# Patient Record
Sex: FEMALE | Race: BLACK OR AFRICAN AMERICAN | NOT HISPANIC OR LATINO | Employment: UNEMPLOYED | URBAN - METROPOLITAN AREA
[De-identification: names, ages, dates, MRNs, and addresses within clinical notes are randomized per-mention and may not be internally consistent; named-entity substitution may affect disease eponyms.]

---

## 2017-01-23 ENCOUNTER — HOSPITAL ENCOUNTER (INPATIENT)
Facility: OTHER | Age: 27
LOS: 5 days | Discharge: HOME | DRG: 812 | End: 2017-01-30
Attending: EMERGENCY MEDICINE | Admitting: HOSPITALIST
Payer: MEDICAID

## 2017-01-23 DIAGNOSIS — D57.00 HB-SS DISEASE WITH CRISIS: ICD-10-CM

## 2017-01-23 DIAGNOSIS — D57.00 SICKLE CELL ANEMIA WITH CRISIS: Primary | ICD-10-CM

## 2017-01-23 DIAGNOSIS — I50.9 HEART FAILURE: ICD-10-CM

## 2017-01-23 DIAGNOSIS — R07.9 CHEST PAIN, UNSPECIFIED TYPE: ICD-10-CM

## 2017-01-23 LAB
ALBUMIN SERPL BCP-MCNC: 3.9 G/DL
ALP SERPL-CCNC: 128 U/L
ALT SERPL W/O P-5'-P-CCNC: 38 U/L
ANION GAP SERPL CALC-SCNC: 9 MMOL/L
AST SERPL-CCNC: 55 U/L
B-HCG UR QL: NEGATIVE
BASOPHILS # BLD AUTO: 0.04 K/UL
BASOPHILS NFR BLD: 0.3 %
BILIRUB SERPL-MCNC: 3.5 MG/DL
BILIRUB UR QL STRIP: NEGATIVE
BUN SERPL-MCNC: 9 MG/DL
CALCIUM SERPL-MCNC: 9.1 MG/DL
CHLORIDE SERPL-SCNC: 108 MMOL/L
CLARITY UR: CLEAR
CO2 SERPL-SCNC: 23 MMOL/L
COLOR UR: YELLOW
CREAT SERPL-MCNC: 0.7 MG/DL
CTP QC/QA: YES
DIFFERENTIAL METHOD: ABNORMAL
EOSINOPHIL # BLD AUTO: 0 K/UL
EOSINOPHIL NFR BLD: 0.1 %
ERYTHROCYTE [DISTWIDTH] IN BLOOD BY AUTOMATED COUNT: 17.7 %
EST. GFR  (AFRICAN AMERICAN): >60 ML/MIN/1.73 M^2
EST. GFR  (NON AFRICAN AMERICAN): >60 ML/MIN/1.73 M^2
GLUCOSE SERPL-MCNC: 100 MG/DL
GLUCOSE UR QL STRIP: NEGATIVE
HCT VFR BLD AUTO: 22.2 %
HGB BLD-MCNC: 7.4 G/DL
HGB UR QL STRIP: NEGATIVE
KETONES UR QL STRIP: NEGATIVE
LEUKOCYTE ESTERASE UR QL STRIP: NEGATIVE
LYMPHOCYTES # BLD AUTO: 1.3 K/UL
LYMPHOCYTES NFR BLD: 8.9 %
MCH RBC QN AUTO: 29.5 PG
MCHC RBC AUTO-ENTMCNC: 33.3 %
MCV RBC AUTO: 88 FL
MONOCYTES # BLD AUTO: 1.9 K/UL
MONOCYTES NFR BLD: 12.8 %
NEUTROPHILS # BLD AUTO: 11.4 K/UL
NEUTROPHILS NFR BLD: 77.1 %
NITRITE UR QL STRIP: NEGATIVE
PH UR STRIP: 6 [PH] (ref 5–8)
PLATELET # BLD AUTO: 449 K/UL
PMV BLD AUTO: 8.3 FL
POTASSIUM SERPL-SCNC: 4 MMOL/L
PROT SERPL-MCNC: 7.4 G/DL
PROT UR QL STRIP: NEGATIVE
RBC # BLD AUTO: 2.51 M/UL
RETICS/RBC NFR AUTO: 11.1 %
SODIUM SERPL-SCNC: 140 MMOL/L
SP GR UR STRIP: 1.01 (ref 1–1.03)
TROPONIN I SERPL DL<=0.01 NG/ML-MCNC: <0.006 NG/ML
URN SPEC COLLECT METH UR: NORMAL
UROBILINOGEN UR STRIP-ACNC: 1 EU/DL
WBC # BLD AUTO: 14.8 K/UL

## 2017-01-23 PROCEDURE — 81003 URINALYSIS AUTO W/O SCOPE: CPT

## 2017-01-23 PROCEDURE — 85025 COMPLETE CBC W/AUTO DIFF WBC: CPT

## 2017-01-23 PROCEDURE — 63600175 PHARM REV CODE 636 W HCPCS: Performed by: HOSPITALIST

## 2017-01-23 PROCEDURE — 85045 AUTOMATED RETICULOCYTE COUNT: CPT

## 2017-01-23 PROCEDURE — 63600175 PHARM REV CODE 636 W HCPCS: Performed by: EMERGENCY MEDICINE

## 2017-01-23 PROCEDURE — G0378 HOSPITAL OBSERVATION PER HR: HCPCS

## 2017-01-23 PROCEDURE — 25000003 PHARM REV CODE 250: Performed by: HOSPITALIST

## 2017-01-23 PROCEDURE — 20000000 HC ICU ROOM

## 2017-01-23 PROCEDURE — 96374 THER/PROPH/DIAG INJ IV PUSH: CPT

## 2017-01-23 PROCEDURE — 93005 ELECTROCARDIOGRAM TRACING: CPT

## 2017-01-23 PROCEDURE — 93010 ELECTROCARDIOGRAM REPORT: CPT | Mod: ,,, | Performed by: INTERNAL MEDICINE

## 2017-01-23 PROCEDURE — 81025 URINE PREGNANCY TEST: CPT | Performed by: EMERGENCY MEDICINE

## 2017-01-23 PROCEDURE — 25000003 PHARM REV CODE 250: Performed by: EMERGENCY MEDICINE

## 2017-01-23 PROCEDURE — 96376 TX/PRO/DX INJ SAME DRUG ADON: CPT

## 2017-01-23 PROCEDURE — 99285 EMERGENCY DEPT VISIT HI MDM: CPT

## 2017-01-23 PROCEDURE — 25500020 PHARM REV CODE 255: Performed by: EMERGENCY MEDICINE

## 2017-01-23 PROCEDURE — 84484 ASSAY OF TROPONIN QUANT: CPT

## 2017-01-23 PROCEDURE — 80053 COMPREHEN METABOLIC PANEL: CPT

## 2017-01-23 PROCEDURE — 96375 TX/PRO/DX INJ NEW DRUG ADDON: CPT

## 2017-01-23 PROCEDURE — 96361 HYDRATE IV INFUSION ADD-ON: CPT

## 2017-01-23 RX ORDER — MORPHINE SULFATE 60 MG/1
60 TABLET, FILM COATED, EXTENDED RELEASE ORAL EVERY 12 HOURS PRN
Status: ON HOLD | COMMUNITY
End: 2017-10-11 | Stop reason: HOSPADM

## 2017-01-23 RX ORDER — HYDROMORPHONE HYDROCHLORIDE 1 MG/ML
1 INJECTION, SOLUTION INTRAMUSCULAR; INTRAVENOUS; SUBCUTANEOUS
Status: COMPLETED | OUTPATIENT
Start: 2017-01-23 | End: 2017-01-23

## 2017-01-23 RX ORDER — DIPHENHYDRAMINE HYDROCHLORIDE 50 MG/ML
25 INJECTION INTRAMUSCULAR; INTRAVENOUS
Status: COMPLETED | OUTPATIENT
Start: 2017-01-23 | End: 2017-01-23

## 2017-01-23 RX ORDER — ONDANSETRON 4 MG/1
4 TABLET, ORALLY DISINTEGRATING ORAL EVERY 8 HOURS PRN
COMMUNITY
End: 2022-03-02

## 2017-01-23 RX ORDER — DIPHENHYDRAMINE HCL 25 MG
25 CAPSULE ORAL
Status: COMPLETED | OUTPATIENT
Start: 2017-01-23 | End: 2017-01-23

## 2017-01-23 RX ORDER — HYDROMORPHONE HYDROCHLORIDE 2 MG/ML
2 INJECTION, SOLUTION INTRAMUSCULAR; INTRAVENOUS; SUBCUTANEOUS
Status: COMPLETED | OUTPATIENT
Start: 2017-01-23 | End: 2017-01-23

## 2017-01-23 RX ORDER — OXYCODONE AND ACETAMINOPHEN 10; 325 MG/1; MG/1
1 TABLET ORAL EVERY 8 HOURS PRN
Status: ON HOLD | COMMUNITY
End: 2017-01-30 | Stop reason: HOSPADM

## 2017-01-23 RX ORDER — FAMOTIDINE 20 MG/1
20 TABLET, FILM COATED ORAL EVERY MORNING
COMMUNITY
End: 2022-03-02

## 2017-01-23 RX ORDER — OXYCODONE HYDROCHLORIDE 10 MG/1
10 TABLET ORAL EVERY 12 HOURS PRN
Status: ON HOLD | COMMUNITY
End: 2017-01-30

## 2017-01-23 RX ORDER — TRAMADOL HYDROCHLORIDE 50 MG/1
50 TABLET ORAL EVERY 6 HOURS PRN
Status: ON HOLD | COMMUNITY
End: 2017-09-06 | Stop reason: HOSPADM

## 2017-01-23 RX ORDER — HYDROMORPHONE HYDROCHLORIDE 2 MG/ML
2 INJECTION, SOLUTION INTRAMUSCULAR; INTRAVENOUS; SUBCUTANEOUS
Status: DISCONTINUED | OUTPATIENT
Start: 2017-01-23 | End: 2017-01-25

## 2017-01-23 RX ORDER — OXYCODONE HYDROCHLORIDE 5 MG/1
10 TABLET ORAL EVERY 4 HOURS PRN
Status: DISCONTINUED | OUTPATIENT
Start: 2017-01-23 | End: 2017-01-30 | Stop reason: HOSPADM

## 2017-01-23 RX ORDER — DIPHENHYDRAMINE HYDROCHLORIDE 50 MG/ML
25 INJECTION INTRAMUSCULAR; INTRAVENOUS EVERY 6 HOURS PRN
Status: DISCONTINUED | OUTPATIENT
Start: 2017-01-23 | End: 2017-01-26

## 2017-01-23 RX ORDER — ACETAMINOPHEN 325 MG/1
650 TABLET ORAL EVERY 4 HOURS PRN
Status: DISCONTINUED | OUTPATIENT
Start: 2017-01-23 | End: 2017-01-30 | Stop reason: HOSPADM

## 2017-01-23 RX ORDER — HYDROXYUREA 500 MG/1
1000 CAPSULE ORAL 2 TIMES DAILY
Status: DISCONTINUED | OUTPATIENT
Start: 2017-01-23 | End: 2017-01-27

## 2017-01-23 RX ORDER — POLYETHYLENE GLYCOL 3350 17 G/17G
17 POWDER, FOR SOLUTION ORAL 2 TIMES DAILY PRN
Status: DISCONTINUED | OUTPATIENT
Start: 2017-01-23 | End: 2017-01-30 | Stop reason: HOSPADM

## 2017-01-23 RX ORDER — ENOXAPARIN SODIUM 100 MG/ML
40 INJECTION SUBCUTANEOUS EVERY 24 HOURS
Status: DISCONTINUED | OUTPATIENT
Start: 2017-01-24 | End: 2017-01-30 | Stop reason: HOSPADM

## 2017-01-23 RX ORDER — AMOXICILLIN 250 MG
1 CAPSULE ORAL 2 TIMES DAILY
Status: DISCONTINUED | OUTPATIENT
Start: 2017-01-23 | End: 2017-01-30 | Stop reason: HOSPADM

## 2017-01-23 RX ORDER — FOLIC ACID 1 MG/1
1 TABLET ORAL 2 TIMES DAILY
Status: DISCONTINUED | OUTPATIENT
Start: 2017-01-23 | End: 2017-01-30 | Stop reason: HOSPADM

## 2017-01-23 RX ORDER — FAMOTIDINE 20 MG/1
20 TABLET, FILM COATED ORAL EVERY MORNING
Status: DISCONTINUED | OUTPATIENT
Start: 2017-01-24 | End: 2017-01-30 | Stop reason: HOSPADM

## 2017-01-23 RX ORDER — DEFERASIROX 500 MG/1
1000 TABLET, FOR SUSPENSION ORAL EVERY MORNING
Status: DISCONTINUED | OUTPATIENT
Start: 2017-01-24 | End: 2017-01-30 | Stop reason: HOSPADM

## 2017-01-23 RX ORDER — SODIUM CHLORIDE 9 MG/ML
INJECTION, SOLUTION INTRAVENOUS CONTINUOUS
Status: DISCONTINUED | OUTPATIENT
Start: 2017-01-23 | End: 2017-01-30 | Stop reason: HOSPADM

## 2017-01-23 RX ORDER — ONDANSETRON 2 MG/ML
4 INJECTION INTRAMUSCULAR; INTRAVENOUS EVERY 12 HOURS PRN
Status: DISCONTINUED | OUTPATIENT
Start: 2017-01-23 | End: 2017-01-30 | Stop reason: HOSPADM

## 2017-01-23 RX ADMIN — DIPHENHYDRAMINE HYDROCHLORIDE 25 MG: 25 CAPSULE ORAL at 03:01

## 2017-01-23 RX ADMIN — HYDROMORPHONE HYDROCHLORIDE 2 MG: 2 INJECTION, SOLUTION INTRAMUSCULAR; INTRAVENOUS; SUBCUTANEOUS at 08:01

## 2017-01-23 RX ADMIN — DIPHENHYDRAMINE HYDROCHLORIDE 25 MG: 50 INJECTION, SOLUTION INTRAMUSCULAR; INTRAVENOUS at 04:01

## 2017-01-23 RX ADMIN — DIPHENHYDRAMINE HYDROCHLORIDE 25 MG: 50 INJECTION, SOLUTION INTRAMUSCULAR; INTRAVENOUS at 10:01

## 2017-01-23 RX ADMIN — STANDARDIZED SENNA CONCENTRATE AND DOCUSATE SODIUM 1 TABLET: 8.6; 5 TABLET, FILM COATED ORAL at 10:01

## 2017-01-23 RX ADMIN — SODIUM CHLORIDE: 0.9 INJECTION, SOLUTION INTRAVENOUS at 10:01

## 2017-01-23 RX ADMIN — DIPHENHYDRAMINE HYDROCHLORIDE 25 MG: 25 CAPSULE ORAL at 02:01

## 2017-01-23 RX ADMIN — HYDROMORPHONE HYDROCHLORIDE 1 MG: 1 INJECTION, SOLUTION INTRAMUSCULAR; INTRAVENOUS; SUBCUTANEOUS at 02:01

## 2017-01-23 RX ADMIN — HYDROXYUREA 1000 MG: 500 CAPSULE ORAL at 10:01

## 2017-01-23 RX ADMIN — OXYCODONE HYDROCHLORIDE 10 MG: 5 TABLET ORAL at 10:01

## 2017-01-23 RX ADMIN — FOLIC ACID 1 MG: 1 TABLET ORAL at 10:01

## 2017-01-23 RX ADMIN — IOHEXOL 75 ML: 350 INJECTION, SOLUTION INTRAVENOUS at 05:01

## 2017-01-23 RX ADMIN — SODIUM CHLORIDE 1000 ML: 0.9 INJECTION, SOLUTION INTRAVENOUS at 02:01

## 2017-01-23 RX ADMIN — HYDROMORPHONE HYDROCHLORIDE 2 MG: 2 INJECTION, SOLUTION INTRAMUSCULAR; INTRAVENOUS; SUBCUTANEOUS at 11:01

## 2017-01-23 RX ADMIN — HYDROMORPHONE HYDROCHLORIDE 1 MG: 1 INJECTION, SOLUTION INTRAMUSCULAR; INTRAVENOUS; SUBCUTANEOUS at 04:01

## 2017-01-23 RX ADMIN — HYDROMORPHONE HYDROCHLORIDE 2 MG: 2 INJECTION, SOLUTION INTRAMUSCULAR; INTRAVENOUS; SUBCUTANEOUS at 03:01

## 2017-01-23 RX ADMIN — SODIUM CHLORIDE 1000 ML: 0.9 INJECTION, SOLUTION INTRAVENOUS at 04:01

## 2017-01-23 NOTE — ED NOTES
Pt with hx of sickle cell reporting to ED with c/o generalized back pain, CP, and L shoulder pain since yesterday. Pt reporting pain 9/10/. Pt is able to move all extremities well, sensation and movement intact. Pt AAOx4 and appropriate at this time. Respirations even and unlabored. No acute distress noted. Awaiting further orders. Pt updated on POC. Bed is locked and in lowest position with side rails up x2. Call bell within reach and pt oriented to use of call bell. Pt on continuous cardiac monitoring, continuous pulse ox, and continuous BP cuff. Will continue to monitor.

## 2017-01-23 NOTE — IP AVS SNAPSHOT
[facility] Location ([facility])  [address] [place], [place] [id number]  Phone: [phone]           Patient Discharge Instructions     Our goal is to set you up for success. This packet includes information on your condition, medications, and your home care. It will help you to care for yourself so you don't get sicker and need to go back to the hospital.     Please ask your nurse if you have any questions.        There are many details to remember when preparing to leave the hospital. Here is what you will need to do:    1. Take your medicine. If you are prescribed medications, review your Medication List in the following pages. You may have new medications to  at the pharmacy and others that you'll need to stop taking. Review the instructions for how and when to take your medications. Talk with your doctor or nurses if you are unsure of what to do.     2. Go to your follow-up appointments. Specific follow-up information is listed in the following pages. Your may be contacted by a transition nurse or clinical provider about future appointments. Be sure we have all of the phone numbers to reach you, if needed. Please contact your provider's office if you are unable to make an appointment.     3. Watch for warning signs. Your doctor or nurse will give you detailed warning signs to watch for and when to call for assistance. These instructions may also include educational information about your condition. If you experience any of warning signs to your health, call your doctor.               [facility]  Unless otherwise directed by your provider, please contact [facility], our nurse care line that is available for 24/7 assistance.     [phone] (toll-free)    Registered nurses in the [facility] provide clinical advisement, health education, appointment booking, and other advisory services.                    ** Verify the list of medication(s) below is accurate and up to  date. Carry this with you in case of emergency. If your medications have changed, please notify your healthcare provider.             Medication List      CONTINUE taking these medications        Additional Info                      deferasirox 500 MG disintegrating tablet   Commonly known as:  EXJADE   Refills:  0   Dose:  1000 mg    Last time this was given:  1,000 mg on 1/29/2017 12:09 PM   Instructions:  Take 1,000 mg by mouth every morning. before breakfast     Begin Date    AM    Noon    PM    Bedtime       famotidine 20 MG tablet   Commonly known as:  PEPCID   Refills:  0   Dose:  20 mg    Last time this was given:  20 mg on 1/30/2017  6:14 AM   Instructions:  Take 20 mg by mouth every morning.     Begin Date    AM    Noon    PM    Bedtime       folic acid 1 MG tablet   Commonly known as:  FOLVITE   Refills:  0   Dose:  1 mg    Last time this was given:  1 mg on 1/30/2017  9:25 AM   Instructions:  Take 1 mg by mouth 2 (two) times daily.     Begin Date    AM    Noon    PM    Bedtime       hydroxyurea 500 mg Cap   Commonly known as:  HYDREA   Refills:  0   Dose:  1000 mg    Last time this was given:  1,000 mg on 1/27/2017  8:38 AM   Instructions:  Take 1,000 mg by mouth 2 (two) times daily.     Begin Date    AM    Noon    PM    Bedtime       morphine 60 MG 12 hr tablet   Commonly known as:  MS CONTIN   Refills:  0   Dose:  60 mg    Last time this was given:  60 mg on 1/30/2017  9:25 AM   Instructions:  Take 60 mg by mouth every 12 (twelve) hours as needed for Pain.     Begin Date    AM    Noon    PM    Bedtime       ondansetron 4 MG Tbdl   Commonly known as:  ZOFRAN-ODT   Refills:  0   Dose:  4 mg    Instructions:  Take 4 mg by mouth every 8 (eight) hours as needed (for nausea).     Begin Date    AM    Noon    PM    Bedtime       oxycodone 10 mg Tab immediate release tablet   Commonly known as:  ROXICODONE   Quantity:  10 tablet   Refills:  0   Dose:  10 mg    Last time this was given:  10 mg on 1/27/2017  4:52  PM   Instructions:  Take 1 tablet (10 mg total) by mouth every 12 (twelve) hours as needed for Pain.     Begin Date    AM    Noon    PM    Bedtime       tramadol 50 mg tablet   Commonly known as:  ULTRAM   Refills:  0   Dose:  50 mg    Instructions:  Take 50 mg by mouth every 6 (six) hours as needed for Pain.     Begin Date    AM    Noon    PM    Bedtime         STOP taking these medications     hydrocodone-acetaminophen 10-325mg  mg Tab   Commonly known as:  NORCO       oxycodone-acetaminophen  mg per tablet   Commonly known as:  PERCOCET            Where to Get Your Medications      You can get these medications from any pharmacy     Bring a paper prescription for each of these medications     oxycodone 10 mg Tab immediate release tablet                  Please bring to all follow up appointments:    1. A copy of your discharge instructions.  2. All medicines you are currently taking in their original bottles.  3. Identification and insurance card.    Please arrive 15 minutes ahead of scheduled appointment time.    Please call 24 hours in advance if you must reschedule your appointment and/or time.        Follow-up Information     Follow up On 5/8/2017.    Why:   @ 2PM 2ND FLOOR WITH DR DUQUE ( [address] [place] [address] ) [facility] # [id number]  NEW PCP APPT         Please follow up.    Contact information:    You have an appointment scheduled with Dr. Howard for Tues. Jan. 31, for 9:40 AM        Discharge Instructions     Future Orders    Activity as tolerated     Diet general     Questions:    Total calories:      Fat restriction, if any:      Protein restriction, if any:      Na restriction, if any:      Fluid restriction:      Additional restrictions:          Discharge Instructions       Your feedback is important to us.  If you should receive a survey in the next few days, please share your experience with us.      Discharge References/Attachments     SICKLE CELL PAIN CRISIS (ENGLISH)    SICKLE CELL  "ANEMIA AND SICKLE CELL CRISIS, DISCHARGE INSTRUCTIONS (ENGLISH)        Primary Diagnosis     Your primary diagnosis was:  Sickle Cell Anemia With Pain      Admission Information     Date & Time Provider Department CSN    1/23/2017 12:40 PM Adam Turk MD [facility] [id number]      Care Providers     Provider Role Specialty Primary office phone    Adam Turk MD Attending Provider Hospitalist [phone]    Brian Erazo MD Consulting Physician  Hematology and Oncology [phone]      Your Vitals Were     BP Pulse Temp Resp Height Weight    111/61 (BP Location: Right arm, Patient Position: Lying, BP Method: Automatic) 79 97.6 °F (36.4 °C) (Oral) 18 4' 11" (1.499 m) 49.5 kg (109 lb 1.6 oz)    Last Period SpO2 BMI          (LMP Unknown) 98% 22.04 kg/m2        Recent Lab Values     No lab values to display.      Pending Labs     Order Current Status    Parvovirus B19 antibody, IgG and IgM In process    Prepare RBC 2 Units In process    Blood culture Preliminary result    Prepare RBC 1 Unit Preliminary result    Prepare RBC 1 Unit Preliminary result      Allergies as of 1/30/2017        Reactions    Iodine And Iodide Containing Products Hives, Swelling    Zithromax [Azithromycin] Anaphylaxis    Demerol [Meperidine] Other (See Comments)    Regarding reaction to demerol pt states "I talk out of my head and become aggressive"      Advance Directives     An advance directive is a document which, in the event you are no longer able to make decisions for yourself, tells your healthcare team what kind of treatment you do or do not want to receive, or who you would like to make those decisions for you.  If you do not currently have an advance directive, [facility] encourages you to create one.  For more information call:  [phone]), [phone] ([phone]),  or log on to [email or web address].        Language Assistance Services     ATTENTION: Language assistance services are " available, free of charge. Please call [phone].      ATENCIÓN: Si noeim morton, tiene a gilman disposición servicios gratuitos de asistencia lingüística. Lladriel al [phone].     CHÚ Ý: N?u b?n nói Ti?ng Vi?t, có các d?ch v? h? tr? ngôn ng? mi?n phí dành cho b?n. G?i s? [phone].        Blood Transfusion Reaction Signs and Symptoms     The blood you have received has been matched for you as carefully as possible. Most patients who receive a blood transfusion do not experience problems. However, there can be a delayed reaction that happens a few weeks after your blood transfusion. Contact your physician immediately if you experience any NEW SYMPTOMS listed below:     Fever greater than 100.4 degrees    Chills   Yellow color to your skin or eyes(Jaundice)   Back pain, chest pain, or pain at the infusion site   Weakness (more than usual)   Discomfort or uneasiness more than usual (Malaise)   Nausea or vomiting   Shortness of breath, wheezing, or coughing   Higher or lower blood pressure than normal   Skin rash, itching, skin redness, or localized swelling (example: hands or feet)   Urinating less than normal   Urine appears reddish or orange and is darker than normal      Remember that some these signs may already exist for you--such as having chronic back pain or high blood pressure. You only need to look for and report to your doctor any new occurrences since your blood transfusion that are of concern.        [facility] Sign-Up     Activating your [facility] account is as easy as 1-2-3!     1) Visit [email or web address], select Sign Up Now, enter this activation code and your date of birth, then select Next.  [email or web address]  Expires: 3/16/2017 11:30 AM      2) Create a username and password to use when you visit [facility] in the future and select a security question in case you lose your password and select Next.    3) Enter your e-mail address and click Sign Up!    Additional Information  If you  have questions, please e-mail [email or web address] or call [phone] to talk to our [facility] staff. Remember, [facility] is NOT to be used for urgent needs. For medical emergencies, dial 911.          [facility] complies with applicable Federal civil rights laws and does not discriminate on the basis of race, color, national origin, age, disability, or sex.

## 2017-01-23 NOTE — ED PROVIDER NOTES
"Encounter Date: 1/23/2017    SCRIBE #1 NOTE: I, John Edwards, am scribing for, and in the presence of,  Dr. Carey. I have scribed the entire note.       History     Chief Complaint   Patient presents with    Sickle Cell Pain Crisis     Pt reports L sided back, chest, and shoulder pain that feels like typical presentation of sickle cell crisis.      Review of patient's allergies indicates:   Allergen Reactions    Zithromax [azithromycin] Anaphylaxis    Demerol [meperidine] Other (See Comments)     Regarding reaction to demerol pt states "I talk out of my head and become aggressive"     HPI Comments: Time seen by provider: 12:43 PM    This is a 26 y.o. female with sickle cell anemia who presents with complaint of back pain, left shoulder pain and chest pain that she attributes to her chronic sickle cell pain crisis. The pain started yesterday and has not been relieved by taking her home medications.  She reports that the pain is typical both in character, intensity, and location to her usual pain crisis.  She last took her Oxycodone 4.5 hours ago. She rates her pain a 9/10 in severity and describes it as "sharp and stabbing." She notes some accompanying cough but denies fever, chills, SOB, rashes nausea, vomiting, diarrhea, constipation, abdominal pain, urinary symptoms, LOC, palpitations, numbness and tingling. She is compliant with her home medications. Her PSHx includes cholecystectomy and left hip replacement.    The history is provided by the patient.     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    Avascular necrosis of bone of hip     Avascular necrosis of humeral head     Blood transfusion     Sickle cell disease      Past Medical History Pertinent Negatives   Diagnosis Date Noted    Anticoagulant long-term use 12/5/2013    Arthritis 12/5/2013    Asthma 12/5/2013    Cancer 12/5/2013    CHF (congestive heart failure) 12/5/2013    COPD " (chronic obstructive pulmonary disease) 12/5/2013    Coronary artery disease 12/5/2013    Diabetes mellitus 12/5/2013    Hypertension 12/5/2013    Seizures 12/5/2013    Stroke 12/5/2013    Thyroid disease 12/5/2013    Transfusion reaction 12/5/2013     Past Surgical History   Procedure Laterality Date    Joint replacement        left hip    Cholecystectomy       Family History   Problem Relation Age of Onset    Sickle cell trait Mother     Sickle cell trait Father     Sickle cell trait Brother     Sickle cell trait Daughter      Social History   Substance Use Topics    Smoking status: Never Smoker    Smokeless tobacco: None    Alcohol use 0.0 oz/week      Comment: occassionally     Review of Systems   Constitutional: Negative for chills, diaphoresis and fever.   HENT: Negative for congestion and sore throat.    Eyes: Negative for pain and visual disturbance.   Respiratory: Positive for cough. Negative for shortness of breath.    Cardiovascular: Positive for chest pain. Negative for palpitations.   Gastrointestinal: Negative for abdominal pain, diarrhea, nausea and vomiting.   Genitourinary: Negative for dysuria and vaginal discharge.   Musculoskeletal: Positive for back pain. Negative for joint swelling, myalgias, neck pain and neck stiffness.        Left shoulder pain   Skin: Negative for color change and rash.   Neurological: Negative for weakness, numbness and headaches.   Psychiatric/Behavioral: Negative for behavioral problems and confusion.       Physical Exam   Initial Vitals   BP Pulse Resp Temp SpO2   01/23/17 1239 01/23/17 1239 01/23/17 1239 01/23/17 1239 01/23/17 1239   110/72 122 17 98.1 °F (36.7 °C) 97 %     Physical Exam    Nursing note and vitals reviewed.  Constitutional: She appears well-developed and well-nourished. She is not diaphoretic. No distress.   HENT:   Head: Normocephalic and atraumatic.   Mouth/Throat: Oropharynx is clear and moist.   Eyes: Conjunctivae and EOM are  normal. Pupils are equal, round, and reactive to light. Right eye exhibits no discharge. Left eye exhibits no discharge.   Neck: Normal range of motion. Neck supple.   Cardiovascular: Regular rhythm and normal heart sounds. Tachycardia present.  Exam reveals no gallop and no friction rub.    No murmur heard.  Pulmonary/Chest: Breath sounds normal. No respiratory distress. She has no wheezes. She has no rhonchi. She has no rales.   Abdominal: Soft. Bowel sounds are normal. She exhibits no distension. There is no tenderness. There is no rebound and no guarding.   Musculoskeletal: She exhibits no edema.   Left shoulder with TTP but no overlying erythema or induration. Bilateral lumbar paraspinal tenderness.    Neurological: She is alert and oriented to person, place, and time. She has normal strength. No sensory deficit.   Skin: Skin is warm and dry. No rash and no abscess noted. No erythema. No pallor.   Psychiatric: She has a normal mood and affect. Her behavior is normal. Judgment and thought content normal.         ED Course   Procedures  Labs Reviewed   CBC W/ AUTO DIFFERENTIAL - Abnormal; Notable for the following:        Result Value    WBC 14.80 (*)     RBC 2.51 (*)     Hemoglobin 7.4 (*)     Hematocrit 22.2 (*)     RDW 17.7 (*)     Platelets 449 (*)     MPV 8.3 (*)     Gran # 11.4 (*)     Mono # 1.9 (*)     Gran% 77.1 (*)     Lymph% 8.9 (*)     All other components within normal limits   COMPREHENSIVE METABOLIC PANEL - Abnormal; Notable for the following:     Total Bilirubin 3.5 (*)     AST 55 (*)     All other components within normal limits   RETICULOCYTES - Abnormal; Notable for the following:     Retic 11.1 (*)     All other components within normal limits   URINALYSIS   TROPONIN I   POCT URINE PREGNANCY     EKG Readings: (Independently Interpreted)   Initial Reading: No STEMI.   Sinus tachycardia at a rate of 105. Prominent appearance of T wave in V2 which is present on previous EKG from 9/28/15.         X-Rays:   Independently Interpreted Readings:   Chest X-Ray: No infiltrate, effusion, pneumothorax or acute process. Port present which terminates in the right atrium.       Imaging Results         CTA Chest Non-Coronary (PE Study) (Final result) Result time:  01/23/17 18:15:49    Final result by Rikki Brizuela MD (01/23/17 18:15:49)    Impression:       No evidence of pulmonary embolism.    Mosaic pattern of attenuation in the pulmonary parenchyma with airspace opacities in the lower lobes.  The findings may be seen with pulmonary artery hypertension or reactive airway disease.  Suggest clinical correlation.    Findings of autosplenectomy and sclerosis throughout the osseous structures, consistent with sickle cell disease.              Electronically signed by: RIKKI BRIZUELA MD  Date:     01/23/17  Time:    18:15     Narrative:    Exam: [id number]  01/23/17  17:01:35 [id number] ([facility]) : CTA CHEST NON CORONARY    Technique:     CT images of the chest obtained during pulmonary arterial phase after injection of 75 cc Omnipaque IV contrast. Coronal and Sagittal Reformats were obtained.       Comparison:    1/23/17    Findings:      CT pulmonary embolism examination:  There are no filling defects within the pulmonary tree to suggest a pulmonary embolism.  The pulmonary tree is nondilated.    CT chest:    The thyroid gland in the base of the neck are within normal limits.    The trachea and the central airways are within normal limits.  There is no evidence of bronchiectasis.  No endobronchial lesion is identified.    The heart is normal in appearance.  No pericardial effusions identified.  No coronary artery calcifications are present.    The thoracic aorta is normal in caliber.  There is no evidence of an aneurysm or dissections.  The visualized musculature is unremarkable.    There is nonspecific prominent soft tissue in the anterior mediastinum, which represent residuum of the thymus.  There is a 1.2 x 1.3 cm right hilar  lymph node.  No additional enlarged lymph nodes are present in the chest.  Prominent lymph nodes are present in the axillary regions left greater than right.    There is no evidence of a pneumothorax.  There are no pleural effusions.  No pleural effusion is present.    There are patchy airspace opacities in the bilateral lower lobes.  There is mosaic attenuation of the pulmonary parenchyma.  No discrete nodules are identified.    There is H-shaped appearance of the vertebral bodies and diffuse sclerosis of the osseous structures, in keeping with history of sickle cell disease.  There is prominent breast tissue, which may be seen with lactation.    There is autosplenectomy.  The reminder of the visualized upper abdominal structures are unremarkable.            X-Ray Chest PA And Lateral (Final result) Result time:  01/23/17 13:54:05    Final result by Roberto Harrison MD (01/23/17 13:54:05)    Impression:     No acute process identified.        Electronically signed by: ROBERTO HARRISON MD  Date:     01/23/17  Time:    13:54     Narrative:    Chest pain.    Comparison: 9/28/15     Findings:2 view examination.Left chest single lumen port via subclavian approach noted with tip projected over SVC.No pneumothorax. The cardiomediastinal contour is within normal limits. No findings to suggest pleural effusions. The lungs are clear.There is fish mouth deformity of the vertebral bodies in keeping with sickle cell disease. Cholecystectomy clips noted.             Medical Decision Making:   Clinical Tests:   Lab Tests: Ordered and Reviewed  Radiological Study: Ordered and Reviewed  Medical Tests: Ordered and Reviewed  ED Management:  4:38 PM - on reassessment after the patient has had a total of 3mg Dilaudid she is still writhing around in bed complaining of continued 9/10 chest pain. Her HR on the monitor is 126. I will test for pulmonary embolism and then plan to admit.     Emergent evaluation of 26 she'll female with history of  sickle cell anemia who presents with complaint of chest pain, reported sickle cell crisis.  Vital signs reveal significant tachycardia, afebrile.  On exam there is no obvious source for bacterial infection.  Although she complains of left shoulder pain, there is no evidence of a septic joint or cellulitis.  She was treated with IV fluids and analgesia, with mild improvement.  EKG and screening troponin are negative, I doubt acute cardiac ischemia.  Labs show anemia with elevated reticulocyte count, consistent with acute sickle cell crisis, but no aplastic anemia.  Chest x-ray did not show any infiltrates to support diagnosis of acute chest syndrome.  On reassessment patient remained significantly tachycardic, so I did check a CTA.  There was no evidence of pulmonary embolus.  She was admitted in stable condition for further care and treatment.  She may need possible transfusion after hydration, repeat CBC will be checked.  Other:   I have discussed this case with another health care provider.       <> Summary of the Discussion: 6:33 PM - I discussed the case with Dr. Mckeon (hospitalist) who will admit the patient.     Additional MDM:   EKG: I have independently interpreted EKG(s) - see notes.   X-Rays: I have independently interpreted X-Ray(s) - see notes.          Scribe Attestation:   Scribe #1: I performed the above scribed service and the documentation accurately describes the services I performed. I attest to the accuracy of the note.    Attending Attestation:           Physician Attestation for Scribe:  Physician Attestation Statement for Scribe #1: I, Dr. Carey, reviewed documentation, as scribed by John Edwards in my presence, and it is both accurate and complete.                 ED Course     Clinical Impression:     1. Sickle cell anemia with crisis    2. Chest pain, unspecified type               Marge Carey MD  01/23/17 2035

## 2017-01-24 LAB
ABO + RH BLD: NORMAL
ANION GAP SERPL CALC-SCNC: 9 MMOL/L
ANISOCYTOSIS BLD QL SMEAR: ABNORMAL
BASOPHILS # BLD AUTO: ABNORMAL K/UL
BASOPHILS NFR BLD: 0 %
BLD GP AB SCN CELLS X3 SERPL QL: NORMAL
BUN SERPL-MCNC: 7 MG/DL
CALCIUM SERPL-MCNC: 8.7 MG/DL
CHLORIDE SERPL-SCNC: 108 MMOL/L
CO2 SERPL-SCNC: 20 MMOL/L
CREAT SERPL-MCNC: 0.6 MG/DL
DIASTOLIC DYSFUNCTION: NO
DIFFERENTIAL METHOD: ABNORMAL
EOSINOPHIL # BLD AUTO: ABNORMAL K/UL
EOSINOPHIL NFR BLD: 0 %
ERYTHROCYTE [DISTWIDTH] IN BLOOD BY AUTOMATED COUNT: 16.7 %
EST. GFR  (AFRICAN AMERICAN): >60 ML/MIN/1.73 M^2
EST. GFR  (NON AFRICAN AMERICAN): >60 ML/MIN/1.73 M^2
ESTIMATED PA SYSTOLIC PRESSURE: 29.01
GLOBAL PERICARDIAL EFFUSION: NORMAL
GLUCOSE SERPL-MCNC: 111 MG/DL
HCT VFR BLD AUTO: 18.5 %
HCT VFR BLD AUTO: 18.6 %
HGB BLD-MCNC: 6.2 G/DL
HGB BLD-MCNC: 6.3 G/DL
HYPOCHROMIA BLD QL SMEAR: ABNORMAL
LYMPHOCYTES # BLD AUTO: ABNORMAL K/UL
LYMPHOCYTES NFR BLD: 5 %
MAGNESIUM SERPL-MCNC: 1.8 MG/DL
MCH RBC QN AUTO: 29.7 PG
MCHC RBC AUTO-ENTMCNC: 33.5 %
MCV RBC AUTO: 89 FL
MITRAL VALVE REGURGITATION: NORMAL
MONOCYTES # BLD AUTO: ABNORMAL K/UL
MONOCYTES NFR BLD: 12 %
NEUTROPHILS NFR BLD: 80 %
NEUTS BAND NFR BLD MANUAL: 3 %
NRBC BLD-RTO: 3 /100 WBC
OVALOCYTES BLD QL SMEAR: ABNORMAL
PHOSPHATE SERPL-MCNC: 3.9 MG/DL
PLATELET # BLD AUTO: 410 K/UL
PLATELET BLD QL SMEAR: ABNORMAL
PMV BLD AUTO: 8.8 FL
POIKILOCYTOSIS BLD QL SMEAR: ABNORMAL
POLYCHROMASIA BLD QL SMEAR: ABNORMAL
POTASSIUM SERPL-SCNC: 4.2 MMOL/L
RBC # BLD AUTO: 2.09 M/UL
RETIRED EF AND QEF - SEE NOTES: 60 (ref 55–65)
SICKLE CELLS BLD QL SMEAR: ABNORMAL
SODIUM SERPL-SCNC: 137 MMOL/L
STOMATOCYTES BLD QL SMEAR: PRESENT
TARGETS BLD QL SMEAR: ABNORMAL
TRICUSPID VALVE REGURGITATION: NORMAL
WBC # BLD AUTO: 15.21 K/UL

## 2017-01-24 PROCEDURE — 25000003 PHARM REV CODE 250: Performed by: HOSPITALIST

## 2017-01-24 PROCEDURE — 93010 ELECTROCARDIOGRAM REPORT: CPT | Mod: ,,, | Performed by: INTERNAL MEDICINE

## 2017-01-24 PROCEDURE — 86920 COMPATIBILITY TEST SPIN: CPT

## 2017-01-24 PROCEDURE — 86900 BLOOD TYPING SEROLOGIC ABO: CPT

## 2017-01-24 PROCEDURE — 85027 COMPLETE CBC AUTOMATED: CPT

## 2017-01-24 PROCEDURE — 36415 COLL VENOUS BLD VENIPUNCTURE: CPT

## 2017-01-24 PROCEDURE — 85018 HEMOGLOBIN: CPT

## 2017-01-24 PROCEDURE — 99222 1ST HOSP IP/OBS MODERATE 55: CPT | Mod: ,,, | Performed by: INTERNAL MEDICINE

## 2017-01-24 PROCEDURE — 93306 TTE W/DOPPLER COMPLETE: CPT

## 2017-01-24 PROCEDURE — 93005 ELECTROCARDIOGRAM TRACING: CPT

## 2017-01-24 PROCEDURE — 27201040 HC RC 50 FILTER: Mod: 91

## 2017-01-24 PROCEDURE — 84100 ASSAY OF PHOSPHORUS: CPT

## 2017-01-24 PROCEDURE — 85007 BL SMEAR W/DIFF WBC COUNT: CPT | Mod: NCS

## 2017-01-24 PROCEDURE — 85014 HEMATOCRIT: CPT

## 2017-01-24 PROCEDURE — 99223 1ST HOSP IP/OBS HIGH 75: CPT | Mod: ,,, | Performed by: HOSPITALIST

## 2017-01-24 PROCEDURE — 63600175 PHARM REV CODE 636 W HCPCS: Performed by: HOSPITALIST

## 2017-01-24 PROCEDURE — 80048 BASIC METABOLIC PNL TOTAL CA: CPT

## 2017-01-24 PROCEDURE — 86850 RBC ANTIBODY SCREEN: CPT

## 2017-01-24 PROCEDURE — 83735 ASSAY OF MAGNESIUM: CPT

## 2017-01-24 PROCEDURE — 63600175 PHARM REV CODE 636 W HCPCS: Performed by: PHYSICIAN ASSISTANT

## 2017-01-24 PROCEDURE — G0378 HOSPITAL OBSERVATION PER HR: HCPCS

## 2017-01-24 PROCEDURE — 20000000 HC ICU ROOM

## 2017-01-24 RX ORDER — MORPHINE SULFATE 15 MG/1
30 TABLET, FILM COATED, EXTENDED RELEASE ORAL EVERY 12 HOURS
Status: DISCONTINUED | OUTPATIENT
Start: 2017-01-24 | End: 2017-01-24

## 2017-01-24 RX ORDER — HYDROCODONE BITARTRATE AND ACETAMINOPHEN 500; 5 MG/1; MG/1
TABLET ORAL
Status: DISCONTINUED | OUTPATIENT
Start: 2017-01-24 | End: 2017-01-26

## 2017-01-24 RX ORDER — DIPHENHYDRAMINE HYDROCHLORIDE 50 MG/ML
25 INJECTION INTRAMUSCULAR; INTRAVENOUS ONCE
Status: COMPLETED | OUTPATIENT
Start: 2017-01-24 | End: 2017-01-24

## 2017-01-24 RX ORDER — DIPHENHYDRAMINE HYDROCHLORIDE 50 MG/ML
50 INJECTION INTRAMUSCULAR; INTRAVENOUS ONCE
Status: COMPLETED | OUTPATIENT
Start: 2017-01-24 | End: 2017-01-25

## 2017-01-24 RX ORDER — MORPHINE SULFATE 15 MG/1
60 TABLET, FILM COATED, EXTENDED RELEASE ORAL EVERY 12 HOURS
Status: DISCONTINUED | OUTPATIENT
Start: 2017-01-24 | End: 2017-01-30 | Stop reason: HOSPADM

## 2017-01-24 RX ADMIN — STANDARDIZED SENNA CONCENTRATE AND DOCUSATE SODIUM 1 TABLET: 8.6; 5 TABLET, FILM COATED ORAL at 08:01

## 2017-01-24 RX ADMIN — HYDROMORPHONE HYDROCHLORIDE 2 MG: 2 INJECTION, SOLUTION INTRAMUSCULAR; INTRAVENOUS; SUBCUTANEOUS at 05:01

## 2017-01-24 RX ADMIN — OXYCODONE HYDROCHLORIDE 10 MG: 5 TABLET ORAL at 02:01

## 2017-01-24 RX ADMIN — DIPHENHYDRAMINE HYDROCHLORIDE 25 MG: 50 INJECTION, SOLUTION INTRAMUSCULAR; INTRAVENOUS at 06:01

## 2017-01-24 RX ADMIN — FOLIC ACID 1 MG: 1 TABLET ORAL at 08:01

## 2017-01-24 RX ADMIN — FOLIC ACID 1 MG: 1 TABLET ORAL at 09:01

## 2017-01-24 RX ADMIN — HYDROMORPHONE HYDROCHLORIDE 2 MG: 2 INJECTION, SOLUTION INTRAMUSCULAR; INTRAVENOUS; SUBCUTANEOUS at 11:01

## 2017-01-24 RX ADMIN — HYDROMORPHONE HYDROCHLORIDE 2 MG: 2 INJECTION, SOLUTION INTRAMUSCULAR; INTRAVENOUS; SUBCUTANEOUS at 08:01

## 2017-01-24 RX ADMIN — ENOXAPARIN SODIUM 40 MG: 100 INJECTION SUBCUTANEOUS at 11:01

## 2017-01-24 RX ADMIN — HYDROXYUREA 1000 MG: 500 CAPSULE ORAL at 08:01

## 2017-01-24 RX ADMIN — MORPHINE SULFATE 60 MG: 15 TABLET, EXTENDED RELEASE ORAL at 03:01

## 2017-01-24 RX ADMIN — HYDROMORPHONE HYDROCHLORIDE 2 MG: 2 INJECTION, SOLUTION INTRAMUSCULAR; INTRAVENOUS; SUBCUTANEOUS at 04:01

## 2017-01-24 RX ADMIN — DIPHENHYDRAMINE HYDROCHLORIDE 25 MG: 50 INJECTION, SOLUTION INTRAMUSCULAR; INTRAVENOUS at 11:01

## 2017-01-24 RX ADMIN — FAMOTIDINE 20 MG: 20 TABLET, FILM COATED ORAL at 06:01

## 2017-01-24 RX ADMIN — STANDARDIZED SENNA CONCENTRATE AND DOCUSATE SODIUM 1 TABLET: 8.6; 5 TABLET, FILM COATED ORAL at 09:01

## 2017-01-24 RX ADMIN — SODIUM CHLORIDE: 0.9 INJECTION, SOLUTION INTRAVENOUS at 07:01

## 2017-01-24 RX ADMIN — HYDROMORPHONE HYDROCHLORIDE 2 MG: 2 INJECTION, SOLUTION INTRAMUSCULAR; INTRAVENOUS; SUBCUTANEOUS at 02:01

## 2017-01-24 RX ADMIN — OXYCODONE HYDROCHLORIDE 10 MG: 5 TABLET ORAL at 09:01

## 2017-01-24 RX ADMIN — OXYCODONE HYDROCHLORIDE 10 MG: 5 TABLET ORAL at 06:01

## 2017-01-24 RX ADMIN — ONDANSETRON 4 MG: 2 INJECTION INTRAMUSCULAR; INTRAVENOUS at 08:01

## 2017-01-24 RX ADMIN — SODIUM CHLORIDE: 0.9 INJECTION, SOLUTION INTRAVENOUS at 01:01

## 2017-01-24 RX ADMIN — HYDROMORPHONE HYDROCHLORIDE 2 MG: 2 INJECTION, SOLUTION INTRAMUSCULAR; INTRAVENOUS; SUBCUTANEOUS at 07:01

## 2017-01-24 RX ADMIN — OXYCODONE HYDROCHLORIDE 10 MG: 5 TABLET ORAL at 10:01

## 2017-01-24 RX ADMIN — HYDROXYUREA 1000 MG: 500 CAPSULE ORAL at 09:01

## 2017-01-24 RX ADMIN — HYDROMORPHONE HYDROCHLORIDE 2 MG: 2 INJECTION, SOLUTION INTRAMUSCULAR; INTRAVENOUS; SUBCUTANEOUS at 01:01

## 2017-01-24 NOTE — SUBJECTIVE & OBJECTIVE
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      Current Outpatient Prescriptions on File Prior to Encounter   Medication Sig    deferasirox (EXJADE) 500 MG disintegrating tablet Take 1,000 mg by mouth every morning. before breakfast    folic acid (FOLVITE) 1 MG tablet Take 1 mg by mouth 2 (two) times daily.    hydrocodone-acetaminophen 10-325mg (NORCO)  mg Tab Take 1 tablet by mouth every 12 (twelve) hours as needed for Pain.     hydroxyurea (HYDREA) 500 mg Cap Take 1,000 mg by mouth 2 (two) times daily.      Family History     Problem Relation (Age of Onset)    Sickle cell trait Mother, Father, Brother, Daughter        Social History Main Topics    Smoking status: Never Smoker    Smokeless tobacco: Not on file    Alcohol use 0.0 oz/week      Comment: occassionally    Drug use: No    Sexual activity: Yes     Partners: Male     Birth control/ protection: Injection     Review of Systems   Constitutional: Negative for appetite change, chills and fever.   HENT: Negative for congestion, nosebleeds, sore throat and trouble swallowing.    Eyes: Negative for pain, discharge and redness.   Respiratory: Positive for cough. Negative for shortness of breath and stridor.    Cardiovascular: Positive for chest pain. Negative for palpitations and leg swelling.   Gastrointestinal: Negative for abdominal pain, " blood in stool, diarrhea, nausea and vomiting.   Endocrine: Negative for polydipsia, polyphagia and polyuria.   Genitourinary: Negative for difficulty urinating, dysuria, flank pain, frequency, urgency and vaginal pain.   Musculoskeletal: Positive for back pain and myalgias. Negative for neck pain and neck stiffness.   Skin: Negative for color change and pallor.   Neurological: Negative for dizziness, seizures, light-headedness and numbness.   Psychiatric/Behavioral: Negative for agitation.     Objective:     Vital Signs (Most Recent):  Temp: 98.4 °F (36.9 °C) (01/24/17 0400)  Pulse: (!) 136 (01/24/17 0700)  Resp: 20 (01/24/17 0400)  BP: (!) 125/57 (01/24/17 0400)  SpO2: 97 % (01/24/17 0400) Vital Signs (24h Range):  Temp:  [98.1 °F (36.7 °C)-100.7 °F (38.2 °C)] 98.4 °F (36.9 °C)  Pulse:  [104-136] 136  Resp:  [17-32] 20  SpO2:  [95 %-100 %] 97 %  BP: (104-125)/(51-72) 125/57     Weight: 49.5 kg (109 lb 1.6 oz)  Body mass index is 22.04 kg/(m^2).    Physical Exam   Constitutional: She is oriented to person, place, and time. She appears well-developed and well-nourished. She appears distressed (secondary to pain).   HENT:   Head: Normocephalic and atraumatic.   Mouth/Throat: Oropharynx is clear and moist. No oropharyngeal exudate.   Eyes: Conjunctivae are normal. Right eye exhibits no discharge. Left eye exhibits no discharge. No scleral icterus.   Neck: Normal range of motion. Neck supple. No JVD present. No tracheal deviation present.   Cardiovascular:   tachycardic   Abdominal: Soft. Bowel sounds are normal. She exhibits no distension. There is no tenderness.   Musculoskeletal: Normal range of motion. She exhibits no edema, tenderness or deformity.   Neurological: She is alert and oriented to person, place, and time. No cranial nerve deficit.   Skin: Skin is warm and dry.   Psychiatric: She has a normal mood and affect.   Nursing note and vitals reviewed.       Significant Labs:   CBC:   Recent Labs  Lab  01/23/17  1331 01/24/17  0332 01/24/17  0559   WBC 14.80* 15.21*  --    HGB 7.4* 6.2* 6.3*   HCT 22.2* 18.5* 18.6*   * 410*  --      CMP:   Recent Labs  Lab 01/23/17  1331 01/24/17  0332    137   K 4.0 4.2    108   CO2 23 20*    111*   BUN 9 7   CREATININE 0.7 0.6   CALCIUM 9.1 8.7   PROT 7.4  --    ALBUMIN 3.9  --    BILITOT 3.5*  --    ALKPHOS 128  --    AST 55*  --    ALT 38  --    ANIONGAP 9 9   EGFRNONAA >60 >60     All pertinent labs within the past 24 hours have been reviewed.    Significant Imaging: I have reviewed all pertinent imaging results/findings within the past 24 hours.   CT chest 1/23/2017  No evidence of pulmonary embolism.    Mosaic pattern of attenuation in the pulmonary parenchyma with airspace opacities in the lower lobes.  The findings may be seen with pulmonary artery hypertension or reactive airway disease.  Suggest clinical correlation.    Findings of autosplenectomy and sclerosis throughout the osseous structures, consistent with sickle cell disease.

## 2017-01-24 NOTE — ASSESSMENT & PLAN NOTE
"- retic 11.1; H&H 6.3/18.6  - transfuse 1 unit PRBC's; patient states she feels like she needs " blood"    "

## 2017-01-24 NOTE — PLAN OF CARE
ATTN: TEAM   DC PLANNING     NO NEEDS VOICED PER PT     Hortencia Grigsby RN  Case management 1/24/20174:29 PM  # [phone] (FAX) [phone]     01/24/17 1630   Discharge Assessment   Assessment Type Discharge Planning Assessment   Confirmed/corrected address and phone number on facesheet? Yes   Assessment information obtained from? Patient   Prior to hospitilization cognitive status: Alert/Oriented   Prior to hospitalization functional status: Independent   Current cognitive status: Alert/Oriented   Current Functional Status: Independent   Lives With child(asha), dependent   Able to Return to Prior Arrangements yes   Is patient able to care for self after discharge? Yes   Patient currently being followed by outpatient case management? No   Patient currently receives home health services? No   Does the patient currently use HME? No   Patient currently receives private duty nursing? N/A   Patient currently receives any other outside agency services? No   Equipment Currently Used at Home none   Do you have any problems affording any of your prescribed medications? TBD   Is the patient taking medications as prescribed? no   Do you have any financial concerns preventing you from receiving the healthcare you need? No   Does the patient have transportation to healthcare appointments? No   On Dialysis? No   Does the patient receive services at the [facility]? No   Are there any open cases? No   Discharge Plan A Home with family   Discharge Plan B Home with family   Patient/Family In Agreement With Plan yes

## 2017-01-24 NOTE — ASSESSMENT & PLAN NOTE
- IVF's  - pain management, currently on Oxycodone IR 10 q4, hydromorphone 2 mg Q3  - Hematology consulted  - no PE on CT chest

## 2017-01-24 NOTE — H&P
"[facility] Hospital Medicine  History & Physical    Patient Name: Nishi Dumont  MRN: [id number]  Admission Date: 1/23/2017  Attending Physician: Adam Turk MD   Primary Care Provider: Vannessa Alcala MD         Patient information was obtained from patient, past medical records and ER records.     Subjective:     Principal Problem:Sickle cell anemia with crisis    Chief Complaint: chest pain    HPI: 26 y.o. female with sickle cell anemia who presents to ED with c/o back pain and chest pain that she attributes to her chronic sickle cell pain crisis. Reports she was in Hospital in [facility] 2-3 weeks ago for same symptoms. Current pain started yesterday and has not been relieved by taking her home medications.  She reports that the pain is typical both in character, intensity, and location to her usual pain crisis, 9/10 in severity and describes it as "sharp and stabbing." Reports cough but denies fever, chills, SOB, rashes nausea, vomiting, diarrhea, constipation, abdominal pain, urinary symptoms, LOC, palpitations, numbness and tingling. She is compliant with her home medications.     Her PSHx includes cholecystectomy and left hip replacement.  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      Current Outpatient Prescriptions on File " Prior to Encounter   Medication Sig    deferasirox (EXJADE) 500 MG disintegrating tablet Take 1,000 mg by mouth every morning. before breakfast    folic acid (FOLVITE) 1 MG tablet Take 1 mg by mouth 2 (two) times daily.    hydrocodone-acetaminophen 10-325mg (NORCO)  mg Tab Take 1 tablet by mouth every 12 (twelve) hours as needed for Pain.     hydroxyurea (HYDREA) 500 mg Cap Take 1,000 mg by mouth 2 (two) times daily.      Family History     Problem Relation (Age of Onset)    Sickle cell trait Mother, Father, Brother, Daughter        Social History Main Topics    Smoking status: Never Smoker    Smokeless tobacco: Not on file    Alcohol use 0.0 oz/week      Comment: occassionally    Drug use: No    Sexual activity: Yes     Partners: Male     Birth control/ protection: Injection     Review of Systems   Constitutional: Negative for appetite change, chills and fever.   HENT: Negative for congestion, nosebleeds, sore throat and trouble swallowing.    Eyes: Negative for pain, discharge and redness.   Respiratory: Positive for cough. Negative for shortness of breath and stridor.    Cardiovascular: Positive for chest pain. Negative for palpitations and leg swelling.   Gastrointestinal: Negative for abdominal pain, blood in stool, diarrhea, nausea and vomiting.   Endocrine: Negative for polydipsia, polyphagia and polyuria.   Genitourinary: Negative for difficulty urinating, dysuria, flank pain, frequency, urgency and vaginal pain.   Musculoskeletal: Positive for back pain and myalgias. Negative for neck pain and neck stiffness.   Skin: Negative for color change and pallor.   Neurological: Negative for dizziness, seizures, light-headedness and numbness.   Psychiatric/Behavioral: Negative for agitation.     Objective:     Vital Signs (Most Recent):  Temp: 98.4 °F (36.9 °C) (01/24/17 0400)  Pulse: (!) 136 (01/24/17 0700)  Resp: 20 (01/24/17 0400)  BP: (!) 125/57 (01/24/17 0400)  SpO2: 97 % (01/24/17 0400) Vital  Signs (24h Range):  Temp:  [98.1 °F (36.7 °C)-100.7 °F (38.2 °C)] 98.4 °F (36.9 °C)  Pulse:  [104-136] 136  Resp:  [17-32] 20  SpO2:  [95 %-100 %] 97 %  BP: (104-125)/(51-72) 125/57     Weight: 49.5 kg (109 lb 1.6 oz)  Body mass index is 22.04 kg/(m^2).    Physical Exam   Constitutional: She is oriented to person, place, and time. She appears well-developed and well-nourished. She appears distressed (secondary to pain).   HENT:   Head: Normocephalic and atraumatic.   Mouth/Throat: Oropharynx is clear and moist. No oropharyngeal exudate.   Eyes: Conjunctivae are normal. Right eye exhibits no discharge. Left eye exhibits no discharge. No scleral icterus.   Neck: Normal range of motion. Neck supple. No JVD present. No tracheal deviation present.   Cardiovascular:   tachycardic   Abdominal: Soft. Bowel sounds are normal. She exhibits no distension. There is no tenderness.   Musculoskeletal: Normal range of motion. She exhibits no edema, tenderness or deformity.   Neurological: She is alert and oriented to person, place, and time. No cranial nerve deficit.   Skin: Skin is warm and dry.   Psychiatric: She has a normal mood and affect.   Nursing note and vitals reviewed.       Significant Labs:   CBC:   Recent Labs  Lab 01/23/17  1331 01/24/17  0332 01/24/17  0559   WBC 14.80* 15.21*  --    HGB 7.4* 6.2* 6.3*   HCT 22.2* 18.5* 18.6*   * 410*  --      CMP:   Recent Labs  Lab 01/23/17  1331 01/24/17  0332    137   K 4.0 4.2    108   CO2 23 20*    111*   BUN 9 7   CREATININE 0.7 0.6   CALCIUM 9.1 8.7   PROT 7.4  --    ALBUMIN 3.9  --    BILITOT 3.5*  --    ALKPHOS 128  --    AST 55*  --    ALT 38  --    ANIONGAP 9 9   EGFRNONAA >60 >60     All pertinent labs within the past 24 hours have been reviewed.    Significant Imaging: I have reviewed all pertinent imaging results/findings within the past 24 hours.   CT chest 1/23/2017  No evidence of pulmonary embolism.    Mosaic pattern of attenuation in the  "pulmonary parenchyma with airspace opacities in the lower lobes.  The findings may be seen with pulmonary artery hypertension or reactive airway disease.  Suggest clinical correlation.    Findings of autosplenectomy and sclerosis throughout the osseous structures, consistent with sickle cell disease.    Assessment/Plan:     * Sickle cell anemia with crisis  - IVF's  - pain management, currently on Oxycodone IR 10 q4, hydromorphone 2 mg Q3  - Hematology consulted  - no PE on CT chest      Sickle cell anemia  - retic 11.1; H&H 6.3/18.6  - transfuse 1 unit PRBC's; patient states she feels like she needs " blood"      Fever  - resolved w/o intervention, monitor      VTE Risk Mitigation         Ordered     enoxaparin injection 40 mg  Daily     Route:  Subcutaneous        01/23/17 2127     Medium Risk of VTE  Once      01/23/17 2127        Brook Williamson PA-C  Department of Hospital Medicine   [facility]  "

## 2017-01-24 NOTE — CONSULTS
Consult Note  Hematology/Oncology      Consult Requested By: Adam Turk MD    Reason for Consult: Sickle cell anemia    SUBJECTIVE:     History of Present Illness:  Patient is a 26 y.o. female presents with back and chest pain typical of her normal sickle cell pain crisis.  She's had no fever and no shortness of breath.  O2 saturations have been 100% on room air.   She describes her crises as occurring approximately twice per month and typically lasting 2-4 days.  That has been her pattern for the last 6 months.    Her hematologic care is at [facility] and her last visit with her hematologist was in November.  She has been on hydroxyurea 1 g per day for some time..    Admission labs showed a hemoglobin of 7.4, reticulocyte count of 11.  Hemoglobin declined to 6.2 today.     CT angiogram showed no evidence of pulmonary embolism.  There were some pulmonary parenchymal changes felt to be consistent with pulmonary artery hypertension or small airways disease.  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    Avascular necrosis of bone of hip     Avascular necrosis of humeral head     Blood transfusion     Sickle cell disease      Past Surgical History   Procedure Laterality Date    Joint replacement        left hip    Cholecystectomy       Family History   Problem Relation Age of Onset    Sickle cell trait Mother     Sickle cell trait Father     Sickle cell trait  "Brother     Sickle cell trait Daughter      Social History   Substance Use Topics    Smoking status: Never Smoker    Smokeless tobacco: None    Alcohol use 0.0 oz/week      Comment: occassionally       Review of patient's allergies indicates:   Allergen Reactions    Iodine and iodide containing products Hives and Swelling    Zithromax [azithromycin] Anaphylaxis    Demerol [meperidine] Other (See Comments)     Regarding reaction to demerol pt states "I talk out of my head and become aggressive"        Review of Systems:  Constitutional: no fever or chills  Respiratory: no cough or shortness of breath  Musculoskeletal: Back and chest pain    OBJECTIVE:     Vital Signs (Most Recent)  Temp: 98.4 °F (36.9 °C) (01/24/17 1230)  Pulse: (!) 117 (01/24/17 1230)  Resp: 20 (01/24/17 1230)  BP: 116/66 (01/24/17 1230)  SpO2: 98 % (01/24/17 1230)    Pain Assessment: 8    Vital Signs Range (Last 24H):  Temp:  [98.4 °F (36.9 °C)-100.7 °F (38.2 °C)]   Pulse:  [114-136]   Resp:  [20-27]   BP: ()/(51-71)   SpO2:  [95 %-100 %]     Physical Exam:  General: well developed, well nourished, moderate distress  HENT: Head:normocephalic, atraumatic. Ears:not examined. Nose: no discharge, no epistaxis. Throat: lips, mucosa, and tongue normal; teeth and gums normal and no throat erythema.  Lungs:  clear to auscultation bilaterally, normal respiratory effort and Respiratory effort suboptimal  Cardiovascular: Heart: regular rate and rhythm. Chest Wall: not examined. Extremities: no cyanosis or edema, or clubbing. Pulses: not examined.  Neurologic: Mental status: Alert, oriented, thought content appropriate    Laboratory:  CBC with Differential:    Recent Labs  Lab 01/24/17  0332 01/24/17  0559   WBC 15.21*  --    LYMPH 5.0*  CANCELED  --    BASOPHIL 0.0  --    RBC 2.09*  --    HCT 18.5* 18.6*   HGB 6.2* 6.3*   MCV 89  --    MCH 29.7  --    RDW 16.7*  --    *  --    MPV 8.8*  --      CMP:    Recent Labs  Lab 01/24/17  0332 "   *   CALCIUM 8.7      K 4.2   CO2 20*      BUN 7   CREATININE 0.6     BMP:   Recent Labs  Lab 01/24/17  0332   *   CALCIUM 8.7      K 4.2   CO2 20*      BUN 7   CREATININE 0.6     LFTs: No results for input(s): ALT, AST, ALKPHOS, BILITOT, PROT, ALBUMIN in the last 24 hours.    Diagnostic Results:  Labs: Reviewed  CT: Reviewed    ASSESSMENT/PLAN:     Sickle cell pain crisis-usual sites, no evidence for acute chest syndrome.    Plan: Continue supportive care with IV fluids and pain medication.  Hemoglobin has declined but transfusion not necessarily indicated for that value, especially given likely pre-existing iron overload.  Would consider increasing her Dilaudid dose as her pain control has not been good at 2 mg.  I told her she should discuss possible increase in her Hydrea dose with her hematologist at [facility] as 1000 mg is not a high dose and her pain crises if not been well controlled.

## 2017-01-25 LAB
ALBUMIN SERPL BCP-MCNC: 3.2 G/DL
ALP SERPL-CCNC: 109 U/L
ALT SERPL W/O P-5'-P-CCNC: 23 U/L
ANION GAP SERPL CALC-SCNC: 8 MMOL/L
ANISOCYTOSIS BLD QL SMEAR: ABNORMAL
AST SERPL-CCNC: 22 U/L
BASOPHILS # BLD AUTO: ABNORMAL K/UL
BASOPHILS NFR BLD: 0 %
BILIRUB SERPL-MCNC: 4.9 MG/DL
BILIRUB UR QL STRIP: ABNORMAL
BILIRUB UR QL STRIP: ABNORMAL
BLD PROD TYP BPU: NORMAL
BLD PROD TYP BPU: NORMAL
BLOOD UNIT EXPIRATION DATE: NORMAL
BLOOD UNIT EXPIRATION DATE: NORMAL
BLOOD UNIT TYPE CODE: 5100
BLOOD UNIT TYPE CODE: 5100
BLOOD UNIT TYPE: NORMAL
BLOOD UNIT TYPE: NORMAL
BUN SERPL-MCNC: 8 MG/DL
CALCIUM SERPL-MCNC: 8.6 MG/DL
CHLORIDE SERPL-SCNC: 110 MMOL/L
CLARITY UR: CLEAR
CLARITY UR: CLEAR
CO2 SERPL-SCNC: 20 MMOL/L
CODING SYSTEM: NORMAL
CODING SYSTEM: NORMAL
COLOR UR: YELLOW
COLOR UR: YELLOW
CREAT SERPL-MCNC: 0.6 MG/DL
DIFFERENTIAL METHOD: ABNORMAL
DISPENSE STATUS: NORMAL
DISPENSE STATUS: NORMAL
EOSINOPHIL # BLD AUTO: ABNORMAL K/UL
EOSINOPHIL NFR BLD: 0 %
ERYTHROCYTE [DISTWIDTH] IN BLOOD BY AUTOMATED COUNT: 15.9 %
EST. GFR  (AFRICAN AMERICAN): >60 ML/MIN/1.73 M^2
EST. GFR  (NON AFRICAN AMERICAN): >60 ML/MIN/1.73 M^2
FLUAV AG SPEC QL IA: NEGATIVE
FLUBV AG SPEC QL IA: NEGATIVE
GLUCOSE SERPL-MCNC: 80 MG/DL
GLUCOSE UR QL STRIP: NEGATIVE
GLUCOSE UR QL STRIP: NEGATIVE
HCT VFR BLD AUTO: 12.1 %
HCT VFR BLD AUTO: 16.2 %
HCT VFR BLD AUTO: 22.6 %
HGB BLD-MCNC: 4.1 G/DL
HGB BLD-MCNC: 5.6 G/DL
HGB BLD-MCNC: 7.9 G/DL
HGB UR QL STRIP: NEGATIVE
HGB UR QL STRIP: NEGATIVE
HYPOCHROMIA BLD QL SMEAR: ABNORMAL
KETONES UR QL STRIP: NEGATIVE
KETONES UR QL STRIP: NEGATIVE
LACTATE SERPL-SCNC: 0.6 MMOL/L
LACTATE SERPL-SCNC: 0.6 MMOL/L
LACTATE SERPL-SCNC: 0.7 MMOL/L
LDH SERPL L TO P-CCNC: 626 U/L
LEUKOCYTE ESTERASE UR QL STRIP: NEGATIVE
LEUKOCYTE ESTERASE UR QL STRIP: NEGATIVE
LYMPHOCYTES # BLD AUTO: ABNORMAL K/UL
LYMPHOCYTES NFR BLD: 6 %
MAGNESIUM SERPL-MCNC: 1.8 MG/DL
MCH RBC QN AUTO: 29.5 PG
MCHC RBC AUTO-ENTMCNC: 33.9 %
MCV RBC AUTO: 87 FL
MONOCYTES # BLD AUTO: ABNORMAL K/UL
MONOCYTES NFR BLD: 11 %
NEUTROPHILS NFR BLD: 81 %
NEUTS BAND NFR BLD MANUAL: 2 %
NITRITE UR QL STRIP: NEGATIVE
NITRITE UR QL STRIP: NEGATIVE
NUM UNITS TRANS PACKED RBC: NORMAL
NUM UNITS TRANS PACKED RBC: NORMAL
OVALOCYTES BLD QL SMEAR: ABNORMAL
PH UR STRIP: 7 [PH] (ref 5–8)
PH UR STRIP: 7 [PH] (ref 5–8)
PHOSPHATE SERPL-MCNC: 2.7 MG/DL
PLATELET # BLD AUTO: 426 K/UL
PLATELET BLD QL SMEAR: ABNORMAL
PMV BLD AUTO: 8.5 FL
POIKILOCYTOSIS BLD QL SMEAR: ABNORMAL
POLYCHROMASIA BLD QL SMEAR: ABNORMAL
POTASSIUM SERPL-SCNC: 3.7 MMOL/L
PROT SERPL-MCNC: 6.4 G/DL
PROT UR QL STRIP: NEGATIVE
PROT UR QL STRIP: NEGATIVE
RBC # BLD AUTO: 1.39 M/UL
RETICS/RBC NFR AUTO: 10.6 %
RETICS/RBC NFR AUTO: 10.9 %
SICKLE CELLS BLD QL SMEAR: ABNORMAL
SODIUM SERPL-SCNC: 138 MMOL/L
SP GR UR STRIP: 1.01 (ref 1–1.03)
SP GR UR STRIP: 1.01 (ref 1–1.03)
SPECIMEN SOURCE: NORMAL
STOMATOCYTES BLD QL SMEAR: PRESENT
TARGETS BLD QL SMEAR: ABNORMAL
URN SPEC COLLECT METH UR: ABNORMAL
URN SPEC COLLECT METH UR: ABNORMAL
UROBILINOGEN UR STRIP-ACNC: 1 EU/DL
UROBILINOGEN UR STRIP-ACNC: 1 EU/DL
WBC # BLD AUTO: 19.85 K/UL

## 2017-01-25 PROCEDURE — 84100 ASSAY OF PHOSPHORUS: CPT

## 2017-01-25 PROCEDURE — P9011 BLOOD SPLIT UNIT: HCPCS

## 2017-01-25 PROCEDURE — 80053 COMPREHEN METABOLIC PANEL: CPT

## 2017-01-25 PROCEDURE — 25000003 PHARM REV CODE 250: Performed by: HOSPITALIST

## 2017-01-25 PROCEDURE — 83615 LACTATE (LD) (LDH) ENZYME: CPT

## 2017-01-25 PROCEDURE — 63600175 PHARM REV CODE 636 W HCPCS: Performed by: HOSPITALIST

## 2017-01-25 PROCEDURE — 85007 BL SMEAR W/DIFF WBC COUNT: CPT

## 2017-01-25 PROCEDURE — 36430 TRANSFUSION BLD/BLD COMPNT: CPT

## 2017-01-25 PROCEDURE — 85014 HEMATOCRIT: CPT | Mod: 91

## 2017-01-25 PROCEDURE — 83735 ASSAY OF MAGNESIUM: CPT

## 2017-01-25 PROCEDURE — 83605 ASSAY OF LACTIC ACID: CPT | Mod: 91

## 2017-01-25 PROCEDURE — 85018 HEMOGLOBIN: CPT | Mod: 91

## 2017-01-25 PROCEDURE — 63600175 PHARM REV CODE 636 W HCPCS: Performed by: PHYSICIAN ASSISTANT

## 2017-01-25 PROCEDURE — 99233 SBSQ HOSP IP/OBS HIGH 50: CPT | Mod: ,,, | Performed by: HOSPITALIST

## 2017-01-25 PROCEDURE — 99233 SBSQ HOSP IP/OBS HIGH 50: CPT | Mod: ,,, | Performed by: INTERNAL MEDICINE

## 2017-01-25 PROCEDURE — P9038 RBC IRRADIATED: HCPCS | Mod: BL

## 2017-01-25 PROCEDURE — 85045 AUTOMATED RETICULOCYTE COUNT: CPT | Mod: 91

## 2017-01-25 PROCEDURE — 87040 BLOOD CULTURE FOR BACTERIA: CPT

## 2017-01-25 PROCEDURE — 86985 SPLIT BLOOD OR PRODUCTS: CPT

## 2017-01-25 PROCEDURE — 81003 URINALYSIS AUTO W/O SCOPE: CPT

## 2017-01-25 PROCEDURE — 87086 URINE CULTURE/COLONY COUNT: CPT

## 2017-01-25 PROCEDURE — 36415 COLL VENOUS BLD VENIPUNCTURE: CPT

## 2017-01-25 PROCEDURE — 20000000 HC ICU ROOM

## 2017-01-25 PROCEDURE — P9011 BLOOD SPLIT UNIT: HCPCS | Mod: BL

## 2017-01-25 PROCEDURE — P9038 RBC IRRADIATED: HCPCS

## 2017-01-25 PROCEDURE — 87400 INFLUENZA A/B EACH AG IA: CPT | Mod: 59

## 2017-01-25 PROCEDURE — 85027 COMPLETE CBC AUTOMATED: CPT

## 2017-01-25 RX ORDER — HYDROCODONE BITARTRATE AND ACETAMINOPHEN 500; 5 MG/1; MG/1
TABLET ORAL
Status: DISCONTINUED | OUTPATIENT
Start: 2017-01-25 | End: 2017-01-26

## 2017-01-25 RX ORDER — HYDROMORPHONE HYDROCHLORIDE 1 MG/ML
3 INJECTION, SOLUTION INTRAMUSCULAR; INTRAVENOUS; SUBCUTANEOUS
Status: DISCONTINUED | OUTPATIENT
Start: 2017-01-25 | End: 2017-01-26

## 2017-01-25 RX ADMIN — HYDROMORPHONE HYDROCHLORIDE 2 MG: 2 INJECTION, SOLUTION INTRAMUSCULAR; INTRAVENOUS; SUBCUTANEOUS at 05:01

## 2017-01-25 RX ADMIN — OXYCODONE HYDROCHLORIDE 10 MG: 5 TABLET ORAL at 06:01

## 2017-01-25 RX ADMIN — HYDROMORPHONE HYDROCHLORIDE 2 MG: 2 INJECTION, SOLUTION INTRAMUSCULAR; INTRAVENOUS; SUBCUTANEOUS at 01:01

## 2017-01-25 RX ADMIN — OXYCODONE HYDROCHLORIDE 10 MG: 5 TABLET ORAL at 09:01

## 2017-01-25 RX ADMIN — OXYCODONE HYDROCHLORIDE 10 MG: 5 TABLET ORAL at 07:01

## 2017-01-25 RX ADMIN — ACETAMINOPHEN 650 MG: 325 TABLET ORAL at 06:01

## 2017-01-25 RX ADMIN — HYDROXYUREA 1000 MG: 500 CAPSULE ORAL at 08:01

## 2017-01-25 RX ADMIN — HYDROMORPHONE HYDROCHLORIDE 3 MG: 1 INJECTION, SOLUTION INTRAMUSCULAR; INTRAVENOUS; SUBCUTANEOUS at 06:01

## 2017-01-25 RX ADMIN — FAMOTIDINE 20 MG: 20 TABLET, FILM COATED ORAL at 07:01

## 2017-01-25 RX ADMIN — PIPERACILLIN SODIUM AND TAZOBACTAM SODIUM 4.5 G: 4; .5 INJECTION, POWDER, LYOPHILIZED, FOR SOLUTION INTRAVENOUS at 08:01

## 2017-01-25 RX ADMIN — DIPHENHYDRAMINE HYDROCHLORIDE 25 MG: 50 INJECTION, SOLUTION INTRAMUSCULAR; INTRAVENOUS at 07:01

## 2017-01-25 RX ADMIN — PIPERACILLIN SODIUM AND TAZOBACTAM SODIUM 4.5 G: 4; .5 INJECTION, POWDER, LYOPHILIZED, FOR SOLUTION INTRAVENOUS at 02:01

## 2017-01-25 RX ADMIN — STANDARDIZED SENNA CONCENTRATE AND DOCUSATE SODIUM 1 TABLET: 8.6; 5 TABLET, FILM COATED ORAL at 08:01

## 2017-01-25 RX ADMIN — HYDROMORPHONE HYDROCHLORIDE 3 MG: 1 INJECTION, SOLUTION INTRAMUSCULAR; INTRAVENOUS; SUBCUTANEOUS at 02:01

## 2017-01-25 RX ADMIN — OXYCODONE HYDROCHLORIDE 10 MG: 5 TABLET ORAL at 10:01

## 2017-01-25 RX ADMIN — MORPHINE SULFATE 60 MG: 15 TABLET, EXTENDED RELEASE ORAL at 08:01

## 2017-01-25 RX ADMIN — HYDROMORPHONE HYDROCHLORIDE 2 MG: 2 INJECTION, SOLUTION INTRAMUSCULAR; INTRAVENOUS; SUBCUTANEOUS at 10:01

## 2017-01-25 RX ADMIN — DIPHENHYDRAMINE HYDROCHLORIDE 25 MG: 50 INJECTION, SOLUTION INTRAMUSCULAR; INTRAVENOUS at 03:01

## 2017-01-25 RX ADMIN — OXYCODONE HYDROCHLORIDE 10 MG: 5 TABLET ORAL at 02:01

## 2017-01-25 RX ADMIN — DIPHENHYDRAMINE HYDROCHLORIDE 25 MG: 50 INJECTION, SOLUTION INTRAMUSCULAR; INTRAVENOUS at 05:01

## 2017-01-25 RX ADMIN — DIPHENHYDRAMINE HYDROCHLORIDE 50 MG: 50 INJECTION, SOLUTION INTRAMUSCULAR; INTRAVENOUS at 10:01

## 2017-01-25 RX ADMIN — DIPHENHYDRAMINE HYDROCHLORIDE 25 MG: 50 INJECTION, SOLUTION INTRAMUSCULAR; INTRAVENOUS at 12:01

## 2017-01-25 RX ADMIN — ENOXAPARIN SODIUM 40 MG: 100 INJECTION SUBCUTANEOUS at 01:01

## 2017-01-25 RX ADMIN — HYDROMORPHONE HYDROCHLORIDE 3 MG: 1 INJECTION, SOLUTION INTRAMUSCULAR; INTRAVENOUS; SUBCUTANEOUS at 09:01

## 2017-01-25 RX ADMIN — HYDROMORPHONE HYDROCHLORIDE 2 MG: 2 INJECTION, SOLUTION INTRAMUSCULAR; INTRAVENOUS; SUBCUTANEOUS at 08:01

## 2017-01-25 RX ADMIN — SODIUM CHLORIDE: 0.9 INJECTION, SOLUTION INTRAVENOUS at 01:01

## 2017-01-25 RX ADMIN — FOLIC ACID 1 MG: 1 TABLET ORAL at 08:01

## 2017-01-25 RX ADMIN — ACETAMINOPHEN 650 MG: 325 TABLET ORAL at 05:01

## 2017-01-25 RX ADMIN — SODIUM CHLORIDE: 0.9 INJECTION, SOLUTION INTRAVENOUS at 10:01

## 2017-01-25 NOTE — SUBJECTIVE & OBJECTIVE
Interval History: febrile, c/o chest, back pain    Review of Systems   Constitutional: Positive for chills and fever. Negative for appetite change.   HENT: Negative for congestion, nosebleeds, sore throat and trouble swallowing.    Eyes: Negative for pain, discharge and redness.   Respiratory: Positive for cough. Negative for shortness of breath and stridor.    Cardiovascular: Positive for chest pain and palpitations. Negative for leg swelling.   Gastrointestinal: Negative for abdominal pain, blood in stool, diarrhea, nausea and vomiting.   Endocrine: Negative for polydipsia, polyphagia and polyuria.   Genitourinary: Negative for difficulty urinating, dysuria, flank pain, frequency, urgency and vaginal pain.   Musculoskeletal: Positive for back pain and myalgias. Negative for neck pain and neck stiffness.   Skin: Negative for color change and pallor.   Neurological: Negative for dizziness, seizures, light-headedness and numbness.   Psychiatric/Behavioral: Negative for agitation.     Objective:     Vital Signs (Most Recent):  Temp: 98.3 °F (36.8 °C) (01/25/17 1030)  Pulse: (!) 118 (01/25/17 1045)  Resp: (!) 23 (01/25/17 1045)  BP: 109/69 (01/25/17 1045)  SpO2: 100 % (01/25/17 1045) Vital Signs (24h Range):  Temp:  [98.3 °F (36.8 °C)-102.4 °F (39.1 °C)] 98.3 °F (36.8 °C)  Pulse:  [112-136] 118  Resp:  [19-26] 23  SpO2:  [91 %-100 %] 100 %  BP: ()/(55-76) 109/69     Weight: 49.5 kg (109 lb 1.6 oz)  Body mass index is 22.04 kg/(m^2).    Intake/Output Summary (Last 24 hours) at 01/25/17 1356  Last data filed at 01/25/17 0910   Gross per 24 hour   Intake           5297.5 ml   Output                0 ml   Net           5297.5 ml      Physical Exam   Constitutional: She is oriented to person, place, and time. She appears well-developed and well-nourished. She appears distressed (secondary to pain).   HENT:   Head: Normocephalic and atraumatic.   Mouth/Throat: Oropharynx is clear and moist. No oropharyngeal exudate.    Eyes: Right eye exhibits no discharge. Left eye exhibits no discharge. Scleral icterus is present.   Neck: Normal range of motion. Neck supple. No JVD present. No tracheal deviation present.   Cardiovascular:   tachycardic   Pulmonary/Chest: Effort normal and breath sounds normal. No respiratory distress.   Abdominal: Soft. Bowel sounds are normal. She exhibits no distension. There is no tenderness.   Musculoskeletal: Normal range of motion. She exhibits no edema, tenderness or deformity.   Neurological: She is alert and oriented to person, place, and time. No cranial nerve deficit.   Skin: Skin is warm and dry.   Psychiatric: She has a normal mood and affect.   Nursing note and vitals reviewed.      Significant Labs:   CBC:   Recent Labs  Lab 01/24/17 0332 01/24/17  0559 01/25/17  0530 01/25/17  0920   WBC 15.21*  --  19.85*  --    HGB 6.2* 6.3* 4.1* 5.6*   HCT 18.5* 18.6* 12.1* 16.2*   *  --  426*  --      CMP:   Recent Labs  Lab 01/24/17 0332 01/25/17  0530    138   K 4.2 3.7    110   CO2 20* 20*   * 80   BUN 7 8   CREATININE 0.6 0.6   CALCIUM 8.7 8.6*   PROT  --  6.4   ALBUMIN  --  3.2*   BILITOT  --  4.9*   ALKPHOS  --  109   AST  --  22   ALT  --  23   ANIONGAP 9 8   EGFRNONAA >60 >60     All pertinent labs within the past 24 hours have been reviewed.    Significant Imaging: I have reviewed all pertinent imaging results/findings within the past 24 hours.   CXR As before, there is a left subclavian line with tip in superior vena cava.  Cardiac size is not enlarged.  There is a diminished inspiration.  Accentuation of increased markings in the lower lung fields likely reflects atelectasis versus developing infiltrate.  No large volume of pleural fluid is present.  There are changes in osseous structures which are the sequelae of sickle cell disease.

## 2017-01-25 NOTE — PROGRESS NOTES
Patient transferred vis wheelchair to ICU bed 10; ambulates to bed; AAOx3, DELEON & follows commands; C/O generalized pain 8/10 noted; VSS; Sinus tach on monitor; no s/s of resp distress noted; temp-99.1; see full assessment; will continue to monitor

## 2017-01-25 NOTE — PLAN OF CARE
Problem: Patient Care Overview  Goal: Plan of Care Review  Outcome: Ongoing (interventions implemented as appropriate)      Resting in bed at this time.  Tachycardic on Telemetry up to 150 bpm and monitored temperature closely  100.3-97.9 F/PO on 2L/NC-RA.  Pain has been an issue this shift, suffers with 9-10/0-10 remains in SCC.  Poor appetite and good UOP this shift, with BRP.  Repositions self independently in bed and ambulating around room independently.       PRBCs not rcvd this shift.   MD Castro Hemoc consulted and following     Free from injury or skin breakdown; Fall precautions maintained and call light in reach.  POC updated questions answered and comments acknowledged.  Purposeful hourly rounding completed this shift.

## 2017-01-25 NOTE — PROGRESS NOTES
"[facility] Medicine  Progress Note    Patient Name: Nishi Dumont  MRN: [id number]  Patient Class: OP- Observation   Admission Date: 1/23/2017  Length of Stay: 0 days  Attending Physician: Adam Turk MD  Primary Care Provider: Vannessa Alcala MD        Subjective:     Principal Problem:Sickle cell anemia with crisis    HPI:  26 y.o. female with sickle cell anemia who presents to ED with c/o back pain and chest pain that she attributes to her chronic sickle cell pain crisis. Reports she was in Hospital in [facility] 2-3 weeks ago for same symptoms. Current pain started yesterday and has not been relieved by taking her home medications.  She reports that the pain is typical both in character, intensity, and location to her usual pain crisis, 9/10 in severity and describes it as "sharp and stabbing." Reports cough but denies fever, chills, SOB, rashes nausea, vomiting, diarrhea, constipation, abdominal pain, urinary symptoms, LOC, palpitations, numbness and tingling. She is compliant with her home medications.     Her PSHx includes cholecystectomy and left hip replacement.  FHx mother and father with sickle cell trait      Hospital Course:  Admitted with sickle cell crisis placed on IVF's, pain medication. Hospital course notable for fever, H&H drop to 4.1 and transferred to ICU.     Interval History: febrile, c/o chest, back pain    Review of Systems   Constitutional: Positive for chills and fever. Negative for appetite change.   HENT: Negative for congestion, nosebleeds, sore throat and trouble swallowing.    Eyes: Negative for pain, discharge and redness.   Respiratory: Positive for cough. Negative for shortness of breath and stridor.    Cardiovascular: Positive for chest pain and palpitations. Negative for leg swelling.   Gastrointestinal: Negative for abdominal pain, blood in stool, diarrhea, nausea and vomiting.   Endocrine: Negative for polydipsia, polyphagia and polyuria. "   Genitourinary: Negative for difficulty urinating, dysuria, flank pain, frequency, urgency and vaginal pain.   Musculoskeletal: Positive for back pain and myalgias. Negative for neck pain and neck stiffness.   Skin: Negative for color change and pallor.   Neurological: Negative for dizziness, seizures, light-headedness and numbness.   Psychiatric/Behavioral: Negative for agitation.     Objective:     Vital Signs (Most Recent):  Temp: 98.3 °F (36.8 °C) (01/25/17 1030)  Pulse: (!) 118 (01/25/17 1045)  Resp: (!) 23 (01/25/17 1045)  BP: 109/69 (01/25/17 1045)  SpO2: 100 % (01/25/17 1045) Vital Signs (24h Range):  Temp:  [98.3 °F (36.8 °C)-102.4 °F (39.1 °C)] 98.3 °F (36.8 °C)  Pulse:  [112-136] 118  Resp:  [19-26] 23  SpO2:  [91 %-100 %] 100 %  BP: ()/(55-76) 109/69     Weight: 49.5 kg (109 lb 1.6 oz)  Body mass index is 22.04 kg/(m^2).    Intake/Output Summary (Last 24 hours) at 01/25/17 1356  Last data filed at 01/25/17 0910   Gross per 24 hour   Intake           5297.5 ml   Output                0 ml   Net           5297.5 ml      Physical Exam   Constitutional: She is oriented to person, place, and time. She appears well-developed and well-nourished. She appears distressed (secondary to pain).   HENT:   Head: Normocephalic and atraumatic.   Mouth/Throat: Oropharynx is clear and moist. No oropharyngeal exudate.   Eyes: Right eye exhibits no discharge. Left eye exhibits no discharge. Scleral icterus is present.   Neck: Normal range of motion. Neck supple. No JVD present. No tracheal deviation present.   Cardiovascular:   tachycardic   Pulmonary/Chest: Effort normal and breath sounds normal. No respiratory distress.   Abdominal: Soft. Bowel sounds are normal. She exhibits no distension. There is no tenderness.   Musculoskeletal: Normal range of motion. She exhibits no edema, tenderness or deformity.   Neurological: She is alert and oriented to person, place, and time. No cranial nerve deficit.   Skin: Skin is  warm and dry.   Psychiatric: She has a normal mood and affect.   Nursing note and vitals reviewed.      Significant Labs:   CBC:   Recent Labs  Lab 01/24/17  0332 01/24/17  0559 01/25/17  0530 01/25/17  0920   WBC 15.21*  --  19.85*  --    HGB 6.2* 6.3* 4.1* 5.6*   HCT 18.5* 18.6* 12.1* 16.2*   *  --  426*  --      CMP:   Recent Labs  Lab 01/24/17  0332 01/25/17  0530    138   K 4.2 3.7    110   CO2 20* 20*   * 80   BUN 7 8   CREATININE 0.6 0.6   CALCIUM 8.7 8.6*   PROT  --  6.4   ALBUMIN  --  3.2*   BILITOT  --  4.9*   ALKPHOS  --  109   AST  --  22   ALT  --  23   ANIONGAP 9 8   EGFRNONAA >60 >60     All pertinent labs within the past 24 hours have been reviewed.    Significant Imaging: I have reviewed all pertinent imaging results/findings within the past 24 hours.   CXR As before, there is a left subclavian line with tip in superior vena cava.  Cardiac size is not enlarged.  There is a diminished inspiration.  Accentuation of increased markings in the lower lung fields likely reflects atelectasis versus developing infiltrate.  No large volume of pleural fluid is present.  There are changes in osseous structures which are the sequelae of sickle cell disease.    Assessment/Plan:      * Sickle cell anemia with crisis  - IVF's  - transfuse PRBC's  - monitor LDH, CBC;  today  - per Heme if no improvement within the next 24- 24 hours would consider blood exchange.  - pain management, currently on Oxycodone IR 10 q4, hydromorphone 2 mg Q3  - no PE on CT chest  - transfer to ICU for closer hemodynamic monitoring      Sickle cell anemia  - retic 10.6,  H&H  Dropped today 4.1/12.1  - transfuse PRBC's      Fever  - panculture  - start Zosyn empirically      VTE Risk Mitigation         Ordered     enoxaparin injection 40 mg  Daily     Route:  Subcutaneous        01/23/17 2127     Medium Risk of VTE  Once      01/23/17 2127          Brook Williamson PA-C  Department of [facility]    [facility]

## 2017-01-25 NOTE — NURSING
During bedside reporting lab called nurse to report critical low H&H results. DIXON Romano RN paged MD twice to report these results. Also called house supervisor to report not reaching the MD. After vital signs were taken I also paged MD and called house supervisor and charge nurse. SUSANA Doe then came to bedside to assess patient. It was determined that she should be moved up to ICU status. House supervisor and charge nurse notified at this time. Report was called to GILLIAN Phoenix in ICU. AM meds were given, along with PRN pain meds. Patient was able to ambulate to  and complete hygiene routine without SOB or difficulty. C/O increased pain in chest. Temp max overnight 102.4F. GILLIAN Mirza, charge nurse transferred patient to ICU and returned telemetry box to monitor.

## 2017-01-25 NOTE — ASSESSMENT & PLAN NOTE
- IVF's  - transfuse PRBC's  - monitor LDH, CBC;  today  - per Heme if no improvement within the next 24- 24 hours would consider blood exchange.  - pain management, currently on Oxycodone IR 10 q4, hydromorphone 2 mg Q3  - no PE on CT chest  - transfer to ICU for closer hemodynamic monitoring

## 2017-01-25 NOTE — PROGRESS NOTES
ATTENDING NOTE, HEMATOLOGY CONSULT TEAM       Patient seen and examined, chart reviewed, Dr. Erazo's note reviewed.  Events of last 24 hours noted.  Apparently she spiked a fever and she was brought to ICU; Ht was 12.1 % at 5:30 am, with a repeat one around 9;20 a.m. Being 16.2 %.  She is currently receiving blood transfusions.  On examination she is uncomfortable, complaining of her typical sickle cell crisis pain involving her back and her chest wall.  Lungs are clear.  Abdomen is soft, nontender.    RECOMMEND  Follow CBC and LDH daily; please check LDH today.  Agree with blood transfusions; if no improvement within the next 24- 24 hours would consider blood exchange.  Agree with supportive care, analgesics, Xjade, and folic acid.    Would consider empirically covering with antibiotics since she spiked to 102.4 and she is effectively splenectomized.    We will follow with you.  Above recommendations were discussed with Dr. Turk.

## 2017-01-25 NOTE — NURSING
Pt free of fall, trauma. Pain constant through the night, occasionally drifting off to sleep between care and medications. Ambulates self to bathroom. Developed elevated temp of reported to Dr. Bronson. H/H received from lab during change of shift, reported to jazmyne Liu, attempted to page Dr. Turk twice, awaiting call back. Pt was typed and screened yesterday, with consent for transfusion in chart if needed.

## 2017-01-26 PROBLEM — E83.39 HYPOPHOSPHATEMIA: Status: ACTIVE | Noted: 2017-01-26

## 2017-01-26 PROBLEM — E87.6 HYPOKALEMIA: Status: ACTIVE | Noted: 2017-01-26

## 2017-01-26 LAB
ALBUMIN SERPL BCP-MCNC: 3.2 G/DL
ALP SERPL-CCNC: 170 U/L
ALT SERPL W/O P-5'-P-CCNC: 34 U/L
ANION GAP SERPL CALC-SCNC: 7 MMOL/L
AST SERPL-CCNC: 46 U/L
BACTERIA UR CULT: NORMAL
BASOPHILS # BLD AUTO: 0.04 K/UL
BASOPHILS NFR BLD: 0.3 %
BILIRUB SERPL-MCNC: 7.4 MG/DL
BUN SERPL-MCNC: 6 MG/DL
CALCIUM SERPL-MCNC: 8.6 MG/DL
CHLORIDE SERPL-SCNC: 107 MMOL/L
CO2 SERPL-SCNC: 23 MMOL/L
CREAT SERPL-MCNC: 0.7 MG/DL
DIFFERENTIAL METHOD: ABNORMAL
EOSINOPHIL # BLD AUTO: 0 K/UL
EOSINOPHIL NFR BLD: 0.1 %
ERYTHROCYTE [DISTWIDTH] IN BLOOD BY AUTOMATED COUNT: 15.3 %
EST. GFR  (AFRICAN AMERICAN): >60 ML/MIN/1.73 M^2
EST. GFR  (NON AFRICAN AMERICAN): >60 ML/MIN/1.73 M^2
GLUCOSE SERPL-MCNC: 100 MG/DL
HCT VFR BLD AUTO: 22.2 %
HGB BLD-MCNC: 7.8 G/DL
LDH SERPL L TO P-CCNC: 502 U/L
LYMPHOCYTES # BLD AUTO: 1.3 K/UL
LYMPHOCYTES NFR BLD: 7.9 %
MAGNESIUM SERPL-MCNC: 1.7 MG/DL
MCH RBC QN AUTO: 29.5 PG
MCHC RBC AUTO-ENTMCNC: 35.1 %
MCV RBC AUTO: 84 FL
MONOCYTES # BLD AUTO: 1.5 K/UL
MONOCYTES NFR BLD: 9.4 %
NEUTROPHILS # BLD AUTO: 12.9 K/UL
NEUTROPHILS NFR BLD: 81.9 %
PHOSPHATE SERPL-MCNC: 1.9 MG/DL
PLATELET # BLD AUTO: 373 K/UL
PMV BLD AUTO: 8.7 FL
POTASSIUM SERPL-SCNC: 3.3 MMOL/L
PROT SERPL-MCNC: 6.9 G/DL
RBC # BLD AUTO: 2.64 M/UL
RETICS/RBC NFR AUTO: 6.1 %
SODIUM SERPL-SCNC: 137 MMOL/L
WBC # BLD AUTO: 15.77 K/UL

## 2017-01-26 PROCEDURE — 99900035 HC TECH TIME PER 15 MIN (STAT)

## 2017-01-26 PROCEDURE — 63600175 PHARM REV CODE 636 W HCPCS: Performed by: HOSPITALIST

## 2017-01-26 PROCEDURE — 99231 SBSQ HOSP IP/OBS SF/LOW 25: CPT | Mod: ,,, | Performed by: INTERNAL MEDICINE

## 2017-01-26 PROCEDURE — 84100 ASSAY OF PHOSPHORUS: CPT

## 2017-01-26 PROCEDURE — 25000003 PHARM REV CODE 250: Performed by: HOSPITALIST

## 2017-01-26 PROCEDURE — 25000003 PHARM REV CODE 250: Performed by: PHYSICIAN ASSISTANT

## 2017-01-26 PROCEDURE — 94770 HC EXHALED C02 TEST: CPT

## 2017-01-26 PROCEDURE — 85045 AUTOMATED RETICULOCYTE COUNT: CPT

## 2017-01-26 PROCEDURE — 63600175 PHARM REV CODE 636 W HCPCS: Performed by: PHYSICIAN ASSISTANT

## 2017-01-26 PROCEDURE — 80053 COMPREHEN METABOLIC PANEL: CPT

## 2017-01-26 PROCEDURE — 63600175 PHARM REV CODE 636 W HCPCS: Performed by: INTERNAL MEDICINE

## 2017-01-26 PROCEDURE — 20000000 HC ICU ROOM

## 2017-01-26 PROCEDURE — 27000221 HC OXYGEN, UP TO 24 HOURS

## 2017-01-26 PROCEDURE — 85025 COMPLETE CBC W/AUTO DIFF WBC: CPT

## 2017-01-26 PROCEDURE — 99232 SBSQ HOSP IP/OBS MODERATE 35: CPT | Mod: ,,, | Performed by: HOSPITALIST

## 2017-01-26 PROCEDURE — 83615 LACTATE (LD) (LDH) ENZYME: CPT

## 2017-01-26 PROCEDURE — 83735 ASSAY OF MAGNESIUM: CPT

## 2017-01-26 RX ORDER — HYDROMORPHONE HCL IN 0.9% NACL 6 MG/30 ML
PATIENT CONTROLLED ANALGESIA SYRINGE INTRAVENOUS CONTINUOUS
Status: DISCONTINUED | OUTPATIENT
Start: 2017-01-26 | End: 2017-01-30 | Stop reason: HOSPADM

## 2017-01-26 RX ORDER — NALOXONE HCL 0.4 MG/ML
0.02 VIAL (ML) INJECTION
Status: DISCONTINUED | OUTPATIENT
Start: 2017-01-26 | End: 2017-01-30 | Stop reason: HOSPADM

## 2017-01-26 RX ORDER — HYDROMORPHONE HYDROCHLORIDE 1 MG/ML
4 INJECTION, SOLUTION INTRAMUSCULAR; INTRAVENOUS; SUBCUTANEOUS
Status: DISCONTINUED | OUTPATIENT
Start: 2017-01-26 | End: 2017-01-26

## 2017-01-26 RX ORDER — PROMETHAZINE HYDROCHLORIDE 25 MG/ML
25 INJECTION, SOLUTION INTRAMUSCULAR; INTRAVENOUS ONCE
Status: COMPLETED | OUTPATIENT
Start: 2017-01-26 | End: 2017-01-26

## 2017-01-26 RX ORDER — SODIUM,POTASSIUM PHOSPHATES 280-250MG
1 POWDER IN PACKET (EA) ORAL
Status: COMPLETED | OUTPATIENT
Start: 2017-01-26 | End: 2017-01-27

## 2017-01-26 RX ORDER — HYDROMORPHONE HYDROCHLORIDE 1 MG/ML
0.2 INJECTION, SOLUTION INTRAMUSCULAR; INTRAVENOUS; SUBCUTANEOUS EVERY 10 MIN PRN
Status: DISCONTINUED | OUTPATIENT
Start: 2017-01-26 | End: 2017-01-30 | Stop reason: HOSPADM

## 2017-01-26 RX ORDER — POTASSIUM CHLORIDE 7.45 MG/ML
10 INJECTION INTRAVENOUS
Status: COMPLETED | OUTPATIENT
Start: 2017-01-26 | End: 2017-01-26

## 2017-01-26 RX ORDER — HYDROMORPHONE HYDROCHLORIDE 2 MG/ML
4 INJECTION, SOLUTION INTRAMUSCULAR; INTRAVENOUS; SUBCUTANEOUS
Status: DISCONTINUED | OUTPATIENT
Start: 2017-01-26 | End: 2017-01-26

## 2017-01-26 RX ORDER — DIPHENHYDRAMINE HYDROCHLORIDE 50 MG/ML
25 INJECTION INTRAMUSCULAR; INTRAVENOUS EVERY 4 HOURS PRN
Status: DISCONTINUED | OUTPATIENT
Start: 2017-01-26 | End: 2017-01-30 | Stop reason: HOSPADM

## 2017-01-26 RX ADMIN — PROMETHAZINE HYDROCHLORIDE 25 MG: 25 INJECTION INTRAMUSCULAR; INTRAVENOUS at 02:01

## 2017-01-26 RX ADMIN — POTASSIUM CHLORIDE 10 MEQ: 10 INJECTION, SOLUTION INTRAVENOUS at 09:01

## 2017-01-26 RX ADMIN — ONDANSETRON 4 MG: 2 INJECTION INTRAMUSCULAR; INTRAVENOUS at 12:01

## 2017-01-26 RX ADMIN — ENOXAPARIN SODIUM 40 MG: 100 INJECTION SUBCUTANEOUS at 12:01

## 2017-01-26 RX ADMIN — MORPHINE SULFATE 60 MG: 15 TABLET, EXTENDED RELEASE ORAL at 08:01

## 2017-01-26 RX ADMIN — FAMOTIDINE 20 MG: 20 TABLET, FILM COATED ORAL at 07:01

## 2017-01-26 RX ADMIN — SODIUM CHLORIDE 150 ML/HR: 0.9 INJECTION, SOLUTION INTRAVENOUS at 08:01

## 2017-01-26 RX ADMIN — DIPHENHYDRAMINE HYDROCHLORIDE 25 MG: 50 INJECTION, SOLUTION INTRAMUSCULAR; INTRAVENOUS at 06:01

## 2017-01-26 RX ADMIN — MORPHINE SULFATE 60 MG: 15 TABLET, EXTENDED RELEASE ORAL at 07:01

## 2017-01-26 RX ADMIN — HYDROXYUREA 1000 MG: 500 CAPSULE ORAL at 07:01

## 2017-01-26 RX ADMIN — STANDARDIZED SENNA CONCENTRATE AND DOCUSATE SODIUM 1 TABLET: 8.6; 5 TABLET, FILM COATED ORAL at 08:01

## 2017-01-26 RX ADMIN — POTASSIUM & SODIUM PHOSPHATES POWDER PACK 280-160-250 MG 1 PACKET: 280-160-250 PACK at 12:01

## 2017-01-26 RX ADMIN — DIPHENHYDRAMINE HYDROCHLORIDE 25 MG: 50 INJECTION, SOLUTION INTRAMUSCULAR; INTRAVENOUS at 12:01

## 2017-01-26 RX ADMIN — DEFERASIROX 1000 MG: 500 TABLET, FOR SUSPENSION ORAL at 05:01

## 2017-01-26 RX ADMIN — PIPERACILLIN SODIUM AND TAZOBACTAM SODIUM 4.5 G: 4; .5 INJECTION, POWDER, LYOPHILIZED, FOR SOLUTION INTRAVENOUS at 03:01

## 2017-01-26 RX ADMIN — OXYCODONE HYDROCHLORIDE 10 MG: 5 TABLET ORAL at 11:01

## 2017-01-26 RX ADMIN — Medication: at 10:01

## 2017-01-26 RX ADMIN — PIPERACILLIN SODIUM AND TAZOBACTAM SODIUM 4.5 G: 4; .5 INJECTION, POWDER, LYOPHILIZED, FOR SOLUTION INTRAVENOUS at 12:01

## 2017-01-26 RX ADMIN — Medication: at 04:01

## 2017-01-26 RX ADMIN — HYDROMORPHONE HYDROCHLORIDE 4 MG: 2 INJECTION, SOLUTION INTRAMUSCULAR; INTRAVENOUS; SUBCUTANEOUS at 09:01

## 2017-01-26 RX ADMIN — HYDROMORPHONE HYDROCHLORIDE 4 MG: 2 INJECTION, SOLUTION INTRAMUSCULAR; INTRAVENOUS; SUBCUTANEOUS at 02:01

## 2017-01-26 RX ADMIN — DIPHENHYDRAMINE HYDROCHLORIDE 25 MG: 50 INJECTION, SOLUTION INTRAMUSCULAR; INTRAVENOUS at 09:01

## 2017-01-26 RX ADMIN — OXYCODONE HYDROCHLORIDE 10 MG: 5 TABLET ORAL at 12:01

## 2017-01-26 RX ADMIN — PIPERACILLIN SODIUM AND TAZOBACTAM SODIUM 4.5 G: 4; .5 INJECTION, POWDER, LYOPHILIZED, FOR SOLUTION INTRAVENOUS at 08:01

## 2017-01-26 RX ADMIN — ACETAMINOPHEN 650 MG: 325 TABLET ORAL at 02:01

## 2017-01-26 RX ADMIN — HYDROMORPHONE HYDROCHLORIDE 4 MG: 2 INJECTION, SOLUTION INTRAMUSCULAR; INTRAVENOUS; SUBCUTANEOUS at 06:01

## 2017-01-26 RX ADMIN — HYDROMORPHONE HYDROCHLORIDE 3 MG: 1 INJECTION, SOLUTION INTRAMUSCULAR; INTRAVENOUS; SUBCUTANEOUS at 12:01

## 2017-01-26 RX ADMIN — POTASSIUM & SODIUM PHOSPHATES POWDER PACK 280-160-250 MG 1 PACKET: 280-160-250 PACK at 09:01

## 2017-01-26 RX ADMIN — DIPHENHYDRAMINE HYDROCHLORIDE 25 MG: 50 INJECTION, SOLUTION INTRAMUSCULAR; INTRAVENOUS at 05:01

## 2017-01-26 RX ADMIN — FOLIC ACID 1 MG: 1 TABLET ORAL at 08:01

## 2017-01-26 RX ADMIN — FOLIC ACID 1 MG: 1 TABLET ORAL at 07:01

## 2017-01-26 RX ADMIN — DIPHENHYDRAMINE HYDROCHLORIDE 25 MG: 50 INJECTION, SOLUTION INTRAMUSCULAR; INTRAVENOUS at 08:01

## 2017-01-26 RX ADMIN — OXYCODONE HYDROCHLORIDE 10 MG: 5 TABLET ORAL at 04:01

## 2017-01-26 RX ADMIN — OXYCODONE HYDROCHLORIDE 10 MG: 5 TABLET ORAL at 08:01

## 2017-01-26 RX ADMIN — ACETAMINOPHEN 650 MG: 325 TABLET ORAL at 04:01

## 2017-01-26 RX ADMIN — POTASSIUM & SODIUM PHOSPHATES POWDER PACK 280-160-250 MG 1 PACKET: 280-160-250 PACK at 05:01

## 2017-01-26 RX ADMIN — POTASSIUM CHLORIDE 10 MEQ: 10 INJECTION, SOLUTION INTRAVENOUS at 08:01

## 2017-01-26 RX ADMIN — STANDARDIZED SENNA CONCENTRATE AND DOCUSATE SODIUM 1 TABLET: 8.6; 5 TABLET, FILM COATED ORAL at 07:01

## 2017-01-26 RX ADMIN — SODIUM CHLORIDE 150 ML/HR: 0.9 INJECTION, SOLUTION INTRAVENOUS at 05:01

## 2017-01-26 RX ADMIN — SODIUM CHLORIDE: 0.9 INJECTION, SOLUTION INTRAVENOUS at 12:01

## 2017-01-26 RX ADMIN — HYDROMORPHONE HYDROCHLORIDE 4 MG: 2 INJECTION, SOLUTION INTRAMUSCULAR; INTRAVENOUS; SUBCUTANEOUS at 12:01

## 2017-01-26 RX ADMIN — HYDROXYUREA 1000 MG: 500 CAPSULE ORAL at 08:01

## 2017-01-26 RX ADMIN — OXYCODONE HYDROCHLORIDE 10 MG: 5 TABLET ORAL at 07:01

## 2017-01-26 NOTE — PLAN OF CARE
Pt was very restless throughout the night, groaned and cried majority of the night, pt pain was poorly controlled even while receiving pain meds q3h, MD notified, new orders noted. Lowest pain rating was a 9/10. Pt remains in sinus tach, tmax 102.3, prn tylenol administered x1, ambulates without assistance, will continue to monitor.

## 2017-01-26 NOTE — SUBJECTIVE & OBJECTIVE
Interval History: febrile, c/o pain     Review of Systems   Constitutional: Positive for chills and fever. Negative for appetite change.   HENT: Negative for congestion, nosebleeds, sore throat and trouble swallowing.    Eyes: Negative for pain, discharge and redness.   Respiratory: Positive for cough. Negative for shortness of breath and stridor.    Cardiovascular: Positive for chest pain and palpitations. Negative for leg swelling.   Gastrointestinal: Negative for abdominal pain, blood in stool, diarrhea, nausea and vomiting.   Endocrine: Negative for polydipsia, polyphagia and polyuria.   Genitourinary: Negative for difficulty urinating, dysuria, flank pain, frequency, urgency and vaginal pain.   Musculoskeletal: Positive for back pain and myalgias. Negative for neck pain and neck stiffness.   Skin: Negative for color change and pallor.   Neurological: Negative for dizziness, seizures, light-headedness and numbness.   Psychiatric/Behavioral: Negative for agitation.     Objective:     Vital Signs (Most Recent):  Temp: 99.1 °F (37.3 °C) (01/26/17 0700)  Pulse: 100 (01/26/17 1015)  Resp: (!) 22 (01/26/17 1015)  BP: (!) 111/56 (01/26/17 1015)  SpO2: 96 % (01/26/17 1015) Vital Signs (24h Range):  Temp:  [97.6 °F (36.4 °C)-103.1 °F (39.5 °C)] 99.1 °F (37.3 °C)  Pulse:  [] 100  Resp:  [21-46] 22  SpO2:  [78 %-100 %] 96 %  BP: ()/(52-74) 111/56     Weight: 49.5 kg (109 lb 1.6 oz)  Body mass index is 22.04 kg/(m^2).    Intake/Output Summary (Last 24 hours) at 01/26/17 1045  Last data filed at 01/26/17 0931   Gross per 24 hour   Intake             5145 ml   Output                0 ml   Net             5145 ml      Physical Exam   Constitutional: She is oriented to person, place, and time. She appears well-developed and well-nourished. She appears distressed (secondary to pain).   HENT:   Head: Normocephalic and atraumatic.   Mouth/Throat: Oropharynx is clear and moist. No oropharyngeal exudate.   Eyes: Right  eye exhibits no discharge. Left eye exhibits no discharge. Scleral icterus is present.   Neck: Normal range of motion. Neck supple. No JVD present. No tracheal deviation present.   Cardiovascular:   tachycardic   Pulmonary/Chest: Effort normal and breath sounds normal. No respiratory distress.   Abdominal: Soft. Bowel sounds are normal. She exhibits no distension. There is no tenderness.   Musculoskeletal: Normal range of motion. She exhibits no edema, tenderness or deformity.   Neurological: She is alert and oriented to person, place, and time. No cranial nerve deficit.   Skin: Skin is warm and dry.   Psychiatric: She has a normal mood and affect.   Nursing note and vitals reviewed.      Significant Labs:   Blood Culture:   Recent Labs  Lab 01/25/17  0921 01/25/17  1308   LABBLOO No Growth to date No Growth to date     BMP:   Recent Labs  Lab 01/26/17  0530         K 3.3*      CO2 23   BUN 6   CREATININE 0.7   CALCIUM 8.6*   MG 1.7     CBC:   Recent Labs  Lab 01/25/17  0530 01/25/17  0920 01/25/17  1825 01/26/17  0530   WBC 19.85*  --   --  15.77*   HGB 4.1* 5.6* 7.9* 7.8*   HCT 12.1* 16.2* 22.6* 22.2*   *  --   --  373*     Urine Culture: No results for input(s): LABURIN in the last 48 hours.  Urine Studies:   Recent Labs  Lab 01/25/17  1453   COLORU Yellow  Yellow   APPEARANCEUA Clear  Clear   PHUR 7.0  7.0   SPECGRAV 1.010  1.010   PROTEINUA Negative  Negative   GLUCUA Negative  Negative   KETONESU Negative  Negative   BILIRUBINUA 1+*  1+*   OCCULTUA Negative  Negative   NITRITE Negative  Negative   UROBILINOGEN 1.0  1.0   LEUKOCYTESUR Negative  Negative     All pertinent labs within the past 24 hours have been reviewed.    Significant Imaging: I have reviewed all pertinent imaging results/findings within the past 24 hours.   CXR   As before, there is a left subclavian line with tip in superior vena cava.  Cardiac size is not enlarged.  There is a diminished inspiration.   Accentuation of increased markings in the lower lung fields likely reflects atelectasis versus developing infiltrate.  No large volume of pleural fluid is present.  There are changes in osseous structures which are the sequelae of sickle cell disease.

## 2017-01-26 NOTE — ASSESSMENT & PLAN NOTE
- IVF's  - s/p 2 units PRBC  - Xjade (mother is supposed to bring) and folic acid  -  >502  - pain management, patient with severe pain last night, eICU increased hydromorphone (Dilaudid) to 4 mg every 3 hours iv    - may need exchange transfusion  - no PE on CT chest

## 2017-01-26 NOTE — PROGRESS NOTES
"[facility] Hospital Medicine  Progress Note    Patient Name: Nishi Dumont  MRN: [id number]  Patient Class: IP- Inpatient   Admission Date: 1/23/2017  Length of Stay: 1 days  Attending Physician: Adam Turk MD  Primary Care Provider: Vannessa Alcala MD        Subjective:     Principal Problem:Sickle cell anemia with crisis    HPI:  26 y.o. female with sickle cell anemia who presents to ED with c/o back pain and chest pain that she attributes to her chronic sickle cell pain crisis. Reports she was in Hospital in [facility] 2-3 weeks ago for same symptoms. Current pain started yesterday and has not been relieved by taking her home medications.  She reports that the pain is typical both in character, intensity, and location to her usual pain crisis, 9/10 in severity and describes it as "sharp and stabbing." Reports cough but denies fever, chills, SOB, rashes nausea, vomiting, diarrhea, constipation, abdominal pain, urinary symptoms, LOC, palpitations, numbness and tingling. She is compliant with her home medications.     Her PSHx includes cholecystectomy and left hip replacement.  FHx mother and father with sickle cell trait      Hospital Course:  Admitted with sickle cell crisis placed on IVF's, pain medication. Hospital course notable for fever, H&H drop to 4.1 and transferred to ICU s/p 2 units PRBC's.     Interval History: febrile, c/o pain     Review of Systems   Constitutional: Positive for chills and fever. Negative for appetite change.   HENT: Negative for congestion, nosebleeds, sore throat and trouble swallowing.    Eyes: Negative for pain, discharge and redness.   Respiratory: Positive for cough. Negative for shortness of breath and stridor.    Cardiovascular: Positive for chest pain and palpitations. Negative for leg swelling.   Gastrointestinal: Negative for abdominal pain, blood in stool, diarrhea, nausea and vomiting.   Endocrine: Negative for polydipsia, polyphagia and polyuria. "   Genitourinary: Negative for difficulty urinating, dysuria, flank pain, frequency, urgency and vaginal pain.   Musculoskeletal: Positive for back pain and myalgias. Negative for neck pain and neck stiffness.   Skin: Negative for color change and pallor.   Neurological: Negative for dizziness, seizures, light-headedness and numbness.   Psychiatric/Behavioral: Negative for agitation.     Objective:     Vital Signs (Most Recent):  Temp: 99.1 °F (37.3 °C) (01/26/17 0700)  Pulse: 100 (01/26/17 1015)  Resp: (!) 22 (01/26/17 1015)  BP: (!) 111/56 (01/26/17 1015)  SpO2: 96 % (01/26/17 1015) Vital Signs (24h Range):  Temp:  [97.6 °F (36.4 °C)-103.1 °F (39.5 °C)] 99.1 °F (37.3 °C)  Pulse:  [] 100  Resp:  [21-46] 22  SpO2:  [78 %-100 %] 96 %  BP: ()/(52-74) 111/56     Weight: 49.5 kg (109 lb 1.6 oz)  Body mass index is 22.04 kg/(m^2).    Intake/Output Summary (Last 24 hours) at 01/26/17 1045  Last data filed at 01/26/17 0931   Gross per 24 hour   Intake             5145 ml   Output                0 ml   Net             5145 ml      Physical Exam   Constitutional: She is oriented to person, place, and time. She appears well-developed and well-nourished.   HENT:   Head: Normocephalic and atraumatic.   Mouth/Throat: Oropharynx is clear and moist. No oropharyngeal exudate.   Eyes: Right eye exhibits no discharge. Left eye exhibits no discharge. Scleral icterus is present.   Neck: Normal range of motion. Neck supple. No JVD present. No tracheal deviation present.   Cardiovascular:   tachycardic   Pulmonary/Chest: Effort normal and breath sounds normal. No respiratory distress.   Abdominal: Soft. Bowel sounds are normal. She exhibits no distension. There is no tenderness.   Musculoskeletal: Normal range of motion. She exhibits no edema, tenderness or deformity.   Neurological: She is alert and oriented to person, place, and time. No cranial nerve deficit.   Skin: Skin is warm and dry.   Psychiatric: She has a normal  mood and affect.   Nursing note and vitals reviewed.      Significant Labs:   Blood Culture:   Recent Labs  Lab 01/25/17  0921 01/25/17  1308   LABBLOO No Growth to date No Growth to date     BMP:   Recent Labs  Lab 01/26/17  0530         K 3.3*      CO2 23   BUN 6   CREATININE 0.7   CALCIUM 8.6*   MG 1.7     CBC:   Recent Labs  Lab 01/25/17  0530 01/25/17  0920 01/25/17  1825 01/26/17  0530   WBC 19.85*  --   --  15.77*   HGB 4.1* 5.6* 7.9* 7.8*   HCT 12.1* 16.2* 22.6* 22.2*   *  --   --  373*     Urine Culture: No results for input(s): LABURIN in the last 48 hours.  Urine Studies:   Recent Labs  Lab 01/25/17  1453   COLORU Yellow  Yellow   APPEARANCEUA Clear  Clear   PHUR 7.0  7.0   SPECGRAV 1.010  1.010   PROTEINUA Negative  Negative   GLUCUA Negative  Negative   KETONESU Negative  Negative   BILIRUBINUA 1+*  1+*   OCCULTUA Negative  Negative   NITRITE Negative  Negative   UROBILINOGEN 1.0  1.0   LEUKOCYTESUR Negative  Negative     All pertinent labs within the past 24 hours have been reviewed.    Significant Imaging: I have reviewed all pertinent imaging results/findings within the past 24 hours.   CXR   As before, there is a left subclavian line with tip in superior vena cava.  Cardiac size is not enlarged.  There is a diminished inspiration.  Accentuation of increased markings in the lower lung fields likely reflects atelectasis versus developing infiltrate.  No large volume of pleural fluid is present.  There are changes in osseous structures which are the sequelae of sickle cell disease.             Assessment/Plan:      * Sickle cell anemia with crisis  - IVF's  - s/p 2 units PRBC  - Xjade (mother is supposed to bring) and folic acid  -  >502  - pain management, patient with severe pain last night, eICU increased hydromorphone (Dilaudid) to 4 mg every 3 hours iv    - may need exchange transfusion  - no PE on CT chest         Sickle cell anemia  - s/p 2 units  PRBC's            Fever  - panculture; BC, NGTD, UC pending  - on Zosyn empirically      Hypokalemia  - replaced      Hypophosphatemia  - replaced      VTE Risk Mitigation         Ordered     enoxaparin injection 40 mg  Daily     Route:  Subcutaneous        01/23/17 2127     Medium Risk of VTE  Once      01/23/17 2127          Brook Williamson PA-C  Department of Hospital Medicine   [facility]

## 2017-01-26 NOTE — PROGRESS NOTES
Spoke with Dr. Turk at length about patient's continued poor pain control; verbal order for PCA placed with MD on phone; orders read back & verified with MD

## 2017-01-26 NOTE — PROGRESS NOTES
Events noted. Fever spike - cults  negative thus far,CXR does not reveal any significant changes - some increase in lower lung field markings.    She continues to have significant pain in spite of Dilaudid 4 mg Q 3 h.    Hemoglobin 7.8 - stable, retic 6.1, , WBC 15.77    O2 sat 96%    Comfortable on room air - ambulating to the bathroom.  Lungs - slight decrease BS at bases, otherwise clear.    Imp: Sickle cell pain crisis          Fever - etiology unclear    Rec: See no clear indication for exchange at this time.           Consider changing to PCA given ongoing poor pain control.

## 2017-01-26 NOTE — EICU
Patient continues to report severe pain despite aggressive opioid regimen with hydromorphone (Dilaudid) 3 mg iv push every 3 hours, in addition to long-acting po morphine 60 mg twice daily and immediate release po oxycodone 10 mg every 4 hours prn.    · Will increase hydromorphone (Dilaudid) to 4 mg every 3 hours iv and give one time dose of phenergan iv.  · Given refractory crisis, exchange transfusion seems like the next step in her management.

## 2017-01-27 LAB
ALBUMIN SERPL BCP-MCNC: 2.9 G/DL
ALP SERPL-CCNC: 230 U/L
ALT SERPL W/O P-5'-P-CCNC: 64 U/L
ANION GAP SERPL CALC-SCNC: 7 MMOL/L
AST SERPL-CCNC: 82 U/L
BASOPHILS # BLD AUTO: 0.03 K/UL
BASOPHILS NFR BLD: 0.2 %
BILIRUB SERPL-MCNC: 5.8 MG/DL
BUN SERPL-MCNC: 3 MG/DL
CALCIUM SERPL-MCNC: 8.4 MG/DL
CHLORIDE SERPL-SCNC: 108 MMOL/L
CO2 SERPL-SCNC: 24 MMOL/L
CREAT SERPL-MCNC: 0.6 MG/DL
DIFFERENTIAL METHOD: ABNORMAL
EOSINOPHIL # BLD AUTO: 0.1 K/UL
EOSINOPHIL NFR BLD: 0.5 %
ERYTHROCYTE [DISTWIDTH] IN BLOOD BY AUTOMATED COUNT: 15.9 %
EST. GFR  (AFRICAN AMERICAN): >60 ML/MIN/1.73 M^2
EST. GFR  (NON AFRICAN AMERICAN): >60 ML/MIN/1.73 M^2
GLUCOSE SERPL-MCNC: 101 MG/DL
HCT VFR BLD AUTO: 19.6 %
HGB BLD-MCNC: 6.8 G/DL
LYMPHOCYTES # BLD AUTO: 1.7 K/UL
LYMPHOCYTES NFR BLD: 13.3 %
MAGNESIUM SERPL-MCNC: 1.6 MG/DL
MCH RBC QN AUTO: 29.4 PG
MCHC RBC AUTO-ENTMCNC: 34.7 %
MCV RBC AUTO: 85 FL
MONOCYTES # BLD AUTO: 1 K/UL
MONOCYTES NFR BLD: 8.1 %
NEUTROPHILS # BLD AUTO: 9.9 K/UL
NEUTROPHILS NFR BLD: 77.7 %
PHOSPHATE SERPL-MCNC: 2.3 MG/DL
PLATELET # BLD AUTO: 367 K/UL
PMV BLD AUTO: 8.7 FL
POTASSIUM SERPL-SCNC: 3.6 MMOL/L
PROT SERPL-MCNC: 6.5 G/DL
RBC # BLD AUTO: 2.31 M/UL
RETICS/RBC NFR AUTO: 3.7 %
SODIUM SERPL-SCNC: 139 MMOL/L
WBC # BLD AUTO: 12.73 K/UL

## 2017-01-27 PROCEDURE — 25000003 PHARM REV CODE 250: Performed by: PHYSICIAN ASSISTANT

## 2017-01-27 PROCEDURE — 99231 SBSQ HOSP IP/OBS SF/LOW 25: CPT | Mod: ,,, | Performed by: INTERNAL MEDICINE

## 2017-01-27 PROCEDURE — 86747 PARVOVIRUS ANTIBODY: CPT | Mod: 91

## 2017-01-27 PROCEDURE — 99900035 HC TECH TIME PER 15 MIN (STAT)

## 2017-01-27 PROCEDURE — 63600175 PHARM REV CODE 636 W HCPCS: Performed by: PHYSICIAN ASSISTANT

## 2017-01-27 PROCEDURE — 63600175 PHARM REV CODE 636 W HCPCS: Performed by: INTERNAL MEDICINE

## 2017-01-27 PROCEDURE — 25000003 PHARM REV CODE 250: Performed by: HOSPITALIST

## 2017-01-27 PROCEDURE — 63600175 PHARM REV CODE 636 W HCPCS: Performed by: HOSPITALIST

## 2017-01-27 PROCEDURE — 84100 ASSAY OF PHOSPHORUS: CPT

## 2017-01-27 PROCEDURE — 83735 ASSAY OF MAGNESIUM: CPT

## 2017-01-27 PROCEDURE — 25000003 PHARM REV CODE 250: Performed by: INTERNAL MEDICINE

## 2017-01-27 PROCEDURE — 11000001 HC ACUTE MED/SURG PRIVATE ROOM

## 2017-01-27 PROCEDURE — 85025 COMPLETE CBC W/AUTO DIFF WBC: CPT

## 2017-01-27 PROCEDURE — 80053 COMPREHEN METABOLIC PANEL: CPT

## 2017-01-27 PROCEDURE — 99232 SBSQ HOSP IP/OBS MODERATE 35: CPT | Mod: ,,, | Performed by: HOSPITALIST

## 2017-01-27 PROCEDURE — 94770 HC EXHALED C02 TEST: CPT

## 2017-01-27 PROCEDURE — 85045 AUTOMATED RETICULOCYTE COUNT: CPT

## 2017-01-27 RX ORDER — PROMETHAZINE HYDROCHLORIDE 25 MG/1
25 TABLET ORAL EVERY 6 HOURS PRN
Status: DISCONTINUED | OUTPATIENT
Start: 2017-01-27 | End: 2017-01-27

## 2017-01-27 RX ORDER — PROMETHAZINE HYDROCHLORIDE 25 MG/1
25 TABLET ORAL EVERY 6 HOURS PRN
Status: DISCONTINUED | OUTPATIENT
Start: 2017-01-27 | End: 2017-01-30 | Stop reason: HOSPADM

## 2017-01-27 RX ADMIN — DEFERASIROX 1000 MG: 500 TABLET, FOR SUSPENSION ORAL at 12:01

## 2017-01-27 RX ADMIN — PIPERACILLIN SODIUM AND TAZOBACTAM SODIUM 4.5 G: 4; .5 INJECTION, POWDER, LYOPHILIZED, FOR SOLUTION INTRAVENOUS at 12:01

## 2017-01-27 RX ADMIN — PIPERACILLIN SODIUM AND TAZOBACTAM SODIUM 4.5 G: 4; .5 INJECTION, POWDER, LYOPHILIZED, FOR SOLUTION INTRAVENOUS at 04:01

## 2017-01-27 RX ADMIN — DIPHENHYDRAMINE HYDROCHLORIDE 25 MG: 50 INJECTION, SOLUTION INTRAMUSCULAR; INTRAVENOUS at 08:01

## 2017-01-27 RX ADMIN — STANDARDIZED SENNA CONCENTRATE AND DOCUSATE SODIUM 1 TABLET: 8.6; 5 TABLET, FILM COATED ORAL at 08:01

## 2017-01-27 RX ADMIN — DIPHENHYDRAMINE HYDROCHLORIDE 25 MG: 50 INJECTION, SOLUTION INTRAMUSCULAR; INTRAVENOUS at 05:01

## 2017-01-27 RX ADMIN — HYDROXYUREA 1000 MG: 500 CAPSULE ORAL at 08:01

## 2017-01-27 RX ADMIN — OXYCODONE HYDROCHLORIDE 10 MG: 5 TABLET ORAL at 04:01

## 2017-01-27 RX ADMIN — Medication: at 07:01

## 2017-01-27 RX ADMIN — POTASSIUM & SODIUM PHOSPHATES POWDER PACK 280-160-250 MG 1 PACKET: 280-160-250 PACK at 05:01

## 2017-01-27 RX ADMIN — OXYCODONE HYDROCHLORIDE 10 MG: 5 TABLET ORAL at 08:01

## 2017-01-27 RX ADMIN — OXYCODONE HYDROCHLORIDE 10 MG: 5 TABLET ORAL at 03:01

## 2017-01-27 RX ADMIN — POTASSIUM & SODIUM PHOSPHATES POWDER PACK 280-160-250 MG 1 PACKET: 280-160-250 PACK at 12:01

## 2017-01-27 RX ADMIN — DIPHENHYDRAMINE HYDROCHLORIDE 25 MG: 50 INJECTION, SOLUTION INTRAMUSCULAR; INTRAVENOUS at 12:01

## 2017-01-27 RX ADMIN — POTASSIUM & SODIUM PHOSPHATES POWDER PACK 280-160-250 MG 1 PACKET: 280-160-250 PACK at 06:01

## 2017-01-27 RX ADMIN — FOLIC ACID 1 MG: 1 TABLET ORAL at 08:01

## 2017-01-27 RX ADMIN — DIPHENHYDRAMINE HYDROCHLORIDE 25 MG: 50 INJECTION, SOLUTION INTRAMUSCULAR; INTRAVENOUS at 04:01

## 2017-01-27 RX ADMIN — PIPERACILLIN SODIUM AND TAZOBACTAM SODIUM 4.5 G: 4; .5 INJECTION, POWDER, LYOPHILIZED, FOR SOLUTION INTRAVENOUS at 08:01

## 2017-01-27 RX ADMIN — SODIUM CHLORIDE 150 ML/HR: 0.9 INJECTION, SOLUTION INTRAVENOUS at 03:01

## 2017-01-27 RX ADMIN — MORPHINE SULFATE 60 MG: 15 TABLET, EXTENDED RELEASE ORAL at 08:01

## 2017-01-27 RX ADMIN — PROMETHAZINE HYDROCHLORIDE 25 MG: 25 TABLET ORAL at 02:01

## 2017-01-27 RX ADMIN — SODIUM CHLORIDE 150 ML/HR: 0.9 INJECTION, SOLUTION INTRAVENOUS at 08:01

## 2017-01-27 RX ADMIN — Medication: at 10:01

## 2017-01-27 RX ADMIN — OXYCODONE HYDROCHLORIDE 10 MG: 5 TABLET ORAL at 12:01

## 2017-01-27 RX ADMIN — ENOXAPARIN SODIUM 40 MG: 100 INJECTION SUBCUTANEOUS at 12:01

## 2017-01-27 RX ADMIN — Medication: at 02:01

## 2017-01-27 RX ADMIN — FAMOTIDINE 20 MG: 20 TABLET, FILM COATED ORAL at 06:01

## 2017-01-27 RX ADMIN — DIPHENHYDRAMINE HYDROCHLORIDE 25 MG: 50 INJECTION, SOLUTION INTRAMUSCULAR; INTRAVENOUS at 01:01

## 2017-01-27 NOTE — PROGRESS NOTES
"[facility] Hospital Medicine  Progress Note    Patient Name: Nishi Dumont  MRN: [id number]  Patient Class: IP- Inpatient   Admission Date: 1/23/2017  Length of Stay: 2 days  Attending Physician: Adam Turk MD  Primary Care Provider: Vannessa Alcala MD        Subjective:     Principal Problem:Sickle cell anemia with crisis    HPI:  26 y.o. female with sickle cell anemia who presents to ED with c/o back pain and chest pain that she attributes to her chronic sickle cell pain crisis. Reports she was in Hospital in [facility] 2-3 weeks ago for same symptoms. Current pain started yesterday and has not been relieved by taking her home medications.  She reports that the pain is typical both in character, intensity, and location to her usual pain crisis, 9/10 in severity and describes it as "sharp and stabbing." Reports cough but denies fever, chills, SOB, rashes nausea, vomiting, diarrhea, constipation, abdominal pain, urinary symptoms, LOC, palpitations, numbness and tingling. She is compliant with her home medications.     Her PSHx includes cholecystectomy and left hip replacement.  FHx mother and father with sickle cell trait      Hospital Course:  Admitted with sickle cell crisis placed on IVF's, pain medication. Hospital course notable for fever, H&H drop to 4.1 and transferred to ICU s/p 2 units PRBC's. cultures negative to date,CXR does not reveal any significant changes. No indication at this time for exchange transfusion    Interval History: placed on PCA, afebrile overngiht    Review of Systems   Constitutional: Positive for chills. Negative for appetite change.   HENT: Negative for congestion, nosebleeds, sore throat and trouble swallowing.    Eyes: Negative for pain, discharge and redness.   Respiratory: Negative for shortness of breath and stridor.    Cardiovascular: Positive for chest pain. Negative for leg swelling.   Gastrointestinal: Negative for abdominal pain, blood in stool, " diarrhea, nausea and vomiting.   Endocrine: Negative for polydipsia, polyphagia and polyuria.   Genitourinary: Negative for difficulty urinating, dysuria, flank pain, frequency, urgency and vaginal pain.   Musculoskeletal: Positive for back pain and myalgias. Negative for neck pain and neck stiffness.   Skin: Negative for color change and pallor.   Neurological: Negative for dizziness, seizures, light-headedness and numbness.   Psychiatric/Behavioral: Negative for agitation.     Objective:     Vital Signs (Most Recent):  Temp: 99 °F (37.2 °C) (01/27/17 0300)  Pulse: 98 (01/27/17 0715)  Resp: (!) 25 (01/27/17 0715)  BP: 118/66 (01/27/17 0500)  SpO2: 99 % (01/27/17 0500) Vital Signs (24h Range):  Temp:  [98.1 °F (36.7 °C)-100 °F (37.8 °C)] 99 °F (37.2 °C)  Pulse:  [] 98  Resp:  [22-38] 25  SpO2:  [76 %-100 %] 99 %  BP: (101-128)/(55-82) 118/66     Weight: 49.5 kg (109 lb 1.6 oz)  Body mass index is 22.04 kg/(m^2).    Intake/Output Summary (Last 24 hours) at 01/27/17 0833  Last data filed at 01/27/17 0710   Gross per 24 hour   Intake          3459.62 ml   Output                0 ml   Net          3459.62 ml      Physical Exam   Constitutional: She is oriented to person, place, and time. She appears well-developed and well-nourished.   HENT:   Head: Normocephalic and atraumatic.   Mouth/Throat: Oropharynx is clear and moist. No oropharyngeal exudate.   Eyes: Right eye exhibits no discharge. Left eye exhibits no discharge. Scleral icterus is present.   Neck: Normal range of motion. Neck supple. No JVD present. No tracheal deviation present.   Cardiovascular:   tachycardic   Pulmonary/Chest: Effort normal and breath sounds normal. No respiratory distress.   Abdominal: Soft. Bowel sounds are normal. She exhibits no distension. There is no tenderness.   Musculoskeletal: Normal range of motion. She exhibits no edema, tenderness or deformity.   Neurological: She is alert and oriented to person, place, and time. No  cranial nerve deficit.   Skin: Skin is warm and dry.   Psychiatric: She has a normal mood and affect.   Nursing note and vitals reviewed.      Significant Labs:   Blood Culture:   Recent Labs  Lab 01/25/17  0921 01/25/17  1308   LABBLOO No Growth to date  No Growth to date No Growth to date  No Growth to date     CBC:   Recent Labs  Lab 01/25/17  1825 01/26/17  0530 01/27/17  0452   WBC  --  15.77* 12.73*   HGB 7.9* 7.8* 6.8*   HCT 22.6* 22.2* 19.6*   PLT  --  373* 367*     CMP:   Recent Labs  Lab 01/26/17  0530 01/27/17  0452    139   K 3.3* 3.6    108   CO2 23 24    101   BUN 6 3*   CREATININE 0.7 0.6   CALCIUM 8.6* 8.4*   PROT 6.9 6.5   ALBUMIN 3.2* 2.9*   BILITOT 7.4* 5.8*   ALKPHOS 170* 230*   AST 46* 82*   ALT 34 64*   ANIONGAP 7* 7*   EGFRNONAA >60 >60     Urine Culture:   Recent Labs  Lab 01/25/17  1453   LABURIN No significant growth     All pertinent labs within the past 24 hours have been reviewed.         Assessment/Plan:      * Sickle cell anemia with crisis  - IVF's  - s/p 2 units PRBC  - Xjade (mother is supposed to bring) and folic acid  -  >502  - pain management, PCA placed  - Hemoglobin 6.8 -retic 3.7 WBC 12.73 trending down  - no need exchange transfusion at this time per Heme  - no PE on CT chest         Sickle cell anemia  - s/p 2 units PRBC's   - Hgb 6.8           Fever  - panculture; BC, NGTD, UC NGTD, afebrile last night  - on Zosyn empirically       Hypokalemia  - replaced      Hypophosphatemia  - replaced      VTE Risk Mitigation         Ordered     enoxaparin injection 40 mg  Daily     Route:  Subcutaneous        01/23/17 2127     Medium Risk of VTE  Once      01/23/17 2127          Brook Williamson PA-C  Department of [facility] Medicine   [facility]

## 2017-01-27 NOTE — NURSING
eICU notified again of patient's increased pain. No new orders noted prior. Awaiting additional orders at present time. Patient currently crying and restless in bed. heating pads placed to affected area. Safety maintained. Will cont to monitor.  India Russ

## 2017-01-27 NOTE — EICU
Second call from nurse with concerns of patient's pain level.  On both occassions, upon video assessment, patient was resting with eyes closed in no acute distress.   /74, Dr Padgett was notified of concerns. No new orders at this time

## 2017-01-27 NOTE — PLAN OF CARE
Problem: Patient Care Overview  Goal: Plan of Care Review  Outcome: Ongoing (interventions implemented as appropriate)  AAOx3, crying and moaning inconsolably throughout most of day shift. Intermittent nausea, no vomiting, no bowel movements.  Sinus tachy, asymptomatic other than continuous pain. O2 sats >90% on 2L NC.  OOB toileting spontaneously without incident.  Provided comfort measures including family support, dietary requests, music requests, healing touch, heating packs, prn medications all to no relief.  PCA pump started late in day per MD order. Pt and family educated on treatment course, plan of care and medications.  All questions answered, verbalized understanding.  Pt remained safe and comfort measures provided all day.

## 2017-01-27 NOTE — PROGRESS NOTES
Afebrile, cults negative.  Pian control better on PCA but still C/O significant pain. No SOB.  Oxygen sats 99%    Hgb 6.8 with retic of 3.7 (falling)      Imp: SS crisis, retic lower than appropriate     Rec: Stop Hydrea - not needed in the acute setting           Will check for parvoviral infection.  Will need blood if her Hgb continues to decline.

## 2017-01-27 NOTE — SUBJECTIVE & OBJECTIVE
Interval History: placed on PCA, afebrile overngiht    Review of Systems   Constitutional: Positive for chills. Negative for appetite change.   HENT: Negative for congestion, nosebleeds, sore throat and trouble swallowing.    Eyes: Negative for pain, discharge and redness.   Respiratory: Negative for shortness of breath and stridor.    Cardiovascular: Positive for chest pain. Negative for leg swelling.   Gastrointestinal: Negative for abdominal pain, blood in stool, diarrhea, nausea and vomiting.   Endocrine: Negative for polydipsia, polyphagia and polyuria.   Genitourinary: Negative for difficulty urinating, dysuria, flank pain, frequency, urgency and vaginal pain.   Musculoskeletal: Positive for back pain and myalgias. Negative for neck pain and neck stiffness.   Skin: Negative for color change and pallor.   Neurological: Negative for dizziness, seizures, light-headedness and numbness.   Psychiatric/Behavioral: Negative for agitation.     Objective:     Vital Signs (Most Recent):  Temp: 99 °F (37.2 °C) (01/27/17 0300)  Pulse: 98 (01/27/17 0715)  Resp: (!) 25 (01/27/17 0715)  BP: 118/66 (01/27/17 0500)  SpO2: 99 % (01/27/17 0500) Vital Signs (24h Range):  Temp:  [98.1 °F (36.7 °C)-100 °F (37.8 °C)] 99 °F (37.2 °C)  Pulse:  [] 98  Resp:  [22-38] 25  SpO2:  [76 %-100 %] 99 %  BP: (101-128)/(55-82) 118/66     Weight: 49.5 kg (109 lb 1.6 oz)  Body mass index is 22.04 kg/(m^2).    Intake/Output Summary (Last 24 hours) at 01/27/17 0833  Last data filed at 01/27/17 0710   Gross per 24 hour   Intake          3459.62 ml   Output                0 ml   Net          3459.62 ml      Physical Exam   Constitutional: She is oriented to person, place, and time. She appears well-developed and well-nourished.   HENT:   Head: Normocephalic and atraumatic.   Mouth/Throat: Oropharynx is clear and moist. No oropharyngeal exudate.   Eyes: Right eye exhibits no discharge. Left eye exhibits no discharge. Scleral icterus is present.    Neck: Normal range of motion. Neck supple. No JVD present. No tracheal deviation present.   Cardiovascular:   tachycardic   Pulmonary/Chest: Effort normal and breath sounds normal. No respiratory distress.   Abdominal: Soft. Bowel sounds are normal. She exhibits no distension. There is no tenderness.   Musculoskeletal: Normal range of motion. She exhibits no edema, tenderness or deformity.   Neurological: She is alert and oriented to person, place, and time. No cranial nerve deficit.   Skin: Skin is warm and dry.   Psychiatric: She has a normal mood and affect.   Nursing note and vitals reviewed.      Significant Labs:   Blood Culture:   Recent Labs  Lab 01/25/17  0921 01/25/17  1308   LABBLOO No Growth to date  No Growth to date No Growth to date  No Growth to date     CBC:   Recent Labs  Lab 01/25/17  1825 01/26/17  0530 01/27/17  0452   WBC  --  15.77* 12.73*   HGB 7.9* 7.8* 6.8*   HCT 22.6* 22.2* 19.6*   PLT  --  373* 367*     CMP:   Recent Labs  Lab 01/26/17  0530 01/27/17  0452    139   K 3.3* 3.6    108   CO2 23 24    101   BUN 6 3*   CREATININE 0.7 0.6   CALCIUM 8.6* 8.4*   PROT 6.9 6.5   ALBUMIN 3.2* 2.9*   BILITOT 7.4* 5.8*   ALKPHOS 170* 230*   AST 46* 82*   ALT 34 64*   ANIONGAP 7* 7*   EGFRNONAA >60 >60     Urine Culture:   Recent Labs  Lab 01/25/17  1453   LABURIN No significant growth     All pertinent labs within the past 24 hours have been reviewed.

## 2017-01-27 NOTE — ASSESSMENT & PLAN NOTE
- IVF's  - s/p 2 units PRBC  - Xjade (mother is supposed to bring) and folic acid  -  >502  - pain management, PCA placed  - Hemoglobin 6.8 -retic 3.7 WBC 12.73 trending down  - no need exchange transfusion at this time per Heme  - no PE on CT chest

## 2017-01-27 NOTE — PLAN OF CARE
Problem: Patient Care Overview  Goal: Plan of Care Review  Outcome: Ongoing (interventions implemented as appropriate)  No changes made this shift. ETCO2 not in use at this time. Will continue to monitor.

## 2017-01-28 LAB
ALBUMIN SERPL BCP-MCNC: 2.8 G/DL
ALP SERPL-CCNC: 223 U/L
ALT SERPL W/O P-5'-P-CCNC: 47 U/L
ANION GAP SERPL CALC-SCNC: 10 MMOL/L
ANISOCYTOSIS BLD QL SMEAR: SLIGHT
AST SERPL-CCNC: 33 U/L
BASOPHILS # BLD AUTO: 0.06 K/UL
BASOPHILS NFR BLD: 0.6 %
BILIRUB SERPL-MCNC: 4.1 MG/DL
BUN SERPL-MCNC: 3 MG/DL
CALCIUM SERPL-MCNC: 8.5 MG/DL
CHLORIDE SERPL-SCNC: 106 MMOL/L
CO2 SERPL-SCNC: 25 MMOL/L
CREAT SERPL-MCNC: 0.6 MG/DL
DIFFERENTIAL METHOD: ABNORMAL
EOSINOPHIL # BLD AUTO: 0.3 K/UL
EOSINOPHIL NFR BLD: 3 %
ERYTHROCYTE [DISTWIDTH] IN BLOOD BY AUTOMATED COUNT: 16.2 %
EST. GFR  (AFRICAN AMERICAN): >60 ML/MIN/1.73 M^2
EST. GFR  (NON AFRICAN AMERICAN): >60 ML/MIN/1.73 M^2
GLUCOSE SERPL-MCNC: 87 MG/DL
HCT VFR BLD AUTO: 19.3 %
HGB BLD-MCNC: 6.8 G/DL
HYPOCHROMIA BLD QL SMEAR: ABNORMAL
LYMPHOCYTES # BLD AUTO: 3.1 K/UL
LYMPHOCYTES NFR BLD: 28.6 %
MAGNESIUM SERPL-MCNC: 1.8 MG/DL
MCH RBC QN AUTO: 30.4 PG
MCHC RBC AUTO-ENTMCNC: 35.2 %
MCV RBC AUTO: 86 FL
MONOCYTES # BLD AUTO: 0.8 K/UL
MONOCYTES NFR BLD: 7.2 %
NEUTROPHILS # BLD AUTO: 6.5 K/UL
NEUTROPHILS NFR BLD: 60.3 %
OVALOCYTES BLD QL SMEAR: ABNORMAL
PHOSPHATE SERPL-MCNC: 3.7 MG/DL
PLATELET # BLD AUTO: 401 K/UL
PLATELET BLD QL SMEAR: ABNORMAL
PMV BLD AUTO: 8.6 FL
POIKILOCYTOSIS BLD QL SMEAR: SLIGHT
POLYCHROMASIA BLD QL SMEAR: ABNORMAL
POTASSIUM SERPL-SCNC: 3.9 MMOL/L
PROT SERPL-MCNC: 6.6 G/DL
RBC # BLD AUTO: 2.24 M/UL
RETICS/RBC NFR AUTO: 4.9 %
SODIUM SERPL-SCNC: 141 MMOL/L
STOMATOCYTES BLD QL SMEAR: PRESENT
TARGETS BLD QL SMEAR: ABNORMAL
WBC # BLD AUTO: 10.83 K/UL

## 2017-01-28 PROCEDURE — 63600175 PHARM REV CODE 636 W HCPCS: Performed by: PHYSICIAN ASSISTANT

## 2017-01-28 PROCEDURE — 99231 SBSQ HOSP IP/OBS SF/LOW 25: CPT | Mod: ,,, | Performed by: INTERNAL MEDICINE

## 2017-01-28 PROCEDURE — 11000001 HC ACUTE MED/SURG PRIVATE ROOM

## 2017-01-28 PROCEDURE — 99900035 HC TECH TIME PER 15 MIN (STAT)

## 2017-01-28 PROCEDURE — 63600175 PHARM REV CODE 636 W HCPCS: Performed by: HOSPITALIST

## 2017-01-28 PROCEDURE — 84100 ASSAY OF PHOSPHORUS: CPT

## 2017-01-28 PROCEDURE — 25000003 PHARM REV CODE 250: Performed by: HOSPITALIST

## 2017-01-28 PROCEDURE — 94770 HC EXHALED C02 TEST: CPT

## 2017-01-28 PROCEDURE — 83735 ASSAY OF MAGNESIUM: CPT

## 2017-01-28 PROCEDURE — 99232 SBSQ HOSP IP/OBS MODERATE 35: CPT | Mod: ,,, | Performed by: HOSPITALIST

## 2017-01-28 PROCEDURE — 85025 COMPLETE CBC W/AUTO DIFF WBC: CPT

## 2017-01-28 PROCEDURE — 80053 COMPREHEN METABOLIC PANEL: CPT

## 2017-01-28 PROCEDURE — 85045 AUTOMATED RETICULOCYTE COUNT: CPT

## 2017-01-28 RX ADMIN — PIPERACILLIN SODIUM AND TAZOBACTAM SODIUM 4.5 G: 4; .5 INJECTION, POWDER, LYOPHILIZED, FOR SOLUTION INTRAVENOUS at 01:01

## 2017-01-28 RX ADMIN — FOLIC ACID 1 MG: 1 TABLET ORAL at 08:01

## 2017-01-28 RX ADMIN — ENOXAPARIN SODIUM 40 MG: 100 INJECTION SUBCUTANEOUS at 01:01

## 2017-01-28 RX ADMIN — MORPHINE SULFATE 60 MG: 15 TABLET, EXTENDED RELEASE ORAL at 08:01

## 2017-01-28 RX ADMIN — FAMOTIDINE 20 MG: 20 TABLET, FILM COATED ORAL at 05:01

## 2017-01-28 RX ADMIN — SODIUM CHLORIDE: 0.9 INJECTION, SOLUTION INTRAVENOUS at 04:01

## 2017-01-28 RX ADMIN — DIPHENHYDRAMINE HYDROCHLORIDE 25 MG: 50 INJECTION, SOLUTION INTRAMUSCULAR; INTRAVENOUS at 01:01

## 2017-01-28 RX ADMIN — STANDARDIZED SENNA CONCENTRATE AND DOCUSATE SODIUM 1 TABLET: 8.6; 5 TABLET, FILM COATED ORAL at 08:01

## 2017-01-28 RX ADMIN — DIPHENHYDRAMINE HYDROCHLORIDE 25 MG: 50 INJECTION, SOLUTION INTRAMUSCULAR; INTRAVENOUS at 05:01

## 2017-01-28 RX ADMIN — DEFERASIROX 1000 MG: 500 TABLET, FOR SUSPENSION ORAL at 01:01

## 2017-01-28 RX ADMIN — Medication: at 07:01

## 2017-01-28 RX ADMIN — DIPHENHYDRAMINE HYDROCHLORIDE 25 MG: 50 INJECTION, SOLUTION INTRAMUSCULAR; INTRAVENOUS at 09:01

## 2017-01-28 RX ADMIN — PIPERACILLIN SODIUM AND TAZOBACTAM SODIUM 4.5 G: 4; .5 INJECTION, POWDER, LYOPHILIZED, FOR SOLUTION INTRAVENOUS at 08:01

## 2017-01-28 RX ADMIN — Medication: at 09:01

## 2017-01-28 RX ADMIN — PIPERACILLIN SODIUM AND TAZOBACTAM SODIUM 4.5 G: 4; .5 INJECTION, POWDER, LYOPHILIZED, FOR SOLUTION INTRAVENOUS at 04:01

## 2017-01-28 NOTE — PLAN OF CARE
Problem: Patient Care Overview  Goal: Plan of Care Review  Outcome: Ongoing (interventions implemented as appropriate)  Patient received on RA SAT 98%, ETCO2 47, f 21. Will continue to monitor.

## 2017-01-28 NOTE — SUBJECTIVE & OBJECTIVE
"Interval History: states pain is a "little better" today;      Review of Systems   Constitutional: Negative for appetite change.   HENT: Negative for congestion, nosebleeds, sore throat and trouble swallowing.    Eyes: Negative for pain, discharge and redness.   Respiratory: Negative for shortness of breath and stridor.    Cardiovascular: Positive for chest pain. Negative for leg swelling.   Gastrointestinal: Negative for abdominal pain, blood in stool, diarrhea, nausea and vomiting.   Endocrine: Negative for polydipsia, polyphagia and polyuria.   Genitourinary: Negative for difficulty urinating, dysuria, flank pain, frequency, urgency and vaginal pain.   Musculoskeletal: Positive for back pain and myalgias. Negative for neck pain and neck stiffness.   Skin: Negative for color change and pallor.   Neurological: Negative for dizziness, seizures, light-headedness and numbness.   Psychiatric/Behavioral: Negative for agitation.     Objective:     Vital Signs (Most Recent):  Temp: 98.4 °F (36.9 °C) (01/28/17 0745)  Pulse: 89 (01/28/17 0800)  Resp: (!) 21 (01/28/17 0748)  BP: 108/66 (01/28/17 0745)  SpO2: 98 % (01/28/17 0748) Vital Signs (24h Range):  Temp:  [98.4 °F (36.9 °C)-99.8 °F (37.7 °C)] 98.4 °F (36.9 °C)  Pulse:  [] 89  Resp:  [18-92] 21  SpO2:  [95 %-99 %] 98 %  BP: ()/(57-74) 108/66     Weight: 49.5 kg (109 lb 1.6 oz)  Body mass index is 22.04 kg/(m^2).    Intake/Output Summary (Last 24 hours) at 01/28/17 1037  Last data filed at 01/28/17 0611   Gross per 24 hour   Intake          2685.15 ml   Output                0 ml   Net          2685.15 ml      Physical Exam   Constitutional: She is oriented to person, place, and time. She appears well-developed and well-nourished.   HENT:   Head: Normocephalic and atraumatic.   Mouth/Throat: Oropharynx is clear and moist. No oropharyngeal exudate.   Eyes: Right eye exhibits no discharge. Left eye exhibits no discharge.   Neck: Normal range of motion. Neck " supple. No JVD present. No tracheal deviation present.   Cardiovascular: Normal rate, regular rhythm, normal heart sounds and intact distal pulses.    Pulmonary/Chest: Effort normal and breath sounds normal. No respiratory distress.   Abdominal: Soft. Bowel sounds are normal. She exhibits no distension. There is no tenderness.   Musculoskeletal: Normal range of motion. She exhibits no edema, tenderness or deformity.   Neurological: She is alert and oriented to person, place, and time. No cranial nerve deficit.   Skin: Skin is warm and dry.   Psychiatric: She has a normal mood and affect.   Nursing note and vitals reviewed.      Significant Labs:   CBC:   Recent Labs  Lab 01/27/17 0452 01/28/17 0424   WBC 12.73* 10.83   HGB 6.8* 6.8*   HCT 19.6* 19.3*   * 401*     CMP:   Recent Labs  Lab 01/27/17 0452 01/28/17 0424    141   K 3.6 3.9    106   CO2 24 25    87   BUN 3* 3*   CREATININE 0.6 0.6   CALCIUM 8.4* 8.5*   PROT 6.5 6.6   ALBUMIN 2.9* 2.8*   BILITOT 5.8* 4.1*   ALKPHOS 230* 223*   AST 82* 33   ALT 64* 47*   ANIONGAP 7* 10   EGFRNONAA >60 >60      All pertinent labs within the past 24 hours have been reviewed.

## 2017-01-28 NOTE — ASSESSMENT & PLAN NOTE
- IVF's  - s/p 2 units PRBC  - Xjade (mother is supposed to bring) and folic acid  -  >502  - pain management, PCA placed  - Hemoglobin 6.8 -retic up 4.9; Hydroxy dc'd not needed in acute crisis; WBC trending down 10.83  - Parvovirus B19 antibody, IgG and IgM pending  - no need exchange transfusion at this time per Heme  - no PE on CT chest

## 2017-01-28 NOTE — PROGRESS NOTES
Patient lying in bed with generalized body aches and pain, but patient does state that pain is controlled with medication regimen. Patient has no complaints of SOB or difficulty breathing at this time. VSS. Patient free from falls with bed in low position, wheels locked, and call light in reach. Will continue to monitor.

## 2017-01-28 NOTE — PLAN OF CARE
Problem: Patient Care Overview  Goal: Plan of Care Review  Outcome: Ongoing (interventions implemented as appropriate)  Pt on RA. No ETCO2 in use. No distress noted.

## 2017-01-28 NOTE — NURSING TRANSFER
Nursing Transfer Note      1/27/2017     Transfer from ICU 10 to [facility] room 312    Transfer via wheelchair.    Transfer with patient belongings.    Transported by NILDA Segura RN    Medicines sent: YES    Chart send with patient: YES    Notified: Charge RN, Nurse receiving patient    Patient reassessed at: 1930    Upon arrival to floor: Pt ambulated from wheelchair, no apparent distress.
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
## 2017-01-28 NOTE — PROGRESS NOTES
"[facility] Hospital Medicine  Progress Note    Patient Name: Nishi Dumont  MRN: [id number]  Patient Class: IP- Inpatient   Admission Date: 1/23/2017  Length of Stay: 3 days  Attending Physician: Adam Turk MD  Primary Care Provider: Vannessa Alcala MD        Subjective:     Principal Problem:Sickle cell anemia with crisis    HPI:  26 y.o. female with sickle cell anemia who presents to ED with c/o back pain and chest pain that she attributes to her chronic sickle cell pain crisis. Reports she was in Hospital in [facility] 2-3 weeks ago for same symptoms. Current pain started yesterday and has not been relieved by taking her home medications.  She reports that the pain is typical both in character, intensity, and location to her usual pain crisis, 9/10 in severity and describes it as "sharp and stabbing." Reports cough but denies fever, chills, SOB, rashes nausea, vomiting, diarrhea, constipation, abdominal pain, urinary symptoms, LOC, palpitations, numbness and tingling. She is compliant with her home medications.     Her PSHx includes cholecystectomy and left hip replacement.  FHx mother and father with sickle cell trait      Hospital Course:  Admitted with sickle cell crisis placed on IVF's, pain medication. Hospital course notable for fever, H&H drop to 4.1 and transferred to ICU s/p 2 units PRBC's. cultures negative to date,CXR does not reveal any significant changes. No indication at this time for exchange transfusion. Placed on PCA for pain control    Interval History: states pain is a "little better" today;      Review of Systems   Constitutional: Negative for appetite change.   HENT: Negative for congestion, nosebleeds, sore throat and trouble swallowing.    Eyes: Negative for pain, discharge and redness.   Respiratory: Negative for shortness of breath and stridor.    Cardiovascular: Positive for chest pain. Negative for leg swelling.   Gastrointestinal: Negative for abdominal pain, " blood in stool, diarrhea, nausea and vomiting.   Endocrine: Negative for polydipsia, polyphagia and polyuria.   Genitourinary: Negative for difficulty urinating, dysuria, flank pain, frequency, urgency and vaginal pain.   Musculoskeletal: Positive for back pain and myalgias. Negative for neck pain and neck stiffness.   Skin: Negative for color change and pallor.   Neurological: Negative for dizziness, seizures, light-headedness and numbness.   Psychiatric/Behavioral: Negative for agitation.     Objective:     Vital Signs (Most Recent):  Temp: 98.4 °F (36.9 °C) (01/28/17 0745)  Pulse: 89 (01/28/17 0800)  Resp: (!) 21 (01/28/17 0748)  BP: 108/66 (01/28/17 0745)  SpO2: 98 % (01/28/17 0748) Vital Signs (24h Range):  Temp:  [98.4 °F (36.9 °C)-99.8 °F (37.7 °C)] 98.4 °F (36.9 °C)  Pulse:  [] 89  Resp:  [18-92] 21  SpO2:  [95 %-99 %] 98 %  BP: ()/(57-74) 108/66     Weight: 49.5 kg (109 lb 1.6 oz)  Body mass index is 22.04 kg/(m^2).    Intake/Output Summary (Last 24 hours) at 01/28/17 1037  Last data filed at 01/28/17 0611   Gross per 24 hour   Intake          2685.15 ml   Output                0 ml   Net          2685.15 ml      Physical Exam   Constitutional: She is oriented to person, place, and time. She appears well-developed and well-nourished.   HENT:   Head: Normocephalic and atraumatic.   Mouth/Throat: Oropharynx is clear and moist. No oropharyngeal exudate.   Eyes: Right eye exhibits no discharge. Left eye exhibits no discharge.   Neck: Normal range of motion. Neck supple. No JVD present. No tracheal deviation present.   Cardiovascular: Normal rate, regular rhythm, normal heart sounds and intact distal pulses.    Pulmonary/Chest: Effort normal and breath sounds normal. No respiratory distress.   Abdominal: Soft. Bowel sounds are normal. She exhibits no distension. There is no tenderness.   Musculoskeletal: Normal range of motion. She exhibits no edema, tenderness or deformity.   Neurological: She is  alert and oriented to person, place, and time. No cranial nerve deficit.   Skin: Skin is warm and dry.   Psychiatric: She has a normal mood and affect.   Nursing note and vitals reviewed.      Significant Labs:   CBC:   Recent Labs  Lab 01/27/17 0452 01/28/17 0424   WBC 12.73* 10.83   HGB 6.8* 6.8*   HCT 19.6* 19.3*   * 401*     CMP:   Recent Labs  Lab 01/27/17 0452 01/28/17 0424    141   K 3.6 3.9    106   CO2 24 25    87   BUN 3* 3*   CREATININE 0.6 0.6   CALCIUM 8.4* 8.5*   PROT 6.5 6.6   ALBUMIN 2.9* 2.8*   BILITOT 5.8* 4.1*   ALKPHOS 230* 223*   AST 82* 33   ALT 64* 47*   ANIONGAP 7* 10   EGFRNONAA >60 >60      All pertinent labs within the past 24 hours have been reviewed.         Assessment/Plan:      * Sickle cell anemia with crisis  - IVF's  - s/p 2 units PRBC  - Xjade (mother is supposed to bring) and folic acid  -  >502  - pain management, PCA placed  - Hemoglobin 6.8 -retic up 4.9; Hydroxy dc'd not needed in acute crisis; WBC trending down 10.83  - Parvovirus B19 antibody, IgG and IgM pending  - no need exchange transfusion at this time per Heme  - no PE on CT chest         Sickle cell anemia  - s/p 2 units PRBC's   - Hgb 6.8  - monitor, retic up from yesterday 4.9 today           Fever  - panculture; BC, NGTD, UC NGTD, afebrile last night  - on Zosyn empirically       Hypokalemia  - replaced      Hypophosphatemia  - replaced      VTE Risk Mitigation         Ordered     enoxaparin injection 40 mg  Daily     Route:  Subcutaneous        01/23/17 2127     Medium Risk of VTE  Once      01/23/17 2127          Brook Williamson PA-C  Department of Hospital Medicine   [facility]

## 2017-01-28 NOTE — PROGRESS NOTES
Afebrile, counts stable with Hgb 6.8,retic 4.9.    Cults negative.    Still with some pain on PCA but I think there is some slow improvement.  Lungs clear on exam and she is comfortable from a resp perspective.    IMP: SS pain Crisis    Rec: Continue current RX. Nothing new to add.

## 2017-01-29 LAB
ALBUMIN SERPL BCP-MCNC: 2.9 G/DL
ALP SERPL-CCNC: 205 U/L
ALT SERPL W/O P-5'-P-CCNC: 42 U/L
ANION GAP SERPL CALC-SCNC: 8 MMOL/L
ANISOCYTOSIS BLD QL SMEAR: ABNORMAL
AST SERPL-CCNC: 25 U/L
BASOPHILS # BLD AUTO: 0.04 K/UL
BASOPHILS NFR BLD: 0.4 %
BILIRUB SERPL-MCNC: 2.9 MG/DL
BUN SERPL-MCNC: 3 MG/DL
CALCIUM SERPL-MCNC: 8.6 MG/DL
CHLORIDE SERPL-SCNC: 106 MMOL/L
CO2 SERPL-SCNC: 26 MMOL/L
CREAT SERPL-MCNC: 0.6 MG/DL
DIFFERENTIAL METHOD: ABNORMAL
EOSINOPHIL # BLD AUTO: 0.4 K/UL
EOSINOPHIL NFR BLD: 3.8 %
ERYTHROCYTE [DISTWIDTH] IN BLOOD BY AUTOMATED COUNT: 16.6 %
EST. GFR  (AFRICAN AMERICAN): >60 ML/MIN/1.73 M^2
EST. GFR  (NON AFRICAN AMERICAN): >60 ML/MIN/1.73 M^2
GLUCOSE SERPL-MCNC: 95 MG/DL
HCT VFR BLD AUTO: 22.5 %
HGB BLD-MCNC: 7.5 G/DL
LYMPHOCYTES # BLD AUTO: 2.9 K/UL
LYMPHOCYTES NFR BLD: 28.1 %
MAGNESIUM SERPL-MCNC: 1.8 MG/DL
MCH RBC QN AUTO: 29.2 PG
MCHC RBC AUTO-ENTMCNC: 33.3 %
MCV RBC AUTO: 88 FL
MONOCYTES # BLD AUTO: 1 K/UL
MONOCYTES NFR BLD: 9.9 %
NEUTROPHILS # BLD AUTO: 5.8 K/UL
NEUTROPHILS NFR BLD: 57.8 %
OVALOCYTES BLD QL SMEAR: ABNORMAL
PHOSPHATE SERPL-MCNC: 4 MG/DL
PLATELET # BLD AUTO: 519 K/UL
PLATELET BLD QL SMEAR: ABNORMAL
PMV BLD AUTO: 8.7 FL
POIKILOCYTOSIS BLD QL SMEAR: SLIGHT
POLYCHROMASIA BLD QL SMEAR: ABNORMAL
POTASSIUM SERPL-SCNC: 3.7 MMOL/L
PROT SERPL-MCNC: 6.9 G/DL
RBC # BLD AUTO: 2.57 M/UL
RETICS/RBC NFR AUTO: 7.1 %
SCHISTOCYTES BLD QL SMEAR: ABNORMAL
SODIUM SERPL-SCNC: 140 MMOL/L
STOMATOCYTES BLD QL SMEAR: PRESENT
TARGETS BLD QL SMEAR: ABNORMAL
WBC # BLD AUTO: 10.14 K/UL

## 2017-01-29 PROCEDURE — 25000003 PHARM REV CODE 250: Performed by: HOSPITALIST

## 2017-01-29 PROCEDURE — 63600175 PHARM REV CODE 636 W HCPCS: Performed by: HOSPITALIST

## 2017-01-29 PROCEDURE — 63600175 PHARM REV CODE 636 W HCPCS: Performed by: PHYSICIAN ASSISTANT

## 2017-01-29 PROCEDURE — 83735 ASSAY OF MAGNESIUM: CPT

## 2017-01-29 PROCEDURE — 99900035 HC TECH TIME PER 15 MIN (STAT)

## 2017-01-29 PROCEDURE — 84100 ASSAY OF PHOSPHORUS: CPT

## 2017-01-29 PROCEDURE — 85025 COMPLETE CBC W/AUTO DIFF WBC: CPT

## 2017-01-29 PROCEDURE — 11000001 HC ACUTE MED/SURG PRIVATE ROOM

## 2017-01-29 PROCEDURE — 94770 HC EXHALED C02 TEST: CPT

## 2017-01-29 PROCEDURE — 80053 COMPREHEN METABOLIC PANEL: CPT

## 2017-01-29 PROCEDURE — 85045 AUTOMATED RETICULOCYTE COUNT: CPT

## 2017-01-29 RX ADMIN — DIPHENHYDRAMINE HYDROCHLORIDE 25 MG: 50 INJECTION, SOLUTION INTRAMUSCULAR; INTRAVENOUS at 05:01

## 2017-01-29 RX ADMIN — PIPERACILLIN SODIUM AND TAZOBACTAM SODIUM 4.5 G: 4; .5 INJECTION, POWDER, LYOPHILIZED, FOR SOLUTION INTRAVENOUS at 12:01

## 2017-01-29 RX ADMIN — PIPERACILLIN SODIUM AND TAZOBACTAM SODIUM 4.5 G: 4; .5 INJECTION, POWDER, LYOPHILIZED, FOR SOLUTION INTRAVENOUS at 04:01

## 2017-01-29 RX ADMIN — DIPHENHYDRAMINE HYDROCHLORIDE 25 MG: 50 INJECTION, SOLUTION INTRAMUSCULAR; INTRAVENOUS at 09:01

## 2017-01-29 RX ADMIN — FOLIC ACID 1 MG: 1 TABLET ORAL at 09:01

## 2017-01-29 RX ADMIN — Medication: at 08:01

## 2017-01-29 RX ADMIN — SODIUM CHLORIDE: 0.9 INJECTION, SOLUTION INTRAVENOUS at 09:01

## 2017-01-29 RX ADMIN — Medication: at 04:01

## 2017-01-29 RX ADMIN — ENOXAPARIN SODIUM 40 MG: 100 INJECTION SUBCUTANEOUS at 12:01

## 2017-01-29 RX ADMIN — STANDARDIZED SENNA CONCENTRATE AND DOCUSATE SODIUM 1 TABLET: 8.6; 5 TABLET, FILM COATED ORAL at 09:01

## 2017-01-29 RX ADMIN — DIPHENHYDRAMINE HYDROCHLORIDE 25 MG: 50 INJECTION, SOLUTION INTRAMUSCULAR; INTRAVENOUS at 01:01

## 2017-01-29 RX ADMIN — DEFERASIROX 1000 MG: 500 TABLET, FOR SUSPENSION ORAL at 12:01

## 2017-01-29 RX ADMIN — FAMOTIDINE 20 MG: 20 TABLET, FILM COATED ORAL at 06:01

## 2017-01-29 RX ADMIN — MORPHINE SULFATE 60 MG: 15 TABLET, EXTENDED RELEASE ORAL at 09:01

## 2017-01-29 RX ADMIN — PIPERACILLIN SODIUM AND TAZOBACTAM SODIUM 4.5 G: 4; .5 INJECTION, POWDER, LYOPHILIZED, FOR SOLUTION INTRAVENOUS at 09:01

## 2017-01-29 RX ADMIN — Medication: at 12:01

## 2017-01-29 RX ADMIN — ONDANSETRON 4 MG: 2 INJECTION INTRAMUSCULAR; INTRAVENOUS at 10:01

## 2017-01-29 RX ADMIN — DIPHENHYDRAMINE HYDROCHLORIDE 25 MG: 50 INJECTION, SOLUTION INTRAMUSCULAR; INTRAVENOUS at 06:01

## 2017-01-29 NOTE — PLAN OF CARE
Problem: Patient Care Overview  Goal: Plan of Care Review  Outcome: Ongoing (interventions implemented as appropriate)  Safety maintained. VSS on RA. Heart monitor remains in place. IV Abx administered as scheduled. NS infusing @ 150 ml/hr. PCA infusing. Bed in lowest position, wheels locked and call light within reach.

## 2017-01-29 NOTE — SUBJECTIVE & OBJECTIVE
Interval History: feels better, less pain    Review of Systems   Constitutional: Negative for appetite change.   HENT: Negative for congestion, nosebleeds, sore throat and trouble swallowing.    Eyes: Negative for pain, discharge and redness.   Respiratory: Negative for shortness of breath and stridor.    Cardiovascular: Negative for chest pain and leg swelling.   Gastrointestinal: Negative for abdominal pain, blood in stool, diarrhea, nausea and vomiting.   Endocrine: Negative for polydipsia, polyphagia and polyuria.   Genitourinary: Negative for difficulty urinating, dysuria, flank pain, frequency, urgency and vaginal pain.   Musculoskeletal: Positive for back pain and myalgias. Negative for neck pain and neck stiffness.   Skin: Negative for color change and pallor.   Neurological: Negative for dizziness, seizures, light-headedness and numbness.   Psychiatric/Behavioral: Negative for agitation.     Objective:     Vital Signs (Most Recent):  Temp: 98.2 °F (36.8 °C) (01/29/17 1210)  Pulse: 84 (01/29/17 1210)  Resp: 18 (01/29/17 1210)  BP: (!) 105/58 (01/29/17 1210)  SpO2: 98 % (01/29/17 1210) Vital Signs (24h Range):  Temp:  [98 °F (36.7 °C)-99.9 °F (37.7 °C)] 98.2 °F (36.8 °C)  Pulse:  [78-93] 84  Resp:  [18-20] 18  SpO2:  [97 %-99 %] 98 %  BP: (101-114)/(56-62) 105/58     Weight: 49.5 kg (109 lb 1.6 oz)  Body mass index is 22.04 kg/(m^2).    Intake/Output Summary (Last 24 hours) at 01/29/17 1359  Last data filed at 01/29/17 0659   Gross per 24 hour   Intake            644.5 ml   Output                0 ml   Net            644.5 ml      Physical Exam   Constitutional: She is oriented to person, place, and time. She appears well-developed and well-nourished.   HENT:   Head: Normocephalic and atraumatic.   Mouth/Throat: Oropharynx is clear and moist. No oropharyngeal exudate.   Eyes: Right eye exhibits no discharge. Left eye exhibits no discharge.   Neck: Normal range of motion. Neck supple. No JVD present. No  tracheal deviation present.   Cardiovascular: Normal rate, regular rhythm, normal heart sounds and intact distal pulses.    Pulmonary/Chest: Effort normal and breath sounds normal. No respiratory distress.   Abdominal: Soft. Bowel sounds are normal. She exhibits no distension. There is no tenderness.   Musculoskeletal: Normal range of motion. She exhibits no edema, tenderness or deformity.   Neurological: She is alert and oriented to person, place, and time. No cranial nerve deficit.   Skin: Skin is warm and dry.   Psychiatric: She has a normal mood and affect.   Nursing note and vitals reviewed.      Significant Labs:   BMP:   Recent Labs  Lab 01/29/17 0423   GLU 95      K 3.7      CO2 26   BUN 3*   CREATININE 0.6   CALCIUM 8.6*   MG 1.8     CBC:   Recent Labs  Lab 01/28/17 0424 01/29/17 0423   WBC 10.83 10.14   HGB 6.8* 7.5*   HCT 19.3* 22.5*   * 519*

## 2017-01-29 NOTE — PLAN OF CARE
Problem: Patient Care Overview  Goal: Plan of Care Review  Outcome: Ongoing (interventions implemented as appropriate)  Resting in a low lying bed with her PCA pump button in her hand.  VSS.  Afebrile.  C/o itching even though she requests benadryl Q4 as prescribed.  Is often locked out from trying to give herself more Dilaudid than ordered.  No falls or injuries.  Safety precautions ongoing and call light within reach.

## 2017-01-29 NOTE — PLAN OF CARE
Problem: Patient Care Overview  Goal: Plan of Care Review  Outcome: Ongoing (interventions implemented as appropriate)  Patient in no apparent distress. Sat's 99  % on room air. ETCO2 47 . Will continue to monitor.

## 2017-01-30 VITALS
HEART RATE: 102 BPM | BODY MASS INDEX: 22 KG/M2 | TEMPERATURE: 99 F | OXYGEN SATURATION: 97 % | SYSTOLIC BLOOD PRESSURE: 104 MMHG | WEIGHT: 109.13 LBS | RESPIRATION RATE: 18 BRPM | HEIGHT: 59 IN | DIASTOLIC BLOOD PRESSURE: 59 MMHG

## 2017-01-30 LAB
ALBUMIN SERPL BCP-MCNC: 3.1 G/DL
ALP SERPL-CCNC: 185 U/L
ALT SERPL W/O P-5'-P-CCNC: 42 U/L
ANION GAP SERPL CALC-SCNC: 8 MMOL/L
ANISOCYTOSIS BLD QL SMEAR: ABNORMAL
AST SERPL-CCNC: 34 U/L
BACTERIA BLD CULT: NORMAL
BACTERIA BLD CULT: NORMAL
BASOPHILS # BLD AUTO: 0.05 K/UL
BASOPHILS NFR BLD: 0.5 %
BILIRUB SERPL-MCNC: 2.5 MG/DL
BUN SERPL-MCNC: 3 MG/DL
CALCIUM SERPL-MCNC: 8.9 MG/DL
CHLORIDE SERPL-SCNC: 105 MMOL/L
CO2 SERPL-SCNC: 25 MMOL/L
CREAT SERPL-MCNC: 0.7 MG/DL
DIFFERENTIAL METHOD: ABNORMAL
EOSINOPHIL # BLD AUTO: 0.3 K/UL
EOSINOPHIL NFR BLD: 2.9 %
ERYTHROCYTE [DISTWIDTH] IN BLOOD BY AUTOMATED COUNT: 16.7 %
EST. GFR  (AFRICAN AMERICAN): >60 ML/MIN/1.73 M^2
EST. GFR  (NON AFRICAN AMERICAN): >60 ML/MIN/1.73 M^2
GLUCOSE SERPL-MCNC: 95 MG/DL
HCT VFR BLD AUTO: 23.1 %
HGB BLD-MCNC: 7.8 G/DL
HYPOCHROMIA BLD QL SMEAR: ABNORMAL
LYMPHOCYTES # BLD AUTO: 2.9 K/UL
LYMPHOCYTES NFR BLD: 30.4 %
MAGNESIUM SERPL-MCNC: 2.1 MG/DL
MCH RBC QN AUTO: 29.7 PG
MCHC RBC AUTO-ENTMCNC: 33.8 %
MCV RBC AUTO: 88 FL
MONOCYTES # BLD AUTO: 1.2 K/UL
MONOCYTES NFR BLD: 12.4 %
NEUTROPHILS # BLD AUTO: 5.1 K/UL
NEUTROPHILS NFR BLD: 53.8 %
OVALOCYTES BLD QL SMEAR: ABNORMAL
PHOSPHATE SERPL-MCNC: 4.4 MG/DL
PLATELET # BLD AUTO: 583 K/UL
PLATELET BLD QL SMEAR: ABNORMAL
PMV BLD AUTO: 8.8 FL
POIKILOCYTOSIS BLD QL SMEAR: SLIGHT
POLYCHROMASIA BLD QL SMEAR: ABNORMAL
POTASSIUM SERPL-SCNC: 3.8 MMOL/L
PROT SERPL-MCNC: 7.3 G/DL
RBC # BLD AUTO: 2.63 M/UL
RETICS/RBC NFR AUTO: 7.4 %
SCHISTOCYTES BLD QL SMEAR: ABNORMAL
SODIUM SERPL-SCNC: 138 MMOL/L
STOMATOCYTES BLD QL SMEAR: PRESENT
TARGETS BLD QL SMEAR: ABNORMAL
WBC # BLD AUTO: 9.58 K/UL

## 2017-01-30 PROCEDURE — 85025 COMPLETE CBC W/AUTO DIFF WBC: CPT

## 2017-01-30 PROCEDURE — 99239 HOSP IP/OBS DSCHRG MGMT >30: CPT | Mod: ,,, | Performed by: PHYSICIAN ASSISTANT

## 2017-01-30 PROCEDURE — 85045 AUTOMATED RETICULOCYTE COUNT: CPT

## 2017-01-30 PROCEDURE — 84100 ASSAY OF PHOSPHORUS: CPT

## 2017-01-30 PROCEDURE — 25000003 PHARM REV CODE 250: Performed by: HOSPITALIST

## 2017-01-30 PROCEDURE — 83735 ASSAY OF MAGNESIUM: CPT

## 2017-01-30 PROCEDURE — 63600175 PHARM REV CODE 636 W HCPCS: Performed by: PHYSICIAN ASSISTANT

## 2017-01-30 PROCEDURE — 80053 COMPREHEN METABOLIC PANEL: CPT

## 2017-01-30 PROCEDURE — 63600175 PHARM REV CODE 636 W HCPCS: Performed by: HOSPITALIST

## 2017-01-30 RX ORDER — OXYCODONE HYDROCHLORIDE 10 MG/1
10 TABLET ORAL EVERY 12 HOURS PRN
Qty: 10 TABLET | Refills: 0 | Status: ON HOLD | OUTPATIENT
Start: 2017-01-30 | End: 2017-10-11 | Stop reason: HOSPADM

## 2017-01-30 RX ADMIN — STANDARDIZED SENNA CONCENTRATE AND DOCUSATE SODIUM 1 TABLET: 8.6; 5 TABLET, FILM COATED ORAL at 09:01

## 2017-01-30 RX ADMIN — MORPHINE SULFATE 60 MG: 15 TABLET, EXTENDED RELEASE ORAL at 09:01

## 2017-01-30 RX ADMIN — PIPERACILLIN SODIUM AND TAZOBACTAM SODIUM 4.5 G: 4; .5 INJECTION, POWDER, LYOPHILIZED, FOR SOLUTION INTRAVENOUS at 05:01

## 2017-01-30 RX ADMIN — DEFERASIROX 1000 MG: 500 TABLET, FOR SUSPENSION ORAL at 01:01

## 2017-01-30 RX ADMIN — DIPHENHYDRAMINE HYDROCHLORIDE 25 MG: 50 INJECTION, SOLUTION INTRAMUSCULAR; INTRAVENOUS at 09:01

## 2017-01-30 RX ADMIN — FOLIC ACID 1 MG: 1 TABLET ORAL at 09:01

## 2017-01-30 RX ADMIN — DIPHENHYDRAMINE HYDROCHLORIDE 25 MG: 50 INJECTION, SOLUTION INTRAMUSCULAR; INTRAVENOUS at 01:01

## 2017-01-30 RX ADMIN — DIPHENHYDRAMINE HYDROCHLORIDE 25 MG: 50 INJECTION, SOLUTION INTRAMUSCULAR; INTRAVENOUS at 05:01

## 2017-01-30 RX ADMIN — DIPHENHYDRAMINE HYDROCHLORIDE 25 MG: 50 INJECTION, SOLUTION INTRAMUSCULAR; INTRAVENOUS at 02:01

## 2017-01-30 RX ADMIN — FAMOTIDINE 20 MG: 20 TABLET, FILM COATED ORAL at 06:01

## 2017-01-30 NOTE — PLAN OF CARE
Problem: Patient Care Overview  Goal: Plan of Care Review  Outcome: Ongoing (interventions implemented as appropriate)  Patient received on RA SAT 98%, ETCO2- 43. Will continue to monitor.

## 2017-01-30 NOTE — ASSESSMENT & PLAN NOTE
- s/p IVF's  - s/p 2 units PRBC  - Xjade (mother is supposed to bring) and folic acid  -  >502  - pain management, PCA placed  - Hemoglobin 7.8 -retic up ; Hydroxy dc'd not needed in acute crisis; WBC trending down   - Parvovirus B19 antibody, IgG and IgM pending  - no need exchange transfusion at this time per Heme  - no PE on CT chest

## 2017-01-30 NOTE — PLAN OF CARE
Discharge Plan    Pt discharging home today. States her mother will transport her home.  CM scheduled an appointment with pt's Hematologist, Dr. Howard for Tues. Jan. 31 for 0940.  Pt is in agreement with plan.  No further dc needs identified.     01/30/17 1209   Final Note   Assessment Type Final Discharge Note   Discharge Disposition Home   Discharge planning education complete? Yes   What phone number can be called within the next 1-3 days to see how you are doing after discharge? [phone]   Hospital Follow Up  Appt(s) scheduled? Yes   Discharge plans and expectations educations in teach back method with documentation complete? Yes   Offered [facility] -- Bedside Delivery? n/a   Discharge/Hospital Encounter Summary to (non-[facility]) PCP Yes   Referral to Outpatient Case Management complete? n/a   30 day supply of medicines given at discharge, if documented non-compliance / non-adherence? n/a   Any social issues identified prior to discharge? No   Did you assess the readiness or willingness of the family or caregiver to support self management of care? Yes   Right Care Referral Info   Post Acute Recommendation Home-care

## 2017-01-30 NOTE — PROGRESS NOTES
"[facility] Hospital Medicine  Progress Note    Patient Name: Nishi Dumont  MRN: [id number]  Patient Class: IP- Inpatient   Admission Date: 1/23/2017  Length of Stay: 5 days  Attending Physician: Adam Turk MD  Primary Care Provider: No primary care provider on file.        Subjective:     Principal Problem:Sickle cell anemia with crisis    HPI:  26 y.o. female with sickle cell anemia who presents to ED with c/o back pain and chest pain that she attributes to her chronic sickle cell pain crisis. Reports she was in Hospital in [facility] 2-3 weeks ago for same symptoms. Current pain started yesterday and has not been relieved by taking her home medications.  She reports that the pain is typical both in character, intensity, and location to her usual pain crisis, 9/10 in severity and describes it as "sharp and stabbing." Reports cough but denies fever, chills, SOB, rashes nausea, vomiting, diarrhea, constipation, abdominal pain, urinary symptoms, LOC, palpitations, numbness and tingling. She is compliant with her home medications.     Her PSHx includes cholecystectomy and left hip replacement.  FHx mother and father with sickle cell trait      Hospital Course:  Admitted with sickle cell crisis placed on IVF's, pain medication. Hospital course notable for fever, H&H drop to 4.1 and transferred to ICU s/p 2 units PRBC's. cultures negative to date,CXR does not reveal any significant changes. No indication at this time for exchange transfusion. Lungs clear on exam and she is comfortable from a respiratory perspective. Placed on PCA for pain control, clinically improved.     Interval History: feels better, less pain    Review of Systems   Constitutional: Negative for appetite change.   HENT: Negative for congestion, nosebleeds, sore throat and trouble swallowing.    Eyes: Negative for pain, discharge and redness.   Respiratory: Negative for shortness of breath and stridor.    Cardiovascular: " Negative for chest pain and leg swelling.   Gastrointestinal: Negative for abdominal pain, blood in stool, diarrhea, nausea and vomiting.   Endocrine: Negative for polydipsia, polyphagia and polyuria.   Genitourinary: Negative for difficulty urinating, dysuria, flank pain, frequency, urgency and vaginal pain.   Musculoskeletal: Positive for back pain and myalgias. Negative for neck pain and neck stiffness.   Skin: Negative for color change and pallor.   Neurological: Negative for dizziness, seizures, light-headedness and numbness.   Psychiatric/Behavioral: Negative for agitation.     Objective:     Vital Signs (Most Recent):  Temp: 98.2 °F (36.8 °C) (01/29/17 1210)  Pulse: 84 (01/29/17 1210)  Resp: 18 (01/29/17 1210)  BP: (!) 105/58 (01/29/17 1210)  SpO2: 98 % (01/29/17 1210) Vital Signs (24h Range):  Temp:  [98 °F (36.7 °C)-99.9 °F (37.7 °C)] 98.2 °F (36.8 °C)  Pulse:  [78-93] 84  Resp:  [18-20] 18  SpO2:  [97 %-99 %] 98 %  BP: (101-114)/(56-62) 105/58     Weight: 49.5 kg (109 lb 1.6 oz)  Body mass index is 22.04 kg/(m^2).    Intake/Output Summary (Last 24 hours) at 01/29/17 1359  Last data filed at 01/29/17 0659   Gross per 24 hour   Intake            644.5 ml   Output                0 ml   Net            644.5 ml      Physical Exam   Constitutional: She is oriented to person, place, and time. She appears well-developed and well-nourished.   HENT:   Head: Normocephalic and atraumatic.   Mouth/Throat: Oropharynx is clear and moist. No oropharyngeal exudate.   Eyes: Right eye exhibits no discharge. Left eye exhibits no discharge.   Neck: Normal range of motion. Neck supple. No JVD present. No tracheal deviation present.   Cardiovascular: Normal rate, regular rhythm, normal heart sounds and intact distal pulses.    Pulmonary/Chest: Effort normal and breath sounds normal. No respiratory distress.   Abdominal: Soft. Bowel sounds are normal. She exhibits no distension. There is no tenderness.   Musculoskeletal: Normal  range of motion. She exhibits no edema, tenderness or deformity.   Neurological: She is alert and oriented to person, place, and time. No cranial nerve deficit.   Skin: Skin is warm and dry.   Psychiatric: She has a normal mood and affect.   Nursing note and vitals reviewed.      Significant Labs:   BMP:   Recent Labs  Lab 01/29/17 0423   GLU 95      K 3.7      CO2 26   BUN 3*   CREATININE 0.6   CALCIUM 8.6*   MG 1.8     CBC:   Recent Labs  Lab 01/28/17 0424 01/29/17 0423   WBC 10.83 10.14   HGB 6.8* 7.5*   HCT 19.3* 22.5*   * 519*           Assessment/Plan:      * Sickle cell anemia with crisis  - IVF's  - s/p 2 units PRBC  - Xjade (mother is supposed to bring) and folic acid  -  >502  - pain management, PCA placed  - Hemoglobin 6.8 -retic up 4.9; Hydroxy dc'd not needed in acute crisis; WBC trending down   - Parvovirus B19 antibody, IgG and IgM pending  - no need exchange transfusion at this time per Heme  - no PE on CT chest         Sickle cell anemia  - s/p 2 units PRBC's   - Hgb 6.8             Fever  - panculture; BC, NGTD, UC NGTD, afebrile last night  - on Zosyn empirically       Hypokalemia  - replaced      Hypophosphatemia  - replaced      VTE Risk Mitigation         Ordered     enoxaparin injection 40 mg  Daily     Route:  Subcutaneous        01/23/17 2127     Medium Risk of VTE  Once      01/23/17 2127          Brook Williamson PA-C  Department of Hospital Medicine   [facility]

## 2017-01-30 NOTE — ASSESSMENT & PLAN NOTE
- IVF's  - s/p 2 units PRBC  - Xjade (mother is supposed to bring) and folic acid  -  >502  - pain management, PCA placed  - Hemoglobin 6.8 -retic up 4.9; Hydroxy dc'd not needed in acute crisis; WBC trending down   - Parvovirus B19 antibody, IgG and IgM pending  - no need exchange transfusion at this time per Heme  - no PE on CT chest

## 2017-01-30 NOTE — PROGRESS NOTES
Discharge papers, instructions, and prescription given. All questions answered and Pt verbalized understanding. Port-a-Cath de-accessed and gauze and tegaderm pressure dressing applied. Pt walked out under own power with family.

## 2017-01-30 NOTE — DISCHARGE SUMMARY
"[facility] Hospital Medicine  Discharge Summary      Patient Name: Nishi Dumont  MRN: [id number]  Admission Date: 1/23/2017  Hospital Length of Stay: 5 days  Discharge Date and Time: 1/30/2017 2:57 PM  Attending Physician: Adam Turk MD   Discharging Provider: Brook Williamson PA-C  Primary Care Provider: No primary care provider on file.      HPI:   26 y.o. female with sickle cell anemia who presents to ED with c/o back pain and chest pain that she attributes to her chronic sickle cell pain crisis. Reports she was in Hospital in [facility] 2-3 weeks ago for same symptoms. Current pain started yesterday and has not been relieved by taking her home medications.  She reports that the pain is typical both in character, intensity, and location to her usual pain crisis, 9/10 in severity and describes it as "sharp and stabbing." Reports cough but denies fever, chills, SOB, rashes nausea, vomiting, diarrhea, constipation, abdominal pain, urinary symptoms, LOC, palpitations, numbness and tingling. She is compliant with her home medications.     Her PSHx includes cholecystectomy and left hip replacement.  FHx mother and father with sickle cell trait      * No surgery found *      Indwelling Lines/Drains at time of discharge:   Lines/Drains/Airways     Central Venous Catheter Line                 Port A Cath Single Lumen 09/14/15 504 days              Hospital Course:   Admitted with sickle cell crisis placed on IVF's, pain medication. Hospital course notable for fever, H&H drop to 4.1 and transferred to ICU s/p 2 units PRBC's. cultures negative to date,CXR does not reveal any significant changes. No indication at this time for exchange transfusion. Lungs clear on exam and she is comfortable from a respiratory perspective. Placed on PCA for pain control, clinically improved. Vitals stable, discharged home.      Consults:   Consults         Status Ordering Provider     Inpatient consult to Hematology  " Once     Provider:  Brian Erazo MD    Completed KSENIA BROOKS     Inpatient consult to Respiratory Care  Once     Provider:  (Not yet assigned)    Acknowledged KSENIA BROOKS          Significant Diagnostic Studies: Labs:   BMP:     Recent Labs  Lab 01/29/17 0423 01/30/17  0431   GLU 95 95    138   K 3.7 3.8    105   CO2 26 25   BUN 3* 3*   CREATININE 0.6 0.7   CALCIUM 8.6* 8.9   MG 1.8 2.1   , CBC     Recent Labs  Lab 01/29/17 0423 01/30/17  0431   WBC 10.14 9.58   HGB 7.5* 7.8*   HCT 22.5* 23.1*   * 583*    and All labs within the past 24 hours have been reviewed    Pending Diagnostic Studies:     Procedure Component Value Units Date/Time    Parvovirus B19 antibody, IgG and IgM [[id number]] Collected:  01/27/17 1629    Order Status:  Sent Lab Status:  In process Updated:  01/27/17 1088    Specimen:  Blood from Blood         Final Active Diagnoses:    Diagnosis Date Noted POA    PRINCIPAL PROBLEM:  Sickle cell anemia with crisis [D57.00] 01/23/2017 Yes    Hypokalemia [E87.6] 01/26/2017 Yes    Hypophosphatemia [E83.39] 01/26/2017 Yes    Leukocytosis, unspecified [D72.829] 12/30/2013 Yes    Fever [R50.9] 09/04/2013 Yes    Sickle cell anemia [D57.1] 09/03/2013 Yes      Problems Resolved During this Admission:    Diagnosis Date Noted Date Resolved POA      * Sickle cell anemia with crisis  - s/p IVF's  - s/p 2 units PRBC  - Xjade (mother is supposed to bring) and folic acid  -  >502  - pain management, PCA placed  - Hemoglobin 7.8 -retic up ; Hydroxy dc'd not needed in acute crisis; WBC trending down   - Parvovirus B19 antibody, IgG and IgM pending  - no need exchange transfusion at this time per Heme  - no PE on CT chest         Sickle cell anemia  - s/p 2 units PRBC's   - Hgb 7.8             Fever  - panculture; BC, NGTD, UC NGTD, afebrile   - dc Zosyn      Hypokalemia  - replaced      Hypophosphatemia  - replaced        Discharged Condition: stable    Disposition: Home or Self  Care    Follow Up:  Follow-up Information     Follow up On 5/8/2017.    Why:   @ 2PM 2ND FLOOR WITH DR DUQUE ( [address] [facility] ) [facility] # [phone]  NEW PCP APPT         Please follow up.    Contact information:    You have an appointment scheduled with Dr. Howard for [place]. Jan. 31, for 9:40 AM        Patient Instructions:     Diet general     Activity as tolerated       Medications:  Reconciled Home Medications:   Current Discharge Medication List      CONTINUE these medications which have CHANGED    Details   oxycodone (ROXICODONE) 10 mg Tab immediate release tablet Take 1 tablet (10 mg total) by mouth every 12 (twelve) hours as needed for Pain.  Qty: 10 tablet, Refills: 0         CONTINUE these medications which have NOT CHANGED    Details   deferasirox (EXJADE) 500 MG disintegrating tablet Take 1,000 mg by mouth every morning. before breakfast      famotidine (PEPCID) 20 MG tablet Take 20 mg by mouth every morning.      folic acid (FOLVITE) 1 MG tablet Take 1 mg by mouth 2 (two) times daily.      hydroxyurea (HYDREA) 500 mg Cap Take 1,000 mg by mouth 2 (two) times daily.       morphine (MS CONTIN) 60 MG 12 hr tablet Take 60 mg by mouth every 12 (twelve) hours as needed for Pain.      ondansetron (ZOFRAN-ODT) 4 MG TbDL Take 4 mg by mouth every 8 (eight) hours as needed (for nausea).      tramadol (ULTRAM) 50 mg tablet Take 50 mg by mouth every 6 (six) hours as needed for Pain.         STOP taking these medications       oxycodone-acetaminophen (PERCOCET)  mg per tablet Comments:   Reason for Stopping:         hydrocodone-acetaminophen 10-325mg (NORCO)  mg Tab Comments:   Reason for Stopping:             Time spent on the discharge of patient: > 30 minutes    Brook Williamson PA-C  Department of Hospital Medicine  [facility]

## 2017-01-31 LAB
PARVOVIRUS B19 ABS IGG & IGM: ABNORMAL
PARVOVIRUS B19 IGG ANTIBODY: 6.7 INDEX
PARVOVIRUS B19 IGM ANTIBODY: 0.17 INDEX

## 2017-02-01 ENCOUNTER — PATIENT OUTREACH (OUTPATIENT)
Dept: ADMINISTRATIVE | Facility: CLINIC | Age: 27
End: 2017-02-01
Payer: MEDICAID

## 2017-02-01 NOTE — PROGRESS NOTES
C3 nurse attempted to contact patient. No answer. The following message was left for the patient to return the call:  Good morning  I am a nurse calling on [facility] of [facility] from [facility].  This is a Transitional Care Call for Nishi Dumont. When you have a moment please contact us at [phone] or [phone] Monday through Friday, between the hours of 8 am to 4 pm. We look forward to speaking with you. On behalf of [facility] have a nice day.    The patient has a scheduled appointment with Dr. Howard on 1/31/17 @ 1342. Unable to route out of network MD.

## 2017-04-19 ENCOUNTER — HOSPITAL ENCOUNTER (EMERGENCY)
Facility: OTHER | Age: 27
Discharge: HOME | End: 2017-04-19
Attending: EMERGENCY MEDICINE
Payer: MEDICAID

## 2017-04-19 VITALS
RESPIRATION RATE: 18 BRPM | OXYGEN SATURATION: 100 % | TEMPERATURE: 99 F | WEIGHT: 122 LBS | BODY MASS INDEX: 24.6 KG/M2 | DIASTOLIC BLOOD PRESSURE: 59 MMHG | SYSTOLIC BLOOD PRESSURE: 109 MMHG | HEIGHT: 59 IN | HEART RATE: 100 BPM

## 2017-04-19 DIAGNOSIS — D57.00 SICKLE CELL CRISIS: Primary | ICD-10-CM

## 2017-04-19 DIAGNOSIS — Z34.90 PREGNANCY, UNSPECIFIED GESTATIONAL AGE: ICD-10-CM

## 2017-04-19 LAB
ALBUMIN SERPL BCP-MCNC: 4 G/DL
ALP SERPL-CCNC: 101 U/L
ALT SERPL W/O P-5'-P-CCNC: 33 U/L
ANION GAP SERPL CALC-SCNC: 8 MMOL/L
AST SERPL-CCNC: 31 U/L
B-HCG UR QL: POSITIVE
BACTERIA #/AREA URNS HPF: ABNORMAL /HPF
BASOPHILS # BLD AUTO: 0.02 K/UL
BASOPHILS NFR BLD: 0.1 %
BILIRUB SERPL-MCNC: 1.9 MG/DL
BILIRUB UR QL STRIP: NEGATIVE
BUN SERPL-MCNC: 5 MG/DL
CALCIUM SERPL-MCNC: 8.7 MG/DL
CHLORIDE SERPL-SCNC: 112 MMOL/L
CLARITY UR: CLEAR
CO2 SERPL-SCNC: 18 MMOL/L
COLOR UR: YELLOW
CREAT SERPL-MCNC: 0.6 MG/DL
CTP QC/QA: YES
DIFFERENTIAL METHOD: ABNORMAL
EOSINOPHIL # BLD AUTO: 0 K/UL
EOSINOPHIL NFR BLD: 0.1 %
ERYTHROCYTE [DISTWIDTH] IN BLOOD BY AUTOMATED COUNT: 15 %
EST. GFR  (AFRICAN AMERICAN): >60 ML/MIN/1.73 M^2
EST. GFR  (NON AFRICAN AMERICAN): >60 ML/MIN/1.73 M^2
GLUCOSE SERPL-MCNC: 106 MG/DL
GLUCOSE UR QL STRIP: NEGATIVE
HCG INTACT+B SERPL-ACNC: 6214 MIU/ML
HCT VFR BLD AUTO: 24.2 %
HGB BLD-MCNC: 8.3 G/DL
HGB UR QL STRIP: NEGATIVE
KETONES UR QL STRIP: NEGATIVE
LEUKOCYTE ESTERASE UR QL STRIP: ABNORMAL
LYMPHOCYTES # BLD AUTO: 2 K/UL
LYMPHOCYTES NFR BLD: 12.5 %
MCH RBC QN AUTO: 29.3 PG
MCHC RBC AUTO-ENTMCNC: 34.3 %
MCV RBC AUTO: 86 FL
MICROSCOPIC COMMENT: ABNORMAL
MONOCYTES # BLD AUTO: 1.2 K/UL
MONOCYTES NFR BLD: 7.4 %
NEUTROPHILS # BLD AUTO: 13 K/UL
NEUTROPHILS NFR BLD: 79.5 %
NITRITE UR QL STRIP: NEGATIVE
PH UR STRIP: 7 [PH] (ref 5–8)
PLATELET # BLD AUTO: 479 K/UL
PMV BLD AUTO: 8 FL
POTASSIUM SERPL-SCNC: 3.8 MMOL/L
PROT SERPL-MCNC: 7.5 G/DL
PROT UR QL STRIP: NEGATIVE
RBC # BLD AUTO: 2.83 M/UL
RETICS/RBC NFR AUTO: 3.5 %
SODIUM SERPL-SCNC: 138 MMOL/L
SP GR UR STRIP: <=1.005 (ref 1–1.03)
URN SPEC COLLECT METH UR: ABNORMAL
UROBILINOGEN UR STRIP-ACNC: NEGATIVE EU/DL
WBC # BLD AUTO: 16.36 K/UL
WBC #/AREA URNS HPF: 12 /HPF (ref 0–5)

## 2017-04-19 PROCEDURE — 96375 TX/PRO/DX INJ NEW DRUG ADDON: CPT

## 2017-04-19 PROCEDURE — 87086 URINE CULTURE/COLONY COUNT: CPT

## 2017-04-19 PROCEDURE — 63600175 PHARM REV CODE 636 W HCPCS: Performed by: EMERGENCY MEDICINE

## 2017-04-19 PROCEDURE — 84702 CHORIONIC GONADOTROPIN TEST: CPT

## 2017-04-19 PROCEDURE — 80053 COMPREHEN METABOLIC PANEL: CPT

## 2017-04-19 PROCEDURE — 81000 URINALYSIS NONAUTO W/SCOPE: CPT

## 2017-04-19 PROCEDURE — 81025 URINE PREGNANCY TEST: CPT | Performed by: EMERGENCY MEDICINE

## 2017-04-19 PROCEDURE — 25000003 PHARM REV CODE 250: Performed by: EMERGENCY MEDICINE

## 2017-04-19 PROCEDURE — 85045 AUTOMATED RETICULOCYTE COUNT: CPT

## 2017-04-19 PROCEDURE — 96376 TX/PRO/DX INJ SAME DRUG ADON: CPT

## 2017-04-19 PROCEDURE — 99284 EMERGENCY DEPT VISIT MOD MDM: CPT | Mod: 25

## 2017-04-19 PROCEDURE — 85025 COMPLETE CBC W/AUTO DIFF WBC: CPT

## 2017-04-19 PROCEDURE — 96374 THER/PROPH/DIAG INJ IV PUSH: CPT

## 2017-04-19 RX ORDER — HYDROMORPHONE HYDROCHLORIDE 1 MG/ML
1 INJECTION, SOLUTION INTRAMUSCULAR; INTRAVENOUS; SUBCUTANEOUS
Status: COMPLETED | OUTPATIENT
Start: 2017-04-19 | End: 2017-04-19

## 2017-04-19 RX ORDER — NITROFURANTOIN 25; 75 MG/1; MG/1
100 CAPSULE ORAL 2 TIMES DAILY
Qty: 14 CAPSULE | Refills: 0 | Status: SHIPPED | OUTPATIENT
Start: 2017-04-19 | End: 2017-04-26

## 2017-04-19 RX ORDER — SODIUM CHLORIDE 9 MG/ML
1000 INJECTION, SOLUTION INTRAVENOUS
Status: COMPLETED | OUTPATIENT
Start: 2017-04-19 | End: 2017-04-19

## 2017-04-19 RX ORDER — DIPHENHYDRAMINE HCL 25 MG
25 CAPSULE ORAL
Status: COMPLETED | OUTPATIENT
Start: 2017-04-19 | End: 2017-04-19

## 2017-04-19 RX ORDER — HYDROMORPHONE HYDROCHLORIDE 1 MG/ML
1.5 INJECTION, SOLUTION INTRAMUSCULAR; INTRAVENOUS; SUBCUTANEOUS
Status: COMPLETED | OUTPATIENT
Start: 2017-04-19 | End: 2017-04-19

## 2017-04-19 RX ORDER — ONDANSETRON 2 MG/ML
4 INJECTION INTRAMUSCULAR; INTRAVENOUS
Status: COMPLETED | OUTPATIENT
Start: 2017-04-19 | End: 2017-04-19

## 2017-04-19 RX ADMIN — Medication 500 UNITS: at 06:04

## 2017-04-19 RX ADMIN — SODIUM CHLORIDE 1000 ML: 0.9 INJECTION, SOLUTION INTRAVENOUS at 02:04

## 2017-04-19 RX ADMIN — DIPHENHYDRAMINE HYDROCHLORIDE 25 MG: 25 CAPSULE ORAL at 04:04

## 2017-04-19 RX ADMIN — ONDANSETRON 4 MG: 2 INJECTION INTRAMUSCULAR; INTRAVENOUS at 02:04

## 2017-04-19 RX ADMIN — HYDROMORPHONE HYDROCHLORIDE 1 MG: 1 INJECTION, SOLUTION INTRAMUSCULAR; INTRAVENOUS; SUBCUTANEOUS at 02:04

## 2017-04-19 RX ADMIN — HYDROMORPHONE HYDROCHLORIDE 1.5 MG: 1 INJECTION, SOLUTION INTRAMUSCULAR; INTRAVENOUS; SUBCUTANEOUS at 04:04

## 2017-04-19 NOTE — ED NOTES
Patient reclined in recliner, eyes open AAOX4,  No apparent SOB or distress noted.  Continuous Pulse ox, BP, and heart rate in place.  Call bell within reach, comfort measures addressed.  Patient informed of plan of care.  Will continue to monitor.

## 2017-04-19 NOTE — ED AVS SNAPSHOT
[facility]  [address]  [facility] [address]               Nishi Pinzon Vital   2017 12:19 PM   ED    Description:  Female : 1990   Department:  [facility]           Your Care was Coordinated By:     Provider Role From To    Clarissa Ireland MD Attending Provider 17 1228 --      Reason for Visit     Sickle Cell Pain Crisis           Diagnoses this Visit        Comments    Sickle cell crisis    -  Primary     Pregnancy, unspecified gestational age           ED Disposition     None           To Do List           Follow-up Information     Follow up with Primary Care Physician  .       These Medications        Disp Refills Start End    nitrofurantoin, macrocrystal-monohydrate, (MACROBID) 100 MG capsule 14 capsule 0 2017    Take 1 capsule (100 mg total) by mouth 2 (two) times daily. - Oral    [facility] [address] [place] [address] AT [facility] #: [phone]         [facility]     [facility] Nurse Care Line -  Assistance  Unless otherwise directed by your provider, please contact [facility], our nurse care line that is available for  assistance.     Registered nurses in the [facility] provide: appointment scheduling, clinical advisement, health education, and other advisory services.  Call: [phone] (toll free)               Medications           Message regarding Medications     Verify the changes and/or additions to your medication regime listed below are the same as discussed with your clinician today.  If any of these changes or additions are incorrect, please notify your healthcare provider.        START taking these NEW medications        Refills    nitrofurantoin, macrocrystal-monohydrate, (MACROBID) 100 MG capsule 0    Sig: Take 1 capsule (100 mg total) by mouth 2 (two) times daily.    Class: Print    Route: Oral       These medications were administered today        Dose Freq    0.9%  NaCl infusion 1,000 mL ED 1 Time    Sig: Inject 1,000 mLs into the vein ED 1 Time.    Class: Normal    Route: Intravenous    0.9%  NaCl infusion 1,000 mL ED 1 Time    Sig: Inject 1,000 mLs into the vein ED 1 Time.    Class: Normal    Route: Intravenous    hydromorphone injection 1 mg 1 mg ED 1 Time    Sig: Inject 1 mL (1 mg total) into the vein ED 1 Time.    Class: Normal    Route: Intravenous    ondansetron injection 4 mg 4 mg ED 1 Time    Sig: Inject 4 mg into the vein ED 1 Time.    Class: Normal    Route: Intravenous    HYDROmorphone injection 1.5 mg 1.5 mg ED 1 Time    Sig: Inject 1.5 mLs (1.5 mg total) into the vein ED 1 Time.    Class: Normal    Route: Intravenous    diphenhydrAMINE capsule 25 mg 25 mg ED 1 Time    Sig: Take 1 each (25 mg total) by mouth ED 1 Time.    Class: Normal    Route: Oral           Verify that the below list of medications is an accurate representation of the medications you are currently taking.  If none reported, the list may be blank. If incorrect, please contact your healthcare provider. Carry this list with you in case of emergency.           Current Medications     deferasirox (EXJADE) 500 MG disintegrating tablet Take 1,000 mg by mouth every morning. before breakfast    famotidine (PEPCID) 20 MG tablet Take 20 mg by mouth every morning.    folic acid (FOLVITE) 1 MG tablet Take 1 mg by mouth 2 (two) times daily.    hydroxyurea (HYDREA) 500 mg Cap Take 1,000 mg by mouth 2 (two) times daily.     morphine (MS CONTIN) 60 MG 12 hr tablet Take 60 mg by mouth every 12 (twelve) hours as needed for Pain.    ondansetron (ZOFRAN-ODT) 4 MG TbDL Take 4 mg by mouth every 8 (eight) hours as needed (for nausea).    oxycodone (ROXICODONE) 10 mg Tab immediate release tablet Take 1 tablet (10 mg total) by mouth every 12 (twelve) hours as needed for Pain.    tramadol (ULTRAM) 50 mg tablet Take 50 mg by mouth every 6 (six) hours as  "needed for Pain.    nitrofurantoin, macrocrystal-monohydrate, (MACROBID) 100 MG capsule Take 1 capsule (100 mg total) by mouth 2 (two) times daily.           Clinical Reference Information           Your Vitals Were     BP Pulse Temp Resp Height Weight    102/58 96 98.9 °F (37.2 °C) (Oral) 18 4' 11" (1.499 m) 55.3 kg (122 lb)    SpO2 BMI             100% 24.64 kg/m2         Allergies as of 4/19/2017        Reactions    Iodine And Iodide Containing Products Hives, Swelling    Zithromax [Azithromycin] Anaphylaxis    Demerol [Meperidine] Other (See Comments)    Regarding reaction to demerol pt states "I talk out of my head and become aggressive"      Immunizations Administered on Date of Encounter - 4/19/2017     None      ED Micro, Lab, POCT     Start Ordered       Status Ordering Provider    04/19/17 1553 04/19/17 1552  Urine culture  Add-on      Completed     04/19/17 1459 04/19/17 1459  Urinalysis  STAT      Final result     04/19/17 1459 04/19/17 1459  Urinalysis Microscopic  Once      Final result     04/19/17 1459 04/19/17 1459  Urine culture  Once      In process     04/19/17 1420 04/19/17 1419  hCG, quantitative, pregnancy  Add-on      Completed     04/19/17 1240 04/19/17 1239  CBC auto differential  STAT      Final result     04/19/17 1240 04/19/17 1239  Reticulocytes  Once      Final result     04/19/17 1239 04/19/17 1239  Comprehensive metabolic panel  STAT      Final result     04/19/17 1239 04/19/17 1239  hCG, quantitative  Once      Final result     04/19/17 1220 04/19/17 1219  POCT urine pregnancy  Once      Final result       ED Imaging Orders     None        Discharge Instructions       We have prescribed you antibiotics. Please fill and take as directed. It is important that you completely finish the antibiotics.        Please return to the ER if you have chest pain, difficulty breathing, fevers, altered mental status, dizziness, weakness, or any other concerns.      Follow up with your primary care " physician and your OB physician.        Discharge References/Attachments     SICKLE CELL ANEMIA AND SICKLE CELL CRISIS, DISCHARGE INSTRUCTIONS (ENGLISH)    PREGNANCY, NEW DX (ENGLISH)      [facility] Sign-Up     Activating your [facility] account is as easy as 1-2-3!     1) Visit [email or web address], select Sign Up Now, enter this activation code and your date of birth, then select Next.  [email or web address]  Expires: 6/3/2017  3:12 PM      2) Create a username and password to use when you visit [facility] in the future and select a security question in case you lose your password and select Next.    3) Enter your e-mail address and click Sign Up!    Additional Information  If you have questions, please e-mail [email or web address] or call [phone] to talk to our [facility] staff. Remember, [facility] is NOT to be used for urgent needs. For medical emergencies, dial 911.          [facility] complies with applicable Federal civil rights laws and does not discriminate on the basis of race, color, national origin, age, disability, or sex.        Language Assistance Services     ATTENTION: Language assistance services are available, free of charge. Please call [phone].      ATENCIÓN: Si habla selene, tiene a gilman disposición servicios gratuitos de asistencia lingüística. Llame al [phone].     CARLOS Ý: N?u b?n nói Ti?ng Vi?t, có các d?ch v? h? tr? ngôn ng? mi?n phí dành cho b?n. G?i s? [phone].

## 2017-04-19 NOTE — ED PROVIDER NOTES
"Encounter Date: 4/19/2017    SCRIBE #1 NOTE: I, Karma Wen, am scribing for, and in the presence of,  Dr. Ibarra I have scribed the entire note.       History     Chief Complaint   Patient presents with    Sickle Cell Pain Crisis     "sickle cell crisis"     Review of patient's allergies indicates:   Allergen Reactions    Iodine and iodide containing products Hives and Swelling    Zithromax [azithromycin] Anaphylaxis    Demerol [meperidine] Other (See Comments)     Regarding reaction to demerol pt states "I talk out of my head and become aggressive"     HPI Comments: Time seen by provider: 12:37 PM    This is a 26 y.o. female who presents with complaint of sickle cell pain. She reports onset of symptoms was about 2 days ago. The patient states the pain is primarily located in the chest and back. She denies any associated shortness of breath, palpitations, nausea, vomiting, fever or chills but admits to cough. The patient states she has been having the cough for 1 week. She reports she has been using her home medications with minimal relief of symptoms. The patient states her last admission and transfusion was about 1 month ago. She states she typically has to get a transfusion every 2-3 months.     The history is provided by the patient.     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    Avascular necrosis of bone of hip     Avascular necrosis of humeral head     Blood transfusion     Sickle cell disease      Past Surgical History:   Procedure Laterality Date    CHOLECYSTECTOMY      JOINT REPLACEMENT       left hip     Family History   Problem Relation Age of Onset    Sickle cell trait Mother     Sickle cell trait Father     Sickle cell trait Brother     Sickle cell trait Daughter      Social History   Substance Use Topics    Smoking status: Never Smoker    Smokeless tobacco: None    Alcohol use 0.0 oz/week      Comment: occassionally "     Review of Systems   Constitutional: Negative for chills and fever.   HENT: Negative for congestion and sore throat.    Eyes: Negative for redness and visual disturbance.   Respiratory: Positive for cough. Negative for shortness of breath.    Cardiovascular: Positive for chest pain. Negative for palpitations.   Gastrointestinal: Negative for abdominal pain, diarrhea, nausea and vomiting.   Genitourinary: Negative for dysuria.   Musculoskeletal: Positive for back pain.   Skin: Negative for rash.   Neurological: Negative for weakness and headaches.   Psychiatric/Behavioral: Negative for confusion.       Physical Exam   Initial Vitals   BP Pulse Resp Temp SpO2   04/19/17 1217 04/19/17 1217 04/19/17 1217 04/19/17 1217 04/19/17 1217   116/67 103 18 98.9 °F (37.2 °C) 100 %     Physical Exam    Nursing note and vitals reviewed.  Constitutional: She appears well-developed and well-nourished. She is not diaphoretic. No distress.   HENT:   Head: Normocephalic and atraumatic.   Right Ear: External ear normal.   Left Ear: External ear normal.   Eyes: Conjunctivae and EOM are normal.   Neck: Normal range of motion. Neck supple.   Cardiovascular: Normal rate, regular rhythm and normal heart sounds.   Pulmonary/Chest: Breath sounds normal. No respiratory distress. She has no wheezes. She has no rhonchi. She has no rales.   Left chest port that is clean, dry and intact with no overlying erythema.   Abdominal: Soft. Bowel sounds are normal. She exhibits no distension. There is no tenderness. There is no rebound and no guarding.   Musculoskeletal: Normal range of motion. She exhibits no edema or tenderness.   Lymphadenopathy:     She has no cervical adenopathy.   Neurological: She is alert and oriented to person, place, and time. She has normal strength.   Skin: Skin is warm and dry. No rash noted.         ED Course   Procedures  Labs Reviewed   COMPREHENSIVE METABOLIC PANEL - Abnormal; Notable for the following:        Result  Value    Chloride 112 (*)     CO2 18 (*)     BUN, Bld 5 (*)     Total Bilirubin 1.9 (*)     All other components within normal limits   CBC W/ AUTO DIFFERENTIAL - Abnormal; Notable for the following:     WBC 16.36 (*)     RBC 2.83 (*)     Hemoglobin 8.3 (*)     Hematocrit 24.2 (*)     RDW 15.0 (*)     Platelets 479 (*)     MPV 8.0 (*)     Gran # 13.0 (*)     Mono # 1.2 (*)     Gran% 79.5 (*)     Lymph% 12.5 (*)     All other components within normal limits   RETICULOCYTES - Abnormal; Notable for the following:     Retic 3.5 (*)     All other components within normal limits   URINALYSIS - Abnormal; Notable for the following:     Specific Gravity, UA <=1.005 (*)     Leukocytes, UA 1+ (*)     All other components within normal limits   URINALYSIS MICROSCOPIC - Abnormal; Notable for the following:     WBC, UA 12 (*)     All other components within normal limits   POCT URINE PREGNANCY - Abnormal; Notable for the following:     POC Preg Test, Ur Positive (*)     All other components within normal limits   CULTURE, URINE   CULTURE, URINE   HCG, QUANTITATIVE, PREGNANCY   HCG, QUANTITATIVE, PREGNANCY             Medical Decision Making:   History:   Old Medical Records: I decided to obtain old medical records.  Old Records Summarized: records from previous admission(s) and other records.  Initial Assessment:   12:37PM:  Pt is a 27 y/o F who presents to ED with sickle cell crisis.  Pt appears well, nontoxic.  Will plan for labs, analgesia, will continue to follow and reassess.    Clinical Tests:   Lab Tests: Ordered and Reviewed    2:13 PM:  Pt's pregnancy test is positive.  I informed the patient of her results. She is on depo, but her last shot was in December.  She denies any vaginal bleeding, abdominal pain, or cramping.  I did inform her that pain medication is a class C for the fetus and that it would put her fetus at increased risk.  She still would like the pain medication for her sickle cell, will continue to follow.         5:47 PM: Pt doing well, she is feeling better.  Her UA does show bacteria, so will treat with abx.  Her labs are otherwise stable with a stable Hgb.  I updated pt regarding results and I counseled pt regarding establishing care with OB and following up closely with her heme-onc.  I counseled pt regarding supportive care measures.  I have discussed with the pt ED return warnings.  Pt agreeable to plan and all questions answered.  I feel that pt is stable for discharge and management as an outpatient and no further intervention is needed at this time.  Pt is comfortable returning to the ED if needed.  Will DC home in stable condition.                Scribe Attestation:   Scribe #1: I performed the above scribed service and the documentation accurately describes the services I performed. I attest to the accuracy of the note.    Attending Attestation:           Physician Attestation for Scribe:  Physician Attestation Statement for Scribe #1: I, Dr. Ireland, reviewed documentation, as scribed by Karma Wen in my presence, and it is both accurate and complete.                 ED Course     Clinical Impression:     1. Sickle cell crisis    2. Pregnancy, unspecified gestational age                Clarissa Ireland MD  04/19/17 3140

## 2017-04-19 NOTE — DISCHARGE INSTRUCTIONS
We have prescribed you antibiotics. Please fill and take as directed. It is important that you completely finish the antibiotics.        Please return to the ER if you have chest pain, difficulty breathing, fevers, altered mental status, dizziness, weakness, or any other concerns.      Follow up with your primary care physician and your OB physician.

## 2017-04-19 NOTE — ED TRIAGE NOTES
Patient c/o a sickle cell pain crisis since Monday night.  Patient stated that her lower back and bilateral lower rib cage area hurt.  No trauma.  Patient took oxycodone and ms contin with little relief.  Patient c/o nausea.  Denies vomiting, dizziness and fatigue.

## 2017-04-21 LAB — BACTERIA UR CULT: NORMAL

## 2017-08-30 ENCOUNTER — HOSPITAL ENCOUNTER (OUTPATIENT)
Facility: OTHER | Age: 27
Discharge: HOME | End: 2017-09-06
Attending: OBSTETRICS & GYNECOLOGY | Admitting: OBSTETRICS & GYNECOLOGY
Payer: MEDICAID

## 2017-08-30 DIAGNOSIS — Z3A.23 23 WEEKS GESTATION OF PREGNANCY: ICD-10-CM

## 2017-08-30 DIAGNOSIS — D57.00 SICKLE CELL CRISIS: ICD-10-CM

## 2017-08-30 DIAGNOSIS — O09.292 HISTORY OF PRE-ECLAMPSIA IN PRIOR PREGNANCY, CURRENTLY PREGNANT IN SECOND TRIMESTER: ICD-10-CM

## 2017-08-30 DIAGNOSIS — D57.00 ACUTE SICKLE CELL CRISIS: Primary | ICD-10-CM

## 2017-08-30 PROBLEM — Z87.59 HISTORY OF PRE-ECLAMPSIA: Status: ACTIVE | Noted: 2017-08-30

## 2017-08-30 PROBLEM — E87.6 HYPOKALEMIA: Status: RESOLVED | Noted: 2017-01-26 | Resolved: 2017-08-30

## 2017-08-30 LAB
ALBUMIN SERPL BCP-MCNC: 2.8 G/DL
ALP SERPL-CCNC: 107 U/L
ALT SERPL W/O P-5'-P-CCNC: 15 U/L
ANION GAP SERPL CALC-SCNC: 6 MMOL/L
ANISOCYTOSIS BLD QL SMEAR: SLIGHT
AST SERPL-CCNC: 23 U/L
BACTERIA #/AREA URNS HPF: NORMAL /HPF
BASOPHILS # BLD AUTO: ABNORMAL K/UL
BASOPHILS NFR BLD: 0 %
BILIRUB SERPL-MCNC: 2.6 MG/DL
BILIRUB UR QL STRIP: ABNORMAL
BUN SERPL-MCNC: 5 MG/DL
CALCIUM SERPL-MCNC: 8.3 MG/DL
CHLORIDE SERPL-SCNC: 114 MMOL/L
CLARITY UR: CLEAR
CO2 SERPL-SCNC: 20 MMOL/L
COLOR UR: YELLOW
CREAT SERPL-MCNC: 0.6 MG/DL
CREAT UR-MCNC: 57.5 MG/DL
DIFFERENTIAL METHOD: ABNORMAL
EOSINOPHIL # BLD AUTO: ABNORMAL K/UL
EOSINOPHIL NFR BLD: 1 %
ERYTHROCYTE [DISTWIDTH] IN BLOOD BY AUTOMATED COUNT: 15.9 %
EST. GFR  (AFRICAN AMERICAN): >60 ML/MIN/1.73 M^2
EST. GFR  (NON AFRICAN AMERICAN): >60 ML/MIN/1.73 M^2
GIANT PLATELETS BLD QL SMEAR: PRESENT
GLUCOSE SERPL-MCNC: 99 MG/DL
GLUCOSE UR QL STRIP: NEGATIVE
HCT VFR BLD AUTO: 21.3 %
HGB BLD-MCNC: 7.4 G/DL
HGB UR QL STRIP: NEGATIVE
KETONES UR QL STRIP: NEGATIVE
LEUKOCYTE ESTERASE UR QL STRIP: ABNORMAL
LYMPHOCYTES # BLD AUTO: ABNORMAL K/UL
LYMPHOCYTES NFR BLD: 18 %
MCH RBC QN AUTO: 30.5 PG
MCHC RBC AUTO-ENTMCNC: 34.7 G/DL
MCV RBC AUTO: 88 FL
MICROSCOPIC COMMENT: NORMAL
MONOCYTES # BLD AUTO: ABNORMAL K/UL
MONOCYTES NFR BLD: 5 %
NEUTROPHILS NFR BLD: 76 %
NITRITE UR QL STRIP: NEGATIVE
PH UR STRIP: 6 [PH] (ref 5–8)
PLATELET # BLD AUTO: 579 K/UL
PLATELET BLD QL SMEAR: ABNORMAL
PMV BLD AUTO: 9.1 FL
POIKILOCYTOSIS BLD QL SMEAR: SLIGHT
POLYCHROMASIA BLD QL SMEAR: ABNORMAL
POTASSIUM SERPL-SCNC: 3.6 MMOL/L
PROT SERPL-MCNC: 6.3 G/DL
PROT UR QL STRIP: NEGATIVE
PROT UR-MCNC: <7 MG/DL
PROT/CREAT RATIO, UR: NORMAL
RBC # BLD AUTO: 2.43 M/UL
RETICS/RBC NFR AUTO: 11.9 %
SODIUM SERPL-SCNC: 140 MMOL/L
SP GR UR STRIP: 1.01 (ref 1–1.03)
SQUAMOUS #/AREA URNS HPF: 3 /HPF
URN SPEC COLLECT METH UR: ABNORMAL
UROBILINOGEN UR STRIP-ACNC: >=8 EU/DL
WBC # BLD AUTO: 17.97 K/UL
WBC #/AREA URNS HPF: 0 /HPF (ref 0–5)

## 2017-08-30 PROCEDURE — 63600175 PHARM REV CODE 636 W HCPCS: Performed by: OBSTETRICS & GYNECOLOGY

## 2017-08-30 PROCEDURE — G0378 HOSPITAL OBSERVATION PER HR: HCPCS

## 2017-08-30 PROCEDURE — 83020 HEMOGLOBIN ELECTROPHORESIS: CPT | Mod: 91

## 2017-08-30 PROCEDURE — 99285 EMERGENCY DEPT VISIT HI MDM: CPT | Mod: 25

## 2017-08-30 PROCEDURE — 96374 THER/PROPH/DIAG INJ IV PUSH: CPT

## 2017-08-30 PROCEDURE — 99220 PR INITIAL OBSERVATION CARE,LEVL III: CPT | Mod: ,,, | Performed by: OBSTETRICS & GYNECOLOGY

## 2017-08-30 PROCEDURE — 25000003 PHARM REV CODE 250: Performed by: STUDENT IN AN ORGANIZED HEALTH CARE EDUCATION/TRAINING PROGRAM

## 2017-08-30 PROCEDURE — 25000003 PHARM REV CODE 250: Performed by: OBSTETRICS & GYNECOLOGY

## 2017-08-30 PROCEDURE — 99284 EMERGENCY DEPT VISIT MOD MDM: CPT | Mod: ,,, | Performed by: OBSTETRICS & GYNECOLOGY

## 2017-08-30 PROCEDURE — 83020 HEMOGLOBIN ELECTROPHORESIS: CPT

## 2017-08-30 PROCEDURE — 85045 AUTOMATED RETICULOCYTE COUNT: CPT

## 2017-08-30 PROCEDURE — 85007 BL SMEAR W/DIFF WBC COUNT: CPT

## 2017-08-30 PROCEDURE — 82570 ASSAY OF URINE CREATININE: CPT

## 2017-08-30 PROCEDURE — 87086 URINE CULTURE/COLONY COUNT: CPT

## 2017-08-30 PROCEDURE — 85027 COMPLETE CBC AUTOMATED: CPT

## 2017-08-30 PROCEDURE — 94761 N-INVAS EAR/PLS OXIMETRY MLT: CPT

## 2017-08-30 PROCEDURE — 80053 COMPREHEN METABOLIC PANEL: CPT

## 2017-08-30 PROCEDURE — 81000 URINALYSIS NONAUTO W/SCOPE: CPT

## 2017-08-30 PROCEDURE — 27000221 HC OXYGEN, UP TO 24 HOURS

## 2017-08-30 PROCEDURE — 96375 TX/PRO/DX INJ NEW DRUG ADDON: CPT

## 2017-08-30 RX ORDER — DIPHENHYDRAMINE HCL 25 MG
25 CAPSULE ORAL EVERY 4 HOURS PRN
Status: DISCONTINUED | OUTPATIENT
Start: 2017-08-30 | End: 2017-09-06 | Stop reason: HOSPADM

## 2017-08-30 RX ORDER — OXYCODONE AND ACETAMINOPHEN 10; 325 MG/1; MG/1
1 TABLET ORAL EVERY 4 HOURS PRN
Status: DISCONTINUED | OUTPATIENT
Start: 2017-08-30 | End: 2017-09-06 | Stop reason: HOSPADM

## 2017-08-30 RX ORDER — MORPHINE SULFATE 15 MG/1
30 TABLET, FILM COATED, EXTENDED RELEASE ORAL EVERY 8 HOURS
Status: DISCONTINUED | OUTPATIENT
Start: 2017-08-30 | End: 2017-08-31

## 2017-08-30 RX ORDER — ONDANSETRON 8 MG/1
8 TABLET, ORALLY DISINTEGRATING ORAL EVERY 8 HOURS PRN
Status: DISCONTINUED | OUTPATIENT
Start: 2017-08-30 | End: 2017-09-06 | Stop reason: HOSPADM

## 2017-08-30 RX ORDER — DIPHENHYDRAMINE HYDROCHLORIDE 50 MG/ML
12.5 INJECTION INTRAMUSCULAR; INTRAVENOUS ONCE
Status: COMPLETED | OUTPATIENT
Start: 2017-08-30 | End: 2017-08-30

## 2017-08-30 RX ORDER — FOLIC ACID 1 MG/1
4 TABLET ORAL DAILY
Status: DISCONTINUED | OUTPATIENT
Start: 2017-08-31 | End: 2017-09-06 | Stop reason: HOSPADM

## 2017-08-30 RX ORDER — MORPHINE SULFATE 15 MG/1
15 TABLET, FILM COATED, EXTENDED RELEASE ORAL EVERY 8 HOURS
Status: DISCONTINUED | OUTPATIENT
Start: 2017-08-30 | End: 2017-08-30

## 2017-08-30 RX ORDER — OXYCODONE AND ACETAMINOPHEN 5; 325 MG/1; MG/1
1 TABLET ORAL EVERY 4 HOURS PRN
Status: DISCONTINUED | OUTPATIENT
Start: 2017-08-30 | End: 2017-09-06 | Stop reason: HOSPADM

## 2017-08-30 RX ORDER — PRENATAL WITH FERROUS FUM AND FOLIC ACID 3080; 920; 120; 400; 22; 1.84; 3; 20; 10; 1; 12; 200; 27; 25; 2 [IU]/1; [IU]/1; MG/1; [IU]/1; MG/1; MG/1; MG/1; MG/1; MG/1; MG/1; UG/1; MG/1; MG/1; MG/1; MG/1
1 TABLET ORAL DAILY
Status: DISCONTINUED | OUTPATIENT
Start: 2017-08-31 | End: 2017-09-06 | Stop reason: HOSPADM

## 2017-08-30 RX ORDER — FOLIC ACID 1 MG/1
2 TABLET ORAL DAILY
Status: DISCONTINUED | OUTPATIENT
Start: 2017-08-31 | End: 2017-08-30

## 2017-08-30 RX ORDER — AMOXICILLIN 250 MG
1 CAPSULE ORAL NIGHTLY PRN
Status: DISCONTINUED | OUTPATIENT
Start: 2017-08-30 | End: 2017-09-06 | Stop reason: HOSPADM

## 2017-08-30 RX ORDER — NAPROXEN SODIUM 220 MG/1
81 TABLET, FILM COATED ORAL DAILY
Status: DISCONTINUED | OUTPATIENT
Start: 2017-08-31 | End: 2017-09-06 | Stop reason: HOSPADM

## 2017-08-30 RX ORDER — GABAPENTIN 100 MG/1
100 CAPSULE ORAL DAILY
Status: DISCONTINUED | OUTPATIENT
Start: 2017-08-31 | End: 2017-08-31

## 2017-08-30 RX ORDER — DIPHENHYDRAMINE HYDROCHLORIDE 50 MG/ML
25 INJECTION INTRAMUSCULAR; INTRAVENOUS EVERY 4 HOURS PRN
Status: DISCONTINUED | OUTPATIENT
Start: 2017-08-30 | End: 2017-09-02

## 2017-08-30 RX ORDER — SIMETHICONE 80 MG
1 TABLET,CHEWABLE ORAL EVERY 6 HOURS PRN
Status: DISCONTINUED | OUTPATIENT
Start: 2017-08-30 | End: 2017-09-06 | Stop reason: HOSPADM

## 2017-08-30 RX ORDER — HYDROMORPHONE HYDROCHLORIDE 1 MG/ML
1 INJECTION, SOLUTION INTRAMUSCULAR; INTRAVENOUS; SUBCUTANEOUS EVERY 6 HOURS PRN
Status: DISCONTINUED | OUTPATIENT
Start: 2017-08-30 | End: 2017-08-30

## 2017-08-30 RX ORDER — ACETAMINOPHEN 325 MG/1
650 TABLET ORAL EVERY 6 HOURS PRN
Status: DISCONTINUED | OUTPATIENT
Start: 2017-08-30 | End: 2017-09-06 | Stop reason: HOSPADM

## 2017-08-30 RX ORDER — HYDROXYZINE PAMOATE 25 MG/1
25 CAPSULE ORAL ONCE
Status: COMPLETED | OUTPATIENT
Start: 2017-08-31 | End: 2017-08-31

## 2017-08-30 RX ORDER — HYDROMORPHONE HYDROCHLORIDE 1 MG/ML
1 INJECTION, SOLUTION INTRAMUSCULAR; INTRAVENOUS; SUBCUTANEOUS
Status: COMPLETED | OUTPATIENT
Start: 2017-08-30 | End: 2017-08-30

## 2017-08-30 RX ORDER — SODIUM CHLORIDE, SODIUM LACTATE, POTASSIUM CHLORIDE, CALCIUM CHLORIDE 600; 310; 30; 20 MG/100ML; MG/100ML; MG/100ML; MG/100ML
INJECTION, SOLUTION INTRAVENOUS CONTINUOUS
Status: DISCONTINUED | OUTPATIENT
Start: 2017-08-30 | End: 2017-09-06 | Stop reason: HOSPADM

## 2017-08-30 RX ORDER — HYDROMORPHONE HYDROCHLORIDE 1 MG/ML
1 INJECTION, SOLUTION INTRAMUSCULAR; INTRAVENOUS; SUBCUTANEOUS EVERY 4 HOURS PRN
Status: DISCONTINUED | OUTPATIENT
Start: 2017-08-30 | End: 2017-08-31

## 2017-08-30 RX ORDER — DIPHENHYDRAMINE HCL 25 MG
25 CAPSULE ORAL EVERY 6 HOURS PRN
Status: DISCONTINUED | OUTPATIENT
Start: 2017-08-30 | End: 2017-08-31

## 2017-08-30 RX ADMIN — OXYCODONE HYDROCHLORIDE AND ACETAMINOPHEN 1 TABLET: 10; 325 TABLET ORAL at 09:08

## 2017-08-30 RX ADMIN — MORPHINE SULFATE 30 MG: 15 TABLET, EXTENDED RELEASE ORAL at 10:08

## 2017-08-30 RX ADMIN — SODIUM CHLORIDE, SODIUM LACTATE, POTASSIUM CHLORIDE, AND CALCIUM CHLORIDE: .6; .31; .03; .02 INJECTION, SOLUTION INTRAVENOUS at 09:08

## 2017-08-30 RX ADMIN — SODIUM CHLORIDE, SODIUM LACTATE, POTASSIUM CHLORIDE, AND CALCIUM CHLORIDE 1000 ML: .6; .31; .03; .02 INJECTION, SOLUTION INTRAVENOUS at 08:08

## 2017-08-30 RX ADMIN — DIPHENHYDRAMINE HYDROCHLORIDE 12.5 MG: 50 INJECTION, SOLUTION INTRAMUSCULAR; INTRAVENOUS at 08:08

## 2017-08-30 RX ADMIN — HYDROMORPHONE HYDROCHLORIDE 1 MG: 1 INJECTION, SOLUTION INTRAMUSCULAR; INTRAVENOUS; SUBCUTANEOUS at 08:08

## 2017-08-30 RX ADMIN — HYDROMORPHONE HYDROCHLORIDE 1 MG: 1 INJECTION, SOLUTION INTRAMUSCULAR; INTRAVENOUS; SUBCUTANEOUS at 10:08

## 2017-08-30 NOTE — ED PROVIDER NOTES
"Encounter Date: 2017       History     Chief Complaint   Patient presents with    Sickle Cell Pain Crisis     Rhoda is a 25yo  at 23.5 wga 8 week US c/w LMP with history significant for sickle cell disease who presents with back, right rib and left shoulder pain.  She has had multiple sickle cell crises' during this pregnancy, and was last in the hospital at [facility] about 2 weeks ago.   She was sent home on MS contin, demerol and percocet - which she tried to use to prevent readmission - but pain became too severe.    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    She has not overexerted herself recently, no sick contacts, she does have URI symptoms.             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    Avascular necrosis of bone of hip     Avascular necrosis of humeral head     Blood transfusion     Sickle cell disease      Past Surgical History:   Procedure Laterality Date    CHOLECYSTECTOMY      JOINT REPLACEMENT       left hip     Family History   Problem Relation Age of Onset    Sickle cell trait Mother     Sickle cell trait Father     Sickle cell trait Brother     Sickle cell trait Daughter      Social History   Substance Use Topics    Smoking status: Never Smoker    Smokeless tobacco: Not on file    Alcohol use 0.0 oz/week      Comment: occassionally     Review of Systems   Constitutional: Positive for activity change and appetite change. Negative for chills and fever.   HENT: Positive " for congestion, rhinorrhea and sinus pressure. Negative for sneezing and sore throat.    Eyes: Negative for visual disturbance.   Respiratory: Negative for shortness of breath.    Cardiovascular: Negative for chest pain and leg swelling.   Gastrointestinal: Negative for abdominal pain, constipation, diarrhea, nausea and vomiting.   Genitourinary: Negative for dysuria, urgency, vaginal bleeding, vaginal discharge and vaginal pain.   Musculoskeletal: Positive for back pain.   Neurological: Negative for headaches.       Physical Exam     Initial Vitals   BP Pulse Resp Temp SpO2   08/30/17 1813 08/30/17 1813 08/30/17 1816 08/30/17 1814 08/30/17 1816   (!) 109/58 (!) 111 18 98.1 °F (36.7 °C) 96 %      MAP       08/30/17 1813       75         Physical Exam    Vitals reviewed.  Constitutional: She appears well-developed and well-nourished. She is not diaphoretic. No distress.   HENT:   Head: Normocephalic and atraumatic.   Eyes: Scleral icterus is present.   Neck: Neck supple.   Cardiovascular: Normal rate and regular rhythm.   Murmur heard.  Pulmonary/Chest: Breath sounds normal. No respiratory distress. She exhibits tenderness (over lower right ribs).   Abdominal: Soft. There is no tenderness. There is no rebound, no guarding and no CVA tenderness.   Gravid with fundus at the umbilicus, non-tender   Musculoskeletal: She exhibits no edema.   Neurological: She is alert and oriented to person, place, and time.   Skin: Skin is warm and dry.   Psychiatric: She has a normal mood and affect. Her behavior is normal. Judgment and thought content normal.     OB LABOR EXAM:                       Comments: Fetal heart tones 140       ED Course   Procedures  Labs Reviewed   URINALYSIS - Abnormal; Notable for the following:        Result Value    Bilirubin (UA) 1+ (*)     Urobilinogen, UA >=8.0 (*)     Leukocytes, UA 1+ (*)     All other components within normal limits   URINALYSIS MICROSCOPIC   RETICULOCYTES   CBC W/ AUTO  DIFFERENTIAL   COMPREHENSIVE METABOLIC PANEL   HEMOGLOBIN S QUANTITATION BY ELECTROPHORESIS   RETICULOCYTES             Medical Decision Making:   ED Management:  CXR was normal, power port seen  UA showed 1+ leuks and bilirubin  Port was just accessed prior to patient being assigned room, so she was transferred prior bloodwork returning  Will admit for IV fluids, O2, pain control and work up for infectious sources  Other:   I have discussed this case with another health care provider.       <> Summary of the Discussion: I called Dr. Armenta, her primary MFM at [facility].  She confirms that pt is around 22wga.  She was recently hospitalized at [facility] for crisis. She was discharged on MS Contin, percocet and dilaudid.  We discussed that this patient has severe disease with h/o acute chest and bilateral hip necrosis s/p repair of left hip.   Records requested from [facility].                         ED Course     Clinical Impression:   The primary encounter diagnosis was Acute sickle cell crisis. Diagnoses of Chest pain with noncardiac features, 23 weeks gestation of pregnancy, and History of pre-eclampsia in prior pregnancy, currently pregnant in second trimester were also pertinent to this visit.    Disposition:   Disposition: Admitted  Condition: Consuelo Nicholson DO  08/30/17 2047

## 2017-08-31 LAB
ABO + RH BLD: NORMAL
ANISOCYTOSIS BLD QL SMEAR: SLIGHT
BASOPHILS # BLD AUTO: ABNORMAL K/UL
BASOPHILS NFR BLD: 0 %
BLD GP AB SCN CELLS X3 SERPL QL: NORMAL
BLD PROD TYP BPU: NORMAL
BLD PROD TYP BPU: NORMAL
BLOOD UNIT EXPIRATION DATE: NORMAL
BLOOD UNIT EXPIRATION DATE: NORMAL
BLOOD UNIT TYPE CODE: 7300
BLOOD UNIT TYPE CODE: 7300
BLOOD UNIT TYPE: NORMAL
BLOOD UNIT TYPE: NORMAL
CODING SYSTEM: NORMAL
CODING SYSTEM: NORMAL
DIFFERENTIAL METHOD: ABNORMAL
DISPENSE STATUS: NORMAL
DISPENSE STATUS: NORMAL
EOSINOPHIL # BLD AUTO: ABNORMAL K/UL
EOSINOPHIL NFR BLD: 1 %
ERYTHROCYTE [DISTWIDTH] IN BLOOD BY AUTOMATED COUNT: 15.8 %
GIANT PLATELETS BLD QL SMEAR: PRESENT
HCT VFR BLD AUTO: 20.2 %
HGB BLD-MCNC: 7 G/DL
HYPOCHROMIA BLD QL SMEAR: ABNORMAL
LYMPHOCYTES # BLD AUTO: ABNORMAL K/UL
LYMPHOCYTES NFR BLD: 15 %
MCH RBC QN AUTO: 30.4 PG
MCHC RBC AUTO-ENTMCNC: 34.7 G/DL
MCV RBC AUTO: 88 FL
METAMYELOCYTES NFR BLD MANUAL: 1 %
MONOCYTES # BLD AUTO: ABNORMAL K/UL
MONOCYTES NFR BLD: 13 %
MYELOCYTES NFR BLD MANUAL: 1 %
NEUTROPHILS NFR BLD: 68 %
NEUTS BAND NFR BLD MANUAL: 1 %
PLATELET # BLD AUTO: 519 K/UL
PLATELET BLD QL SMEAR: ABNORMAL
PMV BLD AUTO: 8.9 FL
POIKILOCYTOSIS BLD QL SMEAR: ABNORMAL
POLYCHROMASIA BLD QL SMEAR: ABNORMAL
RBC # BLD AUTO: 2.3 M/UL
SICKLE CELLS BLD QL SMEAR: ABNORMAL
STOMATOCYTES BLD QL SMEAR: PRESENT
TARGETS BLD QL SMEAR: ABNORMAL
TRANS ERYTHROCYTES VOL PATIENT: NORMAL ML
TRANS ERYTHROCYTES VOL PATIENT: NORMAL ML
WBC # BLD AUTO: 23.21 K/UL

## 2017-08-31 PROCEDURE — 25000003 PHARM REV CODE 250: Performed by: STUDENT IN AN ORGANIZED HEALTH CARE EDUCATION/TRAINING PROGRAM

## 2017-08-31 PROCEDURE — 86850 RBC ANTIBODY SCREEN: CPT

## 2017-08-31 PROCEDURE — P9021 RED BLOOD CELLS UNIT: HCPCS

## 2017-08-31 PROCEDURE — 86900 BLOOD TYPING SEROLOGIC ABO: CPT

## 2017-08-31 PROCEDURE — 25000003 PHARM REV CODE 250: Performed by: OBSTETRICS & GYNECOLOGY

## 2017-08-31 PROCEDURE — 86902 BLOOD TYPE ANTIGEN DONOR EA: CPT | Mod: 59

## 2017-08-31 PROCEDURE — 94761 N-INVAS EAR/PLS OXIMETRY MLT: CPT

## 2017-08-31 PROCEDURE — 36430 TRANSFUSION BLD/BLD COMPNT: CPT

## 2017-08-31 PROCEDURE — 36415 COLL VENOUS BLD VENIPUNCTURE: CPT

## 2017-08-31 PROCEDURE — 27000221 HC OXYGEN, UP TO 24 HOURS

## 2017-08-31 PROCEDURE — 63600175 PHARM REV CODE 636 W HCPCS: Performed by: OBSTETRICS & GYNECOLOGY

## 2017-08-31 PROCEDURE — 27201040 HC RC 50 FILTER

## 2017-08-31 PROCEDURE — G0378 HOSPITAL OBSERVATION PER HR: HCPCS

## 2017-08-31 PROCEDURE — 85007 BL SMEAR W/DIFF WBC COUNT: CPT

## 2017-08-31 PROCEDURE — 85660 RBC SICKLE CELL TEST: CPT

## 2017-08-31 PROCEDURE — 99900035 HC TECH TIME PER 15 MIN (STAT)

## 2017-08-31 PROCEDURE — 63600175 PHARM REV CODE 636 W HCPCS: Performed by: STUDENT IN AN ORGANIZED HEALTH CARE EDUCATION/TRAINING PROGRAM

## 2017-08-31 PROCEDURE — 86920 COMPATIBILITY TEST SPIN: CPT

## 2017-08-31 PROCEDURE — 99226 PR SUBSEQUENT OBSERVATION CARE,LEVEL III: CPT | Mod: ,,, | Performed by: OBSTETRICS & GYNECOLOGY

## 2017-08-31 PROCEDURE — 85027 COMPLETE CBC AUTOMATED: CPT

## 2017-08-31 RX ORDER — GABAPENTIN 100 MG/1
100 CAPSULE ORAL 3 TIMES DAILY
Status: DISCONTINUED | OUTPATIENT
Start: 2017-08-31 | End: 2017-09-06 | Stop reason: HOSPADM

## 2017-08-31 RX ORDER — HYDROMORPHONE HYDROCHLORIDE 1 MG/ML
1 INJECTION, SOLUTION INTRAMUSCULAR; INTRAVENOUS; SUBCUTANEOUS
Status: DISCONTINUED | OUTPATIENT
Start: 2017-08-31 | End: 2017-08-31

## 2017-08-31 RX ORDER — HYDROCODONE BITARTRATE AND ACETAMINOPHEN 500; 5 MG/1; MG/1
TABLET ORAL
Status: DISCONTINUED | OUTPATIENT
Start: 2017-08-31 | End: 2017-09-06 | Stop reason: HOSPADM

## 2017-08-31 RX ORDER — MORPHINE SULFATE 15 MG/1
30 TABLET, FILM COATED, EXTENDED RELEASE ORAL EVERY 12 HOURS
Status: DISCONTINUED | OUTPATIENT
Start: 2017-08-31 | End: 2017-09-06 | Stop reason: HOSPADM

## 2017-08-31 RX ORDER — HYDROMORPHONE HYDROCHLORIDE 2 MG/ML
2 INJECTION, SOLUTION INTRAMUSCULAR; INTRAVENOUS; SUBCUTANEOUS
Status: DISCONTINUED | OUTPATIENT
Start: 2017-08-31 | End: 2017-09-05

## 2017-08-31 RX ADMIN — DIPHENHYDRAMINE HYDROCHLORIDE 25 MG: 50 INJECTION, SOLUTION INTRAMUSCULAR; INTRAVENOUS at 10:08

## 2017-08-31 RX ADMIN — DIPHENHYDRAMINE HYDROCHLORIDE 25 MG: 50 INJECTION, SOLUTION INTRAMUSCULAR; INTRAVENOUS at 06:08

## 2017-08-31 RX ADMIN — GABAPENTIN 100 MG: 100 CAPSULE ORAL at 01:08

## 2017-08-31 RX ADMIN — OXYCODONE HYDROCHLORIDE AND ACETAMINOPHEN 1 TABLET: 10; 325 TABLET ORAL at 08:08

## 2017-08-31 RX ADMIN — OXYCODONE HYDROCHLORIDE AND ACETAMINOPHEN 1 TABLET: 10; 325 TABLET ORAL at 05:08

## 2017-08-31 RX ADMIN — HYDROMORPHONE HYDROCHLORIDE 2 MG: 2 INJECTION INTRAMUSCULAR; INTRAVENOUS; SUBCUTANEOUS at 04:08

## 2017-08-31 RX ADMIN — HYDROMORPHONE HYDROCHLORIDE 1 MG: 1 INJECTION, SOLUTION INTRAMUSCULAR; INTRAVENOUS; SUBCUTANEOUS at 07:08

## 2017-08-31 RX ADMIN — OXYCODONE HYDROCHLORIDE AND ACETAMINOPHEN 1 TABLET: 10; 325 TABLET ORAL at 01:08

## 2017-08-31 RX ADMIN — SODIUM CHLORIDE, SODIUM LACTATE, POTASSIUM CHLORIDE, AND CALCIUM CHLORIDE: .6; .31; .03; .02 INJECTION, SOLUTION INTRAVENOUS at 06:08

## 2017-08-31 RX ADMIN — GABAPENTIN 100 MG: 100 CAPSULE ORAL at 10:08

## 2017-08-31 RX ADMIN — MORPHINE SULFATE 30 MG: 15 TABLET, EXTENDED RELEASE ORAL at 06:08

## 2017-08-31 RX ADMIN — HYDROMORPHONE HYDROCHLORIDE 2 MG: 2 INJECTION INTRAMUSCULAR; INTRAVENOUS; SUBCUTANEOUS at 10:08

## 2017-08-31 RX ADMIN — DIPHENHYDRAMINE HYDROCHLORIDE 25 MG: 50 INJECTION, SOLUTION INTRAMUSCULAR; INTRAVENOUS at 07:08

## 2017-08-31 RX ADMIN — CEFTRIAXONE 2 G: 2 INJECTION, SOLUTION INTRAVENOUS at 09:08

## 2017-08-31 RX ADMIN — ASPIRIN 81 MG CHEWABLE TABLET 81 MG: 81 TABLET CHEWABLE at 08:08

## 2017-08-31 RX ADMIN — SODIUM CHLORIDE, SODIUM LACTATE, POTASSIUM CHLORIDE, AND CALCIUM CHLORIDE: .6; .31; .03; .02 INJECTION, SOLUTION INTRAVENOUS at 03:08

## 2017-08-31 RX ADMIN — HYDROXYZINE PAMOATE 25 MG: 25 CAPSULE ORAL at 12:08

## 2017-08-31 RX ADMIN — GABAPENTIN 100 MG: 100 CAPSULE ORAL at 08:08

## 2017-08-31 RX ADMIN — PRENATAL VIT W/ FE FUMARATE-FA TAB 27-0.8 MG 1 EACH: 27-0.8 TAB at 08:08

## 2017-08-31 RX ADMIN — OXYCODONE HYDROCHLORIDE AND ACETAMINOPHEN 1 TABLET: 10; 325 TABLET ORAL at 03:08

## 2017-08-31 RX ADMIN — FOLIC ACID 4 MG: 1 TABLET ORAL at 08:08

## 2017-08-31 RX ADMIN — HYDROMORPHONE HYDROCHLORIDE 2 MG: 2 INJECTION INTRAMUSCULAR; INTRAVENOUS; SUBCUTANEOUS at 07:08

## 2017-08-31 RX ADMIN — OXYCODONE HYDROCHLORIDE AND ACETAMINOPHEN 1 TABLET: 10; 325 TABLET ORAL at 09:08

## 2017-08-31 RX ADMIN — HYDROMORPHONE HYDROCHLORIDE 1 MG: 1 INJECTION, SOLUTION INTRAMUSCULAR; INTRAVENOUS; SUBCUTANEOUS at 01:08

## 2017-08-31 RX ADMIN — MORPHINE SULFATE 30 MG: 15 TABLET, EXTENDED RELEASE ORAL at 05:08

## 2017-08-31 RX ADMIN — HYDROMORPHONE HYDROCHLORIDE 1 MG: 1 INJECTION, SOLUTION INTRAMUSCULAR; INTRAVENOUS; SUBCUTANEOUS at 10:08

## 2017-08-31 RX ADMIN — ACETAMINOPHEN 650 MG: 325 TABLET ORAL at 12:08

## 2017-08-31 RX ADMIN — DIPHENHYDRAMINE HYDROCHLORIDE 25 MG: 50 INJECTION, SOLUTION INTRAMUSCULAR; INTRAVENOUS at 12:08

## 2017-08-31 RX ADMIN — HYDROMORPHONE HYDROCHLORIDE 1 MG: 1 INJECTION, SOLUTION INTRAMUSCULAR; INTRAVENOUS; SUBCUTANEOUS at 03:08

## 2017-08-31 NOTE — ASSESSMENT & PLAN NOTE
- H/H 7.4/21.3 ([facility] records received, Aug 19 H/H 8.1/23.6)  - RC elevated 11.9  - Leukocytosis (18), thrombocytosis (579)  - Total bili mildly increased at 2.6  - Hg electrophoresis pending  - Home medications continued   - MS Contin 30mg tid   - Percocet 10/325mg q4h prn   - Gabapentin 100mg  - Add dilaudid 1mg q4h for breathrough pain  - IVFs, supplemental O2  - UA 1+ leuks, Urine culture pending  - Folic acid 4mg, Aspirin 81mg daily  - Patient reports most recent echo 3 years ago normal

## 2017-08-31 NOTE — PROGRESS NOTES
[facility]  Obstetrics & Gynecology  Progress Note    Patient Name: Nishi Dumont  MRN: [id number]  Admission Date: 2017  Primary Care Provider: Primary Doctor No  Principal Problem: Sickle cell crisis    Subjective:     HPI:   Nishi Dumont is a 26 y.o.  female with IUP at 23w5d (c/w 8 wk US) with significant hx of sickle cell disease who is being admitted for sickle cell crisis.   Patient has been admitted multiple times throughout her pregnancy for sickle cell crisis, approximately every 2 weeks per patient. She has back pain with these episodes, between her shoulder blades, down to her lower back and in her left shoulder.  Her pain today is similar to previous episodes. Patient denies reduced fluid intake but reports having a reduced appetite. Patient feels as though she is coming down with a cold (sore throat). Her most recent sickle cell crisis admission was 2 weeks ago at [facility], at which time she received 1u pRBC, whiched helped. She was discharged on MS contin 30mg TID, percocet 10/325 q4h prn, and Dilaudid PO. Her primary MFM is Dr. Armenta at [facility].   Prior to this pregnancy, she had fewer crises (1/month). Patient only had 1 crisis in her previous pregnancy 7 years ago. 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 She has a history of multiple blood transfusions. This IUP also complicated by a history of pre-eclampsia in prior pregnancy GBS bacteruria.   Patient denies contractions, denies vaginal bleeding, denies leakage of fluid.   Fetal Movement: normal. Patient denies fevers, CP, SOB, N/V, dysuria, hematuria, HA, leg swelling, visual disturbances    Interval History: Patient continues to complain of 9/10 pain in her shoulders, back, and left leg. This is where she usually has pain with crises. Denies nausea, vomiting, fever, chills, shortness of breath.  "    Scheduled Meds:   aspirin  81 mg Oral Daily    folic acid  4 mg Oral Daily    gabapentin  100 mg Oral Daily    morphine  30 mg Oral Q8H    prenatal vitamin  1 tablet Oral Daily     Continuous Infusions:   lactated Ringers 125 mL/hr at 08/31/17 0616     PRN Meds:acetaminophen, diphenhydrAMINE, diphenhydrAMINE, diphenhydrAMINE, HYDROmorphone, ondansetron, oxycodone-acetaminophen, oxycodone-acetaminophen, promethazine (PHENERGAN) IVPB, senna-docusate 8.6-50 mg, simethicone    Review of patient's allergies indicates:   Allergen Reactions    Contrast media Hives    Iodine and iodide containing products Hives and Swelling    Mushroom Hives    Zithromax [azithromycin] Anaphylaxis    Demerol [meperidine] Other (See Comments)     Regarding reaction to demerol pt states "I talk out of my head and become aggressive"       Objective:     Vital Signs (Most Recent):  Temp: 97.3 °F (36.3 °C) (08/31/17 0406)  Pulse: 105 (08/31/17 0445)  Resp: 16 (08/31/17 0406)  BP: (!) 93/48 (08/31/17 0406)  SpO2: 100 % (08/31/17 0445) Vital Signs (24h Range):  Temp:  [96.4 °F (35.8 °C)-98.1 °F (36.7 °C)] 97.3 °F (36.3 °C)  Pulse:  [] 105  Resp:  [16-18] 16  SpO2:  [88 %-100 %] 100 %  BP: ()/(48-63) 93/48     Weight: 59.9 kg (132 lb)  Body mass index is 26.66 kg/m².  No LMP recorded. Patient has had an injection.    I&O (Last 24H):      Laboratory:  Recent Lab Results       08/31/17  0435 08/30/17 2229 08/30/17 2015 08/30/17  1831      Albumin   2.8(L)      Alkaline Phosphatase   107      ALT   15      Anion Gap   6(L)      Aniso Slight  Slight      Appearance, UA    Clear     AST   23      Bacteria, UA    Occasional     BANDS 1.0        Baso # CANCELED  Comment:  Result canceled by the ancillary  CANCELED  Comment:  Result canceled by the ancillary      Basophil% 0.0  0.0      Bilirubin (UA)    1+  Comment:  Positive urine bilirubin is not confirmed. Correlate with   serum bilirubin and clinical presentation.  (A)     " Total Bilirubin   2.6  Comment:  For infants and newborns, interpretation of results should be based  on gestational age, weight and in agreement with clinical  observations.  Premature Infant recommended reference ranges:  Up to 24 hours.............<8.0 mg/dL  Up to 48 hours............<12.0 mg/dL  3-5 days..................<15.0 mg/dL  6-29 days.................<15.0 mg/dL  (H)      BUN, Bld   5(L)      Calcium   8.3(L)      Chloride   114(H)      CO2   20(L)      Color, UA    Yellow     Creatinine   0.6      Creatinine, Random Ur  57.5  Comment:  The random urine reference ranges provided were established   for 24 hour urine collections.  No reference ranges exist for  random urine specimens.  Correlate clinically.         Differential Method Manual  Manual      eGFR if    >60      eGFR if non    >60  Comment:  Calculation used to obtain the estimated glomerular filtration  rate (eGFR) is the CKD-EPI equation. Since race is unknown   in our information system, the eGFR values for   -American and Non--American patients are given   for each creatinine result.        Eos # CANCELED  Comment:  Result canceled by the ancillary  CANCELED  Comment:  Result canceled by the ancillary      Eosinophil% 1.0  1.0      Large/Giant Platelets Present  Present      Glucose   99      Glucose, UA    Negative     Gran% 68.0  76.0(H)      Hematocrit 20.2(L)  21.3(L)      Hemoglobin 7.0(L)  7.4(L)      Hypo Occasional        Ketones, UA    Negative     Leukocytes, UA    1+(A)     Lymph # CANCELED  Comment:  Result canceled by the ancillary  CANCELED  Comment:  Result canceled by the ancillary      Lymph% 15.0(L)  18.0      MCH 30.4  30.5      MCHC 34.7  34.7      MCV 88  88      Metamyelocytes 1.0        Microscopic Comment    SEE COMMENT  Comment:  Other formed elements not mentioned in the report are not   present in the microscopic examination.        Mono # CANCELED  Comment:  Result  canceled by the ancillary  CANCELED  Comment:  Result canceled by the ancillary      Mono% 13.0  5.0      MPV 8.9(L)  9.1(L)      Myelocytes 1.0        Nitrite, UA    Negative     Occult Blood UA    Negative     pH, UA    6.0     Platelet Estimate Increased(A)  Increased(A)      Platelets 519(H)  579(H)      Poik Moderate  Slight      Poly Occasional  Occasional      Potassium   3.6      Prot/Creat Ratio, Ur  Unable to calculate       Total Protein   6.3      Protein, UA    Negative  Comment:  Recommend a 24 hour urine protein or a urine   protein/creatinine ratio if globulin induced proteinuria is  clinically suspected.       Protein, Urine Random  <7  Comment:  The random urine reference ranges provided were established   for 24 hour urine collections.  No reference ranges exist for  random urine specimens.  Correlate clinically.         RBC 2.30(L)  2.43(L)      RDW 15.8(H)  15.9(H)      Retic   11.9(H)      Sickle Cells Moderate(A)        Sodium   140      Specific [place], UA    1.010     Specimen UA    Urine, Clean Catch     Squam Epithel, UA    3     Stomatocytes Present        Target Cells Occasional        Urobilinogen, UA    >=8.0(A)     WBC, UA    0     WBC 23.21(H)  17.97(H)            Diagnostic Results:  X-Ray: Reviewed    Physical Exam:   Constitutional: She is oriented to person, place, and time. She appears well-developed and well-nourished. No distress.       Cardiovascular: Normal rate and regular rhythm.     Pulmonary/Chest: Effort normal. No respiratory distress.   NC at 3L        Abdominal: Soft. She exhibits no distension and no abdominal incision. There is no tenderness.             Musculoskeletal: Normal range of motion. She exhibits no edema or tenderness.       Neurological: She is alert and oriented to person, place, and time.    Skin: Skin is warm and dry. She is not diaphoretic.        Assessment/Plan:     History of pre-eclampsia    - normotensive  - P/c too low to calculate        *  Sickle cell crisis    - H/H 7.4/21.3 > 7.0/20.2 ([facility] records received, Aug 19 H/H 8.1/23.6)   - Received 1U pRBC at [facility] which patient reports helped symptoms   - Consider transfusion today  - RC elevated 11.9  - Leukocytosis (23), thrombocytosis (519)  - Total bili mildly increased at 2.6  - Hg electrophoresis pending  - Home medications continued   - MS Contin 30mg tid   - Percocet 10/325mg q4h prn   - Gabapentin 100mg  - Add dilaudid 1mg q3h for breathrough pain  - IVFs, supplemental O2   - UA 1+ leuks, Urine culture pending  - Folic acid 4mg, Aspirin 81mg daily  - Patient reports most recent echo 3 years ago normal              Efren Garcia MD  Obstetrics & Gynecology  [facility]

## 2017-08-31 NOTE — PLAN OF CARE
Problem: Patient Care Overview  Goal: Plan of Care Review  Outcome: Ongoing (interventions implemented as appropriate)  Patient in no apparent distress. Sat's 99  % on 3 lpm . Will continue to monitor.

## 2017-08-31 NOTE — HPI
Nishi Dumont is a 26 y.o.  female with IUP at 23w5d (c/w 8 wk US) with significant hx of sickle cell disease who is being admitted for sickle cell crisis.   Patient has been admitted multiple times throughout her pregnancy for sickle cell crisis, approximately every 2 weeks per patient. She has back pain with these episodes, between her shoulder blades, down to her lower back and in her left shoulder.  Her pain today is similar to previous episodes. Patient denies reduced fluid intake but reports having a reduced appetite. Patient feels as though she is coming down with a cold (sore throat). Her most recent sickle cell crisis admission was 2 weeks ago at [facility], at which time she received 1u pRBC, whiched helped. She was discharged on MS contin 30mg TID, percocet 10/325 q4h prn, and Dilaudid PO. Her primary MFM is Dr. Armenta at [facility].   Prior to this pregnancy, she had fewer crises (1/month). Patient only had 1 crisis in her previous pregnancy 7 years ago. 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 She has a history of multiple blood transfusions. This IUP also complicated by a history of pre-eclampsia in prior pregnancy GBS bacteruria.   Patient denies contractions, denies vaginal bleeding, denies leakage of fluid.   Fetal Movement: normal. Patient denies fevers, CP, SOB, N/V, dysuria, hematuria, HA, leg swelling, visual disturbances

## 2017-08-31 NOTE — H&P
[facility]  Obstetrics & Gynecology  History & Physical    Patient Name: Nishi Dumont  MRN: [id number]  Admission Date: 2017  Primary Care Provider: Primary Doctor No    Subjective:     Chief Complaint/Reason for Admission: Sickle cell crisis    History of Present Illness:   Nishi Dumont is a 26 y.o.  female with IUP at 23w5d (c/w 8 wk US) with significant hx of sickle cell disease who is being admitted for sickle cell crisis.   Patient has been admitted multiple times throughout her pregnancy for sickle cell crisis, approximately every 2 weeks per patient. She has back pain with these episodes, between her shoulder blades, down to her lower back and in her left shoulder.  Her pain today is similar to previous episodes. Patient denies reduced fluid intake but reports having a reduced appetite. Patient feels as though she is coming down with a cold (sore throat). Her most recent sickle cell crisis admission was 2 weeks ago at [facility], at which time she received 1u pRBC, whiched helped. She was discharged on MS contin 30mg TID, percocet 10/325 q4h prn, and Dilaudid PO. Her primary MFM is Dr. Armenta at [facility].   Prior to this pregnancy, she had fewer crises (1/month). Patient only had 1 crisis in her previous pregnancy 7 years ago. 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 She has a history of multiple blood transfusions. This IUP also complicated by a history of pre-eclampsia in prior pregnancy GBS bacteruria.   Patient denies contractions, denies vaginal bleeding, denies leakage of fluid.   Fetal Movement: normal. 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 before breakfast    famotidine (PEPCID) 20 MG tablet Take 20 mg by mouth every morning.    folic acid (FOLVITE) 1 MG tablet Take 1 mg by mouth 2 (two) times daily.    hydroxyurea (HYDREA) 500 mg Cap Take 1,000 mg by mouth 2 (two) times daily.     morphine (MS CONTIN) 60 MG 12 hr tablet Take 60 mg by mouth every 12 (twelve) hours as needed for Pain.    ondansetron (ZOFRAN-ODT) 4 MG TbDL Take 4 mg by mouth every 8 (eight) hours as needed (for nausea).    oxycodone (ROXICODONE) 10 mg Tab immediate release tablet Take 1 tablet (10 mg total) by mouth every 12 (twelve) hours as needed for Pain.    tramadol (ULTRAM) 50 mg tablet Take 50 mg by mouth every 6 (six) hours as needed for Pain.       Review of patient's allergies indicates:   Allergen Reactions    Contrast media Hives    Iodine and iodide containing products Hives and Swelling    Mushroom Hives    Zithromax [azithromycin] Anaphylaxis    Demerol [meperidine] Other (See Comments)     Regarding reaction to demerol pt states "I talk out of my head and become aggressive"        Family History     Problem Relation (Age of Onset)    Sickle cell trait Mother, Father, Brother, Daughter        Social History Main Topics    Smoking status: Never Smoker    Smokeless tobacco: Not on file    Alcohol use 0.0 oz/week      Comment: occassionally    Drug use: No    Sexual activity: Yes     Partners: Male     Birth control/ " protection: Injection     Review of Systems   Constitutional: Positive for activity change and appetite change. Negative for chills and fever.   HENT:        Sore throat   Respiratory: Negative for shortness of breath.    Cardiovascular: Negative for chest pain and leg swelling.   Gastrointestinal: Negative for abdominal pain, diarrhea and vomiting.   Genitourinary: Negative for dysuria and vaginal bleeding.   Musculoskeletal: Positive for back pain.   Neurological: Negative for headaches.      Objective:     Vital Signs (Most Recent):  Temp: 98.1 °F (36.7 °C) (08/30/17 1814)  Pulse: (!) 112 (08/30/17 1816)  Resp: 18 (08/30/17 1816)  BP: (!) 109/58 (08/30/17 1813)  SpO2: 96 % (08/30/17 1816) Vital Signs (24h Range):  Temp:  [98.1 °F (36.7 °C)] 98.1 °F (36.7 °C)  Pulse:  [111-112] 112  Resp:  [18] 18  SpO2:  [96 %] 96 %  BP: (109)/(58) 109/58        There is no height or weight on file to calculate BMI.    No LMP recorded. Patient has had an injection.    Physical Exam:   Constitutional: She is oriented to person, place, and time. She appears well-developed and well-nourished. She appears distressed.    HENT:   Head: Normocephalic and atraumatic.    Eyes: Scleral icterus is present.     Cardiovascular: Normal rate and regular rhythm.    Murmur heard.   Pulmonary/Chest: Effort normal and breath sounds normal. No respiratory distress. She has no wheezes. She has no rales.                  Musculoskeletal: Normal range of motion. She exhibits no edema.   Tender along length of thoracic and upper lumbar spine       Neurological: She is alert and oriented to person, place, and time.    Skin: Skin is warm and dry.    Psychiatric: She has a normal mood and affect.       Laboratory:  CBC:   Recent Labs  Lab 08/30/17 2015   WBC 17.97*   RBC 2.43*   HGB 7.4*   HCT 21.3*   *   MCV 88   MCH 30.5   MCHC 34.7     CMP:   Recent Labs  Lab 08/30/17 2015   GLU 99   CALCIUM 8.3*   ALBUMIN 2.8*   PROT 6.3      K 3.6   CO2  20*   *   BUN 5*   CREATININE 0.6   ALKPHOS 107   ALT 15   AST 23   BILITOT 2.6*       Recent Labs  Lab 08/30/17  1831   COLORU Yellow   SPECGRAV 1.010   PHUR 6.0   PROTEINUA Negative   BACTERIA Occasional   NITRITE Negative   LEUKOCYTESUR 1+*   UROBILINOGEN >=8.0*       Diagnostic Results:  N/A    Assessment/Plan:     History of pre-eclampsia    - normotensive  - baseline P:C pending        * Sickle cell crisis    - H/H 7.4/21.3 ([facility] records received, Aug 19 H/H 8.1/23.6)  - RC elevated 11.9  - Leukocytosis (18), thrombocytosis (579)  - Total bili mildly increased at 2.6  - Hg electrophoresis pending  - Home medications continued   - MS Contin 30mg tid   - Percocet 10/325mg q4h prn   - Gabapentin 100mg  - Add dilaudid 1mg q4h for breathrough pain  - IVFs, supplemental O2  - UA 1+ leuks, Urine culture pending  - Folic acid 4mg, Aspirin 81mg daily  - Patient reports most recent echo 3 years ago normal              Betty Schrader MD  Obstetrics & Gynecology  [facility]

## 2017-08-31 NOTE — NURSING
Blood admin paused. Port flushed with 20 mL NS, dilaudid given IVP, port flushed with 20 more mL. Blood restarted.

## 2017-08-31 NOTE — ASSESSMENT & PLAN NOTE
- H/H 7.4/21.3 > 7.0/20.2 ([facility] records received, Aug 19 H/H 8.1/23.6)   - Received 1U pRBC at [facility] which patient reports helped symptoms   - Consider transfusion today  - RC elevated 11.9  - Leukocytosis (23), thrombocytosis (519)  - Total bili mildly increased at 2.6  - Hg electrophoresis pending  - Home medications continued   - MS Contin 30mg tid   - Percocet 10/325mg q4h prn   - Gabapentin 100mg  - Add dilaudid 1mg q3h for breathrough pain  - IVFs, supplemental O2   - UA 1+ leuks, Urine culture pending  - Folic acid 4mg, Aspirin 81mg daily  - Patient reports most recent echo 3 years ago normal

## 2017-08-31 NOTE — SUBJECTIVE & OBJECTIVE
"Interval History: Patient continues to complain of 9/10 pain in her shoulders, back, and left leg. This is where she usually has pain with crises. Denies nausea, vomiting, fever, chills, shortness of breath.     Scheduled Meds:   aspirin  81 mg Oral Daily    folic acid  4 mg Oral Daily    gabapentin  100 mg Oral Daily    morphine  30 mg Oral Q8H    prenatal vitamin  1 tablet Oral Daily     Continuous Infusions:   lactated Ringers 125 mL/hr at 08/31/17 0616     PRN Meds:acetaminophen, diphenhydrAMINE, diphenhydrAMINE, diphenhydrAMINE, HYDROmorphone, ondansetron, oxycodone-acetaminophen, oxycodone-acetaminophen, promethazine (PHENERGAN) IVPB, senna-docusate 8.6-50 mg, simethicone    Review of patient's allergies indicates:   Allergen Reactions    Contrast media Hives    Iodine and iodide containing products Hives and Swelling    Mushroom Hives    Zithromax [azithromycin] Anaphylaxis    Demerol [meperidine] Other (See Comments)     Regarding reaction to demerol pt states "I talk out of my head and become aggressive"       Objective:     Vital Signs (Most Recent):  Temp: 97.3 °F (36.3 °C) (08/31/17 0406)  Pulse: 105 (08/31/17 0445)  Resp: 16 (08/31/17 0406)  BP: (!) 93/48 (08/31/17 0406)  SpO2: 100 % (08/31/17 0445) Vital Signs (24h Range):  Temp:  [96.4 °F (35.8 °C)-98.1 °F (36.7 °C)] 97.3 °F (36.3 °C)  Pulse:  [] 105  Resp:  [16-18] 16  SpO2:  [88 %-100 %] 100 %  BP: ()/(48-63) 93/48     Weight: 59.9 kg (132 lb)  Body mass index is 26.66 kg/m².  No LMP recorded. Patient has had an injection.    I&O (Last 24H):      Laboratory:  Recent Lab Results       08/31/17  0435 08/30/17 2229 08/30/17 2015 08/30/17  1831      Albumin   2.8(L)      Alkaline Phosphatase   107      ALT   15      Anion Gap   6(L)      Aniso Slight  Slight      Appearance, UA    Clear     AST   23      Bacteria, UA    Occasional     BANDS 1.0        Baso # CANCELED  Comment:  Result canceled by the ancillary  " CANCELED  Comment:  Result canceled by the ancillary      Basophil% 0.0  0.0      Bilirubin (UA)    1+  Comment:  Positive urine bilirubin is not confirmed. Correlate with   serum bilirubin and clinical presentation.  (A)     Total Bilirubin   2.6  Comment:  For infants and newborns, interpretation of results should be based  on gestational age, weight and in agreement with clinical  observations.  Premature Infant recommended reference ranges:  Up to 24 hours.............<8.0 mg/dL  Up to 48 hours............<12.0 mg/dL  3-5 days..................<15.0 mg/dL  6-29 days.................<15.0 mg/dL  (H)      BUN, Bld   5(L)      Calcium   8.3(L)      Chloride   114(H)      CO2   20(L)      Color, UA    Yellow     Creatinine   0.6      Creatinine, Random Ur  57.5  Comment:  The random urine reference ranges provided were established   for 24 hour urine collections.  No reference ranges exist for  random urine specimens.  Correlate clinically.         Differential Method Manual  Manual      eGFR if    >60      eGFR if non    >60  Comment:  Calculation used to obtain the estimated glomerular filtration  rate (eGFR) is the CKD-EPI equation. Since race is unknown   in our information system, the eGFR values for   -American and Non--American patients are given   for each creatinine result.        Eos # CANCELED  Comment:  Result canceled by the ancillary  CANCELED  Comment:  Result canceled by the ancillary      Eosinophil% 1.0  1.0      Large/Giant Platelets Present  Present      Glucose   99      Glucose, UA    Negative     Gran% 68.0  76.0(H)      Hematocrit 20.2(L)  21.3(L)      Hemoglobin 7.0(L)  7.4(L)      Hypo Occasional        Ketones, UA    Negative     Leukocytes, UA    1+(A)     Lymph # CANCELED  Comment:  Result canceled by the ancillary  CANCELED  Comment:  Result canceled by the ancillary      Lymph% 15.0(L)  18.0      MCH 30.4  30.5      MCHC 34.7  34.7      MCV  88  88      Metamyelocytes 1.0        Microscopic Comment    SEE COMMENT  Comment:  Other formed elements not mentioned in the report are not   present in the microscopic examination.        Mono # CANCELED  Comment:  Result canceled by the ancillary  CANCELED  Comment:  Result canceled by the ancillary      Mono% 13.0  5.0      MPV 8.9(L)  9.1(L)      Myelocytes 1.0        Nitrite, UA    Negative     Occult Blood UA    Negative     pH, UA    6.0     Platelet Estimate Increased(A)  Increased(A)      Platelets 519(H)  579(H)      Poik Moderate  Slight      Poly Occasional  Occasional      Potassium   3.6      Prot/Creat Ratio, Ur  Unable to calculate       Total Protein   6.3      Protein, UA    Negative  Comment:  Recommend a 24 hour urine protein or a urine   protein/creatinine ratio if globulin induced proteinuria is  clinically suspected.       Protein, Urine Random  <7  Comment:  The random urine reference ranges provided were established   for 24 hour urine collections.  No reference ranges exist for  random urine specimens.  Correlate clinically.         RBC 2.30(L)  2.43(L)      RDW 15.8(H)  15.9(H)      Retic   11.9(H)      Sickle Cells Moderate(A)        Sodium   140      Specific [place], UA    1.010     Specimen UA    Urine, Clean Catch     Squam Epithel, UA    3     Stomatocytes Present        Target Cells Occasional        Urobilinogen, UA    >=8.0(A)     WBC, UA    0     WBC 23.21(H)  17.97(H)            Diagnostic Results:  X-Ray: Reviewed    Physical Exam:   Constitutional: She is oriented to person, place, and time. She appears well-developed and well-nourished. No distress.       Cardiovascular: Normal rate and regular rhythm.     Pulmonary/Chest: Effort normal. No respiratory distress.   NC at 3L        Abdominal: Soft. She exhibits no distension and no abdominal incision. There is no tenderness.             Musculoskeletal: Normal range of motion. She exhibits no edema or tenderness.        Neurological: She is alert and oriented to person, place, and time.    Skin: Skin is warm and dry. She is not diaphoretic.

## 2017-08-31 NOTE — SUBJECTIVE & OBJECTIVE
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 before breakfast    famotidine (PEPCID) 20 MG tablet Take 20 mg by mouth every morning.    folic acid (FOLVITE) 1 MG tablet Take 1 mg by mouth 2 (two) times daily.    hydroxyurea (HYDREA) 500 mg Cap Take 1,000 mg by mouth 2 (two) times daily.     morphine (MS CONTIN) 60 MG 12 hr tablet Take 60 mg by mouth every 12 (twelve) hours as needed for Pain.    ondansetron (ZOFRAN-ODT) 4 MG TbDL Take 4 mg by mouth every 8 (eight) hours as needed (for nausea).    oxycodone (ROXICODONE) 10 mg Tab immediate release tablet Take 1 tablet (10 mg total) by mouth every 12 (twelve) hours as needed for Pain.    tramadol (ULTRAM) 50 mg tablet Take 50 mg by mouth every 6 (six) hours as needed for Pain.       Review of patient's allergies indicates:   Allergen Reactions    Contrast media Hives    Iodine and iodide containing products Hives and Swelling    Mushroom Hives    Zithromax [azithromycin] Anaphylaxis    Demerol [meperidine] Other (See Comments)     Regarding reaction to demerol pt states "I talk out of my head and become aggressive"        Family History     Problem Relation (Age of Onset)    Sickle cell trait Mother, Father, Brother, Daughter        Social History Main Topics    Smoking status: Never Smoker    Smokeless " tobacco: Not on file    Alcohol use 0.0 oz/week      Comment: occassionally    Drug use: No    Sexual activity: Yes     Partners: Male     Birth control/ protection: Injection     Review of Systems   Constitutional: Positive for activity change and appetite change. Negative for chills and fever.   HENT:        Sore throat   Respiratory: Negative for shortness of breath.    Cardiovascular: Negative for chest pain and leg swelling.   Gastrointestinal: Negative for abdominal pain, diarrhea and vomiting.   Genitourinary: Negative for dysuria and vaginal bleeding.   Musculoskeletal: Positive for back pain.   Neurological: Negative for headaches.      Objective:     Vital Signs (Most Recent):  Temp: 98.1 °F (36.7 °C) (08/30/17 1814)  Pulse: (!) 112 (08/30/17 1816)  Resp: 18 (08/30/17 1816)  BP: (!) 109/58 (08/30/17 1813)  SpO2: 96 % (08/30/17 1816) Vital Signs (24h Range):  Temp:  [98.1 °F (36.7 °C)] 98.1 °F (36.7 °C)  Pulse:  [111-112] 112  Resp:  [18] 18  SpO2:  [96 %] 96 %  BP: (109)/(58) 109/58        There is no height or weight on file to calculate BMI.    No LMP recorded. Patient has had an injection.    Physical Exam:   Constitutional: She is oriented to person, place, and time. She appears well-developed and well-nourished. She appears distressed.    HENT:   Head: Normocephalic and atraumatic.    Eyes: Scleral icterus is present.     Cardiovascular: Normal rate and regular rhythm.    Murmur heard.   Pulmonary/Chest: Effort normal and breath sounds normal. No respiratory distress. She has no wheezes. She has no rales.                  Musculoskeletal: Normal range of motion. She exhibits no edema.   Tender along length of thoracic and upper lumbar spine       Neurological: She is alert and oriented to person, place, and time.    Skin: Skin is warm and dry.    Psychiatric: She has a normal mood and affect.       Laboratory:  CBC:   Recent Labs  Lab 08/30/17 2015   WBC 17.97*   RBC 2.43*   HGB 7.4*   HCT 21.3*    *   MCV 88   MCH 30.5   MCHC 34.7     CMP:   Recent Labs  Lab 08/30/17 2015   GLU 99   CALCIUM 8.3*   ALBUMIN 2.8*   PROT 6.3      K 3.6   CO2 20*   *   BUN 5*   CREATININE 0.6   ALKPHOS 107   ALT 15   AST 23   BILITOT 2.6*       Recent Labs  Lab 08/30/17  1831   COLORU Yellow   SPECGRAV 1.010   PHUR 6.0   PROTEINUA Negative   BACTERIA Occasional   NITRITE Negative   LEUKOCYTESUR 1+*   UROBILINOGEN >=8.0*       Diagnostic Results:  N/A

## 2017-08-31 NOTE — HOSPITAL COURSE
HD#1: admitted for IVFs and pain control.   HD#2: Home pain regimen reinitiated with added dilaudid for breakthrough pain. Continues to complain of 9/10 pain in her back, shoulder, and legs. Followed by hematologist at [facility]; requesting records. 2u pRBC transfused.  HD#3: AM CBC 10/29, appropriate rise s/p 2u pRBC. Pain control somewhat improved, but still very severe.   HD#4: Pain contolled when she receives pain meds. H/H with appropriate rise this Am. Permacath having difficulties functioning however anesthesia adjusted and fixed. CBC and CMP WNL  Hd#5-7 Pain improving, but still present (7/10). Patient has been taking only IV dilaudid instead of percocet.  HD8: Pain improving, patient requiring less pain medication overnight. Dilaudid transitioned to PO and weaned in dose. Potentially ready for discharge today.

## 2017-09-01 PROBLEM — M79.605 LOWER EXTREMITY PAIN, LEFT: Status: ACTIVE | Noted: 2017-09-01

## 2017-09-01 LAB
ACANTHOCYTES BLD QL SMEAR: PRESENT
ANISOCYTOSIS BLD QL SMEAR: SLIGHT
BACTERIA UR CULT: NORMAL
BASOPHILS # BLD AUTO: ABNORMAL K/UL
BASOPHILS NFR BLD: 1 %
DIFFERENTIAL METHOD: ABNORMAL
EOSINOPHIL # BLD AUTO: ABNORMAL K/UL
EOSINOPHIL NFR BLD: 7 %
ERYTHROCYTE [DISTWIDTH] IN BLOOD BY AUTOMATED COUNT: 15.4 %
GIANT PLATELETS BLD QL SMEAR: PRESENT
HCT VFR BLD AUTO: 28.9 %
HGB BLD-MCNC: 10 G/DL
HGB FRACT BLD ELPH-IMP: NORMAL
HGB S MFR BLD ELPH: 58 %
HYPOCHROMIA BLD QL SMEAR: ABNORMAL
LYMPHOCYTES # BLD AUTO: ABNORMAL K/UL
LYMPHOCYTES NFR BLD: 31 %
MCH RBC QN AUTO: 30.4 PG
MCHC RBC AUTO-ENTMCNC: 34.6 G/DL
MCV RBC AUTO: 88 FL
MONOCYTES # BLD AUTO: ABNORMAL K/UL
MONOCYTES NFR BLD: 5 %
MYELOCYTES NFR BLD MANUAL: 3 %
NEUTROPHILS NFR BLD: 53 %
NRBC BLD-RTO: 2 /100 WBC
PLATELET # BLD AUTO: 484 K/UL
PLATELET BLD QL SMEAR: ABNORMAL
PMV BLD AUTO: 8.7 FL
POIKILOCYTOSIS BLD QL SMEAR: ABNORMAL
POLYCHROMASIA BLD QL SMEAR: ABNORMAL
RBC # BLD AUTO: 3.29 M/UL
SICKLE CELLS BLD QL SMEAR: ABNORMAL
SPHEROCYTES BLD QL SMEAR: ABNORMAL
STOMATOCYTES BLD QL SMEAR: PRESENT
TARGETS BLD QL SMEAR: ABNORMAL
WBC # BLD AUTO: 13.23 K/UL

## 2017-09-01 PROCEDURE — 99233 SBSQ HOSP IP/OBS HIGH 50: CPT | Mod: 25,,, | Performed by: OBSTETRICS & GYNECOLOGY

## 2017-09-01 PROCEDURE — 63600175 PHARM REV CODE 636 W HCPCS: Performed by: OBSTETRICS & GYNECOLOGY

## 2017-09-01 PROCEDURE — G0378 HOSPITAL OBSERVATION PER HR: HCPCS

## 2017-09-01 PROCEDURE — 25000003 PHARM REV CODE 250: Performed by: OBSTETRICS & GYNECOLOGY

## 2017-09-01 PROCEDURE — 85007 BL SMEAR W/DIFF WBC COUNT: CPT

## 2017-09-01 PROCEDURE — 85027 COMPLETE CBC AUTOMATED: CPT

## 2017-09-01 PROCEDURE — 27000221 HC OXYGEN, UP TO 24 HOURS

## 2017-09-01 PROCEDURE — 94761 N-INVAS EAR/PLS OXIMETRY MLT: CPT

## 2017-09-01 PROCEDURE — 63600175 PHARM REV CODE 636 W HCPCS: Performed by: STUDENT IN AN ORGANIZED HEALTH CARE EDUCATION/TRAINING PROGRAM

## 2017-09-01 PROCEDURE — 99900035 HC TECH TIME PER 15 MIN (STAT)

## 2017-09-01 PROCEDURE — 25000003 PHARM REV CODE 250: Performed by: STUDENT IN AN ORGANIZED HEALTH CARE EDUCATION/TRAINING PROGRAM

## 2017-09-01 RX ADMIN — SODIUM CHLORIDE, SODIUM LACTATE, POTASSIUM CHLORIDE, AND CALCIUM CHLORIDE: .6; .31; .03; .02 INJECTION, SOLUTION INTRAVENOUS at 02:09

## 2017-09-01 RX ADMIN — DIPHENHYDRAMINE HYDROCHLORIDE 25 MG: 50 INJECTION, SOLUTION INTRAMUSCULAR; INTRAVENOUS at 07:09

## 2017-09-01 RX ADMIN — GABAPENTIN 100 MG: 100 CAPSULE ORAL at 06:09

## 2017-09-01 RX ADMIN — OXYCODONE HYDROCHLORIDE AND ACETAMINOPHEN 1 TABLET: 10; 325 TABLET ORAL at 01:09

## 2017-09-01 RX ADMIN — DIPHENHYDRAMINE HYDROCHLORIDE 25 MG: 50 INJECTION, SOLUTION INTRAMUSCULAR; INTRAVENOUS at 11:09

## 2017-09-01 RX ADMIN — FOLIC ACID 4 MG: 1 TABLET ORAL at 08:09

## 2017-09-01 RX ADMIN — DIPHENHYDRAMINE HYDROCHLORIDE 25 MG: 50 INJECTION, SOLUTION INTRAMUSCULAR; INTRAVENOUS at 03:09

## 2017-09-01 RX ADMIN — MORPHINE SULFATE 30 MG: 15 TABLET, EXTENDED RELEASE ORAL at 05:09

## 2017-09-01 RX ADMIN — SODIUM CHLORIDE, SODIUM LACTATE, POTASSIUM CHLORIDE, AND CALCIUM CHLORIDE: .6; .31; .03; .02 INJECTION, SOLUTION INTRAVENOUS at 11:09

## 2017-09-01 RX ADMIN — HYDROMORPHONE HYDROCHLORIDE 2 MG: 2 INJECTION INTRAMUSCULAR; INTRAVENOUS; SUBCUTANEOUS at 07:09

## 2017-09-01 RX ADMIN — OXYCODONE HYDROCHLORIDE AND ACETAMINOPHEN 1 TABLET: 10; 325 TABLET ORAL at 10:09

## 2017-09-01 RX ADMIN — HYDROMORPHONE HYDROCHLORIDE 2 MG: 2 INJECTION INTRAMUSCULAR; INTRAVENOUS; SUBCUTANEOUS at 03:09

## 2017-09-01 RX ADMIN — OXYCODONE HYDROCHLORIDE AND ACETAMINOPHEN 1 TABLET: 10; 325 TABLET ORAL at 02:09

## 2017-09-01 RX ADMIN — OXYCODONE HYDROCHLORIDE AND ACETAMINOPHEN 1 TABLET: 10; 325 TABLET ORAL at 05:09

## 2017-09-01 RX ADMIN — DIPHENHYDRAMINE HYDROCHLORIDE 25 MG: 50 INJECTION, SOLUTION INTRAMUSCULAR; INTRAVENOUS at 02:09

## 2017-09-01 RX ADMIN — PRENATAL VIT W/ FE FUMARATE-FA TAB 27-0.8 MG 1 EACH: 27-0.8 TAB at 08:09

## 2017-09-01 RX ADMIN — HYDROMORPHONE HYDROCHLORIDE 2 MG: 2 INJECTION INTRAMUSCULAR; INTRAVENOUS; SUBCUTANEOUS at 11:09

## 2017-09-01 RX ADMIN — OXYCODONE HYDROCHLORIDE AND ACETAMINOPHEN 1 TABLET: 10; 325 TABLET ORAL at 06:09

## 2017-09-01 RX ADMIN — GABAPENTIN 100 MG: 100 CAPSULE ORAL at 02:09

## 2017-09-01 RX ADMIN — GABAPENTIN 100 MG: 100 CAPSULE ORAL at 09:09

## 2017-09-01 RX ADMIN — ASPIRIN 81 MG CHEWABLE TABLET 81 MG: 81 TABLET CHEWABLE at 08:09

## 2017-09-01 RX ADMIN — MORPHINE SULFATE 30 MG: 15 TABLET, EXTENDED RELEASE ORAL at 06:09

## 2017-09-01 RX ADMIN — HYDROMORPHONE HYDROCHLORIDE 2 MG: 2 INJECTION INTRAMUSCULAR; INTRAVENOUS; SUBCUTANEOUS at 02:09

## 2017-09-01 NOTE — SUBJECTIVE & OBJECTIVE
"Interval History:     Patient continues to complain of severe pain in her shoulders, back, and left leg. This morning felt as though left LE pain was worse and somewhat atypical from her usual sickle cell pain. She states the duration of her pain relief is somewhat improved, however when her pain spikes it is still unbearable. She states it usually takes a few days for blood transfusion to give her relief.     Denies nausea, vomiting, fever, chills, shortness of breath, dysuria.     Scheduled Meds:   aspirin  81 mg Oral Daily    folic acid  4 mg Oral Daily    gabapentin  100 mg Oral Daily    morphine  30 mg Oral Q8H    prenatal vitamin  1 tablet Oral Daily     Continuous Infusions:   lactated Ringers 125 mL/hr at 08/31/17 0616     PRN Meds:acetaminophen, diphenhydrAMINE, diphenhydrAMINE, diphenhydrAMINE, HYDROmorphone, ondansetron, oxycodone-acetaminophen, oxycodone-acetaminophen, promethazine (PHENERGAN) IVPB, senna-docusate 8.6-50 mg, simethicone    Review of patient's allergies indicates:   Allergen Reactions    Contrast media Hives    Iodine and iodide containing products Hives and Swelling    Mushroom Hives    Zithromax [azithromycin] Anaphylaxis    Demerol [meperidine] Other (See Comments)     Regarding reaction to demerol pt states "I talk out of my head and become aggressive"       Objective:     Vital Signs (Most Recent):  Temp: 97.3 °F (36.3 °C) (08/31/17 0406)  Pulse: 105 (08/31/17 0445)  Resp: 16 (08/31/17 0406)  BP: (!) 93/48 (08/31/17 0406)  SpO2: 100 % (08/31/17 0445) Vital Signs (24h Range):  Temp:  [96.4 °F (35.8 °C)-98.1 °F (36.7 °C)] 97.3 °F (36.3 °C)  Pulse:  [] 105  Resp:  [16-18] 16  SpO2:  [88 %-100 %] 100 %  BP: ()/(48-63) 93/48     Weight: 59.9 kg (132 lb)  Body mass index is 26.66 kg/m².  No LMP recorded. Patient has had an injection.    I&O (Last 24H):      Laboratory:  Recent Lab Results       09/01/17  0435      Acanthocytes Present     Aniso Slight     Baso # " CANCELED  Comment:  Result canceled by the ancillary     Basophil% 1.0     Differential Method Manual     Eos # CANCELED  Comment:  Result canceled by the ancillary     Eosinophil% 7.0     Large/Giant Platelets Present     Gran% 53.0     Hematocrit 28.9(L)     Hemoglobin 10.0  Comment:  Patient received blood.(L)     Hypo Occasional     Lymph # CANCELED  Comment:  Result canceled by the ancillary     Lymph% 31.0     MCH 30.4     MCHC 34.6     MCV 88     Mono # CANCELED  Comment:  Result canceled by the ancillary     Mono% 5.0     MPV 8.7(L)     Myelocytes 3.0     nRBC 2(A)     Platelet Estimate Increased(A)     Platelets 484(H)     Poik Moderate     Poly Occasional     RBC 3.29(L)     RDW 15.4(H)     Sickle Cells Marked(A)     Spherocytes Occasional     Stomatocytes Present     Target Cells Occasional     WBC 13.23(H)           Diagnostic Results:  X-Ray: Reviewed    Physical Exam:   Constitutional: She is oriented to person, place, and time. She appears well-developed and well-nourished. No distress.       Cardiovascular: Normal rate and regular rhythm.     Pulmonary/Chest: Effort normal. No respiratory distress.   NC at 3L        Abdominal: Soft. She exhibits no distension and no abdominal incision. There is no tenderness.             Musculoskeletal: Normal range of motion. She exhibits tenderness. She exhibits no edema.   LE symmetric without erythema or edema. LLE with +TTP anteriorly and posteriorly. Homans +. Also +TTP over upper back.       Neurological: She is alert and oriented to person, place, and time.    Skin: Skin is warm and dry. She is not diaphoretic.

## 2017-09-01 NOTE — PROGRESS NOTES
To bedside to evaluate patient as notified by nursing of uilateral LLE pain. Patient states she has new onset pain in her lower left leg. Patient tells me this not how her sickle crisis pain typically presents. She denies SOB or palpitations.  She state she has a family member that  from a blood clot in his lungs.     Temp:  [97 °F (36.1 °C)-97.5 °F (36.4 °C)] 97.5 °F (36.4 °C)  Pulse:  [] 103  Resp:  [16] 16  SpO2:  [98 %-100 %] 98 %  BP: ()/(56-74) 112/71    Uncomfortable appearing  Normal WOB  BLE appear symmetric without marked edema or erythema. LLE very tender to palpation over shin and calf. Radha's sign + on LLE.     Will order LE ultrasound to r/o DVT. Continue to monitor.    Betty Mcgill MD  OB/GYN, PGY-2

## 2017-09-01 NOTE — ASSESSMENT & PLAN NOTE
- H/H 7.4/21.3 > 7.0/20.2 > 2u pRBC 8/31 > 10/29  - RC elevated 11.9  - Leukocytosis (23), thrombocytosis (519)  - Total bili mildly increased at 2.6  - Hg electrophoresis pending  - Home medications continued   - MS Contin 30mg tid   - Percocet 10/325mg q4h prn   - Gabapentin 100mg tid  - Add dilaudid 2mg q3h for breathrough pain  - IVFs, supplemental O2   - UA 1+ leuks, Urine culture pending   - s/p Rocephin x 1  - Folic acid 4mg, Aspirin 81mg daily

## 2017-09-01 NOTE — PLAN OF CARE
Problem: Patient Care Overview  Goal: Plan of Care Review  Outcome: Ongoing (interventions implemented as appropriate)  Plan of care discussed with pt.  Scheduled and PRN meds administered for pain.  Pt's pain has been steadily rated at 8-9/10 today.  Port a cath reaccessed and redressed by house supervisor Wendy this afternoon.  LR infusing continuously at 125 cc/hr.  NST completed BID.  Pt on 2L nasal cannula with humidification.  Will continue to monitor closely.

## 2017-09-01 NOTE — PLAN OF CARE
Problem: Patient Care Overview  Goal: Plan of Care Review  Outcome: Ongoing (interventions implemented as appropriate)  Patient on RA. No distress. Will continue to monitor.

## 2017-09-01 NOTE — PROGRESS NOTES
Pt c/o worsening lower left leg pain and now feeling as if her lower left leg was swelling. Pain has been present in this area throughout the night that she attributed to her sickle cell crisis but this is now a notable change per the pt. No swelling visualized but felt tight upon palpation. Dr. Mcgill notified and coming to bedside.

## 2017-09-01 NOTE — PROGRESS NOTES
[facility]  Obstetrics  Antepartum Progress Note    Patient Name: Nishi Dumont  MRN: [id number]  Admission Date: 2017  Hospital Length of Stay: 0 days  Attending Physician: Alex Cortez MD  Primary Care Provider: Primary Doctor No    Subjective:     Principal Problem:Sickle cell crisis    HPI:   Nishi Dumont is a 26 y.o.  female with IUP at 23w5d (c/w 8 wk US) with significant hx of sickle cell disease who is being admitted for sickle cell crisis.   Patient has been admitted multiple times throughout her pregnancy for sickle cell crisis, approximately every 2 weeks per patient. She has back pain with these episodes, between her shoulder blades, down to her lower back and in her left shoulder.  Her pain today is similar to previous episodes. Patient denies reduced fluid intake but reports having a reduced appetite. Patient feels as though she is coming down with a cold (sore throat). Her most recent sickle cell crisis admission was 2 weeks ago at [facility], at which time she received 1u pRBC, whiched helped. She was discharged on MS contin 30mg TID, percocet 10/325 q4h prn, and Dilaudid PO. Her primary MFM is Dr. Armenta at [facility].   Prior to this pregnancy, she had fewer crises (1/month). Patient only had 1 crisis in her previous pregnancy 7 years ago. 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 She has a history of multiple blood transfusions. This IUP also complicated by a history of pre-eclampsia in prior pregnancy GBS bacteruria.   Patient denies contractions, denies vaginal bleeding, denies leakage of fluid.   Fetal Movement: normal. Patient denies fevers, CP, SOB, N/V, dysuria, hematuria, HA, leg swelling, visual disturbances    Hospital Course:  HD#1: admitted for IVFs and pain control.   HD#2: Home pain regimen reinitiated with added dilaudid for breakthrough pain.  "Continues to complain of 9/10 pain in her back, shoulder, and legs. Followed by hematologist at [facility]; requesting records. 2u pRBC transfused.  HD#3: AM CBC 10/29, appropriate rise s/p 2u pRBC. Pain control somewhat improved, but still very severe.     Interval History:     Patient continues to complain of severe pain in her shoulders, back, and left leg. This morning felt as though left LE pain was worse and somewhat atypical from her usual sickle cell pain. She states the duration of her pain relief is somewhat improved, however when her pain spikes it is still unbearable. She states it usually takes a few days for blood transfusion to give her relief.     Denies nausea, vomiting, fever, chills, shortness of breath, dysuria.     Scheduled Meds:   aspirin  81 mg Oral Daily    folic acid  4 mg Oral Daily    gabapentin  100 mg Oral Daily    morphine  30 mg Oral Q8H    prenatal vitamin  1 tablet Oral Daily     Continuous Infusions:   lactated Ringers 125 mL/hr at 08/31/17 0616     PRN Meds:acetaminophen, diphenhydrAMINE, diphenhydrAMINE, diphenhydrAMINE, HYDROmorphone, ondansetron, oxycodone-acetaminophen, oxycodone-acetaminophen, promethazine (PHENERGAN) IVPB, senna-docusate 8.6-50 mg, simethicone    Review of patient's allergies indicates:   Allergen Reactions    Contrast media Hives    Iodine and iodide containing products Hives and Swelling    Mushroom Hives    Zithromax [azithromycin] Anaphylaxis    Demerol [meperidine] Other (See Comments)     Regarding reaction to demerol pt states "I talk out of my head and become aggressive"       Objective:     Vital Signs (Most Recent):  Temp: 97.3 °F (36.3 °C) (08/31/17 0406)  Pulse: 105 (08/31/17 0445)  Resp: 16 (08/31/17 0406)  BP: (!) 93/48 (08/31/17 0406)  SpO2: 100 % (08/31/17 0445) Vital Signs (24h Range):  Temp:  [96.4 °F (35.8 °C)-98.1 °F (36.7 °C)] 97.3 °F (36.3 °C)  Pulse:  [] 105  Resp:  [16-18] 16  SpO2:  [88 %-100 %] 100 %  BP: ()/(48-63) " 93/48     Weight: 59.9 kg (132 lb)  Body mass index is 26.66 kg/m².  No LMP recorded. Patient has had an injection.    I&O (Last 24H):      Laboratory:  Recent Lab Results       17  0435      Acanthocytes Present     Aniso Slight     Baso # CANCELED  Comment:  Result canceled by the ancillary     Basophil% 1.0     Differential Method Manual     Eos # CANCELED  Comment:  Result canceled by the ancillary     Eosinophil% 7.0     Large/Giant Platelets Present     Gran% 53.0     Hematocrit 28.9(L)     Hemoglobin 10.0  Comment:  Patient received blood.(L)     Hypo Occasional     Lymph # CANCELED  Comment:  Result canceled by the ancillary     Lymph% 31.0     MCH 30.4     MCHC 34.6     MCV 88     Mono # CANCELED  Comment:  Result canceled by the ancillary     Mono% 5.0     MPV 8.7(L)     Myelocytes 3.0     nRBC 2(A)     Platelet Estimate Increased(A)     Platelets 484(H)     Poik Moderate     Poly Occasional     RBC 3.29(L)     RDW 15.4(H)     Sickle Cells Marked(A)     Spherocytes Occasional     Stomatocytes Present     Target Cells Occasional     WBC 13.23(H)           Diagnostic Results:  X-Ray: Reviewed    Physical Exam:   Constitutional: She is oriented to person, place, and time. She appears well-developed and well-nourished. No distress.       Cardiovascular: Normal rate and regular rhythm.     Pulmonary/Chest: Effort normal. No respiratory distress.   NC at 3L        Abdominal: Soft. She exhibits no distension and no abdominal incision. There is no tenderness.             Musculoskeletal: Normal range of motion. She exhibits tenderness. She exhibits no edema.   LE symmetric without erythema or edema. LLE with +TTP anteriorly and posteriorly. Homans +. Also +TTP over upper back.       Neurological: She is alert and oriented to person, place, and time.    Skin: Skin is warm and dry. She is not diaphoretic.        Assessment/Plan:     26 y.o. female  at 24w0d for:    Lower extremity pain, left    - likely  related to sickle cell crisis  - LE dopplers pending (per tech, US neg for DVT)        History of pre-eclampsia    - normotensive  - P/c too low to calculate        * Sickle cell crisis    - H/H 7.4/21.3 > 7.0/20.2 > 2u pRBC 8/31 > 10/29  - RC elevated 11.9  - Leukocytosis (23), thrombocytosis (519)  - Total bili mildly increased at 2.6  - Hg electrophoresis pending  - Home medications continued   - MS Contin 30mg tid   - Percocet 10/325mg q4h prn   - Gabapentin 100mg tid  - Add dilaudid 2mg q3h for breathrough pain  - IVFs, supplemental O2   - UA 1+ leuks, Urine culture pending   - s/p Rocephin x 1  - Folic acid 4mg, Aspirin 81mg daily                Betty Schrader MD  Obstetrics  [facility]

## 2017-09-01 NOTE — PROGRESS NOTES
Order received for LLE ultrasound. Called ED to ask if they were ready for the pt. ED stated that radiology would be calling ultrasound and that they would call me back when ready.

## 2017-09-01 NOTE — PLAN OF CARE
Problem: Patient Care Overview  Goal: Plan of Care Review  Outcome: Ongoing (interventions implemented as appropriate)  Patient in no distress on  2L NC . Sats  98 %. Will continue to monitor.

## 2017-09-02 LAB
ALBUMIN SERPL BCP-MCNC: 2.6 G/DL
ALP SERPL-CCNC: 112 U/L
ALT SERPL W/O P-5'-P-CCNC: 20 U/L
ANION GAP SERPL CALC-SCNC: 10 MMOL/L
AST SERPL-CCNC: 29 U/L
BASOPHILS # BLD AUTO: 0.04 K/UL
BASOPHILS NFR BLD: 0.4 %
BILIRUB SERPL-MCNC: 2.3 MG/DL
BUN SERPL-MCNC: 4 MG/DL
CALCIUM SERPL-MCNC: 8.7 MG/DL
CHLORIDE SERPL-SCNC: 107 MMOL/L
CO2 SERPL-SCNC: 23 MMOL/L
CREAT SERPL-MCNC: 0.6 MG/DL
DIFFERENTIAL METHOD: ABNORMAL
EOSINOPHIL # BLD AUTO: 0.7 K/UL
EOSINOPHIL NFR BLD: 6 %
ERYTHROCYTE [DISTWIDTH] IN BLOOD BY AUTOMATED COUNT: 15.5 %
EST. GFR  (AFRICAN AMERICAN): >60 ML/MIN/1.73 M^2
EST. GFR  (NON AFRICAN AMERICAN): >60 ML/MIN/1.73 M^2
GLUCOSE SERPL-MCNC: 98 MG/DL
HCT VFR BLD AUTO: 28.9 %
HGB BLD-MCNC: 9.9 G/DL
LYMPHOCYTES # BLD AUTO: 2.2 K/UL
LYMPHOCYTES NFR BLD: 20.2 %
MCH RBC QN AUTO: 30.4 PG
MCHC RBC AUTO-ENTMCNC: 34.3 G/DL
MCV RBC AUTO: 89 FL
MONOCYTES # BLD AUTO: 1.2 K/UL
MONOCYTES NFR BLD: 11 %
NEUTROPHILS # BLD AUTO: 6.7 K/UL
NEUTROPHILS NFR BLD: 61.4 %
PLATELET # BLD AUTO: 440 K/UL
PMV BLD AUTO: 8.8 FL
POTASSIUM SERPL-SCNC: 4.1 MMOL/L
PROT SERPL-MCNC: 5.9 G/DL
RBC # BLD AUTO: 3.26 M/UL
SODIUM SERPL-SCNC: 140 MMOL/L
WBC # BLD AUTO: 10.92 K/UL

## 2017-09-02 PROCEDURE — 25000003 PHARM REV CODE 250: Performed by: STUDENT IN AN ORGANIZED HEALTH CARE EDUCATION/TRAINING PROGRAM

## 2017-09-02 PROCEDURE — 25000003 PHARM REV CODE 250: Performed by: OBSTETRICS & GYNECOLOGY

## 2017-09-02 PROCEDURE — 63600175 PHARM REV CODE 636 W HCPCS: Performed by: OBSTETRICS & GYNECOLOGY

## 2017-09-02 PROCEDURE — 99233 SBSQ HOSP IP/OBS HIGH 50: CPT | Mod: 25,,, | Performed by: OBSTETRICS & GYNECOLOGY

## 2017-09-02 PROCEDURE — 59025 FETAL NON-STRESS TEST: CPT | Mod: 26,,, | Performed by: OBSTETRICS & GYNECOLOGY

## 2017-09-02 PROCEDURE — 63600175 PHARM REV CODE 636 W HCPCS: Performed by: STUDENT IN AN ORGANIZED HEALTH CARE EDUCATION/TRAINING PROGRAM

## 2017-09-02 PROCEDURE — 27000221 HC OXYGEN, UP TO 24 HOURS

## 2017-09-02 PROCEDURE — 87081 CULTURE SCREEN ONLY: CPT

## 2017-09-02 PROCEDURE — 80053 COMPREHEN METABOLIC PANEL: CPT

## 2017-09-02 PROCEDURE — G0378 HOSPITAL OBSERVATION PER HR: HCPCS

## 2017-09-02 PROCEDURE — 94761 N-INVAS EAR/PLS OXIMETRY MLT: CPT

## 2017-09-02 PROCEDURE — 85025 COMPLETE CBC W/AUTO DIFF WBC: CPT

## 2017-09-02 RX ORDER — DIPHENHYDRAMINE HYDROCHLORIDE 50 MG/ML
25 INJECTION INTRAMUSCULAR; INTRAVENOUS EVERY 4 HOURS PRN
Status: DISCONTINUED | OUTPATIENT
Start: 2017-09-02 | End: 2017-09-05

## 2017-09-02 RX ADMIN — HYDROMORPHONE HYDROCHLORIDE 2 MG: 2 INJECTION INTRAMUSCULAR; INTRAVENOUS; SUBCUTANEOUS at 08:09

## 2017-09-02 RX ADMIN — HYDROMORPHONE HYDROCHLORIDE 2 MG: 2 INJECTION INTRAMUSCULAR; INTRAVENOUS; SUBCUTANEOUS at 12:09

## 2017-09-02 RX ADMIN — OXYCODONE HYDROCHLORIDE AND ACETAMINOPHEN 1 TABLET: 10; 325 TABLET ORAL at 06:09

## 2017-09-02 RX ADMIN — SODIUM CHLORIDE, SODIUM LACTATE, POTASSIUM CHLORIDE, AND CALCIUM CHLORIDE: .6; .31; .03; .02 INJECTION, SOLUTION INTRAVENOUS at 04:09

## 2017-09-02 RX ADMIN — MORPHINE SULFATE 30 MG: 15 TABLET, EXTENDED RELEASE ORAL at 05:09

## 2017-09-02 RX ADMIN — SODIUM CHLORIDE, SODIUM LACTATE, POTASSIUM CHLORIDE, AND CALCIUM CHLORIDE: .6; .31; .03; .02 INJECTION, SOLUTION INTRAVENOUS at 03:09

## 2017-09-02 RX ADMIN — DIPHENHYDRAMINE HYDROCHLORIDE 25 MG: 50 INJECTION, SOLUTION INTRAMUSCULAR; INTRAVENOUS at 08:09

## 2017-09-02 RX ADMIN — OXYCODONE HYDROCHLORIDE AND ACETAMINOPHEN 1 TABLET: 10; 325 TABLET ORAL at 10:09

## 2017-09-02 RX ADMIN — OXYCODONE HYDROCHLORIDE AND ACETAMINOPHEN 1 TABLET: 10; 325 TABLET ORAL at 02:09

## 2017-09-02 RX ADMIN — GABAPENTIN 100 MG: 100 CAPSULE ORAL at 10:09

## 2017-09-02 RX ADMIN — HYDROMORPHONE HYDROCHLORIDE 2 MG: 2 INJECTION INTRAMUSCULAR; INTRAVENOUS; SUBCUTANEOUS at 03:09

## 2017-09-02 RX ADMIN — SODIUM CHLORIDE, SODIUM LACTATE, POTASSIUM CHLORIDE, AND CALCIUM CHLORIDE: .6; .31; .03; .02 INJECTION, SOLUTION INTRAVENOUS at 08:09

## 2017-09-02 RX ADMIN — HYDROMORPHONE HYDROCHLORIDE 2 MG: 2 INJECTION INTRAMUSCULAR; INTRAVENOUS; SUBCUTANEOUS at 04:09

## 2017-09-02 RX ADMIN — DIPHENHYDRAMINE HYDROCHLORIDE 25 MG: 50 INJECTION, SOLUTION INTRAMUSCULAR; INTRAVENOUS at 04:09

## 2017-09-02 RX ADMIN — DIPHENHYDRAMINE HYDROCHLORIDE 25 MG: 50 INJECTION, SOLUTION INTRAMUSCULAR; INTRAVENOUS at 12:09

## 2017-09-02 RX ADMIN — OXYCODONE HYDROCHLORIDE AND ACETAMINOPHEN 1 TABLET: 10; 325 TABLET ORAL at 01:09

## 2017-09-02 RX ADMIN — DIPHENHYDRAMINE HYDROCHLORIDE 25 MG: 50 INJECTION, SOLUTION INTRAMUSCULAR; INTRAVENOUS at 03:09

## 2017-09-02 RX ADMIN — ASPIRIN 81 MG CHEWABLE TABLET 81 MG: 81 TABLET CHEWABLE at 08:09

## 2017-09-02 RX ADMIN — GABAPENTIN 100 MG: 100 CAPSULE ORAL at 05:09

## 2017-09-02 RX ADMIN — GABAPENTIN 100 MG: 100 CAPSULE ORAL at 02:09

## 2017-09-02 RX ADMIN — PRENATAL VIT W/ FE FUMARATE-FA TAB 27-0.8 MG 1 EACH: 27-0.8 TAB at 08:09

## 2017-09-02 RX ADMIN — FOLIC ACID 4 MG: 1 TABLET ORAL at 08:09

## 2017-09-02 RX ADMIN — OXYCODONE HYDROCHLORIDE AND ACETAMINOPHEN 1 TABLET: 10; 325 TABLET ORAL at 09:09

## 2017-09-02 NOTE — PROGRESS NOTES
[facility]  Obstetrics  Antepartum Progress Note    Patient Name: Nishi Dumont  MRN: [id number]  Admission Date: 2017  Hospital Length of Stay: 0 days  Attending Physician: Alex Cortez MD  Primary Care Provider: Primary Doctor No    Subjective:     Principal Problem:Sickle cell crisis    HPI:   Nishi Dumont is a 26 y.o.  female with IUP at 23w5d (c/w 8 wk US) with significant hx of sickle cell disease who is being admitted for sickle cell crisis.   Patient has been admitted multiple times throughout her pregnancy for sickle cell crisis, approximately every 2 weeks per patient. She has back pain with these episodes, between her shoulder blades, down to her lower back and in her left shoulder.  Her pain today is similar to previous episodes. Patient denies reduced fluid intake but reports having a reduced appetite. Patient feels as though she is coming down with a cold (sore throat). Her most recent sickle cell crisis admission was 2 weeks ago at [facility], at which time she received 1u pRBC, whiched helped. She was discharged on MS contin 30mg TID, percocet 10/325 q4h prn, and Dilaudid PO. Her primary MFM is Dr. Armenta at [facility].   Prior to this pregnancy, she had fewer crises (1/month). Patient only had 1 crisis in her previous pregnancy 7 years ago. 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 She has a history of multiple blood transfusions. This IUP also complicated by a history of pre-eclampsia in prior pregnancy GBS bacteruria.   Patient denies contractions, denies vaginal bleeding, denies leakage of fluid.   Fetal Movement: normal. Patient denies fevers, CP, SOB, N/V, dysuria, hematuria, HA, leg swelling, visual disturbances    Hospital Course:  HD#1: admitted for IVFs and pain control.   HD#2: Home pain regimen reinitiated with added dilaudid for breakthrough pain.  "Continues to complain of 9/10 pain in her back, shoulder, and legs. Followed by hematologist at [facility]; requesting records. 2u pRBC transfused.  HD#3: AM CBC 10/29, appropriate rise s/p 2u pRBC. Pain control somewhat improved, but still very severe.   HD#4: Pain contolled when she receives pain meds. H/H with appropriate rise this Am.     Interval History:     Patient reports pain in her shoulders, back, and left leg this morning.  States when she receives pain meds, her pain is now becoming more controlled. However this morning there is difficulty with IV access due to blockage of permacath. Anesthesia coming to assess.   Denies nausea, vomiting, fever, chills, shortness of breath, dysuria, CP.     Scheduled Meds:   aspirin  81 mg Oral Daily    folic acid  4 mg Oral Daily    gabapentin  100 mg Oral Daily    morphine  30 mg Oral Q8H    prenatal vitamin  1 tablet Oral Daily     Continuous Infusions:   lactated Ringers 125 mL/hr at 08/31/17 0616     PRN Meds:acetaminophen, diphenhydrAMINE, diphenhydrAMINE, diphenhydrAMINE, HYDROmorphone, ondansetron, oxycodone-acetaminophen, oxycodone-acetaminophen, promethazine (PHENERGAN) IVPB, senna-docusate 8.6-50 mg, simethicone    Review of patient's allergies indicates:   Allergen Reactions    Contrast media Hives    Iodine and iodide containing products Hives and Swelling    Mushroom Hives    Zithromax [azithromycin] Anaphylaxis    Demerol [meperidine] Other (See Comments)     Regarding reaction to demerol pt states "I talk out of my head and become aggressive"       Objective:     Vital Signs (Most Recent):  Temp: 97.3 °F (36.3 °C) (08/31/17 0406)  Pulse: 105 (08/31/17 0445)  Resp: 16 (08/31/17 0406)  BP: (!) 93/48 (08/31/17 0406)  SpO2: 100 % (08/31/17 0445) Vital Signs (24h Range):  Temp:  [96.4 °F (35.8 °C)-98.1 °F (36.7 °C)] 97.3 °F (36.3 °C)  Pulse:  [] 105  Resp:  [16-18] 16  SpO2:  [88 %-100 %] 100 %  BP: ()/(48-63) 93/48     Weight: 59.9 kg (132 " lb)  Body mass index is 26.66 kg/m².  No LMP recorded. Patient has had an injection.    I&O (Last 24H):      Laboratory:  Recent Lab Results     None          Diagnostic Results:  X-Ray: Reviewed    Physical Exam:   Constitutional: She is oriented to person, place, and time. She appears well-developed and well-nourished. No distress.       Cardiovascular: Normal rate and regular rhythm.     Pulmonary/Chest: Effort normal. No respiratory distress.   NC at 3L        Abdominal: Soft. She exhibits no distension and no abdominal incision. There is no tenderness.             Musculoskeletal: Normal range of motion. She exhibits no edema or tenderness.   LE symmetric without erythema or edema. LLE with +TTP anteriorly and posteriorly. Homans +. Also +TTP over upper back.       Neurological: She is alert and oriented to person, place, and time.    Skin: Skin is warm and dry. She is not diaphoretic.        Assessment/Plan:     26 y.o. female  at 24w1d for:    Lower extremity pain, left    - likely related to sickle cell crisis  - LE dopplers pending (per tech, US neg for DVT)        History of pre-eclampsia    - normotensive  - P/c too low to calculate        * Sickle cell crisis    - H/H 7.4/21.3 > 7.0/20.2 > 2u pRBC  > 10/29   - RC elevated 11.9  - Leukocytosis (23 >13.23), thrombocytosis (519 > 484)  - Total bili mildly increased at 2.6  - Hg electrophoresis pending  - Home medications continued   - MS Contin 30mg tid   - Percocet 10/325mg q4h prn   - Gabapentin 100mg tid  - Add dilaudid 2mg q3h for breathrough pain  - IVFs, supplemental O2   - UA 1+ leuks, Urine culture pending   - s/p Rocephin x 1  - Folic acid 4mg, Aspirin 81mg daily  - anesthesia will assess for IV acccess, permacath may not be functioning              Karma Keating MD  Obstetrics  [facility]

## 2017-09-02 NOTE — ASSESSMENT & PLAN NOTE
- H/H 7.4/21.3 > 7.0/20.2 > 2u pRBC 8/31 > 10/29   - RC elevated 11.9  - Leukocytosis (23 >13.23), thrombocytosis (519 > 484)  - Total bili mildly increased at 2.6  - Hg electrophoresis pending  - Home medications continued   - MS Contin 30mg tid   - Percocet 10/325mg q4h prn   - Gabapentin 100mg tid  - Add dilaudid 2mg q3h for breathrough pain  - IVFs, supplemental O2   - UA 1+ leuks, Urine culture pending   - s/p Rocephin x 1  - Folic acid 4mg, Aspirin 81mg daily  - anesthesia will assess for IV acccess, permacath may not be functioning

## 2017-09-02 NOTE — SUBJECTIVE & OBJECTIVE
"Interval History:     Patient reports pain in her shoulders, back, and left leg this morning.  States when she receives pain meds, her pain is now becoming more controlled. However this morning there is difficulty with IV access due to blockage of permacath. Anesthesia coming to assess.   Denies nausea, vomiting, fever, chills, shortness of breath, dysuria, CP.     Scheduled Meds:   aspirin  81 mg Oral Daily    folic acid  4 mg Oral Daily    gabapentin  100 mg Oral Daily    morphine  30 mg Oral Q8H    prenatal vitamin  1 tablet Oral Daily     Continuous Infusions:   lactated Ringers 125 mL/hr at 08/31/17 0616     PRN Meds:acetaminophen, diphenhydrAMINE, diphenhydrAMINE, diphenhydrAMINE, HYDROmorphone, ondansetron, oxycodone-acetaminophen, oxycodone-acetaminophen, promethazine (PHENERGAN) IVPB, senna-docusate 8.6-50 mg, simethicone    Review of patient's allergies indicates:   Allergen Reactions    Contrast media Hives    Iodine and iodide containing products Hives and Swelling    Mushroom Hives    Zithromax [azithromycin] Anaphylaxis    Demerol [meperidine] Other (See Comments)     Regarding reaction to demerol pt states "I talk out of my head and become aggressive"       Objective:     Vital Signs (Most Recent):  Temp: 97.3 °F (36.3 °C) (08/31/17 0406)  Pulse: 105 (08/31/17 0445)  Resp: 16 (08/31/17 0406)  BP: (!) 93/48 (08/31/17 0406)  SpO2: 100 % (08/31/17 0445) Vital Signs (24h Range):  Temp:  [96.4 °F (35.8 °C)-98.1 °F (36.7 °C)] 97.3 °F (36.3 °C)  Pulse:  [] 105  Resp:  [16-18] 16  SpO2:  [88 %-100 %] 100 %  BP: ()/(48-63) 93/48     Weight: 59.9 kg (132 lb)  Body mass index is 26.66 kg/m².  No LMP recorded. Patient has had an injection.    I&O (Last 24H):      Laboratory:  Recent Lab Results     None          Diagnostic Results:  X-Ray: Reviewed    Physical Exam:   Constitutional: She is oriented to person, place, and time. She appears well-developed and well-nourished. No distress.     "   Cardiovascular: Normal rate and regular rhythm.     Pulmonary/Chest: Effort normal. No respiratory distress.   NC at 3L        Abdominal: Soft. She exhibits no distension and no abdominal incision. There is no tenderness.             Musculoskeletal: Normal range of motion. She exhibits no edema or tenderness.   LE symmetric without erythema or edema. LLE with +TTP anteriorly and posteriorly. Homans +. Also +TTP over upper back.       Neurological: She is alert and oriented to person, place, and time.    Skin: Skin is warm and dry. She is not diaphoretic.

## 2017-09-02 NOTE — PLAN OF CARE
Problem: Patient Care Overview  Goal: Plan of Care Review  Outcome: Ongoing (interventions implemented as appropriate)  Patient in no distress on  2L NC . Sats 100  %. Will continue to monitor.

## 2017-09-02 NOTE — PROGRESS NOTES
Intake output teaching done with patient, I&O for this shift documented thus far. KAMI stockings applied, teaching done, pt verbalized understanding.

## 2017-09-02 NOTE — PLAN OF CARE
Problem: Patient Care Overview  Goal: Plan of Care Review  Outcome: Ongoing (interventions implemented as appropriate)  Pt remains on 2LNC. No distress noted.

## 2017-09-02 NOTE — PLAN OF CARE
Problem: Patient Care Overview  Goal: Plan of Care Review  Outcome: Ongoing (interventions implemented as appropriate)  Pain control mildly controlled with PO and IV pain medication. Dialudid and benadryl given ATC. Pain never less than an 8/10. C/o pain to left leg, right rib and lower back. Heat packs applied intermittently. Reports +FM; denies ctx, leakage of fluids or vaginal bleeding. O2 maintained at 2L per nasal cannula. Teds/ SCds maintained. VSS. Plan of care reviewed with patient. All questions answered.

## 2017-09-03 PROBLEM — M79.605 LOWER EXTREMITY PAIN, LEFT: Status: ACTIVE | Noted: 2017-09-03

## 2017-09-03 LAB — DEPRECATED S PYO AG THROAT QL EIA: NEGATIVE

## 2017-09-03 PROCEDURE — 63600175 PHARM REV CODE 636 W HCPCS: Performed by: OBSTETRICS & GYNECOLOGY

## 2017-09-03 PROCEDURE — 25000003 PHARM REV CODE 250: Performed by: STUDENT IN AN ORGANIZED HEALTH CARE EDUCATION/TRAINING PROGRAM

## 2017-09-03 PROCEDURE — 25000003 PHARM REV CODE 250: Performed by: OBSTETRICS & GYNECOLOGY

## 2017-09-03 PROCEDURE — 99233 SBSQ HOSP IP/OBS HIGH 50: CPT | Mod: 25,,, | Performed by: OBSTETRICS & GYNECOLOGY

## 2017-09-03 PROCEDURE — G0378 HOSPITAL OBSERVATION PER HR: HCPCS

## 2017-09-03 PROCEDURE — 59025 FETAL NON-STRESS TEST: CPT | Mod: 26,,, | Performed by: OBSTETRICS & GYNECOLOGY

## 2017-09-03 PROCEDURE — 87880 STREP A ASSAY W/OPTIC: CPT

## 2017-09-03 PROCEDURE — 87081 CULTURE SCREEN ONLY: CPT

## 2017-09-03 PROCEDURE — 63600175 PHARM REV CODE 636 W HCPCS: Performed by: STUDENT IN AN ORGANIZED HEALTH CARE EDUCATION/TRAINING PROGRAM

## 2017-09-03 RX ORDER — PSEUDOEPHEDRINE HCL 30 MG
60 TABLET ORAL EVERY 6 HOURS PRN
Status: DISCONTINUED | OUTPATIENT
Start: 2017-09-03 | End: 2017-09-03

## 2017-09-03 RX ADMIN — MORPHINE SULFATE 30 MG: 15 TABLET, EXTENDED RELEASE ORAL at 06:09

## 2017-09-03 RX ADMIN — HYDROMORPHONE HYDROCHLORIDE 2 MG: 2 INJECTION INTRAMUSCULAR; INTRAVENOUS; SUBCUTANEOUS at 05:09

## 2017-09-03 RX ADMIN — HYDROMORPHONE HYDROCHLORIDE 2 MG: 2 INJECTION INTRAMUSCULAR; INTRAVENOUS; SUBCUTANEOUS at 01:09

## 2017-09-03 RX ADMIN — OXYCODONE HYDROCHLORIDE AND ACETAMINOPHEN 1 TABLET: 10; 325 TABLET ORAL at 11:09

## 2017-09-03 RX ADMIN — HYDROMORPHONE HYDROCHLORIDE 2 MG: 2 INJECTION INTRAMUSCULAR; INTRAVENOUS; SUBCUTANEOUS at 12:09

## 2017-09-03 RX ADMIN — PSEUDOEPHEDRINE HCL 60 MG: 30 TABLET, FILM COATED ORAL at 02:09

## 2017-09-03 RX ADMIN — HYDROMORPHONE HYDROCHLORIDE 2 MG: 2 INJECTION INTRAMUSCULAR; INTRAVENOUS; SUBCUTANEOUS at 09:09

## 2017-09-03 RX ADMIN — FOLIC ACID 4 MG: 1 TABLET ORAL at 09:09

## 2017-09-03 RX ADMIN — SODIUM CHLORIDE, SODIUM LACTATE, POTASSIUM CHLORIDE, AND CALCIUM CHLORIDE: .6; .31; .03; .02 INJECTION, SOLUTION INTRAVENOUS at 02:09

## 2017-09-03 RX ADMIN — GABAPENTIN 100 MG: 100 CAPSULE ORAL at 09:09

## 2017-09-03 RX ADMIN — DIPHENHYDRAMINE HYDROCHLORIDE 25 MG: 50 INJECTION, SOLUTION INTRAMUSCULAR; INTRAVENOUS at 09:09

## 2017-09-03 RX ADMIN — DIPHENHYDRAMINE HYDROCHLORIDE 25 MG: 50 INJECTION, SOLUTION INTRAMUSCULAR; INTRAVENOUS at 12:09

## 2017-09-03 RX ADMIN — DIPHENHYDRAMINE HYDROCHLORIDE 25 MG: 50 INJECTION, SOLUTION INTRAMUSCULAR; INTRAVENOUS at 05:09

## 2017-09-03 RX ADMIN — GABAPENTIN 100 MG: 100 CAPSULE ORAL at 06:09

## 2017-09-03 RX ADMIN — SODIUM CHLORIDE, SODIUM LACTATE, POTASSIUM CHLORIDE, AND CALCIUM CHLORIDE: .6; .31; .03; .02 INJECTION, SOLUTION INTRAVENOUS at 01:09

## 2017-09-03 RX ADMIN — OXYCODONE HYDROCHLORIDE AND ACETAMINOPHEN 1 TABLET: 10; 325 TABLET ORAL at 08:09

## 2017-09-03 RX ADMIN — ASPIRIN 81 MG CHEWABLE TABLET 81 MG: 81 TABLET CHEWABLE at 09:09

## 2017-09-03 RX ADMIN — GABAPENTIN 100 MG: 100 CAPSULE ORAL at 02:09

## 2017-09-03 RX ADMIN — DIPHENHYDRAMINE HYDROCHLORIDE 25 MG: 50 INJECTION, SOLUTION INTRAMUSCULAR; INTRAVENOUS at 01:09

## 2017-09-03 RX ADMIN — PRENATAL VIT W/ FE FUMARATE-FA TAB 27-0.8 MG 1 EACH: 27-0.8 TAB at 09:09

## 2017-09-03 NOTE — PLAN OF CARE
Problem: Patient Care Overview  Goal: Plan of Care Review  Outcome: Ongoing (interventions implemented as appropriate)  No significant changes this shift. Vital signs stable. Patient's pain level has not been less than an 8/10 on the pain scale. Patient denies contractions, vaginal bleeding, or LOF. POC and medications reviewed with patient. Will continue to monitor.

## 2017-09-03 NOTE — ASSESSMENT & PLAN NOTE
- H/H 7.4/21.3 > 7.0/20.2 > 2u pRBC 8/31 > 10/29 repeat stable (10/29)  - RC elevated 11.9  - Leukocytosis trending downward (23 >13.23>11), thrombocytosis (519 > 484 > 440)  - Total bili mildly increased at 2.6  - Hg electrophoresis pending  - Home medications continued   - MS Contin 30mg tid   - Percocet 10/325mg q4h prn   - Gabapentin 100mg tid  - Dilaudid 2mg q3h for breathrough pain  - Overall pain is improving  - IVFs, supplemental O2   - UA 1+ leuks, Urine culture NGTD   - s/p Rocephin x 1  - Folic acid 4mg, Aspirin 81mg daily  - Permacath now functioning  - Productive cough noted - chest - xray negative  - Sore throat - Group A strep rapid negative  - Pseudoephedrine 60mg PRN

## 2017-09-03 NOTE — SUBJECTIVE & OBJECTIVE
"Interval History:     Patient reports pain is returning in her ribs and back this morning as her next does of pain meds is due soon, however she feels that she is improving from baseline. Also reporting cough and congestion with green sputum, which is new from yesterday. Denies fever/chills/SOB/CP.   Denies CTX, VB, LOF. Reports good fetal movement.    Scheduled Meds:   aspirin  81 mg Oral Daily    folic acid  4 mg Oral Daily    gabapentin  100 mg Oral Daily    morphine  30 mg Oral Q8H    prenatal vitamin  1 tablet Oral Daily     Continuous Infusions:   lactated Ringers 125 mL/hr at 08/31/17 0616     PRN Meds:acetaminophen, diphenhydrAMINE, diphenhydrAMINE, diphenhydrAMINE, HYDROmorphone, ondansetron, oxycodone-acetaminophen, oxycodone-acetaminophen, promethazine (PHENERGAN) IVPB, senna-docusate 8.6-50 mg, simethicone    Review of patient's allergies indicates:   Allergen Reactions    Contrast media Hives    Iodine and iodide containing products Hives and Swelling    Mushroom Hives    Zithromax [azithromycin] Anaphylaxis    Demerol [meperidine] Other (See Comments)     Regarding reaction to demerol pt states "I talk out of my head and become aggressive"       Objective:     Vital Signs (Most Recent):  Temp: 97.3 °F (36.3 °C) (08/31/17 0406)  Pulse: 105 (08/31/17 0445)  Resp: 16 (08/31/17 0406)  BP: (!) 93/48 (08/31/17 0406)  SpO2: 100 % (08/31/17 0445) Vital Signs (24h Range):  Temp:  [96.4 °F (35.8 °C)-98.1 °F (36.7 °C)] 97.3 °F (36.3 °C)  Pulse:  [] 105  Resp:  [16-18] 16  SpO2:  [88 %-100 %] 100 %  BP: ()/(48-63) 93/48     Weight: 59.9 kg (132 lb)  Body mass index is 26.66 kg/m².  No LMP recorded. Patient has had an injection.    I&O (Last 24H):      Laboratory:  Recent Lab Results       09/02/17  1351      Albumin 2.6(L)     Alkaline Phosphatase 112     ALT 20     Anion Gap 10     AST 29     Baso # 0.04     Basophil% 0.4     Total Bilirubin 2.3  Comment:  For infants and newborns, " interpretation of results should be based  on gestational age, weight and in agreement with clinical  observations.  Premature Infant recommended reference ranges:  Up to 24 hours.............<8.0 mg/dL  Up to 48 hours............<12.0 mg/dL  3-5 days..................<15.0 mg/dL  6-29 days.................<15.0 mg/dL  (H)     BUN, Bld 4(L)     Calcium 8.7     Chloride 107     CO2 23     Creatinine 0.6     Differential Method Automated     eGFR if  >60     eGFR if non  >60  Comment:  Calculation used to obtain the estimated glomerular filtration  rate (eGFR) is the CKD-EPI equation. Since race is unknown   in our information system, the eGFR values for   -American and Non--American patients are given   for each creatinine result.       Eos # 0.7(H)     Eosinophil% 6.0     Glucose 98     Gran # 6.7     Gran% 61.4     Hematocrit 28.9(L)     Hemoglobin 9.9(L)     Lymph # 2.2     Lymph% 20.2     MCH 30.4     MCHC 34.3     MCV 89     Mono # 1.2(H)     Mono% 11.0     MPV 8.8(L)     Platelets 440(H)     Potassium 4.1     Total Protein 5.9(L)     RBC 3.26(L)     RDW 15.5(H)     Sodium 140     WBC 10.92           Diagnostic Results:  X-Ray: Reviewed - no evidence for acute pulmonary process    Physical Exam:   Constitutional: She is oriented to person, place, and time. She appears well-developed and well-nourished. No distress.       Cardiovascular: Normal rate and regular rhythm.     Pulmonary/Chest: Effort normal. No respiratory distress.   Posterior ribs with +TTP        Abdominal: Soft. She exhibits no distension and no abdominal incision. There is no tenderness.             Musculoskeletal: Normal range of motion. She exhibits no edema or tenderness.       Neurological: She is alert and oriented to person, place, and time.    Skin: Skin is warm and dry. She is not diaphoretic.

## 2017-09-03 NOTE — PROGRESS NOTES
[facility]  Obstetrics  Antepartum Progress Note    Patient Name: Nishi Dumont  MRN: [id number]  Admission Date: 2017  Hospital Length of Stay: 0 days  Attending Physician: Alex Cortez MD  Primary Care Provider: Primary Doctor No    Subjective:     Principal Problem:Sickle cell crisis    HPI:   Nishi Dumont is a 26 y.o.  female with IUP at 23w5d (c/w 8 wk US) with significant hx of sickle cell disease who is being admitted for sickle cell crisis.   Patient has been admitted multiple times throughout her pregnancy for sickle cell crisis, approximately every 2 weeks per patient. She has back pain with these episodes, between her shoulder blades, down to her lower back and in her left shoulder.  Her pain today is similar to previous episodes. Patient denies reduced fluid intake but reports having a reduced appetite. Patient feels as though she is coming down with a cold (sore throat). Her most recent sickle cell crisis admission was 2 weeks ago at [facility], at which time she received 1u pRBC, whiched helped. She was discharged on MS contin 30mg TID, percocet 10/325 q4h prn, and Dilaudid PO. Her primary MFM is Dr. Armenta at [facility].   Prior to this pregnancy, she had fewer crises (1/month). Patient only had 1 crisis in her previous pregnancy 7 years ago. 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 She has a history of multiple blood transfusions. This IUP also complicated by a history of pre-eclampsia in prior pregnancy GBS bacteruria.   Patient denies contractions, denies vaginal bleeding, denies leakage of fluid.   Fetal Movement: normal. Patient denies fevers, CP, SOB, N/V, dysuria, hematuria, HA, leg swelling, visual disturbances    Hospital Course:  HD#1: admitted for IVFs and pain control.   HD#2: Home pain regimen reinitiated with added dilaudid for breakthrough pain.  Continues to complain of 9/10 pain in her back, shoulder, and legs. Followed by hematologist at [facility]; requesting records. 2u pRBC transfused.  HD#3: AM CBC 10/29, appropriate rise s/p 2u pRBC. Pain control somewhat improved, but still very severe.   HD#4: Pain contolled when she receives pain meds. H/H with appropriate rise this Am. Permacath having difficulties functioning however anesthesia adjusted and fixed. CBC and CMP WNL  HD#5 Pain improving, but still present    No new subjective & objective note has been filed under this hospital service since the last note was generated.    Assessment/Plan:     26 y.o. female  at 24w2d for:    Lower extremity pain, left    - likely related to sickle cell crisis  - LE dopplers pending (per tech, US neg for DVT)        History of pre-eclampsia    - Blood pressures   - P/c too low to calculate        * Sickle cell crisis    - H/H 7.4/21.3 > 7.0/20.2 > 2u pRBC  > 10/29 repeat stable (10/29)  - RC elevated 11.9  - Leukocytosis trending downward (23 >13.23>11), thrombocytosis (519 > 484 > 440)  - Total bili mildly increased at 2.6  - Hg electrophoresis pending  - Home medications continued   - MS Contin 30mg tid   - Percocet 10/325mg q4h prn   - Gabapentin 100mg tid  - Dilaudid 2mg q3h for breathrough pain  - Overall pain is improving  - IVFs, supplemental O2   - UA 1+ leuks, Urine culture NGTD   - s/p Rocephin x 1  - Folic acid 4mg, Aspirin 81mg daily  - Permacath now functioning  - Productive cough noted - chest - xray negative  - Sore throat - Group A strep rapid pending              B Ej Roberts MD  Obstetrics  [facility]

## 2017-09-03 NOTE — PROGRESS NOTES
[facility]  Obstetrics  Antepartum Progress Note    Patient Name: Nishi Dumont  MRN: [id number]  Admission Date: 2017  Hospital Length of Stay: 0 days  Attending Physician: Alex Cortez MD  Primary Care Provider: Primary Doctor No    Subjective:     Principal Problem:Sickle cell crisis    HPI:   Nishi Dumont is a 26 y.o.  female with IUP at 23w5d (c/w 8 wk US) with significant hx of sickle cell disease who is being admitted for sickle cell crisis.   Patient has been admitted multiple times throughout her pregnancy for sickle cell crisis, approximately every 2 weeks per patient. She has back pain with these episodes, between her shoulder blades, down to her lower back and in her left shoulder.  Her pain today is similar to previous episodes. Patient denies reduced fluid intake but reports having a reduced appetite. Patient feels as though she is coming down with a cold (sore throat). Her most recent sickle cell crisis admission was 2 weeks ago at [facility], at which time she received 1u pRBC, whiched helped. She was discharged on MS contin 30mg TID, percocet 10/325 q4h prn, and Dilaudid PO. Her primary MFM is Dr. Armenta at [facility].   Prior to this pregnancy, she had fewer crises (1/month). Patient only had 1 crisis in her previous pregnancy 7 years ago. 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 She has a history of multiple blood transfusions. This IUP also complicated by a history of pre-eclampsia in prior pregnancy GBS bacteruria.   Patient denies contractions, denies vaginal bleeding, denies leakage of fluid.   Fetal Movement: normal. Patient denies fevers, CP, SOB, N/V, dysuria, hematuria, HA, leg swelling, visual disturbances    Hospital Course:  HD#1: admitted for IVFs and pain control.   HD#2: Home pain regimen reinitiated with added dilaudid for breakthrough pain.  "Continues to complain of 9/10 pain in her back, shoulder, and legs. Followed by hematologist at [facility]; requesting records. 2u pRBC transfused.  HD#3: AM CBC 10/29, appropriate rise s/p 2u pRBC. Pain control somewhat improved, but still very severe.   HD#4: Pain contolled when she receives pain meds. H/H with appropriate rise this Am. Permacath having difficulties functioning however anesthesia adjusted and fixed. CBC and CMP WNL  HD#5 Pain improving, but still present    Interval History:     Patient reports pain is still present in back, and shoulders, however she feels that she is improving from baseline and is turning a corner. Otherwise doing well.  Denies nausea, vomiting, fever, chills, shortness of breath, dysuria, CP.   Denies CTX, VB, LOF. Reports good fetal movement.    Scheduled Meds:   aspirin  81 mg Oral Daily    folic acid  4 mg Oral Daily    gabapentin  100 mg Oral Daily    morphine  30 mg Oral Q8H    prenatal vitamin  1 tablet Oral Daily     Continuous Infusions:   lactated Ringers 125 mL/hr at 08/31/17 0616     PRN Meds:acetaminophen, diphenhydrAMINE, diphenhydrAMINE, diphenhydrAMINE, HYDROmorphone, ondansetron, oxycodone-acetaminophen, oxycodone-acetaminophen, promethazine (PHENERGAN) IVPB, senna-docusate 8.6-50 mg, simethicone    Review of patient's allergies indicates:   Allergen Reactions    Contrast media Hives    Iodine and iodide containing products Hives and Swelling    Mushroom Hives    Zithromax [azithromycin] Anaphylaxis    Demerol [meperidine] Other (See Comments)     Regarding reaction to demerol pt states "I talk out of my head and become aggressive"       Objective:     Vital Signs (Most Recent):  Temp: 97.3 °F (36.3 °C) (08/31/17 0406)  Pulse: 105 (08/31/17 0445)  Resp: 16 (08/31/17 0406)  BP: (!) 93/48 (08/31/17 0406)  SpO2: 100 % (08/31/17 0445) Vital Signs (24h Range):  Temp:  [96.4 °F (35.8 °C)-98.1 °F (36.7 °C)] 97.3 °F (36.3 °C)  Pulse:  [] 105  Resp:  [16-18] " 16  SpO2:  [88 %-100 %] 100 %  BP: ()/(48-63) 93/48     Weight: 59.9 kg (132 lb)  Body mass index is 26.66 kg/m².  No LMP recorded. Patient has had an injection.    I&O (Last 24H):      Laboratory:  Recent Lab Results       09/02/17  1351      Albumin 2.6(L)     Alkaline Phosphatase 112     ALT 20     Anion Gap 10     AST 29     Baso # 0.04     Basophil% 0.4     Total Bilirubin 2.3  Comment:  For infants and newborns, interpretation of results should be based  on gestational age, weight and in agreement with clinical  observations.  Premature Infant recommended reference ranges:  Up to 24 hours.............<8.0 mg/dL  Up to 48 hours............<12.0 mg/dL  3-5 days..................<15.0 mg/dL  6-29 days.................<15.0 mg/dL  (H)     BUN, Bld 4(L)     Calcium 8.7     Chloride 107     CO2 23     Creatinine 0.6     Differential Method Automated     eGFR if  >60     eGFR if non  >60  Comment:  Calculation used to obtain the estimated glomerular filtration  rate (eGFR) is the CKD-EPI equation. Since race is unknown   in our information system, the eGFR values for   -American and Non--American patients are given   for each creatinine result.       Eos # 0.7(H)     Eosinophil% 6.0     Glucose 98     Gran # 6.7     Gran% 61.4     Hematocrit 28.9(L)     Hemoglobin 9.9(L)     Lymph # 2.2     Lymph% 20.2     MCH 30.4     MCHC 34.3     MCV 89     Mono # 1.2(H)     Mono% 11.0     MPV 8.8(L)     Platelets 440(H)     Potassium 4.1     Total Protein 5.9(L)     RBC 3.26(L)     RDW 15.5(H)     Sodium 140     WBC 10.92           Diagnostic Results:  X-Ray: Reviewed - no evidence for acute pulmonary process    Physical Exam:   Constitutional: She is oriented to person, place, and time. She appears well-developed and well-nourished. No distress.       Cardiovascular: Normal rate and regular rhythm.     Pulmonary/Chest: Effort normal. No respiratory distress.   NC at 2L         Abdominal: Soft. She exhibits no distension and no abdominal incision. There is no tenderness.             Musculoskeletal: Normal range of motion. She exhibits no edema or tenderness.       Neurological: She is alert and oriented to person, place, and time.    Skin: Skin is warm and dry. She is not diaphoretic.        Assessment/Plan:     26 y.o. female  at 24w2d for:    Lower extremity pain, left    - likely related to sickle cell crisis  - LE dopplers pending (per tech, US neg for DVT)        History of pre-eclampsia    - Blood pressures   - P/c too low to calculate        * Sickle cell crisis    - H/H 7.4/21.3 > 7.0/20.2 > 2u pRBC  > 10/29 repeat stable (10/29)  - RC elevated 11.9  - Leukocytosis trending downward (23 >13.23>11), thrombocytosis (519 > 484 > 440)  - Total bili mildly increased at 2.6  - Hg electrophoresis pending  - Home medications continued   - MS Contin 30mg tid   - Percocet 10/325mg q4h prn   - Gabapentin 100mg tid  - Dilaudid 2mg q3h for breathrough pain  - Overall pain is improving  - IVFs, supplemental O2   - UA 1+ leuks, Urine culture NGTD   - s/p Rocephin x 1  - Folic acid 4mg, Aspirin 81mg daily  - Permacath now functioning  - Productive cough noted - chest - xray negative  - Sore throat - Group A strep rapid pending              B Ej Roberts MD  Obstetrics  [facility]

## 2017-09-03 NOTE — PLAN OF CARE
Problem: Patient Care Overview  Goal: Plan of Care Review  Outcome: Ongoing (interventions implemented as appropriate)  Patient on RA at this time. States she only uses oxygen when needed. Will continue to monitor.

## 2017-09-04 PROCEDURE — 25000003 PHARM REV CODE 250: Performed by: OBSTETRICS & GYNECOLOGY

## 2017-09-04 PROCEDURE — G0378 HOSPITAL OBSERVATION PER HR: HCPCS

## 2017-09-04 PROCEDURE — 99900035 HC TECH TIME PER 15 MIN (STAT)

## 2017-09-04 PROCEDURE — 25000003 PHARM REV CODE 250: Performed by: STUDENT IN AN ORGANIZED HEALTH CARE EDUCATION/TRAINING PROGRAM

## 2017-09-04 PROCEDURE — 63600175 PHARM REV CODE 636 W HCPCS: Performed by: OBSTETRICS & GYNECOLOGY

## 2017-09-04 PROCEDURE — 94761 N-INVAS EAR/PLS OXIMETRY MLT: CPT

## 2017-09-04 PROCEDURE — 27000221 HC OXYGEN, UP TO 24 HOURS

## 2017-09-04 PROCEDURE — 99233 SBSQ HOSP IP/OBS HIGH 50: CPT | Mod: 25,,, | Performed by: OBSTETRICS & GYNECOLOGY

## 2017-09-04 PROCEDURE — 59025 FETAL NON-STRESS TEST: CPT | Mod: 26,,, | Performed by: OBSTETRICS & GYNECOLOGY

## 2017-09-04 PROCEDURE — 63600175 PHARM REV CODE 636 W HCPCS: Performed by: STUDENT IN AN ORGANIZED HEALTH CARE EDUCATION/TRAINING PROGRAM

## 2017-09-04 RX ORDER — GUAIFENESIN 600 MG/1
600 TABLET, EXTENDED RELEASE ORAL 2 TIMES DAILY
Status: DISCONTINUED | OUTPATIENT
Start: 2017-09-04 | End: 2017-09-06 | Stop reason: HOSPADM

## 2017-09-04 RX ORDER — BENZONATATE 100 MG/1
100 CAPSULE ORAL 3 TIMES DAILY PRN
Status: DISCONTINUED | OUTPATIENT
Start: 2017-09-04 | End: 2017-09-06 | Stop reason: HOSPADM

## 2017-09-04 RX ADMIN — HYDROMORPHONE HYDROCHLORIDE 2 MG: 2 INJECTION INTRAMUSCULAR; INTRAVENOUS; SUBCUTANEOUS at 05:09

## 2017-09-04 RX ADMIN — GUAIFENESIN 600 MG: 600 TABLET, EXTENDED RELEASE ORAL at 08:09

## 2017-09-04 RX ADMIN — GABAPENTIN 100 MG: 100 CAPSULE ORAL at 01:09

## 2017-09-04 RX ADMIN — MORPHINE SULFATE 30 MG: 15 TABLET, EXTENDED RELEASE ORAL at 08:09

## 2017-09-04 RX ADMIN — ASPIRIN 81 MG CHEWABLE TABLET 81 MG: 81 TABLET CHEWABLE at 08:09

## 2017-09-04 RX ADMIN — DIPHENHYDRAMINE HYDROCHLORIDE 25 MG: 50 INJECTION, SOLUTION INTRAMUSCULAR; INTRAVENOUS at 01:09

## 2017-09-04 RX ADMIN — PRENATAL VIT W/ FE FUMARATE-FA TAB 27-0.8 MG 1 EACH: 27-0.8 TAB at 08:09

## 2017-09-04 RX ADMIN — DIPHENHYDRAMINE HYDROCHLORIDE 25 MG: 50 INJECTION, SOLUTION INTRAMUSCULAR; INTRAVENOUS at 05:09

## 2017-09-04 RX ADMIN — HYDROMORPHONE HYDROCHLORIDE 2 MG: 2 INJECTION INTRAMUSCULAR; INTRAVENOUS; SUBCUTANEOUS at 01:09

## 2017-09-04 RX ADMIN — GABAPENTIN 100 MG: 100 CAPSULE ORAL at 10:09

## 2017-09-04 RX ADMIN — DIPHENHYDRAMINE HYDROCHLORIDE 25 MG: 50 INJECTION, SOLUTION INTRAMUSCULAR; INTRAVENOUS at 09:09

## 2017-09-04 RX ADMIN — SODIUM CHLORIDE, SODIUM LACTATE, POTASSIUM CHLORIDE, AND CALCIUM CHLORIDE: .6; .31; .03; .02 INJECTION, SOLUTION INTRAVENOUS at 10:09

## 2017-09-04 RX ADMIN — HYDROMORPHONE HYDROCHLORIDE 2 MG: 2 INJECTION INTRAMUSCULAR; INTRAVENOUS; SUBCUTANEOUS at 10:09

## 2017-09-04 RX ADMIN — FOLIC ACID 4 MG: 1 TABLET ORAL at 08:09

## 2017-09-04 RX ADMIN — DIPHENHYDRAMINE HYDROCHLORIDE 25 MG: 50 INJECTION, SOLUTION INTRAMUSCULAR; INTRAVENOUS at 10:09

## 2017-09-04 RX ADMIN — SODIUM CHLORIDE, SODIUM LACTATE, POTASSIUM CHLORIDE, AND CALCIUM CHLORIDE: .6; .31; .03; .02 INJECTION, SOLUTION INTRAVENOUS at 06:09

## 2017-09-04 RX ADMIN — GABAPENTIN 100 MG: 100 CAPSULE ORAL at 06:09

## 2017-09-04 RX ADMIN — HYDROMORPHONE HYDROCHLORIDE 2 MG: 2 INJECTION INTRAMUSCULAR; INTRAVENOUS; SUBCUTANEOUS at 09:09

## 2017-09-04 NOTE — PLAN OF CARE
Problem: Patient Care Overview  Goal: Plan of Care Review  Outcome: Ongoing (interventions implemented as appropriate)  No change in respiratory status, will continue to monitor.

## 2017-09-04 NOTE — PROGRESS NOTES
[facility]  Obstetrics  Antepartum Progress Note    Patient Name: Nishi Dumont  MRN: [id number]  Admission Date: 2017  Hospital Length of Stay: 0 days  Attending Physician: Alex Cortez MD  Primary Care Provider: Primary Doctor No    Subjective:     Principal Problem:Sickle cell crisis    HPI:   Nishi Dumont is a 26 y.o.  female with IUP at 23w5d (c/w 8 wk US) with significant hx of sickle cell disease who is being admitted for sickle cell crisis.   Patient has been admitted multiple times throughout her pregnancy for sickle cell crisis, approximately every 2 weeks per patient. She has back pain with these episodes, between her shoulder blades, down to her lower back and in her left shoulder.  Her pain today is similar to previous episodes. Patient denies reduced fluid intake but reports having a reduced appetite. Patient feels as though she is coming down with a cold (sore throat). Her most recent sickle cell crisis admission was 2 weeks ago at [facility], at which time she received 1u pRBC, whiched helped. She was discharged on MS contin 30mg TID, percocet 10/325 q4h prn, and Dilaudid PO. Her primary MFM is Dr. Armenta at [facility].   Prior to this pregnancy, she had fewer crises (1/month). Patient only had 1 crisis in her previous pregnancy 7 years ago. 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 She has a history of multiple blood transfusions. This IUP also complicated by a history of pre-eclampsia in prior pregnancy GBS bacteruria.   Patient denies contractions, denies vaginal bleeding, denies leakage of fluid.   Fetal Movement: normal. Patient denies fevers, CP, SOB, N/V, dysuria, hematuria, HA, leg swelling, visual disturbances    Hospital Course:  HD#1: admitted for IVFs and pain control.   HD#2: Home pain regimen reinitiated with added dilaudid for breakthrough pain.  "Continues to complain of 9/10 pain in her back, shoulder, and legs. Followed by hematologist at [facility]; requesting records. 2u pRBC transfused.  HD#3: AM CBC 10/29, appropriate rise s/p 2u pRBC. Pain control somewhat improved, but still very severe.   HD#4: Pain contolled when she receives pain meds. H/H with appropriate rise this Am. Permacath having difficulties functioning however anesthesia adjusted and fixed. CBC and CMP WNL  Hd#5-6 Pain improving, but still present    Interval History:     Patient reports pain is returning in her ribs and back this morning as her next does of pain meds is due soon, however she feels that she is improving from baseline. Also reporting cough and congestion with green sputum, which was new yesterday. Denies fever/chills/SOB/CP.   Denies CTX, VB, LOF. Reports good fetal movement.    Scheduled Meds:   aspirin  81 mg Oral Daily    folic acid  4 mg Oral Daily    gabapentin  100 mg Oral Daily    morphine  30 mg Oral Q8H    prenatal vitamin  1 tablet Oral Daily     Continuous Infusions:   lactated Ringers 125 mL/hr at 08/31/17 0616     PRN Meds:acetaminophen, diphenhydrAMINE, diphenhydrAMINE, diphenhydrAMINE, HYDROmorphone, ondansetron, oxycodone-acetaminophen, oxycodone-acetaminophen, promethazine (PHENERGAN) IVPB, senna-docusate 8.6-50 mg, simethicone    Review of patient's allergies indicates:   Allergen Reactions    Contrast media Hives    Iodine and iodide containing products Hives and Swelling    Mushroom Hives    Zithromax [azithromycin] Anaphylaxis    Demerol [meperidine] Other (See Comments)     Regarding reaction to demerol pt states "I talk out of my head and become aggressive"       Objective:     Vital Signs (Most Recent):  Temp: 97.3 °F (36.3 °C) (08/31/17 0406)  Pulse: 105 (08/31/17 0445)  Resp: 16 (08/31/17 0406)  BP: (!) 93/48 (08/31/17 0406)  SpO2: 100 % (08/31/17 0445) Vital Signs (24h Range):  Temp:  [96.4 °F (35.8 °C)-98.1 °F (36.7 °C)] 97.3 °F (36.3 " °C)  Pulse:  [] 105  Resp:  [16-18] 16  SpO2:  [88 %-100 %] 100 %  BP: ()/(48-63) 93/48     Weight: 59.9 kg (132 lb)  Body mass index is 26.66 kg/m².  No LMP recorded. Patient has had an injection.    I&O (Last 24H):      Laboratory:  Recent Lab Results       09/02/17  1351      Albumin 2.6(L)     Alkaline Phosphatase 112     ALT 20     Anion Gap 10     AST 29     Baso # 0.04     Basophil% 0.4     Total Bilirubin 2.3  Comment:  For infants and newborns, interpretation of results should be based  on gestational age, weight and in agreement with clinical  observations.  Premature Infant recommended reference ranges:  Up to 24 hours.............<8.0 mg/dL  Up to 48 hours............<12.0 mg/dL  3-5 days..................<15.0 mg/dL  6-29 days.................<15.0 mg/dL  (H)     BUN, Bld 4(L)     Calcium 8.7     Chloride 107     CO2 23     Creatinine 0.6     Differential Method Automated     eGFR if  >60     eGFR if non  >60  Comment:  Calculation used to obtain the estimated glomerular filtration  rate (eGFR) is the CKD-EPI equation. Since race is unknown   in our information system, the eGFR values for   -American and Non--American patients are given   for each creatinine result.       Eos # 0.7(H)     Eosinophil% 6.0     Glucose 98     Gran # 6.7     Gran% 61.4     Hematocrit 28.9(L)     Hemoglobin 9.9(L)     Lymph # 2.2     Lymph% 20.2     MCH 30.4     MCHC 34.3     MCV 89     Mono # 1.2(H)     Mono% 11.0     MPV 8.8(L)     Platelets 440(H)     Potassium 4.1     Total Protein 5.9(L)     RBC 3.26(L)     RDW 15.5(H)     Sodium 140     WBC 10.92           Diagnostic Results:  X-Ray: Reviewed - no evidence for acute pulmonary process    Physical Exam:   Constitutional: She is oriented to person, place, and time. She appears well-developed and well-nourished. No distress.       Cardiovascular: Normal rate and regular rhythm.     Pulmonary/Chest: Effort normal. No  respiratory distress.   Posterior ribs with +TTP        Abdominal: Soft. She exhibits no distension and no abdominal incision. There is no tenderness.             Musculoskeletal: Normal range of motion. She exhibits no edema or tenderness.       Neurological: She is alert and oriented to person, place, and time.    Skin: Skin is warm and dry. She is not diaphoretic.        Assessment/Plan:     26 y.o. female  at 24w3d for:    Lower extremity pain, left    - likely related to sickle cell crisis  - LE dopplers pending (per tech, US neg for DVT)        History of pre-eclampsia    - Blood pressures stable, normotensive  - P/c too low to calculate        * Sickle cell crisis    - H/H 7.4/21.3 > 7.0/20.2 > 2u pRBC  > 10/29 repeat stable (10/29)  - RC elevated 11.9  - Leukocytosis trending downward (23 >13.23>11), thrombocytosis (519 > 484 > 440)  - Total bili mildly increased at 2.6  - Hg electrophoresis pending  - Home medications continued   - MS Contin 30mg tid   - Percocet 10/325mg q4h prn   - Gabapentin 100mg tid  - Dilaudid 2mg q3h for breathrough pain  - Overall pain is improving  - IVFs, supplemental O2   - UA 1+ leuks, Urine culture NGTD   - s/p Rocephin x 1  - Folic acid 4mg, Aspirin 81mg daily  - Permacath now functioning  - Productive cough noted - chest - xray negative  - Sore throat - Group A strep rapid negative  - Pseudoephedrine 60mg PRN  - NST BID, FHT cat 1 130 bpm              Karma Keating MD  Obstetrics  [facility]

## 2017-09-04 NOTE — PLAN OF CARE
Problem: Patient Care Overview  Goal: Plan of Care Review  Outcome: Ongoing (interventions implemented as appropriate)  Patient on RA sat's 99% with no distress O2 on standby.

## 2017-09-05 PROBLEM — D57.00 ACUTE SICKLE CELL CRISIS: Status: ACTIVE | Noted: 2017-09-05

## 2017-09-05 LAB
BACTERIA THROAT CULT: NORMAL
HGB FRACT BLD ELPH PH6.0-IMP: NORMAL

## 2017-09-05 PROCEDURE — 99900035 HC TECH TIME PER 15 MIN (STAT)

## 2017-09-05 PROCEDURE — 11000001 HC ACUTE MED/SURG PRIVATE ROOM

## 2017-09-05 PROCEDURE — 25000003 PHARM REV CODE 250: Performed by: STUDENT IN AN ORGANIZED HEALTH CARE EDUCATION/TRAINING PROGRAM

## 2017-09-05 PROCEDURE — 99232 SBSQ HOSP IP/OBS MODERATE 35: CPT | Mod: 25,,, | Performed by: OBSTETRICS & GYNECOLOGY

## 2017-09-05 PROCEDURE — 59025 FETAL NON-STRESS TEST: CPT | Mod: 26,,, | Performed by: OBSTETRICS & GYNECOLOGY

## 2017-09-05 PROCEDURE — 25000003 PHARM REV CODE 250: Performed by: OBSTETRICS & GYNECOLOGY

## 2017-09-05 PROCEDURE — 63600175 PHARM REV CODE 636 W HCPCS: Performed by: OBSTETRICS & GYNECOLOGY

## 2017-09-05 PROCEDURE — 59025 FETAL NON-STRESS TEST: CPT

## 2017-09-05 PROCEDURE — G0378 HOSPITAL OBSERVATION PER HR: HCPCS

## 2017-09-05 PROCEDURE — 63600175 PHARM REV CODE 636 W HCPCS: Performed by: STUDENT IN AN ORGANIZED HEALTH CARE EDUCATION/TRAINING PROGRAM

## 2017-09-05 RX ORDER — CETIRIZINE HYDROCHLORIDE 5 MG/1
5 TABLET ORAL DAILY
Status: DISCONTINUED | OUTPATIENT
Start: 2017-09-05 | End: 2017-09-06 | Stop reason: HOSPADM

## 2017-09-05 RX ORDER — HYDROMORPHONE HYDROCHLORIDE 2 MG/ML
2 INJECTION, SOLUTION INTRAMUSCULAR; INTRAVENOUS; SUBCUTANEOUS
Status: DISCONTINUED | OUTPATIENT
Start: 2017-09-05 | End: 2017-09-05

## 2017-09-05 RX ORDER — HYDROMORPHONE HYDROCHLORIDE 2 MG/1
8 TABLET ORAL
Status: DISCONTINUED | OUTPATIENT
Start: 2017-09-05 | End: 2017-09-06

## 2017-09-05 RX ADMIN — OXYCODONE HYDROCHLORIDE AND ACETAMINOPHEN 1 TABLET: 10; 325 TABLET ORAL at 01:09

## 2017-09-05 RX ADMIN — HYDROMORPHONE HYDROCHLORIDE 2 MG: 2 INJECTION INTRAMUSCULAR; INTRAVENOUS; SUBCUTANEOUS at 02:09

## 2017-09-05 RX ADMIN — CETIRIZINE HYDROCHLORIDE 5 MG: 5 TABLET, FILM COATED ORAL at 09:09

## 2017-09-05 RX ADMIN — GABAPENTIN 100 MG: 100 CAPSULE ORAL at 01:09

## 2017-09-05 RX ADMIN — MORPHINE SULFATE 30 MG: 15 TABLET, EXTENDED RELEASE ORAL at 08:09

## 2017-09-05 RX ADMIN — DIPHENHYDRAMINE HYDROCHLORIDE 25 MG: 25 CAPSULE ORAL at 09:09

## 2017-09-05 RX ADMIN — ASPIRIN 81 MG CHEWABLE TABLET 81 MG: 81 TABLET CHEWABLE at 09:09

## 2017-09-05 RX ADMIN — HYDROMORPHONE HYDROCHLORIDE 8 MG: 2 TABLET ORAL at 03:09

## 2017-09-05 RX ADMIN — MORPHINE SULFATE 30 MG: 15 TABLET, EXTENDED RELEASE ORAL at 09:09

## 2017-09-05 RX ADMIN — DIPHENHYDRAMINE HYDROCHLORIDE 25 MG: 25 CAPSULE ORAL at 01:09

## 2017-09-05 RX ADMIN — SODIUM CHLORIDE, SODIUM LACTATE, POTASSIUM CHLORIDE, AND CALCIUM CHLORIDE: .6; .31; .03; .02 INJECTION, SOLUTION INTRAVENOUS at 02:09

## 2017-09-05 RX ADMIN — GUAIFENESIN 600 MG: 600 TABLET, EXTENDED RELEASE ORAL at 08:09

## 2017-09-05 RX ADMIN — DIPHENHYDRAMINE HYDROCHLORIDE 25 MG: 50 INJECTION, SOLUTION INTRAMUSCULAR; INTRAVENOUS at 02:09

## 2017-09-05 RX ADMIN — FOLIC ACID 4 MG: 1 TABLET ORAL at 09:09

## 2017-09-05 RX ADMIN — GABAPENTIN 100 MG: 100 CAPSULE ORAL at 06:09

## 2017-09-05 RX ADMIN — PRENATAL VIT W/ FE FUMARATE-FA TAB 27-0.8 MG 1 EACH: 27-0.8 TAB at 09:09

## 2017-09-05 RX ADMIN — HYDROMORPHONE HYDROCHLORIDE 2 MG: 2 INJECTION INTRAMUSCULAR; INTRAVENOUS; SUBCUTANEOUS at 06:09

## 2017-09-05 RX ADMIN — DIPHENHYDRAMINE HYDROCHLORIDE 25 MG: 50 INJECTION, SOLUTION INTRAMUSCULAR; INTRAVENOUS at 06:09

## 2017-09-05 RX ADMIN — GUAIFENESIN 600 MG: 600 TABLET, EXTENDED RELEASE ORAL at 09:09

## 2017-09-05 RX ADMIN — GABAPENTIN 100 MG: 100 CAPSULE ORAL at 08:09

## 2017-09-05 NOTE — PROGRESS NOTES
[facility]  Obstetrics  Antepartum Progress Note    Patient Name: Nishi Dumont  MRN: [id number]  Admission Date: 2017  Hospital Length of Stay: 0 days  Attending Physician: Alex Cortez MD  Primary Care Provider: Primary Doctor No    Subjective:     Principal Problem:Sickle cell crisis    HPI:   Nishi Dumont is a 26 y.o.  female with IUP at 23w5d (c/w 8 wk US) with significant hx of sickle cell disease who is being admitted for sickle cell crisis.   Patient has been admitted multiple times throughout her pregnancy for sickle cell crisis, approximately every 2 weeks per patient. She has back pain with these episodes, between her shoulder blades, down to her lower back and in her left shoulder.  Her pain today is similar to previous episodes. Patient denies reduced fluid intake but reports having a reduced appetite. Patient feels as though she is coming down with a cold (sore throat). Her most recent sickle cell crisis admission was 2 weeks ago at [facility], at which time she received 1u pRBC, whiched helped. She was discharged on MS contin 30mg TID, percocet 10/325 q4h prn, and Dilaudid PO. Her primary MFM is Dr. Armenta at [facility].   Prior to this pregnancy, she had fewer crises (1/month). Patient only had 1 crisis in her previous pregnancy 7 years ago. 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 She has a history of multiple blood transfusions. This IUP also complicated by a history of pre-eclampsia in prior pregnancy GBS bacteruria.   Patient denies contractions, denies vaginal bleeding, denies leakage of fluid.   Fetal Movement: normal. Patient denies fevers, CP, SOB, N/V, dysuria, hematuria, HA, leg swelling, visual disturbances    Hospital Course:  HD#1: admitted for IVFs and pain control.   HD#2: Home pain regimen reinitiated with added dilaudid for breakthrough pain.  "Continues to complain of 9/10 pain in her back, shoulder, and legs. Followed by hematologist at [facility]; requesting records. 2u pRBC transfused.  HD#3: AM CBC 10/29, appropriate rise s/p 2u pRBC. Pain control somewhat improved, but still very severe.   HD#4: Pain contolled when she receives pain meds. H/H with appropriate rise this Am. Permacath having difficulties functioning however anesthesia adjusted and fixed. CBC and CMP WNL  Hd#5-7 Pain improving, but still present (7/10). Patient has been taking only IV dilaudid instead of percocet.    Interval History:   Patient complains of pain that is 7/10, mostly in her right posterior rib. Also reporting cough and congestion with green sputum. Denies fever/chills/SOB/CP.   Denies CTX, VB, LOF. Reports good fetal movement.    Scheduled Meds:   aspirin  81 mg Oral Daily    folic acid  4 mg Oral Daily    gabapentin  100 mg Oral Daily    morphine  30 mg Oral Q8H    prenatal vitamin  1 tablet Oral Daily     Continuous Infusions:   lactated Ringers 125 mL/hr at 08/31/17 0616     PRN Meds:acetaminophen, diphenhydrAMINE, diphenhydrAMINE, diphenhydrAMINE, HYDROmorphone, ondansetron, oxycodone-acetaminophen, oxycodone-acetaminophen, promethazine (PHENERGAN) IVPB, senna-docusate 8.6-50 mg, simethicone    Review of patient's allergies indicates:   Allergen Reactions    Contrast media Hives    Iodine and iodide containing products Hives and Swelling    Mushroom Hives    Zithromax [azithromycin] Anaphylaxis    Demerol [meperidine] Other (See Comments)     Regarding reaction to demerol pt states "I talk out of my head and become aggressive"       Objective:     Vital Signs (Most Recent):  Temp: 97.3 °F (36.3 °C) (08/31/17 0406)  Pulse: 105 (08/31/17 0445)  Resp: 16 (08/31/17 0406)  BP: (!) 93/48 (08/31/17 0406)  SpO2: 100 % (08/31/17 0445) Vital Signs (24h Range):  Temp:  [96.4 °F (35.8 °C)-98.1 °F (36.7 °C)] 97.3 °F (36.3 °C)  Pulse:  [] 105  Resp:  [16-18] 16  SpO2:  " [88 %-100 %] 100 %  BP: ()/(48-63) 93/48     Weight: 59.9 kg (132 lb)  Body mass index is 26.66 kg/m².  No LMP recorded. Patient has had an injection.    I&O (Last 24H):      Laboratory:  Recent Lab Results     None          Diagnostic Results:  X-Ray: Reviewed - no evidence for acute pulmonary process    Physical Exam:   Constitutional: She is oriented to person, place, and time. She appears well-developed and well-nourished. No distress.       Cardiovascular: Normal rate and regular rhythm.     Pulmonary/Chest: Effort normal. No respiratory distress.   Posterior ribs with +TTP        Abdominal: Soft. She exhibits no distension and no abdominal incision. There is no tenderness.             Musculoskeletal: Normal range of motion. She exhibits no edema or tenderness.       Neurological: She is alert and oriented to person, place, and time.    Skin: Skin is warm and dry. She is not diaphoretic.        Assessment/Plan:     26 y.o. female  at 24w4d for:    Lower extremity pain, left    - likely related to sickle cell crisis  - LE dopplers pending (per tech, US neg for DVT)        History of pre-eclampsia    - Blood pressures stable, normotensive  - P/c too low to calculate        * Sickle cell crisis    - H/H 7.4/21.3 > 7.0/20.2 > 2u pRBC  > 10/29 repeat stable (10/29)  - RC elevated 11.9  - Leukocytosis trending downward (23 >13.23>11), thrombocytosis (519 > 484 > 440)  - Total bili mildly increased at 2.6  - Hg electrophoresis pending  - Home medications continued   - MS Contin 30mg tid   - Percocet 10/325mg q4h prn   - Gabapentin 100mg tid  - Dilaudid 2mg q3h for breathrough pain. Patient has been taking this without the percocet, so it has not been functioning for breakthrough pain. Orders were changed and this was clarified with nursing staff.  - Overall pain is improving  - IVFs, supplemental O2   - UA 1+ leuks, Urine culture NGTD   - s/p Rocephin x 1  - Folic acid 4mg, Aspirin 81mg daily  -  Permacath now functioning  - Productive cough noted - chest - xray negative  - Sore throat - Group A strep rapid negative  - Mucinex and zyrtec for cold symptoms.              Efren Garcia MD  Obstetrics  [facility]

## 2017-09-05 NOTE — ASSESSMENT & PLAN NOTE
- H/H 7.4/21.3 > 7.0/20.2 > 2u pRBC 8/31 > 10/29 repeat stable (10/29)  - RC elevated 11.9  - Leukocytosis trending downward (23 >13.23>11), thrombocytosis (519 > 484 > 440)  - Total bili mildly increased at 2.6  - Hg electrophoresis pending  - Home medications continued   - MS Contin 30mg tid   - Percocet 10/325mg q4h prn   - Gabapentin 100mg tid  - Dilaudid 2mg q3h for breathrough pain. Patient has been taking this without the percocet, so it has not been functioning for breakthrough pain. Orders were changed and this was clarified with nursing staff.  - Overall pain is improving  - IVFs, supplemental O2   - UA 1+ leuks, Urine culture NGTD   - s/p Rocephin x 1  - Folic acid 4mg, Aspirin 81mg daily  - Permacath now functioning  - Productive cough noted - chest - xray negative  - Sore throat - Group A strep rapid negative  - Mucinex and zyrtec for cold symptoms.

## 2017-09-05 NOTE — SUBJECTIVE & OBJECTIVE
"Interval History:   Patient complains of pain that is 7/10, mostly in her right posterior rib. Also reporting cough and congestion with green sputum. Denies fever/chills/SOB/CP.   Denies CTX, VB, LOF. Reports good fetal movement.    Scheduled Meds:   aspirin  81 mg Oral Daily    folic acid  4 mg Oral Daily    gabapentin  100 mg Oral Daily    morphine  30 mg Oral Q8H    prenatal vitamin  1 tablet Oral Daily     Continuous Infusions:   lactated Ringers 125 mL/hr at 08/31/17 0616     PRN Meds:acetaminophen, diphenhydrAMINE, diphenhydrAMINE, diphenhydrAMINE, HYDROmorphone, ondansetron, oxycodone-acetaminophen, oxycodone-acetaminophen, promethazine (PHENERGAN) IVPB, senna-docusate 8.6-50 mg, simethicone    Review of patient's allergies indicates:   Allergen Reactions    Contrast media Hives    Iodine and iodide containing products Hives and Swelling    Mushroom Hives    Zithromax [azithromycin] Anaphylaxis    Demerol [meperidine] Other (See Comments)     Regarding reaction to demerol pt states "I talk out of my head and become aggressive"       Objective:     Vital Signs (Most Recent):  Temp: 97.3 °F (36.3 °C) (08/31/17 0406)  Pulse: 105 (08/31/17 0445)  Resp: 16 (08/31/17 0406)  BP: (!) 93/48 (08/31/17 0406)  SpO2: 100 % (08/31/17 0445) Vital Signs (24h Range):  Temp:  [96.4 °F (35.8 °C)-98.1 °F (36.7 °C)] 97.3 °F (36.3 °C)  Pulse:  [] 105  Resp:  [16-18] 16  SpO2:  [88 %-100 %] 100 %  BP: ()/(48-63) 93/48     Weight: 59.9 kg (132 lb)  Body mass index is 26.66 kg/m².  No LMP recorded. Patient has had an injection.    I&O (Last 24H):      Laboratory:  Recent Lab Results     None          Diagnostic Results:  X-Ray: Reviewed - no evidence for acute pulmonary process    Physical Exam:   Constitutional: She is oriented to person, place, and time. She appears well-developed and well-nourished. No distress.       Cardiovascular: Normal rate and regular rhythm.     Pulmonary/Chest: Effort normal. No " respiratory distress.   Posterior ribs with +TTP        Abdominal: Soft. She exhibits no distension and no abdominal incision. There is no tenderness.             Musculoskeletal: Normal range of motion. She exhibits no edema or tenderness.       Neurological: She is alert and oriented to person, place, and time.    Skin: Skin is warm and dry. She is not diaphoretic.

## 2017-09-05 NOTE — PLAN OF CARE
Problem: Patient Care Overview  Goal: Plan of Care Review  Outcome: Ongoing (interventions implemented as appropriate)  No acute changes this shift. VSS. Pt afebrile. Pt c/o pain in her back and R side of her ribs. Pain controlled by scheduled and PRN pain medication. Pt denies ctxs, vaginal bleeding, or LOF. Pt ambulating to bathroom. Intake and output recorded in flowsheets. Pt encouraged to drink plenty of fluids. TEDs and SCDs in place. Plan of care reviewed with pt. Pt has no further questions at this time.

## 2017-09-05 NOTE — PLAN OF CARE
Problem: Patient Care Overview  Goal: Plan of Care Review  Vital signs stable, afebrile; O2 sats =>95% on room air; tolerating ambulation in room;  discussed changing pain/itching meds to po for preparation for discharge home. Toleration pain well c po meds

## 2017-09-05 NOTE — PLAN OF CARE
Problem: Patient Care Overview  Goal: Plan of Care Review  Outcome: Ongoing (interventions implemented as appropriate)  Patient in no distress on 3L NC  . Sats 98  %. Will continue to monitor.

## 2017-09-06 VITALS
HEART RATE: 94 BPM | TEMPERATURE: 97 F | HEIGHT: 59 IN | BODY MASS INDEX: 26.61 KG/M2 | WEIGHT: 132 LBS | OXYGEN SATURATION: 100 % | RESPIRATION RATE: 16 BRPM | DIASTOLIC BLOOD PRESSURE: 60 MMHG | SYSTOLIC BLOOD PRESSURE: 103 MMHG

## 2017-09-06 LAB — BACTERIA THROAT CULT: NORMAL

## 2017-09-06 PROCEDURE — 25000003 PHARM REV CODE 250: Performed by: OBSTETRICS & GYNECOLOGY

## 2017-09-06 PROCEDURE — 99238 HOSP IP/OBS DSCHRG MGMT 30/<: CPT | Mod: 25,,, | Performed by: OBSTETRICS & GYNECOLOGY

## 2017-09-06 PROCEDURE — 25000003 PHARM REV CODE 250: Performed by: STUDENT IN AN ORGANIZED HEALTH CARE EDUCATION/TRAINING PROGRAM

## 2017-09-06 PROCEDURE — G0378 HOSPITAL OBSERVATION PER HR: HCPCS

## 2017-09-06 PROCEDURE — 59025 FETAL NON-STRESS TEST: CPT | Mod: 26,,, | Performed by: OBSTETRICS & GYNECOLOGY

## 2017-09-06 RX ORDER — HYDROCORTISONE 1 %
CREAM (GRAM) TOPICAL 2 TIMES DAILY
Status: DISCONTINUED | OUTPATIENT
Start: 2017-09-06 | End: 2017-09-06 | Stop reason: HOSPADM

## 2017-09-06 RX ORDER — FOLIC ACID 1 MG/1
4 TABLET ORAL DAILY
Qty: 120 TABLET | Refills: 11 | Status: SHIPPED | OUTPATIENT
Start: 2017-09-06 | End: 2023-04-18

## 2017-09-06 RX ORDER — HYDROMORPHONE HYDROCHLORIDE 2 MG/1
4 TABLET ORAL
Status: DISCONTINUED | OUTPATIENT
Start: 2017-09-06 | End: 2017-09-06 | Stop reason: HOSPADM

## 2017-09-06 RX ADMIN — DIPHENHYDRAMINE HYDROCHLORIDE 25 MG: 25 CAPSULE ORAL at 05:09

## 2017-09-06 RX ADMIN — SODIUM CHLORIDE, SODIUM LACTATE, POTASSIUM CHLORIDE, AND CALCIUM CHLORIDE: .6; .31; .03; .02 INJECTION, SOLUTION INTRAVENOUS at 01:09

## 2017-09-06 RX ADMIN — GUAIFENESIN 600 MG: 600 TABLET, EXTENDED RELEASE ORAL at 09:09

## 2017-09-06 RX ADMIN — FOLIC ACID 4 MG: 1 TABLET ORAL at 09:09

## 2017-09-06 RX ADMIN — GABAPENTIN 100 MG: 100 CAPSULE ORAL at 05:09

## 2017-09-06 RX ADMIN — CETIRIZINE HYDROCHLORIDE 5 MG: 5 TABLET, FILM COATED ORAL at 09:09

## 2017-09-06 RX ADMIN — PRENATAL VIT W/ FE FUMARATE-FA TAB 27-0.8 MG 1 EACH: 27-0.8 TAB at 09:09

## 2017-09-06 RX ADMIN — ASPIRIN 81 MG CHEWABLE TABLET 81 MG: 81 TABLET CHEWABLE at 09:09

## 2017-09-06 RX ADMIN — OXYCODONE HYDROCHLORIDE AND ACETAMINOPHEN 1 TABLET: 10; 325 TABLET ORAL at 11:09

## 2017-09-06 RX ADMIN — OXYCODONE HYDROCHLORIDE AND ACETAMINOPHEN 1 TABLET: 10; 325 TABLET ORAL at 01:09

## 2017-09-06 RX ADMIN — GABAPENTIN 100 MG: 100 CAPSULE ORAL at 01:09

## 2017-09-06 RX ADMIN — MORPHINE SULFATE 30 MG: 15 TABLET, EXTENDED RELEASE ORAL at 09:09

## 2017-09-06 RX ADMIN — DIPHENHYDRAMINE HYDROCHLORIDE 25 MG: 25 CAPSULE ORAL at 10:09

## 2017-09-06 RX ADMIN — HYDROMORPHONE HYDROCHLORIDE 4 MG: 2 TABLET ORAL at 08:09

## 2017-09-06 RX ADMIN — DIPHENHYDRAMINE HYDROCHLORIDE 25 MG: 25 CAPSULE ORAL at 01:09

## 2017-09-06 RX ADMIN — OXYCODONE HYDROCHLORIDE AND ACETAMINOPHEN 1 TABLET: 10; 325 TABLET ORAL at 05:09

## 2017-09-06 RX ADMIN — HYDROCORTISONE: 1 CREAM TOPICAL at 01:09

## 2017-09-06 NOTE — ASSESSMENT & PLAN NOTE
- H/H 7.4/21.3 > 7.0/20.2 > 2u pRBC 8/31 > 10/29 repeat stable (10/29)  - RC elevated 11.9  - Leukocytosis trending downward (23 >13.23>11), thrombocytosis (519 > 484 > 440)  - Hg electrophoresis prior to transfusion: Hb S 58%, Hb A 33%  - Home medications continued   - MS Contin 30mg tid   - Percocet 10/325mg q4h prn   - Gabapentin 100mg tid  - Dilaudid 4mg q3h PO for breathrough pain.  - Overall pain is improving  - IVFs   - UA 1+ leuks, Urine culture NGTD   - s/p Rocephin x 1  - Folic acid 4mg, Aspirin 81mg daily  - Permacath for infusion  - Mucinex and zyrtec for cold symptoms.

## 2017-09-06 NOTE — DISCHARGE INSTRUCTIONS
Call clinic [phone] or L & D after hours at [phone] for vaginal bleeding, leakage of fluids, regular contractions every 5 mins for 2 hours, decreased fetal movements ( 10 kicks in 2 hours), headache not relieved by Tylenol, blurry vision, or temp of 100.4 or greater.  Begin doing fetal kick counts, at least 10 movements in 2 hours starting at 28 weeks gestation.  Keep next clinic appointment.

## 2017-09-06 NOTE — SUBJECTIVE & OBJECTIVE
"Interval History:   Patient complains of pain that is 6/10, mostly in her right posterior rib. Viral URI symptoms improving. Denies fever/chills/SOB/CP.   Denies CTX, VB, LOF. Reports good fetal movement.    Scheduled Meds:   aspirin  81 mg Oral Daily    folic acid  4 mg Oral Daily    gabapentin  100 mg Oral Daily    morphine  30 mg Oral Q8H    prenatal vitamin  1 tablet Oral Daily     Continuous Infusions:   lactated Ringers 125 mL/hr at 08/31/17 0616     PRN Meds:acetaminophen, diphenhydrAMINE, diphenhydrAMINE, diphenhydrAMINE, HYDROmorphone, ondansetron, oxycodone-acetaminophen, oxycodone-acetaminophen, promethazine (PHENERGAN) IVPB, senna-docusate 8.6-50 mg, simethicone    Review of patient's allergies indicates:   Allergen Reactions    Contrast media Hives    Iodine and iodide containing products Hives and Swelling    Mushroom Hives    Zithromax [azithromycin] Anaphylaxis    Demerol [meperidine] Other (See Comments)     Regarding reaction to demerol pt states "I talk out of my head and become aggressive"       Objective:     Vital Signs (Most Recent):  Temp: 97.3 °F (36.3 °C) (08/31/17 0406)  Pulse: 105 (08/31/17 0445)  Resp: 16 (08/31/17 0406)  BP: (!) 93/48 (08/31/17 0406)  SpO2: 100 % (08/31/17 0445) Vital Signs (24h Range):  Temp:  [96.4 °F (35.8 °C)-98.1 °F (36.7 °C)] 97.3 °F (36.3 °C)  Pulse:  [] 105  Resp:  [16-18] 16  SpO2:  [88 %-100 %] 100 %  BP: ()/(48-63) 93/48     Weight: 59.9 kg (132 lb)  Body mass index is 26.66 kg/m².  No LMP recorded. Patient has had an injection.    I&O (Last 24H):      Laboratory:  Recent Lab Results     None          Diagnostic Results:  X-Ray: Reviewed - no evidence for acute pulmonary process    Physical Exam:   Constitutional: She is oriented to person, place, and time. She appears well-developed and well-nourished. No distress.       Cardiovascular: Normal rate and regular rhythm.     Pulmonary/Chest: Effort normal. No respiratory distress. "   Posterior ribs with +TTP        Abdominal: Soft. She exhibits no distension and no abdominal incision. There is no tenderness.             Musculoskeletal: Normal range of motion. She exhibits no edema or tenderness.       Neurological: She is alert and oriented to person, place, and time.    Skin: Skin is warm and dry. She is not diaphoretic.

## 2017-09-06 NOTE — PLAN OF CARE
Problem: Patient Care Overview  Goal: Plan of Care Review  Outcome: Ongoing (interventions implemented as appropriate)  VSS throughout shift. Pt afebrile. Pt reports +FM; denies ctxs, LOF, & VB. Pain managed with around-the-clock medication. No needs at this time. Call light within reach with bed locked in lowest position. Will continue to monitor.

## 2017-09-06 NOTE — DISCHARGE SUMMARY
[facility]  Obstetrics  Discharge Summary      Patient Name: Nishi Dumont  MRN: [id number]  Admission Date: 2017  Hospital Length of Stay: 1 days  Discharge Date and Time:  2017 9:27 AM  Attending Physician: Alex Cortez MD   Discharging Provider: Efren Garcia MD  Primary Care Provider: Primary Doctor No    HPI:  Nishi Dumont is a 26 y.o.  female with IUP at 23w5d (c/w 8 wk US) with significant hx of sickle cell disease who is being admitted for sickle cell crisis.   Patient has been admitted multiple times throughout her pregnancy for sickle cell crisis, approximately every 2 weeks per patient. She has back pain with these episodes, between her shoulder blades, down to her lower back and in her left shoulder.  Her pain today is similar to previous episodes. Patient denies reduced fluid intake but reports having a reduced appetite. Patient feels as though she is coming down with a cold (sore throat). Her most recent sickle cell crisis admission was 2 weeks ago at [facility], at which time she received 1u pRBC, whiched helped. She was discharged on MS contin 30mg TID, percocet 10/325 q4h prn, and Dilaudid PO. Her primary MFM is Dr. Armenta at [facility].   Prior to this pregnancy, she had fewer crises (1/month). Patient only had 1 crisis in her previous pregnancy 7 years ago. 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 She has a history of multiple blood transfusions. This IUP also complicated by a history of pre-eclampsia in prior pregnancy GBS bacteruria.   Patient denies contractions, denies vaginal bleeding, denies leakage of fluid.   Fetal Movement: normal. Patient denies fevers, CP, SOB, N/V, dysuria, hematuria, HA, leg swelling, visual disturbances    * No surgery found *     Hospital Course:   HD#1: admitted for IVFs and pain control.   HD#2: Home pain regimen  reinitiated with added dilaudid for breakthrough pain. Continues to complain of 9/10 pain in her back, shoulder, and legs. Followed by hematologist at [facility]; requesting records. 2u pRBC transfused.  HD#3: AM CBC 10/29, appropriate rise s/p 2u pRBC. Pain control somewhat improved, but still very severe.   HD#4: Pain contolled when she receives pain meds. H/H with appropriate rise this Am. Permacath having difficulties functioning however anesthesia adjusted and fixed. CBC and CMP WNL  Hd#5-7 Pain improving, but still present (7/10). Patient has been taking only IV dilaudid instead of percocet.  HD8: Pain improving, patient requiring less pain medication overnight. Dilaudid transitioned to PO and weaned in dose. Potentially ready for discharge today.        Final Active Diagnoses:    Diagnosis Date Noted POA    PRINCIPAL PROBLEM:  Sickle cell crisis [D57.00] 12/05/2013 Yes    Acute sickle cell crisis [D57.00] 09/05/2017 Yes    Lower extremity pain, left [M79.605] 09/03/2017 Yes    History of pre-eclampsia [Z87.59] 08/30/2017 Not Applicable    Sickle cell anemia with pain [D57.00] 09/04/2013 Yes      Problems Resolved During this Admission:    Diagnosis Date Noted Date Resolved POA    Chest pain with noncardiac features 08/30/2017 08/30/2017 Yes        Labs: BMP: No results for input(s): GLU, NA, K, CL, CO2, BUN, CREATININE, CALCIUM, MG in the last 48 hours., CMP No results for input(s): NA, K, CL, CO2, GLU, BUN, CREATININE, CALCIUM, PROT, ALBUMIN, BILITOT, ALKPHOS, AST, ALT, ANIONGAP, ESTGFRAFRICA, EGFRNONAA in the last 48 hours., CBC No results for input(s): WBC, HGB, HCT, PLT in the last 48 hours. and All labs within the past 24 hours have been reviewed  Radiology: X-Ray: CXR: X-Ray Chest 1 View (CXR):   Results for orders placed or performed during the hospital encounter of 08/30/17   X-Ray Chest 1 View    Narrative    Portable AP view of the chest was obtained.  Comparison -- 8/30. Left central venous catheter  remains in place. Heart size is normal. Lungs are expanded. Both lungs again demonstrate scattered linear opacities consistent with areas of localized fibrotic change. No acute lung consolidation or pleural fluid is identified. Skeletal structures show several findings consistent with sickle cell disease.    Impression     No acute process.      Electronically signed by: JEN GLOVER MD  Date:     09/02/17  Time:    14:42          Immunizations     None            Pending Diagnostic Studies:     None          Discharged Condition: good    Disposition: Home    Follow Up:  Follow-up Information     [facility]. Schedule an appointment as soon as possible for a visit in 1 week.    Why:  Follow up  Contact information:  [address]  [place] [address]  [phone]                 Patient Instructions:     Diet general     Activity as tolerated     Call MD for:   Order Comments: Fever >100.4, vaginal bleeding sufficient to saturate a pad, persistent nausea/vomiting, leakage of fluid, decreased fetal movement, contractions every 3-5 minutes     No dressing needed       Medications:  Current Discharge Medication List      CONTINUE these medications which have CHANGED    Details   folic acid (FOLVITE) 1 MG tablet Take 4 tablets (4 mg total) by mouth once daily.  Qty: 120 tablet, Refills: 11         CONTINUE these medications which have NOT CHANGED    Details   famotidine (PEPCID) 20 MG tablet Take 20 mg by mouth every morning.      morphine (MS CONTIN) 60 MG 12 hr tablet Take 60 mg by mouth every 12 (twelve) hours as needed for Pain.      ondansetron (ZOFRAN-ODT) 4 MG TbDL Take 4 mg by mouth every 8 (eight) hours as needed (for nausea).      oxycodone (ROXICODONE) 10 mg Tab immediate release tablet Take 1 tablet (10 mg total) by mouth every 12 (twelve) hours as needed for Pain.  Qty: 10 tablet, Refills: 0         STOP taking these medications       deferasirox (EXJADE) 500 MG disintegrating  tablet Comments:   Reason for Stopping:         hydroxyurea (HYDREA) 500 mg Cap Comments:   Reason for Stopping:         tramadol (ULTRAM) 50 mg tablet Comments:   Reason for Stopping:               Efren Garcia MD  Obstetrics [facility]

## 2017-09-06 NOTE — PLAN OF CARE
Problem: Patient Care Overview  Goal: Plan of Care Review  Outcome: Outcome(s) achieved Date Met: 09/06/17  VSS throughout shift. Pt afebrile. Pt reports +FM; denies ctxs, LOF, & VB. Pain managed with around-the-clock medication. Benadryl given as needed. NST done this morning. Baseline 135 with moderate variability and accelerations present. Decel noted on monitor and extended monitoring done at that time. Discharge instructions given and patient verbalized understanding. To call back with any questions.

## 2017-09-06 NOTE — PROGRESS NOTES
[facility]  Obstetrics  Antepartum Progress Note    Patient Name: Nishi Dumont  MRN: [id number]  Admission Date: 2017  Hospital Length of Stay: 1 days  Attending Physician: Alex Cortez MD  Primary Care Provider: Primary Doctor No    Subjective:     Principal Problem:Sickle cell crisis    HPI:   Nishi Dumont is a 26 y.o.  female with IUP at 23w5d (c/w 8 wk US) with significant hx of sickle cell disease who is being admitted for sickle cell crisis.   Patient has been admitted multiple times throughout her pregnancy for sickle cell crisis, approximately every 2 weeks per patient. She has back pain with these episodes, between her shoulder blades, down to her lower back and in her left shoulder.  Her pain today is similar to previous episodes. Patient denies reduced fluid intake but reports having a reduced appetite. Patient feels as though she is coming down with a cold (sore throat). Her most recent sickle cell crisis admission was 2 weeks ago at [facility], at which time she received 1u pRBC, whiched helped. She was discharged on MS contin 30mg TID, percocet 10/325 q4h prn, and Dilaudid PO. Her primary MFM is Dr. Armenta at [facility].   Prior to this pregnancy, she had fewer crises (1/month). Patient only had 1 crisis in her previous pregnancy 7 years ago. 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 She has a history of multiple blood transfusions. This IUP also complicated by a history of pre-eclampsia in prior pregnancy GBS bacteruria.   Patient denies contractions, denies vaginal bleeding, denies leakage of fluid.   Fetal Movement: normal. Patient denies fevers, CP, SOB, N/V, dysuria, hematuria, HA, leg swelling, visual disturbances    Hospital Course:  HD#1: admitted for IVFs and pain control.   HD#2: Home pain regimen reinitiated with added dilaudid for breakthrough pain.  "Continues to complain of 9/10 pain in her back, shoulder, and legs. Followed by hematologist at [facility]; requesting records. 2u pRBC transfused.  HD#3: AM CBC 10/29, appropriate rise s/p 2u pRBC. Pain control somewhat improved, but still very severe.   HD#4: Pain contolled when she receives pain meds. H/H with appropriate rise this Am. Permacath having difficulties functioning however anesthesia adjusted and fixed. CBC and CMP WNL  Hd#5-7 Pain improving, but still present (7/10). Patient has been taking only IV dilaudid instead of percocet.  HD8: Pain improving, patient requiring less pain medication overnight. Dilaudid transitioned to PO and weaned in dose. Potentially ready for discharge today.    Interval History:   Patient complains of pain that is 6/10, mostly in her right posterior rib. Viral URI symptoms improving. Denies fever/chills/SOB/CP.   Denies CTX, VB, LOF. Reports good fetal movement.    Scheduled Meds:   aspirin  81 mg Oral Daily    folic acid  4 mg Oral Daily    gabapentin  100 mg Oral Daily    morphine  30 mg Oral Q8H    prenatal vitamin  1 tablet Oral Daily     Continuous Infusions:   lactated Ringers 125 mL/hr at 08/31/17 0616     PRN Meds:acetaminophen, diphenhydrAMINE, diphenhydrAMINE, diphenhydrAMINE, HYDROmorphone, ondansetron, oxycodone-acetaminophen, oxycodone-acetaminophen, promethazine (PHENERGAN) IVPB, senna-docusate 8.6-50 mg, simethicone    Review of patient's allergies indicates:   Allergen Reactions    Contrast media Hives    Iodine and iodide containing products Hives and Swelling    Mushroom Hives    Zithromax [azithromycin] Anaphylaxis    Demerol [meperidine] Other (See Comments)     Regarding reaction to demerol pt states "I talk out of my head and become aggressive"       Objective:     Vital Signs (Most Recent):  Temp: 97.3 °F (36.3 °C) (08/31/17 0406)  Pulse: 105 (08/31/17 0445)  Resp: 16 (08/31/17 0406)  BP: (!) 93/48 (08/31/17 0406)  SpO2: 100 % (08/31/17 0445) Vital " Signs (24h Range):  Temp:  [96.4 °F (35.8 °C)-98.1 °F (36.7 °C)] 97.3 °F (36.3 °C)  Pulse:  [] 105  Resp:  [16-18] 16  SpO2:  [88 %-100 %] 100 %  BP: ()/(48-63) 93/48     Weight: 59.9 kg (132 lb)  Body mass index is 26.66 kg/m².  No LMP recorded. Patient has had an injection.    I&O (Last 24H):      Laboratory:  Recent Lab Results     None          Diagnostic Results:  X-Ray: Reviewed - no evidence for acute pulmonary process    Physical Exam:   Constitutional: She is oriented to person, place, and time. She appears well-developed and well-nourished. No distress.       Cardiovascular: Normal rate and regular rhythm.     Pulmonary/Chest: Effort normal. No respiratory distress.   Posterior ribs with +TTP        Abdominal: Soft. She exhibits no distension and no abdominal incision. There is no tenderness.             Musculoskeletal: Normal range of motion. She exhibits no edema or tenderness.       Neurological: She is alert and oriented to person, place, and time.    Skin: Skin is warm and dry. She is not diaphoretic.        Assessment/Plan:     26 y.o. female  at 24w5d for:    * Sickle cell crisis    - H/H 7.4/21.3 > 7.0/20.2 > 2u pRBC  > 10/29 repeat stable (10/29)  - RC elevated 11.9  - Leukocytosis trending downward (23 >13.23>11), thrombocytosis (519 > 484 > 440)  - Hg electrophoresis prior to transfusion: Hb S 58%, Hb A 33%  - Home medications continued   - MS Contin 30mg tid   - Percocet 10/325mg q4h prn   - Gabapentin 100mg tid  - Dilaudid 4mg q3h PO for breathrough pain.  - Overall pain is improving  - IVFs   - UA 1+ leuks, Urine culture NGTD   - s/p Rocephin x 1  - Folic acid 4mg, Aspirin 81mg daily  - Permacath for infusion  - Mucinex and zyrtec for cold symptoms.        Lower extremity pain, left    - improved  - LE dopplers negative        History of pre-eclampsia    - Blood pressures stable, normotensive  - P/c too low to calculate              Efren Garcia,  MD  Obstetrics  [facility]

## 2017-09-28 ENCOUNTER — HOSPITAL ENCOUNTER (INPATIENT)
Facility: OTHER | Age: 27
LOS: 12 days | Discharge: HOME | End: 2017-10-11
Attending: OBSTETRICS & GYNECOLOGY | Admitting: OBSTETRICS & GYNECOLOGY
Payer: MEDICAID

## 2017-09-28 DIAGNOSIS — O99.013 MATERNAL SICKLE CELL ANEMIA COMPLICATING PREGNANCY IN THIRD TRIMESTER: ICD-10-CM

## 2017-09-28 DIAGNOSIS — D57.1 MATERNAL SICKLE CELL ANEMIA COMPLICATING PREGNANCY IN THIRD TRIMESTER: ICD-10-CM

## 2017-09-28 DIAGNOSIS — Z36.89 ENCOUNTER FOR FETAL ANATOMIC SURVEY: ICD-10-CM

## 2017-09-28 DIAGNOSIS — J06.9 URI WITH COUGH AND CONGESTION: Primary | ICD-10-CM

## 2017-09-28 DIAGNOSIS — D57.00 SICKLE CELL ANEMIA WITH PAIN: ICD-10-CM

## 2017-09-28 DIAGNOSIS — D57.00 ACUTE SICKLE CELL CRISIS: ICD-10-CM

## 2017-09-28 DIAGNOSIS — Z3A.27 27 WEEKS GESTATION OF PREGNANCY: ICD-10-CM

## 2017-09-28 DIAGNOSIS — Z87.59 HISTORY OF PRE-ECLAMPSIA: ICD-10-CM

## 2017-09-28 DIAGNOSIS — J32.9 SINUSITIS, UNSPECIFIED CHRONICITY, UNSPECIFIED LOCATION: ICD-10-CM

## 2017-09-28 DIAGNOSIS — D57.1 SICKLE CELL ANEMIA: ICD-10-CM

## 2017-09-28 DIAGNOSIS — Z87.39 HISTORY OF AVASCULAR NECROSIS OF CAPITAL FEMORAL EPIPHYSIS: ICD-10-CM

## 2017-09-28 DIAGNOSIS — O35.9XX0 TERATOGEN EXPOSURE IN CURRENT PREGNANCY, NOT APPLICABLE OR UNSPECIFIED FETUS: ICD-10-CM

## 2017-09-28 DIAGNOSIS — D57.00 SICKLE CELL PAIN CRISIS: ICD-10-CM

## 2017-09-28 PROCEDURE — 99285 EMERGENCY DEPT VISIT HI MDM: CPT | Mod: 25

## 2017-09-28 PROCEDURE — 11000001 HC ACUTE MED/SURG PRIVATE ROOM

## 2017-09-28 PROCEDURE — 59025 FETAL NON-STRESS TEST: CPT

## 2017-09-29 PROBLEM — J32.9 SINUSITIS: Status: ACTIVE | Noted: 2017-09-29

## 2017-09-29 PROBLEM — D57.00 SICKLE CELL PAIN CRISIS: Status: ACTIVE | Noted: 2017-09-29

## 2017-09-29 LAB
ABO + RH BLD: NORMAL
ALBUMIN SERPL BCP-MCNC: 2.9 G/DL
ALP SERPL-CCNC: 100 U/L
ALT SERPL W/O P-5'-P-CCNC: 24 U/L
AMORPH CRY URNS QL MICRO: ABNORMAL
ANION GAP SERPL CALC-SCNC: 12 MMOL/L
AST SERPL-CCNC: 34 U/L
BACTERIA #/AREA URNS HPF: ABNORMAL /HPF
BASOPHILS # BLD AUTO: 0.03 K/UL
BASOPHILS NFR BLD: 0.2 %
BILIRUB SERPL-MCNC: 3.8 MG/DL
BILIRUB UR QL STRIP: ABNORMAL
BLD GP AB SCN CELLS X3 SERPL QL: NORMAL
BUN SERPL-MCNC: 4 MG/DL
CALCIUM SERPL-MCNC: 8.7 MG/DL
CHLORIDE SERPL-SCNC: 113 MMOL/L
CLARITY UR: CLEAR
CO2 SERPL-SCNC: 15 MMOL/L
COLOR UR: ABNORMAL
CREAT SERPL-MCNC: 0.5 MG/DL
DEPRECATED S PYO AG THROAT QL EIA: NEGATIVE
DIFFERENTIAL METHOD: ABNORMAL
EOSINOPHIL # BLD AUTO: 0 K/UL
EOSINOPHIL NFR BLD: 0.2 %
ERYTHROCYTE [DISTWIDTH] IN BLOOD BY AUTOMATED COUNT: 15.9 %
EST. GFR  (AFRICAN AMERICAN): >60 ML/MIN/1.73 M^2
EST. GFR  (NON AFRICAN AMERICAN): >60 ML/MIN/1.73 M^2
GLUCOSE SERPL-MCNC: 86 MG/DL
GLUCOSE UR QL STRIP: NEGATIVE
HCT VFR BLD AUTO: 24.1 %
HGB BLD-MCNC: 8.7 G/DL
HGB UR QL STRIP: ABNORMAL
KETONES UR QL STRIP: NEGATIVE
LEUKOCYTE ESTERASE UR QL STRIP: ABNORMAL
LYMPHOCYTES # BLD AUTO: 2.7 K/UL
LYMPHOCYTES NFR BLD: 14.7 %
MCH RBC QN AUTO: 31.2 PG
MCHC RBC AUTO-ENTMCNC: 36.1 G/DL
MCV RBC AUTO: 86 FL
MICROSCOPIC COMMENT: ABNORMAL
MONOCYTES # BLD AUTO: 1.7 K/UL
MONOCYTES NFR BLD: 9.1 %
NEUTROPHILS # BLD AUTO: 13.7 K/UL
NEUTROPHILS NFR BLD: 75.3 %
NITRITE UR QL STRIP: NEGATIVE
PH UR STRIP: 6 [PH] (ref 5–8)
PLATELET # BLD AUTO: 415 K/UL
PMV BLD AUTO: 8.6 FL
POTASSIUM SERPL-SCNC: 3.7 MMOL/L
PROT SERPL-MCNC: 6.5 G/DL
PROT UR QL STRIP: NEGATIVE
RBC # BLD AUTO: 2.79 M/UL
RBC #/AREA URNS HPF: 1 /HPF (ref 0–4)
RETICS/RBC NFR AUTO: 5.4 %
SODIUM SERPL-SCNC: 140 MMOL/L
SP GR UR STRIP: <=1.005 (ref 1–1.03)
URN SPEC COLLECT METH UR: ABNORMAL
UROBILINOGEN UR STRIP-ACNC: >=8 EU/DL
WBC # BLD AUTO: 18.26 K/UL
WBC #/AREA URNS HPF: 4 /HPF (ref 0–5)

## 2017-09-29 PROCEDURE — 87081 CULTURE SCREEN ONLY: CPT

## 2017-09-29 PROCEDURE — 94761 N-INVAS EAR/PLS OXIMETRY MLT: CPT

## 2017-09-29 PROCEDURE — 99900035 HC TECH TIME PER 15 MIN (STAT)

## 2017-09-29 PROCEDURE — 87086 URINE CULTURE/COLONY COUNT: CPT

## 2017-09-29 PROCEDURE — 80053 COMPREHEN METABOLIC PANEL: CPT

## 2017-09-29 PROCEDURE — 11000001 HC ACUTE MED/SURG PRIVATE ROOM

## 2017-09-29 PROCEDURE — 99232 SBSQ HOSP IP/OBS MODERATE 35: CPT | Mod: ,,, | Performed by: INTERNAL MEDICINE

## 2017-09-29 PROCEDURE — 87880 STREP A ASSAY W/OPTIC: CPT

## 2017-09-29 PROCEDURE — 85045 AUTOMATED RETICULOCYTE COUNT: CPT

## 2017-09-29 PROCEDURE — 86920 COMPATIBILITY TEST SPIN: CPT

## 2017-09-29 PROCEDURE — 86900 BLOOD TYPING SEROLOGIC ABO: CPT

## 2017-09-29 PROCEDURE — 83020 HEMOGLOBIN ELECTROPHORESIS: CPT

## 2017-09-29 PROCEDURE — 59025 FETAL NON-STRESS TEST: CPT | Mod: 26,,, | Performed by: OBSTETRICS & GYNECOLOGY

## 2017-09-29 PROCEDURE — 81000 URINALYSIS NONAUTO W/SCOPE: CPT

## 2017-09-29 PROCEDURE — 86901 BLOOD TYPING SEROLOGIC RH(D): CPT

## 2017-09-29 PROCEDURE — 76811 OB US DETAILED SNGL FETUS: CPT | Mod: 26,,, | Performed by: OBSTETRICS & GYNECOLOGY

## 2017-09-29 PROCEDURE — 99284 EMERGENCY DEPT VISIT MOD MDM: CPT | Mod: 25,,, | Performed by: OBSTETRICS & GYNECOLOGY

## 2017-09-29 PROCEDURE — 25000003 PHARM REV CODE 250: Performed by: STUDENT IN AN ORGANIZED HEALTH CARE EDUCATION/TRAINING PROGRAM

## 2017-09-29 PROCEDURE — 87040 BLOOD CULTURE FOR BACTERIA: CPT | Mod: 59

## 2017-09-29 PROCEDURE — 36415 COLL VENOUS BLD VENIPUNCTURE: CPT

## 2017-09-29 PROCEDURE — 94799 UNLISTED PULMONARY SVC/PX: CPT

## 2017-09-29 PROCEDURE — 99222 1ST HOSP IP/OBS MODERATE 55: CPT | Mod: 25,,, | Performed by: OBSTETRICS & GYNECOLOGY

## 2017-09-29 PROCEDURE — 85025 COMPLETE CBC W/AUTO DIFF WBC: CPT

## 2017-09-29 PROCEDURE — 63600175 PHARM REV CODE 636 W HCPCS: Performed by: STUDENT IN AN ORGANIZED HEALTH CARE EDUCATION/TRAINING PROGRAM

## 2017-09-29 RX ORDER — AMOXICILLIN 250 MG
1 CAPSULE ORAL NIGHTLY PRN
Status: DISCONTINUED | OUTPATIENT
Start: 2017-09-29 | End: 2017-10-11 | Stop reason: HOSPADM

## 2017-09-29 RX ORDER — HYDROCODONE BITARTRATE AND ACETAMINOPHEN 500; 5 MG/1; MG/1
TABLET ORAL
Status: DISCONTINUED | OUTPATIENT
Start: 2017-09-29 | End: 2017-10-02

## 2017-09-29 RX ORDER — GABAPENTIN 100 MG/1
100 CAPSULE ORAL 3 TIMES DAILY
Status: DISCONTINUED | OUTPATIENT
Start: 2017-09-29 | End: 2017-10-01

## 2017-09-29 RX ORDER — BENZONATATE 100 MG/1
100 CAPSULE ORAL 3 TIMES DAILY PRN
Status: DISCONTINUED | OUTPATIENT
Start: 2017-09-29 | End: 2017-10-11 | Stop reason: HOSPADM

## 2017-09-29 RX ORDER — SODIUM CHLORIDE, SODIUM LACTATE, POTASSIUM CHLORIDE, CALCIUM CHLORIDE 600; 310; 30; 20 MG/100ML; MG/100ML; MG/100ML; MG/100ML
INJECTION, SOLUTION INTRAVENOUS CONTINUOUS
Status: DISCONTINUED | OUTPATIENT
Start: 2017-09-29 | End: 2017-09-29

## 2017-09-29 RX ORDER — CETIRIZINE HYDROCHLORIDE 5 MG/1
5 TABLET ORAL DAILY
Status: DISCONTINUED | OUTPATIENT
Start: 2017-09-29 | End: 2017-10-11 | Stop reason: HOSPADM

## 2017-09-29 RX ORDER — HYDROMORPHONE HYDROCHLORIDE 1 MG/ML
0.5 INJECTION, SOLUTION INTRAMUSCULAR; INTRAVENOUS; SUBCUTANEOUS EVERY 4 HOURS PRN
Status: DISCONTINUED | OUTPATIENT
Start: 2017-09-29 | End: 2017-10-03

## 2017-09-29 RX ORDER — ONDANSETRON 8 MG/1
8 TABLET, ORALLY DISINTEGRATING ORAL EVERY 8 HOURS PRN
Status: DISCONTINUED | OUTPATIENT
Start: 2017-09-29 | End: 2017-10-11 | Stop reason: HOSPADM

## 2017-09-29 RX ORDER — FOLIC ACID 1 MG/1
4 TABLET ORAL DAILY
Status: DISCONTINUED | OUTPATIENT
Start: 2017-09-29 | End: 2017-10-11 | Stop reason: HOSPADM

## 2017-09-29 RX ORDER — DIPHENHYDRAMINE HCL 25 MG
25 CAPSULE ORAL EVERY 4 HOURS PRN
Status: DISCONTINUED | OUTPATIENT
Start: 2017-09-29 | End: 2017-10-02

## 2017-09-29 RX ORDER — SODIUM CHLORIDE, SODIUM LACTATE, POTASSIUM CHLORIDE, CALCIUM CHLORIDE 600; 310; 30; 20 MG/100ML; MG/100ML; MG/100ML; MG/100ML
INJECTION, SOLUTION INTRAVENOUS CONTINUOUS
Status: DISCONTINUED | OUTPATIENT
Start: 2017-09-29 | End: 2017-09-30

## 2017-09-29 RX ORDER — NAPROXEN SODIUM 220 MG/1
81 TABLET, FILM COATED ORAL DAILY
Status: DISCONTINUED | OUTPATIENT
Start: 2017-09-29 | End: 2017-10-11 | Stop reason: HOSPADM

## 2017-09-29 RX ORDER — HYDROMORPHONE HYDROCHLORIDE 1 MG/ML
0.5 INJECTION, SOLUTION INTRAMUSCULAR; INTRAVENOUS; SUBCUTANEOUS EVERY 4 HOURS
Status: DISCONTINUED | OUTPATIENT
Start: 2017-09-29 | End: 2017-09-29

## 2017-09-29 RX ORDER — AMOXICILLIN AND CLAVULANATE POTASSIUM 875; 125 MG/1; MG/1
1 TABLET, FILM COATED ORAL EVERY 12 HOURS
Status: COMPLETED | OUTPATIENT
Start: 2017-09-29 | End: 2017-10-08

## 2017-09-29 RX ORDER — GUAIFENESIN 600 MG/1
600 TABLET, EXTENDED RELEASE ORAL 2 TIMES DAILY
Status: DISCONTINUED | OUTPATIENT
Start: 2017-09-29 | End: 2017-10-08

## 2017-09-29 RX ORDER — MORPHINE SULFATE 15 MG/1
60 TABLET, FILM COATED, EXTENDED RELEASE ORAL EVERY 12 HOURS
Status: DISCONTINUED | OUTPATIENT
Start: 2017-09-29 | End: 2017-10-09

## 2017-09-29 RX ORDER — OXYCODONE AND ACETAMINOPHEN 5; 325 MG/1; MG/1
1 TABLET ORAL EVERY 4 HOURS PRN
Status: DISCONTINUED | OUTPATIENT
Start: 2017-09-29 | End: 2017-09-29

## 2017-09-29 RX ORDER — OXYCODONE AND ACETAMINOPHEN 10; 325 MG/1; MG/1
1 TABLET ORAL EVERY 4 HOURS PRN
Status: DISCONTINUED | OUTPATIENT
Start: 2017-09-29 | End: 2017-09-29

## 2017-09-29 RX ORDER — OXYCODONE HYDROCHLORIDE 5 MG/1
10 TABLET ORAL EVERY 4 HOURS PRN
Status: DISCONTINUED | OUTPATIENT
Start: 2017-09-29 | End: 2017-10-03

## 2017-09-29 RX ORDER — ENOXAPARIN SODIUM 100 MG/ML
40 INJECTION SUBCUTANEOUS EVERY 24 HOURS
Status: DISCONTINUED | OUTPATIENT
Start: 2017-09-29 | End: 2017-10-11 | Stop reason: HOSPADM

## 2017-09-29 RX ORDER — HYDROCORTISONE 1 %
CREAM (GRAM) TOPICAL 2 TIMES DAILY
Status: DISCONTINUED | OUTPATIENT
Start: 2017-09-29 | End: 2017-10-11 | Stop reason: HOSPADM

## 2017-09-29 RX ORDER — PRENATAL WITH FERROUS FUM AND FOLIC ACID 3080; 920; 120; 400; 22; 1.84; 3; 20; 10; 1; 12; 200; 27; 25; 2 [IU]/1; [IU]/1; MG/1; [IU]/1; MG/1; MG/1; MG/1; MG/1; MG/1; MG/1; UG/1; MG/1; MG/1; MG/1; MG/1
1 TABLET ORAL DAILY
Status: DISCONTINUED | OUTPATIENT
Start: 2017-09-29 | End: 2017-10-11 | Stop reason: HOSPADM

## 2017-09-29 RX ORDER — SIMETHICONE 80 MG
1 TABLET,CHEWABLE ORAL EVERY 6 HOURS PRN
Status: DISCONTINUED | OUTPATIENT
Start: 2017-09-29 | End: 2017-10-11 | Stop reason: HOSPADM

## 2017-09-29 RX ADMIN — HYDROMORPHONE HYDROCHLORIDE 0.5 MG: 1 INJECTION, SOLUTION INTRAMUSCULAR; INTRAVENOUS; SUBCUTANEOUS at 03:09

## 2017-09-29 RX ADMIN — GABAPENTIN 100 MG: 100 CAPSULE ORAL at 02:09

## 2017-09-29 RX ADMIN — SODIUM CHLORIDE, SODIUM LACTATE, POTASSIUM CHLORIDE, AND CALCIUM CHLORIDE: 600; 310; 30; 20 INJECTION, SOLUTION INTRAVENOUS at 04:09

## 2017-09-29 RX ADMIN — GUAIFENESIN 600 MG: 600 TABLET, EXTENDED RELEASE ORAL at 09:09

## 2017-09-29 RX ADMIN — AMOXICILLIN AND CLAVULANATE POTASSIUM 1 TABLET: 875; 125 TABLET, FILM COATED ORAL at 09:09

## 2017-09-29 RX ADMIN — DIPHENHYDRAMINE HYDROCHLORIDE 25 MG: 25 CAPSULE ORAL at 08:09

## 2017-09-29 RX ADMIN — ENOXAPARIN SODIUM 40 MG: 100 INJECTION SUBCUTANEOUS at 09:09

## 2017-09-29 RX ADMIN — ASPIRIN 81 MG CHEWABLE TABLET 81 MG: 81 TABLET CHEWABLE at 09:09

## 2017-09-29 RX ADMIN — HYDROMORPHONE HYDROCHLORIDE 0.5 MG: 1 INJECTION, SOLUTION INTRAMUSCULAR; INTRAVENOUS; SUBCUTANEOUS at 07:09

## 2017-09-29 RX ADMIN — OXYCODONE HYDROCHLORIDE 10 MG: 5 TABLET ORAL at 04:09

## 2017-09-29 RX ADMIN — HYDROCORTISONE: 1 CREAM TOPICAL at 07:09

## 2017-09-29 RX ADMIN — HYDROMORPHONE HYDROCHLORIDE 0.5 MG: 1 INJECTION, SOLUTION INTRAMUSCULAR; INTRAVENOUS; SUBCUTANEOUS at 11:09

## 2017-09-29 RX ADMIN — GABAPENTIN 100 MG: 100 CAPSULE ORAL at 10:09

## 2017-09-29 RX ADMIN — HYDROCORTISONE: 1 CREAM TOPICAL at 09:09

## 2017-09-29 RX ADMIN — OXYCODONE HYDROCHLORIDE AND ACETAMINOPHEN 1 TABLET: 10; 325 TABLET ORAL at 05:09

## 2017-09-29 RX ADMIN — SODIUM CHLORIDE, SODIUM LACTATE, POTASSIUM CHLORIDE, AND CALCIUM CHLORIDE 1000 ML: 600; 310; 30; 20 INJECTION, SOLUTION INTRAVENOUS at 12:09

## 2017-09-29 RX ADMIN — HYDROMORPHONE HYDROCHLORIDE 0.5 MG: 1 INJECTION, SOLUTION INTRAMUSCULAR; INTRAVENOUS; SUBCUTANEOUS at 08:09

## 2017-09-29 RX ADMIN — MORPHINE SULFATE 60 MG: 15 TABLET, EXTENDED RELEASE ORAL at 06:09

## 2017-09-29 RX ADMIN — FOLIC ACID 4 MG: 1 TABLET ORAL at 09:09

## 2017-09-29 RX ADMIN — OXYCODONE HYDROCHLORIDE 10 MG: 5 TABLET ORAL at 08:09

## 2017-09-29 RX ADMIN — CETIRIZINE HYDROCHLORIDE 5 MG: 5 TABLET, FILM COATED ORAL at 09:09

## 2017-09-29 RX ADMIN — PRENATAL VIT W/ FE FUMARATE-FA TAB 27-0.8 MG 1 EACH: 27-0.8 TAB at 09:09

## 2017-09-29 RX ADMIN — DIPHENHYDRAMINE HYDROCHLORIDE 25 MG: 25 CAPSULE ORAL at 03:09

## 2017-09-29 RX ADMIN — SODIUM CHLORIDE, SODIUM LACTATE, POTASSIUM CHLORIDE, AND CALCIUM CHLORIDE: 600; 310; 30; 20 INJECTION, SOLUTION INTRAVENOUS at 03:09

## 2017-09-29 NOTE — ED TRIAGE NOTES
Pt came to [facility] c/o sickle cell pain. Pt states her entire body hurts has been hurting for the past two days. Pt is rating her pain 9/10. Pt last took Percocet @ 1900 tonight. Pt denies ctxs, LOF, or vaginal bleeding. States +FM. EFM and TOCO in place. Dr. Mills notified that pt is here

## 2017-09-29 NOTE — SUBJECTIVE & OBJECTIVE
Obstetric HPI:  Reports persistent pain this morning, though states it has improved since Dilaudid. She reports persistent sore throat, tessie coughing or SOB.    Patient reports None contractions, active fetal movement, absent vaginal bleeding , absent loss of fluid      Objective:     Vital Signs (Most Recent):  Temp: 98.4 °F (36.9 °C) (09/29/17 0501)  Pulse: 93 (09/29/17 0506)  Resp: 18 (09/29/17 0401)  BP: (!) 95/52 (09/29/17 0501)  SpO2: 100 % (09/29/17 0506) Vital Signs (24h Range):  Temp:  [98.1 °F (36.7 °C)-98.4 °F (36.9 °C)] 98.4 °F (36.9 °C)  Pulse:  [] 93  Resp:  [18] 18  SpO2:  [96 %-100 %] 100 %  BP: ()/(51-61) 95/52     Weight: 59.9 kg (132 lb)  Body mass index is 26.66 kg/m².    FHT: 130 bpm Cat 1 (reassuring)  TOCO:  none    No intake or output data in the 24 hours ending 09/29/17 0627    Cervical Exam: def  Presentation: Vertex     Significant Labs:  Recent Lab Results       09/29/17  0315      Albumin 2.9(L)     Alkaline Phosphatase 100     ALT 24     Anion Gap 12     AST 34     Baso # 0.03     Basophil% 0.2     Total Bilirubin 3.8  Comment:  For infants and newborns, interpretation of results should be based  on gestational age, weight and in agreement with clinical  observations.  Premature Infant recommended reference ranges:  Up to 24 hours.............<8.0 mg/dL  Up to 48 hours............<12.0 mg/dL  3-5 days..................<15.0 mg/dL  6-29 days.................<15.0 mg/dL  (H)     BUN, Bld 4(L)     Calcium 8.7     Chloride 113(H)     CO2 15(L)     Creatinine 0.5     Differential Method Automated     eGFR if  >60     eGFR if non  >60  Comment:  Calculation used to obtain the estimated glomerular filtration  rate (eGFR) is the CKD-EPI equation. Since race is unknown   in our information system, the eGFR values for   -American and Non--American patients are given   for each creatinine result.       Eos # 0.0     Eosinophil% 0.2      Glucose 86     Gran # 13.7(H)     Gran% 75.3(H)     Group & Rh B POS     Hematocrit 24.1(L)     Hemoglobin 8.7(L)     INDIRECT CESAR NEG     Lymph # 2.7     Lymph% 14.7(L)     MCH 31.2(H)     MCHC 36.1(H)     MCV 86     Mono # 1.7(H)     Mono% 9.1     MPV 8.6(L)     Platelets 415(H)     Potassium 3.7     Total Protein 6.5     RBC 2.79(L)     RDW 15.9(H)     Retic 5.4(H)     Sodium 140     WBC 18.26(H)           Physical Exam:   Constitutional: She is oriented to person, place, and time. She appears well-developed and well-nourished. No distress.    HENT:   Mouth/Throat: Oropharynx is clear and moist.      Cardiovascular: Normal rate and regular rhythm.     Pulmonary/Chest: Effort normal and breath sounds normal. No respiratory distress. She has no wheezes. She has no rales.        Abdominal: Soft. She exhibits no distension. There is no tenderness.             Musculoskeletal: She exhibits no edema.       Neurological: She is alert and oriented to person, place, and time.    Skin: Skin is warm and dry.    Psychiatric: She has a normal mood and affect.

## 2017-09-29 NOTE — ASSESSMENT & PLAN NOTE
- consents for delivery and blood signed and to chart, although do not anticipate delivery this admission  - IV fluids  - IV dilaudid 0.5 mg q4 scheduled for now to achieve pain control, percocet PRN for BTP  - regular diet  - NST BID  - T bili 3.8, RC 5.4  - Hgb S electrophoresis pending  - H/H 8.7/24.1, down from 9.9/29 on 9/2 when patient was s/p 2u pRBC  - UCx pending

## 2017-09-29 NOTE — ASSESSMENT & PLAN NOTE
- suspect bacterial sinus infection   - augmentin BID x10 days to start this morning  - afebrile, VSS  - monitor closely  - rapid strep  - supportive measures including mucinex BID, saline nasal spray prn

## 2017-09-29 NOTE — PROGRESS NOTES
[facility]  Obstetrics  Antepartum Progress Note    Patient Name: Nishi Dumont  MRN: [id number]  Admission Date: 2017  Hospital Length of Stay: 0 days  Attending Physician: Trixie Mejía MD  Primary Care Provider: Primary Doctor No    Subjective:     Principal Problem:Sickle cell pain crisis    HPI:  Nishi Dumont is a 26 y.o. F at 28w0d with significant hx of sickle cell disease who presents complaining of back, rib, and shoulder pain consistent with her usual crisis x2 days. States her pain is 10/10 despite taking MS contin at home scheduled and percocet 2-3 times per day for breakthrough pain. She also reports feeling sick since her last admission. She was told previous that she had a viral URI and was managed with supportive care. Symptoms have not yet resolved and 2 days ago she started having purulent nasal discharge and pain in her upper right jaw with chewing. Denies fever, chills, nausea, vomiting. No pregnancy complaints at this time. Denies ctx, VB, LOF. Good FM.     Patient has been admitted multiple times throughout her pregnancy for sickle cell crisis, approximately every 2 weeks per patient. Her most recent sickle cell crisis admission was 3 weeks ago, at which time she received 2u pRBC, whiched helped. She was discharged on MS contin 60mg BID, oxyIR prn. Her primary MFM is Dr. Armenta at [facility].     Prior to this pregnancy, she had fewer crises (1/month). Patient only had 1 crisis in her previous pregnancy 7 years ago. 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 She has a history of multiple blood transfusions. This IUP also complicated by a history of pre-eclampsia in prior pregnancy and GBS bacteruria.  Patient denies CP, SOB, dysuria, hematuria, HA, leg swelling, visual disturbances.    Hospital Course:  2017 - admitted for presumed sickle cell  crisis. H/H .     No new subjective & objective note has been filed under this hospital service since the last note was generated.    Assessment/Plan:     26 y.o. female  at 28w0d for:    * Sickle cell pain crisis    - consents for delivery and blood signed and to chart, although do not anticipate delivery this admission  - IV fluids  - IV dilaudid 0.5 mg q4 scheduled for now to achieve pain control, percocet PRN for BTP  - regular diet  - NST BID  - T bili 3.8, RC 5.4  - Hgb S electrophoresis pending  - H/H 8.7/24.1, down from 9.9/29 on  when patient was s/p 2u pRBC  - UCx pending  - cont ASA 81, folic acid 4mg daily        Sinusitis    - suspect bacterial sinus infection   - augmentin BID x10 days to start this morning  - afebrile, VSS  - monitor closely  - rapid strep  - supportive measures including mucinex BID, saline nasal spray prn        History of pre-eclampsia    - normotensive  - baseline labs normal last admission, P/C too low to calculate              Betty Schrader MD  Obstetrics  [facility]    I have seen and examined the patient and agree with the resident note. The patient has symptoms of a sinusitis. Given her crisis and concerns this may be bacterial, augmentin was started. I discussed with the patient that this is the preferred treatment for sinusitis. She is likely remote from delivery. We discussed the small potential association with necrotizing enterocolitis with augmentin use but reviewed the need to treat if an infection is present. The patient's port site did not have erythema. She reports more back and rib pain. She denies leg pain. Her lungs are clear and her 02 sat is normal at 100% on room air. Does not have signs of acute chest but we will check CXR with shielding to confirm no pulmonary process. Afebrile. VSS. IV fluids and 02 for crisis with pain meds. Plan to change to LR as less chloride in LR and her chloride is elevated. Hgb electrophoresis  pending but Hgb 8.7. Retic 5.4%. Will hold off on transfusion at this time. On IV dilaudid currently-when able convert to patient's PO regimen with IV dilaudid for breakthrough. NST reactive for GA on admit without evidence of PTL. Plan bid NST. Check MFM ultrasound today as we have no scans for patient. I have encouraged the patient to try to follow with one institution given the difficulty with record keeping with going between hospitals. Will obtain heme/onc consult to confirm no additional suggestions. Add Lovenox 40mg SQ daily for Dvt prophylaxis while in house. Need prenatal records. Glucose on chemistry was normal. BP low normal-will monitor closely. No fevers. Urine culture ordered. Confirm when had GBS in urine. Rapid strep to be done. Will need to review  abstinence syndrome.

## 2017-09-29 NOTE — HOSPITAL COURSE
09/29/2017 - admitted for presumed sickle cell crisis. H/H 8/24. Pain regimen switch to MS contin 60mg BID, ox IR, and dilaudid. No acute evens  9/30/2017 - Pain somewhat worsening but patient not requesting increasing doses of pain medication, intermittent O2 desaturations resolved with IS usage, discussed blood transfusion with patient.    10/01/2017 - Again reports pain is worsened but not requiring additional pain medication. No SOB. Otherwise stable. S/P 1u pRBC 9/30.   10/02/2017- Patient reports continued pain in right ribs. No SOB. Gabapentin increased to 300 TID yesterday. Continued on MS contin 60 mg BID, Oxy IR 10 PRN, Dilaudid PRN.   10/03/2017- Patient reports pain is slightly improved. No changes in medication regimen.   10/04/2017- Patient reports back pain is improved, continues to endorse rib pain. Repeat CXR negative, labs with elevated Tbili and Retic. Patient remained inpatient.   10/5/2017 - Pt had worsened pain and tachycardia. EKG sinus tach, O2 saturations and BP stable. Overnight, she required additional IV Dilaudid. Repeat labs ordered  10/6/2017: Continued pain. 2 units given. Repeat H/H shows good response. Hgb electrophoresis ordered. Dilaudid increased to 1g q2 PRN. Overnight pt got no rest, continued to have pain with only minimal relief in the shower.   10/7/2017 - This morning, pt is very anxious and tearful that she has not improved and is losing sleep. She become tachycardic during severe pain, but O2 sats and BP remain stable.   10/08/2017 - Received one unit RBC yesterday. Stable, pain somewhat controlled yesterday afternoon, but feels that pain worsens in night and early morning.   10/10/2017- Patient with continued pain this AM.  MS contin increased to 75 mg BID.  Did not require dilaudid every 2 hours overnight. Discussed case with OB anesthesia who discussed with acute pain management. Pain control regimen altered per their recommendations: scheduled tylenol 650mg q6h,  Gabapentin 300mg morning/afternoon, Gabapentin 600mg qhs, continued lidocaine patch, MS Contin 75mg bid, Oxy IR 30mg q4h prn. IV medications including IV dilaudid and IV benadryl discontinued.

## 2017-09-29 NOTE — PROGRESS NOTES
"Indication  ========    Fetal anatomy survey.    Method  ======    Transabdominal ultrasound examination. View: Suboptimal view: limited by late gestational age. Suboptimal view: limited by fetal position.    Pregnancy  =========    Messer pregnancy. Number of fetuses: 1.    Dating  ======    Cycle: regular cycle  GA by "stated dating" 28 w + 0 d  SHANE by "stated dating": 12/22/2017  Ultrasound examination on: 9/29/2017  GA by U/S based upon: AC, BPD, Femur, HC  GA by U/S 29 w + 1 d  SHANE by U/S: 12/14/2017  Assigned: Dating performed on 09/29/2017, based on the external assessment  Assigned GA 28 w + 0 d  Assigned SHANE: 12/22/2017    General Evaluation  ==============    Cardiac activity: present.  bpm.  Fetal movements: visualized.  Presentation: cephalic.  Placenta: anterior.  Umbilical cord: 3 vessel cord, normal.  Amniotic fluid: normal amount.    Fetal Biometry  ============    Fetal Biometry  BPD 76.2 mm 30w 4d Hadlock  OFD 95.9 mm 31w 0d Yesenia  .7 mm 30w 4d Hadlock  .0 mm 27w 4d Hadlock  Femur 52.9 mm 28w 1d Hadlock  Cerebellum tr 33.5 mm 29w 5d Carson  CM 6.8 mm  Humerus 50.3 mm 29w 3d Yesenia  EFW 1,199 g 50% John  Calculated by: Hadlock (BPD-HC-AC-FL)  EFW (lb) 2 lb  EFW (oz) 10 oz  Cephalic index 0.79  HC / AC 1.20  FL / BPD 0.69  FL / HC 0.19  FL / AC 0.23  MVP 4.2 cm   bpm  Head / Face / Neck   2.5 mm    Fetal Anatomy  ===========    Cranium: normal  Lateral ventricles: normal  Choroid plexus: normal  Midline falx: normal  Cavum septi pellucidi: normal  Cerebellum: normal  Cisterna magna: normal  Head shape: normal  Rt lateral ventricle: normal  Lt lateral ventricle: normal  Rt choroid plexus: normal  Lt choroid plexus: normal  Parenchyma: normal  Third ventricle: normal  Posterior fossa: normal  Cerebellar lobes: normal  Vermis: normal  Neck: appears normal  Nuchal " fold: normal  Lips: normal  Profile: normal  Nose: normal  Maxilla: normal  Mandible: normal  4-chamber view: normal  RVOT: normal  LVOT: suboptimal  Heart / Thorax: Septal views: normal  Situs: normal  Aortic arch: suboptimal  Ductal arch: suboptimal  SVC: normal  IVC: normal  3-vessel view: normal  3-vessel-trachea view: suboptimal  Cardiac position: normal  Cardiac axis: normal  Cardiac size: normal  Cardiac rhythm: normal  Rt lung: normal  Lt lung: normal  Diaphragm: normal  Cord insertion: normal  Stomach: normal  Kidneys: normal  Bladder: normal  Abdom. wall: appears normal  Abdom. cavity: normal  Rt kidney: normal  Lt kidney: normal  Liver: normal  Bowel: normal  Cervical spine: suboptimal  Thoracic spine: suboptimal  Lumbar spine: suboptimal  Sacral spine: suboptimal  Arms: normal  Legs: normal  Rt arm: normal  Lt arm: normal  Rt hand: present  Lt hand: present  Rt leg: normal  Lt leg: normal  Rt foot: normal  Lt foot: normal  Position of hands: normal  Position of feet: normal  Gender: male  Wants to know gender: yes    Maternal Structures  ===============    Uterus / Cervix  Uterus: Normal  Ovaries / Tubes / Adnexa  Rt ovary: Not visualized  Lt ovary: Visualized  Other: Uterus and adnexa normal    Impression  =========    Messer live intrauterine pregnancy.  Biometry agrees with the clinical dating. Overall normal fetal growth.  Normal amniotic fluid volume.  Some of the anatomy was suboptimally visualized. The anatomy that was seen appeared normal.  Exam is limited by gestational age.  Placenta is anterior without previa.    Recommendation  ==============    Patient currently inpatient. Recommend follow up ultrasounds with primary OB/primary MFM.

## 2017-09-29 NOTE — PLAN OF CARE
Problem: Patient Care Overview  Goal: Plan of Care Review  Outcome: Ongoing (interventions implemented as appropriate)  Pt being controlled with scheduled and PRN pain medications.Pt is on 2% oxygen via nasal canula. Vital signs stable, no signs of distress, pt report positive fetal movement, pt denies contraction, vaginal bleeding, and LOF. Will continue to monitor patient.

## 2017-09-29 NOTE — HPI
Nishi Dumont is a 26 y.o. F at 28w0d with significant hx of sickle cell disease who presents complaining of back, rib, and shoulder pain consistent with her usual crisis x2 days. States her pain is 10/10 despite taking MS contin at home scheduled and percocet 2-3 times per day for breakthrough pain. She also reports feeling sick since her last admission. She was told previous that she had a viral URI and was managed with supportive care. Symptoms have not yet resolved and 2 days ago she started having purulent nasal discharge and pain in her upper right jaw with chewing. Denies fever, chills, nausea, vomiting. No pregnancy complaints at this time. Denies ctx, VB, LOF. Good FM.     Patient has been admitted multiple times throughout her pregnancy for sickle cell crisis, approximately every 2 weeks per patient. Her most recent sickle cell crisis admission was 3 weeks ago, at which time she received 2u pRBC, whiched helped. She was discharged on MS contin 60mg BID, oxyIR prn. Her primary MFM is Dr. Armenta at [facility].     Prior to this pregnancy, she had fewer crises (1/month). Patient only had 1 crisis in her previous pregnancy 7 years ago. 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 She has a history of multiple blood transfusions. This IUP also complicated by a history of pre-eclampsia in prior pregnancy and GBS bacteruria.  Patient denies CP, SOB, dysuria, hematuria, HA, leg swelling, visual disturbances.

## 2017-09-29 NOTE — ASSESSMENT & PLAN NOTE
- admit to antepartum  - consents for delivery and blood signed and to chart, although do not anticipate delivery this admission  - IV fluids  - IV dilaudid 0.5 mg q4 scheduled for now to achieve pain control, percocet PRN for BTP  - regular diet  - NST BID  - CBC, T&S, CMP, retic, T Bili, Hgb S electrophoresis  - H/H 8.7/24.1, will discuss need for transfusion with staff, consider if difficulty achieving adequate pain control

## 2017-09-29 NOTE — ASSESSMENT & PLAN NOTE
- suspect bacterial sinus infection as cold persisted over several days and now has purulent nasal discharge as well as maxillary sinus tenderness on chewing  - augmentin BID x7 days  - afebrile, VSS  - monitor closely  - supportive measures such as saline nasal spray prn

## 2017-09-29 NOTE — H&P
[facility]  Obstetrics  History & Physical    Patient Name: Nishi Dumont  MRN: [id number]  Admission Date: 2017  Primary Care Provider: Primary Doctor No    Subjective:     Principal Problem:Sickle cell pain crisis    History of Present Illness:  Nishi Dumont is a 26 y.o. F at 28w0d with significant hx of sickle cell disease who presents complaining of back, rib, and shoulder pain consistent with her usual crisis x2 days. States her pain is 10/10 despite taking MS contin at home scheduled and percocet 2-3 times per day for breakthrough pain. She also reports feeling sick since her last admission. She was told previous that she had a viral URI and was managed with supportive care. Symptoms have not yet resolved and 2 days ago she started having purulent nasal discharge and pain in her upper right jaw with chewing. Denies fever, chills, nausea, vomiting. No pregnancy complaints at this time. Denies ctx, VB, LOF. Good FM.     Patient has been admitted multiple times throughout her pregnancy for sickle cell crisis, approximately every 2 weeks per patient. Her most recent sickle cell crisis admission was 3 weeks ago, at which time she received 2u pRBC, whiched helped. She was discharged on MS contin 60mg BID, oxyIR prn. Her primary MFM is Dr. Armenta at [facility].     Prior to this pregnancy, she had fewer crises (1/month). Patient only had 1 crisis in her previous pregnancy 7 years ago. 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 She has a history of multiple blood transfusions. This IUP also complicated by a history of pre-eclampsia in prior pregnancy and GBS bacteruria.  Patient denies CP, SOB, dysuria, hematuria, HA, leg swelling, visual disturbances.      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    folic acid (FOLVITE) 1 MG tablet Take 4 tablets (4 mg total) by mouth once daily.    morphine (MS CONTIN) 60 MG 12 hr tablet Take 60 mg by mouth every 12 (twelve) hours as needed for Pain.    ondansetron (ZOFRAN-ODT) 4 MG TbDL Take 4 mg by mouth every 8 (eight) hours as needed (for nausea).    oxycodone (ROXICODONE) 10 mg Tab immediate release tablet Take 1 tablet (10 mg total) by mouth every 12 (twelve) hours as needed for Pain.       Review of patient's allergies indicates:   Allergen Reactions    Contrast media Hives    Iodine and iodide containing products Hives and Swelling    Mushroom Hives    Zithromax [azithromycin] Anaphylaxis    Demerol [meperidine] Other (See Comments)     Regarding reaction to demerol pt states "I talk out of my head and become aggressive"        Family History     Problem Relation (Age of Onset)    Sickle cell trait Mother, Father, Brother, Daughter        Social History Main Topics    Smoking status: Never Smoker    Smokeless tobacco: Not on file    Alcohol use 0.0 oz/week      Comment: occassionally    Drug use: No    Sexual activity: Yes     Partners: Male     Birth control/ protection: Injection     Review of Systems   Constitutional: Negative for chills and fever.   HENT:        Green nasal discharge   Eyes: Negative for visual disturbance.   Respiratory: Negative for shortness of breath.    Cardiovascular: Negative for chest pain " and palpitations.   Gastrointestinal: Negative for abdominal pain, nausea and vomiting.   Genitourinary: Negative for vaginal bleeding and vaginal discharge.   Musculoskeletal: Positive for back pain.   Neurological: Negative for seizures, syncope and headaches.   Hematological: Does not bruise/bleed easily.   Psychiatric/Behavioral: The patient is not nervous/anxious.       Objective:     Vital Signs (Most Recent):  Temp: 98.1 °F (36.7 °C) (09/28/17 2343)  Pulse: 81 (09/29/17 0351)  Resp: 18 (09/28/17 2347)  BP: (!) 108/51 (09/29/17 0343)  SpO2: 100 % (09/29/17 0351) Vital Signs (24h Range):  Temp:  [98.1 °F (36.7 °C)] 98.1 °F (36.7 °C)  Pulse:  [] 81  Resp:  [18] 18  SpO2:  [96 %-100 %] 100 %  BP: ()/(51-61) 108/51     Weight: 59.9 kg (132 lb)  Body mass index is 26.66 kg/m².    FHT: Cat 1 (reassuring)  TOCO: not nathan    Physical Exam:   Constitutional: She is oriented to person, place, and time. She appears well-developed and well-nourished. No distress.    HENT:   Mouth/Throat: Oropharynx is clear and moist.   No facial TTP      Cardiovascular: Normal rate and regular rhythm.     Pulmonary/Chest: Effort normal. No respiratory distress.        Abdominal: Soft. She exhibits no distension. There is no tenderness.   gravid             Musculoskeletal: Moves all extremeties. She exhibits no edema.       Neurological: She is alert and oriented to person, place, and time.    Skin: Skin is warm and dry.    Psychiatric: She has a normal mood and affect. Her behavior is normal.     Significant Labs:  Lab Results   Component Value Date    [facility]     Recent Results (from the past 12 hour(s))   CBC auto differential    Collection Time: 09/29/17  3:15 AM   Result Value Ref Range    WBC 18.26 (H) 3.90 - 12.70 K/uL    RBC 2.79 (L) 4.00 - 5.40 M/uL    Hemoglobin 8.7 (L) 12.0 - 16.0 g/dL    Hematocrit 24.1 (L) 37.0 - 48.5 %    MCV 86 82 - 98 fL    MCH 31.2 (H) 27.0 - 31.0 pg    MCHC 36.1 (H)  32.0 - 36.0 g/dL    RDW 15.9 (H) 11.5 - 14.5 %    Platelets 415 (H) 150 - 350 K/uL    MPV 8.6 (L) 9.2 - 12.9 fL    Gran # 13.7 (H) 1.8 - 7.7 K/uL    Lymph # 2.7 1.0 - 4.8 K/uL    Mono # 1.7 (H) 0.3 - 1.0 K/uL    Eos # 0.0 0.0 - 0.5 K/uL    Baso # 0.03 0.00 - 0.20 K/uL    Gran% 75.3 (H) 38.0 - 73.0 %    Lymph% 14.7 (L) 18.0 - 48.0 %    Mono% 9.1 4.0 - 15.0 %    Eosinophil% 0.2 0.0 - 8.0 %    Basophil% 0.2 0.0 - 1.9 %    Differential Method Automated    Reticulocytes    Collection Time: 17  3:15 AM   Result Value Ref Range    Retic 5.4 (H) 0.5 - 2.5 %   Comprehensive metabolic panel    Collection Time: 17  3:15 AM   Result Value Ref Range    Sodium 140 136 - 145 mmol/L    Potassium 3.7 3.5 - 5.1 mmol/L    Chloride 113 (H) 95 - 110 mmol/L    CO2 15 (L) 23 - 29 mmol/L    Glucose 86 70 - 110 mg/dL    BUN, Bld 4 (L) 6 - 20 mg/dL    Creatinine 0.5 0.5 - 1.4 mg/dL    Calcium 8.7 8.7 - 10.5 mg/dL    Total Protein 6.5 6.0 - 8.4 g/dL    Albumin 2.9 (L) 3.5 - 5.2 g/dL    Total Bilirubin 3.8 (H) 0.1 - 1.0 mg/dL    Alkaline Phosphatase 100 55 - 135 U/L    AST 34 10 - 40 U/L    ALT 24 10 - 44 U/L    Anion Gap 12 8 - 16 mmol/L    eGFR if African American >60 >60 mL/min/1.73 m^2    eGFR if non African American >60 >60 mL/min/1.73 m^2        Assessment/Plan:     26 y.o. female  at 28w0d for:    * Sickle cell pain crisis    - admit to antepartum  - consents for delivery and blood signed and to chart, although do not anticipate delivery this admission  - IV fluids  - IV dilaudid 0.5 mg q4 scheduled for now to achieve pain control, percocet PRN for BTP  - regular diet  - NST BID  - CBC, T&S, CMP, retic, T Bili, Hgb S electrophoresis  - H/H 8.7/24.1, will discuss need for transfusion with staff, consider if difficulty achieving adequate pain control        Sinusitis    - suspect bacterial sinus infection as cold persisted over several days and now has purulent nasal discharge as well as maxillary sinus tenderness on  chewing  - augmentin BID x10 days  - afebrile, VSS  - monitor closely  - supportive measures including mucinex BID, saline nasal spray prn            Lore Arana MD  Obstetrics  [facility]

## 2017-09-29 NOTE — SUBJECTIVE & OBJECTIVE
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    folic acid (FOLVITE) 1 MG tablet Take 4 tablets (4 mg total) by mouth once daily.    morphine (MS CONTIN) 60 MG 12 hr tablet Take 60 mg by mouth every 12 (twelve) hours as needed for Pain.    ondansetron (ZOFRAN-ODT) 4 MG TbDL Take 4 mg by mouth every 8 (eight) hours as needed (for nausea).    oxycodone (ROXICODONE) 10 mg Tab immediate release tablet Take 1 tablet (10 mg total) by mouth every 12 (twelve) hours as needed for Pain.       Review of patient's allergies indicates:   Allergen Reactions    Contrast media Hives    Iodine and iodide containing products Hives and Swelling    Mushroom Hives    Zithromax [azithromycin] Anaphylaxis    Demerol [meperidine] Other (See Comments)     Regarding reaction to demerol pt states "I talk out of my head and become aggressive"        Family History     Problem Relation (Age of Onset)    Sickle cell trait Mother, Father, Brother, Daughter        Social History Main Topics    Smoking status: Never Smoker    Smokeless tobacco: Not on file    Alcohol use 0.0 oz/week      Comment: occassionally    Drug use: No    Sexual activity: Yes     Partners: Male     Birth control/ protection: Injection     Review of Systems   Constitutional: Negative for chills and fever.   HENT:        Green nasal discharge "   Eyes: Negative for visual disturbance.   Respiratory: Negative for shortness of breath.    Cardiovascular: Negative for chest pain and palpitations.   Gastrointestinal: Negative for abdominal pain, nausea and vomiting.   Genitourinary: Negative for vaginal bleeding and vaginal discharge.   Musculoskeletal: Positive for back pain.   Neurological: Negative for seizures, syncope and headaches.   Hematological: Does not bruise/bleed easily.   Psychiatric/Behavioral: The patient is not nervous/anxious.       Objective:     Vital Signs (Most Recent):  Temp: 98.1 °F (36.7 °C) (09/28/17 2343)  Pulse: 81 (09/29/17 0351)  Resp: 18 (09/28/17 2347)  BP: (!) 108/51 (09/29/17 0343)  SpO2: 100 % (09/29/17 0351) Vital Signs (24h Range):  Temp:  [98.1 °F (36.7 °C)] 98.1 °F (36.7 °C)  Pulse:  [] 81  Resp:  [18] 18  SpO2:  [96 %-100 %] 100 %  BP: ()/(51-61) 108/51     Weight: 59.9 kg (132 lb)  Body mass index is 26.66 kg/m².    FHT: Cat 1 (reassuring)  TOCO: not nathan    Physical Exam:   Constitutional: She is oriented to person, place, and time. She appears well-developed and well-nourished. No distress.    HENT:   Mouth/Throat: Oropharynx is clear and moist.   No facial TTP      Cardiovascular: Normal rate and regular rhythm.     Pulmonary/Chest: Effort normal. No respiratory distress.        Abdominal: Soft. She exhibits no distension. There is no tenderness.   gravid             Musculoskeletal: Moves all extremeties. She exhibits no edema.       Neurological: She is alert and oriented to person, place, and time.    Skin: Skin is warm and dry.    Psychiatric: She has a normal mood and affect. Her behavior is normal.     Significant Labs:  Lab Results   Component Value Date    [facility]     Recent Results (from the past 12 hour(s))   CBC auto differential    Collection Time: 09/29/17  3:15 AM   Result Value Ref Range    WBC 18.26 (H) 3.90 - 12.70 K/uL    RBC 2.79 (L) 4.00 - 5.40 M/uL     Hemoglobin 8.7 (L) 12.0 - 16.0 g/dL    Hematocrit 24.1 (L) 37.0 - 48.5 %    MCV 86 82 - 98 fL    MCH 31.2 (H) 27.0 - 31.0 pg    MCHC 36.1 (H) 32.0 - 36.0 g/dL    RDW 15.9 (H) 11.5 - 14.5 %    Platelets 415 (H) 150 - 350 K/uL    MPV 8.6 (L) 9.2 - 12.9 fL    Gran # 13.7 (H) 1.8 - 7.7 K/uL    Lymph # 2.7 1.0 - 4.8 K/uL    Mono # 1.7 (H) 0.3 - 1.0 K/uL    Eos # 0.0 0.0 - 0.5 K/uL    Baso # 0.03 0.00 - 0.20 K/uL    Gran% 75.3 (H) 38.0 - 73.0 %    Lymph% 14.7 (L) 18.0 - 48.0 %    Mono% 9.1 4.0 - 15.0 %    Eosinophil% 0.2 0.0 - 8.0 %    Basophil% 0.2 0.0 - 1.9 %    Differential Method Automated    Reticulocytes    Collection Time: 09/29/17  3:15 AM   Result Value Ref Range    Retic 5.4 (H) 0.5 - 2.5 %   Comprehensive metabolic panel    Collection Time: 09/29/17  3:15 AM   Result Value Ref Range    Sodium 140 136 - 145 mmol/L    Potassium 3.7 3.5 - 5.1 mmol/L    Chloride 113 (H) 95 - 110 mmol/L    CO2 15 (L) 23 - 29 mmol/L    Glucose 86 70 - 110 mg/dL    BUN, Bld 4 (L) 6 - 20 mg/dL    Creatinine 0.5 0.5 - 1.4 mg/dL    Calcium 8.7 8.7 - 10.5 mg/dL    Total Protein 6.5 6.0 - 8.4 g/dL    Albumin 2.9 (L) 3.5 - 5.2 g/dL    Total Bilirubin 3.8 (H) 0.1 - 1.0 mg/dL    Alkaline Phosphatase 100 55 - 135 U/L    AST 34 10 - 40 U/L    ALT 24 10 - 44 U/L    Anion Gap 12 8 - 16 mmol/L    eGFR if African American >60 >60 mL/min/1.73 m^2    eGFR if non African American >60 >60 mL/min/1.73 m^2

## 2017-09-29 NOTE — PROGRESS NOTES
"I discussed with the patient risks and benefits of obtaining CXR in pregnancy. We discussed the the amount of radiation exposure with CXR is minimal and considered acceptable in pregnancy. The fetal radiation exposure with CXR is "very low dose" per ACOG at 0.0005-0.01 mGy. The minimal threshold for adverse fetal effects is considered to be 50-60mGy, well above the level of a CXR. Additionally, the patient should be wearing an abdominal shield which would further minimize radiation exposure.     The patient agrees to proceed with chest x-ray.     Betty Mcgill MD  OB/GYN, PGY-2    "

## 2017-09-29 NOTE — ED PROVIDER NOTES
Encounter Date: 2017       History     Chief Complaint   Patient presents with    Sickle Cell Pain Crisis     Nishi Dumont is a 26 y.o. F at 28w0d with significant hx of sickle cell disease who presents complaining of back, rib, and shoulder pain consistent with her usual crisis x2 days. States her pain is 10/10 despite taking MS contin at home scheduled and percocet 2-3 times per day for breakthrough pain. She also reports feeling sick since her last admission. She was told previous that she had a viral URI and was managed with supportive care. Symptoms have not yet resolved and 2 days ago she started having purulent nasal discharge and pain in her upper right jaw with chewing. Denies fever, chills, nausea, vomiting. No pregnancy complaints at this time. Denies ctx, VB, LOF. Good FM.     Patient has been admitted multiple times throughout her pregnancy for sickle cell crisis, approximately every 2 weeks per patient. Her most recent sickle cell crisis admission was 3 weeks ago, at which time she received 2u pRBC, whiched helped. She was discharged on MS contin 60mg BID, oxyIR prn. Her primary MFM is Dr. Armenta at [facility].      Prior to this pregnancy, she had fewer crises (1/month). Patient only had 1 crisis in her previous pregnancy 7 years ago. 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 She has a history of multiple blood transfusions. This IUP also complicated by a history of pre-eclampsia in prior pregnancy and GBS bacteruria.  Patient denies CP, SOB, dysuria, hematuria, HA, leg swelling, visual disturbances.      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    Avascular necrosis of bone of hip     Avascular necrosis of humeral head     Blood transfusion     Sickle cell disease      Past Surgical History:   Procedure Laterality Date    CHOLECYSTECTOMY      JOINT REPLACEMENT       left hip     Family History   Problem Relation Age of Onset    Sickle cell trait Mother     Sickle cell trait Father     Sickle cell trait Brother     Sickle cell trait Daughter      Social History   Substance Use Topics    Smoking status: Never Smoker    Smokeless tobacco: Not on file    Alcohol use 0.0 oz/week      Comment: occassionally     Review of Systems   Constitutional: Negative for chills, fatigue and fever.   HENT: Positive for congestion, postnasal drip, rhinorrhea, sinus pain and sore throat.    Eyes: Negative for visual disturbance.   Respiratory: Negative for cough and shortness of breath.    Cardiovascular: Negative for chest pain and palpitations.   Gastrointestinal: Negative for abdominal distention, abdominal pain, constipation, diarrhea, nausea and vomiting.   Genitourinary: Negative for difficulty urinating, dysuria, hematuria, vaginal bleeding and vaginal discharge.   Musculoskeletal: Positive for back pain.   Skin: Negative for rash.   Neurological: Negative for dizziness, seizures, light-headedness and headaches.   Hematological: Does not bruise/bleed easily.   Psychiatric/Behavioral: Negative for dysphoric mood. The patient is not nervous/anxious.        Physical Exam     Initial Vitals   BP Pulse Resp Temp SpO2   09/28/17 2343 09/28/17 2342 09/28/17 2347 09/28/17 2343 09/28/17 2342   102/61 95 18 98.1 °F (36.7 °C) 100 %      MAP       09/28/17 2343       74.67         Physical Exam    Nursing note and vitals reviewed.  Constitutional: She appears well-developed and well-nourished. She is not diaphoretic. " No distress.   HENT:   Head: Normocephalic and atraumatic.   Eyes: Conjunctivae and EOM are normal.   Neck: Normal range of motion.   Cardiovascular: Normal rate.   Pulmonary/Chest: No respiratory distress.   Musculoskeletal: Normal range of motion.   Neurological: She is alert and oriented to person, place, and time.   Skin: Skin is warm and dry.   Psychiatric: She has a normal mood and affect.     OB LABOR EXAM:                       Comments: NST Interpretation:   135 BPM baseline  Variability: moderate  Accelerations: present  Decelerations: absent  Contractions: none    Clinical Impression: Reactive Non-Stress Test         ED Course   Procedures  Labs Reviewed   CBC W/ AUTO DIFFERENTIAL   RETICULOCYTES   TYPE & SCREEN                          Attending Attestation:   Physician Attestation Statement for Resident:  As the supervising MD   Physician Attestation Statement: I have personally seen and examined this patient.   I agree with the above history. -:   As the supervising MD I agree with the above PE.    As the supervising MD I agree with the above treatment, course, plan, and disposition.   -:   NST  I independently reviewed the fetal non-stress test with the following interpretation:  135 BPM baseline  Variability: moderate  Accelerations: present  Decelerations: absent  Contractions: none  Category 1    Clinical Interpretation: age appropriate    Patient evaluated and found to be stable, agree with resident's assessment of sickle cell crisis possible due to URI and plan to admit for pain management.  I was personally present during the critical portions of the procedure(s) performed by the resident and was immediately available in the ED to provide services and assistance as needed during the entire procedure.  I have reviewed and agree with the residents interpretation of the following: lab data.  I have reviewed the following: old records at this facility.                    ED Course      Clinical  Impression:   The encounter diagnosis was Sickle cell pain crisis.    - admit to antepartum  - IV fluids, IV narcotics for sickle cell crisis  - augmentin BID for acute sinusitis    Plan was discussed with staff Dr. Jones who agrees with above.                           Mariana Jones MD  10/01/17 0913

## 2017-09-29 NOTE — ASSESSMENT & PLAN NOTE
- IV fluids  - Yuridia'd MS contin, oxy IR prn,IV dilaudid PRN,  - NST BID  - T bili 3.8, RC 5.4  - Hgb S electrophoresis pending  - h/h gradually trending downwards, 8.7/24-7.6/22 will discuss possible blood transfusion with patient  - UCx pending  - cont ASA 81, folic acid 4mg daily  - CMP WNL

## 2017-09-29 NOTE — CONSULTS
"[facility]  Hematology/Oncology  Consult Note    Patient Name: Nishi Dumont  MRN: [id number]  Admission Date: 9/28/2017  Hospital Length of Stay: 0 days  Code Status: Full Code   Attending Provider: Trixie Mejía MD  Consulting Provider: Giana Grijalva MD  Primary Care Physician: Primary Doctor No  Principal Problem:Sickle cell pain crisis    Consults  Subjective:     HPI:   This is a a 25 yo AA fenale with known HbSS who is primarily managed by hematology at [place]. She reports Hydrea stopped when she became pregnant- she is currently 28 weeks gestation.  She had sick contacts recently and developed an upper respiratory tract infection. She notes a pain crisis developed as she recovered from this. She reports developed pain in typical areas.  Denies chest pain or shortness of breath.  No fevers, chills, or infectious complaints.    CXR negative for inlfiltrate  Yrine 1 +LE, culture pending      Medications:  Continuous Infusions:   lactated ringers Stopped (09/29/17 1250)     Scheduled Meds:   amoxicillin-clavulanate 875-125mg  1 tablet Oral Q12H    aspirin  81 mg Oral Daily    cetirizine  5 mg Oral Daily    enoxaparin  40 mg Subcutaneous Daily    folic acid  4 mg Oral Daily    guaifenesin  600 mg Oral BID    hydrocortisone   Topical BID    HYDROmorphone  0.5 mg Intravenous Q4H    lactated ringers  1,000 mL Intravenous Once    prenatal vitamin  1 tablet Oral Daily     PRN Meds:sodium chloride, benzonatate, diphenhydrAMINE, ondansetron, oxycodone-acetaminophen, oxycodone-acetaminophen, promethazine (PHENERGAN) IVPB, senna-docusate 8.6-50 mg, simethicone     Review of patient's allergies indicates:   Allergen Reactions    Contrast media Hives    Iodine and iodide containing products Hives and Swelling    Mushroom Hives    Zithromax [azithromycin] Anaphylaxis    Demerol [meperidine] Other (See Comments)     Regarding reaction to demerol pt states "I talk out of my head " "and become aggressive"        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    Avascular necrosis of bone of hip     Avascular necrosis of humeral head     Blood transfusion     Sickle cell disease      Past Surgical History:   Procedure Laterality Date    CHOLECYSTECTOMY      JOINT REPLACEMENT       left hip     Family History     Problem Relation (Age of Onset)    Sickle cell trait Mother, Father, Brother, Daughter        Social History Main Topics    Smoking status: Never Smoker    Smokeless tobacco: Not on file    Alcohol use 0.0 oz/week      Comment: occassionally    Drug use: No    Sexual activity: Yes     Partners: Male     Birth control/ protection: Injection       Review of Systems   Constitutional: Positive for fatigue. Negative for activity change, appetite change, chills, diaphoresis, fever and unexpected weight change.   HENT: Positive for congestion, postnasal drip and rhinorrhea. Negative for dental problem, ear pain, hearing loss, mouth sores, nosebleeds, sore throat, tinnitus and trouble swallowing.    Eyes: Negative for visual disturbance.   Respiratory: Negative for cough, chest tightness, shortness of breath and wheezing.    Cardiovascular: Negative for chest pain, palpitations and leg swelling.   Gastrointestinal: Negative for abdominal distention, abdominal pain, blood in stool, constipation, diarrhea, nausea and vomiting.   Genitourinary: Negative for decreased urine volume, difficulty urinating, dysuria, frequency and hematuria.   Musculoskeletal: Positive for arthralgias and back pain. Negative for gait problem, joint swelling and neck pain.   Skin: Negative for pallor and rash.   Neurological: Negative for dizziness, weakness, light-headedness, numbness and headaches.   Hematological: Negative for adenopathy. Does not bruise/bleed easily.   Psychiatric/Behavioral: Negative for dysphoric mood and sleep disturbance. The patient is " not nervous/anxious.      Objective:     Vital Signs (Most Recent):  Temp: 97.1 °F (36.2 °C) (09/29/17 1210)  Pulse: 83 (09/29/17 1306)  Resp: 18 (09/29/17 1210)  BP: (!) 85/49 (09/29/17 1210)  SpO2: 100 % (09/29/17 1306) Vital Signs (24h Range):  Temp:  [97.1 °F (36.2 °C)-98.4 °F (36.9 °C)] 97.1 °F (36.2 °C)  Pulse:  [] 83  Resp:  [16-18] 18  SpO2:  [88 %-100 %] 100 %  BP: ()/(37-61) 85/49     Weight: 59.9 kg (132 lb)  Body mass index is 26.66 kg/m².  Body surface area is 1.58 meters squared.    No intake or output data in the 24 hours ending 09/29/17 1310    Physical Exam   Constitutional: She is oriented to person, place, and time. She appears well-developed and well-nourished. No distress.   Alone in room, in no distress currently   HENT:   Head: Normocephalic and atraumatic.   Right Ear: External ear normal.   Nose: Nose normal.   Mouth/Throat: No oropharyngeal exudate.   Eyes: Conjunctivae and EOM are normal. Pupils are equal, round, and reactive to light. Right eye exhibits no discharge. Left eye exhibits no discharge. Scleral icterus: mild. Right pupil is round and reactive. Left pupil is round and reactive.   Neck: Normal range of motion. Neck supple. No tracheal deviation present. No thyromegaly present.   Cardiovascular: Normal rate, regular rhythm, normal heart sounds and intact distal pulses.  Exam reveals no gallop and no friction rub.    No murmur heard.  Pulmonary/Chest: Effort normal and breath sounds normal. No respiratory distress. She has no wheezes. She has no rales. She exhibits no tenderness.   Abdominal: Soft. Bowel sounds are normal. There is no hepatosplenomegaly. There is no tenderness. There is no guarding.   gravid   Musculoskeletal: Normal range of motion. She exhibits no edema, tenderness or deformity.   Lymphadenopathy:        Head (right side): No submandibular adenopathy present.        Head (left side): No submandibular adenopathy present.     She has no cervical  adenopathy.        Right cervical: No superficial cervical, no deep cervical and no posterior cervical adenopathy present.       Left cervical: No superficial cervical, no deep cervical and no posterior cervical adenopathy present.        Right: No inguinal and no supraclavicular adenopathy present.        Left: No inguinal and no supraclavicular adenopathy present.   Neurological: She is alert and oriented to person, place, and time. She has normal strength and normal reflexes. No cranial nerve deficit or sensory deficit. Coordination normal.   Skin: Skin is warm and dry. No bruising, no lesion, no petechiae and no rash noted. She is not diaphoretic. No erythema. No pallor.   Psychiatric: She has a normal mood and affect. Her behavior is normal. Judgment and thought content normal. Her mood appears not anxious. She does not exhibit a depressed mood.   Nursing note and vitals reviewed.      Significant Labs:   CBC:   Recent Labs  Lab 09/29/17  0315   WBC 18.26*   HGB 8.7*   HCT 24.1*   *   , CMP:   Recent Labs  Lab 09/29/17  0315      K 3.7   *   CO2 15*   GLU 86   BUN 4*   CREATININE 0.5   CALCIUM 8.7   PROT 6.5   ALBUMIN 2.9*   BILITOT 3.8*   ALKPHOS 100   AST 34   ALT 24   ANIONGAP 12   EGFRNONAA >60   , Coagulation: No results for input(s): INR, APTT in the last 48 hours.    Invalid input(s): PT, Haptoglobin: No results for input(s): HAPTOGLOBIN in the last 48 hours., LDH: No results for input(s): LDHCSF, BFSOURCE in the last 48 hours., Urine Studies:   Recent Labs  Lab 09/29/17  1000   COLORU [place]   APPEARANCEUA Clear   PHUR 6.0   SPECGRAV <=1.005*   PROTEINUA Negative   GLUCUA Negative   KETONESU Negative   BILIRUBINUA 1+*   OCCULTUA Trace*   NITRITE Negative   UROBILINOGEN >=8.0*   LEUKOCYTESUR 1+*   RBCUA 1   WBCUA 4   BACTERIA Few*    and All pertinent labs from the last 24 hours have been reviewed.    Diagnostic Results:  I have reviewed all pertinent imaging results/findings within the  past 24 hours.    Assessment/Plan:     Active Diagnoses:    Diagnosis Date Noted POA    PRINCIPAL PROBLEM:  Sickle cell pain crisis [D57.00] 09/29/2017 Yes    Sinusitis [J32.9] 09/29/2017 Unknown    Encounter for fetal anatomic survey [Z36]  Not Applicable    Teratogen exposure in current pregnancy [O35.9XX0]  Unknown    Acute sickle cell crisis [D57.00] 09/05/2017 Yes    History of pre-eclampsia [Z87.59] 08/30/2017 Not Applicable    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] 09/04/2013 Not Applicable      Problems Resolved During this Admission:    Diagnosis Date Noted Date Resolved POA     1. HbSS crisis during pregnancy  We discussed with patient risk of increased crises during pregnancy and underlying risk factors including asymptomatic bacteriuria  We reminded her to minimize exposure of sick contacts (nephew recently with strep) and report any concern for infection to her hematologist, minimize risk for dehydration and temperature extremes  For management of acute painful episode - primary team is already providing IVFs  Agree with current pain management- she can use opiods but should avoid nonsteroidal at this point in her pregnancy  We discussed nonsteroidal antiinflammatory drugs (NSAIDs) are generally avoided after 30 weeks of gestation because of an increased risk of premature narrowing or closure of the ductus arteriosus.     She needs routine follow-up with her hematologist through remainder of the pregnancy.    Thank you for your consult. I will sign off. Please contact us if you have any additional questions.    Giana Grijalva MD  Hematology/Oncology  [facility]

## 2017-09-29 NOTE — PROGRESS NOTES
Discussed with residents. Patient with some hypotension this am. No persistent tachycardia and no fever.  Residents evaluating.  Will give fluid bolus to assess response.  Recommend blood cultures as well.  Port looked ok this am.  Monitor closely to assess for any signs of evolving sepsis.  Afebrile.  Attempt to decrease narcotics in case affecting bp.

## 2017-09-30 PROBLEM — I95.9 HYPOTENSION: Status: ACTIVE | Noted: 2017-09-30

## 2017-09-30 LAB
ALBUMIN SERPL BCP-MCNC: 2.5 G/DL
ALP SERPL-CCNC: 87 U/L
ALT SERPL W/O P-5'-P-CCNC: 23 U/L
ANION GAP SERPL CALC-SCNC: 8 MMOL/L
AST SERPL-CCNC: 32 U/L
BACTERIA UR CULT: NORMAL
BACTERIA UR CULT: NORMAL
BASOPHILS # BLD AUTO: 0.04 K/UL
BASOPHILS NFR BLD: 0.3 %
BILIRUB SERPL-MCNC: 3.4 MG/DL
BLD PROD TYP BPU: NORMAL
BLOOD UNIT EXPIRATION DATE: NORMAL
BLOOD UNIT TYPE CODE: 5100
BLOOD UNIT TYPE: NORMAL
BUN SERPL-MCNC: 3 MG/DL
CALCIUM SERPL-MCNC: 8.6 MG/DL
CHLORIDE SERPL-SCNC: 113 MMOL/L
CO2 SERPL-SCNC: 19 MMOL/L
CODING SYSTEM: NORMAL
CREAT SERPL-MCNC: 0.5 MG/DL
DIFFERENTIAL METHOD: ABNORMAL
DISPENSE STATUS: NORMAL
EOSINOPHIL # BLD AUTO: 0.2 K/UL
EOSINOPHIL NFR BLD: 1.4 %
ERYTHROCYTE [DISTWIDTH] IN BLOOD BY AUTOMATED COUNT: 16.2 %
EST. GFR  (AFRICAN AMERICAN): >60 ML/MIN/1.73 M^2
EST. GFR  (NON AFRICAN AMERICAN): >60 ML/MIN/1.73 M^2
GLUCOSE SERPL-MCNC: 80 MG/DL
HCT VFR BLD AUTO: 22.4 %
HGB BLD-MCNC: 7.6 G/DL
LYMPHOCYTES # BLD AUTO: 3.9 K/UL
LYMPHOCYTES NFR BLD: 25.1 %
MCH RBC QN AUTO: 29.8 PG
MCHC RBC AUTO-ENTMCNC: 33.9 G/DL
MCV RBC AUTO: 88 FL
MONOCYTES # BLD AUTO: 1.8 K/UL
MONOCYTES NFR BLD: 11.5 %
NEUTROPHILS # BLD AUTO: 9.4 K/UL
NEUTROPHILS NFR BLD: 60.9 %
NUM UNITS TRANS PACKED RBC: NORMAL
PLATELET # BLD AUTO: 354 K/UL
PMV BLD AUTO: 8.6 FL
POTASSIUM SERPL-SCNC: 3.7 MMOL/L
PROT SERPL-MCNC: 5.6 G/DL
RBC # BLD AUTO: 2.55 M/UL
SODIUM SERPL-SCNC: 140 MMOL/L
WBC # BLD AUTO: 15.44 K/UL

## 2017-09-30 PROCEDURE — 63600175 PHARM REV CODE 636 W HCPCS: Performed by: STUDENT IN AN ORGANIZED HEALTH CARE EDUCATION/TRAINING PROGRAM

## 2017-09-30 PROCEDURE — 94799 UNLISTED PULMONARY SVC/PX: CPT

## 2017-09-30 PROCEDURE — 36415 COLL VENOUS BLD VENIPUNCTURE: CPT

## 2017-09-30 PROCEDURE — 11000001 HC ACUTE MED/SURG PRIVATE ROOM

## 2017-09-30 PROCEDURE — 36430 TRANSFUSION BLD/BLD COMPNT: CPT

## 2017-09-30 PROCEDURE — 85025 COMPLETE CBC W/AUTO DIFF WBC: CPT

## 2017-09-30 PROCEDURE — 59025 FETAL NON-STRESS TEST: CPT | Mod: 26,,, | Performed by: OBSTETRICS & GYNECOLOGY

## 2017-09-30 PROCEDURE — 80053 COMPREHEN METABOLIC PANEL: CPT

## 2017-09-30 PROCEDURE — 99231 SBSQ HOSP IP/OBS SF/LOW 25: CPT | Mod: 25,,, | Performed by: OBSTETRICS & GYNECOLOGY

## 2017-09-30 PROCEDURE — 25000003 PHARM REV CODE 250: Performed by: STUDENT IN AN ORGANIZED HEALTH CARE EDUCATION/TRAINING PROGRAM

## 2017-09-30 PROCEDURE — 27201040 HC RC 50 FILTER

## 2017-09-30 PROCEDURE — 99900035 HC TECH TIME PER 15 MIN (STAT)

## 2017-09-30 PROCEDURE — P9038 RBC IRRADIATED: HCPCS

## 2017-09-30 PROCEDURE — 96372 THER/PROPH/DIAG INJ SC/IM: CPT

## 2017-09-30 RX ORDER — SODIUM CHLORIDE 450 MG/100ML
INJECTION, SOLUTION INTRAVENOUS CONTINUOUS
Status: DISCONTINUED | OUTPATIENT
Start: 2017-09-30 | End: 2017-10-03

## 2017-09-30 RX ORDER — DIPHENHYDRAMINE HYDROCHLORIDE 50 MG/ML
12.5 INJECTION INTRAMUSCULAR; INTRAVENOUS EVERY 4 HOURS PRN
Status: DISCONTINUED | OUTPATIENT
Start: 2017-09-30 | End: 2017-10-04

## 2017-09-30 RX ORDER — DIPHENHYDRAMINE HYDROCHLORIDE 50 MG/ML
12.5 INJECTION INTRAMUSCULAR; INTRAVENOUS
Status: COMPLETED | OUTPATIENT
Start: 2017-09-30 | End: 2017-09-30

## 2017-09-30 RX ORDER — HYDROCODONE BITARTRATE AND ACETAMINOPHEN 500; 5 MG/1; MG/1
TABLET ORAL
Status: DISCONTINUED | OUTPATIENT
Start: 2017-09-30 | End: 2017-10-11 | Stop reason: HOSPADM

## 2017-09-30 RX ORDER — ACETAMINOPHEN 325 MG/1
650 TABLET ORAL
Status: COMPLETED | OUTPATIENT
Start: 2017-09-30 | End: 2017-09-30

## 2017-09-30 RX ADMIN — GUAIFENESIN 600 MG: 600 TABLET, EXTENDED RELEASE ORAL at 09:09

## 2017-09-30 RX ADMIN — HYDROMORPHONE HYDROCHLORIDE 0.5 MG: 1 INJECTION, SOLUTION INTRAMUSCULAR; INTRAVENOUS; SUBCUTANEOUS at 10:09

## 2017-09-30 RX ADMIN — SODIUM CHLORIDE: 0.45 INJECTION, SOLUTION INTRAVENOUS at 11:09

## 2017-09-30 RX ADMIN — MORPHINE SULFATE 60 MG: 15 TABLET, EXTENDED RELEASE ORAL at 06:09

## 2017-09-30 RX ADMIN — OXYCODONE HYDROCHLORIDE 10 MG: 5 TABLET ORAL at 12:09

## 2017-09-30 RX ADMIN — OXYCODONE HYDROCHLORIDE 10 MG: 5 TABLET ORAL at 03:09

## 2017-09-30 RX ADMIN — PRENATAL VIT W/ FE FUMARATE-FA TAB 27-0.8 MG 1 EACH: 27-0.8 TAB at 10:09

## 2017-09-30 RX ADMIN — FOLIC ACID 4 MG: 1 TABLET ORAL at 10:09

## 2017-09-30 RX ADMIN — DIPHENHYDRAMINE HYDROCHLORIDE 12.5 MG: 50 INJECTION, SOLUTION INTRAMUSCULAR; INTRAVENOUS at 12:09

## 2017-09-30 RX ADMIN — AMOXICILLIN AND CLAVULANATE POTASSIUM 1 TABLET: 875; 125 TABLET, FILM COATED ORAL at 09:09

## 2017-09-30 RX ADMIN — GABAPENTIN 100 MG: 100 CAPSULE ORAL at 09:09

## 2017-09-30 RX ADMIN — DIPHENHYDRAMINE HYDROCHLORIDE 12.5 MG: 50 INJECTION, SOLUTION INTRAMUSCULAR; INTRAVENOUS at 09:09

## 2017-09-30 RX ADMIN — GUAIFENESIN 600 MG: 600 TABLET, EXTENDED RELEASE ORAL at 10:09

## 2017-09-30 RX ADMIN — HYDROCORTISONE: 1 CREAM TOPICAL at 09:09

## 2017-09-30 RX ADMIN — GABAPENTIN 100 MG: 100 CAPSULE ORAL at 02:09

## 2017-09-30 RX ADMIN — OXYCODONE HYDROCHLORIDE 10 MG: 5 TABLET ORAL at 08:09

## 2017-09-30 RX ADMIN — OXYCODONE HYDROCHLORIDE 10 MG: 5 TABLET ORAL at 04:09

## 2017-09-30 RX ADMIN — GABAPENTIN 100 MG: 100 CAPSULE ORAL at 06:09

## 2017-09-30 RX ADMIN — CETIRIZINE HYDROCHLORIDE 5 MG: 5 TABLET, FILM COATED ORAL at 10:09

## 2017-09-30 RX ADMIN — HYDROMORPHONE HYDROCHLORIDE 0.5 MG: 1 INJECTION, SOLUTION INTRAMUSCULAR; INTRAVENOUS; SUBCUTANEOUS at 12:09

## 2017-09-30 RX ADMIN — AMOXICILLIN AND CLAVULANATE POTASSIUM 1 TABLET: 875; 125 TABLET, FILM COATED ORAL at 10:09

## 2017-09-30 RX ADMIN — OXYCODONE HYDROCHLORIDE 10 MG: 5 TABLET ORAL at 09:09

## 2017-09-30 RX ADMIN — MORPHINE SULFATE 60 MG: 15 TABLET, EXTENDED RELEASE ORAL at 05:09

## 2017-09-30 RX ADMIN — DIPHENHYDRAMINE HYDROCHLORIDE 12.5 MG: 50 INJECTION, SOLUTION INTRAMUSCULAR; INTRAVENOUS at 02:09

## 2017-09-30 RX ADMIN — ASPIRIN 81 MG CHEWABLE TABLET 81 MG: 81 TABLET CHEWABLE at 10:09

## 2017-09-30 RX ADMIN — DIPHENHYDRAMINE HYDROCHLORIDE 12.5 MG: 50 INJECTION, SOLUTION INTRAMUSCULAR; INTRAVENOUS at 05:09

## 2017-09-30 RX ADMIN — ENOXAPARIN SODIUM 40 MG: 100 INJECTION SUBCUTANEOUS at 10:09

## 2017-09-30 RX ADMIN — DIPHENHYDRAMINE HYDROCHLORIDE 12.5 MG: 50 INJECTION, SOLUTION INTRAMUSCULAR; INTRAVENOUS at 04:09

## 2017-09-30 RX ADMIN — DIPHENHYDRAMINE HYDROCHLORIDE 12.5 MG: 50 INJECTION, SOLUTION INTRAMUSCULAR; INTRAVENOUS at 08:09

## 2017-09-30 RX ADMIN — ACETAMINOPHEN 650 MG: 325 TABLET ORAL at 02:09

## 2017-09-30 NOTE — PLAN OF CARE
Problem: Patient Care Overview  Goal: Plan of Care Review  Outcome: Ongoing (interventions implemented as appropriate)  IS performed patient averaging 1250 mL. Will continue to monitor.

## 2017-09-30 NOTE — PROGRESS NOTES
Patient premedicated for blood transfusion with tylenol and benadryl at 1430. Transfusion reaction signs and symptoms discussed with pt, pt verbalized understanding. 1 unit PRBC received from blood bank at 1433. Pre-transfusion VS stable.

## 2017-09-30 NOTE — PLAN OF CARE
Problem: Patient Care Overview  Goal: Plan of Care Review  Outcome: Ongoing (interventions implemented as appropriate)  VSS throughout shift. Pt afebrile. Pt reports +FM; denies ctxs, LOF, and VB. Pain constant throughout shift and unrelieved by PO & IV meds. Call light within reach with bed locked in lowest position. Will continue to monitor.

## 2017-09-30 NOTE — PROGRESS NOTES
[facility]  Obstetrics  Antepartum Progress Note    Patient Name: Nishi Dumont  MRN: [id number]  Admission Date: 2017  Hospital Length of Stay: 1 days  Attending Physician: Trixie Mejía MD  Primary Care Provider: Primary Doctor No    Subjective:     Principal Problem:Sickle cell pain crisis    HPI:  Nishi Dumont is a 26 y.o. F at 28w0d with significant hx of sickle cell disease who presents complaining of back, rib, and shoulder pain consistent with her usual crisis x2 days. States her pain is 10/10 despite taking MS contin at home scheduled and percocet 2-3 times per day for breakthrough pain. She also reports feeling sick since her last admission. She was told previous that she had a viral URI and was managed with supportive care. Symptoms have not yet resolved and 2 days ago she started having purulent nasal discharge and pain in her upper right jaw with chewing. Denies fever, chills, nausea, vomiting. No pregnancy complaints at this time. Denies ctx, VB, LOF. Good FM.     Patient has been admitted multiple times throughout her pregnancy for sickle cell crisis, approximately every 2 weeks per patient. Her most recent sickle cell crisis admission was 3 weeks ago, at which time she received 2u pRBC, whiched helped. She was discharged on MS contin 60mg BID, oxyIR prn. Her primary MFM is Dr. Armenta at [facility].     Prior to this pregnancy, she had fewer crises (1/month). Patient only had 1 crisis in her previous pregnancy 7 years ago. 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 She has a history of multiple blood transfusions. This IUP also complicated by a history of pre-eclampsia in prior pregnancy and GBS bacteruria.  Patient denies CP, SOB, dysuria, hematuria, HA, leg swelling, visual disturbances.    Hospital Course:  2017 - admitted for presumed sickle cell  crisis. H/H 8/24. Pain regimen switch to MS contin 60mg BID, ox IR, and dilaudid. No acute evens  9/30/2017 - Pain somewhat worsening but patient not requesting increasing doses of pain medication, intermittent O2 desaturations resolved with IS usage, discussed blood transfusion with patient     Obstetric HPI:  Reports pain has slightly worsened since yesterday, but has not requested an increase in pain medication. Otherwise doing well, denies cough, SOB.    Patient reports None contractions, active fetal movement, absent vaginal bleeding , absent loss of fluid      Objective:     Vital Signs (Most Recent):  Temp: 98.7 °F (37.1 °C) (09/30/17 0410)  Pulse: 83 (09/30/17 0710)  Resp: 16 (09/30/17 0410)  BP: (!) 99/55 (09/30/17 0410)  SpO2: 99 % (09/30/17 0710) Vital Signs (24h Range):  Temp:  [97.1 °F (36.2 °C)-98.7 °F (37.1 °C)] 98.7 °F (37.1 °C)  Pulse:  [] 83  Resp:  [16-18] 16  SpO2:  [87 %-100 %] 99 %  BP: ()/(47-63) 99/55     Weight: 59.9 kg (132 lb)  Body mass index is 26.66 kg/m².    FHT: 130 bpm Cat 1 (reassuring)  TOCO:  none      Intake/Output Summary (Last 24 hours) at 09/30/17 0923  Last data filed at 09/30/17 0600   Gross per 24 hour   Intake          3896.67 ml   Output                0 ml   Net          3896.67 ml       Cervical Exam: def  Presentation: Vertex     Significant Labs:  Recent Lab Results       09/30/17  0416 09/29/17  1606 09/29/17  1605 09/29/17  1000      Albumin 2.5(L)        Alkaline Phosphatase 87        ALT 23        Amorphous, UA    Occasional     Anion Gap 8        Appearance, UA    Clear     AST 32        Bacteria, UA    Few(A)     Baso # 0.04        Basophil% 0.3        Bilirubin (UA)    1+  Comment:  Positive urine bilirubin is not confirmed. Correlate with   serum bilirubin and clinical presentation.  (A)     Total Bilirubin 3.4  Comment:  For infants and newborns, interpretation of results should be based  on gestational age, weight and in agreement with  clinical  observations.  Premature Infant recommended reference ranges:  Up to 24 hours.............<8.0 mg/dL  Up to 48 hours............<12.0 mg/dL  3-5 days..................<15.0 mg/dL  6-29 days.................<15.0 mg/dL  (H)        Blood Culture, Routine  No Growth to date[P] No Growth to date[P]      BUN, Bld 3(L)        Calcium 8.6(L)        Chloride 113(H)        CO2 19(L)        Color, UA    Racquel     Creatinine 0.5        Differential Method Automated        eGFR if  >60        eGFR if non  >60  Comment:  Calculation used to obtain the estimated glomerular filtration  rate (eGFR) is the CKD-EPI equation. Since race is unknown   in our information system, the eGFR values for   -American and Non--American patients are given   for each creatinine result.          Eos # 0.2        Eosinophil% 1.4        Glucose 80        Glucose, UA    Negative     Gran # 9.4(H)        Gran% 60.9        Hematocrit 22.4(L)        Hemoglobin 7.6(L)        Ketones, UA    Negative     Leukocytes, UA    1+(A)     Lymph # 3.9        Lymph% 25.1        MCH 29.8        MCHC 33.9        MCV 88        Microscopic Comment    SEE COMMENT  Comment:  Other formed elements not mentioned in the report are not   present in the microscopic examination.        Mono # 1.8(H)        Mono% 11.5        MPV 8.6(L)        Nitrite, UA    Negative     Occult Blood UA    Trace(A)     pH, UA    6.0     Platelets 354(H)        Potassium 3.7        Total Protein 5.6(L)        Protein, UA    Negative  Comment:  Recommend a 24 hour urine protein or a urine   protein/creatinine ratio if globulin induced proteinuria is  clinically suspected.       RBC 2.55(L)        RBC, UA    1     RDW 16.2(H)        Sodium 140        Specific Gravity, UA    <=1.005(A)     Specimen UA    Urine, Clean Catch  Comment:  urine is already in lab     Urobilinogen, UA    >=8.0(A)     WBC, UA    4     WBC 15.44(H)              Physical  Exam:   Constitutional: She is oriented to person, place, and time. She appears well-developed and well-nourished. No distress.    HENT:   Mouth/Throat: Oropharynx is clear and moist.      Cardiovascular: Normal rate and regular rhythm.     Pulmonary/Chest: Effort normal and breath sounds normal. No respiratory distress. She has no wheezes. She has no rales.        Abdominal: Soft. She exhibits no distension. There is no tenderness.             Musculoskeletal: She exhibits no edema.       Neurological: She is alert and oriented to person, place, and time.    Skin: Skin is warm and dry.    Psychiatric: She has a normal mood and affect.       Assessment/Plan:     26 y.o. female  at 28w1d for:    * Sickle cell pain crisis    - IV fluids  - Yuridia'd MS contin, oxy IR prn,IV dilaudid PRN,  - NST BID  - T bili 3.6, RC 5.4  - Hgb S electrophoresis pending  - h/h gradually trending downwards, 8.7/24-7.6/22 will discuss possible blood transfusion with patient  - UCx pending  - cont ASA 81, folic acid 4mg daily  - CMP WNL        Hypotension    - Episodes of hypotension since resolved although with initial concerns for sepsis blood cx drawn-  blood cultures prelim negative        Sinusitis    - suspect bacterial sinus infection   - augmentin BID x10 days  - afebrile, VSS  - monitor closely  - rapid strep  - supportive measures including mucinex BID, saline nasal spray prn        History of pre-eclampsia    - normotensive  - baseline labs normal last admission, P/C too low to calculate              B Ej Roberts MD  Obstetrics  [facility]    I have seen and examined the patient and agree with the resident note. The patient reports that her sinus symptoms have improved. She reports pain slightly worse although nursing reports she has not had IV pain meds since 840pm last night. She is on a low dose of Gabapentin-we discussed the potential to increase this. She declined to increase this because she reports  it gives her headaches. Her hgb has trended down to 7.6 and WBC improved to 15. Prelim blood cultures are negative. Urine culture is pending. If urine c and s is negative if patient had bacteruria early in the pregnancy, I recommend considering suppression. She is on Augmentin currently for sinusitis. CXR negative aside from atalectasis. I encouraged IS use. Normal 02 sat with deep breaths. She reports her continued pain near clavicle and side-port looks without erythema. I discussed with the patient transfusion of one unit today. Her bili is elevated but she has normal LFTS and some of the decline is dilutional but since pain still present patient may benefit from a unit. She desires to proceed with transfusion. We will repeat labs tomorrow. Since chloride still increased will change IV fluids to .5NS. Fetal growth was normal yesterday and overall fetal monitoring has been reassuring. FHT this am 125 with mod variability and no declerations and appropriate for gestation and narcotic use. Hgb electrophoresis is pending. Retic count not as high as prior admit. R/B/A transfusion briefly reviewed. Residents to return with consent. Will need to be sickle negative blood. No antibodies on recent type and screen. BP has improved. Currently, does not appear to be a sepsis like picture.

## 2017-09-30 NOTE — SUBJECTIVE & OBJECTIVE
Obstetric HPI:  Reports pain has slightly worsened since yesterday, but has not requested an increase in pain medication. Otherwise doing well, denies cough, SOB.    Patient reports None contractions, active fetal movement, absent vaginal bleeding , absent loss of fluid      Objective:     Vital Signs (Most Recent):  Temp: 98.7 °F (37.1 °C) (09/30/17 0410)  Pulse: 83 (09/30/17 0710)  Resp: 16 (09/30/17 0410)  BP: (!) 99/55 (09/30/17 0410)  SpO2: 99 % (09/30/17 0710) Vital Signs (24h Range):  Temp:  [97.1 °F (36.2 °C)-98.7 °F (37.1 °C)] 98.7 °F (37.1 °C)  Pulse:  [] 83  Resp:  [16-18] 16  SpO2:  [87 %-100 %] 99 %  BP: ()/(47-63) 99/55     Weight: 59.9 kg (132 lb)  Body mass index is 26.66 kg/m².    FHT: 130 bpm Cat 1 (reassuring)  TOCO:  none      Intake/Output Summary (Last 24 hours) at 09/30/17 0923  Last data filed at 09/30/17 0600   Gross per 24 hour   Intake          3896.67 ml   Output                0 ml   Net          3896.67 ml       Cervical Exam: def  Presentation: Vertex     Significant Labs:  Recent Lab Results       09/30/17  0416 09/29/17  1606 09/29/17  1605 09/29/17  1000      Albumin 2.5(L)        Alkaline Phosphatase 87        ALT 23        Amorphous, UA    Occasional     Anion Gap 8        Appearance, UA    Clear     AST 32        Bacteria, UA    Few(A)     Baso # 0.04        Basophil% 0.3        Bilirubin (UA)    1+  Comment:  Positive urine bilirubin is not confirmed. Correlate with   serum bilirubin and clinical presentation.  (A)     Total Bilirubin 3.4  Comment:  For infants and newborns, interpretation of results should be based  on gestational age, weight and in agreement with clinical  observations.  Premature Infant recommended reference ranges:  Up to 24 hours.............<8.0 mg/dL  Up to 48 hours............<12.0 mg/dL  3-5 days..................<15.0 mg/dL  6-29 days.................<15.0 mg/dL  (H)        Blood Culture, Routine  No Growth to date[P] No Growth to date[P]       BUN, Bld 3(L)        Calcium 8.6(L)        Chloride 113(H)        CO2 19(L)        Color, UA    Racquel     Creatinine 0.5        Differential Method Automated        eGFR if  >60        eGFR if non  >60  Comment:  Calculation used to obtain the estimated glomerular filtration  rate (eGFR) is the CKD-EPI equation. Since race is unknown   in our information system, the eGFR values for   -American and Non--American patients are given   for each creatinine result.          Eos # 0.2        Eosinophil% 1.4        Glucose 80        Glucose, UA    Negative     Gran # 9.4(H)        Gran% 60.9        Hematocrit 22.4(L)        Hemoglobin 7.6(L)        Ketones, UA    Negative     Leukocytes, UA    1+(A)     Lymph # 3.9        Lymph% 25.1        MCH 29.8        MCHC 33.9        MCV 88        Microscopic Comment    SEE COMMENT  Comment:  Other formed elements not mentioned in the report are not   present in the microscopic examination.        Mono # 1.8(H)        Mono% 11.5        MPV 8.6(L)        Nitrite, UA    Negative     Occult Blood UA    Trace(A)     pH, UA    6.0     Platelets 354(H)        Potassium 3.7        Total Protein 5.6(L)        Protein, UA    Negative  Comment:  Recommend a 24 hour urine protein or a urine   protein/creatinine ratio if globulin induced proteinuria is  clinically suspected.       RBC 2.55(L)        RBC, UA    1     RDW 16.2(H)        Sodium 140        Specific Gravity, UA    <=1.005(A)     Specimen UA    Urine, Clean Catch  Comment:  urine is already in lab     Urobilinogen, UA    >=8.0(A)     WBC, UA    4     WBC 15.44(H)              Physical Exam:   Constitutional: She is oriented to person, place, and time. She appears well-developed and well-nourished. No distress.    HENT:   Mouth/Throat: Oropharynx is clear and moist.      Cardiovascular: Normal rate and regular rhythm.     Pulmonary/Chest: Effort normal and breath sounds normal. No  respiratory distress. She has no wheezes. She has no rales.        Abdominal: Soft. She exhibits no distension. There is no tenderness.             Musculoskeletal: She exhibits no edema.       Neurological: She is alert and oriented to person, place, and time.    Skin: Skin is warm and dry.    Psychiatric: She has a normal mood and affect.

## 2017-09-30 NOTE — ASSESSMENT & PLAN NOTE
- suspect bacterial sinus infection   - augmentin BID x10 days  - afebrile, VSS  - monitor closely  - rapid strep  - supportive measures including mucinex BID, saline nasal spray prn

## 2017-09-30 NOTE — ASSESSMENT & PLAN NOTE
- Episodes of hypotension since resolved although with initial concerns for sepsis blood cx drawn-  blood cultures prelim negative

## 2017-10-01 LAB
ALBUMIN SERPL BCP-MCNC: 2.7 G/DL
ALP SERPL-CCNC: 102 U/L
ALT SERPL W/O P-5'-P-CCNC: 27 U/L
ANION GAP SERPL CALC-SCNC: 9 MMOL/L
AST SERPL-CCNC: 33 U/L
BACTERIA THROAT CULT: NORMAL
BASOPHILS # BLD AUTO: 0.05 K/UL
BASOPHILS NFR BLD: 0.4 %
BILIRUB SERPL-MCNC: 3.5 MG/DL
BUN SERPL-MCNC: 3 MG/DL
CALCIUM SERPL-MCNC: 8.7 MG/DL
CHLORIDE SERPL-SCNC: 110 MMOL/L
CO2 SERPL-SCNC: 20 MMOL/L
CREAT SERPL-MCNC: 0.6 MG/DL
DIFFERENTIAL METHOD: ABNORMAL
EOSINOPHIL # BLD AUTO: 0.5 K/UL
EOSINOPHIL NFR BLD: 3.3 %
ERYTHROCYTE [DISTWIDTH] IN BLOOD BY AUTOMATED COUNT: 16.9 %
EST. GFR  (AFRICAN AMERICAN): >60 ML/MIN/1.73 M^2
EST. GFR  (NON AFRICAN AMERICAN): >60 ML/MIN/1.73 M^2
GLUCOSE SERPL-MCNC: 76 MG/DL
HCT VFR BLD AUTO: 27.6 %
HGB BLD-MCNC: 9.4 G/DL
LYMPHOCYTES # BLD AUTO: 3.2 K/UL
LYMPHOCYTES NFR BLD: 23 %
MCH RBC QN AUTO: 29.3 PG
MCHC RBC AUTO-ENTMCNC: 34.1 G/DL
MCV RBC AUTO: 86 FL
MONOCYTES # BLD AUTO: 1.5 K/UL
MONOCYTES NFR BLD: 10.5 %
NEUTROPHILS # BLD AUTO: 8.5 K/UL
NEUTROPHILS NFR BLD: 61.4 %
PLATELET # BLD AUTO: 386 K/UL
PMV BLD AUTO: 8.9 FL
POTASSIUM SERPL-SCNC: 3.7 MMOL/L
PROT SERPL-MCNC: 6.1 G/DL
RBC # BLD AUTO: 3.21 M/UL
SODIUM SERPL-SCNC: 139 MMOL/L
WBC # BLD AUTO: 13.84 K/UL

## 2017-10-01 PROCEDURE — 80053 COMPREHEN METABOLIC PANEL: CPT

## 2017-10-01 PROCEDURE — 99900035 HC TECH TIME PER 15 MIN (STAT)

## 2017-10-01 PROCEDURE — 11000001 HC ACUTE MED/SURG PRIVATE ROOM

## 2017-10-01 PROCEDURE — 25000003 PHARM REV CODE 250: Performed by: OBSTETRICS & GYNECOLOGY

## 2017-10-01 PROCEDURE — 59025 FETAL NON-STRESS TEST: CPT | Mod: 26,,, | Performed by: OBSTETRICS & GYNECOLOGY

## 2017-10-01 PROCEDURE — 25000003 PHARM REV CODE 250: Performed by: STUDENT IN AN ORGANIZED HEALTH CARE EDUCATION/TRAINING PROGRAM

## 2017-10-01 PROCEDURE — 99231 SBSQ HOSP IP/OBS SF/LOW 25: CPT | Mod: 25,,, | Performed by: OBSTETRICS & GYNECOLOGY

## 2017-10-01 PROCEDURE — 85025 COMPLETE CBC W/AUTO DIFF WBC: CPT

## 2017-10-01 PROCEDURE — 63600175 PHARM REV CODE 636 W HCPCS: Performed by: STUDENT IN AN ORGANIZED HEALTH CARE EDUCATION/TRAINING PROGRAM

## 2017-10-01 PROCEDURE — 94799 UNLISTED PULMONARY SVC/PX: CPT

## 2017-10-01 PROCEDURE — 36415 COLL VENOUS BLD VENIPUNCTURE: CPT

## 2017-10-01 RX ORDER — GABAPENTIN 100 MG/1
300 CAPSULE ORAL 3 TIMES DAILY
Status: DISCONTINUED | OUTPATIENT
Start: 2017-10-01 | End: 2017-10-10

## 2017-10-01 RX ADMIN — ENOXAPARIN SODIUM 40 MG: 100 INJECTION SUBCUTANEOUS at 10:10

## 2017-10-01 RX ADMIN — HYDROMORPHONE HYDROCHLORIDE 0.5 MG: 1 INJECTION, SOLUTION INTRAMUSCULAR; INTRAVENOUS; SUBCUTANEOUS at 02:10

## 2017-10-01 RX ADMIN — PRENATAL VIT W/ FE FUMARATE-FA TAB 27-0.8 MG 1 EACH: 27-0.8 TAB at 08:10

## 2017-10-01 RX ADMIN — DIPHENHYDRAMINE HYDROCHLORIDE 12.5 MG: 50 INJECTION, SOLUTION INTRAMUSCULAR; INTRAVENOUS at 10:10

## 2017-10-01 RX ADMIN — OXYCODONE HYDROCHLORIDE 10 MG: 5 TABLET ORAL at 01:10

## 2017-10-01 RX ADMIN — SODIUM CHLORIDE: 0.45 INJECTION, SOLUTION INTRAVENOUS at 10:10

## 2017-10-01 RX ADMIN — GABAPENTIN 300 MG: 100 CAPSULE ORAL at 09:10

## 2017-10-01 RX ADMIN — HYDROMORPHONE HYDROCHLORIDE 0.5 MG: 1 INJECTION, SOLUTION INTRAMUSCULAR; INTRAVENOUS; SUBCUTANEOUS at 07:10

## 2017-10-01 RX ADMIN — HYDROCORTISONE: 1 CREAM TOPICAL at 08:10

## 2017-10-01 RX ADMIN — OXYCODONE HYDROCHLORIDE 10 MG: 5 TABLET ORAL at 12:10

## 2017-10-01 RX ADMIN — MORPHINE SULFATE 60 MG: 15 TABLET, EXTENDED RELEASE ORAL at 05:10

## 2017-10-01 RX ADMIN — DIPHENHYDRAMINE HYDROCHLORIDE 12.5 MG: 50 INJECTION, SOLUTION INTRAMUSCULAR; INTRAVENOUS at 07:10

## 2017-10-01 RX ADMIN — SODIUM CHLORIDE, SODIUM LACTATE, POTASSIUM CHLORIDE, AND CALCIUM CHLORIDE 500 ML: 600; 310; 30; 20 INJECTION, SOLUTION INTRAVENOUS at 09:10

## 2017-10-01 RX ADMIN — GABAPENTIN 300 MG: 100 CAPSULE ORAL at 01:10

## 2017-10-01 RX ADMIN — DIPHENHYDRAMINE HYDROCHLORIDE 12.5 MG: 50 INJECTION, SOLUTION INTRAMUSCULAR; INTRAVENOUS at 03:10

## 2017-10-01 RX ADMIN — CETIRIZINE HYDROCHLORIDE 5 MG: 5 TABLET, FILM COATED ORAL at 08:10

## 2017-10-01 RX ADMIN — SODIUM CHLORIDE: 0.45 INJECTION, SOLUTION INTRAVENOUS at 05:10

## 2017-10-01 RX ADMIN — FOLIC ACID 4 MG: 1 TABLET ORAL at 08:10

## 2017-10-01 RX ADMIN — DIPHENHYDRAMINE HYDROCHLORIDE 12.5 MG: 50 INJECTION, SOLUTION INTRAMUSCULAR; INTRAVENOUS at 02:10

## 2017-10-01 RX ADMIN — GABAPENTIN 100 MG: 100 CAPSULE ORAL at 05:10

## 2017-10-01 RX ADMIN — HYDROCORTISONE: 1 CREAM TOPICAL at 09:10

## 2017-10-01 RX ADMIN — SODIUM CHLORIDE: 0.45 INJECTION, SOLUTION INTRAVENOUS at 01:10

## 2017-10-01 RX ADMIN — ASPIRIN 81 MG CHEWABLE TABLET 81 MG: 81 TABLET CHEWABLE at 08:10

## 2017-10-01 RX ADMIN — OXYCODONE HYDROCHLORIDE 10 MG: 5 TABLET ORAL at 07:10

## 2017-10-01 RX ADMIN — AMOXICILLIN AND CLAVULANATE POTASSIUM 1 TABLET: 875; 125 TABLET, FILM COATED ORAL at 08:10

## 2017-10-01 RX ADMIN — GUAIFENESIN 600 MG: 600 TABLET, EXTENDED RELEASE ORAL at 09:10

## 2017-10-01 RX ADMIN — OXYCODONE HYDROCHLORIDE 10 MG: 5 TABLET ORAL at 08:10

## 2017-10-01 RX ADMIN — GUAIFENESIN 600 MG: 600 TABLET, EXTENDED RELEASE ORAL at 08:10

## 2017-10-01 RX ADMIN — AMOXICILLIN AND CLAVULANATE POTASSIUM 1 TABLET: 875; 125 TABLET, FILM COATED ORAL at 09:10

## 2017-10-01 RX ADMIN — HYDROMORPHONE HYDROCHLORIDE 0.5 MG: 1 INJECTION, SOLUTION INTRAMUSCULAR; INTRAVENOUS; SUBCUTANEOUS at 10:10

## 2017-10-01 NOTE — PLAN OF CARE
Problem: Patient Care Overview  Goal: Plan of Care Review  Outcome: Ongoing (interventions implemented as appropriate)  Plan of care reviewed with Pt. Pt remains free from falls and injuries. Pt reports positive fetal movement. Pt denies ctx, LOF, and vaginal bleeding. Pt c/o upper back and clavicle pain throughout night, PRN medications administered. Vital signs stable. Pt tolerated diet. No distress noted, will continue to monitor.

## 2017-10-01 NOTE — PROGRESS NOTES
[facility]  Obstetrics  Antepartum Progress Note    Patient Name: Nishi Dumont  MRN: [id number]  Admission Date: 2017  Hospital Length of Stay: 2 days  Attending Physician: Trixie Mejía MD  Primary Care Provider: Primary Doctor No    Subjective:     Principal Problem:Sickle cell pain crisis    HPI:  Nishi Dumont is a 26 y.o. F at 28w0d with significant hx of sickle cell disease who presents complaining of back, rib, and shoulder pain consistent with her usual crisis x2 days. States her pain is 10/10 despite taking MS contin at home scheduled and percocet 2-3 times per day for breakthrough pain. She also reports feeling sick since her last admission. She was told previous that she had a viral URI and was managed with supportive care. Symptoms have not yet resolved and 2 days ago she started having purulent nasal discharge and pain in her upper right jaw with chewing. Denies fever, chills, nausea, vomiting. No pregnancy complaints at this time. Denies ctx, VB, LOF. Good FM.     Patient has been admitted multiple times throughout her pregnancy for sickle cell crisis, approximately every 2 weeks per patient. Her most recent sickle cell crisis admission was 3 weeks ago, at which time she received 2u pRBC, whiched helped. She was discharged on MS contin 60mg BID, oxyIR prn. Her primary MFM is Dr. Armenta at [facility].     Prior to this pregnancy, she had fewer crises (1/month). Patient only had 1 crisis in her previous pregnancy 7 years ago. 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 She has a history of multiple blood transfusions. This IUP also complicated by a history of pre-eclampsia in prior pregnancy and GBS bacteruria.  Patient denies CP, SOB, dysuria, hematuria, HA, leg swelling, visual disturbances.    Hospital Course:  2017 - admitted for presumed sickle cell  "crisis. H/H 8/24. Pain regimen switch to MS contin 60mg BID, ox IR, and dilaudid. No acute evens  9/30/2017 - Pain somewhat worsening but patient not requesting increasing doses of pain medication, intermittent O2 desaturations resolved with IS usage, discussed blood transfusion with patient   10/01/2017 - Again reports pain is worsened but not requiring additional pain medication. No SOB. Otherwise stable.    Obstetric HPI:  Again reports pain is worsened but not requiring additional pain medication, pain in the right side over the ribs. "sharp, stabbing" similar in nature to previous pain crisis. No SOB. Otherwise stable. O2 was not on at the time of my exam, d/w pt that this may help sx. Asked nursing to place O2 NC.    Patient reports No contractions, active fetal movement, absent vaginal bleeding , absent loss of fluid      Objective:     Vital Signs (Most Recent):  Temp: 98.1 °F (36.7 °C) (10/01/17 0354)  Pulse: 81 (10/01/17 0401)  Resp: 18 (10/01/17 0354)  BP: (!) 102/59 (10/01/17 0354)  SpO2: 100 % (10/01/17 0401) Vital Signs (24h Range):  Temp:  [97.7 °F (36.5 °C)-98.3 °F (36.8 °C)] 98.1 °F (36.7 °C)  Pulse:  [] 81  Resp:  [16-20] 18  SpO2:  [94 %-100 %] 100 %  BP: (102-126)/(56-75) 102/59     Weight: 59.9 kg (132 lb)  Body mass index is 26.65 kg/m².    FHT: 125, +acels, Cat 1 (reassuring)  TOCO: no ctx      Intake/Output Summary (Last 24 hours) at 10/01/17 0641  Last data filed at 10/01/17 0538   Gross per 24 hour   Intake          3590.83 ml   Output             3100 ml   Net           490.83 ml       Cervical Exam:deferred     Significant Labs:  Recent Lab Results       10/01/17  0415      Albumin 2.7(L)     Alkaline Phosphatase 102     ALT 27     Anion Gap 9     AST 33     Baso # 0.05     Basophil% 0.4     Total Bilirubin 3.5  Comment:  For infants and newborns, interpretation of results should be based  on gestational age, weight and in agreement with clinical  observations.  Premature Infant " recommended reference ranges:  Up to 24 hours.............<8.0 mg/dL  Up to 48 hours............<12.0 mg/dL  3-5 days..................<15.0 mg/dL  6-29 days.................<15.0 mg/dL  (H)     BUN, Bld 3(L)     Calcium 8.7     Chloride 110     CO2 20(L)     Creatinine 0.6     Differential Method Automated     eGFR if  >60     eGFR if non  >60  Comment:  Calculation used to obtain the estimated glomerular filtration  rate (eGFR) is the CKD-EPI equation. Since race is unknown   in our information system, the eGFR values for   -American and Non--American patients are given   for each creatinine result.       Eos # 0.5     Eosinophil% 3.3     Glucose 76     Gran # 8.5(H)     Gran% 61.4     Hematocrit 27.6(L)     Hemoglobin 9.4(L)     Lymph # 3.2     Lymph% 23.0     MCH 29.3     MCHC 34.1     MCV 86     Mono # 1.5(H)     Mono% 10.5     MPV 8.9(L)     Platelets 386(H)     Potassium 3.7     Total Protein 6.1     RBC 3.21(L)     RDW 16.9(H)     Sodium 139     WBC 13.84(H)           Physical Exam:   Constitutional: She is oriented to person, place, and time. She appears well-developed and well-nourished. No distress.    HENT:   Head: Normocephalic and atraumatic.     Neck: Normal range of motion. Neck supple.    Cardiovascular: Normal rate and regular rhythm.     Pulmonary/Chest: Effort normal and breath sounds normal.        Abdominal: Soft. She exhibits no distension. There is no tenderness. There is no guarding.   Gravid uterus, non-tender                 Neurological: She is alert and oriented to person, place, and time.    Skin: Skin is warm and dry.    Psychiatric: She has a normal mood and affect. Her behavior is normal.       Assessment/Plan:     26 y.o. female  at 28w2d for:    * Sickle cell pain crisis    - IV fluids, consider increase in rate? - 1/2 NS 2/2 increased CL yesterday, CL now normal  - Yuridia'd MS contin, oxy IR prn,IV dilaudid PRN,  - NST BID  - T bili  3.8 decreased to 3.5, RC 5.4  - Hgb S electrophoresis pending  - h/h gradually trending downwards, 8.7/24-7.6/22, received 1 u pRBC yesterday, now 9.4/27.6  - UCx negative, BCx NGTD x2d, WBC trending down 18>>13  - cont ASA 81, folic acid 4mg daily  - CMP WNL           Hypotension    - Episodes of hypotension since resolved although with initial concerns for sepsis blood cx drawn-  blood cultures prelim negative        Sinusitis    - suspect bacterial sinus infection   - augmentin BID x10 days  - afebrile, VSS  - monitor closely  - rapid strep  - supportive measures including mucinex BID, saline nasal spray prn        History of pre-eclampsia    - normotensive  - baseline labs normal last admission, P/C too low to calculate              Thais Blandon MD  Obstetrics  [facility]

## 2017-10-01 NOTE — PLAN OF CARE
Problem: Patient Care Overview  Goal: Plan of Care Review  Patient resting quietly. Hourly rounding performed. Patients complains of upper back pain. Pain medicine given PRN. Vital signs stable. Patient remained afebrile throughout shift. NST BID reactive. No apparent distress noted. Call light within reach. No falls or injuries noted.

## 2017-10-01 NOTE — PLAN OF CARE
Problem: Patient Care Overview  Goal: Plan of Care Review  Outcome: Ongoing (interventions implemented as appropriate)  Patient in no apparent distress. Sat's  98 % on  Room air. IS done. Will continue to monitor.

## 2017-10-01 NOTE — PLAN OF CARE
Problem: Patient Care Overview  Goal: Plan of Care Review  Outcome: Ongoing (interventions implemented as appropriate)  Pt received on room air with adequate saturation. Incentive spirometer done, achieves 1000cc volume./

## 2017-10-01 NOTE — SUBJECTIVE & OBJECTIVE
"Obstetric HPI:  Again reports pain is worsened but not requiring additional pain medication, pain in the right side over the ribs. "sharp, stabbing" similar in nature to previous pain crisis. No SOB. Otherwise stable. O2 was not on at the time of my exam, d/w pt that this may help sx. Asked nursing to place O2 NC.    Patient reports No contractions, active fetal movement, absent vaginal bleeding , absent loss of fluid      Objective:     Vital Signs (Most Recent):  Temp: 98.1 °F (36.7 °C) (10/01/17 0354)  Pulse: 81 (10/01/17 0401)  Resp: 18 (10/01/17 0354)  BP: (!) 102/59 (10/01/17 0354)  SpO2: 100 % (10/01/17 0401) Vital Signs (24h Range):  Temp:  [97.7 °F (36.5 °C)-98.3 °F (36.8 °C)] 98.1 °F (36.7 °C)  Pulse:  [] 81  Resp:  [16-20] 18  SpO2:  [94 %-100 %] 100 %  BP: (102-126)/(56-75) 102/59     Weight: 59.9 kg (132 lb)  Body mass index is 26.65 kg/m².    FHT: 125, +acels, Cat 1 (reassuring)  TOCO: no ctx      Intake/Output Summary (Last 24 hours) at 10/01/17 0641  Last data filed at 10/01/17 0538   Gross per 24 hour   Intake          3590.83 ml   Output             3100 ml   Net           490.83 ml       Cervical Exam:deferred     Significant Labs:  Recent Lab Results       10/01/17  0415      Albumin 2.7(L)     Alkaline Phosphatase 102     ALT 27     Anion Gap 9     AST 33     Baso # 0.05     Basophil% 0.4     Total Bilirubin 3.5  Comment:  For infants and newborns, interpretation of results should be based  on gestational age, weight and in agreement with clinical  observations.  Premature Infant recommended reference ranges:  Up to 24 hours.............<8.0 mg/dL  Up to 48 hours............<12.0 mg/dL  3-5 days..................<15.0 mg/dL  6-29 days.................<15.0 mg/dL  (H)     BUN, Bld 3(L)     Calcium 8.7     Chloride 110     CO2 20(L)     Creatinine 0.6     Differential Method Automated     eGFR if  >60     eGFR if non  >60  Comment:  Calculation used to obtain " the estimated glomerular filtration  rate (eGFR) is the CKD-EPI equation. Since race is unknown   in our information system, the eGFR values for   -American and Non--American patients are given   for each creatinine result.       Eos # 0.5     Eosinophil% 3.3     Glucose 76     Gran # 8.5(H)     Gran% 61.4     Hematocrit 27.6(L)     Hemoglobin 9.4(L)     Lymph # 3.2     Lymph% 23.0     MCH 29.3     MCHC 34.1     MCV 86     Mono # 1.5(H)     Mono% 10.5     MPV 8.9(L)     Platelets 386(H)     Potassium 3.7     Total Protein 6.1     RBC 3.21(L)     RDW 16.9(H)     Sodium 139     WBC 13.84(H)           Physical Exam:   Constitutional: She is oriented to person, place, and time. She appears well-developed and well-nourished. No distress.    HENT:   Head: Normocephalic and atraumatic.     Neck: Normal range of motion. Neck supple.    Cardiovascular: Normal rate and regular rhythm.     Pulmonary/Chest: Effort normal and breath sounds normal.        Abdominal: Soft. She exhibits no distension. There is no tenderness. There is no guarding.   Gravid uterus, non-tender                 Neurological: She is alert and oriented to person, place, and time.    Skin: Skin is warm and dry.    Psychiatric: She has a normal mood and affect. Her behavior is normal.

## 2017-10-01 NOTE — ASSESSMENT & PLAN NOTE
- IV fluids, consider increase in rate? - 1/2 NS 2/2 increased CL yesterday, CL now normal  - Yuridia'd MS contin, oxy IR prn,IV dilaudid PRN,  - NST BID  - T bili 3.8 decreased to 3.5, RC 5.4  - Hgb S electrophoresis pending  - h/h gradually trending downwards, 8.7/24-7.6/22, received 1 u pRBC yesterday, now 9.4/27.6  - UCx negative, BCx NGTD x2d, WBC trending down 18>>13  - cont ASA 81, folic acid 4mg daily  - CMP WNL

## 2017-10-02 LAB
ALBUMIN SERPL BCP-MCNC: 2.7 G/DL
ALP SERPL-CCNC: 98 U/L
ALT SERPL W/O P-5'-P-CCNC: 28 U/L
ANION GAP SERPL CALC-SCNC: 11 MMOL/L
AST SERPL-CCNC: 33 U/L
BASOPHILS # BLD AUTO: 0.04 K/UL
BASOPHILS NFR BLD: 0.3 %
BILIRUB SERPL-MCNC: 3.1 MG/DL
BUN SERPL-MCNC: 3 MG/DL
CALCIUM SERPL-MCNC: 8.6 MG/DL
CHLORIDE SERPL-SCNC: 109 MMOL/L
CO2 SERPL-SCNC: 18 MMOL/L
CREAT SERPL-MCNC: 0.6 MG/DL
DIFFERENTIAL METHOD: ABNORMAL
EOSINOPHIL # BLD AUTO: 0.7 K/UL
EOSINOPHIL NFR BLD: 4.9 %
ERYTHROCYTE [DISTWIDTH] IN BLOOD BY AUTOMATED COUNT: 17.3 %
EST. GFR  (AFRICAN AMERICAN): >60 ML/MIN/1.73 M^2
EST. GFR  (NON AFRICAN AMERICAN): >60 ML/MIN/1.73 M^2
GLUCOSE SERPL-MCNC: 78 MG/DL
HCT VFR BLD AUTO: 28.4 %
HGB BLD-MCNC: 9.6 G/DL
HGB FRACT BLD ELPH-IMP: NORMAL
HGB S MFR BLD ELPH: 41 %
LYMPHOCYTES # BLD AUTO: 2.6 K/UL
LYMPHOCYTES NFR BLD: 18.5 %
MCH RBC QN AUTO: 29.4 PG
MCHC RBC AUTO-ENTMCNC: 33.8 G/DL
MCV RBC AUTO: 87 FL
MONOCYTES # BLD AUTO: 1.3 K/UL
MONOCYTES NFR BLD: 9.1 %
NEUTROPHILS # BLD AUTO: 9.2 K/UL
NEUTROPHILS NFR BLD: 66 %
PLATELET # BLD AUTO: 334 K/UL
PMV BLD AUTO: 9 FL
POTASSIUM SERPL-SCNC: 3.9 MMOL/L
PROT SERPL-MCNC: 6 G/DL
RBC # BLD AUTO: 3.26 M/UL
SODIUM SERPL-SCNC: 138 MMOL/L
WBC # BLD AUTO: 13.93 K/UL

## 2017-10-02 PROCEDURE — 25000003 PHARM REV CODE 250: Performed by: STUDENT IN AN ORGANIZED HEALTH CARE EDUCATION/TRAINING PROGRAM

## 2017-10-02 PROCEDURE — 63600175 PHARM REV CODE 636 W HCPCS: Performed by: STUDENT IN AN ORGANIZED HEALTH CARE EDUCATION/TRAINING PROGRAM

## 2017-10-02 PROCEDURE — 80053 COMPREHEN METABOLIC PANEL: CPT

## 2017-10-02 PROCEDURE — 25000003 PHARM REV CODE 250: Performed by: OBSTETRICS & GYNECOLOGY

## 2017-10-02 PROCEDURE — 11000001 HC ACUTE MED/SURG PRIVATE ROOM

## 2017-10-02 PROCEDURE — 59025 FETAL NON-STRESS TEST: CPT | Mod: 26,,, | Performed by: OBSTETRICS & GYNECOLOGY

## 2017-10-02 PROCEDURE — 99900035 HC TECH TIME PER 15 MIN (STAT)

## 2017-10-02 PROCEDURE — 99231 SBSQ HOSP IP/OBS SF/LOW 25: CPT | Mod: 25,,, | Performed by: OBSTETRICS & GYNECOLOGY

## 2017-10-02 PROCEDURE — 85025 COMPLETE CBC W/AUTO DIFF WBC: CPT

## 2017-10-02 PROCEDURE — 36415 COLL VENOUS BLD VENIPUNCTURE: CPT

## 2017-10-02 RX ADMIN — ASPIRIN 81 MG CHEWABLE TABLET 81 MG: 81 TABLET CHEWABLE at 08:10

## 2017-10-02 RX ADMIN — GUAIFENESIN 600 MG: 600 TABLET, EXTENDED RELEASE ORAL at 08:10

## 2017-10-02 RX ADMIN — OXYCODONE HYDROCHLORIDE 10 MG: 5 TABLET ORAL at 12:10

## 2017-10-02 RX ADMIN — OXYCODONE HYDROCHLORIDE 10 MG: 5 TABLET ORAL at 04:10

## 2017-10-02 RX ADMIN — HYDROMORPHONE HYDROCHLORIDE 0.5 MG: 1 INJECTION, SOLUTION INTRAMUSCULAR; INTRAVENOUS; SUBCUTANEOUS at 10:10

## 2017-10-02 RX ADMIN — ENOXAPARIN SODIUM 40 MG: 100 INJECTION SUBCUTANEOUS at 09:10

## 2017-10-02 RX ADMIN — GUAIFENESIN 600 MG: 600 TABLET, EXTENDED RELEASE ORAL at 09:10

## 2017-10-02 RX ADMIN — FOLIC ACID 4 MG: 1 TABLET ORAL at 08:10

## 2017-10-02 RX ADMIN — HYDROCORTISONE: 1 CREAM TOPICAL at 09:10

## 2017-10-02 RX ADMIN — AMOXICILLIN AND CLAVULANATE POTASSIUM 1 TABLET: 875; 125 TABLET, FILM COATED ORAL at 08:10

## 2017-10-02 RX ADMIN — OXYCODONE HYDROCHLORIDE 10 MG: 5 TABLET ORAL at 10:10

## 2017-10-02 RX ADMIN — HYDROMORPHONE HYDROCHLORIDE 0.5 MG: 1 INJECTION, SOLUTION INTRAMUSCULAR; INTRAVENOUS; SUBCUTANEOUS at 05:10

## 2017-10-02 RX ADMIN — HYDROMORPHONE HYDROCHLORIDE 0.5 MG: 1 INJECTION, SOLUTION INTRAMUSCULAR; INTRAVENOUS; SUBCUTANEOUS at 12:10

## 2017-10-02 RX ADMIN — CETIRIZINE HYDROCHLORIDE 5 MG: 5 TABLET, FILM COATED ORAL at 08:10

## 2017-10-02 RX ADMIN — OXYCODONE HYDROCHLORIDE 10 MG: 5 TABLET ORAL at 05:10

## 2017-10-02 RX ADMIN — GABAPENTIN 300 MG: 100 CAPSULE ORAL at 02:10

## 2017-10-02 RX ADMIN — DIPHENHYDRAMINE HYDROCHLORIDE 12.5 MG: 50 INJECTION, SOLUTION INTRAMUSCULAR; INTRAVENOUS at 12:10

## 2017-10-02 RX ADMIN — AMOXICILLIN AND CLAVULANATE POTASSIUM 1 TABLET: 875; 125 TABLET, FILM COATED ORAL at 09:10

## 2017-10-02 RX ADMIN — OXYCODONE HYDROCHLORIDE 10 MG: 5 TABLET ORAL at 08:10

## 2017-10-02 RX ADMIN — PRENATAL VIT W/ FE FUMARATE-FA TAB 27-0.8 MG 1 EACH: 27-0.8 TAB at 08:10

## 2017-10-02 RX ADMIN — MORPHINE SULFATE 60 MG: 15 TABLET, EXTENDED RELEASE ORAL at 06:10

## 2017-10-02 RX ADMIN — HYDROMORPHONE HYDROCHLORIDE 0.5 MG: 1 INJECTION, SOLUTION INTRAMUSCULAR; INTRAVENOUS; SUBCUTANEOUS at 07:10

## 2017-10-02 RX ADMIN — MORPHINE SULFATE 60 MG: 15 TABLET, EXTENDED RELEASE ORAL at 05:10

## 2017-10-02 RX ADMIN — DIPHENHYDRAMINE HYDROCHLORIDE 12.5 MG: 50 INJECTION, SOLUTION INTRAMUSCULAR; INTRAVENOUS at 02:10

## 2017-10-02 RX ADMIN — DIPHENHYDRAMINE HYDROCHLORIDE 12.5 MG: 50 INJECTION, SOLUTION INTRAMUSCULAR; INTRAVENOUS at 05:10

## 2017-10-02 RX ADMIN — DIPHENHYDRAMINE HYDROCHLORIDE 12.5 MG: 50 INJECTION, SOLUTION INTRAMUSCULAR; INTRAVENOUS at 10:10

## 2017-10-02 RX ADMIN — GABAPENTIN 300 MG: 100 CAPSULE ORAL at 09:10

## 2017-10-02 RX ADMIN — SODIUM CHLORIDE: 0.45 INJECTION, SOLUTION INTRAVENOUS at 11:10

## 2017-10-02 RX ADMIN — DIPHENHYDRAMINE HYDROCHLORIDE 12.5 MG: 50 INJECTION, SOLUTION INTRAMUSCULAR; INTRAVENOUS at 07:10

## 2017-10-02 RX ADMIN — HYDROMORPHONE HYDROCHLORIDE 0.5 MG: 1 INJECTION, SOLUTION INTRAMUSCULAR; INTRAVENOUS; SUBCUTANEOUS at 02:10

## 2017-10-02 RX ADMIN — SODIUM CHLORIDE: 0.45 INJECTION, SOLUTION INTRAVENOUS at 07:10

## 2017-10-02 RX ADMIN — GABAPENTIN 300 MG: 100 CAPSULE ORAL at 06:10

## 2017-10-02 NOTE — ASSESSMENT & PLAN NOTE
- IV fluids, 1/2 NS at 125 cc/hr  - Yuridia'd MS contin 60 mg BID, gabapentin 300 mg TID, oxy IR 10 prn, IV dilaudid PRN,  - NST BID  - T bili 3.8 >> 3.1, RC 5.4  - Hgb S electrophoresis pending  - H/h 8.7/24 --> 7.6/22, received 1 u pRBC 9/30 --> 9.4/27.6  - UCx negative, BCx NGTD x2d, WBC trending down 18>>13   - cont ASA 81, folic acid 4mg daily  - AM CBC 13.9/9.6/28.4  - CMP WNL

## 2017-10-02 NOTE — PROGRESS NOTES
[facility]  Obstetrics  Antepartum Progress Note    Patient Name: Nishi Dumont  MRN: [id number]  Admission Date: 2017  Hospital Length of Stay: 3 days  Attending Physician: Trixie Mejía MD  Primary Care Provider: Primary Doctor No    Subjective:     Principal Problem:Sickle cell pain crisis    HPI:  Nishi Dumont is a 26 y.o. F at 28w0d with significant hx of sickle cell disease who presents complaining of back, rib, and shoulder pain consistent with her usual crisis x2 days. States her pain is 10/10 despite taking MS contin at home scheduled and percocet 2-3 times per day for breakthrough pain. She also reports feeling sick since her last admission. She was told previous that she had a viral URI and was managed with supportive care. Symptoms have not yet resolved and 2 days ago she started having purulent nasal discharge and pain in her upper right jaw with chewing. Denies fever, chills, nausea, vomiting. No pregnancy complaints at this time. Denies ctx, VB, LOF. Good FM.     Patient has been admitted multiple times throughout her pregnancy for sickle cell crisis, approximately every 2 weeks per patient. Her most recent sickle cell crisis admission was 3 weeks ago, at which time she received 2u pRBC, whiched helped. She was discharged on MS contin 60mg BID, oxyIR prn. Her primary MFM is Dr. Armenta at [facility].     Prior to this pregnancy, she had fewer crises (1/month). Patient only had 1 crisis in her previous pregnancy 7 years ago. 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 She has a history of multiple blood transfusions. This IUP also complicated by a history of pre-eclampsia in prior pregnancy and GBS bacteruria.  Patient denies CP, SOB, dysuria, hematuria, HA, leg swelling, visual disturbances.    Hospital Course:  2017 - admitted for presumed sickle cell  "crisis. H/H 8/24. Pain regimen switch to MS contin 60mg BID, ox IR, and dilaudid. No acute evens  9/30/2017 - Pain somewhat worsening but patient not requesting increasing doses of pain medication, intermittent O2 desaturations resolved with IS usage, discussed blood transfusion with patient.    10/01/2017 - Again reports pain is worsened but not requiring additional pain medication. No SOB. Otherwise stable. S/P 1u pRBC 9/30.   10/02/2017- Patient reports continued pain in right ribs. No SOB. Gabapentin increased to 300 TID yesterday. Continued on MS contin 60 mg BID, Oxy IR 10 PRN, Dilaudid PRN.     Obstetric HPI:  Again reports pain is stable, located in right ribs.  She describes the pain as "sharp, stabbing" similar in nature to previous pain crisis. No SOB. Otherwise stable. O2 sats 96%.    Patient reports No contractions, active fetal movement, absent vaginal bleeding , absent loss of fluid      Objective:     Vital Signs (Most Recent):  Temp: 97.3 °F (36.3 °C) (10/02/17 0406)  Pulse: 106 (10/02/17 0443)  Resp: 16 (10/02/17 0406)  BP: 99/61 (10/02/17 0406)  SpO2: (!) 94 % (10/02/17 0443) Vital Signs (24h Range):  Temp:  [97.3 °F (36.3 °C)-98.6 °F (37 °C)] 97.3 °F (36.3 °C)  Pulse:  [] 106  Resp:  [16-17] 16  SpO2:  [88 %-100 %] 94 %  BP: ()/(53-73) 99/61     Weight: 59.9 kg (132 lb)  Body mass index is 26.66 kg/m².    FHT: 125, +acels, Cat 1 (reassuring)  TOCO: no ctx      Intake/Output Summary (Last 24 hours) at 10/02/17 0645  Last data filed at 10/01/17 1833   Gross per 24 hour   Intake          4359.99 ml   Output             1800 ml   Net          2559.99 ml       Cervical Exam:deferred     Significant Labs:  Recent Lab Results       10/02/17  0419      Albumin 2.7(L)     Alkaline Phosphatase 98     ALT 28     Anion Gap 11     AST 33     Baso # 0.04     Basophil% 0.3     Total Bilirubin 3.1  Comment:  For infants and newborns, interpretation of results should be based  on gestational age, " weight and in agreement with clinical  observations.  Premature Infant recommended reference ranges:  Up to 24 hours.............<8.0 mg/dL  Up to 48 hours............<12.0 mg/dL  3-5 days..................<15.0 mg/dL  6-29 days.................<15.0 mg/dL  (H)     BUN, Bld 3(L)     Calcium 8.6(L)     Chloride 109     CO2 18(L)     Creatinine 0.6     Differential Method Automated     eGFR if  >60     eGFR if non  >60  Comment:  Calculation used to obtain the estimated glomerular filtration  rate (eGFR) is the CKD-EPI equation. Since race is unknown   in our information system, the eGFR values for   -American and Non--American patients are given   for each creatinine result.       Eos # 0.7(H)     Eosinophil% 4.9     Glucose 78     Gran # 9.2(H)     Gran% 66.0     Hematocrit 28.4(L)     Hemoglobin 9.6(L)     Lymph # 2.6     Lymph% 18.5     MCH 29.4     MCHC 33.8     MCV 87     Mono # 1.3(H)     Mono% 9.1     MPV 9.0(L)     Platelets 334     Potassium 3.9     Total Protein 6.0     RBC 3.26(L)     RDW 17.3(H)     Sodium 138     WBC 13.93(H)           Physical Exam:   Constitutional: She is oriented to person, place, and time. She appears well-developed and well-nourished. No distress.    HENT:   Head: Normocephalic and atraumatic.     Neck: Normal range of motion. Neck supple.    Cardiovascular: Normal rate and regular rhythm.     Pulmonary/Chest: Effort normal and breath sounds normal.        Abdominal: Soft. She exhibits no distension. There is no tenderness. There is no guarding.   Gravid uterus, non-tender                 Neurological: She is alert and oriented to person, place, and time.    Skin: Skin is warm and dry.    Psychiatric: She has a normal mood and affect. Her behavior is normal.       Assessment/Plan:     26 y.o. female  at 28w3d for:    * Sickle cell pain crisis    - IV fluids, 1/2 NS at 125 cc/hr  - Yuridia'd MS contin 60 mg BID, gabapentin 300 mg TID,  oxy IR 10 prn, IV dilaudid PRN,  - NST BID  - T bili 3.8 >> 3.1, RC 5.4  - Hgb S electrophoresis pending  - H/h 8.7/24 --> 7.6/22, received 1 u pRBC 9/30 --> 9.4/27.6  - UCx negative, BCx NGTD x2d, WBC trending down 18>>13   - cont ASA 81, folic acid 4mg daily  - AM CBC 13.9/9.6/28.4  - CMP WNL            Hypotension    - Episodes of hypotension since resolved although with initial concerns for sepsis blood cx drawn-  blood cultures prelim negative        Sinusitis    - suspect bacterial sinus infection   - augmentin BID x10 days  - afebrile, VSS  - monitor closely  - rapid strep negative  - supportive measures including mucinex BID, saline nasal spray prn        History of pre-eclampsia    - normotensive  - baseline labs normal last admission, P/C too low to calculate              Tiny Alexander MD  Obstetrics  [facility]

## 2017-10-02 NOTE — SUBJECTIVE & OBJECTIVE
"Obstetric HPI:  Again reports pain is stable, located in right ribs.  She describes the pain as "sharp, stabbing" similar in nature to previous pain crisis. No SOB. Otherwise stable. O2 sats 96%.    Patient reports No contractions, active fetal movement, absent vaginal bleeding , absent loss of fluid      Objective:     Vital Signs (Most Recent):  Temp: 97.3 °F (36.3 °C) (10/02/17 0406)  Pulse: 106 (10/02/17 0443)  Resp: 16 (10/02/17 0406)  BP: 99/61 (10/02/17 0406)  SpO2: (!) 94 % (10/02/17 0443) Vital Signs (24h Range):  Temp:  [97.3 °F (36.3 °C)-98.6 °F (37 °C)] 97.3 °F (36.3 °C)  Pulse:  [] 106  Resp:  [16-17] 16  SpO2:  [88 %-100 %] 94 %  BP: ()/(53-73) 99/61     Weight: 59.9 kg (132 lb)  Body mass index is 26.66 kg/m².    FHT: 125, +acels, Cat 1 (reassuring)  TOCO: no ctx      Intake/Output Summary (Last 24 hours) at 10/02/17 0645  Last data filed at 10/01/17 1833   Gross per 24 hour   Intake          4359.99 ml   Output             1800 ml   Net          2559.99 ml       Cervical Exam:deferred     Significant Labs:  Recent Lab Results       10/02/17  0419      Albumin 2.7(L)     Alkaline Phosphatase 98     ALT 28     Anion Gap 11     AST 33     Baso # 0.04     Basophil% 0.3     Total Bilirubin 3.1  Comment:  For infants and newborns, interpretation of results should be based  on gestational age, weight and in agreement with clinical  observations.  Premature Infant recommended reference ranges:  Up to 24 hours.............<8.0 mg/dL  Up to 48 hours............<12.0 mg/dL  3-5 days..................<15.0 mg/dL  6-29 days.................<15.0 mg/dL  (H)     BUN, Bld 3(L)     Calcium 8.6(L)     Chloride 109     CO2 18(L)     Creatinine 0.6     Differential Method Automated     eGFR if  >60     eGFR if non  >60  Comment:  Calculation used to obtain the estimated glomerular filtration  rate (eGFR) is the CKD-EPI equation. Since race is unknown   in our information " system, the eGFR values for   -American and Non--American patients are given   for each creatinine result.       Eos # 0.7(H)     Eosinophil% 4.9     Glucose 78     Gran # 9.2(H)     Gran% 66.0     Hematocrit 28.4(L)     Hemoglobin 9.6(L)     Lymph # 2.6     Lymph% 18.5     MCH 29.4     MCHC 33.8     MCV 87     Mono # 1.3(H)     Mono% 9.1     MPV 9.0(L)     Platelets 334     Potassium 3.9     Total Protein 6.0     RBC 3.26(L)     RDW 17.3(H)     Sodium 138     WBC 13.93(H)           Physical Exam:   Constitutional: She is oriented to person, place, and time. She appears well-developed and well-nourished. No distress.    HENT:   Head: Normocephalic and atraumatic.     Neck: Normal range of motion. Neck supple.    Cardiovascular: Normal rate and regular rhythm.     Pulmonary/Chest: Effort normal and breath sounds normal.        Abdominal: Soft. She exhibits no distension. There is no tenderness. There is no guarding.   Gravid uterus, non-tender                 Neurological: She is alert and oriented to person, place, and time.    Skin: Skin is warm and dry.    Psychiatric: She has a normal mood and affect. Her behavior is normal.

## 2017-10-02 NOTE — ASSESSMENT & PLAN NOTE
- suspect bacterial sinus infection   - augmentin BID x10 days  - afebrile, VSS  - monitor closely  - rapid strep negative  - supportive measures including mucinex BID, saline nasal spray prn

## 2017-10-03 LAB
ALBUMIN SERPL BCP-MCNC: 2.5 G/DL
ALP SERPL-CCNC: 92 U/L
ALT SERPL W/O P-5'-P-CCNC: 27 U/L
ANION GAP SERPL CALC-SCNC: 9 MMOL/L
AST SERPL-CCNC: 31 U/L
BASOPHILS # BLD AUTO: 0.04 K/UL
BASOPHILS NFR BLD: 0.3 %
BILIRUB SERPL-MCNC: 2.8 MG/DL
BUN SERPL-MCNC: 4 MG/DL
CALCIUM SERPL-MCNC: 8.2 MG/DL
CHLORIDE SERPL-SCNC: 109 MMOL/L
CO2 SERPL-SCNC: 18 MMOL/L
CREAT SERPL-MCNC: 0.6 MG/DL
DIFFERENTIAL METHOD: ABNORMAL
EOSINOPHIL # BLD AUTO: 0.7 K/UL
EOSINOPHIL NFR BLD: 5.4 %
ERYTHROCYTE [DISTWIDTH] IN BLOOD BY AUTOMATED COUNT: 17.2 %
EST. GFR  (AFRICAN AMERICAN): >60 ML/MIN/1.73 M^2
EST. GFR  (NON AFRICAN AMERICAN): >60 ML/MIN/1.73 M^2
GLUCOSE SERPL-MCNC: 102 MG/DL
HCT VFR BLD AUTO: 26 %
HGB BLD-MCNC: 8.8 G/DL
LYMPHOCYTES # BLD AUTO: 2.6 K/UL
LYMPHOCYTES NFR BLD: 19.4 %
MCH RBC QN AUTO: 29.7 PG
MCHC RBC AUTO-ENTMCNC: 33.8 G/DL
MCV RBC AUTO: 88 FL
MONOCYTES # BLD AUTO: 1.2 K/UL
MONOCYTES NFR BLD: 8.9 %
NEUTROPHILS # BLD AUTO: 8.9 K/UL
NEUTROPHILS NFR BLD: 65.2 %
PLATELET # BLD AUTO: 334 K/UL
PMV BLD AUTO: 8.7 FL
POTASSIUM SERPL-SCNC: 4.3 MMOL/L
PROT SERPL-MCNC: 5.7 G/DL
RBC # BLD AUTO: 2.96 M/UL
SODIUM SERPL-SCNC: 136 MMOL/L
WBC # BLD AUTO: 13.62 K/UL

## 2017-10-03 PROCEDURE — 25000003 PHARM REV CODE 250: Performed by: OBSTETRICS & GYNECOLOGY

## 2017-10-03 PROCEDURE — 25000003 PHARM REV CODE 250: Performed by: STUDENT IN AN ORGANIZED HEALTH CARE EDUCATION/TRAINING PROGRAM

## 2017-10-03 PROCEDURE — 85025 COMPLETE CBC W/AUTO DIFF WBC: CPT

## 2017-10-03 PROCEDURE — 99231 SBSQ HOSP IP/OBS SF/LOW 25: CPT | Mod: 25,,, | Performed by: OBSTETRICS & GYNECOLOGY

## 2017-10-03 PROCEDURE — 63600175 PHARM REV CODE 636 W HCPCS: Performed by: STUDENT IN AN ORGANIZED HEALTH CARE EDUCATION/TRAINING PROGRAM

## 2017-10-03 PROCEDURE — 80053 COMPREHEN METABOLIC PANEL: CPT

## 2017-10-03 PROCEDURE — 36415 COLL VENOUS BLD VENIPUNCTURE: CPT

## 2017-10-03 PROCEDURE — 99900035 HC TECH TIME PER 15 MIN (STAT)

## 2017-10-03 PROCEDURE — 11000001 HC ACUTE MED/SURG PRIVATE ROOM

## 2017-10-03 RX ORDER — HYDROMORPHONE HYDROCHLORIDE 2 MG/1
2 TABLET ORAL EVERY 4 HOURS PRN
Status: DISCONTINUED | OUTPATIENT
Start: 2017-10-03 | End: 2017-10-05

## 2017-10-03 RX ORDER — OXYCODONE HYDROCHLORIDE 5 MG/1
15 TABLET ORAL EVERY 4 HOURS PRN
Status: DISCONTINUED | OUTPATIENT
Start: 2017-10-03 | End: 2017-10-04

## 2017-10-03 RX ADMIN — DIPHENHYDRAMINE HYDROCHLORIDE 12.5 MG: 50 INJECTION, SOLUTION INTRAMUSCULAR; INTRAVENOUS at 09:10

## 2017-10-03 RX ADMIN — OXYCODONE HYDROCHLORIDE 15 MG: 5 TABLET ORAL at 11:10

## 2017-10-03 RX ADMIN — FOLIC ACID 4 MG: 1 TABLET ORAL at 08:10

## 2017-10-03 RX ADMIN — GABAPENTIN 300 MG: 100 CAPSULE ORAL at 09:10

## 2017-10-03 RX ADMIN — AMOXICILLIN AND CLAVULANATE POTASSIUM 1 TABLET: 875; 125 TABLET, FILM COATED ORAL at 08:10

## 2017-10-03 RX ADMIN — DIPHENHYDRAMINE HYDROCHLORIDE 12.5 MG: 50 INJECTION, SOLUTION INTRAMUSCULAR; INTRAVENOUS at 11:10

## 2017-10-03 RX ADMIN — HYDROCORTISONE: 1 CREAM TOPICAL at 08:10

## 2017-10-03 RX ADMIN — OXYCODONE HYDROCHLORIDE 15 MG: 5 TABLET ORAL at 03:10

## 2017-10-03 RX ADMIN — OXYCODONE HYDROCHLORIDE 10 MG: 5 TABLET ORAL at 03:10

## 2017-10-03 RX ADMIN — DIPHENHYDRAMINE HYDROCHLORIDE 12.5 MG: 50 INJECTION, SOLUTION INTRAMUSCULAR; INTRAVENOUS at 06:10

## 2017-10-03 RX ADMIN — AMOXICILLIN AND CLAVULANATE POTASSIUM 1 TABLET: 875; 125 TABLET, FILM COATED ORAL at 09:10

## 2017-10-03 RX ADMIN — HYDROMORPHONE HYDROCHLORIDE 0.5 MG: 1 INJECTION, SOLUTION INTRAMUSCULAR; INTRAVENOUS; SUBCUTANEOUS at 12:10

## 2017-10-03 RX ADMIN — HYDROMORPHONE HYDROCHLORIDE 0.5 MG: 1 INJECTION, SOLUTION INTRAMUSCULAR; INTRAVENOUS; SUBCUTANEOUS at 06:10

## 2017-10-03 RX ADMIN — DIPHENHYDRAMINE HYDROCHLORIDE 12.5 MG: 50 INJECTION, SOLUTION INTRAMUSCULAR; INTRAVENOUS at 12:10

## 2017-10-03 RX ADMIN — MORPHINE SULFATE 60 MG: 15 TABLET, EXTENDED RELEASE ORAL at 05:10

## 2017-10-03 RX ADMIN — GABAPENTIN 300 MG: 100 CAPSULE ORAL at 02:10

## 2017-10-03 RX ADMIN — ASPIRIN 81 MG CHEWABLE TABLET 81 MG: 81 TABLET CHEWABLE at 08:10

## 2017-10-03 RX ADMIN — SODIUM CHLORIDE: 0.45 INJECTION, SOLUTION INTRAVENOUS at 07:10

## 2017-10-03 RX ADMIN — ENOXAPARIN SODIUM 40 MG: 100 INJECTION SUBCUTANEOUS at 10:10

## 2017-10-03 RX ADMIN — GUAIFENESIN 600 MG: 600 TABLET, EXTENDED RELEASE ORAL at 09:10

## 2017-10-03 RX ADMIN — GABAPENTIN 300 MG: 100 CAPSULE ORAL at 05:10

## 2017-10-03 RX ADMIN — PRENATAL VIT W/ FE FUMARATE-FA TAB 27-0.8 MG 1 EACH: 27-0.8 TAB at 08:10

## 2017-10-03 RX ADMIN — CETIRIZINE HYDROCHLORIDE 5 MG: 5 TABLET, FILM COATED ORAL at 08:10

## 2017-10-03 RX ADMIN — GUAIFENESIN 600 MG: 600 TABLET, EXTENDED RELEASE ORAL at 08:10

## 2017-10-03 RX ADMIN — DIPHENHYDRAMINE HYDROCHLORIDE 12.5 MG: 50 INJECTION, SOLUTION INTRAMUSCULAR; INTRAVENOUS at 03:10

## 2017-10-03 RX ADMIN — OXYCODONE HYDROCHLORIDE 15 MG: 5 TABLET ORAL at 09:10

## 2017-10-03 RX ADMIN — HYDROCORTISONE: 1 CREAM TOPICAL at 09:10

## 2017-10-03 NOTE — PLAN OF CARE
Problem: Patient Care Overview  Goal: Plan of Care Review  Outcome: Ongoing (interventions implemented as appropriate)  No acute changes this shift. VSS Pt afebrile. Pt c/o aching/throbbing pain in her back, radiating to her ribs throughout the night. Pt stated her pain went from a 9/10 to 6 or 7/10 with PO and IV pain medication and heat packs. Pt denies ctxs, vaginal bleeding, or LOF. States +FM. Nasal cannula (2L) applied for a few hours during the night for SpO2 sats in the lower 90's. Pt denied SOB or chest pain. Pt sats now 98%-100% on room air. Intake and output recorded in pts flowsheet. Plan of care reviewed with pt. Pt has no further questions at this time. No signs of distress.

## 2017-10-03 NOTE — PLAN OF CARE
10/03/17 1135   Discharge Assessment   Assessment Type Discharge Planning Assessment   Confirmed/corrected address and phone number on facesheet? Yes   Assessment information obtained from? Patient   Prior to hospitilization cognitive status: Alert/Oriented   Prior to hospitalization functional status: Independent   Current cognitive status: Alert/Oriented   Current Functional Status: Independent   Able to Return to Prior Arrangements yes   Patient currently receives any other outside agency services? No   Does the patient have transportation home? Yes   Discharge Plan A Home       Courtney Lafont, LCSW [facility]  [email or web address]    (phone) [phone] or  [place]. [phone]  (fax) [phone]

## 2017-10-03 NOTE — PLAN OF CARE
Problem: Patient Care Overview  Goal: Plan of Care Review  Outcome: Ongoing (interventions implemented as appropriate)  No changes made. Will continue to monitor.

## 2017-10-03 NOTE — PLAN OF CARE
Problem: Patient Care Overview  Goal: Plan of Care Review  Vital signs stable, afebrile, multiple pain meds given to patient--scheduled and per request; pain continues to be at a 7; patient independent in room; no shortness of breath; reports + fetal movement, denies rupture, vaginal bleeding,contractions; labs in am

## 2017-10-03 NOTE — SUBJECTIVE & OBJECTIVE
Obstetric HPI:  Patient reports slight improvement in rib and back pain.  No SOB. Otherwise stable. O2 sats 96%.    Patient reports No contractions, active fetal movement, absent vaginal bleeding , absent loss of fluid      Objective:     Vital Signs (Most Recent):  Temp: 97.1 °F (36.2 °C) (10/03/17 0322)  Pulse: 101 (10/03/17 0642)  Resp: 18 (10/03/17 0320)  BP: (!) 105/59 (10/03/17 0322)  SpO2: 97 % (10/03/17 0642) Vital Signs (24h Range):  Temp:  [97.1 °F (36.2 °C)-98.4 °F (36.9 °C)] 97.1 °F (36.2 °C)  Pulse:  [] 101  Resp:  [16-18] 18  SpO2:  [93 %-100 %] 97 %  BP: (104-116)/(55-72) 105/59     Weight: 59.9 kg (132 lb)  Body mass index is 26.66 kg/m².    FHT: 135, +acels, Cat 1 (reassuring)  TOCO: no ctx      Intake/Output Summary (Last 24 hours) at 10/03/17 0707  Last data filed at 10/03/17 0600   Gross per 24 hour   Intake          5008.34 ml   Output             3700 ml   Net          1308.34 ml       Cervical Exam:deferred     Significant Labs:  Recent Lab Results       10/03/17  0408      Albumin 2.5(L)     Alkaline Phosphatase 92     ALT 27     Anion Gap 9     AST 31     Baso # 0.04     Basophil% 0.3     Total Bilirubin 2.8  Comment:  For infants and newborns, interpretation of results should be based  on gestational age, weight and in agreement with clinical  observations.  Premature Infant recommended reference ranges:  Up to 24 hours.............<8.0 mg/dL  Up to 48 hours............<12.0 mg/dL  3-5 days..................<15.0 mg/dL  6-29 days.................<15.0 mg/dL  (H)     BUN, Bld 4(L)     Calcium 8.2(L)     Chloride 109     CO2 18(L)     Creatinine 0.6     Differential Method Automated     eGFR if  >60     eGFR if non  >60  Comment:  Calculation used to obtain the estimated glomerular filtration  rate (eGFR) is the CKD-EPI equation. Since race is unknown   in our information system, the eGFR values for   -American and Non--American patients are  given   for each creatinine result.       Eos # 0.7(H)     Eosinophil% 5.4     Glucose 102     Gran # 8.9(H)     Gran% 65.2     Hematocrit 26.0(L)     Hemoglobin 8.8(L)     Lymph # 2.6     Lymph% 19.4     MCH 29.7     MCHC 33.8     MCV 88     Mono # 1.2(H)     Mono% 8.9     MPV 8.7(L)     Platelets 334     Potassium 4.3  Comment:  Specimen slightly icteric     Total Protein 5.7(L)     RBC 2.96(L)     RDW 17.2(H)     Sodium 136     WBC 13.62(H)           Physical Exam:   Constitutional: She is oriented to person, place, and time. She appears well-developed and well-nourished. No distress.    HENT:   Head: Normocephalic and atraumatic.     Neck: Normal range of motion. Neck supple.    Cardiovascular: Normal rate and regular rhythm.     Pulmonary/Chest: Effort normal and breath sounds normal.        Abdominal: Soft. She exhibits no distension. There is no tenderness. There is no guarding.   Gravid uterus, non-tender                 Neurological: She is alert and oriented to person, place, and time.    Skin: Skin is warm and dry.    Psychiatric: She has a normal mood and affect. Her behavior is normal.

## 2017-10-03 NOTE — ASSESSMENT & PLAN NOTE
- IV fluids, 1/2 NS at 125 cc/hr  - Yuridia'd MS contin 60 mg BID, gabapentin 300 mg TID, oxy IR 10 prn, IV dilaudid PRN,  - NST BID  - T bili 3.8 >> 2.8, RC 5.4  - Hgb S electrophoresis pending  - H/h 8.7/24 --> 7.6/22, received 1 u pRBC 9/30 --> 9.4/27.6  - UCx negative, BCx NGTD x2d, WBC trending down 18>>13   - cont ASA 81, folic acid 4mg daily  - AM CBC 13.9/9.6/28.4> 8.8/26  - CMP WNL

## 2017-10-03 NOTE — PROGRESS NOTES
[facility]  Obstetrics  Antepartum Progress Note    Patient Name: Nishi Dumont  MRN: [id number]  Admission Date: 2017  Hospital Length of Stay: 4 days  Attending Physician: Trixie Mejía MD  Primary Care Provider: Primary Doctor No    Subjective:     Principal Problem:Sickle cell pain crisis    HPI:  Nishi Dumont is a 26 y.o. F at 28w0d with significant hx of sickle cell disease who presents complaining of back, rib, and shoulder pain consistent with her usual crisis x2 days. States her pain is 10/10 despite taking MS contin at home scheduled and percocet 2-3 times per day for breakthrough pain. She also reports feeling sick since her last admission. She was told previous that she had a viral URI and was managed with supportive care. Symptoms have not yet resolved and 2 days ago she started having purulent nasal discharge and pain in her upper right jaw with chewing. Denies fever, chills, nausea, vomiting. No pregnancy complaints at this time. Denies ctx, VB, LOF. Good FM.     Patient has been admitted multiple times throughout her pregnancy for sickle cell crisis, approximately every 2 weeks per patient. Her most recent sickle cell crisis admission was 3 weeks ago, at which time she received 2u pRBC, whiched helped. She was discharged on MS contin 60mg BID, oxyIR prn. Her primary MFM is Dr. Armenta at [facility].     Prior to this pregnancy, she had fewer crises (1/month). Patient only had 1 crisis in her previous pregnancy 7 years ago. 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 She has a history of multiple blood transfusions. This IUP also complicated by a history of pre-eclampsia in prior pregnancy and GBS bacteruria.  Patient denies CP, SOB, dysuria, hematuria, HA, leg swelling, visual disturbances.    Hospital Course:  2017 - admitted for presumed sickle cell  crisis. H/H 8/24. Pain regimen switch to MS contin 60mg BID, ox IR, and dilaudid. No acute evens  9/30/2017 - Pain somewhat worsening but patient not requesting increasing doses of pain medication, intermittent O2 desaturations resolved with IS usage, discussed blood transfusion with patient.    10/01/2017 - Again reports pain is worsened but not requiring additional pain medication. No SOB. Otherwise stable. S/P 1u pRBC 9/30.   10/02/2017- Patient reports continued pain in right ribs. No SOB. Gabapentin increased to 300 TID yesterday. Continued on MS contin 60 mg BID, Oxy IR 10 PRN, Dilaudid PRN.   10/03/2017- Patient reports pain is slightly improved. No changes in medication regimen.     Obstetric HPI:  Patient reports slight improvement in rib and back pain.  No SOB. Otherwise stable. O2 sats 96%.    Patient reports No contractions, active fetal movement, absent vaginal bleeding , absent loss of fluid      Objective:     Vital Signs (Most Recent):  Temp: 97.1 °F (36.2 °C) (10/03/17 0322)  Pulse: 101 (10/03/17 0642)  Resp: 18 (10/03/17 0320)  BP: (!) 105/59 (10/03/17 0322)  SpO2: 97 % (10/03/17 0642) Vital Signs (24h Range):  Temp:  [97.1 °F (36.2 °C)-98.4 °F (36.9 °C)] 97.1 °F (36.2 °C)  Pulse:  [] 101  Resp:  [16-18] 18  SpO2:  [93 %-100 %] 97 %  BP: (104-116)/(55-72) 105/59     Weight: 59.9 kg (132 lb)  Body mass index is 26.66 kg/m².    FHT: 135, +acels, Cat 1 (reassuring)  TOCO: no ctx      Intake/Output Summary (Last 24 hours) at 10/03/17 0707  Last data filed at 10/03/17 0600   Gross per 24 hour   Intake          5008.34 ml   Output             3700 ml   Net          1308.34 ml       Cervical Exam:deferred     Significant Labs:  Recent Lab Results       10/03/17  0408      Albumin 2.5(L)     Alkaline Phosphatase 92     ALT 27     Anion Gap 9     AST 31     Baso # 0.04     Basophil% 0.3     Total Bilirubin 2.8  Comment:  For infants and newborns, interpretation of results should be based  on  gestational age, weight and in agreement with clinical  observations.  Premature Infant recommended reference ranges:  Up to 24 hours.............<8.0 mg/dL  Up to 48 hours............<12.0 mg/dL  3-5 days..................<15.0 mg/dL  6-29 days.................<15.0 mg/dL  (H)     BUN, Bld 4(L)     Calcium 8.2(L)     Chloride 109     CO2 18(L)     Creatinine 0.6     Differential Method Automated     eGFR if  >60     eGFR if non  >60  Comment:  Calculation used to obtain the estimated glomerular filtration  rate (eGFR) is the CKD-EPI equation. Since race is unknown   in our information system, the eGFR values for   -American and Non--American patients are given   for each creatinine result.       Eos # 0.7(H)     Eosinophil% 5.4     Glucose 102     Gran # 8.9(H)     Gran% 65.2     Hematocrit 26.0(L)     Hemoglobin 8.8(L)     Lymph # 2.6     Lymph% 19.4     MCH 29.7     MCHC 33.8     MCV 88     Mono # 1.2(H)     Mono% 8.9     MPV 8.7(L)     Platelets 334     Potassium 4.3  Comment:  Specimen slightly icteric     Total Protein 5.7(L)     RBC 2.96(L)     RDW 17.2(H)     Sodium 136     WBC 13.62(H)           Physical Exam:   Constitutional: She is oriented to person, place, and time. She appears well-developed and well-nourished. No distress.    HENT:   Head: Normocephalic and atraumatic.     Neck: Normal range of motion. Neck supple.    Cardiovascular: Normal rate and regular rhythm.     Pulmonary/Chest: Effort normal and breath sounds normal.        Abdominal: Soft. She exhibits no distension. There is no tenderness. There is no guarding.   Gravid uterus, non-tender                 Neurological: She is alert and oriented to person, place, and time.    Skin: Skin is warm and dry.    Psychiatric: She has a normal mood and affect. Her behavior is normal.       Assessment/Plan:     26 y.o. female  at 28w4d for:    * Sickle cell pain crisis    - IV fluids, 1/2 NS at 125  cc/hr  - Yuridia'd MS contin 60 mg BID, gabapentin 300 mg TID, oxy IR 10 prn, IV dilaudid PRN,  - NST BID  - T bili 3.8 >> 2.8, RC 5.4  - Hgb S electrophoresis pending  - H/h 8.7/24 --> 7.6/22, received 1 u pRBC 9/30 --> 9.4/27.6  - UCx negative, BCx NGTD x2d, WBC trending down 18>>13   - cont ASA 81, folic acid 4mg daily  - AM CBC 13.9/9.6/28.4> 8.8/26  - CMP WNL             Hypotension    - Episodes of hypotension since resolved although with initial concerns for sepsis blood cx drawn-  blood cultures prelim negative        Sinusitis    - suspect bacterial sinus infection   - augmentin BID x10 days  - afebrile, VSS  - monitor closely  - rapid strep negative  - supportive measures including mucinex BID, saline nasal spray prn        History of pre-eclampsia    - normotensive  - baseline labs normal last admission, P/C too low to calculate              Tiny Alexander MD  Obstetrics  [facility]

## 2017-10-04 LAB
ALBUMIN SERPL BCP-MCNC: 2.8 G/DL
ALP SERPL-CCNC: 109 U/L
ALT SERPL W/O P-5'-P-CCNC: 26 U/L
ANION GAP SERPL CALC-SCNC: 11 MMOL/L
AST SERPL-CCNC: 36 U/L
BACTERIA BLD CULT: NORMAL
BACTERIA BLD CULT: NORMAL
BASOPHILS # BLD AUTO: 0.03 K/UL
BASOPHILS NFR BLD: 0.2 %
BILIRUB SERPL-MCNC: 4.2 MG/DL
BUN SERPL-MCNC: 4 MG/DL
CALCIUM SERPL-MCNC: 9.3 MG/DL
CHLORIDE SERPL-SCNC: 107 MMOL/L
CO2 SERPL-SCNC: 21 MMOL/L
CREAT SERPL-MCNC: 0.6 MG/DL
DIFFERENTIAL METHOD: ABNORMAL
EOSINOPHIL # BLD AUTO: 0.6 K/UL
EOSINOPHIL NFR BLD: 4 %
ERYTHROCYTE [DISTWIDTH] IN BLOOD BY AUTOMATED COUNT: 16.6 %
EST. GFR  (AFRICAN AMERICAN): >60 ML/MIN/1.73 M^2
EST. GFR  (NON AFRICAN AMERICAN): >60 ML/MIN/1.73 M^2
GLUCOSE SERPL-MCNC: 104 MG/DL
HCT VFR BLD AUTO: 28.4 %
HGB BLD-MCNC: 9.5 G/DL
LYMPHOCYTES # BLD AUTO: 2.9 K/UL
LYMPHOCYTES NFR BLD: 20.5 %
MCH RBC QN AUTO: 29.3 PG
MCHC RBC AUTO-ENTMCNC: 33.5 G/DL
MCV RBC AUTO: 88 FL
MONOCYTES # BLD AUTO: 1.2 K/UL
MONOCYTES NFR BLD: 8.8 %
NEUTROPHILS # BLD AUTO: 9.2 K/UL
NEUTROPHILS NFR BLD: 65.9 %
PLATELET # BLD AUTO: 314 K/UL
PMV BLD AUTO: 9 FL
POTASSIUM SERPL-SCNC: 4.4 MMOL/L
PROT SERPL-MCNC: 6.6 G/DL
RBC # BLD AUTO: 3.24 M/UL
RETICS/RBC NFR AUTO: 7.8 %
SODIUM SERPL-SCNC: 139 MMOL/L
WBC # BLD AUTO: 14 K/UL

## 2017-10-04 PROCEDURE — 85045 AUTOMATED RETICULOCYTE COUNT: CPT

## 2017-10-04 PROCEDURE — 25000003 PHARM REV CODE 250: Performed by: STUDENT IN AN ORGANIZED HEALTH CARE EDUCATION/TRAINING PROGRAM

## 2017-10-04 PROCEDURE — 25000003 PHARM REV CODE 250: Performed by: OBSTETRICS & GYNECOLOGY

## 2017-10-04 PROCEDURE — 36415 COLL VENOUS BLD VENIPUNCTURE: CPT

## 2017-10-04 PROCEDURE — 85025 COMPLETE CBC W/AUTO DIFF WBC: CPT

## 2017-10-04 PROCEDURE — 63600175 PHARM REV CODE 636 W HCPCS: Performed by: STUDENT IN AN ORGANIZED HEALTH CARE EDUCATION/TRAINING PROGRAM

## 2017-10-04 PROCEDURE — 99900035 HC TECH TIME PER 15 MIN (STAT)

## 2017-10-04 PROCEDURE — 80053 COMPREHEN METABOLIC PANEL: CPT

## 2017-10-04 PROCEDURE — 11000001 HC ACUTE MED/SURG PRIVATE ROOM

## 2017-10-04 RX ORDER — DIPHENHYDRAMINE HCL 25 MG
25 CAPSULE ORAL ONCE
Status: COMPLETED | OUTPATIENT
Start: 2017-10-04 | End: 2017-10-04

## 2017-10-04 RX ORDER — SODIUM CHLORIDE, SODIUM LACTATE, POTASSIUM CHLORIDE, CALCIUM CHLORIDE 600; 310; 30; 20 MG/100ML; MG/100ML; MG/100ML; MG/100ML
INJECTION, SOLUTION INTRAVENOUS CONTINUOUS
Status: DISCONTINUED | OUTPATIENT
Start: 2017-10-04 | End: 2017-10-11 | Stop reason: HOSPADM

## 2017-10-04 RX ORDER — OXYCODONE HYDROCHLORIDE 5 MG/1
20 TABLET ORAL EVERY 4 HOURS PRN
Status: DISCONTINUED | OUTPATIENT
Start: 2017-10-04 | End: 2017-10-10

## 2017-10-04 RX ORDER — DIPHENHYDRAMINE HCL 25 MG
25 CAPSULE ORAL EVERY 4 HOURS PRN
Status: DISCONTINUED | OUTPATIENT
Start: 2017-10-04 | End: 2017-10-05

## 2017-10-04 RX ADMIN — DIPHENHYDRAMINE HYDROCHLORIDE 25 MG: 25 CAPSULE ORAL at 09:10

## 2017-10-04 RX ADMIN — ASPIRIN 81 MG CHEWABLE TABLET 81 MG: 81 TABLET CHEWABLE at 09:10

## 2017-10-04 RX ADMIN — MORPHINE SULFATE 60 MG: 15 TABLET, EXTENDED RELEASE ORAL at 05:10

## 2017-10-04 RX ADMIN — OXYCODONE HYDROCHLORIDE 15 MG: 5 TABLET ORAL at 01:10

## 2017-10-04 RX ADMIN — DIPHENHYDRAMINE HYDROCHLORIDE 25 MG: 25 CAPSULE ORAL at 05:10

## 2017-10-04 RX ADMIN — AMOXICILLIN AND CLAVULANATE POTASSIUM 1 TABLET: 875; 125 TABLET, FILM COATED ORAL at 09:10

## 2017-10-04 RX ADMIN — DIPHENHYDRAMINE HYDROCHLORIDE 25 MG: 25 CAPSULE ORAL at 01:10

## 2017-10-04 RX ADMIN — SODIUM CHLORIDE, SODIUM LACTATE, POTASSIUM CHLORIDE, AND CALCIUM CHLORIDE: 600; 310; 30; 20 INJECTION, SOLUTION INTRAVENOUS at 05:10

## 2017-10-04 RX ADMIN — PRENATAL VIT W/ FE FUMARATE-FA TAB 27-0.8 MG 1 EACH: 27-0.8 TAB at 09:10

## 2017-10-04 RX ADMIN — GABAPENTIN 300 MG: 100 CAPSULE ORAL at 09:10

## 2017-10-04 RX ADMIN — DIPHENHYDRAMINE HYDROCHLORIDE 12.5 MG: 50 INJECTION, SOLUTION INTRAMUSCULAR; INTRAVENOUS at 09:10

## 2017-10-04 RX ADMIN — GABAPENTIN 300 MG: 100 CAPSULE ORAL at 01:10

## 2017-10-04 RX ADMIN — HYDROCORTISONE: 1 CREAM TOPICAL at 09:10

## 2017-10-04 RX ADMIN — FOLIC ACID 4 MG: 1 TABLET ORAL at 09:10

## 2017-10-04 RX ADMIN — GUAIFENESIN 600 MG: 600 TABLET, EXTENDED RELEASE ORAL at 09:10

## 2017-10-04 RX ADMIN — GABAPENTIN 300 MG: 100 CAPSULE ORAL at 05:10

## 2017-10-04 RX ADMIN — ENOXAPARIN SODIUM 40 MG: 100 INJECTION SUBCUTANEOUS at 09:10

## 2017-10-04 RX ADMIN — DIPHENHYDRAMINE HYDROCHLORIDE 12.5 MG: 50 INJECTION, SOLUTION INTRAMUSCULAR; INTRAVENOUS at 05:10

## 2017-10-04 RX ADMIN — OXYCODONE HYDROCHLORIDE 15 MG: 5 TABLET ORAL at 09:10

## 2017-10-04 RX ADMIN — OXYCODONE HYDROCHLORIDE 20 MG: 5 TABLET ORAL at 09:10

## 2017-10-04 RX ADMIN — DIPHENHYDRAMINE HYDROCHLORIDE 12.5 MG: 50 INJECTION, SOLUTION INTRAMUSCULAR; INTRAVENOUS at 01:10

## 2017-10-04 RX ADMIN — DIPHENHYDRAMINE HYDROCHLORIDE 25 MG: 25 CAPSULE ORAL at 03:10

## 2017-10-04 RX ADMIN — CETIRIZINE HYDROCHLORIDE 5 MG: 5 TABLET, FILM COATED ORAL at 09:10

## 2017-10-04 NOTE — SUBJECTIVE & OBJECTIVE
Obstetric HPI:  Patient reports improvement in back pain but continued rib pain. No SOB. Otherwise stable. O2 sats 96%. She states she could go home today.     Patient reports No contractions, active fetal movement, absent vaginal bleeding , absent loss of fluid      Objective:     Vital Signs (Most Recent):  Temp: 97.3 °F (36.3 °C) (10/04/17 0542)  Pulse: 106 (10/04/17 0542)  Resp: 14 (10/04/17 0542)  BP: 116/67 (10/04/17 0542)  SpO2: 96 % (10/03/17 2037) Vital Signs (24h Range):  Temp:  [97.2 °F (36.2 °C)-98.6 °F (37 °C)] 97.3 °F (36.3 °C)  Pulse:  [] 106  Resp:  [14-20] 14  SpO2:  [95 %-97 %] 96 %  BP: ()/(51-67) 116/67     Weight: 59.9 kg (132 lb)  Body mass index is 26.66 kg/m².    FHT: 135, +acels, Cat 1 (reassuring)  TOCO: no ctx      Intake/Output Summary (Last 24 hours) at 10/04/17 0646  Last data filed at 10/03/17 1700   Gross per 24 hour   Intake             1500 ml   Output             2100 ml   Net             -600 ml       Cervical Exam:deferred     Significant Labs:  Recent Lab Results     None          Physical Exam:   Constitutional: She is oriented to person, place, and time. She appears well-developed and well-nourished. No distress.    HENT:   Head: Normocephalic and atraumatic.     Neck: Normal range of motion. Neck supple.    Cardiovascular: Normal rate and regular rhythm.     Pulmonary/Chest: Effort normal and breath sounds normal.        Abdominal: Soft. She exhibits no distension. There is no tenderness. There is no guarding.   Gravid uterus, non-tender  Rib TTP R>L                 Neurological: She is alert and oriented to person, place, and time.    Skin: Skin is warm and dry.    Psychiatric: She has a normal mood and affect. Her behavior is normal.

## 2017-10-04 NOTE — PROGRESS NOTES
[facility]  Obstetrics  Antepartum Progress Note    Patient Name: Nishi Dumont  MRN: [id number]  Admission Date: 2017  Hospital Length of Stay: 5 days  Attending Physician: Trixie Mejía MD  Primary Care Provider: Primary Doctor No    Subjective:     Principal Problem:Sickle cell pain crisis    HPI:  Nishi Dumont is a 26 y.o. F at 28w0d with significant hx of sickle cell disease who presents complaining of back, rib, and shoulder pain consistent with her usual crisis x2 days. States her pain is 10/10 despite taking MS contin at home scheduled and percocet 2-3 times per day for breakthrough pain. She also reports feeling sick since her last admission. She was told previous that she had a viral URI and was managed with supportive care. Symptoms have not yet resolved and 2 days ago she started having purulent nasal discharge and pain in her upper right jaw with chewing. Denies fever, chills, nausea, vomiting. No pregnancy complaints at this time. Denies ctx, VB, LOF. Good FM.     Patient has been admitted multiple times throughout her pregnancy for sickle cell crisis, approximately every 2 weeks per patient. Her most recent sickle cell crisis admission was 3 weeks ago, at which time she received 2u pRBC, whiched helped. She was discharged on MS contin 60mg BID, oxyIR prn. Her primary MFM is Dr. Armenta at [facility].     Prior to this pregnancy, she had fewer crises (1/month). Patient only had 1 crisis in her previous pregnancy 7 years ago. 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 She has a history of multiple blood transfusions. This IUP also complicated by a history of pre-eclampsia in prior pregnancy and GBS bacteruria.  Patient denies CP, SOB, dysuria, hematuria, HA, leg swelling, visual disturbances.    Hospital Course:  2017 - admitted for presumed sickle cell  crisis. H/H 8/24. Pain regimen switch to MS contin 60mg BID, ox IR, and dilaudid. No acute evens  9/30/2017 - Pain somewhat worsening but patient not requesting increasing doses of pain medication, intermittent O2 desaturations resolved with IS usage, discussed blood transfusion with patient.    10/01/2017 - Again reports pain is worsened but not requiring additional pain medication. No SOB. Otherwise stable. S/P 1u pRBC 9/30.   10/02/2017- Patient reports continued pain in right ribs. No SOB. Gabapentin increased to 300 TID yesterday. Continued on MS contin 60 mg BID, Oxy IR 10 PRN, Dilaudid PRN.   10/03/2017- Patient reports pain is slightly improved. No changes in medication regimen.   10/04/2017- Patient reports back pain is improved, continues to endorse rib pain. She is amenable to discharge with follow up with her primary ob/gyn and hematologist.     Obstetric HPI:  Patient reports improvement in back pain but continued rib pain. No SOB. Otherwise stable. O2 sats 96%. She states she could go home today.     Patient reports No contractions, active fetal movement, absent vaginal bleeding , absent loss of fluid      Objective:     Vital Signs (Most Recent):  Temp: 97.3 °F (36.3 °C) (10/04/17 0542)  Pulse: 106 (10/04/17 0542)  Resp: 14 (10/04/17 0542)  BP: 116/67 (10/04/17 0542)  SpO2: 96 % (10/03/17 2037) Vital Signs (24h Range):  Temp:  [97.2 °F (36.2 °C)-98.6 °F (37 °C)] 97.3 °F (36.3 °C)  Pulse:  [] 106  Resp:  [14-20] 14  SpO2:  [95 %-97 %] 96 %  BP: ()/(51-67) 116/67     Weight: 59.9 kg (132 lb)  Body mass index is 26.66 kg/m².    FHT: 135, +acels, Cat 1 (reassuring)  TOCO: no ctx      Intake/Output Summary (Last 24 hours) at 10/04/17 0646  Last data filed at 10/03/17 1700   Gross per 24 hour   Intake             1500 ml   Output             2100 ml   Net             -600 ml       Cervical Exam:deferred     Significant Labs:  Recent Lab Results     None          Physical Exam:   Constitutional:  She is oriented to person, place, and time. She appears well-developed and well-nourished. No distress.    HENT:   Head: Normocephalic and atraumatic.     Neck: Normal range of motion. Neck supple.    Cardiovascular: Normal rate and regular rhythm.     Pulmonary/Chest: Effort normal and breath sounds normal.        Abdominal: Soft. She exhibits no distension. There is no tenderness. There is no guarding.   Gravid uterus, non-tender  Rib TTP R>L                 Neurological: She is alert and oriented to person, place, and time.    Skin: Skin is warm and dry.    Psychiatric: She has a normal mood and affect. Her behavior is normal.       Assessment/Plan:     26 y.o. female  at 28w5d for:    * Sickle cell pain crisis    - S/P IVFs  - Yuridia'd MS contin 60 mg BID, gabapentin 300 mg TID, oxy IR 15 prn, PO dilaudid 2mg q4 PRN   - NST BID  - T bili 3.8 >> 2.8, RC 5.4  - Hgb S electrophoresis pending  - UCx negative, BCx NGTD x2d, WBC trending down 18>>13   - cont ASA 81, folic acid 4mg daily  - H/h 8.7/24 > 7.6/22 >1 u pRBC 9/30 > 9.4/27.6 >9.6/28.4> 8.8/26  - CMP WNL   - No repeat labs this AM            Hypotension    - Episodes of hypotension since resolved although with initial concerns for sepsis blood cx drawn-  blood cultures prelim negative        Sinusitis    - suspect bacterial sinus infection   - augmentin BID x10 days  - afebrile, VSS  - monitor closely  - rapid strep negative  - supportive measures including mucinex BID, saline nasal spray prn        History of pre-eclampsia    - normotensive  - baseline labs normal last admission, P/C too low to calculate              Tiny Alexander MD  Obstetrics  [facility]

## 2017-10-04 NOTE — PLAN OF CARE
Problem: Patient Care Overview  Goal: Plan of Care Review  Outcome: Ongoing (interventions implemented as appropriate)  VSS throughout shift. Pt afebrile. Pt reports +FM; denies ctxs, LOF & VB. Pain controlled with around-the-clock medication. No needs at this time. Call light within reach with bed locked in lowest position. Will continue to monitor.

## 2017-10-04 NOTE — ASSESSMENT & PLAN NOTE
- S/P IVFs  - Yuridia'd MS contin 60 mg BID, gabapentin 300 mg TID, oxy IR 15 prn, PO dilaudid 2mg q4 PRN   - NST BID  - T bili 3.8 >> 2.8, RC 5.4  - Hgb S electrophoresis pending  - UCx negative, BCx NGTD x2d, WBC trending down 18>>13   - cont ASA 81, folic acid 4mg daily  - H/h 8.7/24 > 7.6/22 >1 u pRBC 9/30 > 9.4/27.6 >9.6/28.4> 8.8/26  - CMP WNL   - No repeat labs this AM

## 2017-10-04 NOTE — PLAN OF CARE
Problem: Patient Care Overview  Goal: Plan of Care Review  Outcome: Ongoing (interventions implemented as appropriate)  Patient in no apparent distress. Sat's 96  % on room air . Will continue to monitor.

## 2017-10-05 LAB
ABO + RH BLD: NORMAL
ALBUMIN SERPL BCP-MCNC: 2.7 G/DL
ALP SERPL-CCNC: 109 U/L
ALT SERPL W/O P-5'-P-CCNC: 24 U/L
ANION GAP SERPL CALC-SCNC: 9 MMOL/L
ANISOCYTOSIS BLD QL SMEAR: SLIGHT
AST SERPL-CCNC: 40 U/L
BASOPHILS # BLD AUTO: ABNORMAL K/UL
BASOPHILS NFR BLD: 0 %
BILIRUB SERPL-MCNC: 4.8 MG/DL
BLD GP AB SCN CELLS X3 SERPL QL: NORMAL
BUN SERPL-MCNC: 6 MG/DL
CALCIUM SERPL-MCNC: 8.7 MG/DL
CHLORIDE SERPL-SCNC: 109 MMOL/L
CO2 SERPL-SCNC: 18 MMOL/L
CREAT SERPL-MCNC: 0.6 MG/DL
DIFFERENTIAL METHOD: ABNORMAL
EOSINOPHIL # BLD AUTO: ABNORMAL K/UL
EOSINOPHIL NFR BLD: 5 %
ERYTHROCYTE [DISTWIDTH] IN BLOOD BY AUTOMATED COUNT: 16.1 %
EST. GFR  (AFRICAN AMERICAN): >60 ML/MIN/1.73 M^2
EST. GFR  (NON AFRICAN AMERICAN): >60 ML/MIN/1.73 M^2
GLUCOSE SERPL-MCNC: 78 MG/DL
HCT VFR BLD AUTO: 28 %
HGB BLD-MCNC: 9.2 G/DL
LYMPHOCYTES # BLD AUTO: ABNORMAL K/UL
LYMPHOCYTES NFR BLD: 16 %
MCH RBC QN AUTO: 28.6 PG
MCHC RBC AUTO-ENTMCNC: 32.9 G/DL
MCV RBC AUTO: 87 FL
METAMYELOCYTES NFR BLD MANUAL: 1 %
MONOCYTES # BLD AUTO: ABNORMAL K/UL
MONOCYTES NFR BLD: 10 %
MYELOCYTES NFR BLD MANUAL: 1 %
NEUTROPHILS NFR BLD: 67 %
PLATELET # BLD AUTO: 291 K/UL
PLATELET BLD QL SMEAR: ABNORMAL
PMV BLD AUTO: 9.3 FL
POTASSIUM SERPL-SCNC: 4.5 MMOL/L
PROT SERPL-MCNC: 6.5 G/DL
RBC # BLD AUTO: 3.22 M/UL
RETICS/RBC NFR AUTO: 6.5 %
SODIUM SERPL-SCNC: 136 MMOL/L
WBC # BLD AUTO: 14.58 K/UL

## 2017-10-05 PROCEDURE — 85027 COMPLETE CBC AUTOMATED: CPT

## 2017-10-05 PROCEDURE — 25000003 PHARM REV CODE 250: Performed by: OBSTETRICS & GYNECOLOGY

## 2017-10-05 PROCEDURE — 85007 BL SMEAR W/DIFF WBC COUNT: CPT

## 2017-10-05 PROCEDURE — 86901 BLOOD TYPING SEROLOGIC RH(D): CPT

## 2017-10-05 PROCEDURE — 59025 FETAL NON-STRESS TEST: CPT

## 2017-10-05 PROCEDURE — 85045 AUTOMATED RETICULOCYTE COUNT: CPT

## 2017-10-05 PROCEDURE — 80053 COMPREHEN METABOLIC PANEL: CPT

## 2017-10-05 PROCEDURE — 63600175 PHARM REV CODE 636 W HCPCS: Performed by: STUDENT IN AN ORGANIZED HEALTH CARE EDUCATION/TRAINING PROGRAM

## 2017-10-05 PROCEDURE — 86920 COMPATIBILITY TEST SPIN: CPT

## 2017-10-05 PROCEDURE — 11000001 HC ACUTE MED/SURG PRIVATE ROOM

## 2017-10-05 PROCEDURE — 25000003 PHARM REV CODE 250: Performed by: STUDENT IN AN ORGANIZED HEALTH CARE EDUCATION/TRAINING PROGRAM

## 2017-10-05 PROCEDURE — 36415 COLL VENOUS BLD VENIPUNCTURE: CPT

## 2017-10-05 PROCEDURE — 99900035 HC TECH TIME PER 15 MIN (STAT)

## 2017-10-05 PROCEDURE — 86900 BLOOD TYPING SEROLOGIC ABO: CPT

## 2017-10-05 PROCEDURE — 93010 ELECTROCARDIOGRAM REPORT: CPT | Mod: ,,, | Performed by: INTERNAL MEDICINE

## 2017-10-05 PROCEDURE — 94761 N-INVAS EAR/PLS OXIMETRY MLT: CPT

## 2017-10-05 PROCEDURE — 59025 FETAL NON-STRESS TEST: CPT | Mod: 26,,, | Performed by: OBSTETRICS & GYNECOLOGY

## 2017-10-05 PROCEDURE — 99232 SBSQ HOSP IP/OBS MODERATE 35: CPT | Mod: 25,,, | Performed by: OBSTETRICS & GYNECOLOGY

## 2017-10-05 PROCEDURE — 87086 URINE CULTURE/COLONY COUNT: CPT

## 2017-10-05 RX ORDER — HYDROMORPHONE HYDROCHLORIDE 1 MG/ML
0.5 INJECTION, SOLUTION INTRAMUSCULAR; INTRAVENOUS; SUBCUTANEOUS EVERY 4 HOURS PRN
Status: DISCONTINUED | OUTPATIENT
Start: 2017-10-05 | End: 2017-10-06

## 2017-10-05 RX ORDER — DIPHENHYDRAMINE HYDROCHLORIDE 50 MG/ML
12.5 INJECTION INTRAMUSCULAR; INTRAVENOUS EVERY 6 HOURS PRN
Status: DISCONTINUED | OUTPATIENT
Start: 2017-10-05 | End: 2017-10-08

## 2017-10-05 RX ORDER — LIDOCAINE 50 MG/G
1 PATCH TOPICAL
Status: DISCONTINUED | OUTPATIENT
Start: 2017-10-05 | End: 2017-10-11 | Stop reason: HOSPADM

## 2017-10-05 RX ADMIN — ENOXAPARIN SODIUM 40 MG: 100 INJECTION SUBCUTANEOUS at 09:10

## 2017-10-05 RX ADMIN — HYDROCORTISONE: 1 CREAM TOPICAL at 09:10

## 2017-10-05 RX ADMIN — DIPHENHYDRAMINE HYDROCHLORIDE 12.5 MG: 50 INJECTION, SOLUTION INTRAMUSCULAR; INTRAVENOUS at 05:10

## 2017-10-05 RX ADMIN — AMOXICILLIN AND CLAVULANATE POTASSIUM 1 TABLET: 875; 125 TABLET, FILM COATED ORAL at 09:10

## 2017-10-05 RX ADMIN — DIPHENHYDRAMINE HYDROCHLORIDE 25 MG: 25 CAPSULE ORAL at 02:10

## 2017-10-05 RX ADMIN — OXYCODONE HYDROCHLORIDE 20 MG: 5 TABLET ORAL at 05:10

## 2017-10-05 RX ADMIN — MORPHINE SULFATE 60 MG: 15 TABLET, EXTENDED RELEASE ORAL at 05:10

## 2017-10-05 RX ADMIN — ASPIRIN 81 MG CHEWABLE TABLET 81 MG: 81 TABLET CHEWABLE at 09:10

## 2017-10-05 RX ADMIN — HYDROMORPHONE HYDROCHLORIDE 0.5 MG: 1 INJECTION, SOLUTION INTRAMUSCULAR; INTRAVENOUS; SUBCUTANEOUS at 12:10

## 2017-10-05 RX ADMIN — OXYCODONE HYDROCHLORIDE 20 MG: 5 TABLET ORAL at 09:10

## 2017-10-05 RX ADMIN — FOLIC ACID 4 MG: 1 TABLET ORAL at 09:10

## 2017-10-05 RX ADMIN — GABAPENTIN 300 MG: 100 CAPSULE ORAL at 05:10

## 2017-10-05 RX ADMIN — OXYCODONE HYDROCHLORIDE 20 MG: 5 TABLET ORAL at 02:10

## 2017-10-05 RX ADMIN — GUAIFENESIN 600 MG: 600 TABLET, EXTENDED RELEASE ORAL at 09:10

## 2017-10-05 RX ADMIN — PRENATAL VIT W/ FE FUMARATE-FA TAB 27-0.8 MG 1 EACH: 27-0.8 TAB at 09:10

## 2017-10-05 RX ADMIN — CETIRIZINE HYDROCHLORIDE 5 MG: 5 TABLET, FILM COATED ORAL at 09:10

## 2017-10-05 RX ADMIN — DIPHENHYDRAMINE HYDROCHLORIDE 12.5 MG: 50 INJECTION, SOLUTION INTRAMUSCULAR; INTRAVENOUS at 01:10

## 2017-10-05 RX ADMIN — OXYCODONE HYDROCHLORIDE 20 MG: 5 TABLET ORAL at 11:10

## 2017-10-05 RX ADMIN — HYDROMORPHONE HYDROCHLORIDE 0.5 MG: 1 INJECTION, SOLUTION INTRAMUSCULAR; INTRAVENOUS; SUBCUTANEOUS at 07:10

## 2017-10-05 RX ADMIN — SODIUM CHLORIDE, SODIUM LACTATE, POTASSIUM CHLORIDE, AND CALCIUM CHLORIDE: 600; 310; 30; 20 INJECTION, SOLUTION INTRAVENOUS at 07:10

## 2017-10-05 RX ADMIN — SODIUM CHLORIDE, SODIUM LACTATE, POTASSIUM CHLORIDE, AND CALCIUM CHLORIDE: 600; 310; 30; 20 INJECTION, SOLUTION INTRAVENOUS at 12:10

## 2017-10-05 RX ADMIN — DIPHENHYDRAMINE HYDROCHLORIDE 12.5 MG: 50 INJECTION, SOLUTION INTRAMUSCULAR; INTRAVENOUS at 11:10

## 2017-10-05 RX ADMIN — DIPHENHYDRAMINE HYDROCHLORIDE 12.5 MG: 50 INJECTION, SOLUTION INTRAMUSCULAR; INTRAVENOUS at 09:10

## 2017-10-05 RX ADMIN — OXYCODONE HYDROCHLORIDE 20 MG: 5 TABLET ORAL at 01:10

## 2017-10-05 RX ADMIN — GABAPENTIN 300 MG: 100 CAPSULE ORAL at 01:10

## 2017-10-05 RX ADMIN — GABAPENTIN 300 MG: 100 CAPSULE ORAL at 09:10

## 2017-10-05 RX ADMIN — LIDOCAINE 1 PATCH: 50 PATCH TOPICAL at 12:10

## 2017-10-05 NOTE — PROGRESS NOTES
[facility]  Obstetrics  Antepartum Progress Note    Patient Name: Nishi Dumont  MRN: [id number]  Admission Date: 2017  Hospital Length of Stay: 6 days  Attending Physician: Trixie Mejía MD  Primary Care Provider: Primary Doctor No    Subjective:     Principal Problem:Sickle cell pain crisis    HPI:  Nishi Dumont is a 26 y.o. F at 28w0d with significant hx of sickle cell disease who presents complaining of back, rib, and shoulder pain consistent with her usual crisis x2 days. States her pain is 10/10 despite taking MS contin at home scheduled and percocet 2-3 times per day for breakthrough pain. She also reports feeling sick since her last admission. She was told previous that she had a viral URI and was managed with supportive care. Symptoms have not yet resolved and 2 days ago she started having purulent nasal discharge and pain in her upper right jaw with chewing. Denies fever, chills, nausea, vomiting. No pregnancy complaints at this time. Denies ctx, VB, LOF. Good FM.     Patient has been admitted multiple times throughout her pregnancy for sickle cell crisis, approximately every 2 weeks per patient. Her most recent sickle cell crisis admission was 3 weeks ago, at which time she received 2u pRBC, whiched helped. She was discharged on MS contin 60mg BID, oxyIR prn. Her primary MFM is Dr. Armenta at [facility].     Prior to this pregnancy, she had fewer crises (1/month). Patient only had 1 crisis in her previous pregnancy 7 years ago. 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 She has a history of multiple blood transfusions. This IUP also complicated by a history of pre-eclampsia in prior pregnancy and GBS bacteruria.  Patient denies CP, SOB, dysuria, hematuria, HA, leg swelling, visual disturbances.    Hospital Course:  2017 - admitted for presumed sickle cell  crisis. H/H 8/24. Pain regimen switch to MS contin 60mg BID, ox IR, and dilaudid. No acute evens  9/30/2017 - Pain somewhat worsening but patient not requesting increasing doses of pain medication, intermittent O2 desaturations resolved with IS usage, discussed blood transfusion with patient.    10/01/2017 - Again reports pain is worsened but not requiring additional pain medication. No SOB. Otherwise stable. S/P 1u pRBC 9/30.   10/02/2017- Patient reports continued pain in right ribs. No SOB. Gabapentin increased to 300 TID yesterday. Continued on MS contin 60 mg BID, Oxy IR 10 PRN, Dilaudid PRN.   10/03/2017- Patient reports pain is slightly improved. No changes in medication regimen.   10/04/2017- Patient reports back pain is improved, continues to endorse rib pain. Repeat CXR negative, labs with elevated Tbili and Retic. Patient remained inpatient.     Obstetric HPI:  Patient reports pain is minimally improved, but tolerable. No SOB. Otherwise stable. O2 sats 98%. States she desires discharge.     Patient reports No contractions, active fetal movement, absent vaginal bleeding , absent loss of fluid      Objective:     Vital Signs (Most Recent):  Temp: 97.8 °F (36.6 °C) (10/05/17 0535)  Pulse: (!) 142 (10/05/17 0145)  Resp: 18 (10/05/17 0535)  BP: (!) 107/56 (10/05/17 0145)  SpO2: 98 % (10/04/17 2130) Vital Signs (24h Range):  Temp:  [96.6 °F (35.9 °C)-98.8 °F (37.1 °C)] 97.8 °F (36.6 °C)  Pulse:  [] 142  Resp:  [16-20] 18  SpO2:  [92 %-100 %] 98 %  BP: ()/(53-70) 107/56     Weight: 59.9 kg (132 lb)  Body mass index is 26.66 kg/m².    NST last PM:  FHT: 125 bpm, mod BTBV, +accels, - decels, Cat 1 (reassuring)  TOCO: no ctx      Intake/Output Summary (Last 24 hours) at 10/05/17 0703  Last data filed at 10/05/17 0600   Gross per 24 hour   Intake          2646.25 ml   Output             2100 ml   Net           546.25 ml       Cervical Exam:deferred     Significant Labs:    Recent Labs  Lab 10/02/17  2559  10/03/17  0408 10/04/17  1824   WBC 13.93* 13.62* 14.00*   HGB 9.6* 8.8* 9.5*   HCT 28.4* 26.0* 28.4*   MCV 87 88 88    334 314        Recent Labs  Lab 10/02/17  0419 10/03/17  0408 10/04/17  1824    136 139   K 3.9 4.3 4.4    109 107   CO2 18* 18* 21*   BUN 3* 4* 4*   CREATININE 0.6 0.6 0.6   GLU 78 102 104   PROT 6.0 5.7* 6.6   BILITOT 3.1* 2.8* 4.2*   ALKPHOS 98 92 109   ALT 28 27 26   AST 33 31 36      AM CBC, CMP pending  Retic 7.8>pending    Physical Exam:   Constitutional: She is oriented to person, place, and time. She appears well-developed and well-nourished. No distress.    HENT:   Head: Normocephalic and atraumatic.     Neck: Normal range of motion. Neck supple.    Cardiovascular: Normal rate and regular rhythm.     Pulmonary/Chest: Effort normal and breath sounds normal.        Abdominal: Soft. She exhibits no distension. There is no tenderness. There is no guarding.   Gravid uterus, non-tender  Rib TTP R>L                 Neurological: She is alert and oriented to person, place, and time.    Skin: Skin is warm and dry.    Psychiatric: She has a normal mood and affect. Her behavior is normal.       Assessment/Plan:     26 y.o. female  at 28w6d for:    * Sickle cell pain crisis    - S/P IVFs  - Yuridia'd MS contin 60 mg BID, gabapentin 300 mg TID. Oxy IR increased to 20 mg q4 prn, PO dilaudid 2mg q4 PRN   - NST BID  - T bili 3.8 >> 2.8>4.2, RC 5.4>7.8  - Hgb S electrophoresis: Hemoglobins A, F and S are present measuring approximately 51%, 4%   and 41% respectively  - UCx negative, BCx NGTD x2d, WBC 18>>13>14, pending this AM   - cont ASA 81, folic acid 4mg daily  - H/h 8.7/24 > 7.6/22 >1 u pRBC  > 9.4/27.6 >9.6/28.4> 8.8/>9.5/28.4  - tachycardia noted overnight  - labs in bold from last PM, slightly worse in each regard other than H/H. Repeat labs pending this AM  - Patient desires discharge. If labs stable this AM and clinically doing well, consider possible discharge home  later today. Will discuss with staff.            Hypotension    - Episodes of hypotension since resolved, although with initial concerns for sepsis blood cx drawn and are negative (final)        Sinusitis    - suspect bacterial sinus infection   - augmentin BID x10 days, started 9/29  - afebrile, VSS  - monitor closely  - rapid strep negative  - supportive measures including mucinex BID, saline nasal spray prn        History of pre-eclampsia    - normotensive  - baseline labs normal last admission, P/C too low to calculate              Lore Arana MD on behalf of Dr. Tatyana Dalal  Obstetrics  [facility]

## 2017-10-05 NOTE — ASSESSMENT & PLAN NOTE
- suspect bacterial sinus infection   - augmentin BID x10 days, started 9/29  - afebrile, VSS  - monitor closely  - rapid strep negative  - supportive measures including mucinex BID, saline nasal spray prn

## 2017-10-05 NOTE — SUBJECTIVE & OBJECTIVE
Obstetric HPI:  Patient reports pain is minimally improved, but tolerable. No SOB. Otherwise stable. O2 sats 98%. States she desires discharge.     Patient reports No contractions, active fetal movement, absent vaginal bleeding , absent loss of fluid      Objective:     Vital Signs (Most Recent):  Temp: 97.8 °F (36.6 °C) (10/05/17 0535)  Pulse: (!) 142 (10/05/17 0145)  Resp: 18 (10/05/17 0535)  BP: (!) 107/56 (10/05/17 0145)  SpO2: 98 % (10/04/17 2130) Vital Signs (24h Range):  Temp:  [96.6 °F (35.9 °C)-98.8 °F (37.1 °C)] 97.8 °F (36.6 °C)  Pulse:  [] 142  Resp:  [16-20] 18  SpO2:  [92 %-100 %] 98 %  BP: ()/(53-70) 107/56     Weight: 59.9 kg (132 lb)  Body mass index is 26.66 kg/m².    NST last PM:  FHT: 125 bpm, mod BTBV, +accels, - decels, Cat 1 (reassuring)  TOCO: no ctx      Intake/Output Summary (Last 24 hours) at 10/05/17 0703  Last data filed at 10/05/17 0600   Gross per 24 hour   Intake          2646.25 ml   Output             2100 ml   Net           546.25 ml       Cervical Exam:deferred     Significant Labs:    Recent Labs  Lab 10/02/17  0419 10/03/17  0408 10/04/17  1824   WBC 13.93* 13.62* 14.00*   HGB 9.6* 8.8* 9.5*   HCT 28.4* 26.0* 28.4*   MCV 87 88 88    334 314        Recent Labs  Lab 10/02/17  0419 10/03/17  0408 10/04/17  1824    136 139   K 3.9 4.3 4.4    109 107   CO2 18* 18* 21*   BUN 3* 4* 4*   CREATININE 0.6 0.6 0.6   GLU 78 102 104   PROT 6.0 5.7* 6.6   BILITOT 3.1* 2.8* 4.2*   ALKPHOS 98 92 109   ALT 28 27 26   AST 33 31 36      AM CBC, CMP pending  Retic 7.8>pending    Physical Exam:   Constitutional: She is oriented to person, place, and time. She appears well-developed and well-nourished. No distress.    HENT:   Head: Normocephalic and atraumatic.     Neck: Normal range of motion. Neck supple.    Cardiovascular: Normal rate and regular rhythm.     Pulmonary/Chest: Effort normal and breath sounds normal.        Abdominal: Soft. She exhibits no distension.  There is no tenderness. There is no guarding.   Gravid uterus, non-tender  Rib TTP R>L                 Neurological: She is alert and oriented to person, place, and time.    Skin: Skin is warm and dry.    Psychiatric: She has a normal mood and affect. Her behavior is normal.

## 2017-10-05 NOTE — PLAN OF CARE
Problem: Patient Care Overview  Goal: Plan of Care Review  Outcome: Ongoing (interventions implemented as appropriate)  Pt AAOx4. Not quite able to control pt's pain. +FM, denies vag bledding or LOF. No falls or injury this shift.

## 2017-10-05 NOTE — ASSESSMENT & PLAN NOTE
- Episodes of hypotension since resolved, although with initial concerns for sepsis blood cx drawn and are negative (final)

## 2017-10-05 NOTE — ASSESSMENT & PLAN NOTE
- S/P IVFs  - Yuridia'd MS contin 60 mg BID, gabapentin 300 mg TID. Oxy IR increased to 20 mg q4 prn, PO dilaudid 2mg q4 PRN   - NST BID  - T bili 3.8 >> 2.8>4.2, RC 5.4>7.8  - Hgb S electrophoresis: Hemoglobins A, F and S are present measuring approximately 51%, 4%   and 41% respectively  - UCx negative, BCx NGTD x2d, WBC 18>>13>14, pending this AM   - cont ASA 81, folic acid 4mg daily  - H/h 8.7/24 > 7.6/22 >1 u pRBC 9/30 > 9.4/27.6 >9.6/28.4> 8.8/26>9.5/28.4  - tachycardia noted overnight  - labs in bold from last PM, slightly worse in each regard other than H/H. Repeat labs pending this AM  - Patient desires discharge. If labs stable this AM and clinically doing well, consider possible discharge home later today. Will discuss with staff.

## 2017-10-05 NOTE — PLAN OF CARE
Problem: Patient Care Overview  Goal: Plan of Care Review  Outcome: Ongoing (interventions implemented as appropriate)  Pt on RA. EKG was done. No changes made. Will continue to monitor.

## 2017-10-06 LAB
ALBUMIN SERPL BCP-MCNC: 2.7 G/DL
ALP SERPL-CCNC: 106 U/L
ALT SERPL W/O P-5'-P-CCNC: 24 U/L
ANION GAP SERPL CALC-SCNC: 10 MMOL/L
ANISOCYTOSIS BLD QL SMEAR: SLIGHT
AST SERPL-CCNC: 39 U/L
BACTERIA UR CULT: NO GROWTH
BASOPHILS # BLD AUTO: 0.05 K/UL
BASOPHILS # BLD AUTO: 0.06 K/UL
BASOPHILS NFR BLD: 0.3 %
BASOPHILS NFR BLD: 0.4 %
BILIRUB SERPL-MCNC: 6.4 MG/DL
BLD PROD TYP BPU: NORMAL
BLD PROD TYP BPU: NORMAL
BLOOD UNIT EXPIRATION DATE: NORMAL
BLOOD UNIT EXPIRATION DATE: NORMAL
BLOOD UNIT TYPE CODE: 5100
BLOOD UNIT TYPE CODE: 9500
BLOOD UNIT TYPE: NORMAL
BLOOD UNIT TYPE: NORMAL
BUN SERPL-MCNC: 6 MG/DL
CALCIUM SERPL-MCNC: 9 MG/DL
CHLORIDE SERPL-SCNC: 106 MMOL/L
CO2 SERPL-SCNC: 20 MMOL/L
CODING SYSTEM: NORMAL
CODING SYSTEM: NORMAL
CREAT SERPL-MCNC: 0.6 MG/DL
DIFFERENTIAL METHOD: ABNORMAL
DIFFERENTIAL METHOD: ABNORMAL
DISPENSE STATUS: NORMAL
DISPENSE STATUS: NORMAL
EOSINOPHIL # BLD AUTO: 0.5 K/UL
EOSINOPHIL # BLD AUTO: 0.6 K/UL
EOSINOPHIL NFR BLD: 2.7 %
EOSINOPHIL NFR BLD: 4 %
ERYTHROCYTE [DISTWIDTH] IN BLOOD BY AUTOMATED COUNT: 15.6 %
ERYTHROCYTE [DISTWIDTH] IN BLOOD BY AUTOMATED COUNT: 15.9 %
EST. GFR  (AFRICAN AMERICAN): >60 ML/MIN/1.73 M^2
EST. GFR  (NON AFRICAN AMERICAN): >60 ML/MIN/1.73 M^2
GIANT PLATELETS BLD QL SMEAR: PRESENT
GLUCOSE SERPL-MCNC: 78 MG/DL
HCT VFR BLD AUTO: 25 %
HCT VFR BLD AUTO: 29.7 %
HGB BLD-MCNC: 10.3 G/DL
HGB BLD-MCNC: 8.5 G/DL
LYMPHOCYTES # BLD AUTO: 2.5 K/UL
LYMPHOCYTES # BLD AUTO: 3.2 K/UL
LYMPHOCYTES NFR BLD: 17.8 %
LYMPHOCYTES NFR BLD: 18.7 %
MCH RBC QN AUTO: 28.9 PG
MCH RBC QN AUTO: 29.2 PG
MCHC RBC AUTO-ENTMCNC: 34 G/DL
MCHC RBC AUTO-ENTMCNC: 34.7 G/DL
MCV RBC AUTO: 84 FL
MCV RBC AUTO: 85 FL
MONOCYTES # BLD AUTO: 1.5 K/UL
MONOCYTES # BLD AUTO: 2.4 K/UL
MONOCYTES NFR BLD: 10.9 %
MONOCYTES NFR BLD: 13.6 %
NEUTROPHILS # BLD AUTO: 11.1 K/UL
NEUTROPHILS # BLD AUTO: 9.2 K/UL
NEUTROPHILS NFR BLD: 64.7 %
NEUTROPHILS NFR BLD: 66.1 %
NUM UNITS TRANS PACKED RBC: NORMAL
NUM UNITS TRANS PACKED RBC: NORMAL
OVALOCYTES BLD QL SMEAR: ABNORMAL
PAPPENHEIMER BOD BLD QL SMEAR: PRESENT
PLATELET # BLD AUTO: 287 K/UL
PLATELET # BLD AUTO: 310 K/UL
PLATELET BLD QL SMEAR: ABNORMAL
PMV BLD AUTO: 9.1 FL
PMV BLD AUTO: 9.2 FL
POIKILOCYTOSIS BLD QL SMEAR: ABNORMAL
POTASSIUM SERPL-SCNC: 4 MMOL/L
PROT SERPL-MCNC: 6.5 G/DL
RBC # BLD AUTO: 2.94 M/UL
RBC # BLD AUTO: 3.53 M/UL
SCHISTOCYTES BLD QL SMEAR: ABNORMAL
SICKLE CELLS BLD QL SMEAR: ABNORMAL
SODIUM SERPL-SCNC: 136 MMOL/L
SPHEROCYTES BLD QL SMEAR: ABNORMAL
TARGETS BLD QL SMEAR: ABNORMAL
TOXIC GRANULES BLD QL SMEAR: PRESENT
WBC # BLD AUTO: 13.92 K/UL
WBC # BLD AUTO: 17.36 K/UL

## 2017-10-06 PROCEDURE — 94761 N-INVAS EAR/PLS OXIMETRY MLT: CPT

## 2017-10-06 PROCEDURE — 63600175 PHARM REV CODE 636 W HCPCS: Performed by: STUDENT IN AN ORGANIZED HEALTH CARE EDUCATION/TRAINING PROGRAM

## 2017-10-06 PROCEDURE — P9011 BLOOD SPLIT UNIT: HCPCS

## 2017-10-06 PROCEDURE — 25000003 PHARM REV CODE 250: Performed by: STUDENT IN AN ORGANIZED HEALTH CARE EDUCATION/TRAINING PROGRAM

## 2017-10-06 PROCEDURE — 36415 COLL VENOUS BLD VENIPUNCTURE: CPT

## 2017-10-06 PROCEDURE — 11000001 HC ACUTE MED/SURG PRIVATE ROOM

## 2017-10-06 PROCEDURE — 80053 COMPREHEN METABOLIC PANEL: CPT

## 2017-10-06 PROCEDURE — 86985 SPLIT BLOOD OR PRODUCTS: CPT

## 2017-10-06 PROCEDURE — 83020 HEMOGLOBIN ELECTROPHORESIS: CPT

## 2017-10-06 PROCEDURE — 27201040 HC RC 50 FILTER

## 2017-10-06 PROCEDURE — 59025 FETAL NON-STRESS TEST: CPT

## 2017-10-06 PROCEDURE — 85025 COMPLETE CBC W/AUTO DIFF WBC: CPT | Mod: 91

## 2017-10-06 PROCEDURE — 96372 THER/PROPH/DIAG INJ SC/IM: CPT

## 2017-10-06 PROCEDURE — 25000003 PHARM REV CODE 250: Performed by: OBSTETRICS & GYNECOLOGY

## 2017-10-06 PROCEDURE — P9040 RBC LEUKOREDUCED IRRADIATED: HCPCS

## 2017-10-06 PROCEDURE — 36430 TRANSFUSION BLD/BLD COMPNT: CPT

## 2017-10-06 RX ORDER — ACETAMINOPHEN 10 MG/ML
1000 INJECTION, SOLUTION INTRAVENOUS ONCE
Status: DISCONTINUED | OUTPATIENT
Start: 2017-10-06 | End: 2017-10-06

## 2017-10-06 RX ORDER — ACETAMINOPHEN 500 MG
1000 TABLET ORAL ONCE
Status: COMPLETED | OUTPATIENT
Start: 2017-10-06 | End: 2017-10-06

## 2017-10-06 RX ORDER — HYDROMORPHONE HYDROCHLORIDE 1 MG/ML
1 INJECTION, SOLUTION INTRAMUSCULAR; INTRAVENOUS; SUBCUTANEOUS
Status: DISCONTINUED | OUTPATIENT
Start: 2017-10-06 | End: 2017-10-10

## 2017-10-06 RX ORDER — HYDROMORPHONE HYDROCHLORIDE 1 MG/ML
0.5 INJECTION, SOLUTION INTRAMUSCULAR; INTRAVENOUS; SUBCUTANEOUS ONCE
Status: COMPLETED | OUTPATIENT
Start: 2017-10-06 | End: 2017-10-06

## 2017-10-06 RX ORDER — HYDROMORPHONE HYDROCHLORIDE 1 MG/ML
1 INJECTION, SOLUTION INTRAMUSCULAR; INTRAVENOUS; SUBCUTANEOUS EVERY 4 HOURS PRN
Status: DISCONTINUED | OUTPATIENT
Start: 2017-10-06 | End: 2017-10-06

## 2017-10-06 RX ORDER — HYDROMORPHONE HYDROCHLORIDE 1 MG/ML
1 INJECTION, SOLUTION INTRAMUSCULAR; INTRAVENOUS; SUBCUTANEOUS
Status: DISCONTINUED | OUTPATIENT
Start: 2017-10-06 | End: 2017-10-06

## 2017-10-06 RX ORDER — DIPHENHYDRAMINE HYDROCHLORIDE 50 MG/ML
12.5 INJECTION INTRAMUSCULAR; INTRAVENOUS ONCE
Status: COMPLETED | OUTPATIENT
Start: 2017-10-06 | End: 2017-10-06

## 2017-10-06 RX ORDER — HYDROMORPHONE HYDROCHLORIDE 1 MG/ML
1 INJECTION, SOLUTION INTRAMUSCULAR; INTRAVENOUS; SUBCUTANEOUS ONCE
Status: COMPLETED | OUTPATIENT
Start: 2017-10-06 | End: 2017-10-06

## 2017-10-06 RX ADMIN — HYDROCORTISONE: 1 CREAM TOPICAL at 09:10

## 2017-10-06 RX ADMIN — HYDROMORPHONE HYDROCHLORIDE 1 MG: 1 INJECTION, SOLUTION INTRAMUSCULAR; INTRAVENOUS; SUBCUTANEOUS at 09:10

## 2017-10-06 RX ADMIN — OXYCODONE HYDROCHLORIDE 20 MG: 5 TABLET ORAL at 05:10

## 2017-10-06 RX ADMIN — ACETAMINOPHEN 1000 MG: 500 TABLET ORAL at 11:10

## 2017-10-06 RX ADMIN — MORPHINE SULFATE 60 MG: 15 TABLET, EXTENDED RELEASE ORAL at 05:10

## 2017-10-06 RX ADMIN — HYDROMORPHONE HYDROCHLORIDE 0.5 MG: 1 INJECTION, SOLUTION INTRAMUSCULAR; INTRAVENOUS; SUBCUTANEOUS at 02:10

## 2017-10-06 RX ADMIN — CETIRIZINE HYDROCHLORIDE 5 MG: 5 TABLET, FILM COATED ORAL at 07:10

## 2017-10-06 RX ADMIN — OXYCODONE HYDROCHLORIDE 20 MG: 5 TABLET ORAL at 09:10

## 2017-10-06 RX ADMIN — HYDROMORPHONE HYDROCHLORIDE 1 MG: 1 INJECTION, SOLUTION INTRAMUSCULAR; INTRAVENOUS; SUBCUTANEOUS at 02:10

## 2017-10-06 RX ADMIN — OXYCODONE HYDROCHLORIDE 20 MG: 5 TABLET ORAL at 07:10

## 2017-10-06 RX ADMIN — SODIUM CHLORIDE, SODIUM LACTATE, POTASSIUM CHLORIDE, AND CALCIUM CHLORIDE: 600; 310; 30; 20 INJECTION, SOLUTION INTRAVENOUS at 05:10

## 2017-10-06 RX ADMIN — HYDROMORPHONE HYDROCHLORIDE 1 MG: 1 INJECTION, SOLUTION INTRAMUSCULAR; INTRAVENOUS; SUBCUTANEOUS at 12:10

## 2017-10-06 RX ADMIN — OXYCODONE HYDROCHLORIDE 20 MG: 5 TABLET ORAL at 11:10

## 2017-10-06 RX ADMIN — ENOXAPARIN SODIUM 40 MG: 100 INJECTION SUBCUTANEOUS at 09:10

## 2017-10-06 RX ADMIN — ASPIRIN 81 MG CHEWABLE TABLET 81 MG: 81 TABLET CHEWABLE at 07:10

## 2017-10-06 RX ADMIN — AMOXICILLIN AND CLAVULANATE POTASSIUM 1 TABLET: 875; 125 TABLET, FILM COATED ORAL at 09:10

## 2017-10-06 RX ADMIN — DIPHENHYDRAMINE HYDROCHLORIDE 12.5 MG: 50 INJECTION, SOLUTION INTRAMUSCULAR; INTRAVENOUS at 10:10

## 2017-10-06 RX ADMIN — HYDROMORPHONE HYDROCHLORIDE 1 MG: 1 INJECTION, SOLUTION INTRAMUSCULAR; INTRAVENOUS; SUBCUTANEOUS at 06:10

## 2017-10-06 RX ADMIN — DIPHENHYDRAMINE HYDROCHLORIDE 12.5 MG: 50 INJECTION, SOLUTION INTRAMUSCULAR; INTRAVENOUS at 07:10

## 2017-10-06 RX ADMIN — GABAPENTIN 300 MG: 100 CAPSULE ORAL at 05:10

## 2017-10-06 RX ADMIN — GABAPENTIN 300 MG: 100 CAPSULE ORAL at 02:10

## 2017-10-06 RX ADMIN — GUAIFENESIN 600 MG: 600 TABLET, EXTENDED RELEASE ORAL at 07:10

## 2017-10-06 RX ADMIN — SODIUM CHLORIDE: 0.9 INJECTION, SOLUTION INTRAVENOUS at 10:10

## 2017-10-06 RX ADMIN — GABAPENTIN 300 MG: 100 CAPSULE ORAL at 09:10

## 2017-10-06 RX ADMIN — HYDROCORTISONE: 1 CREAM TOPICAL at 07:10

## 2017-10-06 RX ADMIN — AMOXICILLIN AND CLAVULANATE POTASSIUM 1 TABLET: 875; 125 TABLET, FILM COATED ORAL at 07:10

## 2017-10-06 RX ADMIN — HYDROMORPHONE HYDROCHLORIDE 0.5 MG: 1 INJECTION, SOLUTION INTRAMUSCULAR; INTRAVENOUS; SUBCUTANEOUS at 01:10

## 2017-10-06 RX ADMIN — OXYCODONE HYDROCHLORIDE 20 MG: 5 TABLET ORAL at 03:10

## 2017-10-06 RX ADMIN — PRENATAL VIT W/ FE FUMARATE-FA TAB 27-0.8 MG 1 EACH: 27-0.8 TAB at 07:10

## 2017-10-06 RX ADMIN — LIDOCAINE 1 PATCH: 50 PATCH TOPICAL at 02:10

## 2017-10-06 RX ADMIN — FOLIC ACID 4 MG: 1 TABLET ORAL at 07:10

## 2017-10-06 RX ADMIN — GUAIFENESIN 600 MG: 600 TABLET, EXTENDED RELEASE ORAL at 09:10

## 2017-10-06 RX ADMIN — DIPHENHYDRAMINE HYDROCHLORIDE 12.5 MG: 50 INJECTION, SOLUTION INTRAMUSCULAR; INTRAVENOUS at 01:10

## 2017-10-06 NOTE — PROGRESS NOTES
MD to bedside for reassessment. Patient reports pain is present but about the same as earlier this AM, adequately controlled with current pain regimen. Last dose of IV dilaudid @1220. Hot packs continue to help rib pain. Denies CP, SOB, chest tightness, cough. Reports improvement in sinus infection symptoms. Denies fever, chills.     Temp:  [96.9 °F (36.1 °C)-98.8 °F (37.1 °C)] 98.2 °F (36.8 °C)  Pulse:  [108-142] 116  Resp:  [18-20] 18  SpO2:  [95 %-98 %] 95 %  BP: ()/(53-66) 111/59    General: well-developed, well-nourished, no apparent distress  CV: RRR  Resp: CTAB  Abd: soft, non-tender, non-distended, gravid; rib TTP unchanged  Extremities: no edema    FHT: 125bpm, mod BTB variability, +accels, -decels --reactive and reassuring  TOCO: not nathan    - continue current pain regimen, discussed taking IV dilaudid as needed  - repeat CBC, CMP in am  - may need additional transfusion, 2u pRBC held  - O2 sats normal on room air  - CTA discussed however patient allergic to contrast -hives, swelling  - apart from tachycardia, no other factors suggesting PE at this time. Rib pain remains consistent with her usual pain crisis symptoms.  - reassess pain and labs in AM  - continue to monitor tachycardia and chest symptoms    Lore Arana MD  OBGYN - PGY 3

## 2017-10-06 NOTE — ASSESSMENT & PLAN NOTE
- Episodes of hypotension since resolved, although with initial concerns for sepsis blood cx drawn and are negative (final)  - Repeat urine culture from yesterday pending

## 2017-10-06 NOTE — PROGRESS NOTES
called due to patient's response to her pain; patient is writhing in bed, moaning/crying; states pain is a 9, only had about 30 min of relief c IV dilaudid

## 2017-10-06 NOTE — SUBJECTIVE & OBJECTIVE
Obstetric HPI:  Patient reports she had a very rough night. Pain is still localized to her lower back and right-sided rib cage. She describes it as sharp bone pain. She felt SOB last night when her pain was at its peak. She became tachycardic. O2 sats and BP remained stable. Extra IV Dilaudid was given and repeat labs were ordered.  This AM, she states her pain is only minimally improved since she received most recent pain medication. She is about to sit in a warm shower, which she states has helped in the past. She denies current CP, SOB, N/V, abdominal pain, calf tenderness.    Patient reports No contractions, active fetal movement, absent vaginal bleeding , absent loss of fluid      Objective:     Vital Signs (Most Recent):  Temp: 98.2 °F (36.8 °C) (10/06/17 0453)  Pulse: (!) 115 (10/06/17 0458)  Resp: 16 (10/06/17 0453)  BP: 121/74 (10/06/17 0453)  SpO2: 100 % (10/06/17 0458) Vital Signs (24h Range):  Temp:  [96.6 °F (35.9 °C)-98.2 °F (36.8 °C)] 98.2 °F (36.8 °C)  Pulse:  [102-129] 115  Resp:  [16-18] 16  SpO2:  [95 %-100 %] 100 %  BP: ()/(53-74) 121/74     Weight: 59.9 kg (132 lb)  Body mass index is 26.66 kg/m².    NST last PM:  FHT: 125 bpm, mod BTBV, +accels, - decels, Cat 1 (reassuring)  TOCO: no ctx      Intake/Output Summary (Last 24 hours) at 10/06/17 0613  Last data filed at 10/06/17 0552   Gross per 24 hour   Intake          5793.34 ml   Output             4300 ml   Net          1493.34 ml       Cervical Exam:deferred     Significant Labs:    Recent Labs  Lab 10/03/17  0408 10/04/17  1824 10/05/17  0559   WBC 13.62* 14.00* 14.58*   HGB 8.8* 9.5* 9.2*   HCT 26.0* 28.4* 28.0*   MCV 88 88 87    314 291        Recent Labs  Lab 10/03/17  0408 10/04/17  1824 10/05/17  0559    139 136   K 4.3 4.4 4.5    107 109   CO2 18* 21* 18*   BUN 4* 4* 6   CREATININE 0.6 0.6 0.6    104 78   PROT 5.7* 6.6 6.5   BILITOT 2.8* 4.2* 4.8*   ALKPHOS 92 109 109   ALT 27 26 24   AST 31 36 40       AM CBC, CMP pending  Retic 7.8>pending    Physical Exam:   Constitutional: She is oriented to person, place, and time. She appears well-developed and well-nourished.   Appears ill    HENT:   Head: Normocephalic and atraumatic.     Neck: Normal range of motion. Neck supple.    Cardiovascular: Normal rate and regular rhythm.     Pulmonary/Chest: Effort normal and breath sounds normal.        Abdominal: Soft. She exhibits no distension. There is no tenderness. There is no guarding.   Gravid uterus, non-tender  Rib TTP R>L             Musculoskeletal: Normal range of motion and moves all extremeties. She exhibits no edema or tenderness.       Neurological: She is alert and oriented to person, place, and time.    Skin: Skin is warm and dry. She is not diaphoretic.    Psychiatric: She has a normal mood and affect. Her behavior is normal.

## 2017-10-06 NOTE — ASSESSMENT & PLAN NOTE
- On maintenance IVF  - Yuridia'd MS contin 60 mg BID, gabapentin 300 mg TID. Oxy IR increased to 20 mg q4 prn, PO dilaudid 2mg q4 PRN   - NST BID  - T bili 3.8 >> 2.8>4.2>4.8, RC 5.4>7.8  AM labs pending  - Hgb S electrophoresis: Hemoglobins A, F and S are present measuring approximately 51%, 4%   and 41% respectively  - UCx negative, BCx NGTD x2d, WBC 18>>13>14, pending this AM   - cont ASA 81, folic acid 4mg daily  - H/h 8.7/24 > 7.6/22 >1 u pRBC 9/30 > 9.4/27.6 >9.6/28.4> 8.8/26>9.5/28.4>9.2/28>pending this AM. 2 units held.  - tachycardia noted overnight, EKG yesterday was sinus tachycardia. No concern for PE at this time as O2 sats are normal. Pt is allergic to contrast with swelling and hives as a reacton. No CT angio at this time.  - labs in bold from last PM, slightly worse in each regard other than H/H. Repeat labs pending this AM

## 2017-10-06 NOTE — PLAN OF CARE
Problem: Patient Care Overview  Goal: Plan of Care Review  Outcome: Ongoing (interventions implemented as appropriate)  Pt doing well.  VS stable. No acute changes this shift. Pt denies LOF, vaginal bleeding,  and contractions.  Pt reports positive fetal movement. Pain not adequately controlled throughout this shift with IV and PO medications. No needs at this time. Call light within reach.

## 2017-10-06 NOTE — PLAN OF CARE
Problem: Patient Care Overview  Goal: Plan of Care Review  Continues c c/o back pamultiple pain meds given, reports pain 8-9 even p pain meds; tolerating food, liquids; reports + fetal movement, denies cramping, vaginal bleeding/cramping

## 2017-10-06 NOTE — PROGRESS NOTES
[facility]  Obstetrics  Antepartum Progress Note    Patient Name: Nishi Dumont  MRN: [id number]  Admission Date: 2017  Hospital Length of Stay: 7 days  Attending Physician: Trixie Mejía MD  Primary Care Provider: Primary Doctor No    Subjective:     Principal Problem:Sickle cell pain crisis    HPI:  Nishi Dumont is a 26 y.o. F at 28w0d with significant hx of sickle cell disease who presents complaining of back, rib, and shoulder pain consistent with her usual crisis x2 days. States her pain is 10/10 despite taking MS contin at home scheduled and percocet 2-3 times per day for breakthrough pain. She also reports feeling sick since her last admission. She was told previous that she had a viral URI and was managed with supportive care. Symptoms have not yet resolved and 2 days ago she started having purulent nasal discharge and pain in her upper right jaw with chewing. Denies fever, chills, nausea, vomiting. No pregnancy complaints at this time. Denies ctx, VB, LOF. Good FM.     Patient has been admitted multiple times throughout her pregnancy for sickle cell crisis, approximately every 2 weeks per patient. Her most recent sickle cell crisis admission was 3 weeks ago, at which time she received 2u pRBC, whiched helped. She was discharged on MS contin 60mg BID, oxyIR prn. Her primary MFM is Dr. Armenta at [facility].     Prior to this pregnancy, she had fewer crises (1/month). Patient only had 1 crisis in her previous pregnancy 7 years ago. 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 She has a history of multiple blood transfusions. This IUP also complicated by a history of pre-eclampsia in prior pregnancy and GBS bacteruria.  Patient denies CP, SOB, dysuria, hematuria, HA, leg swelling, visual disturbances.    Hospital Course:  2017 - admitted for presumed sickle cell  crisis. H/H 8/24. Pain regimen switch to MS contin 60mg BID, ox IR, and dilaudid. No acute evens  9/30/2017 - Pain somewhat worsening but patient not requesting increasing doses of pain medication, intermittent O2 desaturations resolved with IS usage, discussed blood transfusion with patient.    10/01/2017 - Again reports pain is worsened but not requiring additional pain medication. No SOB. Otherwise stable. S/P 1u pRBC 9/30.   10/02/2017- Patient reports continued pain in right ribs. No SOB. Gabapentin increased to 300 TID yesterday. Continued on MS contin 60 mg BID, Oxy IR 10 PRN, Dilaudid PRN.   10/03/2017- Patient reports pain is slightly improved. No changes in medication regimen.   10/04/2017- Patient reports back pain is improved, continues to endorse rib pain. Repeat CXR negative, labs with elevated Tbili and Retic. Patient remained inpatient.   10/5/2017 - Pt had worsened pain and tachycardia. EKG sinus tach, O2 saturations and BP stable. Overnight, she required additional IV Dilaudid. Repeat labs ordered  10/6/2017: This AM, she continues to endorse pain in her back and ribs. Labs pending.     Obstetric HPI:  Patient reports she had a very rough night. Pain is still localized to her lower back and right-sided rib cage. She describes it as sharp bone pain. She felt SOB last night when her pain was at its peak. She became tachycardic. O2 sats and BP remained stable. Extra IV Dilaudid was given and repeat labs were ordered.  This AM, she states her pain is only minimally improved since she received most recent pain medication. She is about to sit in a warm shower, which she states has helped in the past. She denies current CP, SOB, N/V, abdominal pain, calf tenderness.    Patient reports No contractions, active fetal movement, absent vaginal bleeding , absent loss of fluid      Objective:     Vital Signs (Most Recent):  Temp: 98.2 °F (36.8 °C) (10/06/17 0453)  Pulse: (!) 115 (10/06/17 0458)  Resp: 16  (10/06/17 0453)  BP: 121/74 (10/06/17 0453)  SpO2: 100 % (10/06/17 0458) Vital Signs (24h Range):  Temp:  [96.6 °F (35.9 °C)-98.2 °F (36.8 °C)] 98.2 °F (36.8 °C)  Pulse:  [102-129] 115  Resp:  [16-18] 16  SpO2:  [95 %-100 %] 100 %  BP: ()/(53-74) 121/74     Weight: 59.9 kg (132 lb)  Body mass index is 26.66 kg/m².    NST last PM:  FHT: 125 bpm, mod BTBV, +accels, - decels, Cat 1 (reassuring)  TOCO: no ctx      Intake/Output Summary (Last 24 hours) at 10/06/17 0613  Last data filed at 10/06/17 0552   Gross per 24 hour   Intake          5793.34 ml   Output             4300 ml   Net          1493.34 ml       Cervical Exam:deferred     Significant Labs:    Recent Labs  Lab 10/03/17  0408 10/04/17  1824 10/05/17  0559   WBC 13.62* 14.00* 14.58*   HGB 8.8* 9.5* 9.2*   HCT 26.0* 28.4* 28.0*   MCV 88 88 87    314 291        Recent Labs  Lab 10/03/17  0408 10/04/17  1824 10/05/17  0559    139 136   K 4.3 4.4 4.5    107 109   CO2 18* 21* 18*   BUN 4* 4* 6   CREATININE 0.6 0.6 0.6    104 78   PROT 5.7* 6.6 6.5   BILITOT 2.8* 4.2* 4.8*   ALKPHOS 92 109 109   ALT 27 26 24   AST 31 36 40      AM CBC, CMP pending  Retic 7.8>pending    Physical Exam:   Constitutional: She is oriented to person, place, and time. She appears well-developed and well-nourished.   Appears ill    HENT:   Head: Normocephalic and atraumatic.     Neck: Normal range of motion. Neck supple.    Cardiovascular: Normal rate and regular rhythm.     Pulmonary/Chest: Effort normal and breath sounds normal.        Abdominal: Soft. She exhibits no distension. There is no tenderness. There is no guarding.   Gravid uterus, non-tender  Rib TTP R>L             Musculoskeletal: Normal range of motion and moves all extremeties. She exhibits no edema or tenderness.       Neurological: She is alert and oriented to person, place, and time.    Skin: Skin is warm and dry. She is not diaphoretic.    Psychiatric: She has a normal mood and affect.  Her behavior is normal.       Assessment/Plan:     26 y.o. female  at 29w0d for:    * Sickle cell pain crisis    - On maintenance IVF  - Yuridia'd MS contin 60 mg BID, gabapentin 300 mg TID. Oxy IR increased to 20 mg q4 prn, PO dilaudid 2mg q4 PRN   - NST BID  - T bili 3.8 >> 2.8>4.2>4.8, RC 5.4>7.8  AM labs pending  - Hgb S electrophoresis: Hemoglobins A, F and S are present measuring approximately 51%, 4%   and 41% respectively  - UCx negative, BCx NGTD x2d, WBC 18>>13>14, pending this AM   - cont ASA 81, folic acid 4mg daily  - H/h 8.7/24 > 7.6/22 >1 u pRBC  > 9.4/27.6 >9.6/28.4> 8.8/26>9.5/28.4>9.2/28>pending this AM. 2 units held.  - tachycardia noted overnight, EKG yesterday was sinus tachycardia. No concern for PE at this time as O2 sats are normal. Pt is allergic to contrast with swelling and hives as a reacton. No CT angio at this time.  - labs in bold from last PM, slightly worse in each regard other than H/H. Repeat labs pending this AM            Hypotension    - Episodes of hypotension since resolved, although with initial concerns for sepsis blood cx drawn and are negative (final)  - Repeat urine culture from yesterday pending        Sinusitis    - suspect bacterial sinus infection   - augmentin BID x10 days, started   - afebrile, VSS  - monitor closely  - rapid strep negative  - supportive measures including mucinex BID, saline nasal spray prn        History of pre-eclampsia    - normotensive  - baseline labs normal last admission, P/C too low to calculate              Tatyana Dalal MD  Obstetrics  [facility]

## 2017-10-07 LAB
ALBUMIN SERPL BCP-MCNC: 2.7 G/DL
ALP SERPL-CCNC: 104 U/L
ALT SERPL W/O P-5'-P-CCNC: 24 U/L
ANION GAP SERPL CALC-SCNC: 10 MMOL/L
ANISOCYTOSIS BLD QL SMEAR: SLIGHT
AST SERPL-CCNC: 38 U/L
BASOPHILS # BLD AUTO: ABNORMAL K/UL
BASOPHILS NFR BLD: 0 %
BILIRUB SERPL-MCNC: 7.5 MG/DL
BUN SERPL-MCNC: 6 MG/DL
CALCIUM SERPL-MCNC: 9.1 MG/DL
CHLORIDE SERPL-SCNC: 106 MMOL/L
CO2 SERPL-SCNC: 22 MMOL/L
CREAT SERPL-MCNC: 0.6 MG/DL
DIFFERENTIAL METHOD: ABNORMAL
EOSINOPHIL # BLD AUTO: ABNORMAL K/UL
EOSINOPHIL NFR BLD: 6 %
ERYTHROCYTE [DISTWIDTH] IN BLOOD BY AUTOMATED COUNT: 16.1 %
EST. GFR  (AFRICAN AMERICAN): >60 ML/MIN/1.73 M^2
EST. GFR  (NON AFRICAN AMERICAN): >60 ML/MIN/1.73 M^2
GLUCOSE SERPL-MCNC: 78 MG/DL
HCT VFR BLD AUTO: 29.1 %
HGB BLD-MCNC: 9.8 G/DL
LYMPHOCYTES # BLD AUTO: ABNORMAL K/UL
LYMPHOCYTES NFR BLD: 18 %
MCH RBC QN AUTO: 28.2 PG
MCHC RBC AUTO-ENTMCNC: 33.7 G/DL
MCV RBC AUTO: 84 FL
MONOCYTES # BLD AUTO: ABNORMAL K/UL
MONOCYTES NFR BLD: 9 %
NEUTROPHILS NFR BLD: 67 %
OVALOCYTES BLD QL SMEAR: ABNORMAL
PLATELET # BLD AUTO: 307 K/UL
PLATELET BLD QL SMEAR: ABNORMAL
PMV BLD AUTO: 9.4 FL
POIKILOCYTOSIS BLD QL SMEAR: SLIGHT
POTASSIUM SERPL-SCNC: 4.1 MMOL/L
PROT SERPL-MCNC: 6.4 G/DL
RBC # BLD AUTO: 3.47 M/UL
SICKLE CELLS BLD QL SMEAR: ABNORMAL
SODIUM SERPL-SCNC: 138 MMOL/L
TARGETS BLD QL SMEAR: ABNORMAL
WBC # BLD AUTO: 14.91 K/UL

## 2017-10-07 PROCEDURE — 59025 FETAL NON-STRESS TEST: CPT | Mod: 26,,, | Performed by: OBSTETRICS & GYNECOLOGY

## 2017-10-07 PROCEDURE — 85027 COMPLETE CBC AUTOMATED: CPT

## 2017-10-07 PROCEDURE — 25000003 PHARM REV CODE 250: Performed by: STUDENT IN AN ORGANIZED HEALTH CARE EDUCATION/TRAINING PROGRAM

## 2017-10-07 PROCEDURE — 63600175 PHARM REV CODE 636 W HCPCS: Performed by: STUDENT IN AN ORGANIZED HEALTH CARE EDUCATION/TRAINING PROGRAM

## 2017-10-07 PROCEDURE — 36415 COLL VENOUS BLD VENIPUNCTURE: CPT

## 2017-10-07 PROCEDURE — 80053 COMPREHEN METABOLIC PANEL: CPT

## 2017-10-07 PROCEDURE — 11000001 HC ACUTE MED/SURG PRIVATE ROOM

## 2017-10-07 PROCEDURE — 25000003 PHARM REV CODE 250: Performed by: OBSTETRICS & GYNECOLOGY

## 2017-10-07 PROCEDURE — 94761 N-INVAS EAR/PLS OXIMETRY MLT: CPT

## 2017-10-07 PROCEDURE — 85007 BL SMEAR W/DIFF WBC COUNT: CPT

## 2017-10-07 PROCEDURE — 99232 SBSQ HOSP IP/OBS MODERATE 35: CPT | Mod: 25,,, | Performed by: OBSTETRICS & GYNECOLOGY

## 2017-10-07 RX ORDER — ZOLPIDEM TARTRATE 5 MG/1
5 TABLET ORAL ONCE
Status: COMPLETED | OUTPATIENT
Start: 2017-10-07 | End: 2017-10-07

## 2017-10-07 RX ADMIN — PRENATAL VIT W/ FE FUMARATE-FA TAB 27-0.8 MG 1 EACH: 27-0.8 TAB at 09:10

## 2017-10-07 RX ADMIN — ZOLPIDEM TARTRATE 5 MG: 5 TABLET, FILM COATED ORAL at 05:10

## 2017-10-07 RX ADMIN — GABAPENTIN 300 MG: 100 CAPSULE ORAL at 01:10

## 2017-10-07 RX ADMIN — OXYCODONE HYDROCHLORIDE 20 MG: 5 TABLET ORAL at 02:10

## 2017-10-07 RX ADMIN — HYDROMORPHONE HYDROCHLORIDE 1 MG: 1 INJECTION, SOLUTION INTRAMUSCULAR; INTRAVENOUS; SUBCUTANEOUS at 05:10

## 2017-10-07 RX ADMIN — LIDOCAINE 1 PATCH: 50 PATCH TOPICAL at 01:10

## 2017-10-07 RX ADMIN — HYDROCORTISONE: 1 CREAM TOPICAL at 09:10

## 2017-10-07 RX ADMIN — MORPHINE SULFATE 60 MG: 15 TABLET, EXTENDED RELEASE ORAL at 06:10

## 2017-10-07 RX ADMIN — ENOXAPARIN SODIUM 40 MG: 100 INJECTION SUBCUTANEOUS at 09:10

## 2017-10-07 RX ADMIN — GABAPENTIN 300 MG: 100 CAPSULE ORAL at 09:10

## 2017-10-07 RX ADMIN — HYDROMORPHONE HYDROCHLORIDE 1 MG: 1 INJECTION, SOLUTION INTRAMUSCULAR; INTRAVENOUS; SUBCUTANEOUS at 09:10

## 2017-10-07 RX ADMIN — GUAIFENESIN 600 MG: 600 TABLET, EXTENDED RELEASE ORAL at 09:10

## 2017-10-07 RX ADMIN — ASPIRIN 81 MG CHEWABLE TABLET 81 MG: 81 TABLET CHEWABLE at 09:10

## 2017-10-07 RX ADMIN — OXYCODONE HYDROCHLORIDE 20 MG: 5 TABLET ORAL at 09:10

## 2017-10-07 RX ADMIN — DIPHENHYDRAMINE HYDROCHLORIDE 12.5 MG: 50 INJECTION, SOLUTION INTRAMUSCULAR; INTRAVENOUS at 01:10

## 2017-10-07 RX ADMIN — GABAPENTIN 300 MG: 100 CAPSULE ORAL at 05:10

## 2017-10-07 RX ADMIN — DIPHENHYDRAMINE HYDROCHLORIDE 12.5 MG: 50 INJECTION, SOLUTION INTRAMUSCULAR; INTRAVENOUS at 05:10

## 2017-10-07 RX ADMIN — HYDROMORPHONE HYDROCHLORIDE 1 MG: 1 INJECTION, SOLUTION INTRAMUSCULAR; INTRAVENOUS; SUBCUTANEOUS at 01:10

## 2017-10-07 RX ADMIN — DIPHENHYDRAMINE HYDROCHLORIDE 12.5 MG: 50 INJECTION, SOLUTION INTRAMUSCULAR; INTRAVENOUS at 07:10

## 2017-10-07 RX ADMIN — OXYCODONE HYDROCHLORIDE 20 MG: 5 TABLET ORAL at 07:10

## 2017-10-07 RX ADMIN — HYDROMORPHONE HYDROCHLORIDE 1 MG: 1 INJECTION, SOLUTION INTRAMUSCULAR; INTRAVENOUS; SUBCUTANEOUS at 07:10

## 2017-10-07 RX ADMIN — FOLIC ACID 4 MG: 1 TABLET ORAL at 09:10

## 2017-10-07 RX ADMIN — HYDROMORPHONE HYDROCHLORIDE 1 MG: 1 INJECTION, SOLUTION INTRAMUSCULAR; INTRAVENOUS; SUBCUTANEOUS at 03:10

## 2017-10-07 RX ADMIN — ONDANSETRON 8 MG: 8 TABLET, ORALLY DISINTEGRATING ORAL at 03:10

## 2017-10-07 RX ADMIN — SODIUM CHLORIDE, SODIUM LACTATE, POTASSIUM CHLORIDE, AND CALCIUM CHLORIDE: 600; 310; 30; 20 INJECTION, SOLUTION INTRAVENOUS at 07:10

## 2017-10-07 RX ADMIN — OXYCODONE HYDROCHLORIDE 20 MG: 5 TABLET ORAL at 01:10

## 2017-10-07 RX ADMIN — AMOXICILLIN AND CLAVULANATE POTASSIUM 1 TABLET: 875; 125 TABLET, FILM COATED ORAL at 09:10

## 2017-10-07 RX ADMIN — MORPHINE SULFATE 60 MG: 15 TABLET, EXTENDED RELEASE ORAL at 05:10

## 2017-10-07 RX ADMIN — CETIRIZINE HYDROCHLORIDE 5 MG: 5 TABLET, FILM COATED ORAL at 09:10

## 2017-10-07 NOTE — PLAN OF CARE
Problem: Patient Care Overview  Goal: Plan of Care Review  Outcome: Ongoing (interventions implemented as appropriate)  No changes at this time. Will continue to monitor.

## 2017-10-07 NOTE — SUBJECTIVE & OBJECTIVE
Obstetric HPI:  Patient reports she had a very rough night again. Pain is still localized to her lower back and right-sided rib cage. She describes it as sharp bone pain. She is tearful and frustrated this morning. Her pain is very shortly relieved by IV dilaudid in addition to her scheduled PO meds. She is upset that she isn't getting rest and that her pain isn't improving. She is worried about all of the medications and the effect they may have on her baby. O2 sats and BP remained stable. She reports SOB at times when her pain is peaked, but no CP, fevers, chills, N/V, abdominal pain, calf pain.    Patient reports No contractions, active fetal movement, absent vaginal bleeding , absent loss of fluid      Objective:     Vital Signs (Most Recent):  Temp: 97.6 °F (36.4 °C) (10/07/17 0134)  Pulse: (!) 118 (10/07/17 0134)  Resp: 18 (10/07/17 0134)  BP: 110/63 (10/07/17 0134)  SpO2: 95 % (10/07/17 0134) Vital Signs (24h Range):  Temp:  [97.4 °F (36.3 °C)-98.8 °F (37.1 °C)] 97.6 °F (36.4 °C)  Pulse:  [] 118  Resp:  [16-18] 18  SpO2:  [91 %-100 %] 95 %  BP: (106-136)/(56-69) 110/63     Weight: 59.9 kg (132 lb)  Body mass index is 26.66 kg/m².    NST last PM:  FHT: 125 bpm, mod BTBV, +accels, - decels, Cat 1 (reassuring)  TOCO: no ctx      Intake/Output Summary (Last 24 hours) at 10/07/17 0524  Last data filed at 10/06/17 1801   Gross per 24 hour   Intake          4160.09 ml   Output             2600 ml   Net          1560.09 ml       Cervical Exam:deferred     Significant Labs:    Recent Labs  Lab 10/05/17  0559 10/06/17  0556 10/06/17  1726   WBC 14.58* 17.36* 13.92*   HGB 9.2* 8.5* 10.3*   HCT 28.0* 25.0* 29.7*   MCV 87 85 84    310 287        Recent Labs  Lab 10/04/17  1824 10/05/17  0559 10/06/17  0556    136 136   K 4.4 4.5 4.0    109 106   CO2 21* 18* 20*   BUN 4* 6 6   CREATININE 0.6 0.6 0.6    78 78   PROT 6.6 6.5 6.5   BILITOT 4.2* 4.8* 6.4*   ALKPHOS 109 109 106   ALT 26 24 24    AST 36 40 39      AM CBC, CMP pending  Retic 7.8>pending    Physical Exam:   Constitutional: She is oriented to person, place, and time. She appears well-developed and well-nourished. She appears distressed (crying).   Appears ill    HENT:   Head: Normocephalic and atraumatic.     Neck: Normal range of motion. Neck supple.    Cardiovascular: Normal rate and regular rhythm.     Pulmonary/Chest: Effort normal and breath sounds normal.        Abdominal: Soft. She exhibits no distension. There is no tenderness. There is no guarding.   Gravid uterus, non-tender  Rib TTP R>L             Musculoskeletal: Normal range of motion and moves all extremeties. She exhibits no edema or tenderness.       Neurological: She is alert and oriented to person, place, and time.    Skin: Skin is warm and dry. She is not diaphoretic.    Psychiatric: She has a normal mood and affect. Her behavior is normal.

## 2017-10-07 NOTE — ASSESSMENT & PLAN NOTE
- On maintenance IVF  - Yuridia'd MS contin 60 mg BID, gabapentin 300 mg TID. Oxy IR increased to 20 mg q4 prn, PO dilaudid 2mg q4 PRN   - NST BID  - T bili 3.8 >> 2.8>4.2>4.8>6.4, RC 5.4>7.8  AM labs pending  - Hgb S electrophoresis: Hemoglobins A, F and S are present measuring approximately 51%, 4%   and 41% respectively  - UCx negative, BCx NGTD x2d, WBC 18>>13>14.  - cont ASA 81, folic acid 4mg daily  - H/h 8.7/24 > 7.6/22 >1 u pRBC 9/30 > 9.4/27.6 >9.6/28.4> 8.8/26>9.5/28.4>9.2/28>2units (10/6)>10.3/29.7  - tachycardia noted during severe pain, EKG 10/5 was sinus tachycardia. No concern for PE at this time as O2 sats are normal. Pt is allergic to contrast with swelling and hives as a reacton. No CT angio at this time.  - Pain continues to worsen even after Dilaudid IV q2 around the clock. Will add ambien to her scheduled MS Contin to alleviate restlessness and distress. If no improvement, will need PCA. Consider anesthesia consult at that time.

## 2017-10-07 NOTE — PLAN OF CARE
Problem: Patient Care Overview  Goal: Plan of Care Review  Outcome: Ongoing (interventions implemented as appropriate)  Patient reports that her pain is still localized to her lower back and right-sided rib cage. Her pain is shortly relieved by IV/PO meds. Vitals are within normal limits. She reports SOB at times when her pain is peaked, but no CP, fevers, chills, N/V, abdominal pain, calf pain. Pt report positive fetal movement, pt denies contraction, vaginal bleeding, and LOF.

## 2017-10-07 NOTE — PROGRESS NOTES
[facility]  Obstetrics  Antepartum Progress Note    Patient Name: Nishi Dumont  MRN: [id number]  Admission Date: 2017  Hospital Length of Stay: 8 days  Attending Physician: Trixie Mejía MD  Primary Care Provider: Primary Doctor No    Subjective:     Principal Problem:Sickle cell pain crisis    HPI:  Nishi Dumont is a 26 y.o. F at 28w0d with significant hx of sickle cell disease who presents complaining of back, rib, and shoulder pain consistent with her usual crisis x2 days. States her pain is 10/10 despite taking MS contin at home scheduled and percocet 2-3 times per day for breakthrough pain. She also reports feeling sick since her last admission. She was told previous that she had a viral URI and was managed with supportive care. Symptoms have not yet resolved and 2 days ago she started having purulent nasal discharge and pain in her upper right jaw with chewing. Denies fever, chills, nausea, vomiting. No pregnancy complaints at this time. Denies ctx, VB, LOF. Good FM.     Patient has been admitted multiple times throughout her pregnancy for sickle cell crisis, approximately every 2 weeks per patient. Her most recent sickle cell crisis admission was 3 weeks ago, at which time she received 2u pRBC, whiched helped. She was discharged on MS contin 60mg BID, oxyIR prn. Her primary MFM is Dr. Armenta at [facility].     Prior to this pregnancy, she had fewer crises (1/month). Patient only had 1 crisis in her previous pregnancy 7 years ago. 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 She has a history of multiple blood transfusions. This IUP also complicated by a history of pre-eclampsia in prior pregnancy and GBS bacteruria.  Patient denies CP, SOB, dysuria, hematuria, HA, leg swelling, visual disturbances.    Hospital Course:  2017 - admitted for presumed sickle cell  crisis. H/H 8/24. Pain regimen switch to MS contin 60mg BID, ox IR, and dilaudid. No acute evens  9/30/2017 - Pain somewhat worsening but patient not requesting increasing doses of pain medication, intermittent O2 desaturations resolved with IS usage, discussed blood transfusion with patient.    10/01/2017 - Again reports pain is worsened but not requiring additional pain medication. No SOB. Otherwise stable. S/P 1u pRBC 9/30.   10/02/2017- Patient reports continued pain in right ribs. No SOB. Gabapentin increased to 300 TID yesterday. Continued on MS contin 60 mg BID, Oxy IR 10 PRN, Dilaudid PRN.   10/03/2017- Patient reports pain is slightly improved. No changes in medication regimen.   10/04/2017- Patient reports back pain is improved, continues to endorse rib pain. Repeat CXR negative, labs with elevated Tbili and Retic. Patient remained inpatient.   10/5/2017 - Pt had worsened pain and tachycardia. EKG sinus tach, O2 saturations and BP stable. Overnight, she required additional IV Dilaudid. Repeat labs ordered  10/6/2017: Continued pain. 2 units given. Repeat H/H shows good response. Hgb electrophoresis ordered. Dilaudid increased to 1g q2 PRN. Overnight pt got no rest, continued to have pain with only minimal relief in the shower.   10/7/2017 - This morning, pt is very anxious and tearful that she has not improved and is losing sleep. She become tachycardic during severe pain, but O2 sats and BP remain stable.     Obstetric HPI:  Patient reports she had a very rough night again. Pain is still localized to her lower back and right-sided rib cage. She describes it as sharp bone pain. She is tearful and frustrated this morning. Her pain is very shortly relieved by IV dilaudid in addition to her scheduled PO meds. She is upset that she isn't getting rest and that her pain isn't improving. She is worried about all of the medications and the effect they may have on her baby. O2 sats and BP remained stable. She  reports SOB at times when her pain is peaked, but no CP, fevers, chills, N/V, abdominal pain, calf pain.    Patient reports No contractions, active fetal movement, absent vaginal bleeding , absent loss of fluid      Objective:     Vital Signs (Most Recent):  Temp: 97.6 °F (36.4 °C) (10/07/17 0134)  Pulse: (!) 118 (10/07/17 0134)  Resp: 18 (10/07/17 0134)  BP: 110/63 (10/07/17 0134)  SpO2: 95 % (10/07/17 0134) Vital Signs (24h Range):  Temp:  [97.4 °F (36.3 °C)-98.8 °F (37.1 °C)] 97.6 °F (36.4 °C)  Pulse:  [] 118  Resp:  [16-18] 18  SpO2:  [91 %-100 %] 95 %  BP: (106-136)/(56-69) 110/63     Weight: 59.9 kg (132 lb)  Body mass index is 26.66 kg/m².    NST last PM:  FHT: 125 bpm, mod BTBV, +accels, - decels, Cat 1 (reassuring)  TOCO: no ctx      Intake/Output Summary (Last 24 hours) at 10/07/17 0524  Last data filed at 10/06/17 1801   Gross per 24 hour   Intake          4160.09 ml   Output             2600 ml   Net          1560.09 ml       Cervical Exam:deferred     Significant Labs:    Recent Labs  Lab 10/05/17  0559 10/06/17  0556 10/06/17  1726   WBC 14.58* 17.36* 13.92*   HGB 9.2* 8.5* 10.3*   HCT 28.0* 25.0* 29.7*   MCV 87 85 84    310 287        Recent Labs  Lab 10/04/17  1824 10/05/17  0559 10/06/17  0556    136 136   K 4.4 4.5 4.0    109 106   CO2 21* 18* 20*   BUN 4* 6 6   CREATININE 0.6 0.6 0.6    78 78   PROT 6.6 6.5 6.5   BILITOT 4.2* 4.8* 6.4*   ALKPHOS 109 109 106   ALT 26 24 24   AST 36 40 39      AM CBC, CMP pending  Retic 7.8>pending    Physical Exam:   Constitutional: She is oriented to person, place, and time. She appears well-developed and well-nourished. She appears distressed (crying).   Appears ill    HENT:   Head: Normocephalic and atraumatic.     Neck: Normal range of motion. Neck supple.    Cardiovascular: Normal rate and regular rhythm.     Pulmonary/Chest: Effort normal and breath sounds normal.        Abdominal: Soft. She exhibits no distension. There is no  tenderness. There is no guarding.   Gravid uterus, non-tender  Rib TTP R>L             Musculoskeletal: Normal range of motion and moves all extremeties. She exhibits no edema or tenderness.       Neurological: She is alert and oriented to person, place, and time.    Skin: Skin is warm and dry. She is not diaphoretic.    Psychiatric: She has a normal mood and affect. Her behavior is normal.       Assessment/Plan:     26 y.o. female  at 29w1d for:    * Sickle cell pain crisis    - On maintenance IVF  - Yuridia'd MS contin 60 mg BID, gabapentin 300 mg TID. Oxy IR increased to 20 mg q4 prn, PO dilaudid 2mg q4 PRN   - NST BID  - T bili 3.8 >> 2.8>4.2>4.8>6.4, RC 5.4>7.8  AM labs pending  - Hgb S electrophoresis: Hemoglobins A, F and S are present measuring approximately 51%, 4%   and 41% respectively  - UCx negative, BCx NGTD x2d, WBC 18>>13>14.  - cont ASA 81, folic acid 4mg daily  - H/h 8.7/24 > 7.6/22 >1 u pRBC  > 9.4/27.6 >9.6/28.4> 8.8/26>9.5/28.4>9.2/28>2units (10/6)>10.3/29.7  - tachycardia noted during severe pain, EKG 10/5 was sinus tachycardia. No concern for PE at this time as O2 sats are normal. Pt is allergic to contrast with swelling and hives as a reacton. No CT angio at this time.  - Pain continues to worsen even after Dilaudid IV q2 around the clock. Will add ambien to her scheduled MS Contin to alleviate restlessness and distress. If no improvement, will need PCA. Consider anesthesia consult at that time.         Hypotension    - Episodes of hypotension since resolved, although with initial concerns for sepsis blood cx drawn and are negative (final)  - Repeat urine culture from yesterday pending        Sinusitis    - suspect bacterial sinus infection   - augmentin BID x10 days, started   - afebrile, VSS  - monitor closely  - rapid strep negative  - supportive measures including mucinex BID, saline nasal spray prn        History of pre-eclampsia    - normotensive  - baseline labs normal last  admission, P/C too low to calculate              Tatyana Dalal MD  Obstetrics  [facility]

## 2017-10-08 LAB
ABO + RH BLD: NORMAL
ALBUMIN SERPL BCP-MCNC: 2.4 G/DL
ALP SERPL-CCNC: 98 U/L
ALT SERPL W/O P-5'-P-CCNC: 21 U/L
ANION GAP SERPL CALC-SCNC: 8 MMOL/L
AST SERPL-CCNC: 31 U/L
BASOPHILS # BLD AUTO: 0.05 K/UL
BASOPHILS NFR BLD: 0.4 %
BILIRUB SERPL-MCNC: 5.6 MG/DL
BLD GP AB SCN CELLS X3 SERPL QL: NORMAL
BLD PROD TYP BPU: NORMAL
BLOOD UNIT EXPIRATION DATE: NORMAL
BLOOD UNIT TYPE CODE: 5100
BLOOD UNIT TYPE: NORMAL
BUN SERPL-MCNC: 4 MG/DL
CALCIUM SERPL-MCNC: 8.3 MG/DL
CHLORIDE SERPL-SCNC: 109 MMOL/L
CO2 SERPL-SCNC: 21 MMOL/L
CODING SYSTEM: NORMAL
CREAT SERPL-MCNC: 0.6 MG/DL
DIFFERENTIAL METHOD: ABNORMAL
DISPENSE STATUS: NORMAL
EOSINOPHIL # BLD AUTO: 0.6 K/UL
EOSINOPHIL NFR BLD: 5.2 %
ERYTHROCYTE [DISTWIDTH] IN BLOOD BY AUTOMATED COUNT: 16 %
EST. GFR  (AFRICAN AMERICAN): >60 ML/MIN/1.73 M^2
EST. GFR  (NON AFRICAN AMERICAN): >60 ML/MIN/1.73 M^2
GLUCOSE SERPL-MCNC: 88 MG/DL
HCT VFR BLD AUTO: 25.7 %
HGB BLD-MCNC: 8.9 G/DL
LYMPHOCYTES # BLD AUTO: 2.5 K/UL
LYMPHOCYTES NFR BLD: 20.7 %
MCH RBC QN AUTO: 29.1 PG
MCHC RBC AUTO-ENTMCNC: 34.6 G/DL
MCV RBC AUTO: 84 FL
MONOCYTES # BLD AUTO: 1.5 K/UL
MONOCYTES NFR BLD: 12.6 %
NEUTROPHILS # BLD AUTO: 7.3 K/UL
NEUTROPHILS NFR BLD: 60.6 %
NUM UNITS TRANS PACKED RBC: NORMAL
PLATELET # BLD AUTO: 275 K/UL
PMV BLD AUTO: 9.4 FL
POTASSIUM SERPL-SCNC: 3.9 MMOL/L
PROT SERPL-MCNC: 5.8 G/DL
RBC # BLD AUTO: 3.06 M/UL
SODIUM SERPL-SCNC: 138 MMOL/L
WBC # BLD AUTO: 12.05 K/UL

## 2017-10-08 PROCEDURE — P9038 RBC IRRADIATED: HCPCS

## 2017-10-08 PROCEDURE — 63600175 PHARM REV CODE 636 W HCPCS: Performed by: STUDENT IN AN ORGANIZED HEALTH CARE EDUCATION/TRAINING PROGRAM

## 2017-10-08 PROCEDURE — 25000003 PHARM REV CODE 250: Performed by: STUDENT IN AN ORGANIZED HEALTH CARE EDUCATION/TRAINING PROGRAM

## 2017-10-08 PROCEDURE — 36415 COLL VENOUS BLD VENIPUNCTURE: CPT

## 2017-10-08 PROCEDURE — 59025 FETAL NON-STRESS TEST: CPT

## 2017-10-08 PROCEDURE — 11000001 HC ACUTE MED/SURG PRIVATE ROOM

## 2017-10-08 PROCEDURE — 86901 BLOOD TYPING SEROLOGIC RH(D): CPT

## 2017-10-08 PROCEDURE — 25000003 PHARM REV CODE 250: Performed by: OBSTETRICS & GYNECOLOGY

## 2017-10-08 PROCEDURE — 59025 FETAL NON-STRESS TEST: CPT | Mod: 26,,, | Performed by: OBSTETRICS & GYNECOLOGY

## 2017-10-08 PROCEDURE — 96372 THER/PROPH/DIAG INJ SC/IM: CPT

## 2017-10-08 PROCEDURE — 99232 SBSQ HOSP IP/OBS MODERATE 35: CPT | Mod: 25,,, | Performed by: OBSTETRICS & GYNECOLOGY

## 2017-10-08 PROCEDURE — 85025 COMPLETE CBC W/AUTO DIFF WBC: CPT

## 2017-10-08 PROCEDURE — 86900 BLOOD TYPING SEROLOGIC ABO: CPT

## 2017-10-08 PROCEDURE — 80053 COMPREHEN METABOLIC PANEL: CPT

## 2017-10-08 PROCEDURE — 27201040 HC RC 50 FILTER

## 2017-10-08 RX ORDER — DIPHENHYDRAMINE HYDROCHLORIDE 50 MG/ML
12.5 INJECTION INTRAMUSCULAR; INTRAVENOUS ONCE
Status: COMPLETED | OUTPATIENT
Start: 2017-10-08 | End: 2017-10-08

## 2017-10-08 RX ORDER — ACETAMINOPHEN 325 MG/1
650 TABLET ORAL EVERY 6 HOURS PRN
Status: DISCONTINUED | OUTPATIENT
Start: 2017-10-08 | End: 2017-10-10

## 2017-10-08 RX ORDER — HYDROCODONE BITARTRATE AND ACETAMINOPHEN 500; 5 MG/1; MG/1
TABLET ORAL
Status: DISCONTINUED | OUTPATIENT
Start: 2017-10-08 | End: 2017-10-11 | Stop reason: HOSPADM

## 2017-10-08 RX ORDER — ZOLPIDEM TARTRATE 5 MG/1
5 TABLET ORAL NIGHTLY PRN
Status: DISCONTINUED | OUTPATIENT
Start: 2017-10-08 | End: 2017-10-10

## 2017-10-08 RX ORDER — DIPHENHYDRAMINE HYDROCHLORIDE 50 MG/ML
12.5 INJECTION INTRAMUSCULAR; INTRAVENOUS EVERY 4 HOURS PRN
Status: DISCONTINUED | OUTPATIENT
Start: 2017-10-08 | End: 2017-10-10

## 2017-10-08 RX ADMIN — HYDROMORPHONE HYDROCHLORIDE 1 MG: 1 INJECTION, SOLUTION INTRAMUSCULAR; INTRAVENOUS; SUBCUTANEOUS at 11:10

## 2017-10-08 RX ADMIN — DIPHENHYDRAMINE HYDROCHLORIDE 12.5 MG: 50 INJECTION, SOLUTION INTRAMUSCULAR; INTRAVENOUS at 11:10

## 2017-10-08 RX ADMIN — HYDROMORPHONE HYDROCHLORIDE 1 MG: 1 INJECTION, SOLUTION INTRAMUSCULAR; INTRAVENOUS; SUBCUTANEOUS at 12:10

## 2017-10-08 RX ADMIN — ACETAMINOPHEN 650 MG: 325 TABLET ORAL at 10:10

## 2017-10-08 RX ADMIN — MORPHINE SULFATE 60 MG: 15 TABLET, EXTENDED RELEASE ORAL at 05:10

## 2017-10-08 RX ADMIN — OXYCODONE HYDROCHLORIDE 20 MG: 5 TABLET ORAL at 06:10

## 2017-10-08 RX ADMIN — GUAIFENESIN 600 MG: 600 TABLET, EXTENDED RELEASE ORAL at 08:10

## 2017-10-08 RX ADMIN — DIPHENHYDRAMINE HYDROCHLORIDE 12.5 MG: 50 INJECTION, SOLUTION INTRAMUSCULAR; INTRAVENOUS at 12:10

## 2017-10-08 RX ADMIN — CETIRIZINE HYDROCHLORIDE 5 MG: 5 TABLET, FILM COATED ORAL at 08:10

## 2017-10-08 RX ADMIN — HYDROCORTISONE: 1 CREAM TOPICAL at 09:10

## 2017-10-08 RX ADMIN — FOLIC ACID 4 MG: 1 TABLET ORAL at 08:10

## 2017-10-08 RX ADMIN — HYDROMORPHONE HYDROCHLORIDE 1 MG: 1 INJECTION, SOLUTION INTRAMUSCULAR; INTRAVENOUS; SUBCUTANEOUS at 10:10

## 2017-10-08 RX ADMIN — ENOXAPARIN SODIUM 40 MG: 100 INJECTION SUBCUTANEOUS at 10:10

## 2017-10-08 RX ADMIN — ASPIRIN 81 MG CHEWABLE TABLET 81 MG: 81 TABLET CHEWABLE at 08:10

## 2017-10-08 RX ADMIN — LIDOCAINE 1 PATCH: 50 PATCH TOPICAL at 12:10

## 2017-10-08 RX ADMIN — HYDROMORPHONE HYDROCHLORIDE 1 MG: 1 INJECTION, SOLUTION INTRAMUSCULAR; INTRAVENOUS; SUBCUTANEOUS at 04:10

## 2017-10-08 RX ADMIN — AMOXICILLIN AND CLAVULANATE POTASSIUM 1 TABLET: 875; 125 TABLET, FILM COATED ORAL at 09:10

## 2017-10-08 RX ADMIN — GABAPENTIN 300 MG: 100 CAPSULE ORAL at 05:10

## 2017-10-08 RX ADMIN — HYDROMORPHONE HYDROCHLORIDE 1 MG: 1 INJECTION, SOLUTION INTRAMUSCULAR; INTRAVENOUS; SUBCUTANEOUS at 07:10

## 2017-10-08 RX ADMIN — OXYCODONE HYDROCHLORIDE 20 MG: 5 TABLET ORAL at 01:10

## 2017-10-08 RX ADMIN — GABAPENTIN 300 MG: 100 CAPSULE ORAL at 09:10

## 2017-10-08 RX ADMIN — OXYCODONE HYDROCHLORIDE 20 MG: 5 TABLET ORAL at 02:10

## 2017-10-08 RX ADMIN — SODIUM CHLORIDE, SODIUM LACTATE, POTASSIUM CHLORIDE, AND CALCIUM CHLORIDE: 600; 310; 30; 20 INJECTION, SOLUTION INTRAVENOUS at 05:10

## 2017-10-08 RX ADMIN — DIPHENHYDRAMINE HYDROCHLORIDE 12.5 MG: 50 INJECTION, SOLUTION INTRAMUSCULAR; INTRAVENOUS at 06:10

## 2017-10-08 RX ADMIN — SODIUM CHLORIDE, SODIUM LACTATE, POTASSIUM CHLORIDE, AND CALCIUM CHLORIDE: 600; 310; 30; 20 INJECTION, SOLUTION INTRAVENOUS at 09:10

## 2017-10-08 RX ADMIN — HYDROMORPHONE HYDROCHLORIDE 1 MG: 1 INJECTION, SOLUTION INTRAMUSCULAR; INTRAVENOUS; SUBCUTANEOUS at 08:10

## 2017-10-08 RX ADMIN — AMOXICILLIN AND CLAVULANATE POTASSIUM 1 TABLET: 875; 125 TABLET, FILM COATED ORAL at 08:10

## 2017-10-08 RX ADMIN — DIPHENHYDRAMINE HYDROCHLORIDE 12.5 MG: 50 INJECTION, SOLUTION INTRAMUSCULAR; INTRAVENOUS at 08:10

## 2017-10-08 RX ADMIN — SODIUM CHLORIDE: 0.9 INJECTION, SOLUTION INTRAVENOUS at 10:10

## 2017-10-08 RX ADMIN — PRENATAL VIT W/ FE FUMARATE-FA TAB 27-0.8 MG 1 EACH: 27-0.8 TAB at 08:10

## 2017-10-08 RX ADMIN — SODIUM CHLORIDE, SODIUM LACTATE, POTASSIUM CHLORIDE, AND CALCIUM CHLORIDE: 600; 310; 30; 20 INJECTION, SOLUTION INTRAVENOUS at 12:10

## 2017-10-08 RX ADMIN — OXYCODONE HYDROCHLORIDE 20 MG: 5 TABLET ORAL at 10:10

## 2017-10-08 RX ADMIN — GABAPENTIN 300 MG: 100 CAPSULE ORAL at 02:10

## 2017-10-08 NOTE — PROGRESS NOTES
[facility]  Obstetrics  Antepartum Progress Note    Patient Name: Nishi Dumont  MRN: [id number]  Admission Date: 2017  Hospital Length of Stay: 9 days  Attending Physician: Trixie Mejía MD  Primary Care Provider: Primary Doctor No    Subjective:     Principal Problem:Sickle cell pain crisis    HPI:  Nishi Dumnot is a 26 y.o. F at 28w0d with significant hx of sickle cell disease who presents complaining of back, rib, and shoulder pain consistent with her usual crisis x2 days. States her pain is 10/10 despite taking MS contin at home scheduled and percocet 2-3 times per day for breakthrough pain. She also reports feeling sick since her last admission. She was told previous that she had a viral URI and was managed with supportive care. Symptoms have not yet resolved and 2 days ago she started having purulent nasal discharge and pain in her upper right jaw with chewing. Denies fever, chills, nausea, vomiting. No pregnancy complaints at this time. Denies ctx, VB, LOF. Good FM.     Patient has been admitted multiple times throughout her pregnancy for sickle cell crisis, approximately every 2 weeks per patient. Her most recent sickle cell crisis admission was 3 weeks ago, at which time she received 2u pRBC, whiched helped. She was discharged on MS contin 60mg BID, oxyIR prn. Her primary MFM is Dr. Armenta at [facility].     Prior to this pregnancy, she had fewer crises (1/month). Patient only had 1 crisis in her previous pregnancy 7 years ago. 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 She has a history of multiple blood transfusions. This IUP also complicated by a history of pre-eclampsia in prior pregnancy and GBS bacteruria.  Patient denies CP, SOB, dysuria, hematuria, HA, leg swelling, visual disturbances.    Hospital Course:  2017 - admitted for presumed sickle cell  crisis. H/H 8/24. Pain regimen switch to MS contin 60mg BID, ox IR, and dilaudid. No acute evens  9/30/2017 - Pain somewhat worsening but patient not requesting increasing doses of pain medication, intermittent O2 desaturations resolved with IS usage, discussed blood transfusion with patient.    10/01/2017 - Again reports pain is worsened but not requiring additional pain medication. No SOB. Otherwise stable. S/P 1u pRBC 9/30.   10/02/2017- Patient reports continued pain in right ribs. No SOB. Gabapentin increased to 300 TID yesterday. Continued on MS contin 60 mg BID, Oxy IR 10 PRN, Dilaudid PRN.   10/03/2017- Patient reports pain is slightly improved. No changes in medication regimen.   10/04/2017- Patient reports back pain is improved, continues to endorse rib pain. Repeat CXR negative, labs with elevated Tbili and Retic. Patient remained inpatient.   10/5/2017 - Pt had worsened pain and tachycardia. EKG sinus tach, O2 saturations and BP stable. Overnight, she required additional IV Dilaudid. Repeat labs ordered  10/6/2017: Continued pain. 2 units given. Repeat H/H shows good response. Hgb electrophoresis ordered. Dilaudid increased to 1g q2 PRN. Overnight pt got no rest, continued to have pain with only minimal relief in the shower.   10/7/2017 - This morning, pt is very anxious and tearful that she has not improved and is losing sleep. She become tachycardic during severe pain, but O2 sats and BP remain stable.   10/08/2017 - Stable, pain somewhat controlled with current prn meds. Pain improves with meds and warm compresses. VSS.    Obstetric HPI:  Patient resting in bed this AM. States she has been going back and forth to the bathroom to place warm compresses on her back/ribs which helps relieve her pain. Throughout the day and night she was able to space out her prn meds a little. Still reports continued pain that she does get some relief with her pain medications.Pain remains localized to her lower back and  right-sided rib cage. Denies SOB, CP, fevers, chills, N/V, abdominal pain, calf pain.    Patient reports no contractions, active fetal movement, absent vaginal bleeding , absent loss of fluid      Objective:     Vital Signs (Most Recent):  Temp: 98.1 °F (36.7 °C) (10/08/17 0456)  Pulse: 103 (10/08/17 0456)  Resp: 18 (10/08/17 0456)  BP: 108/67 (10/08/17 0456)  SpO2: 100 % (10/08/17 0456) Vital Signs (24h Range):  Temp:  [97 °F (36.1 °C)-98.8 °F (37.1 °C)] 98.1 °F (36.7 °C)  Pulse:  [103-115] 103  Resp:  [18] 18  SpO2:  [98 %-100 %] 100 %  BP: (101-126)/(55-74) 108/67     Weight: 59.9 kg (132 lb)  Body mass index is 26.66 kg/m².    NST last PM:  FHT: 130 bpm, mod BTBV, +accels, - decels, Cat 1 (reassuring)  TOCO: no ctx      Intake/Output Summary (Last 24 hours) at 10/08/17 0551  Last data filed at 10/07/17 1800   Gross per 24 hour   Intake              800 ml   Output              900 ml   Net             -100 ml       Cervical Exam:deferred     Significant Labs:    Recent Labs  Lab 10/06/17  1726 10/07/17  0527 10/08/17  0525   WBC 13.92* 14.91* 12.05   HGB 10.3* 9.8* 8.9*   HCT 29.7* 29.1* 25.7*   MCV 84 84 84    307 275        Recent Labs  Lab 10/05/17  0559 10/06/17  0556 10/07/17  0527    136 138   K 4.5 4.0 4.1    106 106   CO2 18* 20* 22*   BUN 6 6 6   CREATININE 0.6 0.6 0.6   GLU 78 78 78   PROT 6.5 6.5 6.4   BILITOT 4.8* 6.4* 7.5*   ALKPHOS 109 106 104   ALT 24 24 24   AST 40 39 38      AM CMP pending  Retic 7.8>6.5    Physical Exam:   Constitutional: She is oriented to person, place, and time. She appears well-developed and well-nourished. No distress.    HENT:   Head: Normocephalic and atraumatic.     Neck: Normal range of motion. Neck supple.    Cardiovascular: Normal rate and regular rhythm.     Pulmonary/Chest: Effort normal and breath sounds normal.        Abdominal: Soft. She exhibits no distension. There is no tenderness. There is no guarding.   Gravid uterus, non-tender  Rib TTP  R>L             Musculoskeletal: Normal range of motion and moves all extremeties. She exhibits no edema or tenderness.       Neurological: She is alert and oriented to person, place, and time.    Skin: Skin is warm and dry. She is not diaphoretic.    Psychiatric: She has a normal mood and affect. Her behavior is normal.       Assessment/Plan:     26 y.o. female  at 29w2d for:    * Sickle cell pain crisis    - On maintenance IVF  - Yuridia'd MS contin 60 mg BID, gabapentin 300 mg TID. Oxy IR increased to 20 mg q4 prn, PO dilaudid 2mg q2 PRN   - NST BID  - T bili 3.8 >> 2.8>4.2>4.8>6.4>7.5 , RC 5.4>7.8>6.5  - Hgb S electrophoresis: Hemoglobins A, F and S are present measuring approximately 51%, 4% and 41% respectively  - UCx negative, BCx negative, WBC 18>>13>14>12.  - cont ASA 81, folic acid 4mg daily   - H/h 8.7/24 > 7.6/22 >1 u pRBC  > 9.4/27.6 >9.6/28.4> 8.8/26>9.5/28.4>9.2/28>2units (10/6)>10.3/29.7>9.8/29.1>8.9/25.7   - tachycardia noted during severe pain, EKG 10/5 was sinus tachycardia. No concern for PE at this time as O2 sats are normal. Pt is allergic to contrast with swelling and hives as a reacton. No CT angio at this time.  - Pain better today than yesterday, spaced out prn meds at times throughout the last 24h.        Hypotension    - Episodes of hypotension since resolved, although with initial concerns for sepsis blood cx drawn and are negative (final)  - Repeat urine culture negative        Sinusitis    - suspect bacterial sinus infection   - augmentin BID x10 days, started   - afebrile, VSS  - monitor closely  - rapid strep negative  - supportive measures including mucinex BID, saline nasal spray prn        History of pre-eclampsia    - normotensive  - baseline labs normal last admission, P/C too low to calculate              Elise Pena MD  Obstetrics  [facility]

## 2017-10-08 NOTE — ASSESSMENT & PLAN NOTE
- On maintenance IVF  - Yuridia'd MS contin 60 mg BID, gabapentin 300 mg TID. Oxy IR increased to 20 mg q4 prn, PO dilaudid 2mg q2 PRN   - NST BID  - T bili 3.8 >> 2.8>4.2>4.8>6.4>7.5 , RC 5.4>7.8>6.5  - Hgb S electrophoresis: Hemoglobins A, F and S are present measuring approximately 51%, 4% and 41% respectively  - UCx negative, BCx negative, WBC 18>>13>14>12.  - cont ASA 81, folic acid 4mg daily   - H/h 8.7/24 > 7.6/22 >1 u pRBC 9/30 > 9.4/27.6 >9.6/28.4> 8.8/26>9.5/28.4>9.2/28>2units (10/6)>10.3/29.7>9.8/29.1>8.9/25.7   - tachycardia noted during severe pain, EKG 10/5 was sinus tachycardia. No concern for PE at this time as O2 sats are normal. Pt is allergic to contrast with swelling and hives as a reacton. No CT angio at this time.  - Pain better today than yesterday, spaced out prn meds at times throughout the last 24h.

## 2017-10-08 NOTE — PLAN OF CARE
Problem: Patient Care Overview  Goal: Plan of Care Review  Outcome: Ongoing (interventions implemented as appropriate)  VSS on room air. Pt continues to report pain in the right side of her rib cage and back. States some improvement in pain with IV and po medications, but continues to rate pain 7/10 or higher. Warm showers and warm packs also used during shift. Pt states +fm. Denies SOB, chest pain, LOF, ctx, or vaginal bleeding. NST reassuring. Will continue to monitor.

## 2017-10-08 NOTE — ASSESSMENT & PLAN NOTE
- Episodes of hypotension since resolved, although with initial concerns for sepsis blood cx drawn and are negative (final)  - Repeat urine culture negative

## 2017-10-08 NOTE — PLAN OF CARE
Problem: Patient Care Overview  Goal: Interdisciplinary Rounds/Family Conf  Outcome: Ongoing (interventions implemented as appropriate)  Pt pain continues to be difficult to control. LR@150. No sleep. Pt states she was hallucinating after being given ambien yesterday. CBC and CMP drawn this morning. No falls or injury shift.

## 2017-10-09 PROBLEM — I95.9 HYPOTENSION: Status: RESOLVED | Noted: 2017-09-30 | Resolved: 2017-10-09

## 2017-10-09 PROBLEM — Z87.59 HISTORY OF PRE-ECLAMPSIA: Status: RESOLVED | Noted: 2017-08-30 | Resolved: 2017-10-09

## 2017-10-09 LAB
ALBUMIN SERPL BCP-MCNC: 2.6 G/DL
ALP SERPL-CCNC: 108 U/L
ALT SERPL W/O P-5'-P-CCNC: 20 U/L
ANION GAP SERPL CALC-SCNC: 11 MMOL/L
ANISOCYTOSIS BLD QL SMEAR: SLIGHT
AST SERPL-CCNC: 34 U/L
BASOPHILS # BLD AUTO: 0.06 K/UL
BASOPHILS NFR BLD: 0.5 %
BILIRUB SERPL-MCNC: 4.8 MG/DL
BUN SERPL-MCNC: 4 MG/DL
CALCIUM SERPL-MCNC: 8.8 MG/DL
CHLORIDE SERPL-SCNC: 110 MMOL/L
CO2 SERPL-SCNC: 17 MMOL/L
CREAT SERPL-MCNC: 0.6 MG/DL
DACRYOCYTES BLD QL SMEAR: ABNORMAL
DIFFERENTIAL METHOD: ABNORMAL
EOSINOPHIL # BLD AUTO: 0.6 K/UL
EOSINOPHIL NFR BLD: 5.5 %
ERYTHROCYTE [DISTWIDTH] IN BLOOD BY AUTOMATED COUNT: 16 %
EST. GFR  (AFRICAN AMERICAN): >60 ML/MIN/1.73 M^2
EST. GFR  (NON AFRICAN AMERICAN): >60 ML/MIN/1.73 M^2
GIANT PLATELETS BLD QL SMEAR: PRESENT
GLUCOSE SERPL-MCNC: 82 MG/DL
HCT VFR BLD AUTO: 32.2 %
HGB BLD-MCNC: 10.5 G/DL
HGB FRACT BLD ELPH-IMP: NORMAL
HGB S MFR BLD ELPH: 27 %
HOWELL-JOLLY BOD BLD QL SMEAR: ABNORMAL
LYMPHOCYTES # BLD AUTO: 3 K/UL
LYMPHOCYTES NFR BLD: 25.6 %
MCH RBC QN AUTO: 28.2 PG
MCHC RBC AUTO-ENTMCNC: 32.6 G/DL
MCV RBC AUTO: 87 FL
MONOCYTES # BLD AUTO: 1.4 K/UL
MONOCYTES NFR BLD: 12.3 %
NEUTROPHILS # BLD AUTO: 6.4 K/UL
NEUTROPHILS NFR BLD: 56.1 %
OVALOCYTES BLD QL SMEAR: ABNORMAL
PLATELET # BLD AUTO: 276 K/UL
PLATELET BLD QL SMEAR: ABNORMAL
PMV BLD AUTO: 9.6 FL
POIKILOCYTOSIS BLD QL SMEAR: SLIGHT
POTASSIUM SERPL-SCNC: 4.3 MMOL/L
PROT SERPL-MCNC: 6.4 G/DL
RBC # BLD AUTO: 3.72 M/UL
SCHISTOCYTES BLD QL SMEAR: ABNORMAL
SICKLE CELLS BLD QL SMEAR: ABNORMAL
SODIUM SERPL-SCNC: 138 MMOL/L
TARGETS BLD QL SMEAR: ABNORMAL
TOXIC GRANULES BLD QL SMEAR: PRESENT
WBC # BLD AUTO: 11.55 K/UL

## 2017-10-09 PROCEDURE — 82239 BILE ACIDS TOTAL: CPT

## 2017-10-09 PROCEDURE — 25000003 PHARM REV CODE 250: Performed by: STUDENT IN AN ORGANIZED HEALTH CARE EDUCATION/TRAINING PROGRAM

## 2017-10-09 PROCEDURE — 63600175 PHARM REV CODE 636 W HCPCS: Performed by: STUDENT IN AN ORGANIZED HEALTH CARE EDUCATION/TRAINING PROGRAM

## 2017-10-09 PROCEDURE — 97802 MEDICAL NUTRITION INDIV IN: CPT

## 2017-10-09 PROCEDURE — 11000001 HC ACUTE MED/SURG PRIVATE ROOM

## 2017-10-09 PROCEDURE — 94761 N-INVAS EAR/PLS OXIMETRY MLT: CPT

## 2017-10-09 PROCEDURE — 36415 COLL VENOUS BLD VENIPUNCTURE: CPT

## 2017-10-09 PROCEDURE — 25000003 PHARM REV CODE 250: Performed by: OBSTETRICS & GYNECOLOGY

## 2017-10-09 PROCEDURE — 94799 UNLISTED PULMONARY SVC/PX: CPT

## 2017-10-09 PROCEDURE — 85025 COMPLETE CBC W/AUTO DIFF WBC: CPT

## 2017-10-09 PROCEDURE — 99223 1ST HOSP IP/OBS HIGH 75: CPT | Mod: ,,, | Performed by: INTERNAL MEDICINE

## 2017-10-09 PROCEDURE — 80053 COMPREHEN METABOLIC PANEL: CPT

## 2017-10-09 PROCEDURE — 59025 FETAL NON-STRESS TEST: CPT

## 2017-10-09 PROCEDURE — 59025 FETAL NON-STRESS TEST: CPT | Mod: 26,,, | Performed by: OBSTETRICS & GYNECOLOGY

## 2017-10-09 PROCEDURE — 96372 THER/PROPH/DIAG INJ SC/IM: CPT

## 2017-10-09 PROCEDURE — 99233 SBSQ HOSP IP/OBS HIGH 50: CPT | Mod: 25,,, | Performed by: OBSTETRICS & GYNECOLOGY

## 2017-10-09 RX ORDER — MORPHINE SULFATE 15 MG/1
75 TABLET, FILM COATED, EXTENDED RELEASE ORAL EVERY 12 HOURS
Status: DISCONTINUED | OUTPATIENT
Start: 2017-10-09 | End: 2017-10-11 | Stop reason: HOSPADM

## 2017-10-09 RX ADMIN — HYDROMORPHONE HYDROCHLORIDE 1 MG: 1 INJECTION, SOLUTION INTRAMUSCULAR; INTRAVENOUS; SUBCUTANEOUS at 08:10

## 2017-10-09 RX ADMIN — OXYCODONE HYDROCHLORIDE 20 MG: 5 TABLET ORAL at 01:10

## 2017-10-09 RX ADMIN — DIPHENHYDRAMINE HYDROCHLORIDE 12.5 MG: 50 INJECTION, SOLUTION INTRAMUSCULAR; INTRAVENOUS at 12:10

## 2017-10-09 RX ADMIN — DIPHENHYDRAMINE HYDROCHLORIDE 12.5 MG: 50 INJECTION, SOLUTION INTRAMUSCULAR; INTRAVENOUS at 07:10

## 2017-10-09 RX ADMIN — HYDROCORTISONE: 1 CREAM TOPICAL at 08:10

## 2017-10-09 RX ADMIN — CETIRIZINE HYDROCHLORIDE 5 MG: 5 TABLET, FILM COATED ORAL at 08:10

## 2017-10-09 RX ADMIN — LIDOCAINE 1 PATCH: 50 PATCH TOPICAL at 01:10

## 2017-10-09 RX ADMIN — FOLIC ACID 4 MG: 1 TABLET ORAL at 08:10

## 2017-10-09 RX ADMIN — HYDROCORTISONE: 1 CREAM TOPICAL at 09:10

## 2017-10-09 RX ADMIN — HYDROMORPHONE HYDROCHLORIDE 1 MG: 1 INJECTION, SOLUTION INTRAMUSCULAR; INTRAVENOUS; SUBCUTANEOUS at 11:10

## 2017-10-09 RX ADMIN — ASPIRIN 81 MG CHEWABLE TABLET 81 MG: 81 TABLET CHEWABLE at 09:10

## 2017-10-09 RX ADMIN — DIPHENHYDRAMINE HYDROCHLORIDE 12.5 MG: 50 INJECTION, SOLUTION INTRAMUSCULAR; INTRAVENOUS at 03:10

## 2017-10-09 RX ADMIN — GABAPENTIN 300 MG: 100 CAPSULE ORAL at 09:10

## 2017-10-09 RX ADMIN — HYDROMORPHONE HYDROCHLORIDE 1 MG: 1 INJECTION, SOLUTION INTRAMUSCULAR; INTRAVENOUS; SUBCUTANEOUS at 03:10

## 2017-10-09 RX ADMIN — OXYCODONE HYDROCHLORIDE 20 MG: 5 TABLET ORAL at 07:10

## 2017-10-09 RX ADMIN — MORPHINE SULFATE 75 MG: 15 TABLET, EXTENDED RELEASE ORAL at 09:10

## 2017-10-09 RX ADMIN — MORPHINE SULFATE 60 MG: 15 TABLET, EXTENDED RELEASE ORAL at 05:10

## 2017-10-09 RX ADMIN — DIPHENHYDRAMINE HYDROCHLORIDE 12.5 MG: 50 INJECTION, SOLUTION INTRAMUSCULAR; INTRAVENOUS at 08:10

## 2017-10-09 RX ADMIN — OXYCODONE HYDROCHLORIDE 20 MG: 5 TABLET ORAL at 03:10

## 2017-10-09 RX ADMIN — PRENATAL VIT W/ FE FUMARATE-FA TAB 27-0.8 MG 1 EACH: 27-0.8 TAB at 08:10

## 2017-10-09 RX ADMIN — ENOXAPARIN SODIUM 40 MG: 100 INJECTION SUBCUTANEOUS at 09:10

## 2017-10-09 RX ADMIN — DIPHENHYDRAMINE HYDROCHLORIDE 12.5 MG: 50 INJECTION, SOLUTION INTRAMUSCULAR; INTRAVENOUS at 11:10

## 2017-10-09 RX ADMIN — HYDROMORPHONE HYDROCHLORIDE 1 MG: 1 INJECTION, SOLUTION INTRAMUSCULAR; INTRAVENOUS; SUBCUTANEOUS at 12:10

## 2017-10-09 RX ADMIN — SODIUM CHLORIDE, SODIUM LACTATE, POTASSIUM CHLORIDE, AND CALCIUM CHLORIDE: 600; 310; 30; 20 INJECTION, SOLUTION INTRAVENOUS at 08:10

## 2017-10-09 RX ADMIN — GABAPENTIN 300 MG: 100 CAPSULE ORAL at 01:10

## 2017-10-09 RX ADMIN — SODIUM CHLORIDE, SODIUM LACTATE, POTASSIUM CHLORIDE, AND CALCIUM CHLORIDE: 600; 310; 30; 20 INJECTION, SOLUTION INTRAVENOUS at 05:10

## 2017-10-09 RX ADMIN — GABAPENTIN 300 MG: 100 CAPSULE ORAL at 05:10

## 2017-10-09 RX ADMIN — OXYCODONE HYDROCHLORIDE 20 MG: 5 TABLET ORAL at 09:10

## 2017-10-09 RX ADMIN — HYDROMORPHONE HYDROCHLORIDE 1 MG: 1 INJECTION, SOLUTION INTRAMUSCULAR; INTRAVENOUS; SUBCUTANEOUS at 05:10

## 2017-10-09 RX ADMIN — HYDROMORPHONE HYDROCHLORIDE 1 MG: 1 INJECTION, SOLUTION INTRAMUSCULAR; INTRAVENOUS; SUBCUTANEOUS at 06:10

## 2017-10-09 RX ADMIN — OXYCODONE HYDROCHLORIDE 20 MG: 5 TABLET ORAL at 05:10

## 2017-10-09 NOTE — SUBJECTIVE & OBJECTIVE
Obstetric HPI:  Patient restless in bed this AM. She received one unit pRBC yesterday. Throughout the day she was able to space out her prn meds a little. But this AM, she states her pain is still at 10/10, overall unimproved throughout her stay. She has severe itching as well that is only minimally resolved with IV Benadryl. Pain remains localized to her lower back and right-sided rib cage. Denies SOB, CP, fevers, chills, N/V, abdominal pain, calf pain.  Patient reports no contractions, active fetal movement, absent vaginal bleeding , absent loss of fluid      Objective:     Vital Signs (Most Recent):  Temp: 98.1 °F (36.7 °C) (10/09/17 0346)  Pulse: (!) 114 (10/09/17 0346)  Resp: 16 (10/08/17 1952)  BP: 123/72 (10/09/17 0346)  SpO2: 95 % (10/09/17 0346) Vital Signs (24h Range):  Temp:  [97.6 °F (36.4 °C)-98.8 °F (37.1 °C)] 98.1 °F (36.7 °C)  Pulse:  [] 114  Resp:  [16-18] 16  SpO2:  [92 %-100 %] 95 %  BP: ()/(50-73) 123/72     Weight: 59.9 kg (132 lb)  Body mass index is 26.66 kg/m².    NST last PM:  FHT: 135 bpm, mod BTBV, +accels, - decels, Cat 1 (reassuring)  TOCO: no ctx    Intake/Output Summary (Last 24 hours) at 10/09/17 0630  Last data filed at 10/08/17 1800   Gross per 24 hour   Intake             1600 ml   Output                0 ml   Net             1600 ml       Cervical Exam:deferred     Significant Labs:    Recent Labs  Lab 10/07/17  0527 10/08/17  0525 10/09/17  0423   WBC 14.91* 12.05 11.55   HGB 9.8* 8.9* 10.5*   HCT 29.1* 25.7* 32.2*   MCV 84 84 87    275 276        Recent Labs  Lab 10/07/17  0527 10/08/17  0525 10/09/17  0423    138 138   K 4.1 3.9 4.3    109 110   CO2 22* 21* 17*   BUN 6 4* 4*   CREATININE 0.6 0.6 0.6   GLU 78 88 82   PROT 6.4 5.8* 6.4   BILITOT 7.5* 5.6* 4.8*   ALKPHOS 104 98 108   ALT 24 21 20   AST 38 31 34        Retic 7.8>6.5    Physical Exam:   Constitutional: She is oriented to person, place, and time. She appears well-developed and  well-nourished. No distress.    HENT:   Head: Normocephalic and atraumatic.     Neck: Normal range of motion. Neck supple.    Cardiovascular: Normal rate and regular rhythm.     Pulmonary/Chest: Effort normal and breath sounds normal.        Abdominal: Soft. She exhibits no distension. There is no tenderness. There is no guarding.   Gravid uterus, non-tender  Rib TTP R>L             Musculoskeletal: Normal range of motion and moves all extremeties. She exhibits no edema or tenderness.       Neurological: She is alert and oriented to person, place, and time.    Skin: Skin is warm and dry. She is not diaphoretic.   Bruising/excoriations on arms from itching    Psychiatric: She has a normal mood and affect. Her behavior is normal.

## 2017-10-09 NOTE — SUBJECTIVE & OBJECTIVE
"    Medications:  Continuous Infusions:   lactated ringers 100 mL/hr at 10/09/17 0834     Scheduled Meds:   aspirin  81 mg Oral Daily    cetirizine  5 mg Oral Daily    enoxaparin  40 mg Subcutaneous Daily    folic acid  4 mg Oral Daily    gabapentin  300 mg Oral TID    hydrocortisone   Topical BID    lidocaine  1 patch Transdermal Q24H    morphine  60 mg Oral Q12H    prenatal vitamin  1 tablet Oral Daily     PRN Meds:sodium chloride, sodium chloride, acetaminophen, benzonatate, diphenhydrAMINE, HYDROmorphone, influenza, ondansetron, oxycodone, promethazine (PHENERGAN) IVPB, senna-docusate 8.6-50 mg, simethicone, zolpidem     Review of patient's allergies indicates:   Allergen Reactions    Contrast media Hives    Iodine and iodide containing products Hives and Swelling    Mushroom Hives    Zithromax [azithromycin] Anaphylaxis    Demerol [meperidine] Other (See Comments)     Regarding reaction to demerol pt states "I talk out of my head and become aggressive"        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    Avascular necrosis of bone of hip     Avascular necrosis of humeral head     Blood transfusion     Sickle cell disease      Past Surgical History:   Procedure Laterality Date    CHOLECYSTECTOMY      JOINT REPLACEMENT       left hip     Family History     Problem Relation (Age of Onset)    Sickle cell trait Mother, Father, Brother, Daughter        Social History Main Topics    Smoking status: Never Smoker    Smokeless tobacco: Not on file    Alcohol use 0.0 oz/week      Comment: occassionally    Drug use: No    Sexual activity: Yes     Partners: Male     Birth control/ protection: Injection       Review of Systems   Constitutional: Negative for appetite change, chills, fatigue, fever and unexpected weight change.   HENT: Negative for mouth sores, nosebleeds, tinnitus, trouble swallowing and voice change.    Eyes: Negative for pain, " redness and visual disturbance.   Respiratory: Negative for apnea, cough, chest tightness, shortness of breath, wheezing and stridor.    Cardiovascular: Positive for chest pain (sickle cell pain around her ribcage). Negative for palpitations and leg swelling.   Gastrointestinal: Negative for abdominal pain, blood in stool, constipation, diarrhea, nausea and vomiting.   Endocrine: Negative for polydipsia, polyphagia and polyuria.   Genitourinary: Positive for flank pain. Negative for dysuria, frequency and hematuria.   Musculoskeletal: Positive for back pain and myalgias. Negative for arthralgias, joint swelling, neck pain and neck stiffness.   Skin: Negative for color change, pallor, rash and wound.   Neurological: Negative for dizziness, tremors, seizures, syncope, speech difficulty, weakness, numbness and headaches.   Hematological: Negative for adenopathy. Does not bruise/bleed easily.   Psychiatric/Behavioral: Positive for dysphoric mood. Negative for agitation, confusion and self-injury. The patient is not nervous/anxious.      Objective:     Vital Signs (Most Recent):  Temp: 97.8 °F (36.6 °C) (10/09/17 1619)  Pulse: 101 (10/09/17 1619)  Resp: 18 (10/09/17 0753)  BP: (!) 99/55 (10/09/17 1619)  SpO2: 97 % (10/09/17 1619) Vital Signs (24h Range):  Temp:  [97.2 °F (36.2 °C)-98.8 °F (37.1 °C)] 97.8 °F (36.6 °C)  Pulse:  [101-215] 101  Resp:  [16-18] 18  SpO2:  [92 %-100 %] 97 %  BP: ()/(55-72) 99/55     Weight: 59.9 kg (132 lb)  Body mass index is 26.66 kg/m².  Body surface area is 1.58 meters squared.      Intake/Output Summary (Last 24 hours) at 10/09/17 1646  Last data filed at 10/08/17 1800   Gross per 24 hour   Intake             1350 ml   Output                0 ml   Net             1350 ml       Physical Exam   Constitutional: She is oriented to person, place, and time. She appears well-developed and well-nourished. No distress.   Tearful due to pain   HENT:   Head: Normocephalic and atraumatic.    Nose: Nose normal.   Mouth/Throat: No oropharyngeal exudate.   Eyes: Conjunctivae and EOM are normal. Pupils are equal, round, and reactive to light. No scleral icterus.   Neck: Normal range of motion. Neck supple. No JVD present.   Cardiovascular: Normal rate, regular rhythm, normal heart sounds and intact distal pulses.  Exam reveals no gallop and no friction rub.    No murmur heard.  Pulmonary/Chest: Effort normal and breath sounds normal. No respiratory distress. She has no wheezes. She has no rales.   Abdominal: Soft. Bowel sounds are normal. There is no tenderness. No hernia.   Musculoskeletal: Normal range of motion. She exhibits no edema, tenderness or deformity.        Arms:  Diffuse tenderness over the back on light touch   Lymphadenopathy:     She has no cervical adenopathy.   Neurological: She is alert and oriented to person, place, and time. No cranial nerve deficit. She exhibits normal muscle tone.   Skin: Skin is warm and dry. No rash noted. She is not diaphoretic. No erythema. No pallor.   Psychiatric: She has a normal mood and affect. Her behavior is normal. Judgment and thought content normal.       Significant Labs:   BMP:   Recent Labs  Lab 10/08/17  0525 10/09/17  0423   GLU 88 82    138   K 3.9 4.3    110   CO2 21* 17*   BUN 4* 4*   CREATININE 0.6 0.6   CALCIUM 8.3* 8.8    and CBC:   Recent Labs  Lab 10/08/17  0525 10/09/17  0423   WBC 12.05 11.55   HGB 8.9* 10.5*   HCT 25.7* 32.2*    276

## 2017-10-09 NOTE — PLAN OF CARE
Problem: Patient Care Overview  Goal: Plan of Care Review  Outcome: Ongoing (interventions implemented as appropriate)  Recommendation/Intervention:   1. Continue regular diet   2. Continue prenatal mvi     Goals: Meet >85% EEN and EPN

## 2017-10-09 NOTE — ASSESSMENT & PLAN NOTE
- On maintenance IVF  - Yuridia'd MS contin 60 mg BID, gabapentin 300 mg TID. Oxy IR increased to 20 mg q4 prn, PO dilaudid 2mg q2 PRN   - NST BID  - T bili 3.8 >> 2.8>4.2>4.8>6.4>7.5 , RC 5.4>7.8>6.5>4.8  - Hgb S electrophoresis: Hemoglobins A, F and S are present measuring approximately 51%, 4% and 41% respectively  - UCx negative, BCx negative, WBC 18>>13>14>12  - cont ASA 81, folic acid 4mg daily   - H/h 8.7/24 > 7.6/22 >1 u pRBC 9/30 > 9.4/27.6 >9.6/28.4> 8.8/26>9.5/28.4>9.2/28>2units (10/6)>10.3/29.7>9.8/29.1>8.9/25.7>1unit(10/8)>10.5/32.2  - tachycardia noted during severe pain, EKG 10/5 was sinus tachycardia. No concern for PE at this time as O2 sats are normal. Pt is allergic to contrast with swelling and hives as a reacton. No CT angio at this time.  - Pain medication requirement improved yesterday, but seems to be back at 10/10 pain this AM. Consider Anesthesia consult and re-consulting Hematology. Also consider exchange transfusion.

## 2017-10-09 NOTE — ASSESSMENT & PLAN NOTE
- If deemed okay by MFM team, increase IV dilaudid from 1 mg to 2 mg every 2 hours prn, increase MS Contin from 60 mg to 75 mg q12h scheduled  - continue with IVF, ASA, prophylactic Lovenox, folic acid  - Exchange transfusion is not recommended unless patient develops acute chest or stroke. Simple transfusion can be given instead. Hb 10.5 today after transfusion.   - Had a long discussion with Ms. Dumont. It is critical to prevent acute chest. She has no fever or SOB, lung are clear. I encouraged her to use bedside incentive spirometry at least 5 puffs every hour when she is awake.

## 2017-10-09 NOTE — HPI
Ms. Dumont is a 25 yo AAF with known HbSS who follows by hematology at [place], who we are consulted for sickle cell crisis. She was previously evaluated by Dr. Grijalva 10 days ago when she was first admitted. Pain meds and IVF were recommended. Ms. Dumont continues to have a lot of pain which has worsened today. She is currently getting IV dilaudid 1 mg every 2 hours prn, oxycodone 20 mg every 4 hours prn, MS Contin 60 mg q12h. Also on aspirin 81 mg daily, Lovenox 40 mg qd, folic acid 4 mg daily. Her home meds include MS Contin 60 mg or 75 mg if pain is bad, oxycodone 10 mg as needed for breakthrough pain. She has been afebrile and on room air since admission. Ms. Dumont feels that dilaudid helps with her pain a little bit but is not enough. She denies fever or SOB. She had acute chest in the past, with the most recent one this past January, which was treated with exchange transfusion.

## 2017-10-09 NOTE — CONSULTS
[facility]  Hematology/Oncology  Consult Note    Patient Name: Nishi Dumont  MRN: [id number]  Admission Date: 9/28/2017  Hospital Length of Stay: 10 days  Code Status: Full Code   Attending Provider: Trixie Mejía MD  Consulting Provider: Belinda Romero MD  Primary Care Physician: Primary Doctor No  Principal Problem:Sickle cell pain crisis    Inpatient consult to Hematology/Oncology  Consult performed by: BELINDA ROMERO  Consult ordered by: JORGE WATKINS  Reason for consult: sickle cell pain crisis  Assessment/Recommendations: Please see consult note        Subjective:     HPI:  Ms. 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 She was previously evaluated by Dr. Grijalva 10 days ago when she was first admitted. Pain meds and IVF were recommended. Ms. Dumont continues to have a lot of pain which has worsened today. She is currently getting IV dilaudid 1 mg every 2 hours prn, oxycodone 20 mg every 4 hours prn, MS Contin 60 mg q12h. Also on aspirin 81 mg daily, Lovenox 40 mg qd, folic acid 4 mg daily. Her home meds include MS Contin 60 mg or 75 mg if pain is bad, oxycodone 10 mg as needed for breakthrough pain. She has been afebrile and on room air since admission. Ms. Dumont feels that dilaudid helps with her pain a little bit but is not enough. She denies fever or SOB. She had acute chest in the past, with the most recent one this past January, which was treated with exchange transfusion.         Medications:  Continuous Infusions:   lactated ringers 100 mL/hr at 10/09/17 0834     Scheduled Meds:   aspirin  81 mg Oral Daily    cetirizine  5 mg Oral Daily    enoxaparin  40 mg Subcutaneous Daily    folic acid  4 mg Oral Daily    gabapentin  300 mg Oral TID    hydrocortisone   Topical BID    lidocaine  1 patch Transdermal Q24H    morphine  60 mg Oral Q12H    prenatal vitamin  1 tablet Oral Daily  "    PRN Meds:sodium chloride, sodium chloride, acetaminophen, benzonatate, diphenhydrAMINE, HYDROmorphone, influenza, ondansetron, oxycodone, promethazine (PHENERGAN) IVPB, senna-docusate 8.6-50 mg, simethicone, zolpidem     Review of patient's allergies indicates:   Allergen Reactions    Contrast media Hives    Iodine and iodide containing products Hives and Swelling    Mushroom Hives    Zithromax [azithromycin] Anaphylaxis    Demerol [meperidine] Other (See Comments)     Regarding reaction to demerol pt states "I talk out of my head and become aggressive"        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    Avascular necrosis of bone of hip     Avascular necrosis of humeral head     Blood transfusion     Sickle cell disease      Past Surgical History:   Procedure Laterality Date    CHOLECYSTECTOMY      JOINT REPLACEMENT       left hip     Family History     Problem Relation (Age of Onset)    Sickle cell trait Mother, Father, Brother, Daughter        Social History Main Topics    Smoking status: Never Smoker    Smokeless tobacco: Not on file    Alcohol use 0.0 oz/week      Comment: occassionally    Drug use: No    Sexual activity: Yes     Partners: Male     Birth control/ protection: Injection       Review of Systems   Constitutional: Negative for appetite change, chills, fatigue, fever and unexpected weight change.   HENT: Negative for mouth sores, nosebleeds, tinnitus, trouble swallowing and voice change.    Eyes: Negative for pain, redness and visual disturbance.   Respiratory: Negative for apnea, cough, chest tightness, shortness of breath, wheezing and stridor.    Cardiovascular: Positive for chest pain (sickle cell pain around her ribcage). Negative for palpitations and leg swelling.   Gastrointestinal: Negative for abdominal pain, blood in stool, constipation, diarrhea, nausea and vomiting.   Endocrine: Negative for polydipsia, polyphagia " and polyuria.   Genitourinary: Positive for flank pain. Negative for dysuria, frequency and hematuria.   Musculoskeletal: Positive for back pain and myalgias. Negative for arthralgias, joint swelling, neck pain and neck stiffness.   Skin: Negative for color change, pallor, rash and wound.   Neurological: Negative for dizziness, tremors, seizures, syncope, speech difficulty, weakness, numbness and headaches.   Hematological: Negative for adenopathy. Does not bruise/bleed easily.   Psychiatric/Behavioral: Positive for dysphoric mood. Negative for agitation, confusion and self-injury. The patient is not nervous/anxious.      Objective:     Vital Signs (Most Recent):  Temp: 97.8 °F (36.6 °C) (10/09/17 1619)  Pulse: 101 (10/09/17 1619)  Resp: 18 (10/09/17 0753)  BP: (!) 99/55 (10/09/17 1619)  SpO2: 97 % (10/09/17 1619) Vital Signs (24h Range):  Temp:  [97.2 °F (36.2 °C)-98.8 °F (37.1 °C)] 97.8 °F (36.6 °C)  Pulse:  [101-215] 101  Resp:  [16-18] 18  SpO2:  [92 %-100 %] 97 %  BP: ()/(55-72) 99/55     Weight: 59.9 kg (132 lb)  Body mass index is 26.66 kg/m².  Body surface area is 1.58 meters squared.      Intake/Output Summary (Last 24 hours) at 10/09/17 1646  Last data filed at 10/08/17 1800   Gross per 24 hour   Intake             1350 ml   Output                0 ml   Net             1350 ml       Physical Exam   Constitutional: She is oriented to person, place, and time. She appears well-developed and well-nourished. No distress.   Tearful due to pain   HENT:   Head: Normocephalic and atraumatic.   Nose: Nose normal.   Mouth/Throat: No oropharyngeal exudate.   Eyes: Conjunctivae and EOM are normal. Pupils are equal, round, and reactive to light. No scleral icterus.   Neck: Normal range of motion. Neck supple. No JVD present.   Cardiovascular: Normal rate, regular rhythm, normal heart sounds and intact distal pulses.  Exam reveals no gallop and no friction rub.    No murmur heard.  Pulmonary/Chest: Effort normal  "and breath sounds normal. No respiratory distress. She has no wheezes. She has no rales.   Abdominal: Soft. Bowel sounds are normal. There is no tenderness. No hernia.   Musculoskeletal: Normal range of motion. She exhibits no edema, tenderness or deformity.        Arms:  Diffuse tenderness over the back on light touch   Lymphadenopathy:     She has no cervical adenopathy.   Neurological: She is alert and oriented to person, place, and time. No cranial nerve deficit. She exhibits normal muscle tone.   Skin: Skin is warm and dry. No rash noted. She is not diaphoretic. No erythema. No pallor.   Psychiatric: She has a normal mood and affect. Her behavior is normal. Judgment and thought content normal.       Significant Labs:   BMP:   Recent Labs  Lab 10/08/17  0525 10/09/17  0423   GLU 88 82    138   K 3.9 4.3    110   CO2 21* 17*   BUN 4* 4*   CREATININE 0.6 0.6   CALCIUM 8.3* 8.8    and CBC:   Recent Labs  Lab 10/08/17  0525 10/09/17  0423   WBC 12.05 11.55   HGB 8.9* 10.5*   HCT 25.7* 32.2*    276         Assessment/Plan:     * Sickle cell pain crisis    See "acute sickle cell crisis"        Acute sickle cell crisis    - If deemed okay by MFM team, increase IV dilaudid from 1 mg to 2 mg every 2 hours prn, increase MS Contin from 60 mg to 75 mg q12h scheduled  - continue with IVF, ASA, prophylactic Lovenox, folic acid  - Exchange transfusion is not recommended unless patient develops acute chest or stroke. Simple transfusion can be given instead. Hb 10.5 today after transfusion.   - Had a long discussion with Ms. Dumont. It is critical to prevent acute chest. She has no fever or SOB, lung are clear. I encouraged her to use bedside incentive spirometry at least 5 puffs every hour when she is awake.               Thank you for your consult. I will follow-up with patient. Please contact us if you have any additional questions.    Belinda Cornejo MD  Hematology/Oncology  [facility]  "

## 2017-10-09 NOTE — PROGRESS NOTES
"[facility]  Adult Nutrition  Progress Note    SUMMARY     Recommendations    Recommendation/Intervention:   1. Continue regular diet   2. Continue prenatal mvi    Goals: Meet >85% EEN and EPN    Nutrition Goal Status: new  Communication of RD Recs: other (comment) (care plan)    Reason for Assessment    Reason for Assessment: length of stay  Diagnosis: other (see comments) (Sickle cell pain crisis)  Relevent Medical History: sickle cell disease      General Information Comments: Pt is  at 28w0d. Endorses good appetite, denies any N/V. Pt states she is eating most of all meals.     Nutrition Discharge Planning: d/c on regular diet    Nutrition Prescription Ordered    Current Diet Order: regular    Evaluation of Received Nutrients/Fluid Intake    % Intake of Estimated Energy Needs: 75 - 100 %  % Meal Intake: 100%     Nutrition Risk Screen     Nutrition Risk Screen: no indicators present    Nutrition/Diet History    Patient Reported Diet/Restrictions/Preferences: general  Food Preferences: No cultural/spiritual preferences noted    Labs/Tests/Procedures/Meds    Pertinent Labs Reviewed: reviewed  Pertinent Labs Comments: CO2 17L, BUN 4L, Alb 2.6L  Pertinent Medications Reviewed: reviewed  Pertinent Medications Comments: abx, aspirin, folic acid, morphine, prenatal mvi, IVF    Physical Findings    Overall Physical Appearance: nourished  Skin: intact    Anthropometrics    Temp: 97.5 °F (36.4 °C)  Height: 4' 11" (149.9 cm)  Weight Method: Stated  Weight: 59.9 kg (132 lb)  Ideal Body Weight (IBW), Female: 95 lb  % Ideal Body Weight, Female (lb): 138.95 lb  BMI (Calculated): 26.7  BMI Grade: 25 - 29.9 - overweight     Estimated/Assessed Needs    Weight Used For Calorie Calculations: 59.9 kg (132 lb 0.9 oz)   Height (cm): 149.9 cm  Energy Calorie Requirements (kcal): 6085-0020  Energy Need Method: Kcal/kg (30-35 kcal/kg)  RMR (Cascade-St. Jeor Equation): 1244.62     Weight Used For Protein " Calculations: 59.9 kg (132 lb 0.9 oz)  Protein Requirements: 60-72 gm/d (1-1.2 gm/kg)    Fluid Requirements (mL): 1800 (or per team)  Fluid Need Method: RDA Method     Assessment and Plan    No nutrition dx at this time    Monitor and Evaluation    Food and Nutrient Intake: energy intake, food and beverage intake  Food and Nutrient Adminstration: diet order  Anthropometric Measurements: weight, weight change  Biochemical Data, Medical Tests and Procedures: electrolyte and renal panel, gastrointestinal profile, glucose/endocrine profile, inflammatory profile  Nutrition-Focused Physical Findings: overall appearance    Nutrition Risk    Level of Risk: other (see comments) (f/u 1x/wk)    Nutrition Follow-Up    RD Follow-up?: Yes

## 2017-10-09 NOTE — ASSESSMENT & PLAN NOTE
- suspect bacterial sinus infection   - s/p augmentin BID x10 days  - afebrile, VSS  - monitor closely  - rapid strep negative  - supportive measures including mucinex BID, saline nasal spray prn

## 2017-10-09 NOTE — PROGRESS NOTES
[facility]  Obstetrics  Antepartum Progress Note    Patient Name: Nishi Dumont  MRN: [id number]  Admission Date: 2017  Hospital Length of Stay: 10 days  Attending Physician: Trixie Mejía MD  Primary Care Provider: Primary Doctor No    Subjective:     Principal Problem:Sickle cell pain crisis    HPI:  Nishi Dumont is a 26 y.o. F at 28w0d with significant hx of sickle cell disease who presents complaining of back, rib, and shoulder pain consistent with her usual crisis x2 days. States her pain is 10/10 despite taking MS contin at home scheduled and percocet 2-3 times per day for breakthrough pain. She also reports feeling sick since her last admission. She was told previous that she had a viral URI and was managed with supportive care. Symptoms have not yet resolved and 2 days ago she started having purulent nasal discharge and pain in her upper right jaw with chewing. Denies fever, chills, nausea, vomiting. No pregnancy complaints at this time. Denies ctx, VB, LOF. Good FM.     Patient has been admitted multiple times throughout her pregnancy for sickle cell crisis, approximately every 2 weeks per patient. Her most recent sickle cell crisis admission was 3 weeks ago, at which time she received 2u pRBC, whiched helped. She was discharged on MS contin 60mg BID, oxyIR prn. Her primary MFM is Dr. Armenta at [facility].     Prior to this pregnancy, she had fewer crises (1/month). Patient only had 1 crisis in her previous pregnancy 7 years ago. 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 She has a history of multiple blood transfusions. This IUP also complicated by a history of pre-eclampsia in prior pregnancy and GBS bacteruria.  Patient denies CP, SOB, dysuria, hematuria, HA, leg swelling, visual disturbances.    Hospital Course:  2017 - admitted for presumed sickle cell  crisis. H/H 8/24. Pain regimen switch to MS contin 60mg BID, ox IR, and dilaudid. No acute evens  9/30/2017 - Pain somewhat worsening but patient not requesting increasing doses of pain medication, intermittent O2 desaturations resolved with IS usage, discussed blood transfusion with patient.    10/01/2017 - Again reports pain is worsened but not requiring additional pain medication. No SOB. Otherwise stable. S/P 1u pRBC 9/30.   10/02/2017- Patient reports continued pain in right ribs. No SOB. Gabapentin increased to 300 TID yesterday. Continued on MS contin 60 mg BID, Oxy IR 10 PRN, Dilaudid PRN.   10/03/2017- Patient reports pain is slightly improved. No changes in medication regimen.   10/04/2017- Patient reports back pain is improved, continues to endorse rib pain. Repeat CXR negative, labs with elevated Tbili and Retic. Patient remained inpatient.   10/5/2017 - Pt had worsened pain and tachycardia. EKG sinus tach, O2 saturations and BP stable. Overnight, she required additional IV Dilaudid. Repeat labs ordered  10/6/2017: Continued pain. 2 units given. Repeat H/H shows good response. Hgb electrophoresis ordered. Dilaudid increased to 1g q2 PRN. Overnight pt got no rest, continued to have pain with only minimal relief in the shower.   10/7/2017 - This morning, pt is very anxious and tearful that she has not improved and is losing sleep. She become tachycardic during severe pain, but O2 sats and BP remain stable.   10/08/2017 - Received one unit RBC yesterday. Stable, pain somewhat controlled yesterday afternoon, but feels that pain worsens in night and early morning.     Obstetric HPI:  Patient restless in bed this AM. She received one unit pRBC yesterday. Throughout the day she was able to space out her prn meds a little. But this AM, she states her pain is still at 10/10, overall unimproved throughout her stay. She has severe itching as well that is only minimally resolved with IV Benadryl. Pain remains  localized to her lower back and right-sided rib cage. Denies SOB, CP, fevers, chills, N/V, abdominal pain, calf pain.  Patient reports no contractions, active fetal movement, absent vaginal bleeding , absent loss of fluid      Objective:     Vital Signs (Most Recent):  Temp: 98.1 °F (36.7 °C) (10/09/17 0346)  Pulse: (!) 114 (10/09/17 0346)  Resp: 16 (10/08/17 1952)  BP: 123/72 (10/09/17 0346)  SpO2: 95 % (10/09/17 0346) Vital Signs (24h Range):  Temp:  [97.6 °F (36.4 °C)-98.8 °F (37.1 °C)] 98.1 °F (36.7 °C)  Pulse:  [] 114  Resp:  [16-18] 16  SpO2:  [92 %-100 %] 95 %  BP: ()/(50-73) 123/72     Weight: 59.9 kg (132 lb)  Body mass index is 26.66 kg/m².    NST last PM:  FHT: 135 bpm, mod BTBV, +accels, - decels, Cat 1 (reassuring)  TOCO: no ctx    Intake/Output Summary (Last 24 hours) at 10/09/17 0630  Last data filed at 10/08/17 1800   Gross per 24 hour   Intake             1600 ml   Output                0 ml   Net             1600 ml       Cervical Exam:deferred     Significant Labs:    Recent Labs  Lab 10/07/17  0527 10/08/17  0525 10/09/17  0423   WBC 14.91* 12.05 11.55   HGB 9.8* 8.9* 10.5*   HCT 29.1* 25.7* 32.2*   MCV 84 84 87    275 276        Recent Labs  Lab 10/07/17  0527 10/08/17  0525 10/09/17  0423    138 138   K 4.1 3.9 4.3    109 110   CO2 22* 21* 17*   BUN 6 4* 4*   CREATININE 0.6 0.6 0.6   GLU 78 88 82   PROT 6.4 5.8* 6.4   BILITOT 7.5* 5.6* 4.8*   ALKPHOS 104 98 108   ALT 24 21 20   AST 38 31 34        Retic 7.8>6.5    Physical Exam:   Constitutional: She is oriented to person, place, and time. She appears well-developed and well-nourished. No distress.    HENT:   Head: Normocephalic and atraumatic.     Neck: Normal range of motion. Neck supple.    Cardiovascular: Normal rate and regular rhythm.     Pulmonary/Chest: Effort normal and breath sounds normal.        Abdominal: Soft. She exhibits no distension. There is no tenderness. There is no guarding.   Gravid uterus,  non-tender  Rib TTP R>L             Musculoskeletal: Normal range of motion and moves all extremeties. She exhibits no edema or tenderness.       Neurological: She is alert and oriented to person, place, and time.    Skin: Skin is warm and dry. She is not diaphoretic.   Bruising/excoriations on arms from itching    Psychiatric: She has a normal mood and affect. Her behavior is normal.       Assessment/Plan:     26 y.o. female  at 29w3d for:    * Sickle cell pain crisis    - On maintenance IVF  - Yuridia'd MS contin 60 mg BID, gabapentin 300 mg TID. Oxy IR increased to 20 mg q4 prn, PO dilaudid 2mg q2 PRN   - NST BID  - T bili 3.8 >> 2.8>4.2>4.8>6.4>7.5 , RC 5.4>7.8>6.5>4.8  - Hgb S electrophoresis: Hemoglobins A, F and S are present measuring approximately 51%, 4% and 41% respectively  - UCx negative, BCx negative, WBC 18>>13>14>12  - cont ASA 81, folic acid 4mg daily   - H/h 8.7/24 > 7.6/22 >1 u pRBC  > 9.4/27.6 >9.6/28.4> 8.8/26>9.5/28.4>9.2/28>2units (10/6)>10.3/29.7>9.8/29.1>8.9/25.7>1unit(10/8)>10.5/32.2  - tachycardia noted during severe pain, EKG 10/5 was sinus tachycardia. No concern for PE at this time as O2 sats are normal. Pt is allergic to contrast with swelling and hives as a reacton. No CT angio at this time.  - Pain medication requirement improved yesterday, but seems to be back at 10/10 pain this AM. Consider Anesthesia consult and re-consulting Hematology. Also consider exchange transfusion.        Hypotension    - Episodes of hypotension since resolved, although with initial concerns for sepsis blood cx drawn and are negative (final)  - Repeat urine culture negative        Sinusitis    - suspect bacterial sinus infection   - s/p augmentin BID x10 days  - afebrile, VSS  - monitor closely  - rapid strep negative  - supportive measures including mucinex BID, saline nasal spray prn        History of pre-eclampsia    - normotensive  - baseline labs normal last admission, P/C too low to calculate               Tatyana Dalal MD  Obstetrics  [facility]

## 2017-10-09 NOTE — PLAN OF CARE
Problem: Patient Care Overview  Goal: Plan of Care Review  Outcome: Ongoing (interventions implemented as appropriate)  Patient c/o pain to right rib area and lower back. Pain uncontrolled with IV and PO pain medication, stating pain 9-10/10. VSS. Reports +FM, denies ctx, loss of fluid or vag bleeding. Benadryl given ATC with minimal relief from itching. Teds/SCDs, IVF maintained. Plan of care reviewed with patient. All questions answered.

## 2017-10-10 LAB — BILE AC SERPL-SCNC: 21 MCMOL/L

## 2017-10-10 PROCEDURE — 11000001 HC ACUTE MED/SURG PRIVATE ROOM

## 2017-10-10 PROCEDURE — 94761 N-INVAS EAR/PLS OXIMETRY MLT: CPT

## 2017-10-10 PROCEDURE — 99900035 HC TECH TIME PER 15 MIN (STAT)

## 2017-10-10 PROCEDURE — 63600175 PHARM REV CODE 636 W HCPCS: Performed by: STUDENT IN AN ORGANIZED HEALTH CARE EDUCATION/TRAINING PROGRAM

## 2017-10-10 PROCEDURE — 59025 FETAL NON-STRESS TEST: CPT | Mod: 26,,, | Performed by: OBSTETRICS & GYNECOLOGY

## 2017-10-10 PROCEDURE — 25000003 PHARM REV CODE 250: Performed by: STUDENT IN AN ORGANIZED HEALTH CARE EDUCATION/TRAINING PROGRAM

## 2017-10-10 PROCEDURE — 25000003 PHARM REV CODE 250: Performed by: OBSTETRICS & GYNECOLOGY

## 2017-10-10 PROCEDURE — 94799 UNLISTED PULMONARY SVC/PX: CPT

## 2017-10-10 PROCEDURE — 99233 SBSQ HOSP IP/OBS HIGH 50: CPT | Mod: 25,,, | Performed by: OBSTETRICS & GYNECOLOGY

## 2017-10-10 RX ORDER — ACETAMINOPHEN 325 MG/1
650 TABLET ORAL EVERY 6 HOURS
Status: DISCONTINUED | OUTPATIENT
Start: 2017-10-10 | End: 2017-10-11 | Stop reason: HOSPADM

## 2017-10-10 RX ORDER — OXYCODONE HYDROCHLORIDE 5 MG/1
30 TABLET ORAL EVERY 4 HOURS PRN
Status: DISCONTINUED | OUTPATIENT
Start: 2017-10-10 | End: 2017-10-11 | Stop reason: HOSPADM

## 2017-10-10 RX ORDER — GABAPENTIN 100 MG/1
300 CAPSULE ORAL EVERY MORNING
Status: DISCONTINUED | OUTPATIENT
Start: 2017-10-11 | End: 2017-10-11 | Stop reason: HOSPADM

## 2017-10-10 RX ORDER — GABAPENTIN 100 MG/1
300 CAPSULE ORAL DAILY
Status: DISCONTINUED | OUTPATIENT
Start: 2017-10-11 | End: 2017-10-11 | Stop reason: HOSPADM

## 2017-10-10 RX ORDER — OXYCODONE HYDROCHLORIDE 5 MG/1
20 TABLET ORAL EVERY 4 HOURS PRN
Status: DISCONTINUED | OUTPATIENT
Start: 2017-10-10 | End: 2017-10-11 | Stop reason: HOSPADM

## 2017-10-10 RX ADMIN — OXYCODONE HYDROCHLORIDE 20 MG: 5 TABLET ORAL at 10:10

## 2017-10-10 RX ADMIN — HYDROCORTISONE: 1 CREAM TOPICAL at 08:10

## 2017-10-10 RX ADMIN — DIPHENHYDRAMINE HYDROCHLORIDE 12.5 MG: 50 INJECTION, SOLUTION INTRAMUSCULAR; INTRAVENOUS at 04:10

## 2017-10-10 RX ADMIN — HYDROMORPHONE HYDROCHLORIDE 1 MG: 1 INJECTION, SOLUTION INTRAMUSCULAR; INTRAVENOUS; SUBCUTANEOUS at 12:10

## 2017-10-10 RX ADMIN — FOLIC ACID 4 MG: 1 TABLET ORAL at 08:10

## 2017-10-10 RX ADMIN — HYDROCORTISONE: 1 CREAM TOPICAL at 09:10

## 2017-10-10 RX ADMIN — GABAPENTIN 300 MG: 100 CAPSULE ORAL at 06:10

## 2017-10-10 RX ADMIN — MORPHINE SULFATE 75 MG: 15 TABLET, EXTENDED RELEASE ORAL at 09:10

## 2017-10-10 RX ADMIN — OXYCODONE HYDROCHLORIDE 30 MG: 5 TABLET ORAL at 03:10

## 2017-10-10 RX ADMIN — GABAPENTIN 300 MG: 100 CAPSULE ORAL at 01:10

## 2017-10-10 RX ADMIN — SODIUM CHLORIDE, SODIUM LACTATE, POTASSIUM CHLORIDE, AND CALCIUM CHLORIDE: 600; 310; 30; 20 INJECTION, SOLUTION INTRAVENOUS at 05:10

## 2017-10-10 RX ADMIN — OXYCODONE HYDROCHLORIDE 30 MG: 5 TABLET ORAL at 08:10

## 2017-10-10 RX ADMIN — HYDROMORPHONE HYDROCHLORIDE 1 MG: 1 INJECTION, SOLUTION INTRAMUSCULAR; INTRAVENOUS; SUBCUTANEOUS at 08:10

## 2017-10-10 RX ADMIN — MORPHINE SULFATE 75 MG: 15 TABLET, EXTENDED RELEASE ORAL at 08:10

## 2017-10-10 RX ADMIN — HYDROMORPHONE HYDROCHLORIDE 1 MG: 1 INJECTION, SOLUTION INTRAMUSCULAR; INTRAVENOUS; SUBCUTANEOUS at 06:10

## 2017-10-10 RX ADMIN — SODIUM CHLORIDE, SODIUM LACTATE, POTASSIUM CHLORIDE, AND CALCIUM CHLORIDE: 600; 310; 30; 20 INJECTION, SOLUTION INTRAVENOUS at 06:10

## 2017-10-10 RX ADMIN — LIDOCAINE 1 PATCH: 50 PATCH TOPICAL at 12:10

## 2017-10-10 RX ADMIN — PRENATAL VIT W/ FE FUMARATE-FA TAB 27-0.8 MG 1 EACH: 27-0.8 TAB at 08:10

## 2017-10-10 RX ADMIN — HYDROMORPHONE HYDROCHLORIDE 1 MG: 1 INJECTION, SOLUTION INTRAMUSCULAR; INTRAVENOUS; SUBCUTANEOUS at 04:10

## 2017-10-10 RX ADMIN — ACETAMINOPHEN 650 MG: 325 TABLET ORAL at 07:10

## 2017-10-10 RX ADMIN — DIPHENHYDRAMINE HYDROCHLORIDE 12.5 MG: 50 INJECTION, SOLUTION INTRAMUSCULAR; INTRAVENOUS at 12:10

## 2017-10-10 RX ADMIN — OXYCODONE HYDROCHLORIDE 20 MG: 5 TABLET ORAL at 06:10

## 2017-10-10 RX ADMIN — ENOXAPARIN SODIUM 40 MG: 100 INJECTION SUBCUTANEOUS at 10:10

## 2017-10-10 RX ADMIN — ASPIRIN 81 MG CHEWABLE TABLET 81 MG: 81 TABLET CHEWABLE at 08:10

## 2017-10-10 RX ADMIN — CETIRIZINE HYDROCHLORIDE 5 MG: 5 TABLET, FILM COATED ORAL at 08:10

## 2017-10-10 RX ADMIN — DIPHENHYDRAMINE HYDROCHLORIDE 12.5 MG: 50 INJECTION, SOLUTION INTRAMUSCULAR; INTRAVENOUS at 08:10

## 2017-10-10 RX ADMIN — ACETAMINOPHEN 650 MG: 325 TABLET ORAL at 02:10

## 2017-10-10 RX ADMIN — RANITIDINE HYDROCHLORIDE 75 MG: 150 SOLUTION ORAL at 12:10

## 2017-10-10 RX ADMIN — OXYCODONE HYDROCHLORIDE 20 MG: 5 TABLET ORAL at 02:10

## 2017-10-10 RX ADMIN — GABAPENTIN 600 MG: 100 CAPSULE ORAL at 09:10

## 2017-10-10 NOTE — PLAN OF CARE
10/10/17 1143   Discharge Assessment   Assessment Type Discharge Planning Reassessment   Confirmed/corrected address and phone number on facesheet? Yes   Assessment information obtained from? Patient   Current cognitive status: Alert/Oriented   Is patient able to care for self after discharge? Yes   Does the patient have transportation home? Yes   Discharge Plan A Home       Took pt for a walk around the hospital to get her out of her room. Pt was very appreciative.     Will cont to f/u.    Salina Simon LCSW    [facility]  [email or web address]    (phone) [phone] or  Ext. [phone]  (fax) [phone]

## 2017-10-10 NOTE — PROGRESS NOTES
[facility]  Obstetrics  Antepartum Progress Note    Patient Name: Nishi Dumont  MRN: [id number]  Admission Date: 2017  Hospital Length of Stay: 11 days  Attending Physician: Trixie Mejía MD  Primary Care Provider: Primary Doctor No    Subjective:     Principal Problem:Sickle cell pain crisis    HPI:  Nishi Dumont is a 26 y.o. F at 28w0d with significant hx of sickle cell disease who presents complaining of back, rib, and shoulder pain consistent with her usual crisis x2 days. States her pain is 10/10 despite taking MS contin at home scheduled and percocet 2-3 times per day for breakthrough pain. She also reports feeling sick since her last admission. She was told previous that she had a viral URI and was managed with supportive care. Symptoms have not yet resolved and 2 days ago she started having purulent nasal discharge and pain in her upper right jaw with chewing. Denies fever, chills, nausea, vomiting. No pregnancy complaints at this time. Denies ctx, VB, LOF. Good FM.     Patient has been admitted multiple times throughout her pregnancy for sickle cell crisis, approximately every 2 weeks per patient. Her most recent sickle cell crisis admission was 3 weeks ago, at which time she received 2u pRBC, whiched helped. She was discharged on MS contin 60mg BID, oxyIR prn. Her primary MFM is Dr. Armenta at [facility].     Prior to this pregnancy, she had fewer crises (1/month). Patient only had 1 crisis in her previous pregnancy 7 years ago. 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 She has a history of multiple blood transfusions. This IUP also complicated by a history of pre-eclampsia in prior pregnancy and GBS bacteruria.  Patient denies CP, SOB, dysuria, hematuria, HA, leg swelling, visual disturbances.    Hospital Course:  2017 - admitted for presumed sickle cell  crisis. H/H 8/24. Pain regimen switch to MS contin 60mg BID, ox IR, and dilaudid. No acute evens  9/30/2017 - Pain somewhat worsening but patient not requesting increasing doses of pain medication, intermittent O2 desaturations resolved with IS usage, discussed blood transfusion with patient.    10/01/2017 - Again reports pain is worsened but not requiring additional pain medication. No SOB. Otherwise stable. S/P 1u pRBC 9/30.   10/02/2017- Patient reports continued pain in right ribs. No SOB. Gabapentin increased to 300 TID yesterday. Continued on MS contin 60 mg BID, Oxy IR 10 PRN, Dilaudid PRN.   10/03/2017- Patient reports pain is slightly improved. No changes in medication regimen.   10/04/2017- Patient reports back pain is improved, continues to endorse rib pain. Repeat CXR negative, labs with elevated Tbili and Retic. Patient remained inpatient.   10/5/2017 - Pt had worsened pain and tachycardia. EKG sinus tach, O2 saturations and BP stable. Overnight, she required additional IV Dilaudid. Repeat labs ordered  10/6/2017: Continued pain. 2 units given. Repeat H/H shows good response. Hgb electrophoresis ordered. Dilaudid increased to 1g q2 PRN. Overnight pt got no rest, continued to have pain with only minimal relief in the shower.   10/7/2017 - This morning, pt is very anxious and tearful that she has not improved and is losing sleep. She become tachycardic during severe pain, but O2 sats and BP remain stable.   10/08/2017 - Received one unit RBC yesterday. Stable, pain somewhat controlled yesterday afternoon, but feels that pain worsens in night and early morning.   10/10/2017- Patient with continued pain this AM.  MS contin increased to 75 mg BID.  Did not require dilaudid every 2 hours overnight.     Obstetric HPI:  Patient with continued pain this AM, seems to be more comfortable this AM.  She was able to space out her IV dilaudid to ever 3-4 hours.   Pain remains localized to her lower back and  right-sided rib cage. Denies SOB, CP, fevers, chills, N/V, abdominal pain, calf pain.  Patient reports no contractions, active fetal movement, absent vaginal bleeding , absent loss of fluid      Objective:     Vital Signs (Most Recent):  Temp: 98.2 °F (36.8 °C) (10/10/17 0711)  Pulse: 110 (10/10/17 0711)  Resp: 20 (10/10/17 0711)  BP: (!) 92/52 (10/10/17 0711)  SpO2: 98 % (10/10/17 0710) Vital Signs (24h Range):  Temp:  [97.2 °F (36.2 °C)-98.2 °F (36.8 °C)] 98.2 °F (36.8 °C)  Pulse:  [100-215] 110  Resp:  [18-20] 20  SpO2:  [92 %-100 %] 98 %  BP: ()/(52-65) 92/52     Weight: 59.9 kg (132 lb)  Body mass index is 26.66 kg/m².    NST last PM:  FHT: 130 bpm, mod BTBV, +accels, possible decel vs maternal, no further decels with 20 more minutes of monitoring, Overall Reactive/Reassuring  TOCO: no ctx    Intake/Output Summary (Last 24 hours) at 10/10/17 0723  Last data filed at 10/09/17 1800   Gross per 24 hour   Intake          3700.01 ml   Output                0 ml   Net          3700.01 ml       Cervical Exam:deferred     Significant Labs:    Recent Labs  Lab 10/07/17  0527 10/08/17  0525 10/09/17  0423   WBC 14.91* 12.05 11.55   HGB 9.8* 8.9* 10.5*   HCT 29.1* 25.7* 32.2*   MCV 84 84 87    275 276        Recent Labs  Lab 10/07/17  0527 10/08/17  0525 10/09/17  0423    138 138   K 4.1 3.9 4.3    109 110   CO2 22* 21* 17*   BUN 6 4* 4*   CREATININE 0.6 0.6 0.6   GLU 78 88 82   PROT 6.4 5.8* 6.4   BILITOT 7.5* 5.6* 4.8*   ALKPHOS 104 98 108   ALT 24 21 20   AST 38 31 34        Retic 7.8>6.5    Physical Exam:   Constitutional: She is oriented to person, place, and time. She appears well-developed and well-nourished. No distress.    HENT:   Head: Normocephalic and atraumatic.     Neck: Normal range of motion. Neck supple.    Cardiovascular: Normal rate and regular rhythm.     Pulmonary/Chest: Effort normal and breath sounds normal.        Abdominal: Soft. She exhibits no distension. There is no  tenderness. There is no guarding.   Gravid uterus, non-tender  Rib TTP R>L             Musculoskeletal: Normal range of motion and moves all extremeties. She exhibits no edema or tenderness.       Neurological: She is alert and oriented to person, place, and time.    Skin: Skin is warm and dry. She is not diaphoretic.   Bruising/excoriations on arms from itching    Psychiatric: She has a normal mood and affect. Her behavior is normal.       Assessment/Plan:     26 y.o. female  at 29w4d for:    * Sickle cell pain crisis    - On maintenance IVF  - Yuridia'd MS contin 75 mg BID (increased 10/9), gabapentin 300 mg TID. Oxy IR increased to 20 mg q4 prn, PO dilaudid 2mg q2 PRN   - Dilaudid 1mg IV q2 hr  - NST BID  - T bili 3.8 >> 2.8>4.2>4.8>6.4>7.5 , RC 5.4>7.8>6.5>4.8  - Hgb S electrophoresis: Hemoglobins A, F and S are present measuring approximately 51%, 4% and 41% respectively  - UCx negative, BCx negative, WBC 18>>13>14>12  - cont ASA 81, folic acid 4mg daily   - H/h 8.7/24 > 7.6/22 >1 u pRBC  > 9.4/27.6 >9.6/28.4> 8.8/26>9.5/28.4>9.2/28>2units (10/6)>10.3/29.7>9.8/29.1>8.9/25.7>1unit(10/8)>10.5/32.2  - tachycardia noted during severe pain, EKG 10/5 was sinus tachycardia. No concern for PE at this time as O2 sats are normal. Pt is allergic to contrast with swelling and hives as a reacton. No CT angio at this time.  - bile acids sent for itching, pending.   - Hgb electrophoresis 10/6 Hgb S 27%, A 67%, F 3%         Sinusitis    - suspect bacterial sinus infection   - s/p augmentin BID x10 days  - afebrile, VSS  - monitor closely  - rapid strep negative  - supportive measures including mucinex BID, saline nasal spray prn              Tiny Alexander MD  Obstetrics [facility]

## 2017-10-10 NOTE — SUBJECTIVE & OBJECTIVE
Obstetric HPI:  Patient with continued pain this AM, seems to be more comfortable this AM.  She was able to space out her IV dilaudid to ever 3-4 hours.   Pain remains localized to her lower back and right-sided rib cage. Denies SOB, CP, fevers, chills, N/V, abdominal pain, calf pain.  Patient reports no contractions, active fetal movement, absent vaginal bleeding , absent loss of fluid      Objective:     Vital Signs (Most Recent):  Temp: 98.2 °F (36.8 °C) (10/10/17 0711)  Pulse: 110 (10/10/17 0711)  Resp: 20 (10/10/17 0711)  BP: (!) 92/52 (10/10/17 0711)  SpO2: 98 % (10/10/17 0710) Vital Signs (24h Range):  Temp:  [97.2 °F (36.2 °C)-98.2 °F (36.8 °C)] 98.2 °F (36.8 °C)  Pulse:  [100-215] 110  Resp:  [18-20] 20  SpO2:  [92 %-100 %] 98 %  BP: ()/(52-65) 92/52     Weight: 59.9 kg (132 lb)  Body mass index is 26.66 kg/m².    NST last PM:  FHT: 130 bpm, mod BTBV, +accels, possible decel vs maternal, no further decels with 20 more minutes of monitoring, Overall Reactive/Reassuring  TOCO: no ctx    Intake/Output Summary (Last 24 hours) at 10/10/17 0723  Last data filed at 10/09/17 1800   Gross per 24 hour   Intake          3700.01 ml   Output                0 ml   Net          3700.01 ml       Cervical Exam:deferred     Significant Labs:    Recent Labs  Lab 10/07/17  0527 10/08/17  0525 10/09/17  0423   WBC 14.91* 12.05 11.55   HGB 9.8* 8.9* 10.5*   HCT 29.1* 25.7* 32.2*   MCV 84 84 87    275 276        Recent Labs  Lab 10/07/17  0527 10/08/17  0525 10/09/17  0423    138 138   K 4.1 3.9 4.3    109 110   CO2 22* 21* 17*   BUN 6 4* 4*   CREATININE 0.6 0.6 0.6   GLU 78 88 82   PROT 6.4 5.8* 6.4   BILITOT 7.5* 5.6* 4.8*   ALKPHOS 104 98 108   ALT 24 21 20   AST 38 31 34        Retic 7.8>6.5    Physical Exam:   Constitutional: She is oriented to person, place, and time. She appears well-developed and well-nourished. No distress.    HENT:   Head: Normocephalic and atraumatic.     Neck: Normal range of  motion. Neck supple.    Cardiovascular: Normal rate and regular rhythm.     Pulmonary/Chest: Effort normal and breath sounds normal.        Abdominal: Soft. She exhibits no distension. There is no tenderness. There is no guarding.   Gravid uterus, non-tender  Rib TTP R>L             Musculoskeletal: Normal range of motion and moves all extremeties. She exhibits no edema or tenderness.       Neurological: She is alert and oriented to person, place, and time.    Skin: Skin is warm and dry. She is not diaphoretic.   Bruising/excoriations on arms from itching    Psychiatric: She has a normal mood and affect. Her behavior is normal.

## 2017-10-10 NOTE — ASSESSMENT & PLAN NOTE
- On maintenance IVF  - Yuridia'd MS contin 75 mg BID (increased 10/9), gabapentin 300 mg TID. Oxy IR increased to 20 mg q4 prn, PO dilaudid 2mg q2 PRN   - Dilaudid 1mg IV q2 hr  - NST BID  - T bili 3.8 >> 2.8>4.2>4.8>6.4>7.5 , RC 5.4>7.8>6.5>4.8  - Hgb S electrophoresis: Hemoglobins A, F and S are present measuring approximately 51%, 4% and 41% respectively  - UCx negative, BCx negative, WBC 18>>13>14>12  - cont ASA 81, folic acid 4mg daily   - H/h 8.7/24 > 7.6/22 >1 u pRBC 9/30 > 9.4/27.6 >9.6/28.4> 8.8/26>9.5/28.4>9.2/28>2units (10/6)>10.3/29.7>9.8/29.1>8.9/25.7>1unit(10/8)>10.5/32.2  - tachycardia noted during severe pain, EKG 10/5 was sinus tachycardia. No concern for PE at this time as O2 sats are normal. Pt is allergic to contrast with swelling and hives as a reacton. No CT angio at this time.  - bile acids sent for itching, pending.   - Hgb electrophoresis 10/6 Hgb S 27%, A 67%, F 3%

## 2017-10-10 NOTE — PROGRESS NOTES
Monitoring completed. Pt with eyes closed, appears restless while sleeping.  Pt requested pain medication, informed pt that it was not time for medication yet. Will continue to monitor pt.

## 2017-10-10 NOTE — PROGRESS NOTES
Pt returned from walk to gift shop with [employer] worker, Port flushed and saline locked while pt off unit and flushed and fluids restarted upon return. Pt reports pain to back continues.

## 2017-10-10 NOTE — PROGRESS NOTES
Dr Gilmore at bedside, MD reviewed pain management plan with pt. Pt agreed to new medication plan of care.

## 2017-10-11 VITALS
BODY MASS INDEX: 26.61 KG/M2 | OXYGEN SATURATION: 97 % | WEIGHT: 132 LBS | HEART RATE: 108 BPM | HEIGHT: 59 IN | DIASTOLIC BLOOD PRESSURE: 51 MMHG | RESPIRATION RATE: 16 BRPM | SYSTOLIC BLOOD PRESSURE: 94 MMHG | TEMPERATURE: 98 F

## 2017-10-11 PROBLEM — D57.00 SICKLE CELL PAIN CRISIS: Status: RESOLVED | Noted: 2017-09-29 | Resolved: 2017-10-11

## 2017-10-11 PROBLEM — O26.642 CHOLESTASIS DURING PREGNANCY IN SECOND TRIMESTER: Status: ACTIVE | Noted: 2017-10-11

## 2017-10-11 PROBLEM — R79.89 HIGH SERUM BILE ACID: Status: ACTIVE | Noted: 2017-10-11

## 2017-10-11 PROCEDURE — 90686 IIV4 VACC NO PRSV 0.5 ML IM: CPT | Performed by: STUDENT IN AN ORGANIZED HEALTH CARE EDUCATION/TRAINING PROGRAM

## 2017-10-11 PROCEDURE — 99238 HOSP IP/OBS DSCHRG MGMT 30/<: CPT | Mod: 25,,, | Performed by: OBSTETRICS & GYNECOLOGY

## 2017-10-11 PROCEDURE — 59025 FETAL NON-STRESS TEST: CPT | Mod: 26,,, | Performed by: OBSTETRICS & GYNECOLOGY

## 2017-10-11 PROCEDURE — 90471 IMMUNIZATION ADMIN: CPT | Performed by: STUDENT IN AN ORGANIZED HEALTH CARE EDUCATION/TRAINING PROGRAM

## 2017-10-11 PROCEDURE — 25000003 PHARM REV CODE 250: Performed by: STUDENT IN AN ORGANIZED HEALTH CARE EDUCATION/TRAINING PROGRAM

## 2017-10-11 PROCEDURE — 63600175 PHARM REV CODE 636 W HCPCS: Performed by: STUDENT IN AN ORGANIZED HEALTH CARE EDUCATION/TRAINING PROGRAM

## 2017-10-11 PROCEDURE — 99232 SBSQ HOSP IP/OBS MODERATE 35: CPT | Mod: ,,, | Performed by: INTERNAL MEDICINE

## 2017-10-11 PROCEDURE — 3E0234Z INTRODUCTION OF SERUM, TOXOID AND VACCINE INTO MUSCLE, PERCUTANEOUS APPROACH: ICD-10-PCS | Performed by: OBSTETRICS & GYNECOLOGY

## 2017-10-11 RX ORDER — MORPHINE SULFATE 15 MG/1
75 TABLET, FILM COATED, EXTENDED RELEASE ORAL EVERY 12 HOURS
Qty: 30 TABLET | Refills: 0 | Status: SHIPPED | OUTPATIENT
Start: 2017-10-11 | End: 2022-03-02

## 2017-10-11 RX ORDER — OXYCODONE HYDROCHLORIDE 20 MG/1
20 TABLET ORAL EVERY 4 HOURS PRN
Qty: 30 TABLET | Refills: 0 | Status: SHIPPED | OUTPATIENT
Start: 2017-10-11 | End: 2022-03-02

## 2017-10-11 RX ORDER — GABAPENTIN 300 MG/1
300 CAPSULE ORAL 2 TIMES DAILY
Qty: 60 CAPSULE | Refills: 11 | Status: SHIPPED | OUTPATIENT
Start: 2017-10-11 | End: 2018-10-11

## 2017-10-11 RX ORDER — ACETAMINOPHEN 325 MG/1
650 TABLET ORAL EVERY 6 HOURS
Refills: 0 | COMMUNITY
Start: 2017-10-11 | End: 2022-03-02

## 2017-10-11 RX ORDER — URSODIOL 300 MG/1
300 CAPSULE ORAL
Status: DISCONTINUED | OUTPATIENT
Start: 2017-10-11 | End: 2017-10-11 | Stop reason: HOSPADM

## 2017-10-11 RX ORDER — OXYCODONE HYDROCHLORIDE 30 MG/1
30 TABLET ORAL EVERY 4 HOURS PRN
Qty: 30 TABLET | Refills: 0 | Status: SHIPPED | OUTPATIENT
Start: 2017-10-11 | End: 2022-03-02

## 2017-10-11 RX ORDER — NAPROXEN SODIUM 220 MG/1
81 TABLET, FILM COATED ORAL DAILY
Refills: 0 | COMMUNITY
Start: 2017-10-12 | End: 2022-04-18

## 2017-10-11 RX ORDER — URSODIOL 300 MG/1
300 CAPSULE ORAL
Qty: 90 CAPSULE | Refills: 11 | Status: SHIPPED | OUTPATIENT
Start: 2017-10-11 | End: 2017-10-11

## 2017-10-11 RX ORDER — URSODIOL 300 MG/1
300 CAPSULE ORAL
Qty: 90 CAPSULE | Refills: 11 | Status: SHIPPED | OUTPATIENT
Start: 2017-10-11 | End: 2022-04-18

## 2017-10-11 RX ORDER — GABAPENTIN 300 MG/1
600 CAPSULE ORAL NIGHTLY
Qty: 60 CAPSULE | Refills: 4 | Status: SHIPPED | OUTPATIENT
Start: 2017-10-11 | End: 2022-04-18

## 2017-10-11 RX ADMIN — INFLUENZA A VIRUS A/MICHIGAN/45/2015 X-275 (H1N1) ANTIGEN (FORMALDEHYDE INACTIVATED), INFLUENZA A VIRUS A/HONG KONG/4801/2014 X-263B (H3N2) ANTIGEN (FORMALDEHYDE INACTIVATED), INFLUENZA B VIRUS B/PHUKET/3073/2013 ANTIGEN (FORMALDEHYDE INACTIVATED), AND INFLUENZA B VIRUS B/BRISBANE/60/2008 ANTIGEN (FORMALDEHYDE INACTIVATED) 0.5 ML: 15; 15; 15; 15 INJECTION, SUSPENSION INTRAMUSCULAR at 04:10

## 2017-10-11 RX ADMIN — ENOXAPARIN SODIUM 40 MG: 100 INJECTION SUBCUTANEOUS at 09:10

## 2017-10-11 RX ADMIN — SODIUM CHLORIDE, SODIUM LACTATE, POTASSIUM CHLORIDE, AND CALCIUM CHLORIDE: 600; 310; 30; 20 INJECTION, SOLUTION INTRAVENOUS at 02:10

## 2017-10-11 RX ADMIN — GABAPENTIN 300 MG: 100 CAPSULE ORAL at 06:10

## 2017-10-11 RX ADMIN — SODIUM CHLORIDE, SODIUM LACTATE, POTASSIUM CHLORIDE, AND CALCIUM CHLORIDE: 600; 310; 30; 20 INJECTION, SOLUTION INTRAVENOUS at 03:10

## 2017-10-11 RX ADMIN — FOLIC ACID 4 MG: 1 TABLET ORAL at 08:10

## 2017-10-11 RX ADMIN — PRENATAL VIT W/ FE FUMARATE-FA TAB 27-0.8 MG 1 EACH: 27-0.8 TAB at 08:10

## 2017-10-11 RX ADMIN — ASPIRIN 81 MG CHEWABLE TABLET 81 MG: 81 TABLET CHEWABLE at 08:10

## 2017-10-11 RX ADMIN — URSODIOL 300 MG: 300 CAPSULE ORAL at 08:10

## 2017-10-11 RX ADMIN — LIDOCAINE 1 PATCH: 50 PATCH TOPICAL at 12:10

## 2017-10-11 RX ADMIN — GABAPENTIN 300 MG: 100 CAPSULE ORAL at 02:10

## 2017-10-11 RX ADMIN — CETIRIZINE HYDROCHLORIDE 5 MG: 5 TABLET, FILM COATED ORAL at 08:10

## 2017-10-11 RX ADMIN — MORPHINE SULFATE 75 MG: 15 TABLET, EXTENDED RELEASE ORAL at 08:10

## 2017-10-11 RX ADMIN — ACETAMINOPHEN 650 MG: 325 TABLET ORAL at 12:10

## 2017-10-11 RX ADMIN — OXYCODONE HYDROCHLORIDE 30 MG: 5 TABLET ORAL at 06:10

## 2017-10-11 RX ADMIN — OXYCODONE HYDROCHLORIDE 30 MG: 5 TABLET ORAL at 12:10

## 2017-10-11 RX ADMIN — URSODIOL 300 MG: 300 CAPSULE ORAL at 02:10

## 2017-10-11 RX ADMIN — ACETAMINOPHEN 650 MG: 325 TABLET ORAL at 06:10

## 2017-10-11 NOTE — SUBJECTIVE & OBJECTIVE
Obstetric HPI:  Patient largely complains of itching this morning. She does not complain of pain when not asked, however when prompted she states it is unchanged. She points to her upper back when asked about location. She has been taking her Oxy IR 30mg prn q4h as written for, however this morning it spaced to 6 hours without prn medicine.  Denies SOB, CP, fevers, chills, N/V, abdominal pain, calf pain.    Patient reports no contractions, active fetal movement, absent vaginal bleeding , absent loss of fluid      Objective:     Vital Signs (Most Recent):  Temp: 97.6 °F (36.4 °C) (10/11/17 0351)  Pulse: 97 (10/11/17 0351)  Resp: 16 (10/11/17 0035)  BP: (!) 101/55 (10/11/17 0351)  SpO2: 97 % (10/11/17 0035) Vital Signs (24h Range):  Temp:  [96.4 °F (35.8 °C)-98.2 °F (36.8 °C)] 97.6 °F (36.4 °C)  Pulse:  [] 97  Resp:  [16-20] 16  SpO2:  [91 %-100 %] 97 %  BP: ()/(52-78) 101/55     Weight: 59.9 kg (132 lb)  Body mass index is 26.66 kg/m².    NST last PM:  FHT: 120 bpm, mod BTBV, +accels, no decels, reactive/reassuring  TOCO: no ctx    Intake/Output Summary (Last 24 hours) at 10/11/17 0626  Last data filed at 10/10/17 1800   Gross per 24 hour   Intake             3690 ml   Output                5 ml   Net             3685 ml       Cervical Exam:deferred     Significant Labs:    Recent Labs  Lab 10/07/17  0527 10/08/17  0525 10/09/17  0423   WBC 14.91* 12.05 11.55   HGB 9.8* 8.9* 10.5*   HCT 29.1* 25.7* 32.2*   MCV 84 84 87    275 276        Recent Labs  Lab 10/07/17  0527 10/08/17  0525 10/09/17  0423    138 138   K 4.1 3.9 4.3    109 110   CO2 22* 21* 17*   BUN 6 4* 4*   CREATININE 0.6 0.6 0.6   GLU 78 88 82   PROT 6.4 5.8* 6.4   BILITOT 7.5* 5.6* 4.8*   ALKPHOS 104 98 108   ALT 24 21 20   AST 38 31 34        Retic 7.8>6.5    Physical Exam:   Constitutional: She is oriented to person, place, and time. She appears well-developed and well-nourished. No distress.    HENT:   Head:  Normocephalic and atraumatic.     Neck: Normal range of motion. Neck supple.    Cardiovascular: Normal rate and regular rhythm.     Pulmonary/Chest: Effort normal and breath sounds normal.        Abdominal: Soft. She exhibits no distension. There is no tenderness. There is no guarding.   Gravid uterus, non-tender             Musculoskeletal: Normal range of motion and moves all extremeties. She exhibits no edema or tenderness.       Neurological: She is alert and oriented to person, place, and time.    Skin: Skin is warm and dry. She is not diaphoretic.    Psychiatric: She has a normal mood and affect. Her behavior is normal.

## 2017-10-11 NOTE — PLAN OF CARE
Problem: Patient Care Overview  Goal: Plan of Care Review  Outcome: Ongoing (interventions implemented as appropriate)  Pt doing well, no acute changes during this shift, vital signs stable. Pain managed with scheduled and PRN medications, pt report positive fetal movement, pt denies contraction, vaginal bleeding, and LOF. Will continue to monitor patient.

## 2017-10-11 NOTE — ASSESSMENT & PLAN NOTE
- MS Contin 75 mg q12h and oxycodone 30 mg q4h prn upon discharge  - patient needs follow up with her hematologist at [facility] within 1-2 weeks after discharge

## 2017-10-11 NOTE — PROGRESS NOTES
[facility]  Hematology/Oncology  Progress Note    Patient Name: Nishi Pinzon Vital  Admission Date: 9/28/2017  Hospital Length of Stay: 12 days  Code Status: Full Code     Subjective:     Interval History:   - IV dilaudid was d/marlon. On MS Contin 75 mg q12h and oxycodone 30 mg q4h prn.   - Patient looks much more comfortable today. Pain is better compared to when I saw her last time.   - able to ambulate and is eating well      Medications:  Continuous Infusions:   lactated ringers 100 mL/hr at 10/11/17 0350     Scheduled Meds:   acetaminophen  650 mg Oral Q6H    aspirin  81 mg Oral Daily    cetirizine  5 mg Oral Daily    enoxaparin  40 mg Subcutaneous Daily    folic acid  4 mg Oral Daily    gabapentin  300 mg Oral QAM    gabapentin  300 mg Oral Daily    gabapentin  600 mg Oral QHS    hydrocortisone   Topical BID    lidocaine  1 patch Transdermal Q24H    morphine  75 mg Oral Q12H    prenatal vitamin  1 tablet Oral Daily    ursodiol  300 mg Oral TID WM     PRN Meds:sodium chloride, sodium chloride, benzonatate, influenza, ondansetron, oxycodone, oxycodone, promethazine (PHENERGAN) IVPB, ranitidine, senna-docusate 8.6-50 mg, simethicone     Review of Systems   Constitutional: Negative for activity change, appetite change, chills, diaphoresis and fever.   Respiratory: Negative for apnea, cough, chest tightness, shortness of breath and wheezing.    Musculoskeletal: Positive for back pain. Negative for joint swelling, neck pain and neck stiffness.   All other systems reviewed and are negative.    Objective:     Vital Signs (Most Recent):  Temp: 98.4 °F (36.9 °C) (10/11/17 1241)  Pulse: 108 (10/11/17 1241)  Resp: 16 (10/11/17 1241)  BP: (!) 94/51 (10/11/17 1241)  SpO2: 97 % (10/11/17 0035) Vital Signs (24h Range):  Temp:  [96.4 °F (35.8 °C)-98.4 °F (36.9 °C)] 98.4 °F (36.9 °C)  Pulse:  [] 108  Resp:  [16-20] 16  SpO2:  [91 %-99 %] 97 %  BP: ()/(51-63) 94/51     Weight: 59.9 kg  (132 lb)  Body mass index is 26.66 kg/m².  Body surface area is 1.58 meters squared.      Intake/Output Summary (Last 24 hours) at 10/11/17 1347  Last data filed at 10/11/17 0600   Gross per 24 hour   Intake             4910 ml   Output             1905 ml   Net             3005 ml       Physical Exam   Constitutional: She is oriented to person, place, and time. She appears well-developed and well-nourished. She is cooperative. No distress.   HENT:   Head: Normocephalic and atraumatic.   Eyes: Conjunctivae and EOM are normal. Pupils are equal, round, and reactive to light. No scleral icterus.   Neck: Normal range of motion. Neck supple.   Cardiovascular: Normal rate and regular rhythm.  Exam reveals no gallop and no friction rub.    No murmur heard.  Pulmonary/Chest: Breath sounds normal. She has no wheezes. She has no rales.   Abdominal: Soft. Bowel sounds are normal. She exhibits no distension and no mass. There is no hepatosplenomegaly. There is no tenderness.   Musculoskeletal: Normal range of motion. She exhibits no edema.   Lymphadenopathy:     She has no cervical adenopathy.   Neurological: She is alert and oriented to person, place, and time. She has normal strength. No cranial nerve deficit.   Skin: Skin is warm and dry. No rash noted. No erythema.   Psychiatric: She has a normal mood and affect. Her behavior is normal. Judgment and thought content normal.   Vitals reviewed.    LABORATORY DATA:      Last CBC on 10/9/17 showed a Hb of 10.5 after trasnfusion        Assessment/Plan:     1. Sickle cell veno-occlusive crisis  - improved   - MS Contin 75 mg q12h and oxycodone 30 mg q4h prn upon discharge  - patient needs follow up with her hematologist at [facility] within 1-2 weeks after discharge    Thank you for your consult. I will sign off. Please contact us if you have any additional questions.     Belinda Cornejo MD  Hematology/Oncology  [facility]

## 2017-10-11 NOTE — DISCHARGE SUMMARY
[facility]  Obstetrics  Discharge Summary      Patient Name: Nishi Dumont  MRN: [id number]  Admission Date: 2017  Hospital Length of Stay: 12 days  Discharge Date and Time:  10/11/2017 1:06 PM  Attending Physician: Trixie Mejía MD   Discharging Provider: Tiny Alexander MD  Primary Care Provider: Primary Doctor No    HPI: Nishi Dumont is a 26 y.o. F at 28w0d with significant hx of sickle cell disease who presents complaining of back, rib, and shoulder pain consistent with her usual crisis x2 days. States her pain is 10/10 despite taking MS contin at home scheduled and percocet 2-3 times per day for breakthrough pain. She also reports feeling sick since her last admission. She was told previous that she had a viral URI and was managed with supportive care. Symptoms have not yet resolved and 2 days ago she started having purulent nasal discharge and pain in her upper right jaw with chewing. Denies fever, chills, nausea, vomiting. No pregnancy complaints at this time. Denies ctx, VB, LOF. Good FM.     Patient has been admitted multiple times throughout her pregnancy for sickle cell crisis, approximately every 2 weeks per patient. Her most recent sickle cell crisis admission was 3 weeks ago, at which time she received 2u pRBC, whiched helped. She was discharged on MS contin 60mg BID, oxyIR prn. Her primary MFM is Dr. Armenta at [facility].     Prior to this pregnancy, she had fewer crises (1/month). Patient only had 1 crisis in her previous pregnancy 7 years ago. 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 She has a history of multiple blood transfusions. This IUP also complicated by a history of pre-eclampsia in prior pregnancy and GBS bacteruria.  Patient denies CP, SOB, dysuria, hematuria, HA, leg swelling, visual disturbances.    * No surgery found *     Hospital Course:    09/29/2017 - admitted for presumed sickle cell crisis. H/H 8/24. Pain regimen switch to MS contin 60mg BID, ox IR, and dilaudid. No acute evens  9/30/2017 - Pain somewhat worsening but patient not requesting increasing doses of pain medication, intermittent O2 desaturations resolved with IS usage, discussed blood transfusion with patient.    10/01/2017 - Again reports pain is worsened but not requiring additional pain medication. No SOB. Otherwise stable. S/P 1u pRBC 9/30.   10/02/2017- Patient reports continued pain in right ribs. No SOB. Gabapentin increased to 300 TID yesterday. Continued on MS contin 60 mg BID, Oxy IR 10 PRN, Dilaudid PRN.   10/03/2017- Patient reports pain is slightly improved. No changes in medication regimen.   10/04/2017- Patient reports back pain is improved, continues to endorse rib pain. Repeat CXR negative, labs with elevated Tbili and Retic. Patient remained inpatient.   10/5/2017 - Pt had worsened pain and tachycardia. EKG sinus tach, O2 saturations and BP stable. Overnight, she required additional IV Dilaudid. Repeat labs ordered  10/6/2017: Continued pain. 2 units given. Repeat H/H shows good response. Hgb electrophoresis ordered. Dilaudid increased to 1g q2 PRN. Overnight pt got no rest, continued to have pain with only minimal relief in the shower.   10/7/2017 - This morning, pt is very anxious and tearful that she has not improved and is losing sleep. She become tachycardic during severe pain, but O2 sats and BP remain stable.   10/08/2017 - Received one unit RBC yesterday. Stable, pain somewhat controlled yesterday afternoon, but feels that pain worsens in night and early morning.   10/10/2017- Patient with continued pain this AM.  MS contin increased to 75 mg BID.  Did not require dilaudid every 2 hours overnight. Discussed case with OB anesthesia who discussed with acute pain management. Pain control regimen altered per their recommendations: scheduled tylenol 650mg q6h,  Gabapentin 300mg morning/afternoon, Gabapentin 600mg qhs, continued lidocaine patch, MS Contin 75mg bid, Oxy IR 30mg q4h prn. IV medications including IV dilaudid and IV benadryl discontinued.     Patient is ready for discharge.  She has been given a schedule for pain medication regimen and instructed to follow up with Dr. Armenta at [facility] and start  testing next week.  Patient voiced understanding.     Consults         Status Ordering Provider     Inpatient consult to Hematology/Oncology  Once     Provider:  (Not yet assigned)    JORGE Parham          Final Active Diagnoses:    Diagnosis Date Noted POA    PRINCIPAL PROBLEM:  Sickle cell anemia with pain [D57.00] 2013 Yes    High serum bile acid [R79.89] 10/11/2017 Unknown    Cholestasis during pregnancy in second trimester [O26.612, K83.1] 10/11/2017 Unknown    Maternal sickle cell anemia complicating pregnancy in third trimester [O99.013, D57.1]  Unknown    Sinusitis [J32.9] 2017 Unknown    Teratogen exposure in current pregnancy [O35.9XX0]  Unknown    Acute sickle cell crisis [D57.00] 2017 Yes    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] 2013 Not Applicable      Problems Resolved During this Admission:    Diagnosis Date Noted Date Resolved POA    Hypotension [I95.9] 2017 10/09/2017 Unknown    Sickle cell pain crisis [D57.00] 2017 10/11/2017 Yes    Encounter for fetal anatomic survey [Z36.89]  10/09/2017 Not Applicable    History of pre-eclampsia [Z87.59] 2017 10/09/2017 Not Applicable        Labs:     Recent Labs  Lab 10/07/17  0527 10/08/17  0525 10/09/17  0423   WBC 14.91* 12.05 11.55   HGB 9.8* 8.9* 10.5*   HCT 29.1* 25.7* 32.2*   MCV 84 84 87    275 276        Recent Labs  Lab 10/07/17  0527 10/08/17  0525 10/09/17  0423    138 138   K 4.1 3.9 4.3    109 110   CO2 22* 21* 17*   BUN 6 4* 4*   CREATININE 0.6 0.6 0.6   GLU 78 88 82   PROT  6.4 5.8* 6.4   BILITOT 7.5* 5.6* 4.8*   ALKPHOS 104 98 108   ALT 24 21 20   AST 38 31 34      Bile Acids 21  Hemoglobin electrophoresis: Hgb S 41% > 27%, Hgb A 51% > 67%, Hgb 4% > 3%      Immunizations     None          This patient has no babies on file.  Pending Diagnostic Studies:     None          Discharged Condition: good    Disposition: Home or Self Care    Follow Up:    Patient Instructions:     Diet general     Call MD for:  increased confusion or weakness     Call MD for:  persistent dizziness, light-headedness, or visual disturbances     Call MD for:  worsening rash     Call MD for:  severe persistent headache     Call MD for:  difficulty breathing or increased cough     Call MD for:  redness, tenderness, or signs of infection (pain, swelling, redness, odor or green/yellow discharge around incision site)     Call MD for:  severe uncontrolled pain     Call MD for:  persistent nausea and vomiting or diarrhea     Call MD for:  temperature >100.4       Medications:  Current Discharge Medication List      START taking these medications    Details   acetaminophen (TYLENOL) 325 MG tablet Take 2 tablets (650 mg total) by mouth every 6 (six) hours.  Refills: 0      aspirin 81 MG Chew Take 1 tablet (81 mg total) by mouth once daily.  Refills: 0      !! gabapentin (NEURONTIN) 300 MG capsule Take 1 capsule (300 mg total) by mouth 2 (two) times daily. Take 1 capsule at 6 AM and 2PM  Qty: 60 capsule, Refills: 11      !! gabapentin (NEURONTIN) 300 MG capsule Take 2 capsules (600 mg total) by mouth every evening.  Qty: 60 capsule, Refills: 4      ursodiol (ACTIGALL) 300 mg capsule Take 1 capsule (300 mg total) by mouth 3 (three) times daily with meals.  Qty: 90 capsule, Refills: 11       !! - Potential duplicate medications found. Please discuss with provider.      CONTINUE these medications which have CHANGED    Details   morphine (MS CONTIN) 15 MG 12 hr tablet Take 5 tablets (75 mg total) by mouth every 12 (twelve)  hours.  Qty: 30 tablet, Refills: 0      !! oxycodone (ROXICODONE) 20 mg Tab immediate release tablet Take 1 tablet (20 mg total) by mouth every 4 (four) hours as needed (Mild pain 1-3).  Qty: 30 tablet, Refills: 0      !! oxycodone (ROXICODONE) 30 MG Tab Take 1 tablet (30 mg total) by mouth every 4 (four) hours as needed.  Qty: 30 tablet, Refills: 0       !! - Potential duplicate medications found. Please discuss with provider.      CONTINUE these medications which have NOT CHANGED    Details   famotidine (PEPCID) 20 MG tablet Take 20 mg by mouth every morning.      folic acid (FOLVITE) 1 MG tablet Take 4 tablets (4 mg total) by mouth once daily.  Qty: 120 tablet, Refills: 11      ondansetron (ZOFRAN-ODT) 4 MG TbDL Take 4 mg by mouth every 8 (eight) hours as needed (for nausea).             Tiny Alexander MD  Obstetrics  [facility]    I have reviewed the notes, assessments, and/or procedures performed by Dr. Munoz, I concur with her/his documentation of Nishi Dumont.

## 2017-10-11 NOTE — PROGRESS NOTES
[facility]  Obstetrics  Antepartum Progress Note    Patient Name: Nishi Dumont  MRN: [id number]  Admission Date: 2017  Hospital Length of Stay: 12 days  Attending Physician: Trixie Mejía MD  Primary Care Provider: Primary Doctor No    Subjective:     Principal Problem:Sickle cell anemia with pain    HPI:  Nishi Dumont is a 26 y.o. F at 28w0d with significant hx of sickle cell disease who presents complaining of back, rib, and shoulder pain consistent with her usual crisis x2 days. States her pain is 10/10 despite taking MS contin at home scheduled and percocet 2-3 times per day for breakthrough pain. She also reports feeling sick since her last admission. She was told previous that she had a viral URI and was managed with supportive care. Symptoms have not yet resolved and 2 days ago she started having purulent nasal discharge and pain in her upper right jaw with chewing. Denies fever, chills, nausea, vomiting. No pregnancy complaints at this time. Denies ctx, VB, LOF. Good FM.     Patient has been admitted multiple times throughout her pregnancy for sickle cell crisis, approximately every 2 weeks per patient. Her most recent sickle cell crisis admission was 3 weeks ago, at which time she received 2u pRBC, whiched helped. She was discharged on MS contin 60mg BID, oxyIR prn. Her primary MFM is Dr. Armenta at [facility].     Prior to this pregnancy, she had fewer crises (1/month). Patient only had 1 crisis in her previous pregnancy 7 years ago. 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 She has a history of multiple blood transfusions. This IUP also complicated by a history of pre-eclampsia in prior pregnancy and GBS bacteruria.  Patient denies CP, SOB, dysuria, hematuria, HA, leg swelling, visual disturbances.    Hospital Course:  2017 - admitted for presumed sickle cell  crisis. H/H 8/24. Pain regimen switch to MS contin 60mg BID, ox IR, and dilaudid. No acute evens  9/30/2017 - Pain somewhat worsening but patient not requesting increasing doses of pain medication, intermittent O2 desaturations resolved with IS usage, discussed blood transfusion with patient.    10/01/2017 - Again reports pain is worsened but not requiring additional pain medication. No SOB. Otherwise stable. S/P 1u pRBC 9/30.   10/02/2017- Patient reports continued pain in right ribs. No SOB. Gabapentin increased to 300 TID yesterday. Continued on MS contin 60 mg BID, Oxy IR 10 PRN, Dilaudid PRN.   10/03/2017- Patient reports pain is slightly improved. No changes in medication regimen.   10/04/2017- Patient reports back pain is improved, continues to endorse rib pain. Repeat CXR negative, labs with elevated Tbili and Retic. Patient remained inpatient.   10/5/2017 - Pt had worsened pain and tachycardia. EKG sinus tach, O2 saturations and BP stable. Overnight, she required additional IV Dilaudid. Repeat labs ordered  10/6/2017: Continued pain. 2 units given. Repeat H/H shows good response. Hgb electrophoresis ordered. Dilaudid increased to 1g q2 PRN. Overnight pt got no rest, continued to have pain with only minimal relief in the shower.   10/7/2017 - This morning, pt is very anxious and tearful that she has not improved and is losing sleep. She become tachycardic during severe pain, but O2 sats and BP remain stable.   10/08/2017 - Received one unit RBC yesterday. Stable, pain somewhat controlled yesterday afternoon, but feels that pain worsens in night and early morning.   10/10/2017- Patient with continued pain this AM.  MS contin increased to 75 mg BID.  Did not require dilaudid every 2 hours overnight. Discussed case with OB anesthesia who discussed with acute pain management. Pain control regimen altered per their recommendations: scheduled tylenol 650mg q6h, Gabapentin 300mg morning/afternoon, Gabapentin 600mg  qhs, continued lidocaine patch, MS Contin 75mg bid, Oxy IR 30mg q4h prn. IV medications including IV dilaudid and IV benadryl discontinued.     Obstetric HPI:  Patient largely complains of itching this morning. She does not complain of pain when not asked, however when prompted she states it is unchanged. She points to her upper back when asked about location. She has been taking her Oxy IR 30mg prn q4h as written for, however this morning it spaced to 6 hours without prn medicine.  Denies SOB, CP, fevers, chills, N/V, abdominal pain, calf pain.    Patient reports no contractions, active fetal movement, absent vaginal bleeding , absent loss of fluid      Objective:     Vital Signs (Most Recent):  Temp: 97.6 °F (36.4 °C) (10/11/17 0351)  Pulse: 97 (10/11/17 0351)  Resp: 16 (10/11/17 0035)  BP: (!) 101/55 (10/11/17 0351)  SpO2: 97 % (10/11/17 0035) Vital Signs (24h Range):  Temp:  [96.4 °F (35.8 °C)-98.2 °F (36.8 °C)] 97.6 °F (36.4 °C)  Pulse:  [] 97  Resp:  [16-20] 16  SpO2:  [91 %-100 %] 97 %  BP: ()/(52-78) 101/55     Weight: 59.9 kg (132 lb)  Body mass index is 26.66 kg/m².    NST last PM:  FHT: 120 bpm, mod BTBV, +accels, no decels, reactive/reassuring  TOCO: no ctx    Intake/Output Summary (Last 24 hours) at 10/11/17 0626  Last data filed at 10/10/17 1800   Gross per 24 hour   Intake             3690 ml   Output                5 ml   Net             3685 ml       Cervical Exam:deferred     Significant Labs:    Recent Labs  Lab 10/07/17  0527 10/08/17  0525 10/09/17  0423   WBC 14.91* 12.05 11.55   HGB 9.8* 8.9* 10.5*   HCT 29.1* 25.7* 32.2*   MCV 84 84 87    275 276        Recent Labs  Lab 10/07/17  0527 10/08/17  0525 10/09/17  0423    138 138   K 4.1 3.9 4.3    109 110   CO2 22* 21* 17*   BUN 6 4* 4*   CREATININE 0.6 0.6 0.6   GLU 78 88 82   PROT 6.4 5.8* 6.4   BILITOT 7.5* 5.6* 4.8*   ALKPHOS 104 98 108   ALT 24 21 20   AST 38 31 34        Retic 7.8>6.5    Physical Exam:    Constitutional: She is oriented to person, place, and time. She appears well-developed and well-nourished. No distress.    HENT:   Head: Normocephalic and atraumatic.     Neck: Normal range of motion. Neck supple.    Cardiovascular: Normal rate and regular rhythm.     Pulmonary/Chest: Effort normal and breath sounds normal.        Abdominal: Soft. She exhibits no distension. There is no tenderness. There is no guarding.   Gravid uterus, non-tender             Musculoskeletal: Normal range of motion and moves all extremeties. She exhibits no edema or tenderness.       Neurological: She is alert and oriented to person, place, and time.    Skin: Skin is warm and dry. She is not diaphoretic.    Psychiatric: She has a normal mood and affect. Her behavior is normal.       Assessment/Plan:     26 y.o. female  at 29w5d for:    * Sickle cell anemia with pain    Discussed case with OB anesthesia who discussed with acute pain management. Pain control regimen altered per their recommendations: scheduled tylenol 650mg q6h, Gabapentin 300mg morning/afternoon, Gabapentin 600mg qhs, continued lidocaine patch, continued MS Contin 75mg bid, Oxy IR 30mg q4h prn.   - IV medications including IV dilaudid and IV benadryl discontinued  - Continue maintenance IVF  - NST BID  - T bili 3.8 >> 2.8>4.2>4.8>6.4>7.5 , RC 5.4>7.8>6.5>4.8  - Admit Hgb S electrophoresis: Hemoglobins A, F and S are present measuring approximately 51%, 4% and 41% respectively. Repeat electrophoresis Hg A 67%, Hg S 27%, Hg F 3%, not indicative of crisis.   - UCx negative, BCx negative, WBC 18>>13>14>12  - cont ASA 81, folic acid 4mg daily   - H/h 8.7/24 > 7.6/22 >1 u pRBC  > 9.4/27.6 >9.6/28.4> 8.8/26>9.5/28.4>9.2/28>2units (10/6)>10.3/29.7>9.8/29.1>8.9/25.7>1unit(10/8)>10.5/32.2. Stable.  - Intermittent episodes of tachycardia.  EKG 10/5 was sinus tachycardia. No concern for PE at this time as O2 sats are normal. Pt is allergic to contrast with swelling and  hives as a reacton. No CT angio at this time. Continue Lovenox prophylaxis.            High serum bile acid    - bile acids sent as patient c/o itching, elevated at 21        Sinusitis    - suspect bacterial sinus infection   - s/p augmentin BID x10 days  - afebrile, VSS  - monitor closely  - rapid strep negative  - supportive measures including mucinex BID, saline nasal spray prn              Betty Schrader MD  Obstetrics  [facility]

## 2017-10-11 NOTE — ASSESSMENT & PLAN NOTE
Discussed case with OB anesthesia who discussed with acute pain management. Pain control regimen altered per their recommendations: scheduled tylenol 650mg q6h, Gabapentin 300mg morning/afternoon, Gabapentin 600mg qhs, continued lidocaine patch, continued MS Contin 75mg bid, Oxy IR 30mg q4h prn.   - IV medications including IV dilaudid and IV benadryl discontinued  - Continue maintenance IVF  - NST BID  - T bili 3.8 >> 2.8>4.2>4.8>6.4>7.5 , RC 5.4>7.8>6.5>4.8  - Admit Hgb S electrophoresis: Hemoglobins A, F and S are present measuring approximately 51%, 4% and 41% respectively. Repeat electrophoresis Hg A 67%, Hg S 27%, Hg F 3%, not indicative of crisis.   - UCx negative, BCx negative, WBC 18>>13>14>12  - cont ASA 81, folic acid 4mg daily   - H/h 8.7/24 > 7.6/22 >1 u pRBC 9/30 > 9.4/27.6 >9.6/28.4> 8.8/26>9.5/28.4>9.2/28>2units (10/6)>10.3/29.7>9.8/29.1>8.9/25.7>1unit(10/8)>10.5/32.2. Stable.  - Intermittent episodes of tachycardia.  EKG 10/5 was sinus tachycardia. No concern for PE at this time as O2 sats are normal. Pt is allergic to contrast with swelling and hives as a reacton. No CT angio at this time. Continue Lovenox prophylaxis.

## 2017-10-14 PROBLEM — O99.013 MATERNAL SICKLE CELL ANEMIA COMPLICATING PREGNANCY IN THIRD TRIMESTER: Status: ACTIVE | Noted: 2017-10-14

## 2017-10-14 PROBLEM — O26.642 CHOLESTASIS DURING PREGNANCY IN SECOND TRIMESTER: Status: ACTIVE | Noted: 2017-10-14

## 2017-10-14 PROBLEM — I95.9 HYPOTENSION: Status: RESOLVED | Noted: 2017-10-14 | Resolved: 2017-10-09

## 2017-10-14 PROBLEM — O35.9XX0 TERATOGEN EXPOSURE IN CURRENT PREGNANCY: Status: ACTIVE | Noted: 2017-10-14

## 2017-10-14 PROBLEM — R79.89 HIGH SERUM BILE ACID: Status: ACTIVE | Noted: 2017-10-14

## 2017-10-14 PROBLEM — D57.1 MATERNAL SICKLE CELL ANEMIA COMPLICATING PREGNANCY IN THIRD TRIMESTER: Status: ACTIVE | Noted: 2017-10-14

## 2017-10-14 PROBLEM — J32.9 SINUSITIS: Status: ACTIVE | Noted: 2017-10-14

## 2018-03-26 NOTE — ED TRIAGE NOTES
[facility] client services stated that lab order was sent out, the max is 29 days to process and have the lab results. Pt's order number is [id number] and [id number]. Today is day 13 and specimen was just received this morning by processing company. Will call pt and let her know.    Pt admitted to OB ED 1 with complaint of sickle cell pain. Pt has had prenatal care with Dr Armenta at [facility] and stated she was 23 weeks,   EDC 12-22-17& LMP 3-11-17.

## 2019-04-15 NOTE — ASSESSMENT & PLAN NOTE
- Episodes of hypotension since resolved although with initial concerns for sepsis blood cx drawn-  blood cultures prelim negative   [FreeTextEntry1] : Patient with multiple medical problems. All medical problems as well his medications were reviewed. Medications were renewed.\par \par There is no significant need for change in present medication or therapy.\par \par Medications reviewed and renewed.\par \par Weight loss recommended and discussed.\par

## 2020-12-02 NOTE — SUBJECTIVE & OBJECTIVE
Obstetric HPI:  Patient resting in bed this AM. States she has been going back and forth to the bathroom to place warm compresses on her back/ribs which helps relieve her pain. Throughout the day and night she was able to space out her prn meds a little. Still reports continued pain that she does get some relief with her pain medications.Pain remains localized to her lower back and right-sided rib cage. Denies SOB, CP, fevers, chills, N/V, abdominal pain, calf pain.    Patient reports no contractions, active fetal movement, absent vaginal bleeding , absent loss of fluid      Objective:     Vital Signs (Most Recent):  Temp: 98.1 °F (36.7 °C) (10/08/17 0456)  Pulse: 103 (10/08/17 0456)  Resp: 18 (10/08/17 0456)  BP: 108/67 (10/08/17 0456)  SpO2: 100 % (10/08/17 0456) Vital Signs (24h Range):  Temp:  [97 °F (36.1 °C)-98.8 °F (37.1 °C)] 98.1 °F (36.7 °C)  Pulse:  [103-115] 103  Resp:  [18] 18  SpO2:  [98 %-100 %] 100 %  BP: (101-126)/(55-74) 108/67     Weight: 59.9 kg (132 lb)  Body mass index is 26.66 kg/m².    NST last PM:  FHT: 130 bpm, mod BTBV, +accels, - decels, Cat 1 (reassuring)  TOCO: no ctx      Intake/Output Summary (Last 24 hours) at 10/08/17 0551  Last data filed at 10/07/17 1800   Gross per 24 hour   Intake              800 ml   Output              900 ml   Net             -100 ml       Cervical Exam:deferred     Significant Labs:    Recent Labs  Lab 10/06/17  1726 10/07/17  0527 10/08/17  0525   WBC 13.92* 14.91* 12.05   HGB 10.3* 9.8* 8.9*   HCT 29.7* 29.1* 25.7*   MCV 84 84 84    307 275        Recent Labs  Lab 10/05/17  0559 10/06/17  0556 10/07/17  0527    136 138   K 4.5 4.0 4.1    106 106   CO2 18* 20* 22*   BUN 6 6 6   CREATININE 0.6 0.6 0.6   GLU 78 78 78   PROT 6.5 6.5 6.4   BILITOT 4.8* 6.4* 7.5*   ALKPHOS 109 106 104   ALT 24 24 24   AST 40 39 38      AM CMP pending  Retic 7.8>6.5    Physical Exam:   Constitutional: She is oriented to person, place, and time. She appears  Please denied. Medication was sent on 11/29/2020. Patient called and complained that the pharmacy did not receive anything.      Called pharmacy and relayed what the patient said they stated that they're confused because the medication is ready for .      Called patient back and he denied of what the pharmacy stated. Informed him his medication is ready for .   well-developed and well-nourished. No distress.    HENT:   Head: Normocephalic and atraumatic.     Neck: Normal range of motion. Neck supple.    Cardiovascular: Normal rate and regular rhythm.     Pulmonary/Chest: Effort normal and breath sounds normal.        Abdominal: Soft. She exhibits no distension. There is no tenderness. There is no guarding.   Gravid uterus, non-tender  Rib TTP R>L             Musculoskeletal: Normal range of motion and moves all extremeties. She exhibits no edema or tenderness.       Neurological: She is alert and oriented to person, place, and time.    Skin: Skin is warm and dry. She is not diaphoretic.    Psychiatric: She has a normal mood and affect. Her behavior is normal.

## 2022-03-02 ENCOUNTER — HOSPITAL ENCOUNTER (INPATIENT)
Facility: HOSPITAL | Age: 32
LOS: 9 days | Discharge: HOME | DRG: 982 | End: 2022-03-11
Attending: EMERGENCY MEDICINE | Admitting: STUDENT IN AN ORGANIZED HEALTH CARE EDUCATION/TRAINING PROGRAM
Payer: MEDICAID

## 2022-03-02 DIAGNOSIS — M87.022 AVASCULAR NECROSIS OF LEFT HUMERAL HEAD: ICD-10-CM

## 2022-03-02 DIAGNOSIS — I82.619 BASILIC VEIN THROMBOSIS: ICD-10-CM

## 2022-03-02 DIAGNOSIS — R07.9 CHEST PAIN: ICD-10-CM

## 2022-03-02 DIAGNOSIS — M25.512 PAIN AND SWELLING OF LEFT SHOULDER: ICD-10-CM

## 2022-03-02 DIAGNOSIS — M89.8X9 BONE PAIN: ICD-10-CM

## 2022-03-02 DIAGNOSIS — D57.00 SICKLE CELL PAIN CRISIS: Primary | ICD-10-CM

## 2022-03-02 DIAGNOSIS — D57.00 SICKLE CELL ANEMIA WITH PAIN: ICD-10-CM

## 2022-03-02 DIAGNOSIS — M25.412 PAIN AND SWELLING OF LEFT SHOULDER: ICD-10-CM

## 2022-03-02 LAB
ALBUMIN SERPL BCP-MCNC: 3.8 G/DL (ref 3.5–5.2)
ALP SERPL-CCNC: 103 U/L (ref 55–135)
ALT SERPL W/O P-5'-P-CCNC: 24 U/L (ref 10–44)
ANION GAP SERPL CALC-SCNC: 10 MMOL/L (ref 8–16)
ANISOCYTOSIS BLD QL SMEAR: SLIGHT
AST SERPL-CCNC: 20 U/L (ref 10–40)
BASOPHILS # BLD AUTO: 0.13 K/UL (ref 0–0.2)
BASOPHILS NFR BLD: 0.8 % (ref 0–1.9)
BILIRUB SERPL-MCNC: 1.5 MG/DL (ref 0.1–1)
BUN SERPL-MCNC: 12 MG/DL (ref 6–20)
CALCIUM SERPL-MCNC: 8.9 MG/DL (ref 8.7–10.5)
CHLORIDE SERPL-SCNC: 109 MMOL/L (ref 95–110)
CO2 SERPL-SCNC: 23 MMOL/L (ref 23–29)
CREAT SERPL-MCNC: 0.8 MG/DL (ref 0.5–1.4)
DIFFERENTIAL METHOD: ABNORMAL
EOSINOPHIL # BLD AUTO: 0.1 K/UL (ref 0–0.5)
EOSINOPHIL NFR BLD: 0.4 % (ref 0–8)
ERYTHROCYTE [DISTWIDTH] IN BLOOD BY AUTOMATED COUNT: 16.9 % (ref 11.5–14.5)
EST. GFR  (AFRICAN AMERICAN): >60 ML/MIN/1.73 M^2
EST. GFR  (NON AFRICAN AMERICAN): >60 ML/MIN/1.73 M^2
GLUCOSE SERPL-MCNC: 102 MG/DL (ref 70–110)
HAPTOGLOB SERPL-MCNC: <10 MG/DL (ref 30–250)
HCT VFR BLD AUTO: 27.5 % (ref 37–48.5)
HGB BLD-MCNC: 9.1 G/DL (ref 12–16)
IMM GRANULOCYTES # BLD AUTO: 0.07 K/UL (ref 0–0.04)
IMM GRANULOCYTES NFR BLD AUTO: 0.4 % (ref 0–0.5)
LDH SERPL L TO P-CCNC: 203 U/L (ref 110–260)
LYMPHOCYTES # BLD AUTO: 3.3 K/UL (ref 1–4.8)
LYMPHOCYTES NFR BLD: 19.3 % (ref 18–48)
MAGNESIUM SERPL-MCNC: 1.9 MG/DL (ref 1.6–2.6)
MCH RBC QN AUTO: 28.3 PG (ref 27–31)
MCHC RBC AUTO-ENTMCNC: 33.1 G/DL (ref 32–36)
MCV RBC AUTO: 85 FL (ref 82–98)
MONOCYTES # BLD AUTO: 1.6 K/UL (ref 0.3–1)
MONOCYTES NFR BLD: 9.2 % (ref 4–15)
NEUTROPHILS # BLD AUTO: 12 K/UL (ref 1.8–7.7)
NEUTROPHILS NFR BLD: 69.9 % (ref 38–73)
NRBC BLD-RTO: 0 /100 WBC
OVALOCYTES BLD QL SMEAR: ABNORMAL
PLATELET # BLD AUTO: 764 K/UL (ref 150–450)
PLATELET BLD QL SMEAR: ABNORMAL
PMV BLD AUTO: 8.6 FL (ref 9.2–12.9)
POIKILOCYTOSIS BLD QL SMEAR: SLIGHT
POTASSIUM SERPL-SCNC: 3.7 MMOL/L (ref 3.5–5.1)
PROCALCITONIN SERPL IA-MCNC: 0.08 NG/ML
PROT SERPL-MCNC: 7.1 G/DL (ref 6–8.4)
RBC # BLD AUTO: 3.22 M/UL (ref 4–5.4)
RETICS/RBC NFR AUTO: 1.4 % (ref 0.5–2.5)
SARS-COV-2 RDRP RESP QL NAA+PROBE: NEGATIVE
SCHISTOCYTES BLD QL SMEAR: ABNORMAL
SCHISTOCYTES BLD QL SMEAR: PRESENT
SICKLE CELLS BLD QL SMEAR: ABNORMAL
SODIUM SERPL-SCNC: 142 MMOL/L (ref 136–145)
TARGETS BLD QL SMEAR: ABNORMAL
TROPONIN I SERPL DL<=0.01 NG/ML-MCNC: 0.01 NG/ML (ref 0–0.03)
TROPONIN I SERPL DL<=0.01 NG/ML-MCNC: <0.006 NG/ML (ref 0–0.03)
WBC # BLD AUTO: 17.13 K/UL (ref 3.9–12.7)

## 2022-03-02 PROCEDURE — 63600175 PHARM REV CODE 636 W HCPCS: Performed by: STUDENT IN AN ORGANIZED HEALTH CARE EDUCATION/TRAINING PROGRAM

## 2022-03-02 PROCEDURE — U0002 COVID-19 LAB TEST NON-CDC: HCPCS | Performed by: EMERGENCY MEDICINE

## 2022-03-02 PROCEDURE — 36415 COLL VENOUS BLD VENIPUNCTURE: CPT | Performed by: EMERGENCY MEDICINE

## 2022-03-02 PROCEDURE — 36415 COLL VENOUS BLD VENIPUNCTURE: CPT | Performed by: STUDENT IN AN ORGANIZED HEALTH CARE EDUCATION/TRAINING PROGRAM

## 2022-03-02 PROCEDURE — 36415 COLL VENOUS BLD VENIPUNCTURE: CPT | Performed by: NURSE PRACTITIONER

## 2022-03-02 PROCEDURE — 96375 TX/PRO/DX INJ NEW DRUG ADDON: CPT

## 2022-03-02 PROCEDURE — 94761 N-INVAS EAR/PLS OXIMETRY MLT: CPT

## 2022-03-02 PROCEDURE — 83010 ASSAY OF HAPTOGLOBIN QUANT: CPT | Performed by: STUDENT IN AN ORGANIZED HEALTH CARE EDUCATION/TRAINING PROGRAM

## 2022-03-02 PROCEDURE — 85045 AUTOMATED RETICULOCYTE COUNT: CPT | Performed by: EMERGENCY MEDICINE

## 2022-03-02 PROCEDURE — 84145 PROCALCITONIN (PCT): CPT | Performed by: STUDENT IN AN ORGANIZED HEALTH CARE EDUCATION/TRAINING PROGRAM

## 2022-03-02 PROCEDURE — 80053 COMPREHEN METABOLIC PANEL: CPT | Performed by: EMERGENCY MEDICINE

## 2022-03-02 PROCEDURE — 99900035 HC TECH TIME PER 15 MIN (STAT): Performed by: STUDENT IN AN ORGANIZED HEALTH CARE EDUCATION/TRAINING PROGRAM

## 2022-03-02 PROCEDURE — 83735 ASSAY OF MAGNESIUM: CPT | Performed by: EMERGENCY MEDICINE

## 2022-03-02 PROCEDURE — 99900035 HC TECH TIME PER 15 MIN (STAT)

## 2022-03-02 PROCEDURE — 84484 ASSAY OF TROPONIN QUANT: CPT | Mod: 91 | Performed by: NURSE PRACTITIONER

## 2022-03-02 PROCEDURE — 96374 THER/PROPH/DIAG INJ IV PUSH: CPT

## 2022-03-02 PROCEDURE — 63600175 PHARM REV CODE 636 W HCPCS: Performed by: NURSE PRACTITIONER

## 2022-03-02 PROCEDURE — 63600175 PHARM REV CODE 636 W HCPCS: Performed by: EMERGENCY MEDICINE

## 2022-03-02 PROCEDURE — 25000003 PHARM REV CODE 250: Performed by: STUDENT IN AN ORGANIZED HEALTH CARE EDUCATION/TRAINING PROGRAM

## 2022-03-02 PROCEDURE — 85025 COMPLETE CBC W/AUTO DIFF WBC: CPT | Performed by: EMERGENCY MEDICINE

## 2022-03-02 PROCEDURE — 12000002 HC ACUTE/MED SURGE SEMI-PRIVATE ROOM

## 2022-03-02 PROCEDURE — 99285 EMERGENCY DEPT VISIT HI MDM: CPT | Mod: 25

## 2022-03-02 PROCEDURE — 83615 LACTATE (LD) (LDH) ENZYME: CPT | Performed by: STUDENT IN AN ORGANIZED HEALTH CARE EDUCATION/TRAINING PROGRAM

## 2022-03-02 PROCEDURE — 84484 ASSAY OF TROPONIN QUANT: CPT | Performed by: EMERGENCY MEDICINE

## 2022-03-02 PROCEDURE — 25000003 PHARM REV CODE 250: Performed by: NURSE PRACTITIONER

## 2022-03-02 RX ORDER — SIMETHICONE 80 MG
1 TABLET,CHEWABLE ORAL 4 TIMES DAILY PRN
Status: DISCONTINUED | OUTPATIENT
Start: 2022-03-02 | End: 2022-03-11 | Stop reason: HOSPADM

## 2022-03-02 RX ORDER — SODIUM,POTASSIUM PHOSPHATES 280-250MG
2 POWDER IN PACKET (EA) ORAL
Status: DISCONTINUED | OUTPATIENT
Start: 2022-03-02 | End: 2022-03-11 | Stop reason: HOSPADM

## 2022-03-02 RX ORDER — MORPHINE SULFATE 30 MG/1
30 TABLET, FILM COATED, EXTENDED RELEASE ORAL EVERY 8 HOURS
COMMUNITY

## 2022-03-02 RX ORDER — HYDROXYUREA 500 MG/1
1000 CAPSULE ORAL DAILY
Status: DISCONTINUED | OUTPATIENT
Start: 2022-03-02 | End: 2022-03-03

## 2022-03-02 RX ORDER — GLUTAMINE 5 G/1
15 POWDER, FOR SOLUTION ORAL 2 TIMES DAILY
COMMUNITY
End: 2023-02-09 | Stop reason: SDUPTHER

## 2022-03-02 RX ORDER — ONDANSETRON 2 MG/ML
8 INJECTION INTRAMUSCULAR; INTRAVENOUS
Status: COMPLETED | OUTPATIENT
Start: 2022-03-02 | End: 2022-03-02

## 2022-03-02 RX ORDER — LEVOFLOXACIN 5 MG/ML
750 INJECTION, SOLUTION INTRAVENOUS
Status: DISCONTINUED | OUTPATIENT
Start: 2022-03-02 | End: 2022-03-02

## 2022-03-02 RX ORDER — ACETAMINOPHEN 325 MG/1
650 TABLET ORAL EVERY 4 HOURS PRN
Status: DISCONTINUED | OUTPATIENT
Start: 2022-03-02 | End: 2022-03-11 | Stop reason: HOSPADM

## 2022-03-02 RX ORDER — FAMOTIDINE 20 MG/1
20 TABLET, FILM COATED ORAL DAILY
Status: DISCONTINUED | OUTPATIENT
Start: 2022-03-02 | End: 2022-03-11 | Stop reason: HOSPADM

## 2022-03-02 RX ORDER — ENOXAPARIN SODIUM 100 MG/ML
40 INJECTION SUBCUTANEOUS EVERY 24 HOURS
Status: DISCONTINUED | OUTPATIENT
Start: 2022-03-02 | End: 2022-03-08

## 2022-03-02 RX ORDER — IPRATROPIUM BROMIDE AND ALBUTEROL SULFATE 2.5; .5 MG/3ML; MG/3ML
3 SOLUTION RESPIRATORY (INHALATION) EVERY 6 HOURS PRN
Status: DISCONTINUED | OUTPATIENT
Start: 2022-03-02 | End: 2022-03-11 | Stop reason: HOSPADM

## 2022-03-02 RX ORDER — IBUPROFEN 200 MG
16 TABLET ORAL
Status: DISCONTINUED | OUTPATIENT
Start: 2022-03-02 | End: 2022-03-11 | Stop reason: HOSPADM

## 2022-03-02 RX ORDER — FOLIC ACID 1 MG/1
4 TABLET ORAL DAILY
Status: DISCONTINUED | OUTPATIENT
Start: 2022-03-02 | End: 2022-03-11 | Stop reason: HOSPADM

## 2022-03-02 RX ORDER — MORPHINE SULFATE 30 MG/1
30 TABLET, FILM COATED, EXTENDED RELEASE ORAL EVERY 8 HOURS
Status: DISCONTINUED | OUTPATIENT
Start: 2022-03-02 | End: 2022-03-07

## 2022-03-02 RX ORDER — ACETAMINOPHEN 325 MG/1
650 TABLET ORAL EVERY 8 HOURS PRN
Status: DISCONTINUED | OUTPATIENT
Start: 2022-03-02 | End: 2022-03-11 | Stop reason: HOSPADM

## 2022-03-02 RX ORDER — DIPHENHYDRAMINE HYDROCHLORIDE 50 MG/ML
25 INJECTION INTRAMUSCULAR; INTRAVENOUS
Status: COMPLETED | OUTPATIENT
Start: 2022-03-02 | End: 2022-03-02

## 2022-03-02 RX ORDER — AMOXICILLIN 250 MG
1 CAPSULE ORAL 2 TIMES DAILY
Status: DISCONTINUED | OUTPATIENT
Start: 2022-03-02 | End: 2022-03-11 | Stop reason: HOSPADM

## 2022-03-02 RX ORDER — NAPROXEN SODIUM 220 MG/1
81 TABLET, FILM COATED ORAL DAILY
Status: DISCONTINUED | OUTPATIENT
Start: 2022-03-02 | End: 2022-03-11 | Stop reason: HOSPADM

## 2022-03-02 RX ORDER — SODIUM CHLORIDE 0.9 % (FLUSH) 0.9 %
3 SYRINGE (ML) INJECTION EVERY 12 HOURS PRN
Status: DISCONTINUED | OUTPATIENT
Start: 2022-03-02 | End: 2022-03-11 | Stop reason: HOSPADM

## 2022-03-02 RX ORDER — DIPHENHYDRAMINE HYDROCHLORIDE 50 MG/ML
25 INJECTION INTRAMUSCULAR; INTRAVENOUS EVERY 8 HOURS PRN
Status: DISCONTINUED | OUTPATIENT
Start: 2022-03-02 | End: 2022-03-04

## 2022-03-02 RX ORDER — LANOLIN ALCOHOL/MO/W.PET/CERES
800 CREAM (GRAM) TOPICAL
Status: DISCONTINUED | OUTPATIENT
Start: 2022-03-02 | End: 2022-03-11 | Stop reason: HOSPADM

## 2022-03-02 RX ORDER — ONDANSETRON 2 MG/ML
8 INJECTION INTRAMUSCULAR; INTRAVENOUS EVERY 6 HOURS PRN
Status: DISCONTINUED | OUTPATIENT
Start: 2022-03-02 | End: 2022-03-11 | Stop reason: HOSPADM

## 2022-03-02 RX ORDER — TALC
6 POWDER (GRAM) TOPICAL NIGHTLY PRN
Status: DISCONTINUED | OUTPATIENT
Start: 2022-03-02 | End: 2022-03-11 | Stop reason: HOSPADM

## 2022-03-02 RX ORDER — MAG HYDROX/ALUMINUM HYD/SIMETH 200-200-20
30 SUSPENSION, ORAL (FINAL DOSE FORM) ORAL 4 TIMES DAILY PRN
Status: DISCONTINUED | OUTPATIENT
Start: 2022-03-02 | End: 2022-03-11 | Stop reason: HOSPADM

## 2022-03-02 RX ORDER — OXYCODONE AND ACETAMINOPHEN 5; 325 MG/1; MG/1
1 TABLET ORAL EVERY 4 HOURS PRN
COMMUNITY
End: 2023-02-09

## 2022-03-02 RX ORDER — HYDROXYUREA 500 MG/1
1000 CAPSULE ORAL DAILY
COMMUNITY
Start: 2021-10-05 | End: 2023-02-09 | Stop reason: SDUPTHER

## 2022-03-02 RX ORDER — POLYETHYLENE GLYCOL 3350 17 G/17G
17 POWDER, FOR SOLUTION ORAL DAILY
Status: DISCONTINUED | OUTPATIENT
Start: 2022-03-02 | End: 2022-03-11 | Stop reason: HOSPADM

## 2022-03-02 RX ORDER — OXYCODONE AND ACETAMINOPHEN 5; 325 MG/1; MG/1
1 TABLET ORAL EVERY 4 HOURS PRN
Status: DISCONTINUED | OUTPATIENT
Start: 2022-03-02 | End: 2022-03-03

## 2022-03-02 RX ORDER — IBUPROFEN 200 MG
24 TABLET ORAL
Status: DISCONTINUED | OUTPATIENT
Start: 2022-03-02 | End: 2022-03-11 | Stop reason: HOSPADM

## 2022-03-02 RX ORDER — NALOXONE HCL 0.4 MG/ML
0.02 VIAL (ML) INJECTION
Status: DISCONTINUED | OUTPATIENT
Start: 2022-03-02 | End: 2022-03-11 | Stop reason: HOSPADM

## 2022-03-02 RX ORDER — URSODIOL 300 MG/1
300 CAPSULE ORAL 3 TIMES DAILY
Status: DISCONTINUED | OUTPATIENT
Start: 2022-03-02 | End: 2022-03-02

## 2022-03-02 RX ORDER — HYDROMORPHONE HYDROCHLORIDE 2 MG/ML
1 INJECTION, SOLUTION INTRAMUSCULAR; INTRAVENOUS; SUBCUTANEOUS
Status: DISCONTINUED | OUTPATIENT
Start: 2022-03-02 | End: 2022-03-03

## 2022-03-02 RX ORDER — DEFERASIROX 360 MG/1
1080 TABLET, FILM COATED ORAL DAILY
COMMUNITY
Start: 2021-10-05 | End: 2023-02-09

## 2022-03-02 RX ORDER — HYDROMORPHONE HYDROCHLORIDE 2 MG/ML
1 INJECTION, SOLUTION INTRAMUSCULAR; INTRAVENOUS; SUBCUTANEOUS
Status: COMPLETED | OUTPATIENT
Start: 2022-03-02 | End: 2022-03-02

## 2022-03-02 RX ORDER — GLUCAGON 1 MG
1 KIT INJECTION
Status: DISCONTINUED | OUTPATIENT
Start: 2022-03-02 | End: 2022-03-11 | Stop reason: HOSPADM

## 2022-03-02 RX ORDER — DEFERASIROX 500 MG/1
1000 TABLET, FOR SUSPENSION ORAL
Status: DISCONTINUED | OUTPATIENT
Start: 2022-03-02 | End: 2022-03-11 | Stop reason: HOSPADM

## 2022-03-02 RX ORDER — GABAPENTIN 300 MG/1
600 CAPSULE ORAL NIGHTLY
Status: DISCONTINUED | OUTPATIENT
Start: 2022-03-02 | End: 2022-03-06

## 2022-03-02 RX ORDER — SODIUM CHLORIDE 9 MG/ML
INJECTION, SOLUTION INTRAVENOUS CONTINUOUS
Status: DISCONTINUED | OUTPATIENT
Start: 2022-03-02 | End: 2022-03-11 | Stop reason: HOSPADM

## 2022-03-02 RX ADMIN — ASPIRIN 81 MG CHEWABLE TABLET 81 MG: 81 TABLET CHEWABLE at 11:03

## 2022-03-02 RX ADMIN — MORPHINE SULFATE 30 MG: 30 TABLET, FILM COATED, EXTENDED RELEASE ORAL at 02:03

## 2022-03-02 RX ADMIN — FAMOTIDINE 20 MG: 20 TABLET, FILM COATED ORAL at 11:03

## 2022-03-02 RX ADMIN — DIPHENHYDRAMINE HYDROCHLORIDE 25 MG: 50 INJECTION, SOLUTION INTRAMUSCULAR; INTRAVENOUS at 03:03

## 2022-03-02 RX ADMIN — MORPHINE SULFATE 30 MG: 30 TABLET, FILM COATED, EXTENDED RELEASE ORAL at 09:03

## 2022-03-02 RX ADMIN — ONDANSETRON 8 MG: 2 INJECTION INTRAMUSCULAR; INTRAVENOUS at 03:03

## 2022-03-02 RX ADMIN — SODIUM CHLORIDE: 0.9 INJECTION, SOLUTION INTRAVENOUS at 05:03

## 2022-03-02 RX ADMIN — POTASSIUM BICARBONATE 50 MEQ: 977.5 TABLET, EFFERVESCENT ORAL at 09:03

## 2022-03-02 RX ADMIN — OXYCODONE HYDROCHLORIDE AND ACETAMINOPHEN 1 TABLET: 5; 325 TABLET ORAL at 12:03

## 2022-03-02 RX ADMIN — MORPHINE SULFATE 30 MG: 30 TABLET, FILM COATED, EXTENDED RELEASE ORAL at 07:03

## 2022-03-02 RX ADMIN — OXYCODONE HYDROCHLORIDE AND ACETAMINOPHEN 1 TABLET: 5; 325 TABLET ORAL at 05:03

## 2022-03-02 RX ADMIN — DEFERASIROX 1000 MG: 500 TABLET, FOR SUSPENSION ORAL at 09:03

## 2022-03-02 RX ADMIN — DIPHENHYDRAMINE HYDROCHLORIDE 25 MG: 50 INJECTION INTRAMUSCULAR; INTRAVENOUS at 10:03

## 2022-03-02 RX ADMIN — DIPHENHYDRAMINE HYDROCHLORIDE 25 MG: 50 INJECTION INTRAMUSCULAR; INTRAVENOUS at 12:03

## 2022-03-02 RX ADMIN — HYDROMORPHONE HYDROCHLORIDE 1 MG: 2 INJECTION INTRAMUSCULAR; INTRAVENOUS; SUBCUTANEOUS at 03:03

## 2022-03-02 RX ADMIN — ENOXAPARIN SODIUM 40 MG: 40 INJECTION SUBCUTANEOUS at 05:03

## 2022-03-02 RX ADMIN — SODIUM CHLORIDE: 0.9 INJECTION, SOLUTION INTRAVENOUS at 10:03

## 2022-03-02 RX ADMIN — HYDROXYUREA 1000 MG: 500 CAPSULE ORAL at 09:03

## 2022-03-02 RX ADMIN — FOLIC ACID 4 MG: 1 TABLET ORAL at 09:03

## 2022-03-02 RX ADMIN — HYDROMORPHONE HYDROCHLORIDE 1 MG: 2 INJECTION, SOLUTION INTRAMUSCULAR; INTRAVENOUS; SUBCUTANEOUS at 05:03

## 2022-03-02 RX ADMIN — HYDROMORPHONE HYDROCHLORIDE 1 MG: 2 INJECTION, SOLUTION INTRAMUSCULAR; INTRAVENOUS; SUBCUTANEOUS at 09:03

## 2022-03-02 RX ADMIN — LEVOFLOXACIN 750 MG: 750 INJECTION, SOLUTION INTRAVENOUS at 06:03

## 2022-03-02 NOTE — ED PROVIDER NOTES
"Encounter Date: 3/2/2022    SCRIBE #1 NOTE: I, Maria Ines Llamas, am scribing for, and in the presence of, Brian Ugarte MD.       History     Chief Complaint   Patient presents with    Chest Pain     Pt c/o left sided chest pain , back pain , and shoulder pain ; pt has hx of sickle cell and reports flair up occurs during weather changes ; resp e/u ; GCS 15 ; NADN      Time seen by provider: 1:55 AM on 03/02/2022    Nishi Dumont is a 31 y.o. female with a PMHx of sickle cell disease and acute chest syndrome presents to the ED with an onset of a sickle cell flair up that began yesterday. Patient c/o associated Sx of middle back pain, rib pain, and stabbing L-side chest pain. Patient reports last admit for sickle cell flair up was 02/06/2022, and her sickle cell physician is Dr. Woods at [facility]. Patient denies chance of pregnancy or Hx of PE. PSHx of cholecystectomy.       The history is provided by the patient and medical records.     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    Avascular necrosis of bone of hip     Avascular necrosis of humeral head     Blood transfusion     Sickle cell disease      Past Surgical History:   Procedure Laterality Date    CHOLECYSTECTOMY      JOINT REPLACEMENT       left hip     Family History   Problem Relation Age of Onset    Sickle cell trait Mother     Sickle cell trait Father     Sickle cell trait Brother     Sickle cell trait Daughter      Social History     Tobacco Use    Smoking status: Never Smoker   Substance Use Topics    Alcohol use: Yes     Alcohol/week: " 0.0 standard drinks     Comment: occassionally    Drug use: No     Review of Systems   Constitutional: Negative for fever.   HENT: Negative for sore throat.    Respiratory: Negative for shortness of breath.    Cardiovascular: Positive for chest pain.   Gastrointestinal: Negative for nausea.   Genitourinary: Negative for dysuria.   Musculoskeletal: Positive for back pain and myalgias.   Skin: Negative for rash.   Neurological: Negative for weakness.   Hematological: Does not bruise/bleed easily.       Physical Exam     Initial Vitals [03/02/22 0042]   BP Pulse Resp Temp SpO2   136/77 88 18 98.5 °F (36.9 °C) 98 %      MAP       --         Physical Exam    Nursing note and vitals reviewed.  Constitutional: She appears well-developed and well-nourished. She is not diaphoretic. No distress.   HENT:   Head: Normocephalic and atraumatic.   Mouth/Throat: Oropharynx is clear and moist.   Eyes: Conjunctivae are normal.   Neck: Neck supple.   Cardiovascular: Normal rate, regular rhythm, normal heart sounds and intact distal pulses. Exam reveals no gallop and no friction rub.    No murmur heard.  Pulmonary/Chest: Effort normal and breath sounds normal. She has no wheezes. She has no rhonchi. She has no rales.   She has a port in L anterior chest wall.    Abdominal: Abdomen is soft. She exhibits no distension. There is no abdominal tenderness.   Musculoskeletal:         General: Normal range of motion.      Cervical back: Neck supple.      Right lower leg: No edema.      Left lower leg: No edema.     Neurological: She is alert and oriented to person, place, and time.   Skin: No rash noted. No erythema.   Psychiatric: Her speech is normal.         ED Course   Procedures  Labs Reviewed   CBC W/ AUTO DIFFERENTIAL - Abnormal; Notable for the following components:       Result Value    WBC 17.13 (*)     RBC 3.22 (*)     Hemoglobin 9.1 (*)     Hematocrit 27.5 (*)     RDW 16.9 (*)     Platelets 764 (*)     MPV 8.6 (*)     Gran # (ANC)  12.0 (*)     Immature Grans (Abs) 0.07 (*)     Mono # 1.6 (*)     Platelet Estimate Increased (*)     Sickle Cells Occasional (*)     All other components within normal limits   COMPREHENSIVE METABOLIC PANEL - Abnormal; Notable for the following components:    Total Bilirubin 1.5 (*)     All other components within normal limits   TROPONIN I   RETICULOCYTES   MAGNESIUM   SARS-COV-2 RNA AMPLIFICATION, QUAL          Imaging Results          X-Ray Chest AP Portable (In process)                  Medications   HYDROmorphone (PF) injection 1 mg (1 mg Intravenous Given 3/2/22 0312)   diphenhydrAMINE injection 25 mg (25 mg Intravenous Given 3/2/22 0313)   ondansetron injection 8 mg (8 mg Intravenous Given 3/2/22 0312)     Medical Decision Making:   History:   Old Medical Records: I decided to obtain old medical records.  Independently Interpreted Test(s):   I have ordered and independently interpreted X-rays - see prior notes.  Clinical Tests:   Lab Tests: Ordered and Reviewed  Radiological Study: Ordered and Reviewed          Scribe Attestation:   Scribe #1: I performed the above scribed service and the documentation accurately describes the services I performed. I attest to the accuracy of the note.        ED Course as of 03/02/22 0348   Wed Mar 02, 2022   0054 EKG independently interpreted by me as rate 87 normal sinus rate rhythm axis and intervals with prolonged QT and no ST segment elevation or depression. [JS]   0250 Chest x-ray independently interpreted by me shows  questionable atelectasis versus infiltrates at the bases.  Patient does not have any signs of acute ischemia on her EKG.  Given history of sickle cell  with chest discomfort I do think she needs to be admitted for further evaluation and acute chest syndrome needs to be ruled out.  She does not have fever at this time.  She denies any significant shortness of breath does have some chest discomfort.  There is this questionable atelectasis on chest x-ray.   Troponin is negative.  Patient has no hypoxia tachypnea or tachycardia to suggest pulmonary embolism [JS]      ED Course User Index  [JS] Brian Ugarte MD           I, Dr. Brian Ugarte personally performed the services described in this documentation. All medical record entries made by the scribe were at my direction and in my presence.  I have reviewed the chart and agree that the record reflects my personal performance and is accurate and complete. Brian Ugarte MD.  3:48 AM 03/02/2022    DISCLAIMER: This note was prepared with Dragon NaturallySpeaking voice recognition transcription software. Garbled syntax, mangled pronouns, and other bizarre constructions may be attributed to that software system   Clinical Impression:   Final diagnoses:  [R07.9] Chest pain  [D57.00] Sickle cell pain crisis (Primary)          ED Disposition Condition    Observation               Brian Ugarte MD  03/02/22 8431

## 2022-03-02 NOTE — ASSESSMENT & PLAN NOTE
Admit to tele  Concern for Acute chest, last episode was about 7-8 months ago  Afebrile, denies SOB  Trend troponin, #1 0.010  Infiltrate seen on initial chest xray, pending official read. Will treat with levaquin empirically  Oxygen PRN to keep O2 sat >94%

## 2022-03-02 NOTE — HPI
Nishi Dumont is a 31 y.o. 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 She states she feels like this is a typical sickle cell flare with associated left sided stabbing chest pain, mid back and left shoulder pain. She denies SOB, nausea, vomiting or diarrhea. She denies fever or cough. She reports compliance with her medications. She states that she gets flares when the weather changes, but has been keeping herself inside to avoid a possible flare. She was last admitted for sickle cell one month ago, and last had an acute chest syndrome about 7-8 months ago. She denies all other complaint.    ED work up was significant for anemia on CBC and a CMP that was essentially unremarkable. Troponin was negative. CXR showed what could be infiltrates to bilateral lung bases, pending official reading. She has not experienced tachycardia or hypoxia. Hospital Medicine consulted for admission and further management.

## 2022-03-02 NOTE — ASSESSMENT & PLAN NOTE
Patient's anemia is currently controlled. Has not received any PRBCs to date.. Etiology likely d/t sickle cell disease  Current CBC reviewed-   Lab Results   Component Value Date    HGB 9.1 (L) 03/02/2022    HCT 27.5 (L) 03/02/2022     Monitor serial CBC and transfuse if patient becomes hemodynamically unstable, symptomatic or H/H drops below 7/21.

## 2022-03-02 NOTE — CARE UPDATE
03/02/22 0914   Patient Assessment/Suction   Level of Consciousness (AVPU) alert   All Lung Fields Breath Sounds equal bilaterally   PRE-TX-O2   O2 Device (Oxygen Therapy) room air   SpO2 99 %   Pulse Oximetry Type Intermittent   $ Pulse Oximetry - Multiple Charge Pulse Oximetry - Multiple   Aerosol Therapy   $ Aerosol Therapy Charges PRN treatment not required

## 2022-03-02 NOTE — H&P
[facility] Medicine  History & Physical    Patient Name: Nishi Dumont  MRN: [id number]  Patient Class: OP- Observation  Admission Date: 3/2/2022  Attending Physician: Brian Ugarte MD   Primary Care Provider: Azael Kennedy MD         Patient information was obtained from patient, past medical records and ER records.     Subjective:     Principal Problem:Sickle cell pain crisis    Chief Complaint:   Chief Complaint   Patient presents with    Chest Pain     Pt c/o left sided chest pain , back pain , and shoulder pain ; pt has hx of sickle cell and reports flair up occurs during weather changes ; resp e/u ; GCS 15 ; NADN         HPI: Nishi Dumont is a 31 y.o. 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 She states she feels like this is a typical sickle cell flare with associated left sided stabbing chest pain, mid back and left shoulder pain. She denies SOB, nausea, vomiting or diarrhea. She denies fever or cough. She reports compliance with her medications. She states that she gets flares when the weather changes, but has been keeping herself inside to avoid a possible flare. She was last admitted for sickle cell one month ago, and last had an acute chest syndrome about 7-8 months ago. She denies all other complaint.    ED work up was significant for anemia on CBC and a CMP that was essentially unremarkable. Troponin was negative. CXR showed what could be infiltrates to bilateral lung bases, pending official reading. She has not experienced tachycardia or hypoxia. Hospital Medicine consulted for admission and further management.      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    Avascular necrosis of bone of hip   "   Avascular necrosis of humeral head     Blood transfusion     Sickle cell disease        Past Surgical History:   Procedure Laterality Date    CHOLECYSTECTOMY      JOINT REPLACEMENT       left hip       Review of patient's allergies indicates:   Allergen Reactions    Contrast media Hives    Iodine and iodide containing products Hives and Swelling    Mushroom Hives    Zithromax [azithromycin] Anaphylaxis    Demerol [meperidine] Other (See Comments)     Regarding reaction to demerol pt states "I talk out of my head and become aggressive"       No current facility-administered medications on file prior to encounter.     Current Outpatient Medications on File Prior to Encounter   Medication Sig    deferasirox (JADENU) 360 mg Tab Take 1,080 mg by mouth once daily.    folic acid (FOLVITE) 1 MG tablet Take 4 tablets (4 mg total) by mouth once daily.    hydroxyurea (HYDREA) 500 mg Cap Take 1,000 mg by mouth once daily.    aspirin 81 MG Chew Take 1 tablet (81 mg total) by mouth once daily.    gabapentin (NEURONTIN) 300 MG capsule Take 2 capsules (600 mg total) by mouth every evening.    glutamine, sickle cell, (ENDARI) 5 gram PwPk Take 15 g by mouth 2 (two) times daily.    morphine (MS CONTIN) 30 MG 12 hr tablet Take 30 mg by mouth every 8 (eight) hours.    oxyCODONE-acetaminophen (PERCOCET) 5-325 mg per tablet Take 1 tablet by mouth every 4 (four) hours as needed for Pain.    ursodiol (ACTIGALL) 300 mg capsule Take 1 capsule (300 mg total) by mouth 3 (three) times daily with meals.    [DISCONTINUED] acetaminophen (TYLENOL) 325 MG tablet Take 2 tablets (650 mg total) by mouth every 6 (six) hours.    [DISCONTINUED] famotidine (PEPCID) 20 MG tablet Take 20 mg by mouth every morning.    [DISCONTINUED] morphine (MS CONTIN) 15 MG 12 hr tablet Take 5 tablets (75 mg total) by mouth every 12 (twelve) hours. (Patient taking differently: Take 30 mg by mouth every 8 (eight) hours.)    [DISCONTINUED] ondansetron " (ZOFRAN-ODT) 4 MG TbDL Take 4 mg by mouth every 8 (eight) hours as needed (for nausea).    [DISCONTINUED] oxycodone (ROXICODONE) 20 mg Tab immediate release tablet Take 1 tablet (20 mg total) by mouth every 4 (four) hours as needed (Mild pain 1-3).    [DISCONTINUED] oxycodone (ROXICODONE) 30 MG Tab Take 1 tablet (30 mg total) by mouth every 4 (four) hours as needed.     Family History       Problem Relation (Age of Onset)    Sickle cell trait Mother, Father, Brother, Daughter          Tobacco Use    Smoking status: Never Smoker    Smokeless tobacco: Never Used   Substance and Sexual Activity    Alcohol use: Yes     Alcohol/week: 0.0 standard drinks     Comment: occassionally    Drug use: No    Sexual activity: Yes     Partners: Male     Birth control/protection: Injection     Review of Systems   Constitutional:  Negative for chills and fever.   HENT:  Negative for congestion and sore throat.    Eyes:  Negative for visual disturbance.   Respiratory:  Negative for cough and shortness of breath.    Cardiovascular:  Positive for chest pain. Negative for palpitations.   Gastrointestinal:  Negative for abdominal pain, nausea and vomiting.   Endocrine: Negative for cold intolerance and heat intolerance.   Genitourinary:  Negative for dysuria and hematuria.   Musculoskeletal:  Positive for arthralgias and back pain. Negative for myalgias.   Skin:  Negative for pallor and rash.   Neurological:  Negative for tremors and seizures.   Hematological:  Negative for adenopathy. Does not bruise/bleed easily.   Psychiatric/Behavioral:  Negative for hallucinations.    Objective:     Vital Signs (Most Recent):  Temp: 98.5 °F (36.9 °C) (03/02/22 0042)  Pulse: 81 (03/02/22 0232)  Resp: 20 (03/02/22 0312)  BP: 107/67 (03/02/22 0232)  SpO2: 100 % (03/02/22 0232) Vital Signs (24h Range):  Temp:  [98.5 °F (36.9 °C)] 98.5 °F (36.9 °C)  Pulse:  [81-88] 81  Resp:  [18-20] 20  SpO2:  [98 %-100 %] 100 %  BP: (103-136)/(65-77) 107/67      Weight: 56.7 kg (125 lb)  Body mass index is 25.25 kg/m².    Physical Exam  Vitals and nursing note reviewed.   Constitutional:       General: She is awake. She is in acute distress (appears to be very uncomfortable).      Appearance: Normal appearance. She is well-developed. She is not ill-appearing.   HENT:      Head: Normocephalic and atraumatic.      Mouth/Throat:      Lips: Pink.      Mouth: Mucous membranes are moist.   Eyes:      Conjunctiva/sclera: Conjunctivae normal.      Pupils: Pupils are equal, round, and reactive to light.   Neck:      Thyroid: No thyromegaly.      Vascular: No JVD.   Cardiovascular:      Rate and Rhythm: Normal rate and regular rhythm.      Heart sounds: Normal heart sounds, S1 normal and S2 normal. No murmur heard.    No friction rub. No gallop.   Pulmonary:      Effort: Pulmonary effort is normal.      Breath sounds: Normal breath sounds.   Chest:       Abdominal:      General: Bowel sounds are normal. There is no distension.      Palpations: Abdomen is soft. There is no mass.      Tenderness: There is no abdominal tenderness.   Musculoskeletal:         General: Normal range of motion.      Cervical back: Full passive range of motion without pain, normal range of motion and neck supple.      Right lower leg: No edema.      Left lower leg: No edema.   Skin:     General: Skin is warm and dry.      Capillary Refill: Capillary refill takes less than 2 seconds.   Neurological:      General: No focal deficit present.      Mental Status: She is alert and oriented to person, place, and time. Mental status is at baseline.      Cranial Nerves: No cranial nerve deficit.      Sensory: Sensation is intact.      Motor: Motor function is intact.   Psychiatric:         Behavior: Behavior normal. Behavior is cooperative.         CRANIAL NERVES     CN III, IV, VI   Pupils are equal, round, and reactive to light.     Significant Labs: All pertinent labs within the past 24 hours have been  reviewed.  CBC:   Recent Labs   Lab 03/02/22 0122   WBC 17.13*   HGB 9.1*   HCT 27.5*   *     CMP:   Recent Labs   Lab 03/02/22 0122      K 3.7      CO2 23      BUN 12   CREATININE 0.8   CALCIUM 8.9   PROT 7.1   ALBUMIN 3.8   BILITOT 1.5*   ALKPHOS 103   AST 20   ALT 24   ANIONGAP 10   EGFRNONAA >60     Magnesium:   Recent Labs   Lab 03/02/22 0122   MG 1.9     Troponin:   Recent Labs   Lab 03/02/22 0122   TROPONINI 0.010       Significant Imaging: I have reviewed all pertinent imaging results/findings within the past 24 hours.    CXR: questionable infiltrate to bases    Assessment/Plan:     * Sickle cell pain crisis  Admit to tele  Continue home medications  Dilaudid PRN  Antinausea PRN      Chest pain  Admit to tele  Concern for Acute chest, last episode was about 7-8 months ago  Afebrile, denies SOB  Trend troponin, #1 0.010  Infiltrate seen on initial chest xray, pending official read. Will treat with levaquin empirically  Oxygen PRN to keep O2 sat >94%      Sickle cell anemia with pain  Patient's anemia is currently controlled. Has not received any PRBCs to date.. Etiology likely d/t sickle cell disease  Current CBC reviewed-   Lab Results   Component Value Date    HGB 9.1 (L) 03/02/2022    HCT 27.5 (L) 03/02/2022     Monitor serial CBC and transfuse if patient becomes hemodynamically unstable, symptomatic or H/H drops below 7/21.           VTE Risk Mitigation (From admission, onward)         Ordered     enoxaparin injection 40 mg  Daily         03/02/22 0415     IP VTE HIGH RISK PATIENT  Once         03/02/22 0415     Place sequential compression device  Until discontinued         03/02/22 0415                   NAVEED Espinosa  Department of Hospital Medicine   [facility] - Emergency Dept

## 2022-03-02 NOTE — SUBJECTIVE & OBJECTIVE
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    Avascular necrosis of bone of hip     Avascular necrosis of humeral head     Blood transfusion     Sickle cell disease        Past Surgical History:   Procedure Laterality Date    CHOLECYSTECTOMY      JOINT REPLACEMENT       left hip       Review of patient's allergies indicates:   Allergen Reactions    Contrast media Hives    Iodine and iodide containing products Hives and Swelling    Mushroom Hives    Zithromax [azithromycin] Anaphylaxis    Demerol [meperidine] Other (See Comments)     Regarding reaction to demerol pt states "I talk out of my head and become aggressive"       No current facility-administered medications on file prior to encounter.     Current Outpatient Medications on File Prior to Encounter   Medication Sig    deferasirox (JADENU) 360 mg Tab Take 1,080 mg by mouth once daily.    folic acid (FOLVITE) 1 MG tablet Take 4 tablets (4 mg total) by mouth once daily.    hydroxyurea (HYDREA) 500 mg Cap Take 1,000 mg by mouth once daily.    aspirin 81 MG Chew Take 1 tablet (81 mg total) by mouth once daily.    gabapentin (NEURONTIN) 300 MG capsule Take 2 capsules (600 mg total) by mouth every evening.    glutamine, sickle cell, (ENDARI) 5 gram PwPk Take 15 g by mouth 2 (two) times daily.    morphine (MS CONTIN) 30 MG 12 hr tablet Take 30 mg by mouth every 8 (eight) hours.    oxyCODONE-acetaminophen (PERCOCET) 5-325 mg per tablet Take 1 tablet by mouth every 4 (four) hours as needed for Pain.    ursodiol (ACTIGALL) 300 mg capsule Take 1 capsule (300 mg total) by mouth 3 (three) times daily with meals.    [DISCONTINUED] acetaminophen (TYLENOL) 325 MG tablet Take 2 tablets (650 mg total) by mouth every 6 (six) hours.    [DISCONTINUED] famotidine (PEPCID) 20 MG tablet Take 20 mg by mouth every morning.    [DISCONTINUED] morphine (MS CONTIN) 15 MG 12 hr tablet Take 5 tablets (75 mg total) by mouth every 12 " (twelve) hours. (Patient taking differently: Take 30 mg by mouth every 8 (eight) hours.)    [DISCONTINUED] ondansetron (ZOFRAN-ODT) 4 MG TbDL Take 4 mg by mouth every 8 (eight) hours as needed (for nausea).    [DISCONTINUED] oxycodone (ROXICODONE) 20 mg Tab immediate release tablet Take 1 tablet (20 mg total) by mouth every 4 (four) hours as needed (Mild pain 1-3).    [DISCONTINUED] oxycodone (ROXICODONE) 30 MG Tab Take 1 tablet (30 mg total) by mouth every 4 (four) hours as needed.     Family History       Problem Relation (Age of Onset)    Sickle cell trait Mother, Father, Brother, Daughter          Tobacco Use    Smoking status: Never Smoker    Smokeless tobacco: Never Used   Substance and Sexual Activity    Alcohol use: Yes     Alcohol/week: 0.0 standard drinks     Comment: occassionally    Drug use: No    Sexual activity: Yes     Partners: Male     Birth control/protection: Injection     Review of Systems   Constitutional:  Negative for chills and fever.   HENT:  Negative for congestion and sore throat.    Eyes:  Negative for visual disturbance.   Respiratory:  Negative for cough and shortness of breath.    Cardiovascular:  Positive for chest pain. Negative for palpitations.   Gastrointestinal:  Negative for abdominal pain, nausea and vomiting.   Endocrine: Negative for cold intolerance and heat intolerance.   Genitourinary:  Negative for dysuria and hematuria.   Musculoskeletal:  Positive for arthralgias and back pain. Negative for myalgias.   Skin:  Negative for pallor and rash.   Neurological:  Negative for tremors and seizures.   Hematological:  Negative for adenopathy. Does not bruise/bleed easily.   Psychiatric/Behavioral:  Negative for hallucinations.    Objective:     Vital Signs (Most Recent):  Temp: 98.5 °F (36.9 °C) (03/02/22 0042)  Pulse: 81 (03/02/22 0232)  Resp: 20 (03/02/22 0312)  BP: 107/67 (03/02/22 0232)  SpO2: 100 % (03/02/22 0232) Vital Signs (24h Range):  Temp:  [98.5 °F (36.9 °C)] 98.5 °F  (36.9 °C)  Pulse:  [81-88] 81  Resp:  [18-20] 20  SpO2:  [98 %-100 %] 100 %  BP: (103-136)/(65-77) 107/67     Weight: 56.7 kg (125 lb)  Body mass index is 25.25 kg/m².    Physical Exam  Vitals and nursing note reviewed.   Constitutional:       General: She is awake. She is in acute distress (appears to be very uncomfortable).      Appearance: Normal appearance. She is well-developed. She is not ill-appearing.   HENT:      Head: Normocephalic and atraumatic.      Mouth/Throat:      Lips: Pink.      Mouth: Mucous membranes are moist.   Eyes:      Conjunctiva/sclera: Conjunctivae normal.      Pupils: Pupils are equal, round, and reactive to light.   Neck:      Thyroid: No thyromegaly.      Vascular: No JVD.   Cardiovascular:      Rate and Rhythm: Normal rate and regular rhythm.      Heart sounds: Normal heart sounds, S1 normal and S2 normal. No murmur heard.    No friction rub. No gallop.   Pulmonary:      Effort: Pulmonary effort is normal.      Breath sounds: Normal breath sounds.   Chest:       Abdominal:      General: Bowel sounds are normal. There is no distension.      Palpations: Abdomen is soft. There is no mass.      Tenderness: There is no abdominal tenderness.   Musculoskeletal:         General: Normal range of motion.      Cervical back: Full passive range of motion without pain, normal range of motion and neck supple.      Right lower leg: No edema.      Left lower leg: No edema.   Skin:     General: Skin is warm and dry.      Capillary Refill: Capillary refill takes less than 2 seconds.   Neurological:      General: No focal deficit present.      Mental Status: She is alert and oriented to person, place, and time. Mental status is at baseline.      Cranial Nerves: No cranial nerve deficit.      Sensory: Sensation is intact.      Motor: Motor function is intact.   Psychiatric:         Behavior: Behavior normal. Behavior is cooperative.         CRANIAL NERVES     CN III, IV, VI   Pupils are equal, round,  and reactive to light.     Significant Labs: All pertinent labs within the past 24 hours have been reviewed.  CBC:   Recent Labs   Lab 03/02/22  0122   WBC 17.13*   HGB 9.1*   HCT 27.5*   *     CMP:   Recent Labs   Lab 03/02/22  0122      K 3.7      CO2 23      BUN 12   CREATININE 0.8   CALCIUM 8.9   PROT 7.1   ALBUMIN 3.8   BILITOT 1.5*   ALKPHOS 103   AST 20   ALT 24   ANIONGAP 10   EGFRNONAA >60     Magnesium:   Recent Labs   Lab 03/02/22  0122   MG 1.9     Troponin:   Recent Labs   Lab 03/02/22  0122   TROPONINI 0.010       Significant Imaging: I have reviewed all pertinent imaging results/findings within the past 24 hours.    CXR: questionable infiltrate to bases

## 2022-03-02 NOTE — PROGRESS NOTES
3/2/22-3:28pm- spoke with a  at [facility] to get the correct spelling of patients Hematologist name. Patient has a hematologist at [facility] Dr. Sherry Murillo. Next appointment in system scheduled for 3/15/22 at 4pm at [facility]. Patient had an appointment with Dr. Jorge Luis Palomino at [facility] to establish new patient PCP for 10/4/21, patient cancelled.

## 2022-03-02 NOTE — PLAN OF CARE
[facility]  Initial Discharge Assessment       Primary Care Provider: Primary Doctor No    Admission Diagnosis: Sickle cell pain crisis [D57.00]  Chest pain [R07.9]    Admission Date: 3/2/2022  Expected Discharge Date:       met with patient at bedside to complete assessment. Demographics and insurance verified. Patient reports PCP has Dr. Sherry Murillo at [facility] in the hematology department. No hospital admission last 30 days. Case management to assist with any additional needs upon discharge. No further needs to discuss at this time.    Discharge Barriers Identified: None    Payor: MEDICAID / Plan: [facility] [employer] ([place] MEDICAID) / Product Type: Managed Medicaid /     Extended Emergency Contact Information  Primary Emergency Contact: Deisy Williamson   [place] [facility]  Mobile Phone: [phone]  Relation: Grandparent  Secondary Emergency Contact: Jhon Jones  Mobile Phone: [phone]  Relation: Mother   needed? No    Discharge Plan A: Home  Discharge Plan B: Home      [facility] - [place], [place] - [address] [address]  [address] [address] [address]  Phone: [phone] Fax: [phone]    [id number] - [place], [place] - [address]  [address] [facility] [address]  Phone: [phone] Fax: [phone]      Initial Assessment (most recent)     Adult Discharge Assessment - 03/02/22 1525        Discharge Assessment    Assessment Type Discharge Planning Assessment     Confirmed/corrected address, phone number and insurance Yes     Confirmed Demographics Correct on Facesheet     Source of Information patient     Lives With grandparent(s)     Do you expect to return to your current living situation? Yes     Do you have help at home or someone to help you manage your care at home? No     Prior to hospitilization cognitive status: Alert/Oriented     Current  cognitive status: Alert/Oriented     Walking or Climbing Stairs Difficulty none     Dressing/Bathing Difficulty none     Home Accessibility wheelchair accessible     Home Layout Able to live on 1st floor     Equipment Currently Used at Home none     Readmission within 30 days? No     Patient currently being followed by outpatient case management? No     Do you currently have service(s) that help you manage your care at home? No     Do you take prescription medications? Yes     Do you have prescription coverage? Yes     Do you have any problems affording any of your prescribed medications? No     Is the patient taking medications as prescribed? yes     How do you get to doctors appointments? car, drives self     Are you on dialysis? No     Do you take coumadin? No     Discharge Plan A Home     Discharge Plan B Home     DME Needed Upon Discharge  none     Discharge Plan discussed with: Patient     Discharge Barriers Identified None

## 2022-03-03 LAB
ALBUMIN SERPL BCP-MCNC: 3.2 G/DL (ref 3.5–5.2)
ALP SERPL-CCNC: 81 U/L (ref 55–135)
ALT SERPL W/O P-5'-P-CCNC: 16 U/L (ref 10–44)
AMORPH CRY URNS QL MICRO: ABNORMAL
ANION GAP SERPL CALC-SCNC: 5 MMOL/L (ref 8–16)
AST SERPL-CCNC: 15 U/L (ref 10–40)
BACTERIA #/AREA URNS HPF: ABNORMAL /HPF
BASOPHILS # BLD AUTO: 0.11 K/UL (ref 0–0.2)
BASOPHILS NFR BLD: 0.8 % (ref 0–1.9)
BILIRUB SERPL-MCNC: 1.6 MG/DL (ref 0.1–1)
BILIRUB UR QL STRIP: NEGATIVE
BUN SERPL-MCNC: 8 MG/DL (ref 6–20)
CALCIUM SERPL-MCNC: 8.3 MG/DL (ref 8.7–10.5)
CHLORIDE SERPL-SCNC: 114 MMOL/L (ref 95–110)
CLARITY UR: CLEAR
CO2 SERPL-SCNC: 24 MMOL/L (ref 23–29)
COLOR UR: YELLOW
CREAT SERPL-MCNC: 0.7 MG/DL (ref 0.5–1.4)
DIFFERENTIAL METHOD: ABNORMAL
EOSINOPHIL # BLD AUTO: 0.3 K/UL (ref 0–0.5)
EOSINOPHIL NFR BLD: 2.1 % (ref 0–8)
ERYTHROCYTE [DISTWIDTH] IN BLOOD BY AUTOMATED COUNT: 17.2 % (ref 11.5–14.5)
EST. GFR  (AFRICAN AMERICAN): >60 ML/MIN/1.73 M^2
EST. GFR  (NON AFRICAN AMERICAN): >60 ML/MIN/1.73 M^2
GLUCOSE SERPL-MCNC: 91 MG/DL (ref 70–110)
GLUCOSE UR QL STRIP: NEGATIVE
HCT VFR BLD AUTO: 26.6 % (ref 37–48.5)
HGB BLD-MCNC: 8.5 G/DL (ref 12–16)
HGB UR QL STRIP: NEGATIVE
IMM GRANULOCYTES # BLD AUTO: 0.08 K/UL (ref 0–0.04)
IMM GRANULOCYTES NFR BLD AUTO: 0.6 % (ref 0–0.5)
KETONES UR QL STRIP: NEGATIVE
LEUKOCYTE ESTERASE UR QL STRIP: ABNORMAL
LYMPHOCYTES # BLD AUTO: 3.5 K/UL (ref 1–4.8)
LYMPHOCYTES NFR BLD: 25.6 % (ref 18–48)
MAGNESIUM SERPL-MCNC: 1.7 MG/DL (ref 1.6–2.6)
MCH RBC QN AUTO: 27.9 PG (ref 27–31)
MCHC RBC AUTO-ENTMCNC: 32 G/DL (ref 32–36)
MCV RBC AUTO: 87 FL (ref 82–98)
MICROSCOPIC COMMENT: ABNORMAL
MONOCYTES # BLD AUTO: 1.3 K/UL (ref 0.3–1)
MONOCYTES NFR BLD: 9.7 % (ref 4–15)
NEUTROPHILS # BLD AUTO: 8.4 K/UL (ref 1.8–7.7)
NEUTROPHILS NFR BLD: 61.2 % (ref 38–73)
NITRITE UR QL STRIP: NEGATIVE
NRBC BLD-RTO: 0 /100 WBC
PH UR STRIP: 6 [PH] (ref 5–8)
PLATELET # BLD AUTO: 694 K/UL (ref 150–450)
PMV BLD AUTO: 8.6 FL (ref 9.2–12.9)
POTASSIUM SERPL-SCNC: 4.1 MMOL/L (ref 3.5–5.1)
PROT SERPL-MCNC: 5.6 G/DL (ref 6–8.4)
PROT UR QL STRIP: NEGATIVE
RBC # BLD AUTO: 3.05 M/UL (ref 4–5.4)
SODIUM SERPL-SCNC: 143 MMOL/L (ref 136–145)
SP GR UR STRIP: 1.01 (ref 1–1.03)
SQUAMOUS #/AREA URNS HPF: 26 /HPF
URN SPEC COLLECT METH UR: ABNORMAL
UROBILINOGEN UR STRIP-ACNC: NEGATIVE EU/DL
WBC # BLD AUTO: 13.69 K/UL (ref 3.9–12.7)
WBC #/AREA URNS HPF: 3 /HPF (ref 0–5)

## 2022-03-03 PROCEDURE — 25000003 PHARM REV CODE 250: Performed by: STUDENT IN AN ORGANIZED HEALTH CARE EDUCATION/TRAINING PROGRAM

## 2022-03-03 PROCEDURE — 94761 N-INVAS EAR/PLS OXIMETRY MLT: CPT

## 2022-03-03 PROCEDURE — 12000002 HC ACUTE/MED SURGE SEMI-PRIVATE ROOM

## 2022-03-03 PROCEDURE — 99900035 HC TECH TIME PER 15 MIN (STAT)

## 2022-03-03 PROCEDURE — 63600175 PHARM REV CODE 636 W HCPCS: Performed by: HOSPITALIST

## 2022-03-03 PROCEDURE — 63600175 PHARM REV CODE 636 W HCPCS: Performed by: NURSE PRACTITIONER

## 2022-03-03 PROCEDURE — 25000003 PHARM REV CODE 250: Performed by: HOSPITALIST

## 2022-03-03 PROCEDURE — 36415 COLL VENOUS BLD VENIPUNCTURE: CPT | Performed by: STUDENT IN AN ORGANIZED HEALTH CARE EDUCATION/TRAINING PROGRAM

## 2022-03-03 PROCEDURE — 25000003 PHARM REV CODE 250: Performed by: NURSE PRACTITIONER

## 2022-03-03 PROCEDURE — 83735 ASSAY OF MAGNESIUM: CPT | Performed by: STUDENT IN AN ORGANIZED HEALTH CARE EDUCATION/TRAINING PROGRAM

## 2022-03-03 PROCEDURE — 85025 COMPLETE CBC W/AUTO DIFF WBC: CPT | Performed by: STUDENT IN AN ORGANIZED HEALTH CARE EDUCATION/TRAINING PROGRAM

## 2022-03-03 PROCEDURE — 81000 URINALYSIS NONAUTO W/SCOPE: CPT | Performed by: HOSPITALIST

## 2022-03-03 PROCEDURE — 80053 COMPREHEN METABOLIC PANEL: CPT | Performed by: STUDENT IN AN ORGANIZED HEALTH CARE EDUCATION/TRAINING PROGRAM

## 2022-03-03 RX ORDER — HYDROMORPHONE HYDROCHLORIDE 1 MG/ML
1.5 INJECTION, SOLUTION INTRAMUSCULAR; INTRAVENOUS; SUBCUTANEOUS
Status: DISCONTINUED | OUTPATIENT
Start: 2022-03-03 | End: 2022-03-06

## 2022-03-03 RX ORDER — HYDROXYUREA 500 MG/1
1000 CAPSULE ORAL 2 TIMES DAILY
Status: DISCONTINUED | OUTPATIENT
Start: 2022-03-03 | End: 2022-03-11 | Stop reason: HOSPADM

## 2022-03-03 RX ORDER — OXYCODONE HYDROCHLORIDE 10 MG/1
10 TABLET ORAL EVERY 4 HOURS PRN
Status: DISCONTINUED | OUTPATIENT
Start: 2022-03-03 | End: 2022-03-05

## 2022-03-03 RX ADMIN — HYDROMORPHONE HYDROCHLORIDE 1.5 MG: 1 INJECTION, SOLUTION INTRAMUSCULAR; INTRAVENOUS; SUBCUTANEOUS at 04:03

## 2022-03-03 RX ADMIN — FAMOTIDINE 20 MG: 20 TABLET, FILM COATED ORAL at 09:03

## 2022-03-03 RX ADMIN — HYDROXYUREA 1000 MG: 500 CAPSULE ORAL at 09:03

## 2022-03-03 RX ADMIN — ENOXAPARIN SODIUM 40 MG: 40 INJECTION SUBCUTANEOUS at 04:03

## 2022-03-03 RX ADMIN — MORPHINE SULFATE 30 MG: 30 TABLET, FILM COATED, EXTENDED RELEASE ORAL at 09:03

## 2022-03-03 RX ADMIN — DIPHENHYDRAMINE HYDROCHLORIDE 25 MG: 50 INJECTION INTRAMUSCULAR; INTRAVENOUS at 06:03

## 2022-03-03 RX ADMIN — MORPHINE SULFATE 30 MG: 30 TABLET, FILM COATED, EXTENDED RELEASE ORAL at 01:03

## 2022-03-03 RX ADMIN — FOLIC ACID 4 MG: 1 TABLET ORAL at 09:03

## 2022-03-03 RX ADMIN — HYDROMORPHONE HYDROCHLORIDE 1.5 MG: 1 INJECTION, SOLUTION INTRAMUSCULAR; INTRAVENOUS; SUBCUTANEOUS at 08:03

## 2022-03-03 RX ADMIN — DEFERASIROX 1000 MG: 500 TABLET, FOR SUSPENSION ORAL at 06:03

## 2022-03-03 RX ADMIN — OXYCODONE HYDROCHLORIDE AND ACETAMINOPHEN 1 TABLET: 5; 325 TABLET ORAL at 10:03

## 2022-03-03 RX ADMIN — HYDROMORPHONE HYDROCHLORIDE 1 MG: 2 INJECTION, SOLUTION INTRAMUSCULAR; INTRAVENOUS; SUBCUTANEOUS at 04:03

## 2022-03-03 RX ADMIN — ASPIRIN 81 MG CHEWABLE TABLET 81 MG: 81 TABLET CHEWABLE at 09:03

## 2022-03-03 RX ADMIN — MORPHINE SULFATE 30 MG: 30 TABLET, FILM COATED, EXTENDED RELEASE ORAL at 06:03

## 2022-03-03 RX ADMIN — SODIUM CHLORIDE: 0.9 INJECTION, SOLUTION INTRAVENOUS at 10:03

## 2022-03-03 RX ADMIN — DIPHENHYDRAMINE HYDROCHLORIDE 25 MG: 50 INJECTION INTRAMUSCULAR; INTRAVENOUS at 04:03

## 2022-03-03 RX ADMIN — DIPHENHYDRAMINE HYDROCHLORIDE 25 MG: 50 INJECTION INTRAMUSCULAR; INTRAVENOUS at 01:03

## 2022-03-03 NOTE — PLAN OF CARE
Problem: Adult Inpatient Plan of Care  Goal: Plan of Care Review  Outcome: Ongoing, Progressing  Goal: Patient-Specific Goal (Individualized)  Outcome: Ongoing, Progressing  Goal: Absence of Hospital-Acquired Illness or Injury  Outcome: Ongoing, Progressing  Goal: Optimal Comfort and Wellbeing  Outcome: Ongoing, Progressing  Goal: Readiness for Transition of Care  Outcome: Ongoing, Progressing     Problem: Infection  Goal: Absence of Infection Signs and Symptoms  Outcome: Ongoing, Progressing     Pt A/O, calm and cooperative during shift. IVFs infusing continuously as ordered. Medicated for pain x 1 as requested. Ambulated independently to toilet. No acute evnts during shift.

## 2022-03-03 NOTE — NURSING
Just spoke with Dr. Salamanca and reported pt bradycardic most of the night as low as the low 40's heart rate but pt asymptomatic. MD states no new orders at this time.

## 2022-03-03 NOTE — CONSULTS
Thank you for your consult to [facility]. We have reviewed the patient chart. This patient does meet criteria for [facility] service at this time. Will assume care on 03/03/22 at 7AM.

## 2022-03-03 NOTE — CARE UPDATE
03/02/22 2000   Patient Assessment/Suction   Level of Consciousness (AVPU) alert   Respiratory Effort Normal;Unlabored   Expansion/Accessory Muscles/Retractions no use of accessory muscles;no retractions   All Lung Fields Breath Sounds clear   Rhythm/Pattern, Respiratory pattern regular;depth regular   PRE-TX-O2   O2 Device (Oxygen Therapy) room air   SpO2 97 %   Pulse Oximetry Type Intermittent   Aerosol Therapy   $ Aerosol Therapy Charges PRN treatment not required

## 2022-03-04 LAB
ALBUMIN SERPL BCP-MCNC: 3.2 G/DL (ref 3.5–5.2)
ALP SERPL-CCNC: 80 U/L (ref 55–135)
ALT SERPL W/O P-5'-P-CCNC: 17 U/L (ref 10–44)
ANION GAP SERPL CALC-SCNC: 6 MMOL/L (ref 8–16)
AST SERPL-CCNC: 17 U/L (ref 10–40)
BASOPHILS # BLD AUTO: 0.12 K/UL (ref 0–0.2)
BASOPHILS NFR BLD: 0.9 % (ref 0–1.9)
BILIRUB SERPL-MCNC: 1.2 MG/DL (ref 0.1–1)
BUN SERPL-MCNC: 6 MG/DL (ref 6–20)
CALCIUM SERPL-MCNC: 8.3 MG/DL (ref 8.7–10.5)
CHLORIDE SERPL-SCNC: 109 MMOL/L (ref 95–110)
CO2 SERPL-SCNC: 25 MMOL/L (ref 23–29)
CREAT SERPL-MCNC: 0.7 MG/DL (ref 0.5–1.4)
DIFFERENTIAL METHOD: ABNORMAL
EOSINOPHIL # BLD AUTO: 0.6 K/UL (ref 0–0.5)
EOSINOPHIL NFR BLD: 4.2 % (ref 0–8)
ERYTHROCYTE [DISTWIDTH] IN BLOOD BY AUTOMATED COUNT: 17 % (ref 11.5–14.5)
EST. GFR  (AFRICAN AMERICAN): >60 ML/MIN/1.73 M^2
EST. GFR  (NON AFRICAN AMERICAN): >60 ML/MIN/1.73 M^2
GLUCOSE SERPL-MCNC: 99 MG/DL (ref 70–110)
HCT VFR BLD AUTO: 24.5 % (ref 37–48.5)
HGB BLD-MCNC: 8 G/DL (ref 12–16)
IMM GRANULOCYTES # BLD AUTO: 0.07 K/UL (ref 0–0.04)
IMM GRANULOCYTES NFR BLD AUTO: 0.5 % (ref 0–0.5)
LYMPHOCYTES # BLD AUTO: 3.3 K/UL (ref 1–4.8)
LYMPHOCYTES NFR BLD: 25.6 % (ref 18–48)
MAGNESIUM SERPL-MCNC: 1.5 MG/DL (ref 1.6–2.6)
MCH RBC QN AUTO: 28.6 PG (ref 27–31)
MCHC RBC AUTO-ENTMCNC: 32.7 G/DL (ref 32–36)
MCV RBC AUTO: 88 FL (ref 82–98)
MONOCYTES # BLD AUTO: 1.1 K/UL (ref 0.3–1)
MONOCYTES NFR BLD: 8.3 % (ref 4–15)
NEUTROPHILS # BLD AUTO: 7.9 K/UL (ref 1.8–7.7)
NEUTROPHILS NFR BLD: 60.5 % (ref 38–73)
NRBC BLD-RTO: 0 /100 WBC
PLATELET # BLD AUTO: 698 K/UL (ref 150–450)
PMV BLD AUTO: 8.9 FL (ref 9.2–12.9)
POTASSIUM SERPL-SCNC: 3.7 MMOL/L (ref 3.5–5.1)
PROT SERPL-MCNC: 5.8 G/DL (ref 6–8.4)
RBC # BLD AUTO: 2.8 M/UL (ref 4–5.4)
SODIUM SERPL-SCNC: 140 MMOL/L (ref 136–145)
WBC # BLD AUTO: 13.05 K/UL (ref 3.9–12.7)

## 2022-03-04 PROCEDURE — 25000003 PHARM REV CODE 250: Performed by: HOSPITALIST

## 2022-03-04 PROCEDURE — 25000003 PHARM REV CODE 250: Performed by: STUDENT IN AN ORGANIZED HEALTH CARE EDUCATION/TRAINING PROGRAM

## 2022-03-04 PROCEDURE — 99900035 HC TECH TIME PER 15 MIN (STAT)

## 2022-03-04 PROCEDURE — 80053 COMPREHEN METABOLIC PANEL: CPT | Performed by: STUDENT IN AN ORGANIZED HEALTH CARE EDUCATION/TRAINING PROGRAM

## 2022-03-04 PROCEDURE — 63600175 PHARM REV CODE 636 W HCPCS: Performed by: NURSE PRACTITIONER

## 2022-03-04 PROCEDURE — 85025 COMPLETE CBC W/AUTO DIFF WBC: CPT | Performed by: STUDENT IN AN ORGANIZED HEALTH CARE EDUCATION/TRAINING PROGRAM

## 2022-03-04 PROCEDURE — 36415 COLL VENOUS BLD VENIPUNCTURE: CPT | Performed by: STUDENT IN AN ORGANIZED HEALTH CARE EDUCATION/TRAINING PROGRAM

## 2022-03-04 PROCEDURE — 94761 N-INVAS EAR/PLS OXIMETRY MLT: CPT

## 2022-03-04 PROCEDURE — 12000002 HC ACUTE/MED SURGE SEMI-PRIVATE ROOM

## 2022-03-04 PROCEDURE — 63600175 PHARM REV CODE 636 W HCPCS: Performed by: HOSPITALIST

## 2022-03-04 PROCEDURE — 25000003 PHARM REV CODE 250: Performed by: NURSE PRACTITIONER

## 2022-03-04 PROCEDURE — 83735 ASSAY OF MAGNESIUM: CPT | Performed by: STUDENT IN AN ORGANIZED HEALTH CARE EDUCATION/TRAINING PROGRAM

## 2022-03-04 RX ORDER — DIPHENHYDRAMINE HYDROCHLORIDE 50 MG/ML
25 INJECTION INTRAMUSCULAR; INTRAVENOUS EVERY 4 HOURS PRN
Status: DISCONTINUED | OUTPATIENT
Start: 2022-03-04 | End: 2022-03-11 | Stop reason: HOSPADM

## 2022-03-04 RX ORDER — MAGNESIUM SULFATE HEPTAHYDRATE 40 MG/ML
2 INJECTION, SOLUTION INTRAVENOUS ONCE
Status: COMPLETED | OUTPATIENT
Start: 2022-03-04 | End: 2022-03-04

## 2022-03-04 RX ADMIN — HYDROMORPHONE HYDROCHLORIDE 1.5 MG: 1 INJECTION, SOLUTION INTRAMUSCULAR; INTRAVENOUS; SUBCUTANEOUS at 07:03

## 2022-03-04 RX ADMIN — HYDROMORPHONE HYDROCHLORIDE 1.5 MG: 1 INJECTION, SOLUTION INTRAMUSCULAR; INTRAVENOUS; SUBCUTANEOUS at 04:03

## 2022-03-04 RX ADMIN — MORPHINE SULFATE 30 MG: 30 TABLET, FILM COATED, EXTENDED RELEASE ORAL at 02:03

## 2022-03-04 RX ADMIN — MAGNESIUM SULFATE 2 G: 2 INJECTION INTRAVENOUS at 11:03

## 2022-03-04 RX ADMIN — HYDROMORPHONE HYDROCHLORIDE 1.5 MG: 1 INJECTION, SOLUTION INTRAMUSCULAR; INTRAVENOUS; SUBCUTANEOUS at 08:03

## 2022-03-04 RX ADMIN — Medication 800 MG: at 07:03

## 2022-03-04 RX ADMIN — HYDROMORPHONE HYDROCHLORIDE 1.5 MG: 1 INJECTION, SOLUTION INTRAMUSCULAR; INTRAVENOUS; SUBCUTANEOUS at 02:03

## 2022-03-04 RX ADMIN — MORPHINE SULFATE 30 MG: 30 TABLET, FILM COATED, EXTENDED RELEASE ORAL at 09:03

## 2022-03-04 RX ADMIN — OXYCODONE HYDROCHLORIDE 10 MG: 10 TABLET ORAL at 11:03

## 2022-03-04 RX ADMIN — OXYCODONE HYDROCHLORIDE 10 MG: 10 TABLET ORAL at 05:03

## 2022-03-04 RX ADMIN — HYDROXYUREA 1000 MG: 500 CAPSULE ORAL at 08:03

## 2022-03-04 RX ADMIN — FAMOTIDINE 20 MG: 20 TABLET, FILM COATED ORAL at 08:03

## 2022-03-04 RX ADMIN — MORPHINE SULFATE 30 MG: 30 TABLET, FILM COATED, EXTENDED RELEASE ORAL at 05:03

## 2022-03-04 RX ADMIN — DIPHENHYDRAMINE HYDROCHLORIDE 25 MG: 50 INJECTION INTRAMUSCULAR; INTRAVENOUS at 02:03

## 2022-03-04 RX ADMIN — DEFERASIROX 1000 MG: 500 TABLET, FOR SUSPENSION ORAL at 05:03

## 2022-03-04 RX ADMIN — SODIUM CHLORIDE: 0.9 INJECTION, SOLUTION INTRAVENOUS at 09:03

## 2022-03-04 RX ADMIN — DIPHENHYDRAMINE HYDROCHLORIDE 25 MG: 50 INJECTION INTRAMUSCULAR; INTRAVENOUS at 08:03

## 2022-03-04 RX ADMIN — ASPIRIN 81 MG CHEWABLE TABLET 81 MG: 81 TABLET CHEWABLE at 08:03

## 2022-03-04 RX ADMIN — DIPHENHYDRAMINE HYDROCHLORIDE 25 MG: 50 INJECTION INTRAMUSCULAR; INTRAVENOUS at 09:03

## 2022-03-04 RX ADMIN — DIPHENHYDRAMINE HYDROCHLORIDE 25 MG: 50 INJECTION INTRAMUSCULAR; INTRAVENOUS at 04:03

## 2022-03-04 RX ADMIN — HYDROXYUREA 1000 MG: 500 CAPSULE ORAL at 09:03

## 2022-03-04 RX ADMIN — FOLIC ACID 4 MG: 1 TABLET ORAL at 08:03

## 2022-03-04 RX ADMIN — SODIUM CHLORIDE: 0.9 INJECTION, SOLUTION INTRAVENOUS at 11:03

## 2022-03-04 RX ADMIN — OXYCODONE HYDROCHLORIDE 10 MG: 10 TABLET ORAL at 12:03

## 2022-03-04 NOTE — ASSESSMENT & PLAN NOTE
Admit to tele  Continue home medications  Dilaudid PRN  Antinausea PRN    3/3- increasing IVFs to 200 cc/hr; adding PO oxy IR as first choice and making dilaudid second choice

## 2022-03-04 NOTE — PLAN OF CARE
Problem: Adult Inpatient Plan of Care  Goal: Plan of Care Review  Outcome: Ongoing, Progressing  Goal: Patient-Specific Goal (Individualized)  Outcome: Ongoing, Progressing  Goal: Absence of Hospital-Acquired Illness or Injury  Outcome: Ongoing, Progressing  Goal: Optimal Comfort and Wellbeing  Outcome: Ongoing, Progressing     Problem: Infection  Goal: Absence of Infection Signs and Symptoms  Outcome: Ongoing, Progressing     Problem: Fall Injury Risk  Goal: Absence of Fall and Fall-Related Injury  Outcome: Ongoing, Progressing     Pt A/O, calm and cooperative. Independent with ADL's. Medicated x 4 for pain and x 1 for itching. IVFs infusing at 200 cc's and hour as ordered.

## 2022-03-04 NOTE — NURSING
Patient continues to refuse stool softeners. Patient educated on the purpose of the stool softeners and acknowledged understating. Patient last bowel movement 3/2/22; date of admission.

## 2022-03-04 NOTE — PROGRESS NOTES
[facility] Hospital Medicine  Telemedicine Progress Note    Patient Name: Nishi Dumont  MRN: [id number]  Patient Class: IP- Inpatient   Admission Date: 3/2/2022  Length of Stay: 1 days  Attending Physician: Toy Shannon MD  Primary Care Provider: Primary Doctor No          Subjective:     Principal Problem:Sickle cell pain crisis        HPI:  Nishi Dumont is a 31 y.o. 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 She states she feels like this is a typical sickle cell flare with associated left sided stabbing chest pain, mid back and left shoulder pain. She denies SOB, nausea, vomiting or diarrhea. She denies fever or cough. She reports compliance with her medications. She states that she gets flares when the weather changes, but has been keeping herself inside to avoid a possible flare. She was last admitted for sickle cell one month ago, and last had an acute chest syndrome about 7-8 months ago. She denies all other complaint.    ED work up was significant for anemia on CBC and a CMP that was essentially unremarkable. Troponin was negative. CXR showed what could be infiltrates to bilateral lung bases, pending official reading. She has not experienced tachycardia or hypoxia. Hospital Medicine consulted for admission and further management.      Overview/Hospital Course:  No notes on file    Interval History: patient doing well today; states pain is improving; increasing IVFs to 200 cc/hr; adding PO narcotics as first choice and monitor     Review of Systems   Constitutional:  Negative for activity change and fever.   Respiratory:  Negative for cough and shortness of breath.    Gastrointestinal:  Negative for abdominal pain and nausea.   Objective:     Vital Signs  (Most Recent):  Temp: 97.6 °F (36.4 °C) (03/03/22 1624)  Pulse: 78 (03/03/22 1624)  Resp: 16 (03/03/22 1651)  BP: (!) 92/55 (03/03/22 1624)  SpO2: 97 % (03/03/22 1624)   Vital Signs (24h Range):  Temp:  [97.3 °F (36.3 °C)-98 °F (36.7 °C)] 97.6 °F (36.4 °C)  Pulse:  [75-84] 78  Resp:  [16-18] 16  SpO2:  [97 %-99 %] 97 %  BP: (89-94)/(50-55) 92/55     Weight: 56.7 kg (125 lb)  Body mass index is 25.25 kg/m².    Intake/Output Summary (Last 24 hours) at 3/3/2022 1855  Last data filed at 3/3/2022 0740  Gross per 24 hour   Intake 3028.58 ml   Output --   Net 3028.58 ml      Physical Exam  Vitals and nursing note reviewed.   Constitutional:       General: She is not in acute distress.  Pulmonary:      Effort: Pulmonary effort is normal.      Breath sounds: No wheezing.   Abdominal:      General: Abdomen is flat. There is no distension.   Musculoskeletal:         General: No swelling.      Left lower leg: No edema.   Skin:     Coloration: Skin is not jaundiced.      Findings: No rash.   Neurological:      General: No focal deficit present.      Mental Status: She is alert and oriented to person, place, and time.   Psychiatric:         Mood and Affect: Mood normal.         Behavior: Behavior normal.       Significant Labs: All pertinent labs within the past 24 hours have been reviewed.    Significant Imaging: I have reviewed all pertinent imaging results/findings within the past 24 hours.      Assessment/Plan:      * Sickle cell pain crisis  Admit to tele  Continue home medications  Dilaudid PRN  Antinausea PRN    3/3- increasing IVFs to 200 cc/hr; adding PO oxy IR as first choice and making dilaudid second choice     Chest pain  Admit to tele  Concern for Acute chest, last episode was about 7-8 months ago  Afebrile, denies SOB  Trend troponin, #1 0.010  Infiltrate seen on initial chest xray, pending official read. Will treat with levaquin empirically  Oxygen PRN to keep O2 sat >94%      Sickle cell anemia with  pain  Patient's anemia is currently controlled. Has not received any PRBCs to date.. Etiology likely d/t sickle cell disease  Current CBC reviewed-   Lab Results   Component Value Date    HGB 9.1 (L) 03/02/2022    HCT 27.5 (L) 03/02/2022     Monitor serial CBC and transfuse if patient becomes hemodynamically unstable, symptomatic or H/H drops below 7/21.           VTE Risk Mitigation (From admission, onward)         Ordered     enoxaparin injection 40 mg  Daily         03/02/22 0415     IP VTE HIGH RISK PATIENT  Once         03/02/22 0415     Place sequential compression device  Until discontinued         03/02/22 0415                      I have assessed these finding virtually using telemed platform and with assistance of bedside nurse                 The attending portion of this evaluation, treatment, and documentation was performed per Toy Shannon MD via Telemedicine AudioVisual using the secure [employer] software platform with 2 way audio/video. The provider was located off-site and the patient is located in the hospital. The aforementioned video software was utilized to document the relevant history and physical exam    Toy Shannon MD  Department of Hospital Medicine   [facility]

## 2022-03-04 NOTE — SUBJECTIVE & OBJECTIVE
Interval History: patient doing well today; states pain is improving; increasing IVFs to 200 cc/hr; adding PO narcotics as first choice and monitor     Review of Systems   Constitutional:  Negative for activity change and fever.   Respiratory:  Negative for cough and shortness of breath.    Gastrointestinal:  Negative for abdominal pain and nausea.   Objective:     Vital Signs (Most Recent):  Temp: 97.6 °F (36.4 °C) (03/03/22 1624)  Pulse: 78 (03/03/22 1624)  Resp: 16 (03/03/22 1651)  BP: (!) 92/55 (03/03/22 1624)  SpO2: 97 % (03/03/22 1624)   Vital Signs (24h Range):  Temp:  [97.3 °F (36.3 °C)-98 °F (36.7 °C)] 97.6 °F (36.4 °C)  Pulse:  [75-84] 78  Resp:  [16-18] 16  SpO2:  [97 %-99 %] 97 %  BP: (89-94)/(50-55) 92/55     Weight: 56.7 kg (125 lb)  Body mass index is 25.25 kg/m².    Intake/Output Summary (Last 24 hours) at 3/3/2022 1855  Last data filed at 3/3/2022 0740  Gross per 24 hour   Intake 3028.58 ml   Output --   Net 3028.58 ml      Physical Exam  Vitals and nursing note reviewed.   Constitutional:       General: She is not in acute distress.  Pulmonary:      Effort: Pulmonary effort is normal.      Breath sounds: No wheezing.   Abdominal:      General: Abdomen is flat. There is no distension.   Musculoskeletal:         General: No swelling.      Left lower leg: No edema.   Skin:     Coloration: Skin is not jaundiced.      Findings: No rash.   Neurological:      General: No focal deficit present.      Mental Status: She is alert and oriented to person, place, and time.   Psychiatric:         Mood and Affect: Mood normal.         Behavior: Behavior normal.       Significant Labs: All pertinent labs within the past 24 hours have been reviewed.    Significant Imaging: I have reviewed all pertinent imaging results/findings within the past 24 hours.

## 2022-03-05 PROBLEM — M25.512 PAIN AND SWELLING OF LEFT SHOULDER: Status: ACTIVE | Noted: 2022-03-05

## 2022-03-05 PROBLEM — M25.412 PAIN AND SWELLING OF LEFT SHOULDER: Status: ACTIVE | Noted: 2022-03-05

## 2022-03-05 LAB
ALBUMIN SERPL BCP-MCNC: 3.3 G/DL (ref 3.5–5.2)
ALP SERPL-CCNC: 85 U/L (ref 55–135)
ALT SERPL W/O P-5'-P-CCNC: 20 U/L (ref 10–44)
ANION GAP SERPL CALC-SCNC: 8 MMOL/L (ref 8–16)
AST SERPL-CCNC: 19 U/L (ref 10–40)
BASOPHILS # BLD AUTO: 0.1 K/UL (ref 0–0.2)
BASOPHILS NFR BLD: 0.7 % (ref 0–1.9)
BILIRUB SERPL-MCNC: 1 MG/DL (ref 0.1–1)
BUN SERPL-MCNC: 6 MG/DL (ref 6–20)
CALCIUM SERPL-MCNC: 8.6 MG/DL (ref 8.7–10.5)
CHLORIDE SERPL-SCNC: 106 MMOL/L (ref 95–110)
CO2 SERPL-SCNC: 26 MMOL/L (ref 23–29)
CREAT SERPL-MCNC: 0.7 MG/DL (ref 0.5–1.4)
CRP SERPL-MCNC: 3.5 MG/L (ref 0–8.2)
DIFFERENTIAL METHOD: ABNORMAL
EOSINOPHIL # BLD AUTO: 0.6 K/UL (ref 0–0.5)
EOSINOPHIL NFR BLD: 4 % (ref 0–8)
ERYTHROCYTE [DISTWIDTH] IN BLOOD BY AUTOMATED COUNT: 16.6 % (ref 11.5–14.5)
ERYTHROCYTE [SEDIMENTATION RATE] IN BLOOD BY WESTERGREN METHOD: 10 MM/HR (ref 0–20)
EST. GFR  (AFRICAN AMERICAN): >60 ML/MIN/1.73 M^2
EST. GFR  (NON AFRICAN AMERICAN): >60 ML/MIN/1.73 M^2
GLUCOSE SERPL-MCNC: 87 MG/DL (ref 70–110)
HCT VFR BLD AUTO: 24.9 % (ref 37–48.5)
HGB BLD-MCNC: 8 G/DL (ref 12–16)
IMM GRANULOCYTES # BLD AUTO: 0.04 K/UL (ref 0–0.04)
IMM GRANULOCYTES NFR BLD AUTO: 0.3 % (ref 0–0.5)
LYMPHOCYTES # BLD AUTO: 2.5 K/UL (ref 1–4.8)
LYMPHOCYTES NFR BLD: 17.6 % (ref 18–48)
MAGNESIUM SERPL-MCNC: 1.7 MG/DL (ref 1.6–2.6)
MCH RBC QN AUTO: 28.3 PG (ref 27–31)
MCHC RBC AUTO-ENTMCNC: 32.1 G/DL (ref 32–36)
MCV RBC AUTO: 88 FL (ref 82–98)
MONOCYTES # BLD AUTO: 0.9 K/UL (ref 0.3–1)
MONOCYTES NFR BLD: 6.1 % (ref 4–15)
NEUTROPHILS # BLD AUTO: 10.1 K/UL (ref 1.8–7.7)
NEUTROPHILS NFR BLD: 71.3 % (ref 38–73)
NRBC BLD-RTO: 0 /100 WBC
PLATELET # BLD AUTO: 716 K/UL (ref 150–450)
PMV BLD AUTO: 9.1 FL (ref 9.2–12.9)
POTASSIUM SERPL-SCNC: 4 MMOL/L (ref 3.5–5.1)
PROT SERPL-MCNC: 6 G/DL (ref 6–8.4)
RBC # BLD AUTO: 2.83 M/UL (ref 4–5.4)
SODIUM SERPL-SCNC: 140 MMOL/L (ref 136–145)
WBC # BLD AUTO: 14.18 K/UL (ref 3.9–12.7)

## 2022-03-05 PROCEDURE — 36415 COLL VENOUS BLD VENIPUNCTURE: CPT | Performed by: HOSPITALIST

## 2022-03-05 PROCEDURE — 12000002 HC ACUTE/MED SURGE SEMI-PRIVATE ROOM

## 2022-03-05 PROCEDURE — 25000003 PHARM REV CODE 250: Performed by: HOSPITALIST

## 2022-03-05 PROCEDURE — 85025 COMPLETE CBC W/AUTO DIFF WBC: CPT | Performed by: STUDENT IN AN ORGANIZED HEALTH CARE EDUCATION/TRAINING PROGRAM

## 2022-03-05 PROCEDURE — 25000003 PHARM REV CODE 250: Performed by: NURSE PRACTITIONER

## 2022-03-05 PROCEDURE — 99900035 HC TECH TIME PER 15 MIN (STAT)

## 2022-03-05 PROCEDURE — 63600175 PHARM REV CODE 636 W HCPCS: Performed by: NURSE PRACTITIONER

## 2022-03-05 PROCEDURE — 80053 COMPREHEN METABOLIC PANEL: CPT | Performed by: STUDENT IN AN ORGANIZED HEALTH CARE EDUCATION/TRAINING PROGRAM

## 2022-03-05 PROCEDURE — 85651 RBC SED RATE NONAUTOMATED: CPT | Performed by: HOSPITALIST

## 2022-03-05 PROCEDURE — 83735 ASSAY OF MAGNESIUM: CPT | Performed by: STUDENT IN AN ORGANIZED HEALTH CARE EDUCATION/TRAINING PROGRAM

## 2022-03-05 PROCEDURE — 63600175 PHARM REV CODE 636 W HCPCS: Performed by: HOSPITALIST

## 2022-03-05 PROCEDURE — 94761 N-INVAS EAR/PLS OXIMETRY MLT: CPT

## 2022-03-05 PROCEDURE — 86140 C-REACTIVE PROTEIN: CPT | Performed by: HOSPITALIST

## 2022-03-05 PROCEDURE — 36415 COLL VENOUS BLD VENIPUNCTURE: CPT | Performed by: STUDENT IN AN ORGANIZED HEALTH CARE EDUCATION/TRAINING PROGRAM

## 2022-03-05 PROCEDURE — 25000003 PHARM REV CODE 250: Performed by: STUDENT IN AN ORGANIZED HEALTH CARE EDUCATION/TRAINING PROGRAM

## 2022-03-05 RX ORDER — HYDROMORPHONE HYDROCHLORIDE 1 MG/ML
1 INJECTION, SOLUTION INTRAMUSCULAR; INTRAVENOUS; SUBCUTANEOUS ONCE
Status: COMPLETED | OUTPATIENT
Start: 2022-03-05 | End: 2022-03-05

## 2022-03-05 RX ORDER — LIDOCAINE 50 MG/G
1 PATCH TOPICAL
Status: DISCONTINUED | OUTPATIENT
Start: 2022-03-05 | End: 2022-03-11 | Stop reason: HOSPADM

## 2022-03-05 RX ADMIN — DEFERASIROX 1000 MG: 500 TABLET, FOR SUSPENSION ORAL at 07:03

## 2022-03-05 RX ADMIN — HYDROMORPHONE HYDROCHLORIDE 1.5 MG: 1 INJECTION, SOLUTION INTRAMUSCULAR; INTRAVENOUS; SUBCUTANEOUS at 05:03

## 2022-03-05 RX ADMIN — SODIUM CHLORIDE: 0.9 INJECTION, SOLUTION INTRAVENOUS at 02:03

## 2022-03-05 RX ADMIN — ENOXAPARIN SODIUM 40 MG: 40 INJECTION SUBCUTANEOUS at 05:03

## 2022-03-05 RX ADMIN — ASPIRIN 81 MG CHEWABLE TABLET 81 MG: 81 TABLET CHEWABLE at 09:03

## 2022-03-05 RX ADMIN — DIPHENHYDRAMINE HYDROCHLORIDE 25 MG: 50 INJECTION INTRAMUSCULAR; INTRAVENOUS at 11:03

## 2022-03-05 RX ADMIN — DIPHENHYDRAMINE HYDROCHLORIDE 25 MG: 50 INJECTION INTRAMUSCULAR; INTRAVENOUS at 01:03

## 2022-03-05 RX ADMIN — FOLIC ACID 4 MG: 1 TABLET ORAL at 09:03

## 2022-03-05 RX ADMIN — HYDROMORPHONE HYDROCHLORIDE 1 MG: 1 INJECTION, SOLUTION INTRAMUSCULAR; INTRAVENOUS; SUBCUTANEOUS at 10:03

## 2022-03-05 RX ADMIN — HYDROMORPHONE HYDROCHLORIDE 1.5 MG: 1 INJECTION, SOLUTION INTRAMUSCULAR; INTRAVENOUS; SUBCUTANEOUS at 07:03

## 2022-03-05 RX ADMIN — DIPHENHYDRAMINE HYDROCHLORIDE 25 MG: 50 INJECTION INTRAMUSCULAR; INTRAVENOUS at 05:03

## 2022-03-05 RX ADMIN — DIPHENHYDRAMINE HYDROCHLORIDE 25 MG: 50 INJECTION INTRAMUSCULAR; INTRAVENOUS at 10:03

## 2022-03-05 RX ADMIN — DIPHENHYDRAMINE HYDROCHLORIDE 25 MG: 50 INJECTION INTRAMUSCULAR; INTRAVENOUS at 07:03

## 2022-03-05 RX ADMIN — OXYCODONE HYDROCHLORIDE 10 MG: 10 TABLET ORAL at 05:03

## 2022-03-05 RX ADMIN — HYDROXYUREA 1000 MG: 500 CAPSULE ORAL at 09:03

## 2022-03-05 RX ADMIN — HYDROMORPHONE HYDROCHLORIDE 1.5 MG: 1 INJECTION, SOLUTION INTRAMUSCULAR; INTRAVENOUS; SUBCUTANEOUS at 10:03

## 2022-03-05 RX ADMIN — MORPHINE SULFATE 30 MG: 30 TABLET, FILM COATED, EXTENDED RELEASE ORAL at 06:03

## 2022-03-05 RX ADMIN — GABAPENTIN 600 MG: 300 CAPSULE ORAL at 09:03

## 2022-03-05 RX ADMIN — HYDROMORPHONE HYDROCHLORIDE 1.5 MG: 1 INJECTION, SOLUTION INTRAMUSCULAR; INTRAVENOUS; SUBCUTANEOUS at 01:03

## 2022-03-05 RX ADMIN — OXYCODONE HYDROCHLORIDE 10 MG: 10 TABLET ORAL at 12:03

## 2022-03-05 RX ADMIN — FAMOTIDINE 20 MG: 20 TABLET, FILM COATED ORAL at 09:03

## 2022-03-05 RX ADMIN — MORPHINE SULFATE 30 MG: 30 TABLET, FILM COATED, EXTENDED RELEASE ORAL at 03:03

## 2022-03-05 RX ADMIN — LIDOCAINE 5% 1 PATCH: 700 PATCH TOPICAL at 10:03

## 2022-03-05 RX ADMIN — MORPHINE SULFATE 30 MG: 30 TABLET, FILM COATED, EXTENDED RELEASE ORAL at 09:03

## 2022-03-05 NOTE — CARE UPDATE
03/05/22 1010   PRE-TX-O2   O2 Device (Oxygen Therapy) room air   SpO2 99 %   Pulse Oximetry Type Intermittent   $ Pulse Oximetry - Multiple Charge Pulse Oximetry - Multiple

## 2022-03-05 NOTE — SUBJECTIVE & OBJECTIVE
Interval History: patient states pain is better controlled today; states likely one more day of IVFs and plan for d/c tomorrow     Review of Systems   Constitutional:  Negative for activity change and fever.   Respiratory:  Negative for cough and shortness of breath.    Gastrointestinal:  Negative for abdominal pain and nausea.   Objective:     Vital Signs (Most Recent):  Temp: 98.8 °F (37.1 °C) (03/04/22 1926)  Pulse: 94 (03/04/22 1926)  Resp: 18 (03/04/22 2124)  BP: (!) 100/56 (03/04/22 1926)  SpO2: 99 % (03/04/22 1926)   Vital Signs (24h Range):  Temp:  [97.4 °F (36.3 °C)-98.8 °F (37.1 °C)] 98.8 °F (37.1 °C)  Pulse:  [75-94] 94  Resp:  [16-20] 18  SpO2:  [98 %-100 %] 99 %  BP: ()/(55-61) 100/56     Weight: 56.7 kg (125 lb)  Body mass index is 25.25 kg/m².    Intake/Output Summary (Last 24 hours) at 3/4/2022 2140  Last data filed at 3/4/2022 0544  Gross per 24 hour   Intake 3460.63 ml   Output --   Net 3460.63 ml        Physical Exam  Vitals and nursing note reviewed.   Constitutional:       General: She is not in acute distress.  Pulmonary:      Effort: Pulmonary effort is normal.      Breath sounds: No wheezing.   Abdominal:      General: Abdomen is flat. There is no distension.   Musculoskeletal:         General: No swelling.      Left lower leg: No edema.   Skin:     Coloration: Skin is not jaundiced.      Findings: No rash.   Neurological:      General: No focal deficit present.      Mental Status: She is alert and oriented to person, place, and time.   Psychiatric:         Mood and Affect: Mood normal.         Behavior: Behavior normal.       Significant Labs: All pertinent labs within the past 24 hours have been reviewed.    Significant Imaging: I have reviewed all pertinent imaging results/findings within the past 24 hours.

## 2022-03-05 NOTE — PROGRESS NOTES
[facility] Medicine  Telemedicine Progress Note    Patient Name: Nishi Dumont  MRN: [id number]  Patient Class: IP- Inpatient   Admission Date: 3/2/2022  Length of Stay: 2 days  Attending Physician: Toy Shannon MD  Primary Care Provider: Primary Doctor No          Subjective:     Principal Problem:Sickle cell pain crisis        HPI:  Nishi Dumont is a 31 y.o. 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 She states she feels like this is a typical sickle cell flare with associated left sided stabbing chest pain, mid back and left shoulder pain. She denies SOB, nausea, vomiting or diarrhea. She denies fever or cough. She reports compliance with her medications. She states that she gets flares when the weather changes, but has been keeping herself inside to avoid a possible flare. She was last admitted for sickle cell one month ago, and last had an acute chest syndrome about 7-8 months ago. She denies all other complaint.    ED work up was significant for anemia on CBC and a CMP that was essentially unremarkable. Troponin was negative. CXR showed what could be infiltrates to bilateral lung bases, pending official reading. She has not experienced tachycardia or hypoxia. Hospital Medicine consulted for admission and further management.      Overview/Hospital Course:  No notes on file    Interval History: patient states pain is better controlled today; states likely one more day of IVFs and plan for d/c tomorrow     Review of Systems   Constitutional:  Negative for activity change and fever.   Respiratory:  Negative for cough and shortness of breath.    Gastrointestinal:  Negative for abdominal pain and nausea.   Objective:     Vital Signs (Most Recent):  Temp:  98.8 °F (37.1 °C) (03/04/22 1926)  Pulse: 94 (03/04/22 1926)  Resp: 18 (03/04/22 2124)  BP: (!) 100/56 (03/04/22 1926)  SpO2: 99 % (03/04/22 1926)   Vital Signs (24h Range):  Temp:  [97.4 °F (36.3 °C)-98.8 °F (37.1 °C)] 98.8 °F (37.1 °C)  Pulse:  [75-94] 94  Resp:  [16-20] 18  SpO2:  [98 %-100 %] 99 %  BP: ()/(55-61) 100/56     Weight: 56.7 kg (125 lb)  Body mass index is 25.25 kg/m².    Intake/Output Summary (Last 24 hours) at 3/4/2022 2140  Last data filed at 3/4/2022 0544  Gross per 24 hour   Intake 3460.63 ml   Output --   Net 3460.63 ml        Physical Exam  Vitals and nursing note reviewed.   Constitutional:       General: She is not in acute distress.  Pulmonary:      Effort: Pulmonary effort is normal.      Breath sounds: No wheezing.   Abdominal:      General: Abdomen is flat. There is no distension.   Musculoskeletal:         General: No swelling.      Left lower leg: No edema.   Skin:     Coloration: Skin is not jaundiced.      Findings: No rash.   Neurological:      General: No focal deficit present.      Mental Status: She is alert and oriented to person, place, and time.   Psychiatric:         Mood and Affect: Mood normal.         Behavior: Behavior normal.       Significant Labs: All pertinent labs within the past 24 hours have been reviewed.    Significant Imaging: I have reviewed all pertinent imaging results/findings within the past 24 hours.      Assessment/Plan:      * Sickle cell pain crisis  Admit to tele  Continue home medications  Dilaudid PRN  Antinausea PRN    3/3- increasing IVFs to 200 cc/hr; adding PO oxy IR as first choice and making dilaudid second choice     Chest pain  Admit to tele  Concern for Acute chest, last episode was about 7-8 months ago  Afebrile, denies SOB  Trend troponin, #1 0.010  Infiltrate seen on initial chest xray, pending official read. Will treat with levaquin empirically  Oxygen PRN to keep O2 sat >94%      Sickle cell anemia with pain  Patient's anemia is  currently controlled. Has not received any PRBCs to date.. Etiology likely d/t sickle cell disease  Current CBC reviewed-   Lab Results   Component Value Date    HGB 9.1 (L) 03/02/2022    HCT 27.5 (L) 03/02/2022     Monitor serial CBC and transfuse if patient becomes hemodynamically unstable, symptomatic or H/H drops below 7/21.           VTE Risk Mitigation (From admission, onward)         Ordered     enoxaparin injection 40 mg  Daily         03/02/22 0415     IP VTE HIGH RISK PATIENT  Once         03/02/22 0415     Place sequential compression device  Until discontinued         03/02/22 0415                      I have assessed these finding virtually using telemed platform and with assistance of bedside nurse                 The attending portion of this evaluation, treatment, and documentation was performed per Toy Shannon MD via Telemedicine AudioVisual using the secure [employer] software platform with 2 way audio/video. The provider was located off-site and the patient is located in the hospital. The aforementioned video software was utilized to document the relevant history and physical exam    Toy Shannon MD  Department of Hospital Medicine   [facility]

## 2022-03-05 NOTE — PROGRESS NOTES
[facility] Medicine  Telemedicine Progress Note    Patient Name: Nishi Dumont  MRN: [id number]  Patient Class: IP- Inpatient   Admission Date: 3/2/2022  Length of Stay: 3 days  Attending Physician: Toy Shannon MD  Primary Care Provider: Primary Doctor No          Subjective:     Principal Problem:Sickle cell pain crisis        HPI:  Nishi Dumont is a 31 y.o. 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 She states she feels like this is a typical sickle cell flare with associated left sided stabbing chest pain, mid back and left shoulder pain. She denies SOB, nausea, vomiting or diarrhea. She denies fever or cough. She reports compliance with her medications. She states that she gets flares when the weather changes, but has been keeping herself inside to avoid a possible flare. She was last admitted for sickle cell one month ago, and last had an acute chest syndrome about 7-8 months ago. She denies all other complaint.    ED work up was significant for anemia on CBC and a CMP that was essentially unremarkable. Troponin was negative. CXR showed what could be infiltrates to bilateral lung bases, pending official reading. She has not experienced tachycardia or hypoxia. Hospital Medicine consulted for admission and further management.      Overview/Hospital Course:  No notes on file    Interval History: patient has new left shoulder swelling pain and decrease ROM; getting ESR, CRP , left shoulder xrays and ortho consult for further evaluation     Review of Systems   Constitutional:  Negative for activity change and fever.   Respiratory:  Negative for cough and shortness of breath.    Gastrointestinal:  Negative for abdominal pain and nausea.   Musculoskeletal:   Positive for arthralgias and joint swelling.   Objective:     Vital Signs (Most Recent):  Temp: 97.8 °F (36.6 °C) (03/05/22 1208)  Pulse: 95 (03/05/22 1208)  Resp: 18 (03/05/22 1502)  BP: 102/61 (03/05/22 1208)  SpO2: 97 % (03/05/22 1208)   Vital Signs (24h Range):  Temp:  [97.3 °F (36.3 °C)-98.8 °F (37.1 °C)] 97.8 °F (36.6 °C)  Pulse:  [86-96] 95  Resp:  [16-20] 18  SpO2:  [97 %-99 %] 97 %  BP: (100-105)/(53-61) 102/61     Weight: 56.7 kg (125 lb)  Body mass index is 25.25 kg/m².  No intake or output data in the 24 hours ending 03/05/22 1525     Physical Exam  Vitals and nursing note reviewed.   Constitutional:       General: She is not in acute distress.  Pulmonary:      Effort: Pulmonary effort is normal.      Breath sounds: No wheezing.   Abdominal:      General: Abdomen is flat. There is no distension.   Musculoskeletal:         General: Swelling present.      Left lower leg: No edema.      Comments: Decrease left shoulder ROM and swelling    Skin:     Coloration: Skin is not jaundiced.      Findings: No rash.   Neurological:      General: No focal deficit present.      Mental Status: She is alert and oriented to person, place, and time.   Psychiatric:         Mood and Affect: Mood normal.         Behavior: Behavior normal.       Significant Labs: All pertinent labs within the past 24 hours have been reviewed.    Significant Imaging: I have reviewed all pertinent imaging results/findings within the past 24 hours.      Assessment/Plan:      * Sickle cell pain crisis  Admit to tele  Continue home medications  Dilaudid PRN  Antinausea PRN    3/3- increasing IVFs to 200 cc/hr; adding PO oxy IR as first choice and making dilaudid second choice     Pain and swelling of left shoulder  - getting ESR, CRP, left shoulder xrays and ortho consult      Chest pain  Admit to tele  Concern for Acute chest, last episode was about 7-8 months ago  Afebrile, denies SOB  Trend troponin, #1 0.010  Infiltrate seen on initial chest  xray, pending official read. Will treat with levaquin empirically  Oxygen PRN to keep O2 sat >94%      Sickle cell anemia with pain  Patient's anemia is currently controlled. Has not received any PRBCs to date.. Etiology likely d/t sickle cell disease  Current CBC reviewed-   Lab Results   Component Value Date    HGB 9.1 (L) 03/02/2022    HCT 27.5 (L) 03/02/2022     Monitor serial CBC and transfuse if patient becomes hemodynamically unstable, symptomatic or H/H drops below 7/21.           VTE Risk Mitigation (From admission, onward)         Ordered     enoxaparin injection 40 mg  Daily         03/02/22 0415     IP VTE HIGH RISK PATIENT  Once         03/02/22 0415     Place sequential compression device  Until discontinued         03/02/22 0415                      I have assessed these finding virtually using telemed platform and with assistance of bedside nurse                 The attending portion of this evaluation, treatment, and documentation was performed per Toy Shannon MD via Telemedicine AudioVisual using the secure [employer] software platform with 2 way audio/video. The provider was located off-site and the patient is located in the hospital. The aforementioned video software was utilized to document the relevant history and physical exam    Toy Shannon MD  Department of Hospital Medicine   [facility]

## 2022-03-05 NOTE — CARE UPDATE
03/04/22 2023   Patient Assessment/Suction   Level of Consciousness (AVPU) alert   Respiratory Effort Unlabored   Expansion/Accessory Muscles/Retractions expansion symmetric   All Lung Fields Breath Sounds Anterior:;equal bilaterally;clear   LLL Breath Sounds diminished   RLL Breath Sounds diminished   Rhythm/Pattern, Respiratory unlabored;pattern regular   Cough Frequency no cough   PRE-TX-O2   O2 Device (Oxygen Therapy) room air   SpO2 98 %   Pulse Oximetry Type Intermittent   $ Pulse Oximetry - Multiple Charge Pulse Oximetry - Multiple   Pulse 93   Resp 18   Positioning HOB elevated 30 degrees   Aerosol Therapy   $ Aerosol Therapy Charges PRN treatment not required   Respiratory Treatment Status (SVN) PRN treatment not required

## 2022-03-05 NOTE — PLAN OF CARE
Plan of care reviewed with patient. IV fluids infusing as per orders. Mild pain relief with prn & scheduled pain medication. Ambulated to restroom , tolerated well. Remains free from falls/injury. Instructed to call for assistance as needed. Call light in reach.

## 2022-03-05 NOTE — SUBJECTIVE & OBJECTIVE
Interval History: patient has new left shoulder swelling pain and decrease ROM; getting ESR, CRP , left shoulder xrays and ortho consult for further evaluation     Review of Systems   Constitutional:  Negative for activity change and fever.   Respiratory:  Negative for cough and shortness of breath.    Gastrointestinal:  Negative for abdominal pain and nausea.   Musculoskeletal:  Positive for arthralgias and joint swelling.   Objective:     Vital Signs (Most Recent):  Temp: 97.8 °F (36.6 °C) (03/05/22 1208)  Pulse: 95 (03/05/22 1208)  Resp: 18 (03/05/22 1502)  BP: 102/61 (03/05/22 1208)  SpO2: 97 % (03/05/22 1208)   Vital Signs (24h Range):  Temp:  [97.3 °F (36.3 °C)-98.8 °F (37.1 °C)] 97.8 °F (36.6 °C)  Pulse:  [86-96] 95  Resp:  [16-20] 18  SpO2:  [97 %-99 %] 97 %  BP: (100-105)/(53-61) 102/61     Weight: 56.7 kg (125 lb)  Body mass index is 25.25 kg/m².  No intake or output data in the 24 hours ending 03/05/22 1525     Physical Exam  Vitals and nursing note reviewed.   Constitutional:       General: She is not in acute distress.  Pulmonary:      Effort: Pulmonary effort is normal.      Breath sounds: No wheezing.   Abdominal:      General: Abdomen is flat. There is no distension.   Musculoskeletal:         General: Swelling present.      Left lower leg: No edema.      Comments: Decrease left shoulder ROM and swelling    Skin:     Coloration: Skin is not jaundiced.      Findings: No rash.   Neurological:      General: No focal deficit present.      Mental Status: She is alert and oriented to person, place, and time.   Psychiatric:         Mood and Affect: Mood normal.         Behavior: Behavior normal.       Significant Labs: All pertinent labs within the past 24 hours have been reviewed.    Significant Imaging: I have reviewed all pertinent imaging results/findings within the past 24 hours.

## 2022-03-06 LAB
ALBUMIN SERPL BCP-MCNC: 3.3 G/DL (ref 3.5–5.2)
ALP SERPL-CCNC: 86 U/L (ref 55–135)
ALT SERPL W/O P-5'-P-CCNC: 21 U/L (ref 10–44)
ANION GAP SERPL CALC-SCNC: 8 MMOL/L (ref 8–16)
AST SERPL-CCNC: 19 U/L (ref 10–40)
BASOPHILS # BLD AUTO: 0.14 K/UL (ref 0–0.2)
BASOPHILS NFR BLD: 1.2 % (ref 0–1.9)
BILIRUB SERPL-MCNC: 1 MG/DL (ref 0.1–1)
BUN SERPL-MCNC: 6 MG/DL (ref 6–20)
CALCIUM SERPL-MCNC: 8.5 MG/DL (ref 8.7–10.5)
CHLORIDE SERPL-SCNC: 107 MMOL/L (ref 95–110)
CO2 SERPL-SCNC: 25 MMOL/L (ref 23–29)
CREAT SERPL-MCNC: 0.7 MG/DL (ref 0.5–1.4)
DIFFERENTIAL METHOD: ABNORMAL
EOSINOPHIL # BLD AUTO: 0.6 K/UL (ref 0–0.5)
EOSINOPHIL NFR BLD: 4.9 % (ref 0–8)
ERYTHROCYTE [DISTWIDTH] IN BLOOD BY AUTOMATED COUNT: 16.6 % (ref 11.5–14.5)
EST. GFR  (AFRICAN AMERICAN): >60 ML/MIN/1.73 M^2
EST. GFR  (NON AFRICAN AMERICAN): >60 ML/MIN/1.73 M^2
GLUCOSE SERPL-MCNC: 83 MG/DL (ref 70–110)
HCT VFR BLD AUTO: 24.8 % (ref 37–48.5)
HGB BLD-MCNC: 7.8 G/DL (ref 12–16)
IMM GRANULOCYTES # BLD AUTO: 0.04 K/UL (ref 0–0.04)
IMM GRANULOCYTES NFR BLD AUTO: 0.3 % (ref 0–0.5)
INR PPP: 1 (ref 0.8–1.2)
LYMPHOCYTES # BLD AUTO: 2.6 K/UL (ref 1–4.8)
LYMPHOCYTES NFR BLD: 21.2 % (ref 18–48)
MAGNESIUM SERPL-MCNC: 1.7 MG/DL (ref 1.6–2.6)
MCH RBC QN AUTO: 27.6 PG (ref 27–31)
MCHC RBC AUTO-ENTMCNC: 31.5 G/DL (ref 32–36)
MCV RBC AUTO: 88 FL (ref 82–98)
MONOCYTES # BLD AUTO: 0.6 K/UL (ref 0.3–1)
MONOCYTES NFR BLD: 4.7 % (ref 4–15)
NEUTROPHILS # BLD AUTO: 8.2 K/UL (ref 1.8–7.7)
NEUTROPHILS NFR BLD: 67.7 % (ref 38–73)
NRBC BLD-RTO: 1 /100 WBC
PLATELET # BLD AUTO: 693 K/UL (ref 150–450)
PMV BLD AUTO: 9.2 FL (ref 9.2–12.9)
POTASSIUM SERPL-SCNC: 4.4 MMOL/L (ref 3.5–5.1)
PROT SERPL-MCNC: 6.1 G/DL (ref 6–8.4)
PROTHROMBIN TIME: 10.6 SEC (ref 9–12.5)
RBC # BLD AUTO: 2.83 M/UL (ref 4–5.4)
SODIUM SERPL-SCNC: 140 MMOL/L (ref 136–145)
WBC # BLD AUTO: 12.04 K/UL (ref 3.9–12.7)

## 2022-03-06 PROCEDURE — 99223 PR INITIAL HOSPITAL CARE,LEVL III: ICD-10-PCS | Mod: ,,, | Performed by: ORTHOPAEDIC SURGERY

## 2022-03-06 PROCEDURE — 94761 N-INVAS EAR/PLS OXIMETRY MLT: CPT

## 2022-03-06 PROCEDURE — 99223 1ST HOSP IP/OBS HIGH 75: CPT | Mod: ,,, | Performed by: ORTHOPAEDIC SURGERY

## 2022-03-06 PROCEDURE — 83735 ASSAY OF MAGNESIUM: CPT | Performed by: STUDENT IN AN ORGANIZED HEALTH CARE EDUCATION/TRAINING PROGRAM

## 2022-03-06 PROCEDURE — 36415 COLL VENOUS BLD VENIPUNCTURE: CPT | Performed by: STUDENT IN AN ORGANIZED HEALTH CARE EDUCATION/TRAINING PROGRAM

## 2022-03-06 PROCEDURE — 25000003 PHARM REV CODE 250: Performed by: STUDENT IN AN ORGANIZED HEALTH CARE EDUCATION/TRAINING PROGRAM

## 2022-03-06 PROCEDURE — 25000003 PHARM REV CODE 250: Performed by: NURSE PRACTITIONER

## 2022-03-06 PROCEDURE — 63600175 PHARM REV CODE 636 W HCPCS: Performed by: HOSPITALIST

## 2022-03-06 PROCEDURE — 12000002 HC ACUTE/MED SURGE SEMI-PRIVATE ROOM

## 2022-03-06 PROCEDURE — 25000003 PHARM REV CODE 250: Performed by: HOSPITALIST

## 2022-03-06 PROCEDURE — 63600175 PHARM REV CODE 636 W HCPCS: Performed by: NURSE PRACTITIONER

## 2022-03-06 PROCEDURE — 80053 COMPREHEN METABOLIC PANEL: CPT | Performed by: STUDENT IN AN ORGANIZED HEALTH CARE EDUCATION/TRAINING PROGRAM

## 2022-03-06 PROCEDURE — 85610 PROTHROMBIN TIME: CPT | Performed by: HOSPITALIST

## 2022-03-06 PROCEDURE — 99900035 HC TECH TIME PER 15 MIN (STAT)

## 2022-03-06 PROCEDURE — 85025 COMPLETE CBC W/AUTO DIFF WBC: CPT | Performed by: STUDENT IN AN ORGANIZED HEALTH CARE EDUCATION/TRAINING PROGRAM

## 2022-03-06 RX ORDER — HYDROMORPHONE HYDROCHLORIDE 2 MG/ML
2 INJECTION, SOLUTION INTRAMUSCULAR; INTRAVENOUS; SUBCUTANEOUS
Status: DISCONTINUED | OUTPATIENT
Start: 2022-03-06 | End: 2022-03-11 | Stop reason: HOSPADM

## 2022-03-06 RX ORDER — GABAPENTIN 300 MG/1
300 CAPSULE ORAL 3 TIMES DAILY
Status: DISCONTINUED | OUTPATIENT
Start: 2022-03-06 | End: 2022-03-06

## 2022-03-06 RX ORDER — CELECOXIB 100 MG/1
200 CAPSULE ORAL 2 TIMES DAILY
Status: DISCONTINUED | OUTPATIENT
Start: 2022-03-06 | End: 2022-03-11 | Stop reason: HOSPADM

## 2022-03-06 RX ORDER — OXYCODONE HYDROCHLORIDE 10 MG/1
20 TABLET ORAL EVERY 4 HOURS PRN
Status: DISCONTINUED | OUTPATIENT
Start: 2022-03-06 | End: 2022-03-11 | Stop reason: HOSPADM

## 2022-03-06 RX ADMIN — FOLIC ACID 4 MG: 1 TABLET ORAL at 08:03

## 2022-03-06 RX ADMIN — HYDROXYUREA 1000 MG: 500 CAPSULE ORAL at 09:03

## 2022-03-06 RX ADMIN — MORPHINE SULFATE 30 MG: 30 TABLET, FILM COATED, EXTENDED RELEASE ORAL at 09:03

## 2022-03-06 RX ADMIN — DIPHENHYDRAMINE HYDROCHLORIDE 25 MG: 50 INJECTION INTRAMUSCULAR; INTRAVENOUS at 09:03

## 2022-03-06 RX ADMIN — CEFTRIAXONE 2 G: 2 INJECTION, SOLUTION INTRAVENOUS at 03:03

## 2022-03-06 RX ADMIN — LIDOCAINE 5% 1 PATCH: 700 PATCH TOPICAL at 11:03

## 2022-03-06 RX ADMIN — HYDROMORPHONE HYDROCHLORIDE 1.5 MG: 1 INJECTION, SOLUTION INTRAMUSCULAR; INTRAVENOUS; SUBCUTANEOUS at 06:03

## 2022-03-06 RX ADMIN — DEFERASIROX 1000 MG: 500 TABLET, FOR SUSPENSION ORAL at 05:03

## 2022-03-06 RX ADMIN — HYDROMORPHONE HYDROCHLORIDE 2 MG: 2 INJECTION, SOLUTION INTRAMUSCULAR; INTRAVENOUS; SUBCUTANEOUS at 03:03

## 2022-03-06 RX ADMIN — OXYCODONE HYDROCHLORIDE 15 MG: 10 TABLET ORAL at 09:03

## 2022-03-06 RX ADMIN — ENOXAPARIN SODIUM 40 MG: 40 INJECTION SUBCUTANEOUS at 04:03

## 2022-03-06 RX ADMIN — DIPHENHYDRAMINE HYDROCHLORIDE 25 MG: 50 INJECTION INTRAMUSCULAR; INTRAVENOUS at 03:03

## 2022-03-06 RX ADMIN — CELECOXIB 200 MG: 100 CAPSULE ORAL at 09:03

## 2022-03-06 RX ADMIN — MORPHINE SULFATE 30 MG: 30 TABLET, FILM COATED, EXTENDED RELEASE ORAL at 01:03

## 2022-03-06 RX ADMIN — HYDROMORPHONE HYDROCHLORIDE 1.5 MG: 1 INJECTION, SOLUTION INTRAMUSCULAR; INTRAVENOUS; SUBCUTANEOUS at 11:03

## 2022-03-06 RX ADMIN — VANCOMYCIN HYDROCHLORIDE 750 MG: 750 INJECTION, POWDER, LYOPHILIZED, FOR SOLUTION INTRAVENOUS at 04:03

## 2022-03-06 RX ADMIN — ASPIRIN 81 MG CHEWABLE TABLET 81 MG: 81 TABLET CHEWABLE at 08:03

## 2022-03-06 RX ADMIN — MORPHINE SULFATE 30 MG: 30 TABLET, FILM COATED, EXTENDED RELEASE ORAL at 05:03

## 2022-03-06 RX ADMIN — DIPHENHYDRAMINE HYDROCHLORIDE 25 MG: 50 INJECTION INTRAMUSCULAR; INTRAVENOUS at 01:03

## 2022-03-06 RX ADMIN — CELECOXIB 200 MG: 100 CAPSULE ORAL at 01:03

## 2022-03-06 RX ADMIN — DIPHENHYDRAMINE HYDROCHLORIDE 25 MG: 50 INJECTION INTRAMUSCULAR; INTRAVENOUS at 06:03

## 2022-03-06 RX ADMIN — HYDROXYUREA 1000 MG: 500 CAPSULE ORAL at 08:03

## 2022-03-06 RX ADMIN — OXYCODONE HYDROCHLORIDE 15 MG: 10 TABLET ORAL at 03:03

## 2022-03-06 RX ADMIN — OXYCODONE HYDROCHLORIDE 20 MG: 10 TABLET ORAL at 06:03

## 2022-03-06 RX ADMIN — DIPHENHYDRAMINE HYDROCHLORIDE 25 MG: 50 INJECTION INTRAMUSCULAR; INTRAVENOUS at 10:03

## 2022-03-06 RX ADMIN — HYDROMORPHONE HYDROCHLORIDE 2 MG: 2 INJECTION, SOLUTION INTRAMUSCULAR; INTRAVENOUS; SUBCUTANEOUS at 10:03

## 2022-03-06 NOTE — PLAN OF CARE
Problem: Adult Inpatient Plan of Care  Goal: Plan of Care Review  3/6/2022 1557 by Lorri Good RN  Outcome: Ongoing, Progressing  3/6/2022 1543 by Lorri Good RN  Outcome: Ongoing, Progressing  Goal: Patient-Specific Goal (Individualized)  3/6/2022 1557 by Lorri Good RN  Outcome: Ongoing, Progressing  3/6/2022 1543 by Lorri Good RN  Outcome: Ongoing, Progressing  Goal: Absence of Hospital-Acquired Illness or Injury  3/6/2022 1557 by Lorri Good RN  Outcome: Ongoing, Progressing  3/6/2022 1543 by Lorri Good RN  Outcome: Ongoing, Progressing  Goal: Optimal Comfort and Wellbeing  3/6/2022 1557 by Lorri Good RN  Outcome: Ongoing, Progressing  3/6/2022 1543 by Lorri Good RN  Outcome: Ongoing, Progressing  Goal: Readiness for Transition of Care  3/6/2022 1557 by Lorri Good RN  Outcome: Ongoing, Progressing  3/6/2022 1543 by Lorri Good RN  Outcome: Ongoing, Progressing     Problem: Infection  Goal: Absence of Infection Signs and Symptoms  3/6/2022 1557 by Lorri Good RN  Outcome: Ongoing, Progressing  3/6/2022 1543 by Lorri Good RN  Outcome: Ongoing, Progressing     Problem: Fall Injury Risk  Goal: Absence of Fall and Fall-Related Injury  3/6/2022 1557 by Lorri Good RN  Outcome: Ongoing, Progressing  3/6/2022 1543 by Lorri Good RN  Outcome: Ongoing, Progressing

## 2022-03-06 NOTE — ASSESSMENT & PLAN NOTE
- getting ESR, CRP, left shoulder xrays and ortho consult      3/6- xrays reviewed; getting LUE U/S to rule out DVT; ortho consulted;

## 2022-03-06 NOTE — SUBJECTIVE & OBJECTIVE
Interval History: still having significant left arm swelling with bruising and decrease ROM; Xrays reviewed; getting LUE U/S to rule out DVT    - ortho consult pending   - increasing narcotics; adding NSAIDS BID; states getting a headache with gabapentin;     Review of Systems   Constitutional:  Negative for activity change and fever.   Respiratory:  Negative for cough and shortness of breath.    Gastrointestinal:  Negative for abdominal pain and nausea.   Musculoskeletal:  Positive for arthralgias and joint swelling.   Objective:     Vital Signs (Most Recent):  Temp: 97 °F (36.1 °C) (03/06/22 1119)  Pulse: 84 (03/06/22 1119)  Resp: 18 (03/06/22 1309)  BP: (!) 105/54 (03/06/22 1119)  SpO2: 98 % (03/06/22 1119)   Vital Signs (24h Range):  Temp:  [96.5 °F (35.8 °C)-98.8 °F (37.1 °C)] 97 °F (36.1 °C)  Pulse:  [82-85] 84  Resp:  [1-18] 18  SpO2:  [98 %-100 %] 98 %  BP: ()/(51-63) 105/54     Weight: 56.7 kg (125 lb)  Body mass index is 25.25 kg/m².    Intake/Output Summary (Last 24 hours) at 3/6/2022 1345  Last data filed at 3/6/2022 0534  Gross per 24 hour   Intake 8267.05 ml   Output --   Net 8267.05 ml        Physical Exam  Vitals and nursing note reviewed.   Constitutional:       General: She is not in acute distress.  Pulmonary:      Effort: Pulmonary effort is normal.      Breath sounds: No wheezing.   Abdominal:      General: Abdomen is flat. There is no distension.   Musculoskeletal:         General: Swelling present.      Left lower leg: No edema.      Comments: Decrease left shoulder ROM and swelling    Skin:     Coloration: Skin is not jaundiced.      Findings: No rash.   Neurological:      General: No focal deficit present.      Mental Status: She is alert and oriented to person, place, and time.   Psychiatric:         Mood and Affect: Mood normal.         Behavior: Behavior normal.       Significant Labs: All pertinent labs within the past 24 hours have been reviewed.    Significant Imaging: I have  reviewed all pertinent imaging results/findings within the past 24 hours.

## 2022-03-06 NOTE — CONSULTS
"Pharmacokinetic Initial Assessment: IV Vancomycin    Assessment/Plan:    Initiate vancomycin 750 mg IV every 12 hours.  Desired empiric serum trough concentration is 10 to 15 mcg/mL.  Draw vancomycin trough level 60 min prior to fourth dose on 3/8 at approximately 0200.  Pharmacy will continue to follow and monitor vancomycin.      Please contact pharmacy at extension [phone] with any questions regarding this assessment.     Thank you for the consult,   Mehreen Pham       Patient brief summary:  Nishi Dumont is a 31 y.o. female initiated on antimicrobial therapy with IV Vancomycin for treatment of suspected skin & soft tissue infection.    Drug Allergies:   Review of patient's allergies indicates:   Allergen Reactions    Contrast media Hives    Iodine and iodide containing products Hives and Swelling    Mushroom Hives    Zithromax [azithromycin] Anaphylaxis    Demerol [meperidine] Other (See Comments)     Regarding reaction to demerol pt states "I talk out of my head and become aggressive"       Actual Body Weight:   56.7 kg    Renal Function:   Estimated Creatinine Clearance: 89.7 mL/min (based on SCr of 0.7 mg/dL).,     Dialysis Method (if applicable):  N/A    CBC (last 72 hours):  Recent Labs   Lab Result Units 03/04/22 0432 03/05/22 0530 03/06/22  0509   WBC K/uL 13.05* 14.18* 12.04   Hemoglobin g/dL 8.0* 8.0* 7.8*   Hematocrit % 24.5* 24.9* 24.8*   Platelets K/uL 698* 716* 693*   Gran % % 60.5 71.3 67.7   Lymph % % 25.6 17.6* 21.2   Mono % % 8.3 6.1 4.7   Eosinophil % % 4.2 4.0 4.9   Basophil % % 0.9 0.7 1.2   Differential Method  Automated Automated Automated       Metabolic Panel (last 72 hours):  Recent Labs   Lab Result Units 03/03/22  1909 03/04/22  0432 03/05/22  0530 03/06/22  0509   Sodium mmol/L  --  140 140 140   Potassium mmol/L  --  3.7 4.0 4.4   Chloride mmol/L  --  109 106 107   CO2 mmol/L  --  25 26 25   Glucose mg/dL  --  99 87 83   Glucose, UA  Negative  --   --   --    BUN mg/dL  " --  6 6 6   Creatinine mg/dL  --  0.7 0.7 0.7   Albumin g/dL  --  3.2* 3.3* 3.3*   Total Bilirubin mg/dL  --  1.2* 1.0 1.0   Alkaline Phosphatase U/L  --  80 85 86   AST U/L  --  17 19 19   ALT U/L  --  17 20 21   Magnesium mg/dL  --  1.5* 1.7 1.7       Drug levels (last 3 results):  No results for input(s): VANCOMYCINRA, VANCOMYCINPE, VANCOMYCINTR in the last 72 hours.    Microbiologic Results:  Microbiology Results (last 7 days)       ** No results found for the last 168 hours. **

## 2022-03-06 NOTE — CONSULTS
CC:  My left shoulder hurts    HPI:  From year old female with a history of sickle cell disease presented to the emergency room at [facility] 4 days ago with quite the patient described as a typical sickle cell flare for her consisting of left-sided chest pain, midback pain, and left shoulder pain.  She was admitted for sickle cell crisis about a month ago.  She has been managed by the hospitalist service.  Yesterday she complained of a new finding of bruising around her left shoulder in addition to the left shoulder pain she reported on admit.  The hospitalist service had concerns for a septic left shoulder and consult Orthopedics for evaluation.    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     Current Outpatient Medications on File Prior to Encounter   Medication Sig Dispense Refill    deferasirox (JADENU) 360 mg Tab Take 1,080 mg by mouth once daily.      folic acid (FOLVITE) 1 MG tablet Take 4 tablets (4 mg total) by mouth once daily. 120 tablet 11    hydroxyurea (HYDREA) 500 mg Cap Take 1,000 mg by mouth once daily.      aspirin 81 MG Chew Take 1 tablet (81 mg total) by mouth once daily.  0    gabapentin (NEURONTIN) 300 MG capsule Take 2 capsules (600 mg total) by mouth every evening. 60 capsule 4    glutamine, sickle cell, (ENDARI) 5 gram PwPk Take 15 g by mouth 2 (two) times daily.      morphine (MS CONTIN) 30 MG 12 hr tablet Take 30 mg by mouth every  8 (eight) hours.      oxyCODONE-acetaminophen (PERCOCET) 5-325 mg per tablet Take 1 tablet by mouth every 4 (four) hours as needed for Pain.      ursodiol (ACTIGALL) 300 mg capsule Take 1 capsule (300 mg total) by mouth 3 (three) times daily with meals. 90 capsule 11       ROS:    Constitution: Denies chills, fever, and sweats.  HENT: Denies headaches or blurry vision.  Cardiovascular: Denies chest pain or irregular heart beat.  Respiratory: Denies cough or shortness of breath.  Gastrointestinal: Denies abdominal pain, nausea, or vomiting.  Genitourinary:  Denies urinary incontinence, bladder and kidney issues  Musculoskeletal:  Denies muscle cramps.  Positive for pain in the midback, left chest wall and left shoulder.  Neurological: Denies dizziness or focal weakness.  Psychiatric/Behavioral: Normal mental status.  Hematologic/Lymphatic: Denies bleeding problem or easy bruising/bleeding.  Skin: Denies rash or suspicious lesions.    Physical examination     Gen - No acute distress, well nourished, well groomed.  Patient is sitting up in bed eating lunch.  She reports pain but is in no obvious acute distress.  Eyes - Extraoccular motions intact, pupils equally round and reactive to light and accommodation   ENT - normocephalic, atruamtic, oropharynx clear   Neck - Supple, no abnormal masses   Cardiovascular - regular rate and rhythm   Pulmonary - clear to auscultation bilaterally, no wheezes, ronchi, or rales   Abdomen - soft, non-tender, non-distended, positive bowel sounds   Psych - The patient is alert and oriented x3 with normal mood and affect    Left Upper Extremity Examination     Skin is intact throughout  No effusion noted  Motor is intact distally radial, median, ulnar, AIN, PIN   +2 radial and ulnar pulses   Sensation to light touch is intact distally radial, median, and ulnar     Examination of the Left shoulder:   ROM:   For - 70 with pain  Abd - 70 with pain  Ext - 30 with pain  Int - left hip with  pain    Tenderness to palpation:   Subacromial space - positive  Biceps Tendon - positive  Anterior Glenohumeral Joint - positive  AC joint - negative  Glenohumeral instability - negative  Provocative testing deferred due to pain    X-ray images were examined and personally interpreted by me.  Three views left shoulder dated 03/05/2022 show changes in the humeral head density consistent with aseptic necrosis.    Recent Labs   Lab 03/06/22  0509   WBC 12.04   RBC 2.83*   HGB 7.8*   HCT 24.8*   *   MCV 88   MCH 27.6   MCHC 31.5*     CRP performed yesterday was 3.5, normal    Sedimentation rate performed yesterday was 10, normal    Dx:  Based on physical examination, x-ray findings, and laboratory values I do not see any clinical indication of septic shoulder.  This appears to be aseptic necrosis of the left proximal humerus.    Plan:  Recommend MRI of the left shoulder.  We will continue to follow.

## 2022-03-06 NOTE — PLAN OF CARE
Plan of care reviewed with patient. Patient verbalized complete understanding. Pt awake, alert, and oriented. Pt complaining of pain, mildly controlled with PRN pain medication. Pt Port clean, dry, intact, and infusing. Pt able to ambulate to the restroom. All fall precautions maintained, bed in lowest position, locked, call light within reach. Side rails up times 2. Slip resistant socks maintained.

## 2022-03-06 NOTE — PROGRESS NOTES
[facility] Hospital Medicine  Telemedicine Progress Note    Patient Name: Nishi Dumont  MRN: [id number]  Patient Class: IP- Inpatient   Admission Date: 3/2/2022  Length of Stay: 4 days  Attending Physician: Toy Shannon MD  Primary Care Provider: Primary Doctor No          Subjective:     Principal Problem:Sickle cell pain crisis        HPI:  Nishi Dumont is a 31 y.o. 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 She states she feels like this is a typical sickle cell flare with associated left sided stabbing chest pain, mid back and left shoulder pain. She denies SOB, nausea, vomiting or diarrhea. She denies fever or cough. She reports compliance with her medications. She states that she gets flares when the weather changes, but has been keeping herself inside to avoid a possible flare. She was last admitted for sickle cell one month ago, and last had an acute chest syndrome about 7-8 months ago. She denies all other complaint.    ED work up was significant for anemia on CBC and a CMP that was essentially unremarkable. Troponin was negative. CXR showed what could be infiltrates to bilateral lung bases, pending official reading. She has not experienced tachycardia or hypoxia. Hospital Medicine consulted for admission and further management.      Overview/Hospital Course:  No notes on file    Interval History: still having significant left arm swelling with bruising and decrease ROM; Xrays reviewed; getting LUE U/S to rule out DVT    - ortho consult pending   - increasing narcotics; adding NSAIDS BID; states getting a headache with gabapentin;     Review of Systems   Constitutional:  Negative for activity change and fever.   Respiratory:  Negative for cough and  shortness of breath.    Gastrointestinal:  Negative for abdominal pain and nausea.   Musculoskeletal:  Positive for arthralgias and joint swelling.   Objective:     Vital Signs (Most Recent):  Temp: 97 °F (36.1 °C) (03/06/22 1119)  Pulse: 84 (03/06/22 1119)  Resp: 18 (03/06/22 1309)  BP: (!) 105/54 (03/06/22 1119)  SpO2: 98 % (03/06/22 1119)   Vital Signs (24h Range):  Temp:  [96.5 °F (35.8 °C)-98.8 °F (37.1 °C)] 97 °F (36.1 °C)  Pulse:  [82-85] 84  Resp:  [1-18] 18  SpO2:  [98 %-100 %] 98 %  BP: ()/(51-63) 105/54     Weight: 56.7 kg (125 lb)  Body mass index is 25.25 kg/m².    Intake/Output Summary (Last 24 hours) at 3/6/2022 1345  Last data filed at 3/6/2022 0534  Gross per 24 hour   Intake 8267.05 ml   Output --   Net 8267.05 ml        Physical Exam  Vitals and nursing note reviewed.   Constitutional:       General: She is not in acute distress.  Pulmonary:      Effort: Pulmonary effort is normal.      Breath sounds: No wheezing.   Abdominal:      General: Abdomen is flat. There is no distension.   Musculoskeletal:         General: Swelling present.      Left lower leg: No edema.      Comments: Decrease left shoulder ROM and swelling    Skin:     Coloration: Skin is not jaundiced.      Findings: No rash.   Neurological:      General: No focal deficit present.      Mental Status: She is alert and oriented to person, place, and time.   Psychiatric:         Mood and Affect: Mood normal.         Behavior: Behavior normal.       Significant Labs: All pertinent labs within the past 24 hours have been reviewed.    Significant Imaging: I have reviewed all pertinent imaging results/findings within the past 24 hours.      Assessment/Plan:      * Sickle cell pain crisis  Admit to tele  Continue home medications  Dilaudid PRN  Antinausea PRN    3/3- increasing IVFs to 200 cc/hr; adding PO oxy IR as first choice and making dilaudid second choice     Pain and swelling of left shoulder  - getting ESR, CRP, left shoulder  xrays and ortho consult      3/6- xrays reviewed; getting LUE U/S to rule out DVT; ortho consulted;     Chest pain  Admit to tele  Concern for Acute chest, last episode was about 7-8 months ago  Afebrile, denies SOB  Trend troponin, #1 0.010  Infiltrate seen on initial chest xray, pending official read. Will treat with levaquin empirically  Oxygen PRN to keep O2 sat >94%      Sickle cell anemia with pain  Patient's anemia is currently controlled. Has not received any PRBCs to date.. Etiology likely d/t sickle cell disease  Current CBC reviewed-   Lab Results   Component Value Date    HGB 9.1 (L) 03/02/2022    HCT 27.5 (L) 03/02/2022     Monitor serial CBC and transfuse if patient becomes hemodynamically unstable, symptomatic or H/H drops below 7/21.           VTE Risk Mitigation (From admission, onward)         Ordered     enoxaparin injection 40 mg  Daily         03/02/22 0415     IP VTE HIGH RISK PATIENT  Once         03/02/22 0415     Place sequential compression device  Until discontinued         03/02/22 0415                      I have assessed these finding virtually using telemed platform and with assistance of bedside nurse                 The attending portion of this evaluation, treatment, and documentation was performed per Toy Shannon MD via Telemedicine AudioVisual using the secure [employer] software platform with 2 way audio/video. The provider was located off-site and the patient is located in the hospital. The aforementioned video software was utilized to document the relevant history and physical exam    Toy Shannon MD  Department of Hospital Medicine   [facility]

## 2022-03-07 PROBLEM — I80.9 THROMBOPHLEBITIS: Status: ACTIVE | Noted: 2022-03-07

## 2022-03-07 LAB
ALBUMIN SERPL BCP-MCNC: 3.5 G/DL (ref 3.5–5.2)
ALP SERPL-CCNC: 89 U/L (ref 55–135)
ALT SERPL W/O P-5'-P-CCNC: 19 U/L (ref 10–44)
ANION GAP SERPL CALC-SCNC: 8 MMOL/L (ref 8–16)
AST SERPL-CCNC: 22 U/L (ref 10–40)
BASOPHILS # BLD AUTO: 0.13 K/UL (ref 0–0.2)
BASOPHILS NFR BLD: 1.1 % (ref 0–1.9)
BILIRUB SERPL-MCNC: 0.9 MG/DL (ref 0.1–1)
BUN SERPL-MCNC: 7 MG/DL (ref 6–20)
CALCIUM SERPL-MCNC: 8.7 MG/DL (ref 8.7–10.5)
CHLORIDE SERPL-SCNC: 104 MMOL/L (ref 95–110)
CO2 SERPL-SCNC: 26 MMOL/L (ref 23–29)
CREAT SERPL-MCNC: 0.7 MG/DL (ref 0.5–1.4)
CRP SERPL-MCNC: 6.2 MG/L (ref 0–8.2)
DIFFERENTIAL METHOD: ABNORMAL
EOSINOPHIL # BLD AUTO: 0.5 K/UL (ref 0–0.5)
EOSINOPHIL NFR BLD: 4.2 % (ref 0–8)
ERYTHROCYTE [DISTWIDTH] IN BLOOD BY AUTOMATED COUNT: 16.5 % (ref 11.5–14.5)
ERYTHROCYTE [SEDIMENTATION RATE] IN BLOOD BY WESTERGREN METHOD: 19 MM/HR (ref 0–20)
EST. GFR  (AFRICAN AMERICAN): >60 ML/MIN/1.73 M^2
EST. GFR  (NON AFRICAN AMERICAN): >60 ML/MIN/1.73 M^2
GLUCOSE SERPL-MCNC: 102 MG/DL (ref 70–110)
HCT VFR BLD AUTO: 24.6 % (ref 37–48.5)
HGB BLD-MCNC: 7.9 G/DL (ref 12–16)
IMM GRANULOCYTES # BLD AUTO: 0.05 K/UL (ref 0–0.04)
IMM GRANULOCYTES NFR BLD AUTO: 0.4 % (ref 0–0.5)
LYMPHOCYTES # BLD AUTO: 2.5 K/UL (ref 1–4.8)
LYMPHOCYTES NFR BLD: 19.9 % (ref 18–48)
MAGNESIUM SERPL-MCNC: 1.7 MG/DL (ref 1.6–2.6)
MCH RBC QN AUTO: 28 PG (ref 27–31)
MCHC RBC AUTO-ENTMCNC: 32.1 G/DL (ref 32–36)
MCV RBC AUTO: 87 FL (ref 82–98)
MONOCYTES # BLD AUTO: 0.7 K/UL (ref 0.3–1)
MONOCYTES NFR BLD: 6 % (ref 4–15)
NEUTROPHILS # BLD AUTO: 8.4 K/UL (ref 1.8–7.7)
NEUTROPHILS NFR BLD: 68.4 % (ref 38–73)
NRBC BLD-RTO: 1 /100 WBC
PLATELET # BLD AUTO: 686 K/UL (ref 150–450)
PMV BLD AUTO: 9.5 FL (ref 9.2–12.9)
POTASSIUM SERPL-SCNC: 4.1 MMOL/L (ref 3.5–5.1)
PROT SERPL-MCNC: 6.5 G/DL (ref 6–8.4)
RBC # BLD AUTO: 2.82 M/UL (ref 4–5.4)
RETICS/RBC NFR AUTO: 1.1 % (ref 0.5–2.5)
SODIUM SERPL-SCNC: 138 MMOL/L (ref 136–145)
WBC # BLD AUTO: 12.34 K/UL (ref 3.9–12.7)

## 2022-03-07 PROCEDURE — 85045 AUTOMATED RETICULOCYTE COUNT: CPT | Performed by: HOSPITALIST

## 2022-03-07 PROCEDURE — 25000003 PHARM REV CODE 250: Performed by: HOSPITALIST

## 2022-03-07 PROCEDURE — 12000002 HC ACUTE/MED SURGE SEMI-PRIVATE ROOM

## 2022-03-07 PROCEDURE — 36415 COLL VENOUS BLD VENIPUNCTURE: CPT | Performed by: STUDENT IN AN ORGANIZED HEALTH CARE EDUCATION/TRAINING PROGRAM

## 2022-03-07 PROCEDURE — 94761 N-INVAS EAR/PLS OXIMETRY MLT: CPT

## 2022-03-07 PROCEDURE — 63600175 PHARM REV CODE 636 W HCPCS: Performed by: HOSPITALIST

## 2022-03-07 PROCEDURE — 99900035 HC TECH TIME PER 15 MIN (STAT)

## 2022-03-07 PROCEDURE — 85651 RBC SED RATE NONAUTOMATED: CPT | Performed by: STUDENT IN AN ORGANIZED HEALTH CARE EDUCATION/TRAINING PROGRAM

## 2022-03-07 PROCEDURE — 25000003 PHARM REV CODE 250: Performed by: STUDENT IN AN ORGANIZED HEALTH CARE EDUCATION/TRAINING PROGRAM

## 2022-03-07 PROCEDURE — 63600175 PHARM REV CODE 636 W HCPCS: Performed by: NURSE PRACTITIONER

## 2022-03-07 PROCEDURE — 85025 COMPLETE CBC W/AUTO DIFF WBC: CPT | Performed by: STUDENT IN AN ORGANIZED HEALTH CARE EDUCATION/TRAINING PROGRAM

## 2022-03-07 PROCEDURE — 83735 ASSAY OF MAGNESIUM: CPT | Performed by: STUDENT IN AN ORGANIZED HEALTH CARE EDUCATION/TRAINING PROGRAM

## 2022-03-07 PROCEDURE — 25000003 PHARM REV CODE 250: Performed by: NURSE PRACTITIONER

## 2022-03-07 PROCEDURE — 80053 COMPREHEN METABOLIC PANEL: CPT | Performed by: STUDENT IN AN ORGANIZED HEALTH CARE EDUCATION/TRAINING PROGRAM

## 2022-03-07 PROCEDURE — 86140 C-REACTIVE PROTEIN: CPT | Performed by: STUDENT IN AN ORGANIZED HEALTH CARE EDUCATION/TRAINING PROGRAM

## 2022-03-07 RX ORDER — METHOCARBAMOL 500 MG/1
500 TABLET, FILM COATED ORAL 4 TIMES DAILY PRN
Status: DISCONTINUED | OUTPATIENT
Start: 2022-03-07 | End: 2022-03-11 | Stop reason: HOSPADM

## 2022-03-07 RX ADMIN — SODIUM CHLORIDE: 0.9 INJECTION, SOLUTION INTRAVENOUS at 08:03

## 2022-03-07 RX ADMIN — DIPHENHYDRAMINE HYDROCHLORIDE 25 MG: 50 INJECTION INTRAMUSCULAR; INTRAVENOUS at 08:03

## 2022-03-07 RX ADMIN — OXYCODONE HYDROCHLORIDE 20 MG: 10 TABLET ORAL at 11:03

## 2022-03-07 RX ADMIN — DEFERASIROX 1000 MG: 500 TABLET, FOR SUSPENSION ORAL at 05:03

## 2022-03-07 RX ADMIN — HYDROXYUREA 1000 MG: 500 CAPSULE ORAL at 08:03

## 2022-03-07 RX ADMIN — OXYCODONE HYDROCHLORIDE 20 MG: 10 TABLET ORAL at 01:03

## 2022-03-07 RX ADMIN — VANCOMYCIN HYDROCHLORIDE 750 MG: 750 INJECTION, POWDER, LYOPHILIZED, FOR SOLUTION INTRAVENOUS at 04:03

## 2022-03-07 RX ADMIN — HYDROXYUREA 1000 MG: 500 CAPSULE ORAL at 09:03

## 2022-03-07 RX ADMIN — CELECOXIB 200 MG: 100 CAPSULE ORAL at 08:03

## 2022-03-07 RX ADMIN — FOLIC ACID 4 MG: 1 TABLET ORAL at 08:03

## 2022-03-07 RX ADMIN — ASPIRIN 81 MG CHEWABLE TABLET 81 MG: 81 TABLET CHEWABLE at 08:03

## 2022-03-07 RX ADMIN — MORPHINE SULFATE 45 MG: 30 TABLET, FILM COATED, EXTENDED RELEASE ORAL at 10:03

## 2022-03-07 RX ADMIN — HYDROMORPHONE HYDROCHLORIDE 2 MG: 2 INJECTION, SOLUTION INTRAMUSCULAR; INTRAVENOUS; SUBCUTANEOUS at 04:03

## 2022-03-07 RX ADMIN — HYDROMORPHONE HYDROCHLORIDE 2 MG: 2 INJECTION, SOLUTION INTRAMUSCULAR; INTRAVENOUS; SUBCUTANEOUS at 02:03

## 2022-03-07 RX ADMIN — HYDROMORPHONE HYDROCHLORIDE 2 MG: 2 INJECTION, SOLUTION INTRAMUSCULAR; INTRAVENOUS; SUBCUTANEOUS at 08:03

## 2022-03-07 RX ADMIN — DOCUSATE SODIUM AND SENNOSIDES 1 TABLET: 8.6; 5 TABLET, FILM COATED ORAL at 08:03

## 2022-03-07 RX ADMIN — LIDOCAINE 5% 1 PATCH: 700 PATCH TOPICAL at 12:03

## 2022-03-07 RX ADMIN — OXYCODONE HYDROCHLORIDE 20 MG: 10 TABLET ORAL at 03:03

## 2022-03-07 RX ADMIN — HYDROMORPHONE HYDROCHLORIDE 2 MG: 2 INJECTION, SOLUTION INTRAMUSCULAR; INTRAVENOUS; SUBCUTANEOUS at 01:03

## 2022-03-07 RX ADMIN — OXYCODONE HYDROCHLORIDE 20 MG: 10 TABLET ORAL at 06:03

## 2022-03-07 RX ADMIN — CEFTRIAXONE 2 G: 2 INJECTION, SOLUTION INTRAVENOUS at 03:03

## 2022-03-07 RX ADMIN — DIPHENHYDRAMINE HYDROCHLORIDE 25 MG: 50 INJECTION INTRAMUSCULAR; INTRAVENOUS at 03:03

## 2022-03-07 RX ADMIN — MORPHINE SULFATE 45 MG: 30 TABLET, FILM COATED, EXTENDED RELEASE ORAL at 02:03

## 2022-03-07 RX ADMIN — VANCOMYCIN HYDROCHLORIDE 750 MG: 750 INJECTION, POWDER, LYOPHILIZED, FOR SOLUTION INTRAVENOUS at 03:03

## 2022-03-07 RX ADMIN — ENOXAPARIN SODIUM 40 MG: 40 INJECTION SUBCUTANEOUS at 04:03

## 2022-03-07 RX ADMIN — DIPHENHYDRAMINE HYDROCHLORIDE 25 MG: 50 INJECTION INTRAMUSCULAR; INTRAVENOUS at 11:03

## 2022-03-07 RX ADMIN — DIPHENHYDRAMINE HYDROCHLORIDE 25 MG: 50 INJECTION INTRAMUSCULAR; INTRAVENOUS at 06:03

## 2022-03-07 RX ADMIN — DIPHENHYDRAMINE HYDROCHLORIDE 25 MG: 50 INJECTION INTRAMUSCULAR; INTRAVENOUS at 02:03

## 2022-03-07 RX ADMIN — MORPHINE SULFATE 30 MG: 30 TABLET, FILM COATED, EXTENDED RELEASE ORAL at 05:03

## 2022-03-07 NOTE — PLAN OF CARE
Plan of care reviewed with patient. Patient verbalized complete understanding. Pt awake, alert, and oriented. Pt complaining of pain, mildly controlled with PRN pain medication. Pt Port clean, dry, intact, and infusing. Sterile dsg change done for the port. Pt able to ambulate to the restroom. All fall precautions maintained, bed in lowest position, locked, call light within reach. Side rails up times 2. Slip resistant socks maintained

## 2022-03-07 NOTE — PROGRESS NOTES
[facility] Hospital Medicine  Telemedicine Progress Note    Patient Name: Nishi Dumont  MRN: [id number]  Patient Class: IP- Inpatient   Admission Date: 3/2/2022  Length of Stay: 5 days  Attending Physician: Tyo Shannon MD  Primary Care Provider: Primary Doctor No          Subjective:     Principal Problem:Sickle cell pain crisis        HPI:  Nishi Dumont is a 31 y.o. 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 She states she feels like this is a typical sickle cell flare with associated left sided stabbing chest pain, mid back and left shoulder pain. She denies SOB, nausea, vomiting or diarrhea. She denies fever or cough. She reports compliance with her medications. She states that she gets flares when the weather changes, but has been keeping herself inside to avoid a possible flare. She was last admitted for sickle cell one month ago, and last had an acute chest syndrome about 7-8 months ago. She denies all other complaint.    ED work up was significant for anemia on CBC and a CMP that was essentially unremarkable. Troponin was negative. CXR showed what could be infiltrates to bilateral lung bases, pending official reading. She has not experienced tachycardia or hypoxia. Hospital Medicine consulted for admission and further management.      Overview/Hospital Course:  No notes on file    Interval History: still having decreased ROM; has superficial basilic vein thrombosis, thought to be infected, on IV abx and ID consulted  - ortho also consulted and MRI left shoulder done today and will follow up results    - OT eval ; pain under better control    Review of Systems   Constitutional:  Negative for activity change and fever.   Respiratory:  Negative for cough and  shortness of breath.    Gastrointestinal:  Negative for abdominal pain and nausea.   Musculoskeletal:  Positive for arthralgias and joint swelling.   Objective:     Vital Signs (Most Recent):  Temp: 97.3 °F (36.3 °C) (03/07/22 0744)  Pulse: 78 (03/07/22 0744)  Resp: 18 (03/07/22 1112)  BP: (!) 100/59 (03/07/22 0744)  SpO2: 100 % (03/07/22 0757)   Vital Signs (24h Range):  Temp:  [97 °F (36.1 °C)-98 °F (36.7 °C)] 97.3 °F (36.3 °C)  Pulse:  [77-90] 78  Resp:  [16-81] 18  SpO2:  [99 %-100 %] 100 %  BP: (100-131)/(54-80) 100/59     Weight: 56.7 kg (125 lb)  Body mass index is 25.25 kg/m².    Intake/Output Summary (Last 24 hours) at 3/7/2022 1230  Last data filed at 3/7/2022 0722  Gross per 24 hour   Intake 7222.15 ml   Output --   Net 7222.15 ml        Physical Exam  Vitals and nursing note reviewed.   Constitutional:       General: She is not in acute distress.  Pulmonary:      Effort: Pulmonary effort is normal.      Breath sounds: No wheezing.   Abdominal:      General: Abdomen is flat. There is no distension.   Musculoskeletal:         General: Swelling present.      Left lower leg: No edema.      Comments: Decrease left shoulder ROM and swelling    Skin:     Coloration: Skin is not jaundiced.      Findings: No rash.   Neurological:      General: No focal deficit present.      Mental Status: She is alert and oriented to person, place, and time.   Psychiatric:         Mood and Affect: Mood normal.         Behavior: Behavior normal.       Significant Labs: All pertinent labs within the past 24 hours have been reviewed.    Significant Imaging: I have reviewed all pertinent imaging results/findings within the past 24 hours.      Assessment/Plan:      * Sickle cell pain crisis  Admit to tele  Continue home medications  Dilaudid PRN  Antinausea PRN    3/3- increasing IVFs to 200 cc/hr; adding PO oxy IR as first choice and making dilaudid second choice     Pain and swelling of left shoulder  - getting ESR, CRP, left  shoulder xrays and ortho consult      3/6- xrays reviewed; getting LUE U/S to rule out DVT; ortho consulted;     3/7- U/S shows basilic vein thrombosis, MRI left shoulder pending ; OT eval    Thrombophlebitis  - on IV vanc and rocephin, ID consulted      Chest pain  Admit to tele  Concern for Acute chest, last episode was about 7-8 months ago  Afebrile, denies SOB  Trend troponin, #1 0.010  Infiltrate seen on initial chest xray, pending official read. Will treat with levaquin empirically  Oxygen PRN to keep O2 sat >94%      Sickle cell anemia with pain  Patient's anemia is currently controlled. Has not received any PRBCs to date.. Etiology likely d/t sickle cell disease  Current CBC reviewed-   Lab Results   Component Value Date    HGB 9.1 (L) 03/02/2022    HCT 27.5 (L) 03/02/2022     Monitor serial CBC and transfuse if patient becomes hemodynamically unstable, symptomatic or H/H drops below 7/21.           VTE Risk Mitigation (From admission, onward)         Ordered     enoxaparin injection 40 mg  Daily         03/02/22 0415     IP VTE HIGH RISK PATIENT  Once         03/02/22 0415     Place sequential compression device  Until discontinued         03/02/22 0415                      I have assessed these finding virtually using telemed platform and with assistance of bedside nurse                 The attending portion of this evaluation, treatment, and documentation was performed per Toy Shannon MD via Telemedicine AudioVisual using the secure [employer] software platform with 2 way audio/video. The provider was located off-site and the patient is located in the hospital. The aforementioned video software was utilized to document the relevant history and physical exam    Toy Shannon MD  Department of Hospital Medicine   [facility]

## 2022-03-07 NOTE — CARE UPDATE
03/06/22 1921   Patient Assessment/Suction   Level of Consciousness (AVPU) alert   Respiratory Effort Normal;Unlabored   PRE-TX-O2   O2 Device (Oxygen Therapy) room air   SpO2 99 %   Pulse Oximetry Type Intermittent   $ Pulse Oximetry - Multiple Charge Pulse Oximetry - Multiple   Pulse 87   Resp 18   Aerosol Therapy   $ Aerosol Therapy Charges PRN treatment not required   Respiratory Treatment Status (SVN) PRN treatment not required

## 2022-03-07 NOTE — ASSESSMENT & PLAN NOTE
- getting ESR, CRP, left shoulder xrays and ortho consult      3/6- xrays reviewed; getting LUE U/S to rule out DVT; ortho consulted;     3/7- U/S shows basilic vein thrombosis, MRI left shoulder pending ; OT eval

## 2022-03-07 NOTE — PT/OT/SLP PROGRESS
Occupational Therapy      Patient Name:  Nishi Dumont   MRN:  [id number]    Patient not seen today secondary to pain. Nurse notified. Will follow-up 3/8/2022.    3/7/2022

## 2022-03-07 NOTE — CARE UPDATE
03/07/22 0757   Patient Assessment/Suction   Level of Consciousness (AVPU) alert   Respiratory Effort Unlabored   Expansion/Accessory Muscles/Retractions expansion symmetric   All Lung Fields Breath Sounds clear   PRE-TX-O2   O2 Device (Oxygen Therapy) room air   SpO2 100 %   Pulse Oximetry Type Intermittent   $ Pulse Oximetry - Multiple Charge Pulse Oximetry - Multiple   Aerosol Therapy   $ Aerosol Therapy Charges PRN treatment not required

## 2022-03-07 NOTE — PHYSICIAN QUERY
PT Name: Nishi Dumont  MR #: [id number]     DOCUMENTATION CLARIFICATION     CDS: Estela Mayorga RN      Contact information:[email or web address] or (cell) [phone]    This form is a permanent document in the medical record.     Query Date: March 7, 2022    By submitting this query, we are merely seeking further clarification of documentation.  Please utilize your independent clinical judgment when addressing the question(s) below.      The Medical Record contains the following:    Clinical Information Location in Medical Records   patient has new left shoulder swelling pain and decrease ROM    has superficial basilic vein thrombosis, thought to be infected, on IV abx and ID consulted    Impression:   Intraluminal thrombus of the left basilic vein.     HM PN 3/5    HM PN 3/7    US UE     According to coding guidelines, Present on Admission is defined as present at the time the order for inpatient admission occurs. Conditions that develop during an outpatient encounter, including emergency department, observation, or outpatient surgery, are considered as present on admission.       Please clarify the Present on Admission (POA) status of the diagnosis: Thrombus of left basilic vein    [  ] Yes (Y)     [  ] No (N)     [ x ] Documentation insufficient to determine if condition is POA (U)     [  ] Clinically Undetermined (W)     Reference:  ICD-10-CM Official Guidelines for Coding and Reporting FY 2021. (2020). Retrieved October 21, 2020, from https://www.cdc.gov/nchs/data/icd/10cmguidelines-FY2021.pdf?[email or web address]    Form No. [id number]

## 2022-03-07 NOTE — CONSULTS
"Consult Note  Infectious Disease    Reason for Consult: Thrombophlebitis/left shoulder pain     HPI: Nishi Dumont is a 31 y.o. female  with history of sickle cell disease, on monthly exchange transfusions via left chemo port, prior acute chest syndrome she has crisis, avascular necrosis of the left hip/femur in 2006, surgical history of cholecystectomy.  Presenting to the ER with symptoms that she described as typical sickle cell flare with associated left-sided stabbing chest pain, midback and severe left shoulder pain.  She denies fever, chills, nausea, vomit, headache, cough, abdominal pain, dysuria, or change in bowel movements.  She was last admitted for sickle cell flare a month ago; last admitted for acute chest syndrome a months ago.    In the ER, blood pressure 107/67, afebrile, O2 sat 100% on room air  Lab significant for white count of 17.1, no left shift, H&H 9.1/27.5, thrombocytosis of 764, likely reactive from acute crisis.  Occasional sickle cells noted on peripheral blood smear, haptoglobin less than 10.  Normal kidney and liver function  ESR and CRP normal on admission  UA with trace leukocytes, few bacteria, many squamous epithelial cell  Chest x-ray negative for acute process  X-ray left shoulder findings suggestive of a vascular necrosis.  Upper extremity ultrasound with left basilic vein DVT.    Empirically started on Levaquin on 03/01.  She was off antibiotics 3/2 until 3/5.  Switched to vancomycin and ceftriaxone 3/6.      ID consult for left basilic thrombophlebitis and shoulder pain..        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    Avascular necrosis of bone of hip     Avascular necrosis of humeral head     Blood transfusion     Sickle cell disease      Past Surgical History:   Procedure Laterality Date    CHOLECYSTECTOMY      JOINT REPLACEMENT       left hip     Social History     Tobacco Use    Smoking status: Never Smoker    Smokeless tobacco: Never Used   Substance Use Topics    Alcohol use: Yes     Alcohol/week: 0.0 standard drinks     Comment: occassionally        Family History   Problem Relation Age of Onset    Sickle cell trait Mother     Sickle cell trait Father     Sickle cell trait Brother     Sickle cell trait Daughter        Pertinent medications noted:     Review of Systems:   As described in HPI    Outdoor activities:  Lives at home, has 3 kids all of them with sickle trait  Travel:  None  Implants:  Left port A -cath placed 6 years ago  Antibiotic History:  From admission    EXAM & DIAGNOSTICS REVIEWED:   Vitals:     Temp:  [97.3 °F (36.3 °C)-97.9 °F (36.6 °C)]   Temp: 97.6 °F (36.4 °C) (03/07/22 1409)  Pulse: 84 (03/07/22 1409)  Resp: 18 (03/07/22 1513)  BP: 135/84 (03/07/22 1409)  SpO2: 100 % (03/07/22 1409)    Intake/Output Summary (Last 24 hours) at 3/7/2022 1638  Last data filed at 3/7/2022 0722  Gross per 24 hour   Intake 5822.15 ml   Output --   Net 5822.15 ml       General:  In NAD. Alert and attentive, cooperative, comfortable on room air  Eyes:  Contact lenses, anicteric  ENT:  No ulcers, exudates, thrush, nares patent, dentition is good  Neck:  Supple, no adenopathy appreciated  Lungs: Clear to auscultation b/l  Heart:  S1/S2+, regular rhythm, no murmurs  Abd:  +BS, soft, non tender, non distended, no rebound  :  Voids  Musc:  Left shoulder with very limited ROM, very tender to palpation, ecchymosis around the humerus head noted.  Left hip nontender, full range of motion. Other joints without effusion, swelling,  erythema, synovitis,  ambulatory  Skin:  Warm, no rash  Wound:   Neuro: Following commands, no acute focal deficit   Psych:  Calm, cooperative  Lymphatic:     No cervical, supraclavicular nodes  Extrem: No LE edema b/l  VAD:  Port-A cath L chest since Sept/201       Isolation: None      General Labs reviewed:  Recent Labs   Lab 03/05/22  0530 03/06/22  0509 03/07/22  0501   WBC 14.18* 12.04 12.34   HGB 8.0* 7.8* 7.9*   HCT 24.9* 24.8* 24.6*   * 693* 686*       Recent Labs   Lab 03/05/22  0530 03/06/22  0509 03/07/22  0501    140 138   K 4.0 4.4 4.1    107 104   CO2 26 25 26   BUN 6 6 7   CREATININE 0.7 0.7 0.7   CALCIUM 8.6* 8.5* 8.7   PROT 6.0 6.1 6.5   BILITOT 1.0 1.0 0.9   ALKPHOS 85 86 89   ALT 20 21 19   AST 19 19 22     Recent Labs   Lab 03/05/22  1146   CRP 3.5     Recent Labs   Lab 03/05/22  1146   SEDRATE 10       Estimated Creatinine Clearance: 89.7 mL/min (based on SCr of 0.7 mg/dL).     Micro:  Microbiology Results (last 7 days)     ** No results found for the last 168 hours. **          Imaging Reviewed:  CXR   X-ray   LUE US - DVT   Mri Left shoulder: AVASCULAR NECROSIS OF HUMERUS HEAD    Cardiology:       IMPRESSION & PLAN     1. Acute sickle cell flare/crisis secondary to left avascular necrosis (AVN) of humerus head, low suspicion for underlying infection/osteomyelitis    ESR and CRP normal     2. Left basilic DVT   3. PMHx sickle cell s/p left hip replacement    Recommendations:  Blood cultures x 2 sets  Ortho eval for avascular necrosis left humerus head - no evidence of fluid on imaging - please assess for possible tap and send fluid for cell count, Gram stain and culture  Continue Vancomycin IV, keep level 10-15 and Ceftriaxone 1g IV q24h,for now, until flare resolves   Pain management and AC for left basilic DVT as per Hospitalist  Follow cultures   Incentive spirometry   Will follow     D/w Dr Shannon    Medical Decision Making during this encounter was  [_] Low Complexity  [_] Moderate  Complexity  [xx] High Complexity

## 2022-03-07 NOTE — SUBJECTIVE & OBJECTIVE
Interval History: still having decreased ROM; has superficial basilic vein thrombosis, thought to be infected, on IV abx and ID consulted  - ortho also consulted and MRI left shoulder done today and will follow up results    - OT eval ; pain under better control    Review of Systems   Constitutional:  Negative for activity change and fever.   Respiratory:  Negative for cough and shortness of breath.    Gastrointestinal:  Negative for abdominal pain and nausea.   Musculoskeletal:  Positive for arthralgias and joint swelling.   Objective:     Vital Signs (Most Recent):  Temp: 97.3 °F (36.3 °C) (03/07/22 0744)  Pulse: 78 (03/07/22 0744)  Resp: 18 (03/07/22 1112)  BP: (!) 100/59 (03/07/22 0744)  SpO2: 100 % (03/07/22 0757)   Vital Signs (24h Range):  Temp:  [97 °F (36.1 °C)-98 °F (36.7 °C)] 97.3 °F (36.3 °C)  Pulse:  [77-90] 78  Resp:  [16-81] 18  SpO2:  [99 %-100 %] 100 %  BP: (100-131)/(54-80) 100/59     Weight: 56.7 kg (125 lb)  Body mass index is 25.25 kg/m².    Intake/Output Summary (Last 24 hours) at 3/7/2022 1230  Last data filed at 3/7/2022 0722  Gross per 24 hour   Intake 7222.15 ml   Output --   Net 7222.15 ml        Physical Exam  Vitals and nursing note reviewed.   Constitutional:       General: She is not in acute distress.  Pulmonary:      Effort: Pulmonary effort is normal.      Breath sounds: No wheezing.   Abdominal:      General: Abdomen is flat. There is no distension.   Musculoskeletal:         General: Swelling present.      Left lower leg: No edema.      Comments: Decrease left shoulder ROM and swelling    Skin:     Coloration: Skin is not jaundiced.      Findings: No rash.   Neurological:      General: No focal deficit present.      Mental Status: She is alert and oriented to person, place, and time.   Psychiatric:         Mood and Affect: Mood normal.         Behavior: Behavior normal.       Significant Labs: All pertinent labs within the past 24 hours have been reviewed.    Significant Imaging:  I have reviewed all pertinent imaging results/findings within the past 24 hours.

## 2022-03-08 ENCOUNTER — TELEPHONE (OUTPATIENT)
Dept: HEMATOLOGY/ONCOLOGY | Facility: CLINIC | Age: 32
End: 2022-03-08
Payer: MEDICAID

## 2022-03-08 PROBLEM — I82.619 BASILIC VEIN THROMBOSIS: Status: ACTIVE | Noted: 2022-03-08

## 2022-03-08 LAB
ALBUMIN SERPL BCP-MCNC: 3.4 G/DL (ref 3.5–5.2)
ALP SERPL-CCNC: 91 U/L (ref 55–135)
ALT SERPL W/O P-5'-P-CCNC: 28 U/L (ref 10–44)
ANION GAP SERPL CALC-SCNC: 7 MMOL/L (ref 8–16)
AST SERPL-CCNC: 35 U/L (ref 10–40)
BASOPHILS # BLD AUTO: 0.1 K/UL (ref 0–0.2)
BASOPHILS NFR BLD: 0.9 % (ref 0–1.9)
BILIRUB SERPL-MCNC: 0.9 MG/DL (ref 0.1–1)
BUN SERPL-MCNC: 7 MG/DL (ref 6–20)
CALCIUM SERPL-MCNC: 8.8 MG/DL (ref 8.7–10.5)
CHLORIDE SERPL-SCNC: 106 MMOL/L (ref 95–110)
CO2 SERPL-SCNC: 27 MMOL/L (ref 23–29)
CREAT SERPL-MCNC: 0.7 MG/DL (ref 0.5–1.4)
DIFFERENTIAL METHOD: ABNORMAL
EOSINOPHIL # BLD AUTO: 0.3 K/UL (ref 0–0.5)
EOSINOPHIL NFR BLD: 3.2 % (ref 0–8)
ERYTHROCYTE [DISTWIDTH] IN BLOOD BY AUTOMATED COUNT: 16.5 % (ref 11.5–14.5)
EST. GFR  (AFRICAN AMERICAN): >60 ML/MIN/1.73 M^2
EST. GFR  (NON AFRICAN AMERICAN): >60 ML/MIN/1.73 M^2
GLUCOSE SERPL-MCNC: 92 MG/DL (ref 70–110)
HCT VFR BLD AUTO: 24.4 % (ref 37–48.5)
HGB BLD-MCNC: 7.7 G/DL (ref 12–16)
IMM GRANULOCYTES # BLD AUTO: 0.03 K/UL (ref 0–0.04)
IMM GRANULOCYTES NFR BLD AUTO: 0.3 % (ref 0–0.5)
LYMPHOCYTES # BLD AUTO: 2.5 K/UL (ref 1–4.8)
LYMPHOCYTES NFR BLD: 22.9 % (ref 18–48)
MAGNESIUM SERPL-MCNC: 1.8 MG/DL (ref 1.6–2.6)
MCH RBC QN AUTO: 27.8 PG (ref 27–31)
MCHC RBC AUTO-ENTMCNC: 31.6 G/DL (ref 32–36)
MCV RBC AUTO: 88 FL (ref 82–98)
MONOCYTES # BLD AUTO: 0.6 K/UL (ref 0.3–1)
MONOCYTES NFR BLD: 5.8 % (ref 4–15)
NEUTROPHILS # BLD AUTO: 7.2 K/UL (ref 1.8–7.7)
NEUTROPHILS NFR BLD: 66.9 % (ref 38–73)
NRBC BLD-RTO: 0 /100 WBC
PLATELET # BLD AUTO: 622 K/UL (ref 150–450)
PMV BLD AUTO: 9.3 FL (ref 9.2–12.9)
POTASSIUM SERPL-SCNC: 4.7 MMOL/L (ref 3.5–5.1)
PROT SERPL-MCNC: 6.3 G/DL (ref 6–8.4)
RBC # BLD AUTO: 2.77 M/UL (ref 4–5.4)
SODIUM SERPL-SCNC: 140 MMOL/L (ref 136–145)
VANCOMYCIN TROUGH SERPL-MCNC: 12 UG/ML (ref 10–22)
WBC # BLD AUTO: 10.72 K/UL (ref 3.9–12.7)

## 2022-03-08 PROCEDURE — 12000002 HC ACUTE/MED SURGE SEMI-PRIVATE ROOM

## 2022-03-08 PROCEDURE — 99223 1ST HOSP IP/OBS HIGH 75: CPT | Mod: ,,, | Performed by: NURSE PRACTITIONER

## 2022-03-08 PROCEDURE — 25000003 PHARM REV CODE 250: Performed by: STUDENT IN AN ORGANIZED HEALTH CARE EDUCATION/TRAINING PROGRAM

## 2022-03-08 PROCEDURE — 83735 ASSAY OF MAGNESIUM: CPT | Performed by: STUDENT IN AN ORGANIZED HEALTH CARE EDUCATION/TRAINING PROGRAM

## 2022-03-08 PROCEDURE — 36415 COLL VENOUS BLD VENIPUNCTURE: CPT | Performed by: STUDENT IN AN ORGANIZED HEALTH CARE EDUCATION/TRAINING PROGRAM

## 2022-03-08 PROCEDURE — 99900035 HC TECH TIME PER 15 MIN (STAT)

## 2022-03-08 PROCEDURE — 80053 COMPREHEN METABOLIC PANEL: CPT | Performed by: STUDENT IN AN ORGANIZED HEALTH CARE EDUCATION/TRAINING PROGRAM

## 2022-03-08 PROCEDURE — 63600175 PHARM REV CODE 636 W HCPCS: Performed by: HOSPITALIST

## 2022-03-08 PROCEDURE — 87040 BLOOD CULTURE FOR BACTERIA: CPT | Performed by: STUDENT IN AN ORGANIZED HEALTH CARE EDUCATION/TRAINING PROGRAM

## 2022-03-08 PROCEDURE — 80202 ASSAY OF VANCOMYCIN: CPT | Performed by: HOSPITALIST

## 2022-03-08 PROCEDURE — 25000003 PHARM REV CODE 250: Performed by: NURSE PRACTITIONER

## 2022-03-08 PROCEDURE — 63600175 PHARM REV CODE 636 W HCPCS: Performed by: STUDENT IN AN ORGANIZED HEALTH CARE EDUCATION/TRAINING PROGRAM

## 2022-03-08 PROCEDURE — 25000003 PHARM REV CODE 250: Performed by: HOSPITALIST

## 2022-03-08 PROCEDURE — 94761 N-INVAS EAR/PLS OXIMETRY MLT: CPT

## 2022-03-08 PROCEDURE — 85025 COMPLETE CBC W/AUTO DIFF WBC: CPT | Performed by: STUDENT IN AN ORGANIZED HEALTH CARE EDUCATION/TRAINING PROGRAM

## 2022-03-08 PROCEDURE — 99223 PR INITIAL HOSPITAL CARE,LEVL III: ICD-10-PCS | Mod: ,,, | Performed by: NURSE PRACTITIONER

## 2022-03-08 RX ORDER — ALPRAZOLAM 1 MG/1
1 TABLET ORAL ONCE
Status: COMPLETED | OUTPATIENT
Start: 2022-03-08 | End: 2022-03-09

## 2022-03-08 RX ORDER — MORPHINE SULFATE 30 MG/1
60 TABLET, FILM COATED, EXTENDED RELEASE ORAL EVERY 8 HOURS
Status: DISCONTINUED | OUTPATIENT
Start: 2022-03-08 | End: 2022-03-11 | Stop reason: HOSPADM

## 2022-03-08 RX ORDER — HYDROMORPHONE HYDROCHLORIDE 2 MG/ML
2 INJECTION, SOLUTION INTRAMUSCULAR; INTRAVENOUS; SUBCUTANEOUS ONCE
Status: COMPLETED | OUTPATIENT
Start: 2022-03-08 | End: 2022-03-08

## 2022-03-08 RX ADMIN — HYDROMORPHONE HYDROCHLORIDE 2 MG: 2 INJECTION INTRAMUSCULAR; INTRAVENOUS; SUBCUTANEOUS at 11:03

## 2022-03-08 RX ADMIN — HYDROMORPHONE HYDROCHLORIDE 2 MG: 2 INJECTION, SOLUTION INTRAMUSCULAR; INTRAVENOUS; SUBCUTANEOUS at 02:03

## 2022-03-08 RX ADMIN — HYDROXYUREA 1000 MG: 500 CAPSULE ORAL at 09:03

## 2022-03-08 RX ADMIN — FAMOTIDINE 20 MG: 20 TABLET, FILM COATED ORAL at 09:03

## 2022-03-08 RX ADMIN — ASPIRIN 81 MG CHEWABLE TABLET 81 MG: 81 TABLET CHEWABLE at 09:03

## 2022-03-08 RX ADMIN — MORPHINE SULFATE 45 MG: 30 TABLET, FILM COATED, EXTENDED RELEASE ORAL at 05:03

## 2022-03-08 RX ADMIN — CELECOXIB 200 MG: 100 CAPSULE ORAL at 09:03

## 2022-03-08 RX ADMIN — DIPHENHYDRAMINE HYDROCHLORIDE 25 MG: 50 INJECTION INTRAMUSCULAR; INTRAVENOUS at 11:03

## 2022-03-08 RX ADMIN — DIPHENHYDRAMINE HYDROCHLORIDE 25 MG: 50 INJECTION INTRAMUSCULAR; INTRAVENOUS at 07:03

## 2022-03-08 RX ADMIN — HYDROMORPHONE HYDROCHLORIDE 2 MG: 2 INJECTION, SOLUTION INTRAMUSCULAR; INTRAVENOUS; SUBCUTANEOUS at 07:03

## 2022-03-08 RX ADMIN — VANCOMYCIN HYDROCHLORIDE 750 MG: 750 INJECTION, POWDER, LYOPHILIZED, FOR SOLUTION INTRAVENOUS at 05:03

## 2022-03-08 RX ADMIN — SODIUM CHLORIDE: 0.9 INJECTION, SOLUTION INTRAVENOUS at 05:03

## 2022-03-08 RX ADMIN — SODIUM CHLORIDE: 0.9 INJECTION, SOLUTION INTRAVENOUS at 09:03

## 2022-03-08 RX ADMIN — LIDOCAINE 5% 1 PATCH: 700 PATCH TOPICAL at 11:03

## 2022-03-08 RX ADMIN — VANCOMYCIN HYDROCHLORIDE 750 MG: 750 INJECTION, POWDER, LYOPHILIZED, FOR SOLUTION INTRAVENOUS at 04:03

## 2022-03-08 RX ADMIN — CEFTRIAXONE 1 G: 1 INJECTION, SOLUTION INTRAVENOUS at 03:03

## 2022-03-08 RX ADMIN — MORPHINE SULFATE 60 MG: 30 TABLET, FILM COATED, EXTENDED RELEASE ORAL at 03:03

## 2022-03-08 RX ADMIN — DIPHENHYDRAMINE HYDROCHLORIDE 25 MG: 50 INJECTION INTRAMUSCULAR; INTRAVENOUS at 01:03

## 2022-03-08 RX ADMIN — OXYCODONE HYDROCHLORIDE 20 MG: 10 TABLET ORAL at 12:03

## 2022-03-08 RX ADMIN — FOLIC ACID 4 MG: 1 TABLET ORAL at 09:03

## 2022-03-08 RX ADMIN — DEFERASIROX 1000 MG: 500 TABLET, FOR SUSPENSION ORAL at 05:03

## 2022-03-08 RX ADMIN — OXYCODONE HYDROCHLORIDE 20 MG: 10 TABLET ORAL at 09:03

## 2022-03-08 RX ADMIN — OXYCODONE HYDROCHLORIDE 20 MG: 10 TABLET ORAL at 05:03

## 2022-03-08 RX ADMIN — MORPHINE SULFATE 60 MG: 30 TABLET, FILM COATED, EXTENDED RELEASE ORAL at 10:03

## 2022-03-08 NOTE — PLAN OF CARE
Problem: Adult Inpatient Plan of Care  Goal: Plan of Care Review  Outcome: Ongoing, Progressing  Goal: Patient-Specific Goal (Individualized)  Outcome: Ongoing, Progressing  Goal: Absence of Hospital-Acquired Illness or Injury  Outcome: Ongoing, Progressing  Goal: Optimal Comfort and Wellbeing  Outcome: Ongoing, Progressing  Goal: Readiness for Transition of Care  Outcome: Ongoing, Progressing     Problem: Infection  Goal: Absence of Infection Signs and Symptoms  Outcome: Ongoing, Progressing     Problem: Fall Injury Risk  Goal: Absence of Fall and Fall-Related Injury  Outcome: Ongoing, Progressing      Is This A New Presentation, Or A Follow-Up?: Skin Lesions Additional History: Was doing yard work 2 weeks ago when she felt bites on back, legs. She has a spot on her back that she is concerned is a tick bite that has been very itchy, but is not itching now. Has not used any meds.

## 2022-03-08 NOTE — CARE UPDATE
03/08/22 1046   Patient Assessment/Suction   Level of Consciousness (AVPU) alert   Respiratory Effort Unlabored   All Lung Fields Breath Sounds clear   PRE-TX-O2   O2 Device (Oxygen Therapy) room air   SpO2 99 %   Pulse Oximetry Type Intermittent   $ Pulse Oximetry - Multiple Charge Pulse Oximetry - Multiple   Aerosol Therapy   $ Aerosol Therapy Charges PRN treatment not required

## 2022-03-08 NOTE — PROGRESS NOTES
MRI of left shoulder reviewed    MRI of the left shoulde dated 03/07/2022 shows aseptic necrosis of the left humeral head.  No effusion is noted no sign of septic shoulder.    Dx:  Aseptic necrosis of the left humeral head    Plan:  Patient can follow up as an outpatient for further treatment of for AVN of her left shoulder.

## 2022-03-08 NOTE — PROGRESS NOTES
"Pharmacokinetic Assessment Follow Up: IV Vancomycin    Vancomycin serum concentration assessment(s):    The trough level was drawn correctly and can be used to guide therapy at this time. The measurement is within the desired definitive target range of 10 to 15 mcg/mL.    Vancomycin Regimen Plan:    Continue regimen to Vancomycin 750 mg IV every 12 hours with next serum trough concentration measured at 1530 prior to 4th dose on 03/09/22    Drug levels (last 3 results):  Recent Labs   Lab Result Units 03/08/22  0311   Vancomycin-Trough ug/mL 12.0       Pharmacy will continue to follow and monitor vancomycin.    Please contact pharmacy at extension [phone] for questions regarding this assessment.    Thank you for the consult,   Alex Murillo       Patient brief summary:  Nishi Dumont is a 31 y.o. female initiated on antimicrobial therapy with IV Vancomycin for treatment of skin & soft tissue infection    The patient's current regimen is 750mg q12h    Drug Allergies:   Review of patient's allergies indicates:   Allergen Reactions    Contrast media Hives    Iodine and iodide containing products Hives and Swelling    Mushroom Hives    Zithromax [azithromycin] Anaphylaxis    Demerol [meperidine] Other (See Comments)     Regarding reaction to demerol pt states "I talk out of my head and become aggressive"       Actual Body Weight:   56.7kg    Renal Function:   Estimated Creatinine Clearance: 89.7 mL/min (based on SCr of 0.7 mg/dL).,     CBC (last 72 hours):  Recent Labs   Lab Result Units 03/05/22  0530 03/06/22  0509 03/07/22  0501 03/08/22  0311   WBC K/uL 14.18* 12.04 12.34 10.72   Hemoglobin g/dL 8.0* 7.8* 7.9* 7.7*   Hematocrit % 24.9* 24.8* 24.6* 24.4*   Platelets K/uL 716* 693* 686* 622*   Gran % % 71.3 67.7 68.4 66.9   Lymph % % 17.6* 21.2 19.9 22.9   Mono % % 6.1 4.7 6.0 5.8   Eosinophil % % 4.0 4.9 4.2 3.2   Basophil % % 0.7 1.2 1.1 0.9   Differential Method  Automated Automated Automated Automated "       Metabolic Panel (last 72 hours):  Recent Labs   Lab Result Units 03/05/22  0530 03/06/22  0509 03/07/22  0501 03/08/22  0311   Sodium mmol/L 140 140 138 140   Potassium mmol/L 4.0 4.4 4.1 4.7   Chloride mmol/L 106 107 104 106   CO2 mmol/L 26 25 26 27   Glucose mg/dL 87 83 102 92   BUN mg/dL 6 6 7 7   Creatinine mg/dL 0.7 0.7 0.7 0.7   Albumin g/dL 3.3* 3.3* 3.5 3.4*   Total Bilirubin mg/dL 1.0 1.0 0.9 0.9   Alkaline Phosphatase U/L 85 86 89 91   AST U/L 19 19 22 35   ALT U/L 20 21 19 28   Magnesium mg/dL 1.7 1.7 1.7 1.8       Vancomycin Administrations:  vancomycin given in the last 96 hours                     vancomycin 750 mg in dextrose 5 % 250 mL IVPB (ready to mix system) (mg) 750 mg New Bag 03/07/22 1647     750 mg New Bag  0341     750 mg New Bag 03/06/22 1635                    Microbiologic Results:  Microbiology Results (last 7 days)       Procedure Component Value Units Date/Time    Blood culture [[id number]] Collected: 03/08/22 0311    Order Status: Sent Specimen: Blood from Antecubital, Right Updated: 03/08/22 0311    Narrative:      Collection has been rescheduled by [facility] at 03/07/2022 19:12 Reason:   Unable to collect  Collection has been rescheduled by [facility] at 03/07/2022 19:32 Reason:   Patient Refused she dont want to be stuck   Collection has been rescheduled by [facility] at 03/07/2022 19:12 Reason:   Unable to collect  Collection has been rescheduled by [facility] at 03/07/2022 19:32 Reason:   Patient Refused she dont want to be stuck     Blood culture [[id number]] Collected: 03/08/22 0311    Order Status: Sent Specimen: Blood from Antecubital, Right Updated: 03/08/22 0311    Narrative:      Collection has been rescheduled by [facility] at 03/07/2022 19:12 Reason:   Unable to collect  Collection has been rescheduled by [facility] at 03/07/2022 19:32 Reason:   Patient Refused she dont want to be stuck   Collection has been rescheduled by [facility] at 03/07/2022 19:12 Reason:   Unable to collect  Collection has been rescheduled  by [facility] at 03/07/2022 19:32 Reason:   Patient Refused she dont want to be stuck

## 2022-03-08 NOTE — SUBJECTIVE & OBJECTIVE
Interval History: patient still having a lot of pain, increasing long acting morphine  - MRI left shoulder shows avascular necrosis which ortho states can treat as an outpatient   - treating a thrombophlebitis with IV abx    - getting MRI left arm and forearm  - having heme/onc evaluate for need to remove porth a cath as reason for her basilic vein thrombosis , if so will have IR or general surgery do tomorrow         Review of Systems   Constitutional:  Negative for activity change and fever.   Respiratory:  Negative for cough and shortness of breath.    Gastrointestinal:  Negative for abdominal pain and nausea.   Musculoskeletal:  Positive for arthralgias and joint swelling.   Objective:     Vital Signs (Most Recent):  Temp: 98.2 °F (36.8 °C) (03/08/22 1102)  Pulse: 98 (03/08/22 1102)  Resp: 16 (03/08/22 1109)  BP: (!) 102/56 (03/08/22 1102)  SpO2: 96 % (03/08/22 1102)   Vital Signs (24h Range):  Temp:  [97.4 °F (36.3 °C)-98.2 °F (36.8 °C)] 98.2 °F (36.8 °C)  Pulse:  [83-98] 98  Resp:  [1-20] 16  SpO2:  [96 %-100 %] 96 %  BP: ()/(53-84) 102/56     Weight: 56.7 kg (125 lb)  Body mass index is 25.25 kg/m².    Intake/Output Summary (Last 24 hours) at 3/8/2022 1301  Last data filed at 3/8/2022 0800  Gross per 24 hour   Intake 3795.03 ml   Output 0 ml   Net 3795.03 ml        Physical Exam  Vitals and nursing note reviewed.   Constitutional:       General: She is not in acute distress.  Pulmonary:      Effort: Pulmonary effort is normal.      Breath sounds: No wheezing.   Abdominal:      General: Abdomen is flat. There is no distension.   Musculoskeletal:         General: Swelling present.      Left lower leg: No edema.      Comments: Decrease left shoulder ROM and swelling    Skin:     Coloration: Skin is not jaundiced.      Findings: No rash.   Neurological:      General: No focal deficit present.      Mental Status: She is alert and oriented to person, place, and time.   Psychiatric:         Mood and Affect:  Mood normal.         Behavior: Behavior normal.       Significant Labs: All pertinent labs within the past 24 hours have been reviewed.    Significant Imaging: I have reviewed all pertinent imaging results/findings within the past 24 hours.

## 2022-03-08 NOTE — TELEPHONE ENCOUNTER
Incoming consult called from [facility] med surg floor from Nurse Dacosta for this pt for sickle cell/basilic vein thrombus. Dr Williamson notified.

## 2022-03-08 NOTE — ASSESSMENT & PLAN NOTE
- superficial vein, so no need for anti-coagulation, however will have heme/onc consulted and discuss cause if port a cath then likely needs removal

## 2022-03-08 NOTE — PT/OT/SLP PROGRESS
Occupational Therapy      Patient Name:  Nishi Dumont   MRN:  [id number]    Patient not seen today secondary to Other (Comment) (Nurse Efraín suggested holding off until after MRI LUE as OT had questions regarding LUE restrictions/status. OT to follow up.).    3/8/2022

## 2022-03-08 NOTE — CARE UPDATE
03/07/22 1915   Patient Assessment/Suction   Level of Consciousness (AVPU) alert   Respiratory Effort Unlabored   Expansion/Accessory Muscles/Retractions expansion symmetric   All Lung Fields Breath Sounds Anterior:;clear;diminished   LLL Breath Sounds diminished   RLL Breath Sounds diminished   Rhythm/Pattern, Respiratory pattern regular;no shortness of breath reported   Cough Frequency no cough   PRE-TX-O2   O2 Device (Oxygen Therapy) room air   SpO2 100 %   Pulse Oximetry Type Intermittent   $ Pulse Oximetry - Multiple Charge Pulse Oximetry - Multiple   Pulse 94   Resp 18   Positioning HOB elevated 30 degrees   Aerosol Therapy   $ Aerosol Therapy Charges PRN treatment not required   Respiratory Treatment Status (SVN) PRN treatment not required

## 2022-03-08 NOTE — CONSULTS
[facility]  Hematology/Oncology  Consult Note    Patient Name: Nishi Dumont  MRN: [id number]  Admission Date: 3/2/2022  Hospital Length of Stay: 6 days  Code Status: Full Code   Attending Provider: Toy Shannon MD  Consulting Provider: Rosenda Donaldson NP  Primary Care Physician: Primary Doctor No  Principal Problem:Sickle cell pain crisis    Consults  Subjective:     HPI: Nishi Dumont is a 31 y.o. 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 She states she feels like this is a typical sickle cell flare with associated left sided stabbing chest pain, mid back and left shoulder pain. She denies SOB, nausea, vomiting or diarrhea. She denies fever or cough. She reports compliance with her medications. She states that she gets flares when the weather changes, but has been keeping herself inside to avoid a possible flare. She was last admitted for sickle cell one month ago, and last had an acute chest syndrome about 7-8 months ago. She denies all other complaint.     ED work up was significant for anemia on CBC and a CMP that was essentially unremarkable. Troponin was negative. CXR showed what could be infiltrates to bilateral lung bases, pending official reading. She has not experienced tachycardia or hypoxia. Hospital Medicine consulted for admission and further management (per med rec)    Hematology Consult:  Hematology was consulted for for this patient who has a known history of sickle cell.  She is currently under the care of Dr. NILDA Carreon at [facility].  She was admitted for sickle cell pain crisis.  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      Medications:  Continuous Infusions:   sodium chloride 0.9% 100 mL/hr at 03/08/22 0634     Scheduled  "Meds:   ALPRAZolam  1 mg Oral Once    aspirin  81 mg Oral Daily    cefTRIAXone (ROCEPHIN) IVPB  1 g Intravenous Q24H    celecoxib  200 mg Oral BID    deferasirox  1,000 mg Oral Before breakfast    enoxaparin  40 mg Subcutaneous Daily    famotidine  20 mg Oral Daily    folic acid  4 mg Oral Daily    hydroxyurea  1,000 mg Oral BID    LIDOcaine  1 patch Transdermal Q24H    morphine  60 mg Oral Q8H    polyethylene glycol  17 g Oral Daily    senna-docusate 8.6-50 mg  1 tablet Oral BID    vancomycin (VANCOCIN) IVPB  750 mg Intravenous Q12H     PRN Meds:acetaminophen, acetaminophen, albuterol-ipratropium, aluminum-magnesium hydroxide-simethicone, dextrose 50%, dextrose 50%, diphenhydrAMINE, glucagon (human recombinant), glucose, glucose, HYDROmorphone, magnesium oxide, magnesium oxide, melatonin, methocarbamoL, naloxone, ondansetron, oxyCODONE, potassium bicarbonate, potassium bicarbonate, potassium bicarbonate, potassium, sodium phosphates, potassium, sodium phosphates, potassium, sodium phosphates, sars-cov-2 (covid-19), simethicone, sodium chloride 0.9%, Pharmacy to dose Vancomycin consult **AND** vancomycin - pharmacy to dose     Review of patient's allergies indicates:   Allergen Reactions    Contrast media Hives    Iodine and iodide containing products Hives and Swelling    Mushroom Hives    Zithromax [azithromycin] Anaphylaxis    Demerol [meperidine] Other (See Comments)     Regarding reaction to demerol pt states "I talk out of my head and become aggressive"        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    Avascular necrosis of bone of hip     Avascular necrosis of humeral head     Blood transfusion     Sickle cell disease      Past Surgical History:   Procedure Laterality Date    CHOLECYSTECTOMY      JOINT REPLACEMENT       left hip     Family History     Problem Relation (Age of Onset)    Sickle cell trait Mother, Father, Brother, " Daughter        Tobacco Use    Smoking status: Never Smoker    Smokeless tobacco: Never Used   Substance and Sexual Activity    Alcohol use: Yes     Alcohol/week: 0.0 standard drinks     Comment: occassionally    Drug use: No    Sexual activity: Yes     Partners: Male     Birth control/protection: Injection       Review of Systems   Positive for fatigue, significant left shoulder pain causing limited range of motion, and right rib pain  All other pertinent review of systems have been reviewed and are negative  Objective:     Vital Signs (Most Recent):  Temp: 98.2 °F (36.8 °C) (03/08/22 1102)  Pulse: 98 (03/08/22 1102)  Resp: 16 (03/08/22 1109)  BP: (!) 102/56 (03/08/22 1102)  SpO2: 96 % (03/08/22 1102) Vital Signs (24h Range):  Temp:  [97.4 °F (36.3 °C)-98.2 °F (36.8 °C)] 98.2 °F (36.8 °C)  Pulse:  [83-98] 98  Resp:  [14-20] 16  SpO2:  [96 %-100 %] 96 %  BP: ()/(53-73) 102/56     Weight: 56.7 kg (125 lb)  Body mass index is 25.25 kg/m².  Body surface area is 1.54 meters squared.      Intake/Output Summary (Last 24 hours) at 3/8/2022 1438  Last data filed at 3/8/2022 1200  Gross per 24 hour   Intake 4035.03 ml   Output 0 ml   Net 4035.03 ml       Physical Exam  PHYSICAL EXAM:     Vitals:    03/08/22 1109   BP:    Pulse:    Resp: 16   Temp:        GENERAL:  Patient is obviously uncomfortable when moving left upper extremity  Awake, alert and oriented to time, person and place.  No anxiety, or agitation.      HEENT: Normal conjunctivae and eyelids. WNL.  PERRLA 3 to 4 mm. No icterus, no pallor, no congestion, and no discharge noted.     NECK:  Supple. Trachea is central.  No crepitus.  No JVD or masses.    RESPIRATORY:  Bilateral breath sounds clear to auscultate  CARDIOVASCULAR:  Regular rate and rhythm.    ABDOMEN:  Normal abdomen.  No hepatosplenomegaly.  No free fluid.  Bowel sounds are present.  No hernia noted. No masses.  No rebound or tenderness.      SKIN/MUSCULOSKELETAL:  Limited range of motion  and left upper extremity due to pain, moves all other extremities well.  No rashes noted    NEUROLOGIC:  Higher functions are appropriate.  No cranial nerve deficits.  Normal gait    GENITAL/RECTAL:  Exams are deferred.  Significant Labs:   BMP:   Recent Labs   Lab 03/07/22  0501 03/08/22  0311    92    140   K 4.1 4.7    106   CO2 26 27   BUN 7 7   CREATININE 0.7 0.7   CALCIUM 8.7 8.8   MG 1.7 1.8    and CBC:   Recent Labs   Lab 03/07/22  0501 03/08/22  0311   WBC 12.34 10.72   HGB 7.9* 7.7*   HCT 24.6* 24.4*   * 622*       Diagnostic Results:  I have reviewed all pertinent imaging results/findings within the past 24 hours.    Assessment/Plan:     Active Diagnoses:    Diagnosis Date Noted POA    PRINCIPAL PROBLEM:  Sickle cell pain crisis [D57.00] 09/29/2017 Yes    Basilic vein thrombosis [I82.619] 03/08/2022 No    Thrombophlebitis [I80.9] 03/07/2022 No    Pain and swelling of left shoulder [M25.512, M25.412] 03/05/2022 No    Chest pain [R07.9] 08/30/2017 Yes    Sickle cell anemia with pain [D57.00] 09/04/2013 Yes      Problems Resolved During this Admission:     Left basilic vein thrombus:  I discussed this case with Dr. Williamson  Recommend warm compresses and starting aspirin 81 mg daily  We will hold off on port removal if MediPort is functioning properly          Sickle cell with pain crisis  Patient is scheduled to follow-up at [facility] on 03/15/2022    AVN:  Patient would benefit from outpatient orthopedic evaluation    Thank you for your consult.     Rosenda Donaldson NP  Hematology/Oncology  [facility]

## 2022-03-08 NOTE — PROGRESS NOTES
[facility] Hospital Medicine  Telemedicine Progress Note    Patient Name: Nishi Dumont  MRN: [id number]  Patient Class: IP- Inpatient   Admission Date: 3/2/2022  Length of Stay: 6 days  Attending Physician: Toy Shannon MD  Primary Care Provider: Primary Doctor No          Subjective:     Principal Problem:Sickle cell pain crisis        HPI:  Nishi Dumont is a 31 y.o. 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 She states she feels like this is a typical sickle cell flare with associated left sided stabbing chest pain, mid back and left shoulder pain. She denies SOB, nausea, vomiting or diarrhea. She denies fever or cough. She reports compliance with her medications. She states that she gets flares when the weather changes, but has been keeping herself inside to avoid a possible flare. She was last admitted for sickle cell one month ago, and last had an acute chest syndrome about 7-8 months ago. She denies all other complaint.    ED work up was significant for anemia on CBC and a CMP that was essentially unremarkable. Troponin was negative. CXR showed what could be infiltrates to bilateral lung bases, pending official reading. She has not experienced tachycardia or hypoxia. Hospital Medicine consulted for admission and further management.      Overview/Hospital Course:  No notes on file    Interval History: patient still having a lot of pain, increasing long acting morphine  - MRI left shoulder shows avascular necrosis which ortho states can treat as an outpatient   - treating a thrombophlebitis with IV abx    - getting MRI left arm and forearm  - having heme/onc evaluate for need to remove porth a cath as reason for her basilic vein thrombosis , if so will have IR  or general surgery do tomorrow         Review of Systems   Constitutional:  Negative for activity change and fever.   Respiratory:  Negative for cough and shortness of breath.    Gastrointestinal:  Negative for abdominal pain and nausea.   Musculoskeletal:  Positive for arthralgias and joint swelling.   Objective:     Vital Signs (Most Recent):  Temp: 98.2 °F (36.8 °C) (03/08/22 1102)  Pulse: 98 (03/08/22 1102)  Resp: 16 (03/08/22 1109)  BP: (!) 102/56 (03/08/22 1102)  SpO2: 96 % (03/08/22 1102)   Vital Signs (24h Range):  Temp:  [97.4 °F (36.3 °C)-98.2 °F (36.8 °C)] 98.2 °F (36.8 °C)  Pulse:  [83-98] 98  Resp:  [1-20] 16  SpO2:  [96 %-100 %] 96 %  BP: ()/(53-84) 102/56     Weight: 56.7 kg (125 lb)  Body mass index is 25.25 kg/m².    Intake/Output Summary (Last 24 hours) at 3/8/2022 1301  Last data filed at 3/8/2022 0800  Gross per 24 hour   Intake 3795.03 ml   Output 0 ml   Net 3795.03 ml        Physical Exam  Vitals and nursing note reviewed.   Constitutional:       General: She is not in acute distress.  Pulmonary:      Effort: Pulmonary effort is normal.      Breath sounds: No wheezing.   Abdominal:      General: Abdomen is flat. There is no distension.   Musculoskeletal:         General: Swelling present.      Left lower leg: No edema.      Comments: Decrease left shoulder ROM and swelling    Skin:     Coloration: Skin is not jaundiced.      Findings: No rash.   Neurological:      General: No focal deficit present.      Mental Status: She is alert and oriented to person, place, and time.   Psychiatric:         Mood and Affect: Mood normal.         Behavior: Behavior normal.       Significant Labs: All pertinent labs within the past 24 hours have been reviewed.    Significant Imaging: I have reviewed all pertinent imaging results/findings within the past 24 hours.      Assessment/Plan:      * Sickle cell pain crisis  Admit to tele  Continue home medications  Dilaudid PRN  Antinausea PRN    3/3- increasing  IVFs to 200 cc/hr; adding PO oxy IR as first choice and making dilaudid second choice     Pain and swelling of left shoulder  - getting ESR, CRP, left shoulder xrays and ortho consult      3/6- xrays reviewed; getting LUE U/S to rule out DVT; ortho consulted;     3/7- U/S shows basilic vein thrombosis, MRI left shoulder pending ; OT eval    3/8- MRI shows avascular necrosis to shoulder; getting MRI arm and forearm     Thrombophlebitis  - on IV vanc and rocephin, ID consulted      Basilic vein thrombosis  - superficial vein, so no need for anti-coagulation, however will have heme/onc consulted and discuss cause if port a cath then likely needs removal       Chest pain  Admit to tele  Concern for Acute chest, last episode was about 7-8 months ago  Afebrile, denies SOB  Trend troponin, #1 0.010  Infiltrate seen on initial chest xray, pending official read. Will treat with levaquin empirically  Oxygen PRN to keep O2 sat >94%      Sickle cell anemia with pain  Patient's anemia is currently controlled. Has not received any PRBCs to date.. Etiology likely d/t sickle cell disease  Current CBC reviewed-   Lab Results   Component Value Date    HGB 9.1 (L) 03/02/2022    HCT 27.5 (L) 03/02/2022     Monitor serial CBC and transfuse if patient becomes hemodynamically unstable, symptomatic or H/H drops below 7/21.           VTE Risk Mitigation (From admission, onward)         Ordered     enoxaparin injection 40 mg  Daily         03/02/22 0415     IP VTE HIGH RISK PATIENT  Once         03/02/22 0415     Place sequential compression device  Until discontinued         03/02/22 0415                      I have assessed these finding virtually using telemed platform and with assistance of bedside nurse                 The attending portion of this evaluation, treatment, and documentation was performed per Toy Shannon MD via Telemedicine AudioVisual using the secure [employer] software platform with 2 way audio/video. The provider was  located off-site and the patient is located in the hospital. The aforementioned video software was utilized to document the relevant history and physical exam    Toy Shannon MD  Department of Hospital Medicine   [facility]

## 2022-03-08 NOTE — ASSESSMENT & PLAN NOTE
- getting ESR, CRP, left shoulder xrays and ortho consult      3/6- xrays reviewed; getting LUE U/S to rule out DVT; ortho consulted;     3/7- U/S shows basilic vein thrombosis, MRI left shoulder pending ; OT eval    3/8- MRI shows avascular necrosis to shoulder; getting MRI arm and forearm

## 2022-03-09 ENCOUNTER — ANESTHESIA EVENT (OUTPATIENT)
Dept: SURGERY | Facility: HOSPITAL | Age: 32
DRG: 982 | End: 2022-03-09
Payer: MEDICAID

## 2022-03-09 ENCOUNTER — ANESTHESIA (OUTPATIENT)
Dept: SURGERY | Facility: HOSPITAL | Age: 32
DRG: 982 | End: 2022-03-09
Payer: MEDICAID

## 2022-03-09 PROBLEM — M87.022 AVASCULAR NECROSIS OF LEFT HUMERAL HEAD: Status: ACTIVE | Noted: 2022-03-09

## 2022-03-09 LAB
ALBUMIN SERPL BCP-MCNC: 3.6 G/DL (ref 3.5–5.2)
ALP SERPL-CCNC: 102 U/L (ref 55–135)
ALT SERPL W/O P-5'-P-CCNC: 42 U/L (ref 10–44)
ANION GAP SERPL CALC-SCNC: 6 MMOL/L (ref 8–16)
AST SERPL-CCNC: 41 U/L (ref 10–40)
B-HCG UR QL: NEGATIVE
BASOPHILS # BLD AUTO: 0.14 K/UL (ref 0–0.2)
BASOPHILS NFR BLD: 1.4 % (ref 0–1.9)
BILIRUB SERPL-MCNC: 1.1 MG/DL (ref 0.1–1)
BUN SERPL-MCNC: 12 MG/DL (ref 6–20)
CALCIUM SERPL-MCNC: 8.8 MG/DL (ref 8.7–10.5)
CHLORIDE SERPL-SCNC: 104 MMOL/L (ref 95–110)
CO2 SERPL-SCNC: 29 MMOL/L (ref 23–29)
CREAT SERPL-MCNC: 0.7 MG/DL (ref 0.5–1.4)
CTP QC/QA: YES
DIFFERENTIAL METHOD: ABNORMAL
EOSINOPHIL # BLD AUTO: 0.3 K/UL (ref 0–0.5)
EOSINOPHIL NFR BLD: 2.5 % (ref 0–8)
ERYTHROCYTE [DISTWIDTH] IN BLOOD BY AUTOMATED COUNT: 16.8 % (ref 11.5–14.5)
EST. GFR  (AFRICAN AMERICAN): >60 ML/MIN/1.73 M^2
EST. GFR  (NON AFRICAN AMERICAN): >60 ML/MIN/1.73 M^2
GLUCOSE SERPL-MCNC: 84 MG/DL (ref 70–110)
HCT VFR BLD AUTO: 24.6 % (ref 37–48.5)
HGB BLD-MCNC: 7.6 G/DL (ref 12–16)
IMM GRANULOCYTES # BLD AUTO: 0.04 K/UL (ref 0–0.04)
IMM GRANULOCYTES NFR BLD AUTO: 0.4 % (ref 0–0.5)
LYMPHOCYTES # BLD AUTO: 2.8 K/UL (ref 1–4.8)
LYMPHOCYTES NFR BLD: 26.9 % (ref 18–48)
MAGNESIUM SERPL-MCNC: 1.8 MG/DL (ref 1.6–2.6)
MCH RBC QN AUTO: 27.1 PG (ref 27–31)
MCHC RBC AUTO-ENTMCNC: 30.9 G/DL (ref 32–36)
MCV RBC AUTO: 88 FL (ref 82–98)
MONOCYTES # BLD AUTO: 0.6 K/UL (ref 0.3–1)
MONOCYTES NFR BLD: 6.1 % (ref 4–15)
NEUTROPHILS # BLD AUTO: 6.5 K/UL (ref 1.8–7.7)
NEUTROPHILS NFR BLD: 62.7 % (ref 38–73)
NRBC BLD-RTO: 0 /100 WBC
PLATELET # BLD AUTO: 622 K/UL (ref 150–450)
PMV BLD AUTO: 9.3 FL (ref 9.2–12.9)
POTASSIUM SERPL-SCNC: 4.6 MMOL/L (ref 3.5–5.1)
PROT SERPL-MCNC: 6.6 G/DL (ref 6–8.4)
RBC # BLD AUTO: 2.8 M/UL (ref 4–5.4)
SARS-COV-2 AG RESP QL IA.RAPID: NEGATIVE
SODIUM SERPL-SCNC: 139 MMOL/L (ref 136–145)
VANCOMYCIN TROUGH SERPL-MCNC: 12.3 UG/ML (ref 10–22)
WBC # BLD AUTO: 10.36 K/UL (ref 3.9–12.7)

## 2022-03-09 PROCEDURE — 76942 ECHO GUIDE FOR BIOPSY: CPT | Mod: 26,,, | Performed by: ANESTHESIOLOGY

## 2022-03-09 PROCEDURE — 25000003 PHARM REV CODE 250: Performed by: HOSPITALIST

## 2022-03-09 PROCEDURE — 36000704 HC OR TIME LEV I 1ST 15 MIN: Performed by: ORTHOPAEDIC SURGERY

## 2022-03-09 PROCEDURE — 25000003 PHARM REV CODE 250: Performed by: ANESTHESIOLOGY

## 2022-03-09 PROCEDURE — D9220A PRA ANESTHESIA: Mod: CRNA,,, | Performed by: NURSE ANESTHETIST, CERTIFIED REGISTERED

## 2022-03-09 PROCEDURE — 27200750 HC INSULATED NEEDLE/ STIMUPLEX: Performed by: ANESTHESIOLOGY

## 2022-03-09 PROCEDURE — 36415 COLL VENOUS BLD VENIPUNCTURE: CPT | Performed by: HOSPITALIST

## 2022-03-09 PROCEDURE — 64415 NJX AA&/STRD BRCH PLXS IMG: CPT | Mod: 59,LT,, | Performed by: ANESTHESIOLOGY

## 2022-03-09 PROCEDURE — 81025 URINE PREGNANCY TEST: CPT | Performed by: HOSPITALIST

## 2022-03-09 PROCEDURE — C9290 INJ, BUPIVACAINE LIPOSOME: HCPCS | Performed by: ANESTHESIOLOGY

## 2022-03-09 PROCEDURE — 25000003 PHARM REV CODE 250: Performed by: NURSE ANESTHETIST, CERTIFIED REGISTERED

## 2022-03-09 PROCEDURE — 23929 UNLISTED PROCEDURE SHOULDER: CPT | Mod: ,,, | Performed by: ORTHOPAEDIC SURGERY

## 2022-03-09 PROCEDURE — 76000 FLUOROSCOPY <1 HR PHYS/QHP: CPT | Mod: 26,,, | Performed by: ORTHOPAEDIC SURGERY

## 2022-03-09 PROCEDURE — 37000008 HC ANESTHESIA 1ST 15 MINUTES: Performed by: ORTHOPAEDIC SURGERY

## 2022-03-09 PROCEDURE — 71000039 HC RECOVERY, EACH ADD'L HOUR: Performed by: ORTHOPAEDIC SURGERY

## 2022-03-09 PROCEDURE — D9220A PRA ANESTHESIA: Mod: ANES,,, | Performed by: ANESTHESIOLOGY

## 2022-03-09 PROCEDURE — 76942 PR U/S GUIDANCE FOR NEEDLE GUIDANCE: ICD-10-PCS | Mod: 26,,, | Performed by: ANESTHESIOLOGY

## 2022-03-09 PROCEDURE — 83735 ASSAY OF MAGNESIUM: CPT | Performed by: STUDENT IN AN ORGANIZED HEALTH CARE EDUCATION/TRAINING PROGRAM

## 2022-03-09 PROCEDURE — 63600175 PHARM REV CODE 636 W HCPCS: Performed by: ORTHOPAEDIC SURGERY

## 2022-03-09 PROCEDURE — 36000705 HC OR TIME LEV I EA ADD 15 MIN: Performed by: ORTHOPAEDIC SURGERY

## 2022-03-09 PROCEDURE — 85025 COMPLETE CBC W/AUTO DIFF WBC: CPT | Performed by: STUDENT IN AN ORGANIZED HEALTH CARE EDUCATION/TRAINING PROGRAM

## 2022-03-09 PROCEDURE — 63600175 PHARM REV CODE 636 W HCPCS: Performed by: NURSE ANESTHETIST, CERTIFIED REGISTERED

## 2022-03-09 PROCEDURE — 99900035 HC TECH TIME PER 15 MIN (STAT)

## 2022-03-09 PROCEDURE — 94761 N-INVAS EAR/PLS OXIMETRY MLT: CPT

## 2022-03-09 PROCEDURE — 71000033 HC RECOVERY, INTIAL HOUR: Performed by: ORTHOPAEDIC SURGERY

## 2022-03-09 PROCEDURE — 12000002 HC ACUTE/MED SURGE SEMI-PRIVATE ROOM

## 2022-03-09 PROCEDURE — 63600175 PHARM REV CODE 636 W HCPCS

## 2022-03-09 PROCEDURE — 25000003 PHARM REV CODE 250: Performed by: ORTHOPAEDIC SURGERY

## 2022-03-09 PROCEDURE — 80202 ASSAY OF VANCOMYCIN: CPT | Performed by: HOSPITALIST

## 2022-03-09 PROCEDURE — 23929: ICD-10-PCS | Mod: ,,, | Performed by: ORTHOPAEDIC SURGERY

## 2022-03-09 PROCEDURE — 99900103 DSU ONLY-NO CHARGE-INITIAL HR (STAT): Performed by: ORTHOPAEDIC SURGERY

## 2022-03-09 PROCEDURE — D9220A PRA ANESTHESIA: ICD-10-PCS | Mod: ANES,,, | Performed by: ANESTHESIOLOGY

## 2022-03-09 PROCEDURE — 99900104 DSU ONLY-NO CHARGE-EA ADD'L HR (STAT): Performed by: ORTHOPAEDIC SURGERY

## 2022-03-09 PROCEDURE — 37000009 HC ANESTHESIA EA ADD 15 MINS: Performed by: ORTHOPAEDIC SURGERY

## 2022-03-09 PROCEDURE — 25000003 PHARM REV CODE 250: Performed by: NURSE PRACTITIONER

## 2022-03-09 PROCEDURE — 36415 COLL VENOUS BLD VENIPUNCTURE: CPT | Performed by: STUDENT IN AN ORGANIZED HEALTH CARE EDUCATION/TRAINING PROGRAM

## 2022-03-09 PROCEDURE — 63600175 PHARM REV CODE 636 W HCPCS: Performed by: HOSPITALIST

## 2022-03-09 PROCEDURE — 64415 PR NERVE BLOCK INJ, ANES/STEROID, BRACHIAL PLEXUS, INCL IMAG GUIDANCE: ICD-10-PCS | Mod: 59,LT,, | Performed by: ANESTHESIOLOGY

## 2022-03-09 PROCEDURE — 76000 PR  FLUOROSCOPE EXAMINATION: ICD-10-PCS | Mod: 26,,, | Performed by: ORTHOPAEDIC SURGERY

## 2022-03-09 PROCEDURE — 63600175 PHARM REV CODE 636 W HCPCS: Performed by: ANESTHESIOLOGY

## 2022-03-09 PROCEDURE — 64415 NJX AA&/STRD BRCH PLXS IMG: CPT | Performed by: ANESTHESIOLOGY

## 2022-03-09 PROCEDURE — 80053 COMPREHEN METABOLIC PANEL: CPT | Performed by: STUDENT IN AN ORGANIZED HEALTH CARE EDUCATION/TRAINING PROGRAM

## 2022-03-09 PROCEDURE — D9220A PRA ANESTHESIA: ICD-10-PCS | Mod: CRNA,,, | Performed by: NURSE ANESTHETIST, CERTIFIED REGISTERED

## 2022-03-09 RX ORDER — LIDOCAINE HCL/PF 100 MG/5ML
SYRINGE (ML) INTRAVENOUS
Status: DISCONTINUED | OUTPATIENT
Start: 2022-03-09 | End: 2022-03-09

## 2022-03-09 RX ORDER — ONDANSETRON 2 MG/ML
4 INJECTION INTRAMUSCULAR; INTRAVENOUS ONCE AS NEEDED
Status: DISCONTINUED | OUTPATIENT
Start: 2022-03-09 | End: 2022-03-09 | Stop reason: HOSPADM

## 2022-03-09 RX ORDER — MIDAZOLAM HYDROCHLORIDE 1 MG/ML
INJECTION INTRAMUSCULAR; INTRAVENOUS
Status: DISCONTINUED | OUTPATIENT
Start: 2022-03-09 | End: 2022-03-09

## 2022-03-09 RX ORDER — FENTANYL CITRATE 50 UG/ML
25 INJECTION, SOLUTION INTRAMUSCULAR; INTRAVENOUS EVERY 5 MIN PRN
Status: DISCONTINUED | OUTPATIENT
Start: 2022-03-09 | End: 2022-03-09 | Stop reason: HOSPADM

## 2022-03-09 RX ORDER — ONDANSETRON HYDROCHLORIDE 2 MG/ML
INJECTION, SOLUTION INTRAMUSCULAR; INTRAVENOUS
Status: DISCONTINUED | OUTPATIENT
Start: 2022-03-09 | End: 2022-03-09

## 2022-03-09 RX ORDER — PROPOFOL 10 MG/ML
VIAL (ML) INTRAVENOUS
Status: DISCONTINUED | OUTPATIENT
Start: 2022-03-09 | End: 2022-03-09

## 2022-03-09 RX ORDER — DEXAMETHASONE SODIUM PHOSPHATE 4 MG/ML
INJECTION, SOLUTION INTRA-ARTICULAR; INTRALESIONAL; INTRAMUSCULAR; INTRAVENOUS; SOFT TISSUE
Status: DISCONTINUED | OUTPATIENT
Start: 2022-03-09 | End: 2022-03-09

## 2022-03-09 RX ORDER — OXYCODONE HYDROCHLORIDE 5 MG/1
5 TABLET ORAL ONCE AS NEEDED
Status: DISCONTINUED | OUTPATIENT
Start: 2022-03-09 | End: 2022-03-09 | Stop reason: HOSPADM

## 2022-03-09 RX ORDER — ACETAMINOPHEN 10 MG/ML
INJECTION, SOLUTION INTRAVENOUS
Status: DISCONTINUED | OUTPATIENT
Start: 2022-03-09 | End: 2022-03-09

## 2022-03-09 RX ORDER — FENTANYL CITRATE 50 UG/ML
INJECTION, SOLUTION INTRAMUSCULAR; INTRAVENOUS
Status: DISCONTINUED | OUTPATIENT
Start: 2022-03-09 | End: 2022-03-09

## 2022-03-09 RX ORDER — BUPIVACAINE HYDROCHLORIDE 5 MG/ML
INJECTION, SOLUTION EPIDURAL; INTRACAUDAL
Status: DISCONTINUED | OUTPATIENT
Start: 2022-03-09 | End: 2022-03-09

## 2022-03-09 RX ADMIN — HYDROMORPHONE HYDROCHLORIDE 2 MG: 2 INJECTION, SOLUTION INTRAMUSCULAR; INTRAVENOUS; SUBCUTANEOUS at 06:03

## 2022-03-09 RX ADMIN — ONDANSETRON 4 MG: 2 INJECTION, SOLUTION INTRAMUSCULAR; INTRAVENOUS at 01:03

## 2022-03-09 RX ADMIN — VANCOMYCIN HYDROCHLORIDE 750 MG: 750 INJECTION, POWDER, LYOPHILIZED, FOR SOLUTION INTRAVENOUS at 03:03

## 2022-03-09 RX ADMIN — LIDOCAINE HYDROCHLORIDE 75 MG: 20 INJECTION INTRAVENOUS at 01:03

## 2022-03-09 RX ADMIN — DIPHENHYDRAMINE HYDROCHLORIDE 25 MG: 50 INJECTION INTRAMUSCULAR; INTRAVENOUS at 06:03

## 2022-03-09 RX ADMIN — DIPHENHYDRAMINE HYDROCHLORIDE 25 MG: 50 INJECTION INTRAMUSCULAR; INTRAVENOUS at 03:03

## 2022-03-09 RX ADMIN — CEFTRIAXONE 1 G: 1 INJECTION, SOLUTION INTRAVENOUS at 03:03

## 2022-03-09 RX ADMIN — ALPRAZOLAM 1 MG: 1 TABLET ORAL at 10:03

## 2022-03-09 RX ADMIN — HYDROMORPHONE HYDROCHLORIDE 2 MG: 2 INJECTION, SOLUTION INTRAMUSCULAR; INTRAVENOUS; SUBCUTANEOUS at 03:03

## 2022-03-09 RX ADMIN — ACETAMINOPHEN 1000 MG: 10 INJECTION, SOLUTION INTRAVENOUS at 01:03

## 2022-03-09 RX ADMIN — HYDROXYUREA 1000 MG: 500 CAPSULE ORAL at 09:03

## 2022-03-09 RX ADMIN — MORPHINE SULFATE 60 MG: 30 TABLET, FILM COATED, EXTENDED RELEASE ORAL at 09:03

## 2022-03-09 RX ADMIN — BUPIVACAINE 10 MG: 13.3 INJECTION, SUSPENSION, LIPOSOMAL INFILTRATION at 12:03

## 2022-03-09 RX ADMIN — MORPHINE SULFATE 60 MG: 30 TABLET, FILM COATED, EXTENDED RELEASE ORAL at 06:03

## 2022-03-09 RX ADMIN — MIDAZOLAM HYDROCHLORIDE 2 MG: 1 INJECTION, SOLUTION INTRAMUSCULAR; INTRAVENOUS at 12:03

## 2022-03-09 RX ADMIN — DEXAMETHASONE SODIUM PHOSPHATE 4 MG: 4 INJECTION, SOLUTION INTRA-ARTICULAR; INTRALESIONAL; INTRAMUSCULAR; INTRAVENOUS; SOFT TISSUE at 01:03

## 2022-03-09 RX ADMIN — SODIUM CHLORIDE, SODIUM GLUCONATE, SODIUM ACETATE, POTASSIUM CHLORIDE, MAGNESIUM CHLORIDE, SODIUM PHOSPHATE, DIBASIC, AND POTASSIUM PHOSPHATE: .53; .5; .37; .037; .03; .012; .00082 INJECTION, SOLUTION INTRAVENOUS at 02:03

## 2022-03-09 RX ADMIN — PROPOFOL 150 MG: 10 INJECTION, EMULSION INTRAVENOUS at 01:03

## 2022-03-09 RX ADMIN — OXYCODONE HYDROCHLORIDE 20 MG: 10 TABLET ORAL at 12:03

## 2022-03-09 RX ADMIN — CELECOXIB 200 MG: 100 CAPSULE ORAL at 09:03

## 2022-03-09 RX ADMIN — OXYCODONE HYDROCHLORIDE 20 MG: 10 TABLET ORAL at 08:03

## 2022-03-09 RX ADMIN — BUPIVACAINE HYDROCHLORIDE 5 ML: 5 INJECTION, SOLUTION EPIDURAL; INTRACAUDAL; PERINEURAL at 12:03

## 2022-03-09 RX ADMIN — SODIUM CHLORIDE, SODIUM GLUCONATE, SODIUM ACETATE, POTASSIUM CHLORIDE, MAGNESIUM CHLORIDE, SODIUM PHOSPHATE, DIBASIC, AND POTASSIUM PHOSPHATE: .53; .5; .37; .037; .03; .012; .00082 INJECTION, SOLUTION INTRAVENOUS at 12:03

## 2022-03-09 RX ADMIN — HYDROXYUREA 1000 MG: 500 CAPSULE ORAL at 08:03

## 2022-03-09 RX ADMIN — FOLIC ACID 4 MG: 1 TABLET ORAL at 08:03

## 2022-03-09 RX ADMIN — HYDROMORPHONE HYDROCHLORIDE 2 MG: 2 INJECTION, SOLUTION INTRAMUSCULAR; INTRAVENOUS; SUBCUTANEOUS at 10:03

## 2022-03-09 RX ADMIN — DIPHENHYDRAMINE HYDROCHLORIDE 25 MG: 50 INJECTION INTRAMUSCULAR; INTRAVENOUS at 10:03

## 2022-03-09 RX ADMIN — FENTANYL CITRATE 100 MCG: 50 INJECTION, SOLUTION INTRAMUSCULAR; INTRAVENOUS at 12:03

## 2022-03-09 RX ADMIN — VANCOMYCIN HYDROCHLORIDE 750 MG: 750 INJECTION, POWDER, LYOPHILIZED, FOR SOLUTION INTRAVENOUS at 05:03

## 2022-03-09 NOTE — ANESTHESIA PREPROCEDURE EVALUATION
03/09/2022  Nishi Dumont is a 31 y.o., female.      Pre-op Assessment    I have reviewed the Patient Summary Reports.     I have reviewed the Nursing Notes. I have reviewed the NPO Status.   I have reviewed the Medications.     Review of Systems  Anesthesia Hx:  No problems with previous Anesthesia    Social:  Non-Smoker    Hematology/Oncology:     Oncology Normal    -- Anemia: Hematology Comments: Sickle cell disease     EENT/Dental:EENT/Dental Normal   Cardiovascular:  Cardiovascular Normal     Pulmonary:  Pulmonary Normal    Hepatic/GI:   Liver Disease,    Musculoskeletal:   Avascular necrosis of L humeral head    Neurological:  Neurology Normal    Dermatological:  Skin Normal    Psych:  Psychiatric Normal           Physical Exam  General: Well nourished    Airway:  Mallampati: II / I  Mouth Opening: Normal  TM Distance: Normal  Neck ROM: Normal ROM    Dental:  Intact    Chest/Lungs:  Clear to auscultation, Normal Respiratory Rate    Heart:  Rate: Normal  Rhythm: Regular Rhythm        Anesthesia Plan  Type of Anesthesia, risks & benefits discussed:    Anesthesia Type: Gen ETT  Intra-op Monitoring Plan: Standard ASA Monitors  Post Op Pain Control Plan: multimodal analgesia, peripheral nerve block and IV/PO Opioids PRN  Induction:  IV  Airway Plan: Direct, Post-Induction  Informed Consent: Informed consent signed with the Patient and all parties understand the risks and agree with anesthesia plan.  All questions answered.   ASA Score: 3  Day of Surgery Review of History & Physical: H&P Update referred to the surgeon/provider.    Ready For Surgery From Anesthesia Perspective.     .

## 2022-03-09 NOTE — PROGRESS NOTES
Consult Note  Infectious Disease    Reason for Consult: Thrombophlebitis/left shoulder pain     HPI: Nishi Dumont is a 31 y.o. female  with history of sickle cell disease, on monthly exchange transfusions via left chemo port, prior acute chest syndrome she has crisis, avascular necrosis of the left hip/femur in 2006, surgical history of cholecystectomy.  Presenting to the ER with symptoms that she described as typical sickle cell flare with associated left-sided stabbing chest pain, midback and severe left shoulder pain.  She denies fever, chills, nausea, vomit, headache, cough, abdominal pain, dysuria, or change in bowel movements.  She was last admitted for sickle cell flare a month ago; last admitted for acute chest syndrome a months ago.    In the ER, blood pressure 107/67, afebrile, O2 sat 100% on room air  Lab significant for white count of 17.1, no left shift, H&H 9.1/27.5, thrombocytosis of 764, likely reactive from acute crisis.  Occasional sickle cells noted on peripheral blood smear, haptoglobin less than 10.  Normal kidney and liver function  ESR and CRP normal on admission  UA with trace leukocytes, few bacteria, many squamous epithelial cell  Chest x-ray negative for acute process  X-ray left shoulder findings suggestive of a vascular necrosis.  Upper extremity ultrasound with left basilic vein DVT.    Empirically started on Levaquin on 03/01.  She was off antibiotics 3/2 until 3/5.  Switched to vancomycin and ceftriaxone 3/6.      ID consult for left basilic thrombophlebitis and shoulder pain..    INTERVAL HISTORY:   3/8: Interim reviewed. Patient see and examined at bedside, c/o of severe left shoulder pain. Hemodynamically stable, afebrile. Blood cultures collected today x 2 no growth, drawn on IV abx. Discussed with Ortho/Dr Rebolledo and Heme-Onc, plan for percutaneous decompression of left humeral head tomorrow in the OR for AVN of left humeral head.       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    Avascular necrosis of bone of hip     Avascular necrosis of humeral head     Blood transfusion     Sickle cell disease      Past Surgical History:   Procedure Laterality Date    CHOLECYSTECTOMY      JOINT REPLACEMENT       left hip     Social History     Tobacco Use    Smoking status: Never Smoker    Smokeless tobacco: Never Used   Substance Use Topics    Alcohol use: Yes     Alcohol/week: 0.0 standard drinks     Comment: occassionally        Family History   Problem Relation Age of Onset    Sickle cell trait Mother     Sickle cell trait Father     Sickle cell trait Brother     Sickle cell trait Daughter        Pertinent medications noted:     Review of Systems:   As described in HPI    Outdoor activities:  Lives at home, has 3 kids all of them with sickle trait  Travel:  None  Implants:  Left port A -cath placed 6 years ago  Antibiotic History:  From admission    EXAM & DIAGNOSTICS REVIEWED:   Vitals:     Temp:  [96.5 °F (35.8 °C)-99.1 °F (37.3 °C)]   Temp: 99.1 °F (37.3 °C) (03/08/22 1922)  Pulse: 90 (03/08/22 1922)  Resp: 17 (03/08/22 1939)  BP: 117/71 (03/08/22 1922)  SpO2: 100 % (03/08/22 1922)    Intake/Output Summary (Last 24 hours) at 3/8/2022 2027  Last data filed at 3/8/2022 1700  Gross per 24 hour   Intake 3075.03 ml   Output 0 ml   Net 3075.03 ml       General:  In NAD. Alert and attentive, cooperative, comfortable on room air  Eyes:  Contact lenses, anicteric  ENT:  No ulcers, exudates, thrush, nares patent, dentition is good  Neck:  Supple  Lungs: Clear to auscultation " b/l  Heart:  S1/S2+, regular rhythm, no murmurs  Abd:  +BS, soft, non tender, non distended, no rebound  :  Voids  Musc:  Left shoulder with very limited ROM, very tender to palpation, ecchymosis around the humerus head noted.  Left hip nontender, full range of motion. Other joints without effusion, swelling,  erythema, synovitis, ambulatory  Skin:  Warm, no rash  Wound:   Neuro: Following commands, no acute focal deficit   Psych:  Calm, cooperative  Lymphatic:     Extrem: No LE edema b/l  VAD:  Port-A cath L chest since Sept/201       Isolation: None      General Labs reviewed:  Recent Labs   Lab 03/06/22  0509 03/07/22  0501 03/08/22  0311   WBC 12.04 12.34 10.72   HGB 7.8* 7.9* 7.7*   HCT 24.8* 24.6* 24.4*   * 686* 622*       Recent Labs   Lab 03/06/22  0509 03/07/22  0501 03/08/22  0311    138 140   K 4.4 4.1 4.7    104 106   CO2 25 26 27   BUN 6 7 7   CREATININE 0.7 0.7 0.7   CALCIUM 8.5* 8.7 8.8   PROT 6.1 6.5 6.3   BILITOT 1.0 0.9 0.9   ALKPHOS 86 89 91   ALT 21 19 28   AST 19 22 35     Recent Labs   Lab 03/05/22  1146 03/07/22  1829   CRP 3.5 6.2     Recent Labs   Lab 03/05/22  1146 03/07/22  1829   SEDRATE 10 19       Estimated Creatinine Clearance: 89.7 mL/min (based on SCr of 0.7 mg/dL).     Micro:  Microbiology Results (last 7 days)     Procedure Component Value Units Date/Time    Blood culture [[id number]] Collected: 03/08/22 0311    Order Status: Completed Specimen: Blood from Antecubital, Right Updated: 03/08/22 1715     Blood Culture, Routine No Growth to date    Narrative:      Collection has been rescheduled by CATIE at 03/07/2022 19:12 Reason:   Unable to collect  Collection has been rescheduled by MRR at 03/07/2022 19:32 Reason:   Patient Refused she dont want to be stuck   Collection has been rescheduled by JCAP at 03/07/2022 19:12 Reason:   Unable to collect  Collection has been rescheduled by MRR at 03/07/2022 19:32 Reason:   Patient Refused she dont want to be stuck     Blood  culture [[id number]] Collected: 03/08/22 0311    Order Status: Completed Specimen: Blood from Antecubital, Right Updated: 03/08/22 1715     Blood Culture, Routine No Growth to date    Narrative:      Collection has been rescheduled by JCJ at 03/07/2022 19:12 Reason:   Unable to collect  Collection has been rescheduled by MRR at 03/07/2022 19:32 Reason:   Patient Refused she dont want to be stuck   Collection has been rescheduled by JCJ at 03/07/2022 19:12 Reason:   Unable to collect  Collection has been rescheduled by MRR at 03/07/2022 19:32 Reason:   Patient Refused she dont want to be stuck           Imaging Reviewed:  CXR   X-ray   LUE US - DVT   Mri Left shoulder: AVASCULAR NECROSIS OF HUMERUS HEAD    Cardiology:       IMPRESSION & PLAN     1. Acute sickle cell flare/crisis secondary to left avascular necrosis (AVN) of humerus head, low suspicion for underlying infection/osteomyelitis    ESR and CRP normal     2. Left basilic DVT   Heme/onc recommendations noted  3. PMHx sickle cell s/p left hip replacement    Recommendations:  Ortho for percutaneous decompression of left humeral head tomorrow 2/9  Continue Vancomycin IV, keep level 10-15 and Ceftriaxone 1g IV q24h,for now, until flare resolves   Pain management as per Hospitalist  Supportive care for left DVT  Holding off on PAC removal  Follow cultures   Incentive spirometry   Will follow     No contraindications form ID standpoint for scheduled procedure      D/w Dr Shannon, Dr Rebolledo, and Dr Williamson     Medical Decision Making during this encounter was  [_] Low Complexity  [_] Moderate Complexity  [xx] High Complexity

## 2022-03-09 NOTE — PLAN OF CARE
Pt. Transport to pre op holding. C/o pain left shoulder with some itching r/t pain meds per pt. States they usually give benadryl with pain meds. Prepared for surgery.

## 2022-03-09 NOTE — ANESTHESIA POSTPROCEDURE EVALUATION
Anesthesia Post Evaluation    Patient: Nishi Pinzon Vital    Procedure(s) Performed: Procedure(s) (LRB):  DECOMPRESSION, SHOULDER, ARTHROSCOPIC (Left)    Final Anesthesia Type: general      Patient location during evaluation: PACU  Patient participation: Yes- Able to Participate  Level of consciousness: awake and alert  Post-procedure vital signs: reviewed and stable  Pain management: adequate  Airway patency: patent    PONV status at discharge: No PONV  Anesthetic complications: no      Cardiovascular status: hemodynamically stable  Respiratory status: unassisted and room air  Hydration status: euvolemic  Follow-up not needed.          Vitals Value Taken Time   /57 03/09/22 1706   Temp 36.2 °C (97.1 °F) 03/09/22 1706   Pulse 92 03/09/22 1706   Resp 18 03/09/22 1706   SpO2 96 % 03/09/22 1706         Event Time   Out of Recovery 03/09/2022 15:10:00         Pain/Praveen Score: Pain Rating Prior to Med Admin: 7 (3/9/2022  8:57 AM)  Pain Rating Post Med Admin: 0 (3/9/2022  9:57 AM)  Praveen Score: 9 (3/9/2022  3:00 PM)

## 2022-03-09 NOTE — TRANSFER OF CARE
"Anesthesia Transfer of Care Note    Patient: Nishi Dumont    Procedure(s) Performed: Procedure(s) (LRB):  DECOMPRESSION, SHOULDER, ARTHROSCOPIC (Left)    Patient location: PACU    Anesthesia Type: general    Transport from OR: Transported from OR on 2-3 L/min O2 by NC with adequate spontaneous ventilation    Post pain: adequate analgesia    Post assessment: no apparent anesthetic complications    Post vital signs: stable    Level of consciousness: awake    Nausea/Vomiting: no nausea/vomiting    Complications: none    Transfer of care protocol was followed      Last vitals:   Visit Vitals  /80   Pulse 95   Temp 36.4 °C (97.5 °F) (Temporal)   Resp 16   Ht 4' 11" (1.499 m)   Wt 56.7 kg (125 lb)   LMP 02/18/2022 (Exact Date)   SpO2 100%   Breastfeeding No   BMI 25.25 kg/m²     "

## 2022-03-09 NOTE — ANESTHESIA PROCEDURE NOTES
Peripheral Block    Patient location during procedure: pre-op   Block not for primary anesthetic.  Reason for block: at surgeon's request and post-op pain management   Post-op Pain Location: left shoulder   Start time: 3/9/2022 12:45 PM  Timeout: 3/9/2022 12:45 PM   End time: 3/9/2022 12:50 PM    Staffing  Authorizing Provider: Espinoza Maldonado MD  Performing Provider: Espinoza Maldonado MD    Preanesthetic Checklist  Completed: patient identified, IV checked, site marked, risks and benefits discussed, surgical consent, monitors and equipment checked, pre-op evaluation and timeout performed  Peripheral Block  Patient position: sitting  Prep: ChloraPrep  Patient monitoring: heart rate, cardiac monitor, continuous pulse ox, continuous capnometry and frequent blood pressure checks  Block type: interscalene  Laterality: left  Injection technique: single shot  Needle  Needle type: Stimuplex   Needle gauge: 22 G  Needle length: 2 in  Needle localization: anatomical landmarks and ultrasound guidance   -ultrasound image captured on disc.  Assessment  Injection assessment: negative aspiration, negative parasthesia and local visualized surrounding nerve  Paresthesia pain: none  Heart rate change: no  Slow fractionated injection: yes  Pain Tolerance: comfortable throughout block and no complaints  Medications:    Medications: bupivacaine (pf) (MARCAINE) injection 0.5% - Perineural   5 mL - 3/9/2022 12:50:00 PM    Additional Notes  VSS.  [facility] RN monitoring vitals throughout procedure.  Patient tolerated procedure well.

## 2022-03-09 NOTE — PLAN OF CARE
POC/Meds reviewed, pt verbalized understanding. Vitals stable. Afebrile. Remains on room air. Uses bathroom independently. PRN pain meds administered. Repositions self. Hourly/Q2hr rounding performed, safety maintained. Bed in lowest position, wheels locked, SR up x2, call light in easy reach. No complaints at this time. Will continue to monitor.

## 2022-03-09 NOTE — OP NOTE
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    Complications: No    Estimated Blood Loss (EBL): * No values recorded between 3/9/2022  1:49 PM and 3/9/2022  2:03 PM *           Implants: * No implants in log *    Specimens:   Specimen (24h ago, onward)            None                  Condition: Good    Disposition: PACU - hemodynamically stable.    Attestation: I was present for the entire procedure.    Procedure In Detail:  Patient brought in the interim and placed on the table in the supine position.  The patient underwent anesthesia and the anesthesia service.  The left upper extremity was prepped and draped in normal sterile fashion.  Under fluoroscopic guidance 2 guide pins from the 5 5 cannulated screw set were fired from the lateral cortex the proximal humerus up into the humeral head.  Proper placement of the pin was verified with fluoroscopy.  Under live fluoro we externally and internally rotated the humerus to make sure that the pins did not penetrate the humeral head that were within the area of sclerotic bone.  We then over drilled the pin through.  Core decompression sites and 2  areas.  The pins were then removed.  The percutaneous sites were then closed with 3-0 nylon.  A sterile intraoperative dressing was applied and the patient was awakened from anesthesia and taken recovery where she was noted to be stable postoperatively.  Needle and lap counts were correct at the end of the case.

## 2022-03-09 NOTE — INTERVAL H&P NOTE
The patient has been examined and the H&P has been reviewed:    I concur with the findings and changes have been noted since the H&P was written:  The patient continues to have severe pain secondary to avascular necrosis of the left shoulder.  Risks and benefits of core decompression for potential pain relief were explained to the patient and she does wish to proceed.  The plan is take her to the operating room today for core decompression of left proximal humerus the hopefully give her some pain relief.    Surgery risks, benefits and alternative options discussed and understood by patient/family.          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

## 2022-03-09 NOTE — PT/OT/SLP PROGRESS
Occupational Therapy      Patient Name:  Nishi Dumont   MRN:  [id number]    Patient not seen today secondary to Off the floor for procedure/surgery. Will follow-up 3/10/2022.    3/9/2022

## 2022-03-10 LAB
ALBUMIN SERPL BCP-MCNC: 3.5 G/DL (ref 3.5–5.2)
ALP SERPL-CCNC: 118 U/L (ref 55–135)
ALT SERPL W/O P-5'-P-CCNC: 192 U/L (ref 10–44)
ANION GAP SERPL CALC-SCNC: 8 MMOL/L (ref 8–16)
AST SERPL-CCNC: 202 U/L (ref 10–40)
BASOPHILS # BLD AUTO: 0.02 K/UL (ref 0–0.2)
BASOPHILS NFR BLD: 0.1 % (ref 0–1.9)
BILIRUB SERPL-MCNC: 1.4 MG/DL (ref 0.1–1)
BUN SERPL-MCNC: 15 MG/DL (ref 6–20)
CALCIUM SERPL-MCNC: 8.9 MG/DL (ref 8.7–10.5)
CHLORIDE SERPL-SCNC: 108 MMOL/L (ref 95–110)
CO2 SERPL-SCNC: 23 MMOL/L (ref 23–29)
CREAT SERPL-MCNC: 0.8 MG/DL (ref 0.5–1.4)
DIFFERENTIAL METHOD: ABNORMAL
EOSINOPHIL # BLD AUTO: 0 K/UL (ref 0–0.5)
EOSINOPHIL NFR BLD: 0 % (ref 0–8)
ERYTHROCYTE [DISTWIDTH] IN BLOOD BY AUTOMATED COUNT: 17.1 % (ref 11.5–14.5)
EST. GFR  (AFRICAN AMERICAN): >60 ML/MIN/1.73 M^2
EST. GFR  (NON AFRICAN AMERICAN): >60 ML/MIN/1.73 M^2
GLUCOSE SERPL-MCNC: 143 MG/DL (ref 70–110)
HCT VFR BLD AUTO: 23.5 % (ref 37–48.5)
HGB BLD-MCNC: 7.4 G/DL (ref 12–16)
IMM GRANULOCYTES # BLD AUTO: 0.06 K/UL (ref 0–0.04)
IMM GRANULOCYTES NFR BLD AUTO: 0.4 % (ref 0–0.5)
LYMPHOCYTES # BLD AUTO: 0.8 K/UL (ref 1–4.8)
LYMPHOCYTES NFR BLD: 5 % (ref 18–48)
MAGNESIUM SERPL-MCNC: 1.9 MG/DL (ref 1.6–2.6)
MCH RBC QN AUTO: 27.7 PG (ref 27–31)
MCHC RBC AUTO-ENTMCNC: 31.5 G/DL (ref 32–36)
MCV RBC AUTO: 88 FL (ref 82–98)
MONOCYTES # BLD AUTO: 0.5 K/UL (ref 0.3–1)
MONOCYTES NFR BLD: 2.9 % (ref 4–15)
NEUTROPHILS # BLD AUTO: 14 K/UL (ref 1.8–7.7)
NEUTROPHILS NFR BLD: 91.6 % (ref 38–73)
NRBC BLD-RTO: 0 /100 WBC
PLATELET # BLD AUTO: 564 K/UL (ref 150–450)
PMV BLD AUTO: 9.7 FL (ref 9.2–12.9)
POTASSIUM SERPL-SCNC: 5.4 MMOL/L (ref 3.5–5.1)
PROT SERPL-MCNC: 6.5 G/DL (ref 6–8.4)
RBC # BLD AUTO: 2.67 M/UL (ref 4–5.4)
SODIUM SERPL-SCNC: 139 MMOL/L (ref 136–145)
WBC # BLD AUTO: 15.31 K/UL (ref 3.9–12.7)

## 2022-03-10 PROCEDURE — 80053 COMPREHEN METABOLIC PANEL: CPT | Performed by: ORTHOPAEDIC SURGERY

## 2022-03-10 PROCEDURE — 83735 ASSAY OF MAGNESIUM: CPT | Performed by: ORTHOPAEDIC SURGERY

## 2022-03-10 PROCEDURE — 63600175 PHARM REV CODE 636 W HCPCS: Performed by: ORTHOPAEDIC SURGERY

## 2022-03-10 PROCEDURE — 25000003 PHARM REV CODE 250: Performed by: ORTHOPAEDIC SURGERY

## 2022-03-10 PROCEDURE — 85025 COMPLETE CBC W/AUTO DIFF WBC: CPT | Performed by: ORTHOPAEDIC SURGERY

## 2022-03-10 PROCEDURE — 87040 BLOOD CULTURE FOR BACTERIA: CPT | Performed by: STUDENT IN AN ORGANIZED HEALTH CARE EDUCATION/TRAINING PROGRAM

## 2022-03-10 PROCEDURE — 99900035 HC TECH TIME PER 15 MIN (STAT)

## 2022-03-10 PROCEDURE — 97165 OT EVAL LOW COMPLEX 30 MIN: CPT

## 2022-03-10 PROCEDURE — 36415 COLL VENOUS BLD VENIPUNCTURE: CPT | Performed by: STUDENT IN AN ORGANIZED HEALTH CARE EDUCATION/TRAINING PROGRAM

## 2022-03-10 PROCEDURE — 94761 N-INVAS EAR/PLS OXIMETRY MLT: CPT

## 2022-03-10 PROCEDURE — 12000002 HC ACUTE/MED SURGE SEMI-PRIVATE ROOM

## 2022-03-10 PROCEDURE — 97161 PT EVAL LOW COMPLEX 20 MIN: CPT

## 2022-03-10 PROCEDURE — 36415 COLL VENOUS BLD VENIPUNCTURE: CPT | Performed by: ORTHOPAEDIC SURGERY

## 2022-03-10 RX ADMIN — HYDROMORPHONE HYDROCHLORIDE 2 MG: 2 INJECTION, SOLUTION INTRAMUSCULAR; INTRAVENOUS; SUBCUTANEOUS at 09:03

## 2022-03-10 RX ADMIN — DIPHENHYDRAMINE HYDROCHLORIDE 25 MG: 50 INJECTION INTRAMUSCULAR; INTRAVENOUS at 09:03

## 2022-03-10 RX ADMIN — VANCOMYCIN HYDROCHLORIDE 750 MG: 750 INJECTION, POWDER, LYOPHILIZED, FOR SOLUTION INTRAVENOUS at 06:03

## 2022-03-10 RX ADMIN — HYDROMORPHONE HYDROCHLORIDE 2 MG: 2 INJECTION, SOLUTION INTRAMUSCULAR; INTRAVENOUS; SUBCUTANEOUS at 04:03

## 2022-03-10 RX ADMIN — HYDROXYUREA 1000 MG: 500 CAPSULE ORAL at 08:03

## 2022-03-10 RX ADMIN — LIDOCAINE 5% 1 PATCH: 700 PATCH TOPICAL at 11:03

## 2022-03-10 RX ADMIN — OXYCODONE HYDROCHLORIDE 20 MG: 10 TABLET ORAL at 05:03

## 2022-03-10 RX ADMIN — MORPHINE SULFATE 60 MG: 30 TABLET, FILM COATED, EXTENDED RELEASE ORAL at 01:03

## 2022-03-10 RX ADMIN — DIPHENHYDRAMINE HYDROCHLORIDE 25 MG: 50 INJECTION INTRAMUSCULAR; INTRAVENOUS at 04:03

## 2022-03-10 RX ADMIN — CELECOXIB 200 MG: 100 CAPSULE ORAL at 08:03

## 2022-03-10 RX ADMIN — DIPHENHYDRAMINE HYDROCHLORIDE 25 MG: 50 INJECTION INTRAMUSCULAR; INTRAVENOUS at 01:03

## 2022-03-10 RX ADMIN — DEFERASIROX 1000 MG: 500 TABLET, FOR SUSPENSION ORAL at 06:03

## 2022-03-10 RX ADMIN — HYDROXYUREA 1000 MG: 500 CAPSULE ORAL at 09:03

## 2022-03-10 RX ADMIN — DIPHENHYDRAMINE HYDROCHLORIDE 25 MG: 50 INJECTION INTRAMUSCULAR; INTRAVENOUS at 05:03

## 2022-03-10 RX ADMIN — SODIUM CHLORIDE: 0.9 INJECTION, SOLUTION INTRAVENOUS at 04:03

## 2022-03-10 RX ADMIN — MORPHINE SULFATE 60 MG: 30 TABLET, FILM COATED, EXTENDED RELEASE ORAL at 06:03

## 2022-03-10 RX ADMIN — FOLIC ACID 4 MG: 1 TABLET ORAL at 09:03

## 2022-03-10 RX ADMIN — CEFTRIAXONE 1 G: 1 INJECTION, SOLUTION INTRAVENOUS at 01:03

## 2022-03-10 RX ADMIN — MORPHINE SULFATE 60 MG: 30 TABLET, FILM COATED, EXTENDED RELEASE ORAL at 09:03

## 2022-03-10 RX ADMIN — OXYCODONE HYDROCHLORIDE 20 MG: 10 TABLET ORAL at 09:03

## 2022-03-10 RX ADMIN — METHOCARBAMOL 500 MG: 500 TABLET ORAL at 04:03

## 2022-03-10 RX ADMIN — ASPIRIN 81 MG CHEWABLE TABLET 81 MG: 81 TABLET CHEWABLE at 09:03

## 2022-03-10 RX ADMIN — FAMOTIDINE 20 MG: 20 TABLET, FILM COATED ORAL at 09:03

## 2022-03-10 RX ADMIN — VANCOMYCIN HYDROCHLORIDE 750 MG: 750 INJECTION, POWDER, LYOPHILIZED, FOR SOLUTION INTRAVENOUS at 05:03

## 2022-03-10 RX ADMIN — CELECOXIB 200 MG: 100 CAPSULE ORAL at 09:03

## 2022-03-10 NOTE — PROGRESS NOTES
"Pharmacokinetic Assessment Follow Up: IV Vancomycin    Vancomycin serum concentration assessment(s):    The trough level was drawn correctly and can be used to guide therapy at this time. The measurement is within the desired definitive target range of 10 to 15 mcg/mL.    Vancomycin Regimen Plan:    Continue regimen to Vancomycin 750 mg IV every 12 hours with next serum trough concentration measured at 0430 prior to 4th dose on 3/11    Drug levels (last 3 results):  Recent Labs   Lab Result Units 03/08/22  0311 03/09/22  1626   Vancomycin-Trough ug/mL 12.0 12.3       Pharmacy will continue to follow and monitor vancomycin.    Please contact pharmacy at extension [phone] for questions regarding this assessment.    Thank you for the consult,   Best Bragg       Patient brief summary:  Nishi Dumont is a 31 y.o. female initiated on antimicrobial therapy with IV Vancomycin for treatment of skin & soft tissue infection    The patient's current regimen is vanc 750mg q12h    Drug Allergies:   Review of patient's allergies indicates:   Allergen Reactions    Contrast media Hives    Iodine and iodide containing products Hives and Swelling    Mushroom Hives    Zithromax [azithromycin] Anaphylaxis    Demerol [meperidine] Other (See Comments)     Regarding reaction to demerol pt states "I talk out of my head and become aggressive"       Actual Body Weight:   56.7 kg    Renal Function:   Estimated Creatinine Clearance: 89.7 mL/min (based on SCr of 0.7 mg/dL).,     Dialysis Method (if applicable):  N/A    CBC (last 72 hours):  Recent Labs   Lab Result Units 03/07/22  0501 03/08/22  0311 03/09/22  0452   WBC K/uL 12.34 10.72 10.36   Hemoglobin g/dL 7.9* 7.7* 7.6*   Hematocrit % 24.6* 24.4* 24.6*   Platelets K/uL 686* 622* 622*   Gran % % 68.4 66.9 62.7   Lymph % % 19.9 22.9 26.9   Mono % % 6.0 5.8 6.1   Eosinophil % % 4.2 3.2 2.5   Basophil % % 1.1 0.9 1.4   Differential Method  Automated Automated Automated       Metabolic Panel " (last 72 hours):  Recent Labs   Lab Result Units 03/07/22  0501 03/08/22  0311 03/09/22  0452   Sodium mmol/L 138 140 139   Potassium mmol/L 4.1 4.7 4.6   Chloride mmol/L 104 106 104   CO2 mmol/L 26 27 29   Glucose mg/dL 102 92 84   BUN mg/dL 7 7 12   Creatinine mg/dL 0.7 0.7 0.7   Albumin g/dL 3.5 3.4* 3.6   Total Bilirubin mg/dL 0.9 0.9 1.1*   Alkaline Phosphatase U/L 89 91 102   AST U/L 22 35 41*   ALT U/L 19 28 42   Magnesium mg/dL 1.7 1.8 1.8       Vancomycin Administrations:  vancomycin given in the last 96 hours                     vancomycin 750 mg in dextrose 5 % 250 mL IVPB (ready to mix system) (mg) 750 mg New Bag 03/09/22 1740     750 mg New Bag  0340     750 mg New Bag 03/08/22 1642     750 mg New Bag  0511     750 mg New Bag 03/07/22 1647     750 mg New Bag  0341     750 mg New Bag 03/06/22 1635                    Microbiologic Results:  Microbiology Results (last 7 days)       Procedure Component Value Units Date/Time    Blood culture [[id number]] Collected: 03/08/22 0311    Order Status: Completed Specimen: Blood from Antecubital, Right Updated: 03/09/22 1212     Blood Culture, Routine No Growth to date      No Growth to date    Narrative:      Collection has been rescheduled by [facility] at 03/07/2022 19:12 Reason:   Unable to collect  Collection has been rescheduled by [facility] at 03/07/2022 19:32 Reason:   Patient Refused she dont want to be stuck   Collection has been rescheduled by [facility] at 03/07/2022 19:12 Reason:   Unable to collect  Collection has been rescheduled by [facility] at 03/07/2022 19:32 Reason:   Patient Refused she dont want to be stuck     Blood culture [[id number]] Collected: 03/08/22 0311    Order Status: Completed Specimen: Blood from Antecubital, Right Updated: 03/09/22 1131     Blood Culture, Routine Gram stain peds bottle: Gram positive cocci in clusters resembling Staph       Results called to and read back by: RANDEE MARTINEZ RN  03/09/2022  11:31    Narrative:      Collection has been rescheduled  by [facility] at 03/07/2022 19:12 Reason:   Unable to collect  Collection has been rescheduled by [facility] at 03/07/2022 19:32 Reason:   Patient Refused she dont want to be stuck   Collection has been rescheduled by [facility] at 03/07/2022 19:12 Reason:   Unable to collect  Collection has been rescheduled by [facility] at 03/07/2022 19:32 Reason:   Patient Refused she dont want to be stuck

## 2022-03-10 NOTE — PT/OT/SLP EVAL
Physical Therapy Evaluation and Discharge Note    Patient Name:  Nishi Dumont   MRN:  [id number]    Recommendations:     Discharge Recommendations:  home   Discharge Equipment Recommendations: none   Barriers to discharge: None    Assessment:     Nishi Dumont is a 31 y.o. female admitted with a medical diagnosis of Sickle cell pain crisis. .  At this time, patient is functioning at their prior level of function and does not require further acute PT services.     Pt seen supine in bed- alert/soft spoken, c/o pain L shoulder and requesting pain meds and for itching. Pt with no sling and has pillow under L arm for support. Pt mobilizing well in bed and goes to bathroom. Pt ambulated with supervision to hallways 250ft with pt holding pillow under L arm. Steady william. Back to bed post PT. Nursing made aware. Pt would benefit from sling- will defer to OT.    Recent Surgery: Procedure(s) (LRB):  DECOMPRESSION, SHOULDER, ARTHROSCOPIC (Left) 1 Day Post-Op    Plan:     During this hospitalization, patient does not require further acute PT services.  Please re-consult if situation changes.      Subjective   Stated she is home alone in an apartment- stays with G parents if not feeling well  Chief Complaint: pain L shoulder 10/10. Also itching  Patient/Family Comments/goals: none stated  Pain/Comfort:  · Pain Rating 1: 10/10  · Location - Side 1: Left  · Location 1: shoulder  · Pain Addressed 1: Reposition, Distraction, Cessation of Activity, Nurse notified    Patients cultural, spiritual, Judaism conflicts given the current situation:      Living Environment:  Home alone  Prior to admission, patients level of function was independent.  Equipment used at home: none.  DME owned (not currently used): none.  Upon discharge, patient will have assistance from family.    Objective:     Communicated with nurse Marshall prior to session.  Patient found HOB elevated with peripheral IV, telemetry upon PT entry to  room.    General Precautions: Standard,     Orthopedic Precautions:LUE non weight bearing   Braces: N/A   Respiratory Status: Room air    Exams:  · Postural Exam:  Patient presented with the following abnormalities:    · -       BMI 25, forward head  · RLE ROM: WFL  · RLE Strength: WFL  · LLE ROM: WFL  · LLE Strength: WFL    Functional Mobility:  · Bed Mobility:     · Scooting: independence  · Supine to Sit: supervision  · Sit to Supine: supervision  · Transfers:     · Sit to Stand:  supervision with no AD  · Gait: 250ft with supervision holding pillow under L arm for support. Has  IV pole/telemetry    AM-PAC 6 CLICK MOBILITY  Total Score:20       Therapeutic Activities and Exercises:   Patient was educated on the importance of OOB activity and functional mobility to negate negative effects of prolonged bed rest during hospitalization, safe transfers and ambulation, and D/C planning   Back to bed post PT and repositioned    AM-PAC 6 CLICK MOBILITY  Total Score:20     Patient left right sidelying with all lines intact, call button in reach, bed alarm on and charge Joan notified.    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

## 2022-03-10 NOTE — CARE UPDATE
03/09/22 1950   Patient Assessment/Suction   Level of Consciousness (AVPU) alert   Respiratory Effort Unlabored   Expansion/Accessory Muscles/Retractions no retractions   All Lung Fields Breath Sounds Anterior:;clear;diminished   KAT Breath Sounds clear   LLL Breath Sounds diminished   RUL Breath Sounds clear   RML Breath Sounds clear   RLL Breath Sounds diminished   Rhythm/Pattern, Respiratory unlabored   Cough Frequency no cough   PRE-TX-O2   O2 Device (Oxygen Therapy) room air   SpO2 (!) 94 %   Pulse Oximetry Type Intermittent   $ Pulse Oximetry - Multiple Charge Pulse Oximetry - Multiple   Resp (!) 115   Temp (!) 64.4 °F (18 °C)   Positioning HOB elevated 30 degrees   Aerosol Therapy   $ Aerosol Therapy Charges PRN treatment not required   Respiratory Treatment Status (SVN) PRN treatment not required

## 2022-03-10 NOTE — SUBJECTIVE & OBJECTIVE
Interval History: patient doing ok today, going to the OR for left humeral head decompression for her AVN    - will keep the port in place  - will have PT/OT eval patient in the AM  - based on their recs, can likely go home ; follow up ID recs        Review of Systems   Constitutional:  Negative for activity change and fever.   Respiratory:  Negative for cough and shortness of breath.    Gastrointestinal:  Negative for abdominal pain and nausea.   Musculoskeletal:  Positive for arthralgias and joint swelling.   Objective:     Vital Signs (Most Recent):  Temp: 97.1 °F (36.2 °C) (03/09/22 1706)  Pulse: 92 (03/09/22 1706)  Resp: 16 (03/09/22 1832)  BP: (!) 104/57 (03/09/22 1706)  SpO2: 96 % (03/09/22 1706)   Vital Signs (24h Range):  Temp:  [97 °F (36.1 °C)-99.1 °F (37.3 °C)] 97.1 °F (36.2 °C)  Pulse:  [] 92  Resp:  [12-20] 16  SpO2:  [96 %-100 %] 96 %  BP: ()/(39-80) 104/57     Weight: 56.7 kg (125 lb)  Body mass index is 25.25 kg/m².    Intake/Output Summary (Last 24 hours) at 3/9/2022 1853  Last data filed at 3/9/2022 1700  Gross per 24 hour   Intake 1000 ml   Output 400 ml   Net 600 ml        Physical Exam  Vitals and nursing note reviewed.   Constitutional:       General: She is not in acute distress.  Pulmonary:      Effort: Pulmonary effort is normal.      Breath sounds: No wheezing.   Abdominal:      General: Abdomen is flat. There is no distension.   Musculoskeletal:         General: Swelling present.      Left lower leg: No edema.      Comments: Decrease left shoulder ROM and swelling    Skin:     Coloration: Skin is not jaundiced.      Findings: No rash.   Neurological:      General: No focal deficit present.      Mental Status: She is alert and oriented to person, place, and time.   Psychiatric:         Mood and Affect: Mood normal.         Behavior: Behavior normal.       Significant Labs: All pertinent labs within the past 24 hours have been reviewed.    Significant Imaging: I have reviewed  all pertinent imaging results/findings within the past 24 hours.

## 2022-03-10 NOTE — PLAN OF CARE
Recommendations  1) If K remains elevated: add potassium restriction to diet   2) Add Novasource Renal ONS BID for additional calories/protein   3) Encourage intake   4) RD to complete NFPE if indicated at follow up    Goals: Pt to meet > 75% EEN by RD follow up  Nutrition Goal Status: new  Communication of RD Recs: Sticky note, POC    Assessment and Plan  Nutrition Problem  Inadequate oral intake    Related to (etiology):   Unknown (likely pain)    Signs and Symptoms (as evidenced by):   PO intake < or equal to 50% meals during admit    Interventions(treatment strategy):  General, Healthful diet  Collaboration with other providers    Nutrition Diagnosis Status:   New

## 2022-03-10 NOTE — PROGRESS NOTES
[facility] Hospital Medicine  Telemedicine Progress Note    Patient Name: Nishi Dumont  MRN: [id number]  Patient Class: IP- Inpatient   Admission Date: 3/2/2022  Length of Stay: 7 days  Attending Physician: Toy Shannon MD  Primary Care Provider: Primary Doctor No          Subjective:     Principal Problem:Sickle cell pain crisis        HPI:  Nishi Dumont is a 31 y.o. 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 She states she feels like this is a typical sickle cell flare with associated left sided stabbing chest pain, mid back and left shoulder pain. She denies SOB, nausea, vomiting or diarrhea. She denies fever or cough. She reports compliance with her medications. She states that she gets flares when the weather changes, but has been keeping herself inside to avoid a possible flare. She was last admitted for sickle cell one month ago, and last had an acute chest syndrome about 7-8 months ago. She denies all other complaint.    ED work up was significant for anemia on CBC and a CMP that was essentially unremarkable. Troponin was negative. CXR showed what could be infiltrates to bilateral lung bases, pending official reading. She has not experienced tachycardia or hypoxia. Hospital Medicine consulted for admission and further management.      Overview/Hospital Course:  No notes on file    Interval History: patient doing ok today, going to the OR for left humeral head decompression for her AVN    - will keep the port in place  - will have PT/OT eval patient in the AM  - based on their recs, can likely go home ; follow up ID recs        Review of Systems   Constitutional:  Negative for activity change and fever.   Respiratory:  Negative for cough and shortness of  breath.    Gastrointestinal:  Negative for abdominal pain and nausea.   Musculoskeletal:  Positive for arthralgias and joint swelling.   Objective:     Vital Signs (Most Recent):  Temp: 97.1 °F (36.2 °C) (03/09/22 1706)  Pulse: 92 (03/09/22 1706)  Resp: 16 (03/09/22 1832)  BP: (!) 104/57 (03/09/22 1706)  SpO2: 96 % (03/09/22 1706)   Vital Signs (24h Range):  Temp:  [97 °F (36.1 °C)-99.1 °F (37.3 °C)] 97.1 °F (36.2 °C)  Pulse:  [] 92  Resp:  [12-20] 16  SpO2:  [96 %-100 %] 96 %  BP: ()/(39-80) 104/57     Weight: 56.7 kg (125 lb)  Body mass index is 25.25 kg/m².    Intake/Output Summary (Last 24 hours) at 3/9/2022 1853  Last data filed at 3/9/2022 1700  Gross per 24 hour   Intake 1000 ml   Output 400 ml   Net 600 ml        Physical Exam  Vitals and nursing note reviewed.   Constitutional:       General: She is not in acute distress.  Pulmonary:      Effort: Pulmonary effort is normal.      Breath sounds: No wheezing.   Abdominal:      General: Abdomen is flat. There is no distension.   Musculoskeletal:         General: Swelling present.      Left lower leg: No edema.      Comments: Decrease left shoulder ROM and swelling    Skin:     Coloration: Skin is not jaundiced.      Findings: No rash.   Neurological:      General: No focal deficit present.      Mental Status: She is alert and oriented to person, place, and time.   Psychiatric:         Mood and Affect: Mood normal.         Behavior: Behavior normal.       Significant Labs: All pertinent labs within the past 24 hours have been reviewed.    Significant Imaging: I have reviewed all pertinent imaging results/findings within the past 24 hours.      Assessment/Plan:      * Sickle cell pain crisis  Admit to tele  Continue home medications  Dilaudid PRN  Antinausea PRN    3/3- increasing IVFs to 200 cc/hr; adding PO oxy IR as first choice and making dilaudid second choice     Avascular necrosis of left humeral head      3/6- xrays reviewed; getting LUE U/S  to rule out DVT; ortho consulted;     3/7- U/S shows basilic vein thrombosis, MRI left shoulder pending ; OT eval    3/8- MRI shows avascular necrosis to shoulder; getting MRI arm and forearm     3/9 - OR today for decompression of left humeral head; PT/OT eval in the AM    Pain and swelling of left shoulder  - see primary problem    Thrombophlebitis  - on IV vanc and rocephin, ID consulted, follow up final recs      Basilic vein thrombosis  - superficial vein, so no need for anti-coagulation; not thought to be due to port  - heme/onc consulted, doing hypercoag work up      Chest pain  Admit to tele  Concern for Acute chest, last episode was about 7-8 months ago  Afebrile, denies SOB  Trend troponin, #1 0.010  Infiltrate seen on initial chest xray, pending official read. Will treat with levaquin empirically  Oxygen PRN to keep O2 sat >94%      Sickle cell anemia with pain  Patient's anemia is currently controlled. Has not received any PRBCs to date.. Etiology likely d/t sickle cell disease  Current CBC reviewed-   Lab Results   Component Value Date    HGB 9.1 (L) 03/02/2022    HCT 27.5 (L) 03/02/2022     Monitor serial CBC and transfuse if patient becomes hemodynamically unstable, symptomatic or H/H drops below 7/21.           VTE Risk Mitigation (From admission, onward)         Ordered     IP VTE HIGH RISK PATIENT  Once         03/02/22 0415     Place sequential compression device  Until discontinued         03/02/22 0415                      I have assessed these finding virtually using telemed platform and with assistance of bedside nurse                 The attending portion of this evaluation, treatment, and documentation was performed per Toy Shannon MD via Telemedicine AudioVisual using the secure [employer] software platform with 2 way audio/video. The provider was located off-site and the patient is located in the hospital. The aforementioned video software was utilized to document the relevant history and  physical exam    Toy Shannon MD  Department of Hospital Medicine   [facility]

## 2022-03-10 NOTE — ASSESSMENT & PLAN NOTE
3/6- xrays reviewed; getting LUE U/S to rule out DVT; ortho consulted;     3/7- U/S shows basilic vein thrombosis, MRI left shoulder pending ; OT eval    3/8- MRI shows avascular necrosis to shoulder; getting MRI arm and forearm     3/9 - OR today for decompression of left humeral head; PT/OT eval in the AM

## 2022-03-10 NOTE — PLAN OF CARE
Goals to be met by: 3/24/2022     Patient will increase functional independence with ADLs by performing:    UE Dressing with Set-up Assistance.  Toileting from toilet with Modified [place] for hygiene and clothing management.   Toilet transfer to toilet with [place].  Patient will don/doff L UE sling with Supervision.  Patient will perform L UE PROM/AROM HEP as tolerated x 5-10 reps per handout with Supervision.

## 2022-03-10 NOTE — PT/OT/SLP EVAL
Occupational Therapy   Evaluation    Name: Nishi Dumont  MRN: [id number]  Admitting Diagnosis:  Sickle cell pain crisis Core decompression of the left humeral head due to avascular necrosis of left humeral head  Recent Surgery: Procedure(s) (LRB):  DECOMPRESSION (Left) 1 Day Post-Op    Recommendations:     Discharge Recommendations: home, other (see comments) (with mother/grandmother)  Discharge Equipment Recommendations:  none  Barriers to discharge:  None    Assessment:     Nishi Dumont is a 31 y.o. female with a medical diagnosis of Sickle cell pain crisis; Core decompression of the left humeral head due to avascular necrosis of left humeral head - L UE NWB.  She presents with erformance deficits affecting function: weakness, impaired endurance, impaired self care skills, impaired functional mobilty, decreased upper extremity function, decreased ROM, orthopedic precautions, pain. Patient agreeable to sit edge of bed. OTR providing education/instruction with demonstration regarding L UE NWB precautions and self-PROM and AROM. OTR also instructing on adaptive dressing techniques. Patient performing bed mobility with Supervision, sit<>stand and side stepping with CGA/SBA with L UE gently supported. OTR returning to patient's room in PM to provided education regarding L UE sling wear schedule - encouraged wear during mobility for comfort (per Dr. Rebolledo) while encouraging range of motion of distal joints - patient verbalizes/demonstrates understanding but will benefit from reinforcement of all.     Rehab Prognosis: Good; patient would benefit from acute skilled OT services to address these deficits and reach maximum level of function.       Plan:     Patient to be seen 3 x/week to address the above listed problems via self-care/home management, therapeutic activities, therapeutic exercises  · Plan of Care Expires: 03/24/22  · Plan of Care Reviewed with: patient    Subjective     Chief Complaint: L shoulder  pain  Patient/Family Comments/goals: Pain management    Occupational Profile:  Living Environment: Lives alone but will discharge to her mother/grandmother's house  Previous level of function: Independent   Roles and Routines: Works at [employer]   Equipment Used at Home:  none  Assistance upon Discharge: Mother/grandmother    Pain/Comfort:  · Pain Rating 1: other (see comments) (not rated)  · Location - Side 1: Left  · Location - Orientation 1: generalized  · Location 1: shoulder  · Pain Addressed 1: Pre-medicate for activity, Reposition, Distraction, Cessation of Activity    Patients cultural, spiritual, Scientologist conflicts given the current situation: no    Objective:     Communicated with: NurseMoraima prior to session. OTR also communicating with Dr. Rebolledo via secure chat regarding L UE sling; OTR communicating this with Moraima as well - patient able to wear sling for comfort but not necessary. Patient found HOB elevated with peripheral IV, telemetry, bed alarm upon OT entry to room.    General Precautions: Standard, fall   Orthopedic Precautions:LUE non weight bearing   Braces: N/A (OTR communicating with Dr. Rebolledo via secure chat regarding UE sling. Per Dr. Rebolledo, sling ok to use for comfort but not necessary.)  Respiratory Status: Room air    Occupational Performance:    Bed Mobility:    · Patient completed Rolling/Turning to Left with  supervision  · Patient completed Rolling/Turning to Right with supervision  · Patient completed Supine to Sit with supervision  · Patient completed Sit to Supine with supervision    Functional Mobility/Transfers:  · Patient completed Sit <> Stand Transfer with stand by assistance and contact guard assistance  with  no assistive device   · Functional Mobility: CGA to perform L side stepping     Activities of Daily Living:  · Grooming: Set-up/Supervision    · Toileting: supervision from toilet level    Cognitive/Visual Perceptual:  Oriented x 3; pleasant/cooperative;  safety awareness    Physical Exam:  Upper Extremity Range of Motion:     -       Right Upper Extremity: WNL  -       Left Upper Extremity: Elbow PROM limited to 50% normal range secondary to pain; PROM wrist/digits WFL but uncomfortable  Upper Extremity Strength:    -       Right Upper Extremity: WNL  -       Left Upper Extremity: N/T secondary to L UE NWB precautions   Strength:    -       Right Upper Extremity: WNL  -       Left Upper Extremity: N/T    AMPAC 6 Click ADL:  AMPAC Total Score: 17    Treatment & Education:  - OTR providing education/instruction regarding OT role/POC, safety awareness and fall prevention including use of bed alarm and call button for assistance with mobility, L UE NWB precautions, adaptive dressing techniques, self-PROM and AROM HEP of L UE, and reviewing wear schedule of L UE sling - as needed for comfort; however, encouraging performance of self-PROM/AROM 3x/daily - patient verbalizes understanding but will benefit from reinforcement   Education:    Patient left HOB elevated with all lines intact, call button in reach, bed alarm on and Nurse notified    GOALS:   Multidisciplinary Problems     Occupational Therapy Goals        Problem: Occupational Therapy Goal    Goal Priority Disciplines Outcome Interventions   Occupational Therapy Goal     OT, PT/OT     Description: Goals to be met by: 3/24/2022     Patient will increase functional independence with ADLs by performing:    UE Dressing with Set-up Assistance.  Toileting from toilet with Modified [place] for hygiene and clothing management.   Toilet transfer to toilet with [place].                     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

## 2022-03-10 NOTE — PROGRESS NOTES
Consult Note  Infectious Disease    Reason for Consult: Thrombophlebitis/left shoulder pain     HPI: Nishi Dumont is a 31 y.o. female  with history of sickle cell disease, on monthly exchange transfusions via left chemo port, prior acute chest syndrome she has crisis, avascular necrosis of the left hip/femur in 2006, surgical history of cholecystectomy.  Presenting to the ER with symptoms that she described as typical sickle cell flare with associated left-sided stabbing chest pain, midback and severe left shoulder pain.  She denies fever, chills, nausea, vomit, headache, cough, abdominal pain, dysuria, or change in bowel movements.  She was last admitted for sickle cell flare a month ago; last admitted for acute chest syndrome a months ago.    In the ER, blood pressure 107/67, afebrile, O2 sat 100% on room air  Lab significant for white count of 17.1, no left shift, H&H 9.1/27.5, thrombocytosis of 764, likely reactive from acute crisis.  Occasional sickle cells noted on peripheral blood smear, haptoglobin less than 10.  Normal kidney and liver function  ESR and CRP normal on admission  UA with trace leukocytes, few bacteria, many squamous epithelial cell  Chest x-ray negative for acute process  X-ray left shoulder findings suggestive of a vascular necrosis.  Upper extremity ultrasound with left basilic vein DVT.    Empirically started on Levaquin on 03/01.  She was off antibiotics 3/2 until 3/5.  Switched to vancomycin and ceftriaxone 3/6.      ID consult for left basilic thrombophlebitis and shoulder pain..    INTERVAL HISTORY:   3/8: Interim reviewed. Patient see and examined at bedside, c/o of severe left shoulder pain. Hemodynamically stable, afebrile. Blood cultures collected today x 2 no growth, drawn on IV abx. Discussed with Ortho/Dr Rebolledo and Heme-Onc, plan for percutaneous decompression of left humeral head tomorrow in the OR for AVN of left humeral head.     3/9-10:  Patient seen  "and examined at bedside, complaining of severe left shoulder pain, she is status post percutaneous fixation of left humeral headby Ortho yesterday.  Hemodynamically stable, afebrile, white count 15.3, PMN 91.6%, status post dexamethasone 4 mg yesterday preop.  Micro reviewed, 1/4 bottles with GPC, likely a contaminant awaiting final report.      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    Avascular necrosis of bone of hip     Avascular necrosis of humeral head     Blood transfusion     Sickle cell disease      Past Surgical History:   Procedure Laterality Date    CHOLECYSTECTOMY      JOINT REPLACEMENT       left hip     Social History     Tobacco Use    Smoking status: Never Smoker    Smokeless tobacco: Never Used   Substance Use Topics    Alcohol use: Yes     Alcohol/week: 0.0 standard drinks     Comment: occassionally        Family History   Problem Relation Age of Onset    Sickle cell trait Mother     Sickle cell trait Father     Sickle cell trait Brother     Sickle cell trait Daughter        Pertinent medications noted:     Review of Systems:   As described in HPI    Outdoor activities:  Lives at home, has 3 kids all of them with sickle trait  Travel:  None  Implants:  Left port A -cath placed 6 years ago  Antibiotic History:  From admission    EXAM & DIAGNOSTICS REVIEWED:   Vitals:     Temp:  [64.4 °F (18 °C)-98.5 °F (36.9 °C)]   Temp: 98.5 °F (36.9 °C) (03/10/22 0731)  Pulse: 94 (03/10/22 0731)  Resp: 16 (03/10/22 0936)  BP: (!) 91/54 (03/10/22 0731)  SpO2: 96 % (03/10/22 0731)    Intake/Output " Summary (Last 24 hours) at 3/10/2022 1045  Last data filed at 3/10/2022 0613  Gross per 24 hour   Intake 1000 ml   Output 0 ml   Net 1000 ml       General:  In NAD. Alert and attentive, cooperative, comfortable on room air  Eyes:  Contact lenses, anicteric  ENT:  No ulcers, exudates, thrush, nares patent, dentition is good  Neck:  Supple  Lungs: Clear to auscultation b/l  Heart:  S1/S2+, regular rhythm, no murmurs  Abd:  +BS, soft, non tender, non distended, no rebound  :  Voids  Musc:  Left shoulder with very limited ROM, very tender to palpation, ecchymosis around the humerus head noted.  Left hip nontender, full range of motion. Other joints without effusion, swelling,  erythema, synovitis, ambulatory  Skin:  Warm, no rash  Wound:   Neuro: Following commands, no acute focal deficit   Psych:  Calm, cooperative  Lymphatic:     Extrem: No LE edema b/l  VAD:  Port-A cath L chest since Sept/2016       Isolation: None      General Labs reviewed:  Recent Labs   Lab 03/08/22 0311 03/09/22  0452 03/10/22  0506   WBC 10.72 10.36 15.31*   HGB 7.7* 7.6* 7.4*   HCT 24.4* 24.6* 23.5*   * 622* 564*       Recent Labs   Lab 03/08/22 0311 03/09/22  0452 03/10/22  0506    139 139   K 4.7 4.6 5.4*    104 108   CO2 27 29 23   BUN 7 12 15   CREATININE 0.7 0.7 0.8   CALCIUM 8.8 8.8 8.9   PROT 6.3 6.6 6.5   BILITOT 0.9 1.1* 1.4*   ALKPHOS 91 102 118   ALT 28 42 192*   AST 35 41* 202*     Recent Labs   Lab 03/05/22  1146 03/07/22  1829   CRP 3.5 6.2     Recent Labs   Lab 03/05/22  1146 03/07/22  1829   SEDRATE 10 19       Estimated Creatinine Clearance: 78.5 mL/min (based on SCr of 0.8 mg/dL).     Micro:  Microbiology Results (last 7 days)     Procedure Component Value Units Date/Time    Blood culture [[id number]] Collected: 03/08/22 0311    Order Status: Completed Specimen: Blood from Antecubital, Right Updated: 03/09/22 1212     Blood Culture, Routine No Growth to date      No Growth to date    Narrative:       Collection has been rescheduled by [facility] at 03/07/2022 19:12 Reason:   Unable to collect  Collection has been rescheduled by [facility] at 03/07/2022 19:32 Reason:   Patient Refused she dont want to be stuck   Collection has been rescheduled by J at 03/07/2022 19:12 Reason:   Unable to collect  Collection has been rescheduled by [facility] at 03/07/2022 19:32 Reason:   Patient Refused she dont want to be stuck     Blood culture [[id number]] Collected: 03/08/22 0311    Order Status: Completed Specimen: Blood from Antecubital, Right Updated: 03/09/22 1131     Blood Culture, Routine Gram stain peds bottle: Gram positive cocci in clusters resembling Staph       Results called to and read back by: RANDEE MARTINEZ RN  03/09/2022  11:31    Narrative:      Collection has been rescheduled by [facility] at 03/07/2022 19:12 Reason:   Unable to collect  Collection has been rescheduled by [facility] at 03/07/2022 19:32 Reason:   Patient Refused she dont want to be stuck   Collection has been rescheduled by J at 03/07/2022 19:12 Reason:   Unable to collect  Collection has been rescheduled by [facility] at 03/07/2022 19:32 Reason:   Patient Refused she dont want to be stuck           Imaging Reviewed:  CXR   X-ray   LUE US - DVT   Mri Left shoulder: AVASCULAR NECROSIS OF HUMERUS HEAD    Cardiology:       IMPRESSION & PLAN     1. Acute sickle cell flare/crisis secondary to left avascular necrosis (AVN) of humerus head, low suspicion for underlying infection/osteomyelitis    ESR and CRP normal    Blood cultures 1/4 GPC, likely CoNS awaiting final  2. Left basilic DVT   Heme/onc recommendations noted  3. PMHx sickle cell s/p left hip replacement    Recommendations:  Repeat blood cultures x1, likely GPC from admission a contaminant, awaiting final  Continue Vancomycin IV, keep level 10-15 and Ceftriaxone 1g IV q24h,for now, until flare resolves   Pain management as per Hospitalist  Supportive care for left DVT  Upon discharge, levofloxacin 500 mg p.o. once a day and  doxycycline 100 mg p.o. capsules twice a day for 7-10 days  Follow cultures   Incentive spirometry     Will follow peripherally      D/w Dr Dotson    Medical Decision Making during this encounter was  [_] Low Complexity  [_] Moderate Complexity  [xx] High Complexity

## 2022-03-10 NOTE — PLAN OF CARE
POC/Meds reviewed, pt verbalized understanding. Vitals stable. Afebrile. Remains on room air. IVPB abx administered. Tele In place-NSR. Up with x1 assist to bathroom. IS at bedside, instructed on use and return demonstration performed. PRN pain meds administered. Left shoulder decompression performed, pt tolerated. Repositions self. Hourly/Q2hr rounding performed, safety maintained. Bed in lowest position, wheels locked, SR up x2, call light in easy reach. No complaints at this time. Will continue to monitor.

## 2022-03-10 NOTE — PROGRESS NOTES
[facility]  Adult Nutrition  Progress Note    SUMMARY   Recommendations  1) If K remains elevated: add potassium restriction to diet   2) Add Novasource Renal ONS BID for additional calories/protein   3) Encourage intake   4) RD to complete NFPE if indicated at follow up    Goals: Pt to meet > 75% EEN by RD follow up  Nutrition Goal Status: new  Communication of RD Recs: Sticky note, POC    Assessment and Plan  Nutrition Problem  Inadequate oral intake    Related to (etiology):   Unknown (likely pain)    Signs and Symptoms (as evidenced by):   PO intake < or equal to 50% meals during admit    Interventions(treatment strategy):  General, Healthful diet  Collaboration with other providers    Nutrition Diagnosis Status:   New    Malnutrition Assessment  Energy Intake: moderate energy intake    SKYLAR NFPE due to remote assessment    Reason for Assessment  Reason For Assessment: length of stay  Diagnosis: other (see comments) (sickle cell crisis)  Relevant Medical History: sickle cell disease, acute chest syndrome, 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 Possible discharge today. Per chart, eating 0-50% meals during admit (suspect 2/2 pain), LBM 3/8. UBW ~125-130 lbs per wt hx, consistent with current wt. Unable to determine malnutrition status; NFPE needed. RD to monitor and follow up.  Nutrition Discharge Planning: Discharge on general, healthful diet    Nutrition Risk Screen  Nutrition Risk Screen: no indicators present    Nutrition/Diet History  Patient Reported Diet/Restrictions/Preferences: general  Spiritual, Cultural Beliefs, Episcopal Practices, Values that Affect Care: no  Food Allergies: other (see comments) (mushroom)  Factors Affecting Nutritional Intake: decreased appetite,  "pain    Anthropometrics  Temp: 98.3 °F (36.8 °C)  Height Method: Stated  Height: 4' 11" (149.9 cm)  Height (inches): 59 in  Weight Method: Standard Scale  Weight: 56.7 kg (125 lb)  Weight (lb): 125 lb  Ideal Body Weight (IBW), Female: 95 lb  % Ideal Body Weight, Female (lb): 131.58 %  BMI (Calculated): 25.2  Usual Body Weight (UBW), k.82 kg  % Usual Body Weight: 100     Lab/Procedures/Meds  Pertinent Labs Reviewed: reviewed  Pertinent Labs Comments: K 5.4, Glu 143, ,   Pertinent Medications Reviewed: reviewed  Pertinent Medications Comments: famotidine, folic acid, polyethylene glycol, senna docusate    Physical Findings/Assessment  Incision to shoulder     Estimated/Assessed Needs  Weight Used For Calorie Calculations: 56.7 kg (125 lb)  Energy Calorie Requirements (kcal): 1500 kcal/day based on MSJ x 1.25 AF  Energy Need Method: [place]-St Jeor  Protein Requirements: 57 g/day based on 1 g/kg  Weight Used For Protein Calculations: 56.7 kg (125 lb)  Fluid Requirements (mL): 1 mL/kcal or per MD  Estimated Fluid Requirement Method: RDA Method  RDA Method (mL): 1500     Nutrition Prescription Ordered  Current Diet Order: Regular    Evaluation of Received Nutrient/Fluid Intake  Energy Calories Required: not meeting needs  Protein Required: not meeting needs  Fluid Required: meeting needs  Comments: LBM 3/8  Tolerance: tolerating  % Intake of Estimated Energy Needs: 25 - 50 %  % Meal Intake: 25 - 50 %    Nutrition Risk  Level of Risk/Frequency of Follow-up:  (1-2x weekly)     Monitor and Evaluation  Food and Nutrient Intake: energy intake  Food and Nutrient Adminstration: diet order  Knowledge/Beliefs/Attitudes: food and nutrition knowledge/skill  Physical Activity and Function: nutrition-related ADLs and IADLs  Anthropometric Measurements: weight change  Biochemical Data, Medical Tests and Procedures: electrolyte and renal panel, lipid profile, gastrointestinal profile, glucose/endocrine profile, " inflammatory profile  Nutrition-Focused Physical Findings: overall appearance, extremities, muscles and bones     Nutrition Follow-Up  RD Follow-up?: Yes

## 2022-03-10 NOTE — ASSESSMENT & PLAN NOTE
- superficial vein, so no need for anti-coagulation; not thought to be due to port  - heme/onc consulted, doing hypercoag work up

## 2022-03-11 VITALS
TEMPERATURE: 98 F | BODY MASS INDEX: 25.2 KG/M2 | RESPIRATION RATE: 16 BRPM | OXYGEN SATURATION: 97 % | HEART RATE: 101 BPM | DIASTOLIC BLOOD PRESSURE: 60 MMHG | HEIGHT: 59 IN | WEIGHT: 125 LBS | SYSTOLIC BLOOD PRESSURE: 107 MMHG

## 2022-03-11 PROBLEM — D57.00 SICKLE CELL PAIN CRISIS: Status: RESOLVED | Noted: 2017-09-29 | Resolved: 2022-03-11

## 2022-03-11 LAB
ALBUMIN SERPL BCP-MCNC: 3.8 G/DL (ref 3.5–5.2)
ALP SERPL-CCNC: 126 U/L (ref 55–135)
ALT SERPL W/O P-5'-P-CCNC: 174 U/L (ref 10–44)
ANION GAP SERPL CALC-SCNC: 10 MMOL/L (ref 8–16)
AST SERPL-CCNC: 94 U/L (ref 10–40)
BACTERIA BLD CULT: ABNORMAL
BASOPHILS # BLD AUTO: 0.13 K/UL (ref 0–0.2)
BASOPHILS NFR BLD: 0.8 % (ref 0–1.9)
BILIRUB SERPL-MCNC: 1.3 MG/DL (ref 0.1–1)
BUN SERPL-MCNC: 7 MG/DL (ref 6–20)
CALCIUM SERPL-MCNC: 8.6 MG/DL (ref 8.7–10.5)
CHLORIDE SERPL-SCNC: 108 MMOL/L (ref 95–110)
CO2 SERPL-SCNC: 23 MMOL/L (ref 23–29)
CREAT SERPL-MCNC: 0.8 MG/DL (ref 0.5–1.4)
DIFFERENTIAL METHOD: ABNORMAL
EOSINOPHIL # BLD AUTO: 0 K/UL (ref 0–0.5)
EOSINOPHIL NFR BLD: 0.2 % (ref 0–8)
ERYTHROCYTE [DISTWIDTH] IN BLOOD BY AUTOMATED COUNT: 17.6 % (ref 11.5–14.5)
EST. GFR  (AFRICAN AMERICAN): >60 ML/MIN/1.73 M^2
EST. GFR  (NON AFRICAN AMERICAN): >60 ML/MIN/1.73 M^2
GLUCOSE SERPL-MCNC: 89 MG/DL (ref 70–110)
HCT VFR BLD AUTO: 23.2 % (ref 37–48.5)
HGB BLD-MCNC: 7.3 G/DL (ref 12–16)
IMM GRANULOCYTES # BLD AUTO: 0.06 K/UL (ref 0–0.04)
IMM GRANULOCYTES NFR BLD AUTO: 0.4 % (ref 0–0.5)
LYMPHOCYTES # BLD AUTO: 3.9 K/UL (ref 1–4.8)
LYMPHOCYTES NFR BLD: 24.4 % (ref 18–48)
MAGNESIUM SERPL-MCNC: 1.7 MG/DL (ref 1.6–2.6)
MCH RBC QN AUTO: 27.7 PG (ref 27–31)
MCHC RBC AUTO-ENTMCNC: 31.5 G/DL (ref 32–36)
MCV RBC AUTO: 88 FL (ref 82–98)
MONOCYTES # BLD AUTO: 0.6 K/UL (ref 0.3–1)
MONOCYTES NFR BLD: 3.6 % (ref 4–15)
NEUTROPHILS # BLD AUTO: 11.2 K/UL (ref 1.8–7.7)
NEUTROPHILS NFR BLD: 70.6 % (ref 38–73)
NRBC BLD-RTO: 0 /100 WBC
PLATELET # BLD AUTO: 534 K/UL (ref 150–450)
PMV BLD AUTO: 9.5 FL (ref 9.2–12.9)
POTASSIUM SERPL-SCNC: 4.2 MMOL/L (ref 3.5–5.1)
PROT SERPL-MCNC: 6.8 G/DL (ref 6–8.4)
RBC # BLD AUTO: 2.64 M/UL (ref 4–5.4)
SODIUM SERPL-SCNC: 141 MMOL/L (ref 136–145)
VANCOMYCIN TROUGH SERPL-MCNC: 11.4 UG/ML (ref 10–22)
WBC # BLD AUTO: 15.93 K/UL (ref 3.9–12.7)

## 2022-03-11 PROCEDURE — 94618 PULMONARY STRESS TESTING: CPT

## 2022-03-11 PROCEDURE — 63600175 PHARM REV CODE 636 W HCPCS: Performed by: ORTHOPAEDIC SURGERY

## 2022-03-11 PROCEDURE — 25000003 PHARM REV CODE 250: Performed by: ORTHOPAEDIC SURGERY

## 2022-03-11 PROCEDURE — 94761 N-INVAS EAR/PLS OXIMETRY MLT: CPT

## 2022-03-11 PROCEDURE — 85025 COMPLETE CBC W/AUTO DIFF WBC: CPT | Performed by: ORTHOPAEDIC SURGERY

## 2022-03-11 PROCEDURE — 80202 ASSAY OF VANCOMYCIN: CPT | Performed by: HOSPITALIST

## 2022-03-11 PROCEDURE — 99900035 HC TECH TIME PER 15 MIN (STAT)

## 2022-03-11 PROCEDURE — 99232 PR SUBSEQUENT HOSPITAL CARE,LEVL II: ICD-10-PCS | Mod: ,,, | Performed by: NURSE PRACTITIONER

## 2022-03-11 PROCEDURE — 83735 ASSAY OF MAGNESIUM: CPT | Performed by: ORTHOPAEDIC SURGERY

## 2022-03-11 PROCEDURE — 80053 COMPREHEN METABOLIC PANEL: CPT | Performed by: ORTHOPAEDIC SURGERY

## 2022-03-11 PROCEDURE — 99232 SBSQ HOSP IP/OBS MODERATE 35: CPT | Mod: ,,, | Performed by: NURSE PRACTITIONER

## 2022-03-11 RX ORDER — NABUMETONE 750 MG/1
750 TABLET, FILM COATED ORAL 2 TIMES DAILY PRN
Qty: 60 TABLET | Refills: 1 | Status: SHIPPED | OUTPATIENT
Start: 2022-03-11 | End: 2022-05-10

## 2022-03-11 RX ORDER — ACETAMINOPHEN 325 MG/1
650 TABLET ORAL EVERY 6 HOURS
Refills: 0 | COMMUNITY
Start: 2022-03-11 | End: 2022-04-18

## 2022-03-11 RX ADMIN — DEFERASIROX 1000 MG: 500 TABLET, FOR SUSPENSION ORAL at 05:03

## 2022-03-11 RX ADMIN — FOLIC ACID 4 MG: 1 TABLET ORAL at 08:03

## 2022-03-11 RX ADMIN — DIPHENHYDRAMINE HYDROCHLORIDE 25 MG: 50 INJECTION INTRAMUSCULAR; INTRAVENOUS at 06:03

## 2022-03-11 RX ADMIN — HYDROMORPHONE HYDROCHLORIDE 2 MG: 2 INJECTION, SOLUTION INTRAMUSCULAR; INTRAVENOUS; SUBCUTANEOUS at 03:03

## 2022-03-11 RX ADMIN — HYDROMORPHONE HYDROCHLORIDE 2 MG: 2 INJECTION, SOLUTION INTRAMUSCULAR; INTRAVENOUS; SUBCUTANEOUS at 11:03

## 2022-03-11 RX ADMIN — DOCUSATE SODIUM AND SENNOSIDES 1 TABLET: 8.6; 5 TABLET, FILM COATED ORAL at 08:03

## 2022-03-11 RX ADMIN — LIDOCAINE 5% 1 PATCH: 700 PATCH TOPICAL at 11:03

## 2022-03-11 RX ADMIN — MORPHINE SULFATE 60 MG: 30 TABLET, FILM COATED, EXTENDED RELEASE ORAL at 05:03

## 2022-03-11 RX ADMIN — DIPHENHYDRAMINE HYDROCHLORIDE 25 MG: 50 INJECTION INTRAMUSCULAR; INTRAVENOUS at 10:03

## 2022-03-11 RX ADMIN — OXYCODONE HYDROCHLORIDE 20 MG: 10 TABLET ORAL at 01:03

## 2022-03-11 RX ADMIN — OXYCODONE HYDROCHLORIDE 20 MG: 10 TABLET ORAL at 06:03

## 2022-03-11 RX ADMIN — HYDROXYUREA 1000 MG: 500 CAPSULE ORAL at 08:03

## 2022-03-11 RX ADMIN — POLYETHYLENE GLYCOL 3350 17 G: 17 POWDER, FOR SOLUTION ORAL at 09:03

## 2022-03-11 RX ADMIN — ASPIRIN 81 MG CHEWABLE TABLET 81 MG: 81 TABLET CHEWABLE at 08:03

## 2022-03-11 RX ADMIN — FAMOTIDINE 20 MG: 20 TABLET, FILM COATED ORAL at 08:03

## 2022-03-11 RX ADMIN — VANCOMYCIN HYDROCHLORIDE 750 MG: 750 INJECTION, POWDER, LYOPHILIZED, FOR SOLUTION INTRAVENOUS at 10:03

## 2022-03-11 RX ADMIN — MORPHINE SULFATE 60 MG: 30 TABLET, FILM COATED, EXTENDED RELEASE ORAL at 02:03

## 2022-03-11 RX ADMIN — CELECOXIB 200 MG: 100 CAPSULE ORAL at 08:03

## 2022-03-11 RX ADMIN — DIPHENHYDRAMINE HYDROCHLORIDE 25 MG: 50 INJECTION INTRAMUSCULAR; INTRAVENOUS at 02:03

## 2022-03-11 RX ADMIN — DIPHENHYDRAMINE HYDROCHLORIDE 25 MG: 50 INJECTION INTRAMUSCULAR; INTRAVENOUS at 01:03

## 2022-03-11 RX ADMIN — OXYCODONE HYDROCHLORIDE 20 MG: 10 TABLET ORAL at 05:03

## 2022-03-11 RX ADMIN — CEFTRIAXONE 1 G: 1 INJECTION, SOLUTION INTRAVENOUS at 02:03

## 2022-03-11 RX ADMIN — OXYCODONE HYDROCHLORIDE 20 MG: 10 TABLET ORAL at 10:03

## 2022-03-11 NOTE — PLAN OF CARE
Pt clear for DC from case management standpoint. Discharging to home       03/11/22 1548   Final Note   Assessment Type Final Discharge Note   Anticipated Discharge Disposition Home

## 2022-03-11 NOTE — PROGRESS NOTES
[facility]  Hematology/Oncology  Consult Note    Patient Name: Nishi Dumont  MRN: [id number]  Admission Date: 3/2/2022  Hospital Length of Stay: 9 days  Code Status: Full Code   Attending Provider: Jose Armando Dotson MD  Consulting Provider: Rosenda Donaldson NP  Primary Care Physician: Primary Doctor No  Principal Problem:Sickle cell pain crisis    Consults  Subjective:     HPI: Nishi Dumont is a 31 y.o. 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 She states she feels like this is a typical sickle cell flare with associated left sided stabbing chest pain, mid back and left shoulder pain. She denies SOB, nausea, vomiting or diarrhea. She denies fever or cough. She reports compliance with her medications. She states that she gets flares when the weather changes, but has been keeping herself inside to avoid a possible flare. She was last admitted for sickle cell one month ago, and last had an acute chest syndrome about 7-8 months ago. She denies all other complaint.     ED work up was significant for anemia on CBC and a CMP that was essentially unremarkable. Troponin was negative. CXR showed what could be infiltrates to bilateral lung bases, pending official reading. She has not experienced tachycardia or hypoxia. Hospital Medicine consulted for admission and further management (per med rec)    Hematology Consult:  Hematology was consulted for for this patient who has a known history of sickle cell.  She is currently under the care of Dr. NILDA Carreon at [facility].  She was admitted for sickle cell pain crisis.  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      Medications:  Continuous Infusions:   sodium chloride 0.9% 100 mL/hr at 03/11/22 0606    electrolyte-S  "(pH 7.4) Stopped (03/09/22 1500)     Scheduled Meds:   aspirin  81 mg Oral Daily    cefTRIAXone (ROCEPHIN) IVPB  1 g Intravenous Q24H    celecoxib  200 mg Oral BID    deferasirox  1,000 mg Oral Before breakfast    famotidine  20 mg Oral Daily    folic acid  4 mg Oral Daily    hydroxyurea  1,000 mg Oral BID    LIDOcaine  1 patch Transdermal Q24H    morphine  60 mg Oral Q8H    polyethylene glycol  17 g Oral Daily    senna-docusate 8.6-50 mg  1 tablet Oral BID    vancomycin (VANCOCIN) IVPB  750 mg Intravenous Q12H     PRN Meds:acetaminophen, acetaminophen, albuterol-ipratropium, aluminum-magnesium hydroxide-simethicone, dextrose 50%, dextrose 50%, diphenhydrAMINE, glucagon (human recombinant), glucose, glucose, HYDROmorphone, influenza, magnesium oxide, magnesium oxide, melatonin, methocarbamoL, naloxone, ondansetron, oxyCODONE, potassium bicarbonate, potassium bicarbonate, potassium bicarbonate, potassium, sodium phosphates, potassium, sodium phosphates, potassium, sodium phosphates, sars-cov-2 (covid-19), simethicone, sodium chloride 0.9%, Pharmacy to dose Vancomycin consult **AND** vancomycin - pharmacy to dose     Review of patient's allergies indicates:   Allergen Reactions    Contrast media Hives    Iodine and iodide containing products Hives and Swelling    Mushroom Hives    Zithromax [azithromycin] Anaphylaxis    Demerol [meperidine] Other (See Comments)     Regarding reaction to demerol pt states "I talk out of my head and become aggressive"        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    Avascular necrosis of bone of hip     Avascular necrosis of humeral head     Blood transfusion     Sickle cell disease      Past Surgical History:   Procedure Laterality Date    CHOLECYSTECTOMY      JOINT REPLACEMENT       left hip     Family History     Problem Relation (Age of Onset)    Sickle cell trait Mother, Father, Brother, Daughter    "     Tobacco Use    Smoking status: Never Smoker    Smokeless tobacco: Never Used   Substance and Sexual Activity    Alcohol use: Yes     Alcohol/week: 0.0 standard drinks     Comment: occassionally    Drug use: No    Sexual activity: Yes     Partners: Male     Birth control/protection: Injection       Review of Systems   Positive for fatigue, significant left shoulder pain causing limited range of motion, and right rib pain  All other pertinent review of systems have been reviewed and are negative  Objective:     Vital Signs (Most Recent):  Temp: 98 °F (36.7 °C) (03/11/22 1106)  Pulse: 100 (03/11/22 1106)  Resp: 17 (03/11/22 1146)  BP: 117/69 (03/11/22 1106)  SpO2: 97 % (03/11/22 1106) Vital Signs (24h Range):  Temp:  [98 °F (36.7 °C)-98.9 °F (37.2 °C)] 98 °F (36.7 °C)  Pulse:  [] 100  Resp:  [16-18] 17  SpO2:  [97 %-99 %] 97 %  BP: (108-139)/(63-74) 117/69     Weight: 56.7 kg (125 lb)  Body mass index is 25.25 kg/m².  Body surface area is 1.54 meters squared.      Intake/Output Summary (Last 24 hours) at 3/11/2022 1333  Last data filed at 3/11/2022 0606  Gross per 24 hour   Intake 7495.53 ml   Output --   Net 7495.53 ml       Physical Exam  PHYSICAL EXAM:     Vitals:    03/11/22 1146   BP:    Pulse:    Resp: 17   Temp:        GENERAL:  Patient is obviously uncomfortable when moving left upper extremity  Awake, alert and oriented to time, person and place.  No anxiety, or agitation.      HEENT: Normal conjunctivae and eyelids. WNL.  PERRLA 3 to 4 mm. No icterus, no pallor, no congestion, and no discharge noted.     NECK:  Supple. Trachea is central.  No crepitus.  No JVD or masses.    RESPIRATORY:  Bilateral breath sounds clear to auscultate  CARDIOVASCULAR:  Regular rate and rhythm.    ABDOMEN:  Normal abdomen.  No hepatosplenomegaly.  No free fluid.  Bowel sounds are present.  No hernia noted. No masses.  No rebound or tenderness.      SKIN/MUSCULOSKELETAL:  Limited range of motion and left upper  extremity due to pain, moves all other extremities well.  No rashes noted    NEUROLOGIC:  Higher functions are appropriate.  No cranial nerve deficits.  Normal gait    GENITAL/RECTAL:  Exams are deferred.  Significant Labs:   BMP:   Recent Labs   Lab 03/10/22  0506 03/11/22  0634   * 89    141   K 5.4* 4.2    108   CO2 23 23   BUN 15 7   CREATININE 0.8 0.8   CALCIUM 8.9 8.6*   MG 1.9 1.7    and CBC:   Recent Labs   Lab 03/10/22  0506 03/11/22  0634   WBC 15.31* 15.93*   HGB 7.4* 7.3*   HCT 23.5* 23.2*   * 534*       Diagnostic Results:  I have reviewed all pertinent imaging results/findings within the past 24 hours.    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 Williamson  Recommend warm compresses and starting aspirin 81 mg daily  We will hold off on port removal if MediPort is functioning properly      Sickle cell with pain crisis  Patient is scheduled to follow-up at [facility] on 03/15/2022    AVN:  Managed by Orthopedics    Thank you for your consult.  Hematology will sign off at this time.  She will need to follow-up with hematology [facility] to continue    Rosenda Donaldson NP  Hematology/Oncology  [facility]

## 2022-03-11 NOTE — CARE UPDATE
03/10/22 2015   Patient Assessment/Suction   Level of Consciousness (AVPU) alert   Respiratory Effort Normal;Unlabored   Expansion/Accessory Muscles/Retractions expansion symmetric;no retractions;no use of accessory muscles   All Lung Fields Breath Sounds clear   PRE-TX-O2   O2 Device (Oxygen Therapy) room air   SpO2 98 %   Pulse Oximetry Type Intermittent   Aerosol Therapy   $ Aerosol Therapy Charges PRN treatment not required   Respiratory Treatment Status (SVN) PRN treatment not required

## 2022-03-11 NOTE — SUBJECTIVE & OBJECTIVE
Interval History:  Patient underwent he moral decompression of the left arm for AVN on 03/09.  Today reports flaccid paralysis from her left elbow down to her left hand.  Pulses are within normal limits.  Will have nursing contact Orthopedic surgery.    Review of Systems   Constitutional:  Positive for fatigue. Negative for chills and fever.   HENT:  Negative for congestion, postnasal drip, rhinorrhea and sore throat.    Respiratory:  Negative for cough, chest tightness and shortness of breath.    Cardiovascular:  Negative for chest pain and palpitations.   Gastrointestinal:  Negative for abdominal pain, constipation, diarrhea, nausea and vomiting.   Genitourinary:  Negative for dysuria and hematuria.   Musculoskeletal:  Positive for arthralgias.   Neurological:  Positive for weakness. Negative for dizziness, light-headedness and headaches.   Objective:     Vital Signs (Most Recent):  Temp: 98.3 °F (36.8 °C) (03/10/22 1104)  Pulse: 104 (03/10/22 1104)  Resp: 18 (03/10/22 1726)  BP: 122/60 (03/10/22 1104)  SpO2: 100 % (03/10/22 1104) Vital Signs (24h Range):  Temp:  [64.4 °F (18 °C)-98.5 °F (36.9 °C)] 98.3 °F (36.8 °C)  Pulse:  [] 104  Resp:  [] 18  SpO2:  [94 %-100 %] 100 %  BP: ()/(51-60) 122/60     Weight: 56.7 kg (125 lb)  Body mass index is 25.25 kg/m².    Intake/Output Summary (Last 24 hours) at 3/10/2022 1830  Last data filed at 3/10/2022 1800  Gross per 24 hour   Intake 1000 ml   Output --   Net 1000 ml      Physical Exam  Constitutional:       General: She is not in acute distress.     Appearance: Normal appearance.   HENT:      Head: Normocephalic and atraumatic.   Eyes:      Extraocular Movements: Extraocular movements intact.   Cardiovascular:      Rate and Rhythm: Normal rate.   Pulmonary:      Effort: Pulmonary effort is normal. No tachypnea or respiratory distress.   Abdominal:      General: Abdomen is flat. There is no distension.   Musculoskeletal:         General: Normal range of  motion.      Cervical back: Normal range of motion and neck supple.   Skin:     General: Skin is dry.   Neurological:      Mental Status: She is alert and oriented to person, place, and time.      Motor: Weakness (Patient unable to flex or extend elbow, wrist or fingers on left side.  Pulses within normal limits.) present.   Psychiatric:         Attention and Perception: Attention and perception normal.         Mood and Affect: Mood and affect normal.       Significant Labs: All pertinent labs within the past 24 hours have been reviewed.    Significant Imaging: I have reviewed all pertinent imaging results/findings within the past 24 hours.

## 2022-03-11 NOTE — DISCHARGE SUMMARY
[facility] Hospital Medicine  Discharge Summary      Patient Name: Nishi Dumont  MRN: [id number]  Patient Class: IP- Inpatient  Admission Date: 3/2/2022  Hospital Length of Stay: 9 days  Discharge Date and Time:  03/11/2022 2:25 PM  Attending Physician: Jose Armando Dotson MD   Discharging Provider: Jose Armando Dotson MD  Primary Care Provider: Primary Doctor No      HPI:   Nishi Dumont is a 31 y.o. 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 She states she feels like this is a typical sickle cell flare with associated left sided stabbing chest pain, mid back and left shoulder pain. She denies SOB, nausea, vomiting or diarrhea. She denies fever or cough. She reports compliance with her medications. She states that she gets flares when the weather changes, but has been keeping herself inside to avoid a possible flare. She was last admitted for sickle cell one month ago, and last had an acute chest syndrome about 7-8 months ago. She denies all other complaint.    ED work up was significant for anemia on CBC and a CMP that was essentially unremarkable. Troponin was negative. CXR showed what could be infiltrates to bilateral lung bases, pending official reading. She has not experienced tachycardia or hypoxia. Hospital Medicine consulted for admission and further management.      Procedure(s) (LRB):  DECOMPRESSION (Left)      Hospital Course:   Patient admitted and started on aggressive IV hydration and IV narcotic analgesia.  Pain and swelling left shoulder continued to worsen and x-ray showed changes concerning for possible osteonecrosis.  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  Patient underwent surgery on 3/9/2022 for  "decompression of left shoulder and findings of a septic avascular necrosis of the humeral head.  Patient responded well surgery without complications.  Mild weakness and left arm on day following surgery has since resolved.  Pain currently well controlled and patient tolerating p.o. intake well.  Working with PT/OT, recommendations for discharge home, will arrange for outpatient PT/OT.  Patient to follow-up with orthopedic surgery closely in outpatient setting. Patient clinically improved and medically stable for discharge home with instructions to follow-up with PCP in 1-2 weeks.    Patient and/or family was seen on day of discharge by myself and was examined. They were stable for discharge. Discharge plan, follow up instructions, and contacts in case of further needs were discussed in detail.         Goals of Care Treatment Preferences:  Code Status: Full Code      Consults:   Consults (From admission, onward)        Status Ordering Provider     Inpatient consult to Hematology/Oncology  Once        Provider:  Mildred Williamson MD    Acknowledged HELEN VALERIO II     Inpatient consult to Infectious Diseases  Once        Provider:  Sarah Piña MD    Completed MARTINA GODINEZ     Pharmacy to dose Vancomycin consult  Once        Provider:  (Not yet assigned)   "And" Linked Group Details    Acknowledged HELEN VALERIO II     Inpatient consult to Orthopedic Surgery  Once        Provider:  Helen Valerio II, MD    Completed MARTINA GODINEZ     Inpatient virtual consult to Hospital Medicine  Once        Provider:  (Not yet assigned)    Completed DEVON WISEMAN new Assessment & Plan notes have been filed under this hospital service since the last note was generated.  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 Schedule an appointment as soon as possible for a visit in 2 week(s).    Specialty: Orthopedic Surgery  Why: Post-hospitalization Follow-up  Contact information:  [address] [place] [address] [address] [id number]  [phone]             PCP. Schedule an appointment as soon as possible for a visit in 2 week(s).    Why: Post-hospitalization Follow-up                     Patient Instructions:      Ambulatory referral/consult to Physical/Occupational Therapy   Standing Status: Future   Referral Priority: Routine Referral Type: Physical Medicine   Referral Reason: Specialty Services Required   Number of Visits Requested: 1     Diet Adult Regular     Notify your health care provider if you experience any of the following:  temperature >100.4     Notify your health care provider if you experience any of the following:  severe uncontrolled pain     Notify your health care provider if you experience any of the following:  redness, tenderness, or signs of infection (pain, swelling, redness, odor or green/yellow discharge around incision site)     Notify your health care provider if you experience any of the following:  difficulty breathing or increased cough     Notify your health care provider if you experience any of the following:  persistent dizziness, light-headedness, or visual disturbances     Notify your health care provider if you experience any of the following:  increased confusion or weakness     Activity as tolerated       Significant Diagnostic Studies:  Labs:   CMP   Recent Labs   Lab 03/10/22  0506 03/11/22  0634    141   K 5.4* 4.2    108   CO2 23 23   * 89   BUN 15 7   CREATININE 0.8 0.8   CALCIUM 8.9 8.6*   PROT 6.5 6.8   ALBUMIN 3.5 3.8   BILITOT 1.4* 1.3*   ALKPHOS 118 126   * 94*   * 174*   ANIONGAP 8 10   ESTGFRAFRICA >60 >60   EGFRNONAA >60 >60    and CBC   Recent Labs   Lab 03/10/22  0506 03/11/22  0634   WBC 15.31* 15.93*   HGB 7.4* 7.3*   HCT 23.5* 23.2*   * 534*       Pending Diagnostic Studies:     None         Medications:  Reconciled Home Medications:      Medication List      CHANGE how you take these medications    morphine 30 MG 12 hr tablet  Commonly known as: MS CONTIN  Take 30 mg by mouth every 8 (eight) hours.  What changed: Another medication with the same name was removed. Continue taking this medication, and follow the directions you see here.        CONTINUE taking these medications    acetaminophen 325 MG tablet  Commonly known as: TYLENOL  Take 2 tablets (650 mg total) by mouth every 6 (six) hours.     aspirin 81 MG Chew  Take 1 tablet (81 mg total) by mouth once daily.     deferasirox 360 mg Tab  Commonly known as: JADENU  Take 1,080 mg by mouth once daily.     ENDARI 5 gram Pwpk  Generic drug: glutamine (sickle cell)  Take 15 g by mouth 2 (two) times daily.     folic acid 1 MG tablet  Commonly known as: FOLVITE  Take 4 tablets (4 mg total) by mouth once daily.     gabapentin 300 MG capsule  Commonly known as: NEURONTIN  Take 2 capsules (600 mg total) by mouth every evening.     hydroxyurea 500 mg Cap  Commonly known as: HYDREA  Take 1,000 mg by mouth once daily.     oxyCODONE-acetaminophen 5-325 mg per tablet  Commonly known as: PERCOCET  Take 1 tablet by mouth every 4 (four) hours as needed for Pain.     ursodioL 300 mg capsule  Commonly known as: ACTIGALL  Take 1 capsule (300 mg total) by mouth 3 (three) times daily with meals.        STOP taking these medications    famotidine 20 MG  tablet  Commonly known as: PEPCID     ondansetron 4 MG Tbdl  Commonly known as: ZOFRAN-ODT     oxyCODONE 20 mg Tab immediate release tablet  Commonly known as: ROXICODONE     oxyCODONE 30 MG Tab  Commonly known as: ROXICODONE            Indwelling Lines/Drains at time of discharge:   Lines/Drains/Airways     Central Venous Catheter Line  Duration                PowerPort A Cath Single Lumen 03/02/22 0800 left atrial 9 days                Time spent on the discharge of patient: 35 minutes         The attending portion of this evaluation, treatment, and documentation was performed per Jose Armando Dotson MD via Telemedicine AudioVisual using the secure [employer] software platform with 2 way audio/video. The provider was located off-site and the patient is located in the hospital. The aforementioned video software was utilized to document the relevant history and physical exam    Jose Armando Dotson MD  Department of Hospital Medicine  [facility]

## 2022-03-11 NOTE — PLAN OF CARE
Discussed plan of care with patient and allowed her to assist with creating a plan of care for pain. Educated patient of the upcoming exams and medications. Patient agrees with plan of care.

## 2022-03-11 NOTE — PROGRESS NOTES
[facility] Medicine  Telemedicine Progress Note    Patient Name: Nishi Dumont  MRN: [id number]  Patient Class: IP- Inpatient   Admission Date: 3/2/2022  Length of Stay: 8 days  Attending Physician: Jose Armando Dotson MD  Primary Care Provider: Primary Doctor No          Subjective:     Principal Problem:Sickle cell pain crisis        HPI:  Nishi Dumont is a 31 y.o. 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 She states she feels like this is a typical sickle cell flare with associated left sided stabbing chest pain, mid back and left shoulder pain. She denies SOB, nausea, vomiting or diarrhea. She denies fever or cough. She reports compliance with her medications. She states that she gets flares when the weather changes, but has been keeping herself inside to avoid a possible flare. She was last admitted for sickle cell one month ago, and last had an acute chest syndrome about 7-8 months ago. She denies all other complaint.    ED work up was significant for anemia on CBC and a CMP that was essentially unremarkable. Troponin was negative. CXR showed what could be infiltrates to bilateral lung bases, pending official reading. She has not experienced tachycardia or hypoxia. Hospital Medicine consulted for admission and further management.      Overview/Hospital Course:  No notes on file    Interval History:  Patient underwent he moral decompression of the left arm for AVN on 03/09.  Today reports flaccid paralysis from her left elbow down to her left hand.  Pulses are within normal limits.  Will have nursing contact Orthopedic surgery.    Review of Systems   Constitutional:  Positive for fatigue. Negative for chills and fever.   HENT:  Negative for congestion,  postnasal drip, rhinorrhea and sore throat.    Respiratory:  Negative for cough, chest tightness and shortness of breath.    Cardiovascular:  Negative for chest pain and palpitations.   Gastrointestinal:  Negative for abdominal pain, constipation, diarrhea, nausea and vomiting.   Genitourinary:  Negative for dysuria and hematuria.   Musculoskeletal:  Positive for arthralgias.   Neurological:  Positive for weakness. Negative for dizziness, light-headedness and headaches.   Objective:     Vital Signs (Most Recent):  Temp: 98.3 °F (36.8 °C) (03/10/22 1104)  Pulse: 104 (03/10/22 1104)  Resp: 18 (03/10/22 1726)  BP: 122/60 (03/10/22 1104)  SpO2: 100 % (03/10/22 1104) Vital Signs (24h Range):  Temp:  [64.4 °F (18 °C)-98.5 °F (36.9 °C)] 98.3 °F (36.8 °C)  Pulse:  [] 104  Resp:  [] 18  SpO2:  [94 %-100 %] 100 %  BP: ()/(51-60) 122/60     Weight: 56.7 kg (125 lb)  Body mass index is 25.25 kg/m².    Intake/Output Summary (Last 24 hours) at 3/10/2022 1830  Last data filed at 3/10/2022 1800  Gross per 24 hour   Intake 1000 ml   Output --   Net 1000 ml      Physical Exam  Constitutional:       General: She is not in acute distress.     Appearance: Normal appearance.   HENT:      Head: Normocephalic and atraumatic.   Eyes:      Extraocular Movements: Extraocular movements intact.   Cardiovascular:      Rate and Rhythm: Normal rate.   Pulmonary:      Effort: Pulmonary effort is normal. No tachypnea or respiratory distress.   Abdominal:      General: Abdomen is flat. There is no distension.   Musculoskeletal:         General: Normal range of motion.      Cervical back: Normal range of motion and neck supple.   Skin:     General: Skin is dry.   Neurological:      Mental Status: She is alert and oriented to person, place, and time.      Motor: Weakness (Patient unable to flex or extend elbow, wrist or fingers on left side.  Pulses within normal limits.) present.   Psychiatric:         Attention and Perception:  Attention and perception normal.         Mood and Affect: Mood and affect normal.       Significant Labs: All pertinent labs within the past 24 hours have been reviewed.    Significant Imaging: I have reviewed all pertinent imaging results/findings within the past 24 hours.      Assessment/Plan:      * Sickle cell pain crisis  Admit to tele  Continue home medications  Dilaudid PRN  Antinausea PRN    3/3- increasing IVFs to 200 cc/hr; adding PO oxy IR as first choice and making dilaudid second choice     Avascular necrosis of left humeral head      3/6- xrays reviewed; getting LUE U/S to rule out DVT; ortho consulted;     3/7- U/S shows basilic vein thrombosis, MRI left shoulder pending ; OT eval    3/8- MRI shows avascular necrosis to shoulder; getting MRI arm and forearm     3/9 - OR today for decompression of left humeral head; PT/OT eval in the AM    Basilic vein thrombosis  - superficial vein, so no need for anti-coagulation; not thought to be due to port  - heme/onc consulted, doing hypercoag work up      Thrombophlebitis  - on IV vanc and rocephin, ID consulted, follow up final recs      Pain and swelling of left shoulder  - see primary problem    Chest pain  Admit to tele  Concern for Acute chest, last episode was about 7-8 months ago  Afebrile, denies SOB  Trend troponin, #1 0.010  Infiltrate seen on initial chest xray, pending official read. Will treat with levaquin empirically  Oxygen PRN to keep O2 sat >94%      Sickle cell anemia with pain  Patient's anemia is currently controlled. Has not received any PRBCs to date.. Etiology likely d/t sickle cell disease  Current CBC reviewed-   Lab Results   Component Value Date    HGB 9.1 (L) 03/02/2022    HCT 27.5 (L) 03/02/2022     Monitor serial CBC and transfuse if patient becomes hemodynamically unstable, symptomatic or H/H drops below 7/21.           VTE Risk Mitigation (From admission, onward)         Ordered     IP VTE HIGH RISK PATIENT  Once          03/02/22 0415     Place sequential compression device  Until discontinued         03/02/22 0415                      I have assessed these finding virtually using telemed platform and with assistance of bedside nurse                 The attending portion of this evaluation, treatment, and documentation was performed per Jose Armando Dotson MD via Telemedicine AudioVisual using the secure [employer] software platform with 2 way audio/video. The provider was located off-site and the patient is located in the hospital. The aforementioned video software was utilized to document the relevant history and physical exam    Jose Armando Dotson MD  Department of Hospital Medicine   [facility]

## 2022-03-11 NOTE — PLAN OF CARE
Next available apt at [facility]. Emailed to see office able to obtain sooner apt and to notify pt

## 2022-03-11 NOTE — NURSING
Educated patient on discharge instructions and scheduled appointments. Patient verbalized understanding of everything taught. PAC removed and patient tolerated procedure well. Patient requested to walk to her escorts vehicle. Patient safe to self ambulate.

## 2022-03-11 NOTE — HOSPITAL COURSE
Patient admitted and started on aggressive IV hydration and IV narcotic analgesia.  Pain and swelling left shoulder continued to worsen and x-ray showed changes concerning for possible osteonecrosis.  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  Patient underwent surgery on 3/9/2022 for decompression of left shoulder and findings of a septic avascular necrosis of the humeral head.  Patient responded well surgery without complications.  Mild weakness and left arm on day following surgery has since resolved.  Pain currently well controlled and patient tolerating p.o. intake well.  Working with PT/OT, recommendations for discharge home, will arrange for outpatient PT/OT.  Patient to follow-up with orthopedic surgery closely in outpatient setting. Patient clinically improved and medically stable for discharge home with instructions to follow-up with PCP in 1-2 weeks.    Patient and/or family was seen on day of discharge by myself and was examined. They were stable for discharge. Discharge plan, follow up instructions, and contacts in case of further needs were discussed in detail.

## 2022-03-11 NOTE — CARE UPDATE
03/11/22 0800   Home Oxygen Qualification   $ Home O2 Qualification Pulmonary Stress Test/6 min walk;Tech time 15 minutes   Room Air SpO2 At Rest 98 %   Room Air SpO2 During Ambulation 99 %   SpO2 Post Ambulation 99 %   Post Ambulation Heart Rate 101 bpm   Home O2 Eval Comments SpO2 did not drop below 98% at rest or with ambulation. She does not qualify for home oxygen at this time. NILDA Schmitz, RRT

## 2022-03-11 NOTE — PROGRESS NOTES
"Pharmacokinetic Assessment Follow Up: IV Vancomycin    Vancomycin serum concentration assessment(s):    The trough level was drawn correctly and can be used to guide therapy at this time. The measurement is within the desired definitive target range of 10 to 15 mcg/mL.    Vancomycin Regimen Plan:    Continue regimen to Vancomycin 750 mg IV every 12 hours with next serum trough concentration measured at approximately 2000 prior to 4th dose on 3/12.    Drug levels (last 3 results):  Recent Labs   Lab Result Units 03/09/22  1626 03/11/22  0634   Vancomycin-Trough ug/mL 12.3 11.4       Pharmacy will continue to follow and monitor vancomycin.    Please contact pharmacy at extension [phone] for questions regarding this assessment.    Thank you for the consult,   Mehreen Pham       Patient brief summary:  Nishi Dumont is a 31 y.o. female initiated on antimicrobial therapy with IV Vancomycin for treatment of skin & soft tissue infection.    The patient's current regimen is vancomycin 750 mg IV every 12 hours.    Drug Allergies:   Review of patient's allergies indicates:   Allergen Reactions    Contrast media Hives    Iodine and iodide containing products Hives and Swelling    Mushroom Hives    Zithromax [azithromycin] Anaphylaxis    Demerol [meperidine] Other (See Comments)     Regarding reaction to demerol pt states "I talk out of my head and become aggressive"       Actual Body Weight:   56.7 kg    Renal Function:   Estimated Creatinine Clearance: 78.5 mL/min (based on SCr of 0.8 mg/dL).,     Dialysis Method (if applicable):  N/A    CBC (last 72 hours):  Recent Labs   Lab Result Units 03/09/22  0452 03/10/22  0506 03/11/22  0634   WBC K/uL 10.36 15.31* 15.93*   Hemoglobin g/dL 7.6* 7.4* 7.3*   Hematocrit % 24.6* 23.5* 23.2*   Platelets K/uL 622* 564* 534*   Gran % % 62.7 91.6* 70.6   Lymph % % 26.9 5.0* 24.4   Mono % % 6.1 2.9* 3.6*   Eosinophil % % 2.5 0.0 0.2   Basophil % % 1.4 0.1 0.8   Differential Method  " Automated Automated Automated       Metabolic Panel (last 72 hours):  Recent Labs   Lab Result Units 03/09/22  0452 03/10/22  0506 03/11/22  0634   Sodium mmol/L 139 139 141   Potassium mmol/L 4.6 5.4* 4.2   Chloride mmol/L 104 108 108   CO2 mmol/L 29 23 23   Glucose mg/dL 84 143* 89   BUN mg/dL 12 15 7   Creatinine mg/dL 0.7 0.8 0.8   Albumin g/dL 3.6 3.5 3.8   Total Bilirubin mg/dL 1.1* 1.4* 1.3*   Alkaline Phosphatase U/L 102 118 126   AST U/L 41* 202* 94*   ALT U/L 42 192* 174*   Magnesium mg/dL 1.8 1.9 1.7       Vancomycin Administrations:  vancomycin given in the last 96 hours                     vancomycin 750 mg in dextrose 5 % 250 mL IVPB (ready to mix system) (mg) 750 mg New Bag 03/10/22 1719     750 mg New Bag  0613     750 mg New Bag 03/09/22 1740     750 mg New Bag  0340     750 mg New Bag 03/08/22 1642     750 mg New Bag  0511     750 mg New Bag 03/07/22 1647                    Microbiologic Results:  Microbiology Results (last 7 days)       Procedure Component Value Units Date/Time    Blood culture [[id number]] Collected: 03/10/22 1428    Order Status: Completed Specimen: Blood from Peripheral, Right Hand Updated: 03/11/22 0515     Blood Culture, Routine No Growth to date    Blood culture [[id number]]  (Abnormal) Collected: 03/08/22 0311    Order Status: Completed Specimen: Blood from Antecubital, Right Updated: 03/10/22 1514     Blood Culture, Routine Gram stain peds bottle: Gram positive cocci in clusters resembling Staph       Results called to and read back by: RANDEE MARTINEZ RN  03/09/2022  11:31      COAGULASE-NEGATIVE STAPHYLOCOCCUS SPECIES  Organism is a probable contaminant      Narrative:      Collection has been rescheduled by [facility] at 03/07/2022 19:12 Reason:   Unable to collect  Collection has been rescheduled by MRR at 03/07/2022 19:32 Reason:   Patient Refused she dont want to be stuck   Collection has been rescheduled by [facility] at 03/07/2022 19:12 Reason:   Unable to collect  Collection has been  rescheduled by MRR at 03/07/2022 19:32 Reason:   Patient Refused she dont want to be stuck     Blood culture [[id number]] Collected: 03/08/22 0311    Order Status: Completed Specimen: Blood from Antecubital, Right Updated: 03/10/22 1212     Blood Culture, Routine No Growth to date      No Growth to date      No Growth to date    Narrative:      Collection has been rescheduled by [facility] at 03/07/2022 19:12 Reason:   Unable to collect  Collection has been rescheduled by MRR at 03/07/2022 19:32 Reason:   Patient Refused she dont want to be stuck   Collection has been rescheduled by [facility] at 03/07/2022 19:12 Reason:   Unable to collect  Collection has been rescheduled by MRR at 03/07/2022 19:32 Reason:   Patient Refused she dont want to be stuck

## 2022-03-11 NOTE — DISCHARGE INSTRUCTIONS
Thank you for choosing [facility] for your medical care. The primary doctor who is taking care of you at the time of your discharge is Jose Armando Dotson MD.     You were admitted to the hospital with Sickle cell pain crisis.     Please note your discharge instructions, including diet/activity restrictions, follow-up appointments, and medication changes.  If you have any questions about your medical issues, prescriptions, or any other questions, please feel free to contact the [facility] at [phone] and we will help.    If you are previously with Home health, outpatient PT/OT or under a therapy program, you are cleared to return to those programs.    Please direct all long term medication refills and follow up to your primary care provider, Primary Doctor No. Thank you again for letting us take care of your health care needs.    Please note the following discharge instructions per your discharging physician-  Follow-up with your PCP in 1-2 weeks.  Follow-up with your Orthopedic Surgeon in 1-2 weeks.

## 2022-03-11 NOTE — PLAN OF CARE
Plan of care reviewed with patient. Patient verbalized complete understanding. Pt awake, alert, and oriented. Pt complaining of pain moderately controlled with pain regimen. Pt still having some numbness and tingling in the arm from the block. Pt was complaining of the port dsg irritating her skin and requested a dressing change with a tegaderm instead.  All fall precautions maintained, bed in lowest position, locked, call light within reach. Side rails up times 2. Slip resistant socks maintained.

## 2022-03-13 LAB — BACTERIA BLD CULT: NORMAL

## 2022-03-15 LAB — BACTERIA BLD CULT: NORMAL

## 2022-04-05 ENCOUNTER — DOCUMENTATION ONLY (OUTPATIENT)
Dept: REHABILITATION | Facility: OTHER | Age: 32
End: 2022-04-05
Payer: MEDICAID

## 2022-04-05 NOTE — PROGRESS NOTES
Physical Therapy Treatment Note    Patient was scheduled for physical therapy at [facility] for 4/4/2022. Pt failed to appear for appointment without prior notification for today.     Annie Small, PT

## 2022-04-18 ENCOUNTER — HOSPITAL ENCOUNTER (OUTPATIENT)
Facility: HOSPITAL | Age: 32
Discharge: HOME | End: 2022-04-19
Attending: EMERGENCY MEDICINE | Admitting: STUDENT IN AN ORGANIZED HEALTH CARE EDUCATION/TRAINING PROGRAM
Payer: MEDICAID

## 2022-04-18 DIAGNOSIS — R07.9 CHEST PAIN: ICD-10-CM

## 2022-04-18 DIAGNOSIS — D57.00 VASO-OCCLUSIVE SICKLE CELL CRISIS: ICD-10-CM

## 2022-04-18 DIAGNOSIS — D57.00 SICKLE CELL DISEASE WITH CRISIS: Primary | ICD-10-CM

## 2022-04-18 PROBLEM — E83.118 SECONDARY HEMOCHROMATOSIS: Status: ACTIVE | Noted: 2020-07-22

## 2022-04-18 PROBLEM — M79.605 LOWER EXTREMITY PAIN, LEFT: Status: RESOLVED | Noted: 2017-09-03 | Resolved: 2022-04-18

## 2022-04-18 PROBLEM — R79.89 ABNORMAL LFTS: Status: ACTIVE | Noted: 2022-04-18

## 2022-04-18 PROBLEM — Z79.891 CHRONIC PRESCRIPTION OPIATE USE: Status: ACTIVE | Noted: 2022-02-06

## 2022-04-18 LAB
ALBUMIN SERPL BCP-MCNC: 4.6 G/DL (ref 3.5–5.2)
ALP SERPL-CCNC: 127 U/L (ref 55–135)
ALT SERPL W/O P-5'-P-CCNC: 66 U/L (ref 10–44)
ANION GAP SERPL CALC-SCNC: 10 MMOL/L (ref 8–16)
AST SERPL-CCNC: 62 U/L (ref 10–40)
B-HCG UR QL: NEGATIVE
BASOPHILS # BLD AUTO: 0.13 K/UL (ref 0–0.2)
BASOPHILS NFR BLD: 1 % (ref 0–1.9)
BILIRUB SERPL-MCNC: 2.7 MG/DL (ref 0.1–1)
BILIRUB UR QL STRIP: NEGATIVE
BUN SERPL-MCNC: 12 MG/DL (ref 6–20)
CALCIUM SERPL-MCNC: 9.3 MG/DL (ref 8.7–10.5)
CHLORIDE SERPL-SCNC: 104 MMOL/L (ref 95–110)
CLARITY UR: CLEAR
CO2 SERPL-SCNC: 25 MMOL/L (ref 23–29)
COLOR UR: YELLOW
CREAT SERPL-MCNC: 0.8 MG/DL (ref 0.5–1.4)
CRP SERPL-MCNC: 6.2 MG/L (ref 0–8.2)
DIFFERENTIAL METHOD: ABNORMAL
EOSINOPHIL # BLD AUTO: 0.3 K/UL (ref 0–0.5)
EOSINOPHIL NFR BLD: 2.3 % (ref 0–8)
ERYTHROCYTE [DISTWIDTH] IN BLOOD BY AUTOMATED COUNT: 15.2 % (ref 11.5–14.5)
ERYTHROCYTE [SEDIMENTATION RATE] IN BLOOD BY WESTERGREN METHOD: 10 MM/HR (ref 0–20)
EST. GFR  (AFRICAN AMERICAN): >60 ML/MIN/1.73 M^2
EST. GFR  (NON AFRICAN AMERICAN): >60 ML/MIN/1.73 M^2
GLUCOSE SERPL-MCNC: 76 MG/DL (ref 70–110)
GLUCOSE UR QL STRIP: NEGATIVE
HAPTOGLOB SERPL-MCNC: <10 MG/DL (ref 30–250)
HCT VFR BLD AUTO: 30.4 % (ref 37–48.5)
HGB BLD-MCNC: 10.1 G/DL (ref 12–16)
HGB UR QL STRIP: NEGATIVE
IMM GRANULOCYTES # BLD AUTO: 0.05 K/UL (ref 0–0.04)
IMM GRANULOCYTES NFR BLD AUTO: 0.4 % (ref 0–0.5)
INFLUENZA A, MOLECULAR: NEGATIVE
INFLUENZA B, MOLECULAR: NEGATIVE
KETONES UR QL STRIP: NEGATIVE
LACTATE SERPL-SCNC: 2.4 MMOL/L (ref 0.5–2.2)
LDH SERPL L TO P-CCNC: 280 U/L (ref 110–260)
LEUKOCYTE ESTERASE UR QL STRIP: NEGATIVE
LYMPHOCYTES # BLD AUTO: 2.6 K/UL (ref 1–4.8)
LYMPHOCYTES NFR BLD: 20.5 % (ref 18–48)
MCH RBC QN AUTO: 29.5 PG (ref 27–31)
MCHC RBC AUTO-ENTMCNC: 33.2 G/DL (ref 32–36)
MCV RBC AUTO: 89 FL (ref 82–98)
MONOCYTES # BLD AUTO: 1.4 K/UL (ref 0.3–1)
MONOCYTES NFR BLD: 11.2 % (ref 4–15)
NEUTROPHILS # BLD AUTO: 8.3 K/UL (ref 1.8–7.7)
NEUTROPHILS NFR BLD: 64.6 % (ref 38–73)
NITRITE UR QL STRIP: NEGATIVE
NRBC BLD-RTO: 0 /100 WBC
PH UR STRIP: 6 [PH] (ref 5–8)
PLATELET # BLD AUTO: 595 K/UL (ref 150–450)
PMV BLD AUTO: 9.1 FL (ref 9.2–12.9)
POTASSIUM SERPL-SCNC: 4.5 MMOL/L (ref 3.5–5.1)
PROCALCITONIN SERPL IA-MCNC: 0.1 NG/ML
PROT SERPL-MCNC: 8.1 G/DL (ref 6–8.4)
PROT UR QL STRIP: NEGATIVE
RBC # BLD AUTO: 3.42 M/UL (ref 4–5.4)
RETICS/RBC NFR AUTO: 1.7 % (ref 0.5–2.5)
SARS-COV-2 RDRP RESP QL NAA+PROBE: NEGATIVE
SODIUM SERPL-SCNC: 139 MMOL/L (ref 136–145)
SP GR UR STRIP: 1.01 (ref 1–1.03)
SPECIMEN SOURCE: NORMAL
URN SPEC COLLECT METH UR: ABNORMAL
UROBILINOGEN UR STRIP-ACNC: ABNORMAL EU/DL
WBC # BLD AUTO: 12.85 K/UL (ref 3.9–12.7)

## 2022-04-18 PROCEDURE — 85045 AUTOMATED RETICULOCYTE COUNT: CPT | Performed by: EMERGENCY MEDICINE

## 2022-04-18 PROCEDURE — 81003 URINALYSIS AUTO W/O SCOPE: CPT | Performed by: EMERGENCY MEDICINE

## 2022-04-18 PROCEDURE — 83615 LACTATE (LD) (LDH) ENZYME: CPT | Performed by: STUDENT IN AN ORGANIZED HEALTH CARE EDUCATION/TRAINING PROGRAM

## 2022-04-18 PROCEDURE — 25000003 PHARM REV CODE 250: Performed by: STUDENT IN AN ORGANIZED HEALTH CARE EDUCATION/TRAINING PROGRAM

## 2022-04-18 PROCEDURE — 83605 ASSAY OF LACTIC ACID: CPT | Performed by: EMERGENCY MEDICINE

## 2022-04-18 PROCEDURE — 94761 N-INVAS EAR/PLS OXIMETRY MLT: CPT

## 2022-04-18 PROCEDURE — 99285 EMERGENCY DEPT VISIT HI MDM: CPT | Mod: 25

## 2022-04-18 PROCEDURE — U0002 COVID-19 LAB TEST NON-CDC: HCPCS | Performed by: EMERGENCY MEDICINE

## 2022-04-18 PROCEDURE — G0378 HOSPITAL OBSERVATION PER HR: HCPCS

## 2022-04-18 PROCEDURE — 96365 THER/PROPH/DIAG IV INF INIT: CPT

## 2022-04-18 PROCEDURE — 87502 INFLUENZA DNA AMP PROBE: CPT | Performed by: EMERGENCY MEDICINE

## 2022-04-18 PROCEDURE — 85025 COMPLETE CBC W/AUTO DIFF WBC: CPT | Performed by: EMERGENCY MEDICINE

## 2022-04-18 PROCEDURE — 85651 RBC SED RATE NONAUTOMATED: CPT | Performed by: EMERGENCY MEDICINE

## 2022-04-18 PROCEDURE — 96361 HYDRATE IV INFUSION ADD-ON: CPT

## 2022-04-18 PROCEDURE — 87040 BLOOD CULTURE FOR BACTERIA: CPT | Performed by: EMERGENCY MEDICINE

## 2022-04-18 PROCEDURE — 86140 C-REACTIVE PROTEIN: CPT | Performed by: EMERGENCY MEDICINE

## 2022-04-18 PROCEDURE — 80053 COMPREHEN METABOLIC PANEL: CPT | Performed by: EMERGENCY MEDICINE

## 2022-04-18 PROCEDURE — 36415 COLL VENOUS BLD VENIPUNCTURE: CPT | Performed by: EMERGENCY MEDICINE

## 2022-04-18 PROCEDURE — 36415 COLL VENOUS BLD VENIPUNCTURE: CPT | Performed by: STUDENT IN AN ORGANIZED HEALTH CARE EDUCATION/TRAINING PROGRAM

## 2022-04-18 PROCEDURE — 63600175 PHARM REV CODE 636 W HCPCS: Performed by: EMERGENCY MEDICINE

## 2022-04-18 PROCEDURE — 83010 ASSAY OF HAPTOGLOBIN QUANT: CPT | Performed by: STUDENT IN AN ORGANIZED HEALTH CARE EDUCATION/TRAINING PROGRAM

## 2022-04-18 PROCEDURE — 84145 PROCALCITONIN (PCT): CPT | Performed by: STUDENT IN AN ORGANIZED HEALTH CARE EDUCATION/TRAINING PROGRAM

## 2022-04-18 PROCEDURE — 81025 URINE PREGNANCY TEST: CPT | Performed by: EMERGENCY MEDICINE

## 2022-04-18 RX ORDER — SODIUM,POTASSIUM PHOSPHATES 280-250MG
2 POWDER IN PACKET (EA) ORAL
Status: DISCONTINUED | OUTPATIENT
Start: 2022-04-18 | End: 2022-04-19 | Stop reason: HOSPADM

## 2022-04-18 RX ORDER — MORPHINE SULFATE 30 MG/1
30 TABLET, FILM COATED, EXTENDED RELEASE ORAL EVERY 8 HOURS
Status: DISCONTINUED | OUTPATIENT
Start: 2022-04-18 | End: 2022-04-19 | Stop reason: HOSPADM

## 2022-04-18 RX ORDER — NAPROXEN SODIUM 220 MG/1
81 TABLET, FILM COATED ORAL DAILY
Status: DISCONTINUED | OUTPATIENT
Start: 2022-04-19 | End: 2022-04-19 | Stop reason: HOSPADM

## 2022-04-18 RX ORDER — GABAPENTIN 300 MG/1
600 CAPSULE ORAL NIGHTLY
Status: DISCONTINUED | OUTPATIENT
Start: 2022-04-18 | End: 2022-04-19 | Stop reason: HOSPADM

## 2022-04-18 RX ORDER — DEFERASIROX 500 MG/1
20 TABLET, FOR SUSPENSION ORAL
Status: DISCONTINUED | OUTPATIENT
Start: 2022-04-19 | End: 2022-04-19 | Stop reason: HOSPADM

## 2022-04-18 RX ORDER — NALOXONE HCL 0.4 MG/ML
0.02 VIAL (ML) INJECTION
Status: DISCONTINUED | OUTPATIENT
Start: 2022-04-18 | End: 2022-04-19 | Stop reason: HOSPADM

## 2022-04-18 RX ORDER — TRAMADOL HYDROCHLORIDE 50 MG/1
50 TABLET ORAL 3 TIMES DAILY
COMMUNITY
End: 2023-02-09 | Stop reason: SDUPTHER

## 2022-04-18 RX ORDER — AMOXICILLIN 250 MG
1 CAPSULE ORAL 2 TIMES DAILY
Status: DISCONTINUED | OUTPATIENT
Start: 2022-04-18 | End: 2022-04-19 | Stop reason: HOSPADM

## 2022-04-18 RX ORDER — ERGOCALCIFEROL 1.25 MG/1
50000 CAPSULE ORAL
Status: ON HOLD | COMMUNITY
Start: 2022-01-25 | End: 2023-11-05 | Stop reason: HOSPADM

## 2022-04-18 RX ORDER — OXYCODONE AND ACETAMINOPHEN 5; 325 MG/1; MG/1
1 TABLET ORAL EVERY 4 HOURS PRN
Status: DISCONTINUED | OUTPATIENT
Start: 2022-04-18 | End: 2022-04-19 | Stop reason: HOSPADM

## 2022-04-18 RX ORDER — HYDROXYUREA 500 MG/1
1000 CAPSULE ORAL DAILY
Status: DISCONTINUED | OUTPATIENT
Start: 2022-04-19 | End: 2022-04-19 | Stop reason: HOSPADM

## 2022-04-18 RX ORDER — SODIUM CHLORIDE, SODIUM LACTATE, POTASSIUM CHLORIDE, CALCIUM CHLORIDE 600; 310; 30; 20 MG/100ML; MG/100ML; MG/100ML; MG/100ML
INJECTION, SOLUTION INTRAVENOUS CONTINUOUS
Status: DISCONTINUED | OUTPATIENT
Start: 2022-04-18 | End: 2022-04-19 | Stop reason: HOSPADM

## 2022-04-18 RX ORDER — HYDROMORPHONE HYDROCHLORIDE 1 MG/ML
1 INJECTION, SOLUTION INTRAMUSCULAR; INTRAVENOUS; SUBCUTANEOUS EVERY 4 HOURS PRN
Status: DISCONTINUED | OUTPATIENT
Start: 2022-04-18 | End: 2022-04-19 | Stop reason: HOSPADM

## 2022-04-18 RX ORDER — SODIUM CHLORIDE 0.9 % (FLUSH) 0.9 %
10 SYRINGE (ML) INJECTION
Status: DISCONTINUED | OUTPATIENT
Start: 2022-04-18 | End: 2022-04-19 | Stop reason: HOSPADM

## 2022-04-18 RX ORDER — FOLIC ACID 1 MG/1
4 TABLET ORAL DAILY
Status: DISCONTINUED | OUTPATIENT
Start: 2022-04-19 | End: 2022-04-19 | Stop reason: HOSPADM

## 2022-04-18 RX ORDER — DIPHENHYDRAMINE HCL 25 MG
25 CAPSULE ORAL EVERY 6 HOURS PRN
Status: DISCONTINUED | OUTPATIENT
Start: 2022-04-18 | End: 2022-04-19 | Stop reason: HOSPADM

## 2022-04-18 RX ADMIN — OXYCODONE HYDROCHLORIDE AND ACETAMINOPHEN 1 TABLET: 5; 325 TABLET ORAL at 06:04

## 2022-04-18 RX ADMIN — SENNOSIDES AND DOCUSATE SODIUM 1 TABLET: 50; 8.6 TABLET ORAL at 08:04

## 2022-04-18 RX ADMIN — CEFTRIAXONE 1 G: 1 INJECTION, SOLUTION INTRAVENOUS at 02:04

## 2022-04-18 RX ADMIN — DIPHENHYDRAMINE HYDROCHLORIDE 25 MG: 25 CAPSULE ORAL at 06:04

## 2022-04-18 RX ADMIN — MORPHINE SULFATE 30 MG: 30 TABLET, FILM COATED, EXTENDED RELEASE ORAL at 09:04

## 2022-04-18 NOTE — ED NOTES
Accessed Lt chest wall powerport @ this time per ER MD order; Hospitalist @ bedside for intial eval

## 2022-04-18 NOTE — HPI
31 year old female with a past medical history of sickle cell anemia with AVN of L hip and shoulder as well as acute chest syndrome who presented from her PCP clinic with worsening pain and decreased ROM in left shoulder (secondary to pain) with subjective fevers. She has recent decompression of the L humeral head on last hospitalization 3/2022. She also endorses back pain. She denies any constant SOB, cough, chest pain, dysuria, abdominal pain, fatigue, or diarrhea. She describes her pain as similar in character to her previous sickle cell crises. The patient received Rocephin in the ED and Hospital Medicine was consulted for admission.

## 2022-04-18 NOTE — ED NOTES
2 each interrupted sutures removed intact from Lt shoulder incision (surgery in March of this year)

## 2022-04-18 NOTE — PHARMACY MED REC
"Admission Medication History     The home medication history was taken by Marge Flores CPhT.    Medication history obtained from Verified by Patient     You may go to "Admission" then "Reconcile Home Medications" tabs to review and/or act upon these items.      The home medication list has been updated by the Pharmacy department.    Please read ALL comments highlighted in yellow.    Please address this information as you see fit.     Feel free to contact us if you have any questions or require assistance.      The medications listed below were removed from the home medication list.  Please reorder if appropriate:  Patient reports no longer taking the following medication(s):   Aspirin 81mg   Gabapentin 300mg   Ursodiol 300mg      Marge Flores CPhT.  [phone]      .          "

## 2022-04-18 NOTE — ASSESSMENT & PLAN NOTE
-Continue home pain medication regimen  -Dilaudid 1 Q4H PRN  - cc/hr  -Hydrea and folic acid  -Consider consultation to Orthopedics if patient's shoulder ROM worsens or does not improve with pain control

## 2022-04-18 NOTE — SUBJECTIVE & OBJECTIVE
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    Avascular necrosis of bone of hip     Avascular necrosis of humeral head     Blood transfusion     Sickle cell disease        Past Surgical History:   Procedure Laterality Date    CHOLECYSTECTOMY      JOINT REPLACEMENT       left hip       Review of patient's allergies indicates:   Allergen Reactions    Contrast media Hives    Iodine and iodide containing products Hives and Swelling    Mushroom Hives    Zithromax [azithromycin] Anaphylaxis    Demerol [meperidine] Other (See Comments)     Regarding reaction to demerol pt states "I talk out of my head and become aggressive"       No current facility-administered medications on file prior to encounter.     Current Outpatient Medications on File Prior to Encounter   Medication Sig    acetaminophen (TYLENOL) 325 MG tablet Take 2 tablets (650 mg total) by mouth every 6 (six) hours.    aspirin 81 MG Chew Take 1 tablet (81 mg total) by mouth once daily.    deferasirox (JADENU) 360 mg Tab Take 1,080 mg by mouth once daily.    ergocalciferol (ERGOCALCIFEROL) 50,000 unit Cap Take 50,000 Units by mouth every 7 days.    folic acid (FOLVITE) 1 MG tablet Take 4 tablets (4 mg total) by mouth once daily.    gabapentin (NEURONTIN) 300 MG capsule Take 2 capsules (600 mg total) by mouth every evening.    glutamine, sickle cell, (ENDARI) 5 gram PwPk Take 15 g by mouth 2 (two) times daily.    hydroxyurea (HYDREA) 500 mg Cap Take 1,000 mg by mouth once daily.    morphine (MS CONTIN) 30 MG 12 hr tablet Take 30 mg by mouth every 8 (eight) hours.    nabumetone (RELAFEN) 750 MG tablet Take 1 tablet (750 mg total) by mouth 2 (two) times daily as needed for Pain.    oxyCODONE-acetaminophen (PERCOCET) 5-325 mg per tablet Take 1 tablet by mouth every 4 (four) hours as needed for Pain.    ursodiol (ACTIGALL) 300 mg capsule Take 1 capsule (300 mg total) by mouth 3 (three) times " daily with meals.     Family History       Problem Relation (Age of Onset)    Sickle cell trait Mother, Father, Brother, Daughter          Tobacco Use    Smoking status: Never Smoker    Smokeless tobacco: Never Used   Substance and Sexual Activity    Alcohol use: Yes     Alcohol/week: 0.0 standard drinks     Comment: occassionally    Drug use: No    Sexual activity: Yes     Partners: Male     Birth control/protection: Injection     Review of Systems   Constitutional:  Positive for activity change and fever. Negative for fatigue.   HENT: Negative.  Negative for congestion.    Respiratory:  Negative for cough, shortness of breath and wheezing.    Cardiovascular:  Negative for chest pain and palpitations.   Gastrointestinal:  Negative for abdominal pain and diarrhea.   Genitourinary:  Negative for dysuria.   Musculoskeletal:  Positive for arthralgias, joint swelling and myalgias.   Skin:  Negative for color change and pallor.   Objective:     Vital Signs (Most Recent):  Temp: 98.3 °F (36.8 °C) (04/18/22 1256)  Pulse: 76 (04/18/22 1256)  Resp: 16 (04/18/22 1406)  BP: 112/72 (04/18/22 1256)  SpO2: 100 % (04/18/22 1256) Vital Signs (24h Range):  Temp:  [98.3 °F (36.8 °C)] 98.3 °F (36.8 °C)  Pulse:  [76] 76  Resp:  [16-18] 16  SpO2:  [100 %] 100 %  BP: (112)/(72) 112/72     Weight: 53.5 kg (118 lb)  Body mass index is 23.83 kg/m².    Physical Exam  Vitals and nursing note reviewed.   Constitutional:       Appearance: Normal appearance.   HENT:      Head: Normocephalic and atraumatic.      Right Ear: External ear normal.      Left Ear: External ear normal.      Nose: Nose normal.      Mouth/Throat:      Mouth: Mucous membranes are moist.      Pharynx: Oropharynx is clear.   Eyes:      Extraocular Movements: Extraocular movements intact.      Conjunctiva/sclera: Conjunctivae normal.   Cardiovascular:      Rate and Rhythm: Normal rate and regular rhythm.      Heart sounds: Normal heart sounds.   Pulmonary:      Effort:  Pulmonary effort is normal.      Breath sounds: Normal breath sounds.   Abdominal:      General: Abdomen is flat. Bowel sounds are normal.      Palpations: Abdomen is soft.   Musculoskeletal:         General: Swelling and tenderness present.      Cervical back: Normal range of motion and neck supple.      Right lower leg: No edema.      Left lower leg: No edema.      Comments: LUE with limited ROM secondary to pain   Skin:     General: Skin is warm and dry.      Coloration: Skin is not jaundiced or pale.      Comments: Port L chest.   Neurological:      General: No focal deficit present.      Mental Status: She is alert and oriented to person, place, and time.   Psychiatric:         Mood and Affect: Mood normal.         Behavior: Behavior normal.           Significant Labs: All pertinent labs within the past 24 hours have been reviewed.    Significant Imaging: I have reviewed all pertinent imaging results/findings within the past 24 hours.

## 2022-04-18 NOTE — H&P
[facility] Emergency Dept  [facility] Medicine  History & Physical    Patient Name: Nishi Dumont  MRN: [id number]  Patient Class: OP- Observation  Admission Date: 4/18/2022  Attending Physician: Edilberto Chapman MD  Primary Care Provider: Primary Doctor No         Patient information was obtained from patient, past medical records and ER records.     Subjective:     Principal Problem:Vaso-occlusive sickle cell crisis    Chief Complaint:   Chief Complaint   Patient presents with    Post-op Problem     Left shoulder surgery in March, fever x 2 days with left shoulder pain and drainage from incisions, stitches still in place; follow up with Dr. 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 She has recent decompression of the L humeral head on last hospitalization 3/2022. She also endorses back pain. She denies any constant SOB, cough, chest pain, dysuria, abdominal pain, fatigue, or diarrhea. She describes her pain as similar in character to her previous sickle cell crises. The patient received Rocephin in the ED and Hospital Medicine was consulted for admission.      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    Avascular necrosis of bone of hip     Avascular necrosis of humeral head     Blood transfusion     Sickle cell disease        Past Surgical History:   Procedure Laterality Date    CHOLECYSTECTOMY      JOINT REPLACEMENT       left hip       Review of patient's allergies indicates:   Allergen Reactions    Contrast media Hives    Iodine and iodide containing products Hives and Swelling    Mushroom Hives    Zithromax [azithromycin] Anaphylaxis    Demerol [meperidine] Other (See Comments)      "Regarding reaction to demerol pt states "I talk out of my head and become aggressive"       No current facility-administered medications on file prior to encounter.     Current Outpatient Medications on File Prior to Encounter   Medication Sig    acetaminophen (TYLENOL) 325 MG tablet Take 2 tablets (650 mg total) by mouth every 6 (six) hours.    aspirin 81 MG Chew Take 1 tablet (81 mg total) by mouth once daily.    deferasirox (JADENU) 360 mg Tab Take 1,080 mg by mouth once daily.    ergocalciferol (ERGOCALCIFEROL) 50,000 unit Cap Take 50,000 Units by mouth every 7 days.    folic acid (FOLVITE) 1 MG tablet Take 4 tablets (4 mg total) by mouth once daily.    gabapentin (NEURONTIN) 300 MG capsule Take 2 capsules (600 mg total) by mouth every evening.    glutamine, sickle cell, (ENDARI) 5 gram PwPk Take 15 g by mouth 2 (two) times daily.    hydroxyurea (HYDREA) 500 mg Cap Take 1,000 mg by mouth once daily.    morphine (MS CONTIN) 30 MG 12 hr tablet Take 30 mg by mouth every 8 (eight) hours.    nabumetone (RELAFEN) 750 MG tablet Take 1 tablet (750 mg total) by mouth 2 (two) times daily as needed for Pain.    oxyCODONE-acetaminophen (PERCOCET) 5-325 mg per tablet Take 1 tablet by mouth every 4 (four) hours as needed for Pain.    ursodiol (ACTIGALL) 300 mg capsule Take 1 capsule (300 mg total) by mouth 3 (three) times daily with meals.     Family History       Problem Relation (Age of Onset)    Sickle cell trait Mother, Father, Brother, Daughter          Tobacco Use    Smoking status: Never Smoker    Smokeless tobacco: Never Used   Substance and Sexual Activity    Alcohol use: Yes     Alcohol/week: 0.0 standard drinks     Comment: occassionally    Drug use: No    Sexual activity: Yes     Partners: Male     Birth control/protection: Injection     Review of Systems   Constitutional:  Positive for activity change and fever. Negative for fatigue.   HENT: Negative.  Negative for congestion.    Respiratory:  " Negative for cough, shortness of breath and wheezing.    Cardiovascular:  Negative for chest pain and palpitations.   Gastrointestinal:  Negative for abdominal pain and diarrhea.   Genitourinary:  Negative for dysuria.   Musculoskeletal:  Positive for arthralgias, joint swelling and myalgias.   Skin:  Negative for color change and pallor.   Objective:     Vital Signs (Most Recent):  Temp: 98.3 °F (36.8 °C) (04/18/22 1256)  Pulse: 76 (04/18/22 1256)  Resp: 16 (04/18/22 1406)  BP: 112/72 (04/18/22 1256)  SpO2: 100 % (04/18/22 1256) Vital Signs (24h Range):  Temp:  [98.3 °F (36.8 °C)] 98.3 °F (36.8 °C)  Pulse:  [76] 76  Resp:  [16-18] 16  SpO2:  [100 %] 100 %  BP: (112)/(72) 112/72     Weight: 53.5 kg (118 lb)  Body mass index is 23.83 kg/m².    Physical Exam  Vitals and nursing note reviewed.   Constitutional:       Appearance: Normal appearance.   HENT:      Head: Normocephalic and atraumatic.      Right Ear: External ear normal.      Left Ear: External ear normal.      Nose: Nose normal.      Mouth/Throat:      Mouth: Mucous membranes are moist.      Pharynx: Oropharynx is clear.   Eyes:      Extraocular Movements: Extraocular movements intact.      Conjunctiva/sclera: Conjunctivae normal.   Cardiovascular:      Rate and Rhythm: Normal rate and regular rhythm.      Heart sounds: Normal heart sounds.   Pulmonary:      Effort: Pulmonary effort is normal.      Breath sounds: Normal breath sounds.   Abdominal:      General: Abdomen is flat. Bowel sounds are normal.      Palpations: Abdomen is soft.   Musculoskeletal:         General: Swelling and tenderness present.      Cervical back: Normal range of motion and neck supple.      Right lower leg: No edema.      Left lower leg: No edema.      Comments: LUE with limited ROM secondary to pain   Skin:     General: Skin is warm and dry.      Coloration: Skin is not jaundiced or pale.      Comments: Port L chest.   Neurological:      General: No focal deficit present.       "Mental Status: She is alert and oriented to person, place, and time.   Psychiatric:         Mood and Affect: Mood normal.         Behavior: Behavior normal.           Significant Labs: All pertinent labs within the past 24 hours have been reviewed.    Significant Imaging: I have reviewed all pertinent imaging results/findings within the past 24 hours.    Assessment/Plan:     * Vaso-occlusive sickle cell crisis  -Continue home pain medication regimen  -Dilaudid 1 Q4H PRN  - cc/hr  -Hydrea and folic acid  -Consider consultation to Orthopedics if patient's shoulder ROM worsens or does not improve with pain control      Abnormal LFTs  -Trend CMP      Thrombocytosis  -Trend CBC      Pain and swelling of left shoulder  -See "Vaso-occlusive sickle cell crisis."        VTE Risk Mitigation (From admission, onward)    None             Edilberto Chapman MD  Department of Hospital Medicine   [facility] - Emergency Dept  "

## 2022-04-18 NOTE — ED PROVIDER NOTES
"Encounter Date: 4/18/2022       History     Chief Complaint   Patient presents with    Post-op Problem     Left shoulder surgery in March, fever x 2 days with left shoulder pain and drainage from incisions, stitches still in place; follow up with Dr. Rebolledo next Tuesday      31-year-old female with sickle cell disease, avascular necrosis of the humeral head avascular necrosis of the hip presents with fever for 2 days and worsening left shoulder pain.  She was seen at our facility in March and admitted for sickle cell crisis and had surgery for septic avascular necrosis of the left humerus.  She did not follow-up as instructed and still has her stitches in place.  She was supposed to follow up with Orthopedics at the end of March but did not go for what ever reason.  She reports fever yesterday of 101° F. No fever today.  She reports worsening left shoulder pain and she has some swelling and tenderness at the site of the stitches.  She reports decreased range of motion in the shoulder.  Worsening shoulder pain began several days ago.          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    Avascular necrosis of bone of hip     Avascular necrosis of humeral head     Blood transfusion     Sickle cell disease      Past Surgical History:   Procedure Laterality Date    CHOLECYSTECTOMY      JOINT REPLACEMENT       left hip     Family History   Problem Relation Age of Onset    Sickle cell trait Mother     Sickle cell trait Father     Sickle cell trait Brother     Sickle cell trait Daughter      Social History     Tobacco " Use    Smoking status: Never Smoker    Smokeless tobacco: Never Used   Substance Use Topics    Alcohol use: Yes     Alcohol/week: 0.0 standard drinks     Comment: occassionally    Drug use: No     Review of Systems   Constitutional: Positive for fatigue and fever.   Gastrointestinal: Negative.  Negative for abdominal pain.   Genitourinary: Positive for flank pain (right lower back). Negative for hematuria.   Musculoskeletal: Positive for arthralgias.   Skin: Positive for wound.   All other systems reviewed and are negative.      Physical Exam     Initial Vitals [04/18/22 1256]   BP Pulse Resp Temp SpO2   112/72 76 18 98.3 °F (36.8 °C) 100 %      MAP       --         Physical Exam    Nursing note and vitals reviewed.  Constitutional: She appears well-developed and well-nourished. She is not diaphoretic.   Well-appearing   HENT:   Head: Normocephalic and atraumatic.   Eyes: EOM are normal.   Neck: Neck supple.   Normal range of motion.  Cardiovascular: Normal rate, regular rhythm and normal heart sounds. Exam reveals no gallop and no friction rub.    No murmur heard.  Port left chest nontender   Pulmonary/Chest: Breath sounds normal. No respiratory distress. She has no wheezes. She has no rhonchi. She has no rales.   Abdominal: Abdomen is soft. She exhibits no distension. There is no abdominal tenderness.   No flank tenderness   Musculoskeletal:      Left shoulder: Decreased range of motion.      Cervical back: Normal range of motion and neck supple.      Comments: Stitches intact left shoulder, tender, erythema     Neurological: She is alert and oriented to person, place, and time.   Skin: Skin is warm and dry.   Psychiatric: She has a normal mood and affect. Her behavior is normal. Judgment and thought content normal.         ED Course   Procedures  Labs Reviewed   CBC W/ AUTO DIFFERENTIAL - Abnormal; Notable for the following components:       Result Value    WBC 12.85 (*)     RBC 3.42 (*)     Hemoglobin 10.1  (*)     Hematocrit 30.4 (*)     RDW 15.2 (*)     Platelets 595 (*)     MPV 9.1 (*)     Gran # (ANC) 8.3 (*)     Immature Grans (Abs) 0.05 (*)     Mono # 1.4 (*)     All other components within normal limits   COMPREHENSIVE METABOLIC PANEL - Abnormal; Notable for the following components:    Total Bilirubin 2.7 (*)     AST 62 (*)     ALT 66 (*)     All other components within normal limits   INFLUENZA A & B BY MOLECULAR   CULTURE, BLOOD   CULTURE, BLOOD   SEDIMENTATION RATE   C-REACTIVE PROTEIN   RETICULOCYTES   SARS-COV-2 RNA AMPLIFICATION, QUAL   PREGNANCY TEST, URINE RAPID   URINALYSIS, REFLEX TO URINE CULTURE   LACTIC ACID, PLASMA          Imaging Results           X-Ray Chest PA And Lateral (Final result)  Result time 04/18/22 14:13:54    Final result by Davis Garcia Jr., MD (04/18/22 14:13:54)                 Impression:      Small faint infiltrate in the left lingula, a possible early pneumonia.    This report was flagged in Epic as abnormal      Electronically signed by: Davis Garcia MD  Date:    04/18/2022  Time:    14:13             Narrative:    EXAMINATION:  XR CHEST PA AND LATERAL    CLINICAL HISTORY:  fever, cp, sickle;    TECHNIQUE:  PA and lateral views of the chest were performed.    COMPARISON:  Chest x-ray of September 29, 2017 March 2, 2022.    FINDINGS:  A venous Port-A-Cath is noted in place on the left with central line ending in the superior vena cava. The mediastinal and cardiac size and contours normal.  There is a very small faint infiltrate identified in the left lingula anteriorly.  The rest of the lung fields are clear.  No pneumothorax or pleural effusion is noted.  .                               X-Ray Shoulder 2 or More Views Left (Final result)  Result time 04/18/22 13:35:54    Final result by Davis Garcia Jr., MD (04/18/22 13:35:54)                 Impression:      Avascular necrosis of the left humeral head.      Electronically signed by: Davis Garcia  MD  Date:    04/18/2022  Time:    13:35             Narrative:    EXAMINATION:  XR SHOULDER COMPLETE 2 OR MORE VIEWS LEFT    CLINICAL HISTORY:  shoulder pain, fever, post op;    TECHNIQUE:  2 or more views of the shoulder are obtained.    COMPARISON:  Shoulder MRI of March 7, 2022.    FINDINGS:  No dislocation is seen.  A fracture of the scapula, humerus or clavicle is not seen.  The acromioclavicular and glenohumeral joints are within normal limits.  There is however mottled density the humeral head secondary to avascular necrosis noted on the prior MRI of March 7, 2022.  Fracture or flattening of the humeral head is not seen.                                 Medications   cefTRIAXone (ROCEPHIN) 1 g/50 mL D5W IVPB (has no administration in time range)     Medical Decision Making:   History:   Old Medical Records: I decided to obtain old medical records.  Clinical Tests:   Lab Tests: Ordered and Reviewed  Radiological Study: Ordered and Reviewed             ED Course as of 04/18/22 1520   Mon Apr 18, 2022   1301 BP: 112/72 [EF]   1301 Temp: 98.3 °F (36.8 °C) [EF]   1301 Temp src: Oral [EF]   1301 Pulse: 76 [EF]   1301 Resp: 18 [EF]   1301 SpO2: 100 % [EF]   1339 X-Ray Shoulder 2 or More Views Left [EF]   1342 Case discussed with Dr. Rebolledo.  He does not think symptoms represent postop surgical infection. [EF]   1406 WBC(!): 12.85 [EF]   1406 Hemoglobin(!): 10.1 [EF]   1406 Platelets(!): 595 [EF]   1421 X-Ray Chest PA And Lateral(!) [EF]   1431 Dr daley to admit [EF]   1435 SARS-CoV-2 RNA, Amplification, Qual: Negative [EF]   1435 Influenza A, Molecular: Negative [EF]   1435 Influenza B, Molecular: Negative [EF]   1435 CRP: 6.2  31-year-old female with a history of sickle cell, acute chest presents with fever which began yesterday.  No fever currently in the emergency room.  Also having arthralgia in left shoulder x 2 days.  Recent surgery 1 month ago for avascular necrosis.  X-ray demonstrates lingular infiltrate.  She does have mild cp and sob, do not think PE.  Patient has elevated white count but consistent with prior.  Sepsis considered at this time, blood cultures lactic acid added on.  Hospital Medicine will admit.  Patient is well-appearing. Urine pending at this time. [EF]      ED Course User Index  [EF] Owen Kaur MD             Clinical Impression:   Final diagnoses:  [D57.00] Vaso-occlusive sickle cell crisis          ED Disposition Condition    Observation               Owen Kaur MD  04/18/22 5116

## 2022-04-19 VITALS
HEIGHT: 59 IN | OXYGEN SATURATION: 99 % | WEIGHT: 118 LBS | BODY MASS INDEX: 23.79 KG/M2 | DIASTOLIC BLOOD PRESSURE: 41 MMHG | TEMPERATURE: 97 F | HEART RATE: 72 BPM | RESPIRATION RATE: 18 BRPM | SYSTOLIC BLOOD PRESSURE: 86 MMHG

## 2022-04-19 LAB
ALBUMIN SERPL BCP-MCNC: 3.7 G/DL (ref 3.5–5.2)
ALP SERPL-CCNC: 97 U/L (ref 55–135)
ALT SERPL W/O P-5'-P-CCNC: 56 U/L (ref 10–44)
ANION GAP SERPL CALC-SCNC: 9 MMOL/L (ref 8–16)
AST SERPL-CCNC: 47 U/L (ref 10–40)
BASOPHILS # BLD AUTO: 0.11 K/UL (ref 0–0.2)
BASOPHILS NFR BLD: 1.1 % (ref 0–1.9)
BILIRUB SERPL-MCNC: 2.2 MG/DL (ref 0.1–1)
BUN SERPL-MCNC: 9 MG/DL (ref 6–20)
CALCIUM SERPL-MCNC: 8.7 MG/DL (ref 8.7–10.5)
CHLORIDE SERPL-SCNC: 106 MMOL/L (ref 95–110)
CO2 SERPL-SCNC: 25 MMOL/L (ref 23–29)
CREAT SERPL-MCNC: 0.7 MG/DL (ref 0.5–1.4)
DIFFERENTIAL METHOD: ABNORMAL
EOSINOPHIL # BLD AUTO: 0.3 K/UL (ref 0–0.5)
EOSINOPHIL NFR BLD: 3.1 % (ref 0–8)
ERYTHROCYTE [DISTWIDTH] IN BLOOD BY AUTOMATED COUNT: 14.9 % (ref 11.5–14.5)
EST. GFR  (AFRICAN AMERICAN): >60 ML/MIN/1.73 M^2
EST. GFR  (NON AFRICAN AMERICAN): >60 ML/MIN/1.73 M^2
GLUCOSE SERPL-MCNC: 133 MG/DL (ref 70–110)
HCT VFR BLD AUTO: 24.1 % (ref 37–48.5)
HGB BLD-MCNC: 8.2 G/DL (ref 12–16)
IMM GRANULOCYTES # BLD AUTO: 0.04 K/UL (ref 0–0.04)
IMM GRANULOCYTES NFR BLD AUTO: 0.4 % (ref 0–0.5)
LACTATE SERPL-SCNC: 1.2 MMOL/L (ref 0.5–2.2)
LYMPHOCYTES # BLD AUTO: 2.6 K/UL (ref 1–4.8)
LYMPHOCYTES NFR BLD: 25.2 % (ref 18–48)
MAGNESIUM SERPL-MCNC: 1.9 MG/DL (ref 1.6–2.6)
MCH RBC QN AUTO: 29.3 PG (ref 27–31)
MCHC RBC AUTO-ENTMCNC: 34 G/DL (ref 32–36)
MCV RBC AUTO: 86 FL (ref 82–98)
MONOCYTES # BLD AUTO: 1.2 K/UL (ref 0.3–1)
MONOCYTES NFR BLD: 11.3 % (ref 4–15)
NEUTROPHILS # BLD AUTO: 6 K/UL (ref 1.8–7.7)
NEUTROPHILS NFR BLD: 58.9 % (ref 38–73)
NRBC BLD-RTO: 0 /100 WBC
PHOSPHATE SERPL-MCNC: 3.8 MG/DL (ref 2.7–4.5)
PLATELET # BLD AUTO: 454 K/UL (ref 150–450)
PMV BLD AUTO: 9.7 FL (ref 9.2–12.9)
POTASSIUM SERPL-SCNC: 4.5 MMOL/L (ref 3.5–5.1)
PROT SERPL-MCNC: 6.4 G/DL (ref 6–8.4)
RBC # BLD AUTO: 2.8 M/UL (ref 4–5.4)
SODIUM SERPL-SCNC: 140 MMOL/L (ref 136–145)
WBC # BLD AUTO: 10.22 K/UL (ref 3.9–12.7)

## 2022-04-19 PROCEDURE — 84100 ASSAY OF PHOSPHORUS: CPT | Performed by: STUDENT IN AN ORGANIZED HEALTH CARE EDUCATION/TRAINING PROGRAM

## 2022-04-19 PROCEDURE — 94760 N-INVAS EAR/PLS OXIMETRY 1: CPT

## 2022-04-19 PROCEDURE — G0378 HOSPITAL OBSERVATION PER HR: HCPCS

## 2022-04-19 PROCEDURE — 80053 COMPREHEN METABOLIC PANEL: CPT | Performed by: STUDENT IN AN ORGANIZED HEALTH CARE EDUCATION/TRAINING PROGRAM

## 2022-04-19 PROCEDURE — 83605 ASSAY OF LACTIC ACID: CPT | Performed by: STUDENT IN AN ORGANIZED HEALTH CARE EDUCATION/TRAINING PROGRAM

## 2022-04-19 PROCEDURE — 83735 ASSAY OF MAGNESIUM: CPT | Performed by: STUDENT IN AN ORGANIZED HEALTH CARE EDUCATION/TRAINING PROGRAM

## 2022-04-19 PROCEDURE — 96361 HYDRATE IV INFUSION ADD-ON: CPT

## 2022-04-19 PROCEDURE — 63600175 PHARM REV CODE 636 W HCPCS: Performed by: STUDENT IN AN ORGANIZED HEALTH CARE EDUCATION/TRAINING PROGRAM

## 2022-04-19 PROCEDURE — 25000003 PHARM REV CODE 250: Performed by: STUDENT IN AN ORGANIZED HEALTH CARE EDUCATION/TRAINING PROGRAM

## 2022-04-19 PROCEDURE — 36415 COLL VENOUS BLD VENIPUNCTURE: CPT | Performed by: STUDENT IN AN ORGANIZED HEALTH CARE EDUCATION/TRAINING PROGRAM

## 2022-04-19 PROCEDURE — 85025 COMPLETE CBC W/AUTO DIFF WBC: CPT | Performed by: STUDENT IN AN ORGANIZED HEALTH CARE EDUCATION/TRAINING PROGRAM

## 2022-04-19 RX ADMIN — DEFERASIROX 1000 MG: 500 TABLET, FOR SUSPENSION ORAL at 07:04

## 2022-04-19 RX ADMIN — MORPHINE SULFATE 30 MG: 30 TABLET, FILM COATED, EXTENDED RELEASE ORAL at 02:04

## 2022-04-19 RX ADMIN — ASPIRIN 81 MG CHEWABLE TABLET 81 MG: 81 TABLET CHEWABLE at 08:04

## 2022-04-19 RX ADMIN — MORPHINE SULFATE 30 MG: 30 TABLET, FILM COATED, EXTENDED RELEASE ORAL at 05:04

## 2022-04-19 RX ADMIN — HYDROXYUREA 1000 MG: 500 CAPSULE ORAL at 08:04

## 2022-04-19 RX ADMIN — FOLIC ACID 4 MG: 1 TABLET ORAL at 08:04

## 2022-04-19 RX ADMIN — OXYCODONE HYDROCHLORIDE AND ACETAMINOPHEN 1 TABLET: 5; 325 TABLET ORAL at 12:04

## 2022-04-19 RX ADMIN — SENNOSIDES AND DOCUSATE SODIUM 1 TABLET: 50; 8.6 TABLET ORAL at 08:04

## 2022-04-19 RX ADMIN — SODIUM CHLORIDE, SODIUM LACTATE, POTASSIUM CHLORIDE, AND CALCIUM CHLORIDE: .6; .31; .03; .02 INJECTION, SOLUTION INTRAVENOUS at 01:04

## 2022-04-19 NOTE — PLAN OF CARE
Pt clear for DC from case management standpoint. Discharging to home     04/19/22 1147   Final Note   Assessment Type Final Discharge Note   Anticipated Discharge Disposition Home   Hospital Resources/Appts/Education Provided Appointments scheduled and added to AVS

## 2022-04-19 NOTE — DISCHARGE INSTRUCTIONS
Discharge Instructions, [facility] Medicine    Thank you for choosing [facility] for your medical care. The primary doctor who is taking care of you at the time of your discharge is Jodi Puente MD.     You were admitted to the hospital with Vaso-occlusive sickle cell crisis.     Please note your discharge instructions, including diet/activity restrictions, follow-up appointments, and medication changes.  If you have any questions about your medical issues, prescriptions, or any other questions, please feel free to contact the [facility] Medicine Dept at [phone] and we will help.    If you are previously with Home health, outpatient PT/OT or under a therapy program, you are cleared to return to those programs.    Please direct all long term medication refills and follow up to your primary care provider, Primary Doctor No. Thank you again for letting us take care of your health care needs.    Please note the following discharge instructions per your discharging physician-  Follow up with Dr. Rebolledo

## 2022-04-19 NOTE — PLAN OF CARE
[facility]  Initial Discharge Assessment       Primary Care Provider: [facility]    Admission Diagnosis: Vaso-occlusive sickle cell crisis [D57.00]    Admission Date: 4/18/2022  Expected Discharge Date: 4/19/2022    Discharge Barriers Identified: None    Payor: MEDICAID / Plan: [facility] COMMUNITY Merged with [facility] ([place] MEDICAID) / Product Type: Managed Medicaid /     Extended Emergency Contact Information  Primary Emergency Contact: ClayDeisy   [place]  Mobile Phone: [phone]  Relation: Grandparent  Secondary Emergency Contact: Jhno Jones  Mobile Phone: [phone]  Relation: Mother   needed? No    Discharge Plan A: Home  Discharge Plan B: Home with family       [facility] [employer] [facility] - [place], [place] - [address]  [address]  [facility] [address]  Phone: [phone] Fax: [phone]    [id number] - [place], [place] - [address] AT [facility] [address]  [address]  [facility] [address]  Phone: [phone] Fax: [phone]    SW met with patient at bedside to complete discharge planning assessment.  Patient alert and oriented xs 4.  Patient verified all demographic information on facesheet is correct.  Patient verified PCP is [facility].  Patient verified primary health insurance is .[facility] [place] Medicaid  Patient with NO home health or DME.  Patient with NO POA or Living Will.  Patient not on dialysis or medication coumadin.  Patient with no 30 day admission.  Patient with no financial issues at this time.  Patient family will provide transportation upon discharge from facility.  Patient independent with ADLs, live with 3 minor children, drives self.      Initial Assessment (most recent)     Adult Discharge Assessment - 04/19/22 1004        Discharge Assessment    Assessment Type Discharge Planning Assessment     Confirmed/corrected address,  phone number and insurance Yes     Confirmed Demographics Correct on Facesheet     Source of Information patient     Does patient/caregiver understand observation status Yes     Communicated SHANE with patient/caregiver Yes     Lives With child(asha), dependent     Facility Arrived From: home     Do you expect to return to your current living situation? Yes     Do you have help at home or someone to help you manage your care at home? Yes     Who are your caregiver(s) and their phone number(s)? mother     Prior to hospitilization cognitive status: Alert/Oriented     Current cognitive status: Alert/Oriented     Walking or Climbing Stairs Difficulty none     Dressing/Bathing Difficulty none     Equipment Currently Used at Home none     Readmission within 30 days? No     Patient currently being followed by outpatient case management? No     Do you currently have service(s) that help you manage your care at home? No     Do you take prescription medications? Yes     Do you have prescription coverage? Yes     Do you have any problems affording any of your prescribed medications? No     Is the patient taking medications as prescribed? yes     How do you get to doctors appointments? car, drives self     Are you on dialysis? No     Do you take coumadin? No     Discharge Plan A Home     Discharge Plan B Home with family     DME Needed Upon Discharge  none     Discharge Plan discussed with: Patient     Discharge Barriers Identified None

## 2022-04-19 NOTE — PLAN OF CARE
POC continues. Patient alert and oriented x 4, able to make needs known. PIV patent, IVF infusing as ordered. Medications given as ordered and PRN. Patient sitting up in bed, NAD noted. Call light and personal items within reach. Bed locked in lowest position. Will continue to monitor.

## 2022-04-19 NOTE — PLAN OF CARE
Problem: Adult Inpatient Plan of Care  Goal: Plan of Care Review  Outcome: Ongoing, Progressing  Goal: Patient-Specific Goal (Individualized)  Outcome: Ongoing, Progressing  Goal: Absence of Hospital-Acquired Illness or Injury  Outcome: Ongoing, Progressing  Goal: Optimal Comfort and Wellbeing  Outcome: Ongoing, Progressing  Goal: Readiness for Transition of Care  Outcome: Ongoing, Progressing     Problem: Infection  Goal: Absence of Infection Signs and Symptoms  Outcome: Ongoing, Progressing     Problem: Fall Injury Risk  Goal: Absence of Fall and Fall-Related Injury  Outcome: Ongoing, Progressing  Assessment complete via flowsheet. Skin warm and dry to touch with respirations even and unlabored. No s/s of distress discomfort or adverse reactions noted. Able to verbalize demands as needed. Observed sitting in chair at bedside. AAOxs 4. C/o of chronic pain to left shoulder. Healed incision noted to left shoulder. Observed lying in bed resting quietly. Preparing discharge instructions. Scheduled pain medication administered as ordered. Will continue to monitor for change of condition not noted at this time.

## 2022-04-19 NOTE — PLAN OF CARE
Pt already has apt scheduled with Dr. Rebolledo for next week. CM confirmed with pt at bedside that she is aware of apt for 3/26 at 10:15      MARIBEL unable to get timely follow up with [facility] online (next available June 14th)- MARIBEL emailed AP Williamson and requested her to call pt to schedule

## 2022-04-19 NOTE — NURSING
Pt received discharge instructions. Pt verbalized understanding. Port o cath to left subclavian discontinued intact with no adverse reactions noted at this time. Pt received prn pain medication as scheduled. Healed incision noted to left shoulder. Pt verbalized no pain discomfort or adverse reaction noted at this time. Pt transported to [facility] for final discharge from room 313 at 1545 with no further incident.

## 2022-04-20 NOTE — HOSPITAL COURSE
Patient was observed by the hospital medicine service. Pain was controlled with PO medications, did not require IV pain medications. Shoulder ROM improved. Pain improved. She was discharged home the next day and will follow up with primary care and ortho.

## 2022-04-20 NOTE — DISCHARGE SUMMARY
[facility] Hospital Medicine  Discharge Summary      Patient Name: Nishi Dumont  MRN: [id number]  Patient Class: OP- Observation  Admission Date: 4/18/2022  Hospital Length of Stay: 0 days  Discharge Date and Time: 4/19/2022  4:59 PM  Attending Physician: No att. 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 She has recent decompression of the L humeral head on last hospitalization 3/2022. She also endorses back pain. She denies any constant SOB, cough, chest pain, dysuria, abdominal pain, fatigue, or diarrhea. She describes her pain as similar in character to her previous sickle cell crises. The patient received Rocephin in the ED and Hospital Medicine was consulted for admission.      * No surgery found *      Hospital Course:   Patient was observed by the hospital medicine service. Pain was controlled with PO medications, did not require IV pain medications. Shoulder ROM improved. Pain improved. She was discharged home the next day and will follow up with primary care and ortho.        Goals of Care Treatment Preferences:  Code Status: Full Code      Consults:     No new Assessment & Plan notes have been filed under this hospital service since the last note was generated.  Service: Hospital Medicine    Final Active Diagnoses:    Diagnosis Date Noted POA    PRINCIPAL PROBLEM:  Vaso-occlusive sickle cell crisis [D57.00] 12/30/2013 Yes    Abnormal LFTs [R94.5] 04/18/2022 Yes    Pain and swelling of left shoulder [M25.512, M25.412] 03/05/2022 Yes    Thrombocytosis [D75.839] 12/24/2015 Yes      Problems Resolved During this  Admission:       Discharged Condition: good    Disposition: Home or Self Care    Follow Up:   Follow-up Information     [facility] Follow up on 6/14/2022.    Why: at 9:30 AM.    clinic should call you for sooner apt  Contact information:  [address]   [place] [address]  [phone]             Anish Rebolledo II, MD Follow up on 4/26/2022.    Specialty: Orthopedic Surgery  Why: at 10:15 AM  Contact information:  [address] [place] [address] [address] [place] [address]  [phone]                       Patient Instructions:      Notify your health care provider if you experience any of the following:  temperature >100.4     Notify your health care provider if you experience any of the following:  severe uncontrolled pain     Notify your health care provider if you experience any of the following:  redness, tenderness, or signs of infection (pain, swelling, redness, odor or green/yellow discharge around incision site)     Notify your health care provider if you experience any of the following:  difficulty breathing or increased cough     Activity as tolerated       Significant Diagnostic Studies: Labs:   BMP:   Recent Labs   Lab 04/19/22 0522   *      K 4.5      CO2 25   BUN 9   CREATININE 0.7   CALCIUM 8.7   MG 1.9    and CBC   Recent Labs   Lab 04/19/22 0522   WBC 10.22   HGB 8.2*   HCT 24.1*   *     Radiology Results (last 7 days)    Procedure Component Value Units Date/Time   X-Ray Chest PA And Lateral [[id number]] (Abnormal) Resulted: 04/18/22 1413   Order Status: Completed Updated: 04/18/22 1416   Narrative:     EXAMINATION:   XR CHEST PA AND LATERAL     CLINICAL HISTORY:   fever, cp, sickle;     TECHNIQUE:   PA and lateral views of the chest were performed.     COMPARISON:   Chest x-ray of September 29, 2017 March 2, 2022.     FINDINGS:   A venous Port-A-Cath is noted in place on the left with central line ending in the superior vena cava. The mediastinal and cardiac size and  contours normal.  There is a very small faint infiltrate identified in the left lingula anteriorly.  The rest of the lung fields are clear.  No pneumothorax or pleural effusion is noted.  .    Impression:       Small faint infiltrate in the left lingula, a possible early pneumonia.     This report was flagged in Epic as abnormal       Electronically signed by: Davis Garcia MD   Date: 04/18/2022   Time: 14:13   X-Ray Shoulder 2 or More Views Left [[id number]] Resulted: 04/18/22 1335   Order Status: Completed Updated: 04/18/22 1338   Narrative:     EXAMINATION:   XR SHOULDER COMPLETE 2 OR MORE VIEWS LEFT     CLINICAL HISTORY:   shoulder pain, fever, post op;     TECHNIQUE:   2 or more views of the shoulder are obtained.     COMPARISON:   Shoulder MRI of March 7, 2022.     FINDINGS:   No dislocation is seen.  A fracture of the scapula, humerus or clavicle is not seen.  The acromioclavicular and glenohumeral joints are within normal limits.  There is however mottled density the humeral head secondary to avascular necrosis noted on the prior MRI of March 7, 2022.  Fracture or flattening of the humeral head is not seen.    Impression:       Avascular necrosis of the left humeral head.       Electronically signed by: Davis Garcia MD   Date: 04/18/2022   Time: 13:35       Pending Diagnostic Studies:     None         Medications:  Reconciled Home Medications:      Medication List      CONTINUE taking these medications    deferasirox 360 mg Tab  Commonly known as: JADENU  Take 1,080 mg by mouth once daily.     ENDARI 5 gram Pwpk  Generic drug: glutamine (sickle cell)  Take 15 g by mouth 2 (two) times daily.     ergocalciferol 50,000 unit Cap  Commonly known as: ERGOCALCIFEROL  Take 50,000 Units by mouth every 7 days.     folic acid 1 MG tablet  Commonly known as: FOLVITE  Take 4 tablets (4 mg total) by mouth once daily.     hydroxyurea 500 mg Cap  Commonly known as: HYDREA  Take 1,000 mg by mouth once daily.      morphine 30 MG 12 hr tablet  Commonly known as: MS CONTIN  Take 30 mg by mouth every 8 (eight) hours.     nabumetone 750 MG tablet  Commonly known as: RELAFEN  Take 1 tablet (750 mg total) by mouth 2 (two) times daily as needed for Pain.     oxyCODONE-acetaminophen 5-325 mg per tablet  Commonly known as: PERCOCET  Take 1 tablet by mouth every 4 (four) hours as needed for Pain.     traMADoL 50 mg tablet  Commonly known as: ULTRAM  Take 50 mg by mouth 3 (three) times daily.            Indwelling Lines/Drains at time of discharge:   Lines/Drains/Airways     None                 Time spent on the discharge of patient: 35 minutes         Jodi Puente MD  Department of Hospital Medicine  [facility]

## 2022-04-21 ENCOUNTER — TELEPHONE (OUTPATIENT)
Dept: MEDSURG UNIT | Facility: HOSPITAL | Age: 32
End: 2022-04-21
Payer: MEDICAID

## 2022-04-24 LAB
BACTERIA BLD CULT: NORMAL
BACTERIA BLD CULT: NORMAL

## 2022-05-23 ENCOUNTER — HOSPITAL ENCOUNTER (INPATIENT)
Facility: HOSPITAL | Age: 32
LOS: 8 days | Discharge: HOME HEALTH | DRG: 811 | End: 2022-05-31
Attending: EMERGENCY MEDICINE | Admitting: HOSPITALIST
Payer: MEDICAID

## 2022-05-23 DIAGNOSIS — R78.81 STAPHYLOCOCCUS EPIDERMIDIS BACTEREMIA: ICD-10-CM

## 2022-05-23 DIAGNOSIS — B95.7 STAPHYLOCOCCUS EPIDERMIDIS BACTEREMIA: ICD-10-CM

## 2022-05-23 DIAGNOSIS — R78.81 GRAM-POSITIVE COCCI BACTEREMIA: ICD-10-CM

## 2022-05-23 DIAGNOSIS — D57.00 SICKLE CELL PAIN CRISIS: Primary | ICD-10-CM

## 2022-05-23 LAB
ALBUMIN SERPL BCP-MCNC: 3.7 G/DL (ref 3.5–5.2)
ALP SERPL-CCNC: 116 U/L (ref 55–135)
ALT SERPL W/O P-5'-P-CCNC: 36 U/L (ref 10–44)
ANION GAP SERPL CALC-SCNC: 11 MMOL/L (ref 8–16)
ANISOCYTOSIS BLD QL SMEAR: SLIGHT
AST SERPL-CCNC: 28 U/L (ref 10–40)
BASOPHILS # BLD AUTO: ABNORMAL K/UL (ref 0–0.2)
BASOPHILS NFR BLD: 0 % (ref 0–1.9)
BILIRUB SERPL-MCNC: 2.1 MG/DL (ref 0.1–1)
BUN SERPL-MCNC: 5 MG/DL (ref 6–20)
CALCIUM SERPL-MCNC: 8.5 MG/DL (ref 8.7–10.5)
CHLORIDE SERPL-SCNC: 112 MMOL/L (ref 95–110)
CO2 SERPL-SCNC: 20 MMOL/L (ref 23–29)
CREAT SERPL-MCNC: 0.7 MG/DL (ref 0.5–1.4)
DIFFERENTIAL METHOD: ABNORMAL
EOSINOPHIL # BLD AUTO: ABNORMAL K/UL (ref 0–0.5)
EOSINOPHIL NFR BLD: 0 % (ref 0–8)
ERYTHROCYTE [DISTWIDTH] IN BLOOD BY AUTOMATED COUNT: 19 % (ref 11.5–14.5)
EST. GFR  (AFRICAN AMERICAN): >60 ML/MIN/1.73 M^2
EST. GFR  (NON AFRICAN AMERICAN): >60 ML/MIN/1.73 M^2
GLUCOSE SERPL-MCNC: 119 MG/DL (ref 70–110)
HCT VFR BLD AUTO: 22.7 % (ref 37–48.5)
HGB BLD-MCNC: 7.8 G/DL (ref 12–16)
IMM GRANULOCYTES # BLD AUTO: ABNORMAL K/UL (ref 0–0.04)
IMM GRANULOCYTES NFR BLD AUTO: ABNORMAL % (ref 0–0.5)
LYMPHOCYTES # BLD AUTO: ABNORMAL K/UL (ref 1–4.8)
LYMPHOCYTES NFR BLD: 15 % (ref 18–48)
MCH RBC QN AUTO: 30.4 PG (ref 27–31)
MCHC RBC AUTO-ENTMCNC: 34.4 G/DL (ref 32–36)
MCV RBC AUTO: 88 FL (ref 82–98)
MONOCYTES # BLD AUTO: ABNORMAL K/UL (ref 0.3–1)
MONOCYTES NFR BLD: 3 % (ref 4–15)
NEUTROPHILS NFR BLD: 82 % (ref 38–73)
NRBC BLD-RTO: 1 /100 WBC
OVALOCYTES BLD QL SMEAR: ABNORMAL
PAPPENHEIMER BOD BLD QL SMEAR: PRESENT
PLATELET # BLD AUTO: 472 K/UL (ref 150–450)
PLATELET BLD QL SMEAR: ABNORMAL
PMV BLD AUTO: 8.9 FL (ref 9.2–12.9)
POIKILOCYTOSIS BLD QL SMEAR: SLIGHT
POLYCHROMASIA BLD QL SMEAR: ABNORMAL
POTASSIUM SERPL-SCNC: 3.1 MMOL/L (ref 3.5–5.1)
PROT SERPL-MCNC: 6.9 G/DL (ref 6–8.4)
RBC # BLD AUTO: 2.57 M/UL (ref 4–5.4)
RETICS/RBC NFR AUTO: 17.8 % (ref 0.5–2.5)
SICKLE CELLS BLD QL SMEAR: ABNORMAL
SODIUM SERPL-SCNC: 143 MMOL/L (ref 136–145)
TARGETS BLD QL SMEAR: ABNORMAL
WBC # BLD AUTO: 15.19 K/UL (ref 3.9–12.7)

## 2022-05-23 PROCEDURE — 25000003 PHARM REV CODE 250: Performed by: EMERGENCY MEDICINE

## 2022-05-23 PROCEDURE — 96376 TX/PRO/DX INJ SAME DRUG ADON: CPT

## 2022-05-23 PROCEDURE — 80053 COMPREHEN METABOLIC PANEL: CPT | Performed by: EMERGENCY MEDICINE

## 2022-05-23 PROCEDURE — 63600175 PHARM REV CODE 636 W HCPCS: Performed by: EMERGENCY MEDICINE

## 2022-05-23 PROCEDURE — 36415 COLL VENOUS BLD VENIPUNCTURE: CPT | Performed by: EMERGENCY MEDICINE

## 2022-05-23 PROCEDURE — U0002 COVID-19 LAB TEST NON-CDC: HCPCS | Performed by: EMERGENCY MEDICINE

## 2022-05-23 PROCEDURE — 12000002 HC ACUTE/MED SURGE SEMI-PRIVATE ROOM

## 2022-05-23 PROCEDURE — 99285 EMERGENCY DEPT VISIT HI MDM: CPT | Mod: 25

## 2022-05-23 PROCEDURE — 85007 BL SMEAR W/DIFF WBC COUNT: CPT | Performed by: EMERGENCY MEDICINE

## 2022-05-23 PROCEDURE — 96375 TX/PRO/DX INJ NEW DRUG ADDON: CPT

## 2022-05-23 PROCEDURE — 96361 HYDRATE IV INFUSION ADD-ON: CPT

## 2022-05-23 PROCEDURE — 96374 THER/PROPH/DIAG INJ IV PUSH: CPT

## 2022-05-23 PROCEDURE — 85045 AUTOMATED RETICULOCYTE COUNT: CPT | Performed by: EMERGENCY MEDICINE

## 2022-05-23 PROCEDURE — 85027 COMPLETE CBC AUTOMATED: CPT | Performed by: EMERGENCY MEDICINE

## 2022-05-23 RX ORDER — DIPHENHYDRAMINE HYDROCHLORIDE 50 MG/ML
25 INJECTION INTRAMUSCULAR; INTRAVENOUS
Status: COMPLETED | OUTPATIENT
Start: 2022-05-23 | End: 2022-05-23

## 2022-05-23 RX ORDER — HYDROMORPHONE HYDROCHLORIDE 2 MG/ML
1 INJECTION, SOLUTION INTRAMUSCULAR; INTRAVENOUS; SUBCUTANEOUS
Status: COMPLETED | OUTPATIENT
Start: 2022-05-23 | End: 2022-05-23

## 2022-05-23 RX ADMIN — HYDROMORPHONE HYDROCHLORIDE 1 MG: 2 INJECTION, SOLUTION INTRAMUSCULAR; INTRAVENOUS; SUBCUTANEOUS at 11:05

## 2022-05-23 RX ADMIN — DIPHENHYDRAMINE HYDROCHLORIDE 25 MG: 50 INJECTION, SOLUTION INTRAMUSCULAR; INTRAVENOUS at 11:05

## 2022-05-23 RX ADMIN — SODIUM CHLORIDE 1000 ML: 0.9 INJECTION, SOLUTION INTRAVENOUS at 10:05

## 2022-05-24 LAB
ALBUMIN SERPL BCP-MCNC: 3.2 G/DL (ref 3.5–5.2)
ALP SERPL-CCNC: 100 U/L (ref 55–135)
ALT SERPL W/O P-5'-P-CCNC: 30 U/L (ref 10–44)
ANION GAP SERPL CALC-SCNC: 8 MMOL/L (ref 8–16)
AST SERPL-CCNC: 22 U/L (ref 10–40)
BASOPHILS # BLD AUTO: 0.11 K/UL (ref 0–0.2)
BASOPHILS NFR BLD: 0.8 % (ref 0–1.9)
BILIRUB SERPL-MCNC: 1.7 MG/DL (ref 0.1–1)
BILIRUB UR QL STRIP: NEGATIVE
BUN SERPL-MCNC: 4 MG/DL (ref 6–20)
CALCIUM SERPL-MCNC: 8.1 MG/DL (ref 8.7–10.5)
CHLORIDE SERPL-SCNC: 113 MMOL/L (ref 95–110)
CLARITY UR: CLEAR
CO2 SERPL-SCNC: 22 MMOL/L (ref 23–29)
COLOR UR: YELLOW
CREAT SERPL-MCNC: 0.7 MG/DL (ref 0.5–1.4)
DIFFERENTIAL METHOD: ABNORMAL
EOSINOPHIL # BLD AUTO: 0.1 K/UL (ref 0–0.5)
EOSINOPHIL NFR BLD: 1 % (ref 0–8)
ERYTHROCYTE [DISTWIDTH] IN BLOOD BY AUTOMATED COUNT: 18.7 % (ref 11.5–14.5)
EST. GFR  (AFRICAN AMERICAN): >60 ML/MIN/1.73 M^2
EST. GFR  (NON AFRICAN AMERICAN): >60 ML/MIN/1.73 M^2
GLUCOSE SERPL-MCNC: 88 MG/DL (ref 70–110)
GLUCOSE UR QL STRIP: NEGATIVE
HCT VFR BLD AUTO: 20.7 % (ref 37–48.5)
HGB BLD-MCNC: 7 G/DL (ref 12–16)
HGB UR QL STRIP: ABNORMAL
IMM GRANULOCYTES # BLD AUTO: 0.1 K/UL (ref 0–0.04)
IMM GRANULOCYTES NFR BLD AUTO: 0.7 % (ref 0–0.5)
KETONES UR QL STRIP: NEGATIVE
LEUKOCYTE ESTERASE UR QL STRIP: NEGATIVE
LYMPHOCYTES # BLD AUTO: 3.1 K/UL (ref 1–4.8)
LYMPHOCYTES NFR BLD: 21.8 % (ref 18–48)
MAGNESIUM SERPL-MCNC: 1.8 MG/DL (ref 1.6–2.6)
MCH RBC QN AUTO: 30.2 PG (ref 27–31)
MCHC RBC AUTO-ENTMCNC: 33.8 G/DL (ref 32–36)
MCV RBC AUTO: 89 FL (ref 82–98)
MONOCYTES # BLD AUTO: 1.6 K/UL (ref 0.3–1)
MONOCYTES NFR BLD: 11 % (ref 4–15)
NEUTROPHILS # BLD AUTO: 9.2 K/UL (ref 1.8–7.7)
NEUTROPHILS NFR BLD: 64.7 % (ref 38–73)
NITRITE UR QL STRIP: NEGATIVE
NRBC BLD-RTO: 1 /100 WBC
PH UR STRIP: 7 [PH] (ref 5–8)
PHOSPHATE SERPL-MCNC: 3 MG/DL (ref 2.7–4.5)
PLATELET # BLD AUTO: 442 K/UL (ref 150–450)
PMV BLD AUTO: 9 FL (ref 9.2–12.9)
POTASSIUM SERPL-SCNC: 3.1 MMOL/L (ref 3.5–5.1)
PROT SERPL-MCNC: 5.9 G/DL (ref 6–8.4)
PROT UR QL STRIP: NEGATIVE
RBC # BLD AUTO: 2.32 M/UL (ref 4–5.4)
SARS-COV-2 RDRP RESP QL NAA+PROBE: NEGATIVE
SODIUM SERPL-SCNC: 143 MMOL/L (ref 136–145)
SP GR UR STRIP: 1.01 (ref 1–1.03)
URN SPEC COLLECT METH UR: ABNORMAL
UROBILINOGEN UR STRIP-ACNC: ABNORMAL EU/DL
WBC # BLD AUTO: 14.16 K/UL (ref 3.9–12.7)

## 2022-05-24 PROCEDURE — 63600175 PHARM REV CODE 636 W HCPCS: Performed by: EMERGENCY MEDICINE

## 2022-05-24 PROCEDURE — 94799 UNLISTED PULMONARY SVC/PX: CPT

## 2022-05-24 PROCEDURE — 25000003 PHARM REV CODE 250

## 2022-05-24 PROCEDURE — 84100 ASSAY OF PHOSPHORUS: CPT

## 2022-05-24 PROCEDURE — 83735 ASSAY OF MAGNESIUM: CPT

## 2022-05-24 PROCEDURE — 63600175 PHARM REV CODE 636 W HCPCS

## 2022-05-24 PROCEDURE — 25000003 PHARM REV CODE 250: Performed by: EMERGENCY MEDICINE

## 2022-05-24 PROCEDURE — 99223 1ST HOSP IP/OBS HIGH 75: CPT | Mod: ,,, | Performed by: ORTHOPAEDIC SURGERY

## 2022-05-24 PROCEDURE — 81003 URINALYSIS AUTO W/O SCOPE: CPT

## 2022-05-24 PROCEDURE — G0378 HOSPITAL OBSERVATION PER HR: HCPCS

## 2022-05-24 PROCEDURE — 25000003 PHARM REV CODE 250: Performed by: NURSE PRACTITIONER

## 2022-05-24 PROCEDURE — 94761 N-INVAS EAR/PLS OXIMETRY MLT: CPT

## 2022-05-24 PROCEDURE — 36415 COLL VENOUS BLD VENIPUNCTURE: CPT

## 2022-05-24 PROCEDURE — 99223 PR INITIAL HOSPITAL CARE,LEVL III: ICD-10-PCS | Mod: ,,, | Performed by: ORTHOPAEDIC SURGERY

## 2022-05-24 PROCEDURE — 99900035 HC TECH TIME PER 15 MIN (STAT)

## 2022-05-24 PROCEDURE — 85025 COMPLETE CBC W/AUTO DIFF WBC: CPT

## 2022-05-24 PROCEDURE — 12000002 HC ACUTE/MED SURGE SEMI-PRIVATE ROOM

## 2022-05-24 PROCEDURE — 80053 COMPREHEN METABOLIC PANEL: CPT

## 2022-05-24 RX ORDER — AMOXICILLIN 250 MG
1 CAPSULE ORAL 2 TIMES DAILY
Status: DISCONTINUED | OUTPATIENT
Start: 2022-05-24 | End: 2022-05-31 | Stop reason: HOSPADM

## 2022-05-24 RX ORDER — HYDROXYUREA 500 MG/1
1000 CAPSULE ORAL DAILY
Status: DISCONTINUED | OUTPATIENT
Start: 2022-05-24 | End: 2022-05-31 | Stop reason: HOSPADM

## 2022-05-24 RX ORDER — DIPHENHYDRAMINE HCL 25 MG
25 CAPSULE ORAL EVERY 6 HOURS PRN
Status: DISCONTINUED | OUTPATIENT
Start: 2022-05-24 | End: 2022-05-26

## 2022-05-24 RX ORDER — MORPHINE SULFATE 2 MG/ML
2 INJECTION, SOLUTION INTRAMUSCULAR; INTRAVENOUS EVERY 4 HOURS PRN
Status: DISCONTINUED | OUTPATIENT
Start: 2022-05-24 | End: 2022-05-27

## 2022-05-24 RX ORDER — SODIUM CHLORIDE, SODIUM LACTATE, POTASSIUM CHLORIDE, CALCIUM CHLORIDE 600; 310; 30; 20 MG/100ML; MG/100ML; MG/100ML; MG/100ML
INJECTION, SOLUTION INTRAVENOUS CONTINUOUS
Status: DISCONTINUED | OUTPATIENT
Start: 2022-05-24 | End: 2022-05-31 | Stop reason: HOSPADM

## 2022-05-24 RX ORDER — LANOLIN ALCOHOL/MO/W.PET/CERES
800 CREAM (GRAM) TOPICAL
Status: DISCONTINUED | OUTPATIENT
Start: 2022-05-24 | End: 2022-05-31 | Stop reason: HOSPADM

## 2022-05-24 RX ORDER — ACETAMINOPHEN 325 MG/1
650 TABLET ORAL EVERY 4 HOURS PRN
Status: DISCONTINUED | OUTPATIENT
Start: 2022-05-24 | End: 2022-05-31 | Stop reason: HOSPADM

## 2022-05-24 RX ORDER — HYDROMORPHONE HYDROCHLORIDE 2 MG/ML
1 INJECTION, SOLUTION INTRAMUSCULAR; INTRAVENOUS; SUBCUTANEOUS
Status: COMPLETED | OUTPATIENT
Start: 2022-05-24 | End: 2022-05-24

## 2022-05-24 RX ORDER — IBUPROFEN 200 MG
24 TABLET ORAL
Status: DISCONTINUED | OUTPATIENT
Start: 2022-05-24 | End: 2022-05-31 | Stop reason: HOSPADM

## 2022-05-24 RX ORDER — ONDANSETRON 2 MG/ML
4 INJECTION INTRAMUSCULAR; INTRAVENOUS EVERY 6 HOURS PRN
Status: DISCONTINUED | OUTPATIENT
Start: 2022-05-24 | End: 2022-05-31 | Stop reason: HOSPADM

## 2022-05-24 RX ORDER — OXYCODONE AND ACETAMINOPHEN 5; 325 MG/1; MG/1
1 TABLET ORAL EVERY 6 HOURS PRN
Status: DISCONTINUED | OUTPATIENT
Start: 2022-05-24 | End: 2022-05-25 | Stop reason: SDUPTHER

## 2022-05-24 RX ORDER — TALC
9 POWDER (GRAM) TOPICAL NIGHTLY PRN
Status: DISCONTINUED | OUTPATIENT
Start: 2022-05-24 | End: 2022-05-31 | Stop reason: HOSPADM

## 2022-05-24 RX ORDER — GLUCAGON 1 MG
1 KIT INJECTION
Status: DISCONTINUED | OUTPATIENT
Start: 2022-05-24 | End: 2022-05-31 | Stop reason: HOSPADM

## 2022-05-24 RX ORDER — FOLIC ACID 1 MG/1
4 TABLET ORAL DAILY
Status: DISCONTINUED | OUTPATIENT
Start: 2022-05-24 | End: 2022-05-31 | Stop reason: HOSPADM

## 2022-05-24 RX ORDER — NALOXONE HCL 0.4 MG/ML
0.02 VIAL (ML) INJECTION
Status: DISCONTINUED | OUTPATIENT
Start: 2022-05-24 | End: 2022-05-31 | Stop reason: HOSPADM

## 2022-05-24 RX ORDER — IBUPROFEN 200 MG
16 TABLET ORAL
Status: DISCONTINUED | OUTPATIENT
Start: 2022-05-24 | End: 2022-05-31 | Stop reason: HOSPADM

## 2022-05-24 RX ORDER — SODIUM CHLORIDE 0.9 % (FLUSH) 0.9 %
3 SYRINGE (ML) INJECTION
Status: DISCONTINUED | OUTPATIENT
Start: 2022-05-24 | End: 2022-05-31 | Stop reason: HOSPADM

## 2022-05-24 RX ADMIN — HYDROMORPHONE HYDROCHLORIDE 1 MG: 2 INJECTION, SOLUTION INTRAMUSCULAR; INTRAVENOUS; SUBCUTANEOUS at 12:05

## 2022-05-24 RX ADMIN — DIPHENHYDRAMINE HYDROCHLORIDE 25 MG: 25 CAPSULE ORAL at 11:05

## 2022-05-24 RX ADMIN — SODIUM CHLORIDE, SODIUM LACTATE, POTASSIUM CHLORIDE, AND CALCIUM CHLORIDE: .6; .31; .03; .02 INJECTION, SOLUTION INTRAVENOUS at 02:05

## 2022-05-24 RX ADMIN — OXYCODONE HYDROCHLORIDE AND ACETAMINOPHEN 1 TABLET: 5; 325 TABLET ORAL at 08:05

## 2022-05-24 RX ADMIN — SODIUM CHLORIDE, SODIUM LACTATE, POTASSIUM CHLORIDE, AND CALCIUM CHLORIDE: .6; .31; .03; .02 INJECTION, SOLUTION INTRAVENOUS at 11:05

## 2022-05-24 RX ADMIN — MORPHINE SULFATE 2 MG: 2 INJECTION, SOLUTION INTRAMUSCULAR; INTRAVENOUS at 06:05

## 2022-05-24 RX ADMIN — MORPHINE SULFATE 2 MG: 2 INJECTION, SOLUTION INTRAMUSCULAR; INTRAVENOUS at 04:05

## 2022-05-24 RX ADMIN — POTASSIUM BICARBONATE 60 MEQ: 391 TABLET, EFFERVESCENT ORAL at 03:05

## 2022-05-24 RX ADMIN — MORPHINE SULFATE 2 MG: 2 INJECTION, SOLUTION INTRAMUSCULAR; INTRAVENOUS at 11:05

## 2022-05-24 RX ADMIN — POTASSIUM BICARBONATE 35 MEQ: 391 TABLET, EFFERVESCENT ORAL at 05:05

## 2022-05-24 RX ADMIN — SODIUM CHLORIDE, SODIUM LACTATE, POTASSIUM CHLORIDE, AND CALCIUM CHLORIDE: .6; .31; .03; .02 INJECTION, SOLUTION INTRAVENOUS at 08:05

## 2022-05-24 RX ADMIN — FOLIC ACID 4 MG: 1 TABLET ORAL at 08:05

## 2022-05-24 RX ADMIN — OXYCODONE HYDROCHLORIDE AND ACETAMINOPHEN 1 TABLET: 5; 325 TABLET ORAL at 04:05

## 2022-05-24 RX ADMIN — HYDROXYUREA 1000 MG: 500 CAPSULE ORAL at 08:05

## 2022-05-24 NOTE — PLAN OF CARE
POC reviewed, pt verbalizes understanding. Pain controlled with prn meds. Denies nausea. AAOx4. IVF infusing. Room air. VSS. Safety maintained. NPO all day, switched to regular diet for dinner. Q2h rounding done. Bed locked and low, call light in reach.

## 2022-05-24 NOTE — ASSESSMENT & PLAN NOTE
Patient's anemia is currently controlled. Has not received any PRBCs to date.. Etiology likely d/t sickle cell anemia  Current CBC reviewed-   Lab Results   Component Value Date    HGB 7.8 (L) 05/23/2022    HCT 22.7 (L) 05/23/2022     Monitor serial CBC and transfuse if patient becomes hemodynamically unstable, symptomatic or H/H drops below 7/21.

## 2022-05-24 NOTE — H&P
"[facility]  [facility] Medicine  History & Physical    Patient Name: Nishi Dumont  MRN: [id number]  Patient Class: OP- Observation  Admission Date: 5/23/2022  Attending Physician: Norberto Umana MD   Primary Care Provider: [facility]         Patient information was obtained from patient, past medical records and ER records.     Subjective:     Principal Problem:Sickle cell pain crisis    Chief Complaint:   Chief Complaint   Patient presents with    Sickle Cell Pain Crisis     All over pain x 2 days          HPI: Nishi Dumont is a 31 y.o. y.o. 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 She states pain started suddenly 2 day ago while she was at home holding her child. Describes pain as a constant generalized stabbing sensation that is "everywhere" but specifically reports pain in right ribcage and mid back. States this feels like past sickle cell crisis. She also reports chills, nasal congestion, back pain, right shoulder pain, and cough intermittently productive of green sputum. She initially reported CP but corrected herself stating it was chest tightness rather than pain. She also denies fever, SOB, palpitations, abdominal pain, n/v/d, and dysuria. Patient has Hx of left should AVN s/p decompression with Dr. Rebolledo 3/9. Last sickle sell pain crisis was 4/18. ED workup was significant for elevated WBC, anemia, hypokalemia, thrombocytosis, hyperbilirubinemia, and CXR concerning for bone infarcts in the bilateral humeral heads and multiple H-type vertebral body compression fractures. The patient was placed in observation under the care of Hospital Medicine.       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    Avascular necrosis of bone of hip     Avascular necrosis of " "humeral head     Blood transfusion     Sickle cell disease        Past Surgical History:   Procedure Laterality Date    CHOLECYSTECTOMY      JOINT REPLACEMENT       left hip       Review of patient's allergies indicates:   Allergen Reactions    Contrast media Hives    Iodine and iodide containing products Hives and Swelling    Mushroom Hives    Zithromax [azithromycin] Anaphylaxis    Demerol [meperidine] Other (See Comments)     Regarding reaction to demerol pt states "I talk out of my head and become aggressive"       No current facility-administered medications on file prior to encounter.     Current Outpatient Medications on File Prior to Encounter   Medication Sig    deferasirox (JADENU) 360 mg Tab Take 1,080 mg by mouth once daily.    ergocalciferol (ERGOCALCIFEROL) 50,000 unit Cap Take 50,000 Units by mouth every 7 days.    folic acid (FOLVITE) 1 MG tablet Take 4 tablets (4 mg total) by mouth once daily.    glutamine, sickle cell, (ENDARI) 5 gram PwPk Take 15 g by mouth 2 (two) times daily.    hydroxyurea (HYDREA) 500 mg Cap Take 1,000 mg by mouth once daily.    morphine (MS CONTIN) 30 MG 12 hr tablet Take 30 mg by mouth every 8 (eight) hours.    oxyCODONE-acetaminophen (PERCOCET) 5-325 mg per tablet Take 1 tablet by mouth every 4 (four) hours as needed for Pain.    traMADoL (ULTRAM) 50 mg tablet Take 50 mg by mouth 3 (three) times daily.     Family History       Problem Relation (Age of Onset)    Sickle cell trait Mother, Father, Brother, Daughter          Tobacco Use    Smoking status: Never Smoker    Smokeless tobacco: Never Used   Substance and Sexual Activity    Alcohol use: Yes     Alcohol/week: 0.0 standard drinks     Comment: occassionally    Drug use: No    Sexual activity: Yes     Partners: Male     Birth control/protection: Injection     Review of Systems   Constitutional:  Positive for chills and fatigue. Negative for fever.   HENT:  Positive for congestion. Negative for sore " throat and trouble swallowing.    Eyes:  Negative for visual disturbance.   Respiratory:  Positive for cough and chest tightness. Negative for shortness of breath and wheezing.    Cardiovascular:  Negative for chest pain, palpitations and leg swelling.   Gastrointestinal:  Negative for abdominal pain, diarrhea, nausea and vomiting.   Genitourinary:  Negative for difficulty urinating, dysuria and hematuria.   Musculoskeletal:  Positive for arthralgias (right should pain) and back pain. Negative for neck pain.   Skin:  Negative for rash.   Neurological:  Negative for dizziness, light-headedness and headaches.   Psychiatric/Behavioral:  Negative for confusion.    Objective:     Vital Signs (Most Recent):  Temp: 98.8 °F (37.1 °C) (05/23/22 2219)  Pulse: 95 (05/23/22 2219)  Resp: 20 (05/24/22 0013)  BP: 124/72 (05/23/22 2219)  SpO2: 100 % (05/23/22 2219) Vital Signs (24h Range):  Temp:  [98.8 °F (37.1 °C)] 98.8 °F (37.1 °C)  Pulse:  [95] 95  Resp:  [16-20] 20  SpO2:  [100 %] 100 %  BP: (124)/(72) 124/72     Weight: 54.4 kg (120 lb)  Body mass index is 24.24 kg/m².    Physical Exam  Vitals and nursing note reviewed.   Constitutional:       General: She is not in acute distress.     Appearance: Normal appearance. She is normal weight. She is not ill-appearing.   HENT:      Head: Normocephalic and atraumatic.      Nose: Nose normal.      Mouth/Throat:      Mouth: Mucous membranes are moist.      Pharynx: Oropharynx is clear.   Eyes:      Extraocular Movements: Extraocular movements intact.      Pupils: Pupils are equal, round, and reactive to light.   Cardiovascular:      Rate and Rhythm: Normal rate and regular rhythm.      Pulses: Normal pulses.      Heart sounds: Normal heart sounds.   Pulmonary:      Effort: Pulmonary effort is normal. No respiratory distress.      Breath sounds: Normal breath sounds. No wheezing, rhonchi or rales.   Abdominal:      General: Abdomen is flat. Bowel sounds are normal. There is no  distension.      Palpations: Abdomen is soft.      Tenderness: There is no abdominal tenderness. There is no guarding or rebound.   Musculoskeletal:         General: Normal range of motion.      Cervical back: Normal range of motion and neck supple.      Comments: Right sided rib cage TTP.   Right thoracic Paraspinous muscle TTP.   Right shoulder TTP.    Skin:     General: Skin is warm.      Capillary Refill: Capillary refill takes 2 to 3 seconds.   Neurological:      General: No focal deficit present.      Mental Status: She is alert and oriented to person, place, and time. Mental status is at baseline.   Psychiatric:         Mood and Affect: Mood normal.         Behavior: Behavior normal.         CRANIAL NERVES     CN III, IV, VI   Pupils are equal, round, and reactive to light.     Significant Labs: All pertinent labs within the past 24 hours have been reviewed.  CBC:   Recent Labs   Lab 05/23/22  2309   WBC 15.19*   HGB 7.8*   HCT 22.7*   *     CMP:   Recent Labs   Lab 05/23/22  2309      K 3.1*   *   CO2 20*   *   BUN 5*   CREATININE 0.7   CALCIUM 8.5*   PROT 6.9   ALBUMIN 3.7   BILITOT 2.1*   ALKPHOS 116   AST 28   ALT 36   ANIONGAP 11   EGFRNONAA >60       Significant Imaging: I have reviewed all pertinent imaging results/findings within the past 24 hours.    CXR:  1. Perihilar interstitial prominence, nonspecific and unchanged from prior.  No new focal consolidation.  Correlate for sickle cell crisis.  2. Bone infarcts in the bilateral humeral heads and multiple H-type vertebral body compression fractures.    Assessment/Plan:     * Sickle cell pain crisis  Acute:  - IVFH  - supplemental oxygen  - prn pain medication ordered  - prn antiemetics  - Trending CBC  - CXR:   1. Perihilar interstitial prominence, nonspecific and unchanged from prior.  No new focal consolidation.  Correlate for sickle cell crisis.   2. Bone infarcts in the bilateral humeral heads and multiple H-type  vertebral body compression fractures.        Thrombocytosis  Chronic:  - monitor CBC      History of avascular necrosis of left humeral head  Noted.   - s/p decompression left humeral head 3/9 with Dr. Rebolledo  - ortho consulted   - Xray right shoulder ordered  - CXR:   1. Perihilar interstitial prominence, nonspecific and unchanged from prior.  No new focal consolidation.  Correlate for sickle cell crisis.   2. Bone infarcts in the bilateral humeral heads and multiple H-type vertebral body compression fractures.      Hypokalemia  Acute:  - initial potassium: 3.1  - replacements ordered  - monitor electrolytes closely      Hyperbilirubinemia  Noted.   - Monitor CBC closely      Leukocytosis, unspecified  Noted.   - monitor closely  - UA ordered        Sickle cell anemia  Patient's anemia is currently controlled. Has not received any PRBCs to date.. Etiology likely d/t sickle cell anemia  Current CBC reviewed-   Lab Results   Component Value Date    HGB 7.8 (L) 05/23/2022    HCT 22.7 (L) 05/23/2022     Monitor serial CBC and transfuse if patient becomes hemodynamically unstable, symptomatic or H/H drops below 7/21.         VTE Risk Mitigation (From admission, onward)         Ordered     IP VTE LOW RISK PATIENT  Once         05/24/22 0202     Place sequential compression device  Until discontinued         05/24/22 0202                   Christine Raines PA-C  Department of Hospital Medicine   [facility] - Emergency Dept

## 2022-05-24 NOTE — ED PROVIDER NOTES
"Encounter Date: 5/23/2022    SCRIBE #1 NOTE: I, Tre Ousna, am scribing for, and in the presence of, Owen Kaur MD.       History     Chief Complaint   Patient presents with    Sickle Cell Pain Crisis     All over pain x 2 days       Time seen by provider: 10:36 PM on 05/23/2022    Nishi Dumont is a 31 y.o. female who presents to the ED with a sickle cell pain crisis. The Pt states that she has pain "everywhere" and that her last crisis was about a month ago. She has a port in her left chest. She also c/o cough and chills. The patient denies fever or any other symptoms at this time. PMHx of sickle cell anemia and blood transfusion. PSHx of joint replacement.      The history is provided by the patient.     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    Avascular necrosis of bone of hip     Avascular necrosis of humeral head     Blood transfusion     Sickle cell disease      Past Surgical History:   Procedure Laterality Date    CHOLECYSTECTOMY      JOINT REPLACEMENT       left hip     Family History   Problem Relation Age of Onset    Sickle cell trait Mother     Sickle cell trait Father     Sickle cell trait Brother     Sickle cell trait Daughter      Social History     Tobacco Use    Smoking status: Never Smoker    Smokeless tobacco: Never Used   Substance Use Topics    Alcohol use: Yes     Alcohol/week: 0.0 standard drinks     Comment: occassionally    Drug use: No     Review of Systems   Constitutional: Positive for chills. Negative for fever.   HENT: Negative for sore throat.  "   Respiratory: Negative for shortness of breath.    Cardiovascular: Negative for chest pain.   Gastrointestinal: Negative for nausea.   Genitourinary: Negative for dysuria.   Musculoskeletal: Positive for arthralgias and myalgias. Negative for back pain.   Skin: Negative for rash.   Neurological: Negative for weakness.   Hematological: Does not bruise/bleed easily.   All other systems reviewed and are negative.      Physical Exam     Initial Vitals [05/23/22 2219]   BP Pulse Resp Temp SpO2   124/72 95 16 98.8 °F (37.1 °C) 100 %      MAP       --         Physical Exam    Nursing note and vitals reviewed.  Constitutional: She appears well-developed and well-nourished.   HENT:   Head: Normocephalic and atraumatic.   Eyes: EOM are normal.   Neck: Neck supple.   Normal range of motion.  Cardiovascular: Normal rate, regular rhythm and normal heart sounds. Exam reveals no gallop and no friction rub.    No murmur heard.  Pulmonary/Chest: Breath sounds normal. No respiratory distress. She has no wheezes. She has no rhonchi. She has no rales.   Left chest port; non tender.   Musculoskeletal:         General: Normal range of motion.      Cervical back: Normal range of motion and neck supple.     Neurological: She is alert and oriented to person, place, and time.   Skin: Skin is warm and dry.   Psychiatric: She has a normal mood and affect. Her behavior is normal. Judgment and thought content normal.         ED Course   Procedures  Labs Reviewed   CBC W/ AUTO DIFFERENTIAL - Abnormal; Notable for the following components:       Result Value    WBC 15.19 (*)     RBC 2.57 (*)     Hemoglobin 7.8 (*)     Hematocrit 22.7 (*)     RDW 19.0 (*)     Platelets 472 (*)     MPV 8.9 (*)     nRBC 1 (*)     Gran % 82.0 (*)     Lymph % 15.0 (*)     Mono % 3.0 (*)     Platelet Estimate Increased (*)     Sickle Cells Occasional (*)     All other components within normal limits   COMPREHENSIVE METABOLIC PANEL - Abnormal; Notable for the following  components:    Potassium 3.1 (*)     Chloride 112 (*)     CO2 20 (*)     Glucose 119 (*)     BUN 5 (*)     Calcium 8.5 (*)     Total Bilirubin 2.1 (*)     All other components within normal limits   RETICULOCYTES - Abnormal; Notable for the following components:    Retic 17.8 (*)     All other components within normal limits   SARS-COV-2 RNA AMPLIFICATION, QUAL          Imaging Results          X-Ray Chest AP Portable (Final result)  Result time 05/23/22 23:23:26    Final result by Demetrius Padgett DO (05/23/22 23:23:26)                 Impression:      1. Perihilar interstitial prominence, nonspecific and unchanged from prior.  No new focal consolidation.  Correlate for sickle cell crisis.  2. Bone infarcts in the bilateral humeral heads and multiple H-type vertebral body compression fractures.      Electronically signed by: Demetrius Padgett  Date:    05/23/2022  Time:    23:23             Narrative:    EXAMINATION:  XR CHEST AP PORTABLE    CLINICAL HISTORY:  Chest Pain;    TECHNIQUE:  Single frontal view of the chest was performed.    COMPARISON:  04/18/2022.    FINDINGS:  There is a left chest wall port, unchanged in position from prior.    The lungs are well expanded.  There are perihilar interstitial opacities, unchanged from prior.  There is no new large focal consolidation.  The pleural spaces are clear.    The cardiac silhouette is unremarkable.    There is patchy sclerotic attenuation in the bilateral humeral heads, likely representing bone infarcts.  There are H-shaped vertebral body compression fractures.    There are right upper quadrant surgical clips.                                 Medications   potassium bicarbonate disintegrating tablet 60 mEq (has no administration in time range)   sodium chloride 0.9% bolus 1,000 mL (0 mLs Intravenous Stopped 5/24/22 0132)   HYDROmorphone (PF) injection 1 mg (1 mg Intravenous Given 5/23/22 2312)   diphenhydrAMINE injection 25 mg (25 mg Intravenous Given 5/23/22  2313)   HYDROmorphone (PF) injection 1 mg (1 mg Intravenous Given 5/24/22 0013)     Medical Decision Making:   History:   Old Medical Records: I decided to obtain old medical records.  Clinical Tests:   Lab Tests: Ordered and Reviewed  Radiological Study: Ordered and Reviewed          Scribe Attestation:   Scribe #1: I performed the above scribed service and the documentation accurately describes the services I performed. I attest to the accuracy of the note.         I, Dr. Kaur, personally performed the services described in this documentation. All medical record entries made by the scribe were at my direction and in my presence.  I have reviewed the chart and agree that the record reflects my personal performance and is accurate and complete.1:39 AM 05/24/2022      ED Course as of 05/24/22 0139   Mon May 23, 2022   2233 BP: 124/72 [EF]   2233 Temp src: Oral [EF]   2233 Temp: 98.8 °F (37.1 °C) [EF]   2233 Pulse: 95 [EF]   2233 Resp: 16 [EF]   2233 SpO2: 100 % [EF]   2327 Retic(!): 17.8 [EF]   2327 WBC(!): 15.19 [EF]   2327 Hemoglobin(!): 7.8 [EF]   2327 Platelets(!): 472 [EF]   2327 X-Ray Chest AP Portable [EF]   Tue May 24, 2022   0139 Leukocytosis is nonspecific, consistent with prior.  Potassium has been replaced. [EF]   0139 Potassium(!): 3.1 [EF]   0139 HM to admit for sickle crisis [EF]      ED Course User Index  [EF] Owen Kaur MD             Clinical Impression:   Final diagnoses:  [D57.00] Sickle cell pain crisis (Primary)          ED Disposition Condition    Observation               31-year-old female with sickle cell disease presents with diffuse arthralgias consistent with crisis.  Little improvement with 2 doses of IV pain medication.  She will be admitted to Hospital Medicine. Christine to admit.     Owen Kaur MD  05/24/22 0140

## 2022-05-24 NOTE — SUBJECTIVE & OBJECTIVE
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    Avascular necrosis of bone of hip     Avascular necrosis of humeral head     Blood transfusion     Sickle cell disease        Past Surgical History:   Procedure Laterality Date    CHOLECYSTECTOMY      JOINT REPLACEMENT       left hip       Review of patient's allergies indicates:   Allergen Reactions    Contrast media Hives    Iodine and iodide containing products Hives and Swelling    Mushroom Hives    Zithromax [azithromycin] Anaphylaxis    Demerol [meperidine] Other (See Comments)     Regarding reaction to demerol pt states "I talk out of my head and become aggressive"       No current facility-administered medications on file prior to encounter.     Current Outpatient Medications on File Prior to Encounter   Medication Sig    deferasirox (JADENU) 360 mg Tab Take 1,080 mg by mouth once daily.    ergocalciferol (ERGOCALCIFEROL) 50,000 unit Cap Take 50,000 Units by mouth every 7 days.    folic acid (FOLVITE) 1 MG tablet Take 4 tablets (4 mg total) by mouth once daily.    glutamine, sickle cell, (ENDARI) 5 gram PwPk Take 15 g by mouth 2 (two) times daily.    hydroxyurea (HYDREA) 500 mg Cap Take 1,000 mg by mouth once daily.    morphine (MS CONTIN) 30 MG 12 hr tablet Take 30 mg by mouth every 8 (eight) hours.    oxyCODONE-acetaminophen (PERCOCET) 5-325 mg per tablet Take 1 tablet by mouth every 4 (four) hours as needed for Pain.    traMADoL (ULTRAM) 50 mg tablet Take 50 mg by mouth 3 (three) times daily.     Family History       Problem Relation (Age of Onset)    Sickle cell trait Mother, Father, Brother, Daughter          Tobacco Use    Smoking status: Never Smoker    Smokeless tobacco: Never Used   Substance and Sexual Activity    Alcohol use: Yes     Alcohol/week: 0.0 standard drinks     Comment: occassionally    Drug use: No    Sexual activity: Yes     Partners: Male     Birth control/protection: Injection "     Review of Systems   Constitutional:  Positive for chills and fatigue. Negative for fever.   HENT:  Positive for congestion. Negative for sore throat and trouble swallowing.    Eyes:  Negative for visual disturbance.   Respiratory:  Positive for cough and chest tightness. Negative for shortness of breath and wheezing.    Cardiovascular:  Negative for chest pain, palpitations and leg swelling.   Gastrointestinal:  Negative for abdominal pain, diarrhea, nausea and vomiting.   Genitourinary:  Negative for difficulty urinating, dysuria and hematuria.   Musculoskeletal:  Positive for arthralgias (right should pain) and back pain. Negative for neck pain.   Skin:  Negative for rash.   Neurological:  Negative for dizziness, light-headedness and headaches.   Psychiatric/Behavioral:  Negative for confusion.    Objective:     Vital Signs (Most Recent):  Temp: 98.8 °F (37.1 °C) (05/23/22 2219)  Pulse: 95 (05/23/22 2219)  Resp: 20 (05/24/22 0013)  BP: 124/72 (05/23/22 2219)  SpO2: 100 % (05/23/22 2219) Vital Signs (24h Range):  Temp:  [98.8 °F (37.1 °C)] 98.8 °F (37.1 °C)  Pulse:  [95] 95  Resp:  [16-20] 20  SpO2:  [100 %] 100 %  BP: (124)/(72) 124/72     Weight: 54.4 kg (120 lb)  Body mass index is 24.24 kg/m².    Physical Exam  Vitals and nursing note reviewed.   Constitutional:       General: She is not in acute distress.     Appearance: Normal appearance. She is normal weight. She is not ill-appearing.   HENT:      Head: Normocephalic and atraumatic.      Nose: Nose normal.      Mouth/Throat:      Mouth: Mucous membranes are moist.      Pharynx: Oropharynx is clear.   Eyes:      Extraocular Movements: Extraocular movements intact.      Pupils: Pupils are equal, round, and reactive to light.   Cardiovascular:      Rate and Rhythm: Normal rate and regular rhythm.      Pulses: Normal pulses.      Heart sounds: Normal heart sounds.   Pulmonary:      Effort: Pulmonary effort is normal. No respiratory distress.      Breath  sounds: Normal breath sounds. No wheezing, rhonchi or rales.   Abdominal:      General: Abdomen is flat. Bowel sounds are normal. There is no distension.      Palpations: Abdomen is soft.      Tenderness: There is no abdominal tenderness. There is no guarding or rebound.   Musculoskeletal:         General: Normal range of motion.      Cervical back: Normal range of motion and neck supple.      Comments: Right sided rib cage TTP.   Right thoracic Paraspinous muscle TTP.   Right shoulder TTP.    Skin:     General: Skin is warm.      Capillary Refill: Capillary refill takes 2 to 3 seconds.   Neurological:      General: No focal deficit present.      Mental Status: She is alert and oriented to person, place, and time. Mental status is at baseline.   Psychiatric:         Mood and Affect: Mood normal.         Behavior: Behavior normal.         CRANIAL NERVES     CN III, IV, VI   Pupils are equal, round, and reactive to light.     Significant Labs: All pertinent labs within the past 24 hours have been reviewed.  CBC:   Recent Labs   Lab 05/23/22  2309   WBC 15.19*   HGB 7.8*   HCT 22.7*   *     CMP:   Recent Labs   Lab 05/23/22  2309      K 3.1*   *   CO2 20*   *   BUN 5*   CREATININE 0.7   CALCIUM 8.5*   PROT 6.9   ALBUMIN 3.7   BILITOT 2.1*   ALKPHOS 116   AST 28   ALT 36   ANIONGAP 11   EGFRNONAA >60       Significant Imaging: I have reviewed all pertinent imaging results/findings within the past 24 hours.    CXR:  1. Perihilar interstitial prominence, nonspecific and unchanged from prior.  No new focal consolidation.  Correlate for sickle cell crisis.  2. Bone infarcts in the bilateral humeral heads and multiple H-type vertebral body compression fractures.

## 2022-05-24 NOTE — ASSESSMENT & PLAN NOTE
Acute:  - IVFH  - supplemental oxygen  - prn pain medication ordered  - prn antiemetics  - Trending CBC  - CXR:   1. Perihilar interstitial prominence, nonspecific and unchanged from prior.  No new focal consolidation.  Correlate for sickle cell crisis.   2. Bone infarcts in the bilateral humeral heads and multiple H-type vertebral body compression fractures.

## 2022-05-24 NOTE — NURSING
Pt arrived to room 410. Pt able to walk to bed. IV clean dry and intact. POC discussed with pt. Pt has no requests at this time.

## 2022-05-24 NOTE — ASSESSMENT & PLAN NOTE
Noted.   - s/p decompression left humeral head 3/9 with Dr. Rebolledo  - ortho consulted   - CXR:   1. Perihilar interstitial prominence, nonspecific and unchanged from prior.  No new focal consolidation.  Correlate for sickle cell crisis.   2. Bone infarcts in the bilateral humeral heads and multiple H-type vertebral body compression fractures.

## 2022-05-24 NOTE — CARE UPDATE
05/24/22 0825   Patient Assessment/Suction   Level of Consciousness (AVPU) alert   Respiratory Effort Normal;Unlabored   All Lung Fields Breath Sounds clear;diminished   Rhythm/Pattern, Respiratory unlabored   PRE-TX-O2   O2 Device (Oxygen Therapy) room air   SpO2 97 %   Pulse Oximetry Type Intermittent   $ Pulse Oximetry - Multiple Charge Pulse Oximetry - Multiple   Pulse 82   Resp 18   Incentive Spirometer   $ Incentive Spirometer Charges unable to perform  (in pain, placed unit at bedside)

## 2022-05-24 NOTE — CONSULTS
CC:  Aseptic necrosis of bilateral shoulders    HPI:  31-year-old female with sickle cell disease who has recurrent episodes of sickle cell crisis.  She was apparently admitted yesterday with a sickle cell crisis.  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  Orthopedics has been consulted for aseptic necrosis of both shoulders.  The patient underwent a core decompression of the left shoulder during 1 of her previous hospitalizations.  After that the patient subsequently no showed for all of her follow-up appointments.      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     Current Outpatient Medications on File Prior to Encounter   Medication Sig Dispense Refill    deferasirox (JADENU) 360 mg Tab Take 1,080 mg by mouth once daily.      ergocalciferol (ERGOCALCIFEROL) 50,000 unit Cap Take 50,000 Units by mouth every 7 days.      folic acid (FOLVITE) 1 MG tablet Take 4 tablets (4 mg total) by mouth once daily. 120 tablet 11    glutamine, sickle cell, (ENDARI) 5 gram PwPk Take 15 g by mouth 2 (two) times daily.      hydroxyurea (HYDREA) 500 mg Cap Take 1,000 mg by mouth once daily.      morphine (MS CONTIN) 30 MG 12 hr tablet Take 30 mg by mouth every 8 (eight) hours.      oxyCODONE-acetaminophen (PERCOCET) 5-325 mg per tablet Take 1 tablet by mouth every 4 (four) hours as needed for Pain.      traMADoL (ULTRAM) 50 mg tablet Take 50 mg by mouth 3 (three) times daily.         ROS:    Constitution: Denies chills, fever, and sweats.  HENT: Denies headaches or blurry vision.  Cardiovascular: Denies chest pain or irregular  heart beat.  Respiratory: Denies cough or shortness of breath.  Gastrointestinal: Denies abdominal pain, nausea, or vomiting.  Genitourinary:  Denies urinary incontinence, bladder and kidney issues  Musculoskeletal:  Denies muscle cramps.  Neurological: Denies dizziness or focal weakness.  Psychiatric/Behavioral: Normal mental status.  Hematologic/Lymphatic: Denies bleeding problem or easy bruising/bleeding.  Skin: Denies rash or suspicious lesions.    Physical examination     Gen - No acute distress, well nourished, well groomed   Eyes - Extraoccular motions intact, pupils equally round and reactive to light and accommodation   ENT - normocephalic, atruamtic, oropharynx clear   Neck - Supple, no abnormal masses   Cardiovascular - regular rate and rhythm   Pulmonary - clear to auscultation bilaterally, no wheezes, ronchi, or rales   Abdomen - soft, non-tender, non-distended, positive bowel sounds   Psych - The patient is alert and oriented x3 with normal mood and affect    Right Upper Extremity Examination     Skin is intact throughout   Motor is intact distally radial, median, ulnar, AIN, PIN   +2 radial and ulnar pulses   Sensation to light touch is intact distally radial, median, and ulnar     Examination of the Right shoulder:   ROM:   For - 150 with mild pain at end range of motion  Abd - 150 with mild pain at end range of motion  Ext - 50   Int - T12     Tenderness to palpation:   Subacromial space - negative  Biceps Tendon - positive, mild  Anterior Glenohumeral Joint - positive, mild  AC joint - negative  Glenohumeral instability - negative  Empty Can test - negative  Speeds test - negative  Montez/Neers sign - negative  Cross-arm adduction test - negative    Left Upper Extremity Examination     Skin is intact throughout   Motor is intact distally radial, median, ulnar, AIN, PIN   +2 radial and ulnar pulses   Sensation to light touch is intact distally radial, median, and ulnar     Examination of the Left  shoulder:   ROM:   For - 150   Abd - 150   Ext - 50   Int - T12     Tenderness to palpation:   Subacromial space - negative  Biceps Tendon - negative  Anterior Glenohumeral Joint - negative  AC joint - negative  Glenohumeral instability - negative  Empty Can test - negative  Speeds test - negative  Montez/Neers sign - negative  Cross-arm adduction test - negative    X-ray images were examined and personally interpreted by me.  Three views of the right shoulder obtained today show aseptic necrosis of the right humeral head.  Joint space maintained with no acute fractures    Dx:  Bilateral shoulder aseptic necrosis.  Left shoulder status post core decompression with some improvement in symptoms.  Right shoulder is currently minimally symptomatic.    Plan:  The patient can follow up as an outpatient to discuss core decompression of her right shoulder if she would like.

## 2022-05-24 NOTE — PLAN OF CARE
[facility]  Initial Discharge Assessment       Primary Care Provider: [facility]    Admission Diagnosis: Sickle cell pain crisis [D57.00]    Admission Date: 5/23/2022  Expected Discharge Date:     Discharge Barriers Identified: None    Payor: MEDICAID / Plan: [facility] COMMUNITY PLAN [email or web address] ([place] MEDICAID) / Product Type: Managed Medicaid /     Extended Emergency Contact Information  Primary Emergency Contact: Deisy Williamson   [place]  Mobile Phone: [phone]  Relation: Grandparent  Secondary Emergency Contact: Jhon Jones  Mobile Phone: [phone]  Relation: Mother   needed? No    Discharge Plan A: Home  Discharge Plan B: Home with family       [address], [facility] [place] - [address]  [address] [facility]  [address]  [address] [facility]  [place] [address]  Phone: [phone] Fax: [phone]    SW met with patient at bedside to complete discharge planning assessment.  Patient alert and oriented xs 4.  Patient verified all demographic information on facesheet is correct.  Patient verified PCP is [facility].  Patient verified primary health insurance is [facility] [place] Medicaid.  Patient with NO home health or DME.  Patient with NO POA or Living Will.  Patient not on dialysis or medication coumadin.  Patient with no 30 day admission.  Patient with no financial issues at this time.  Patient family will provide transportation upon discharge from facility.  Patient independent with ADLs, live with 3 dependent children, drives self.      Initial Assessment (most recent)     Adult Discharge Assessment - 05/24/22 1123        Discharge Assessment    Assessment Type Discharge Planning Assessment     Confirmed/corrected address, phone number and insurance Yes     Confirmed Demographics Correct on Facesheet     Source of Information patient     Communicated SHANE with patient/caregiver Date not available/Unable to determine     Lives  With child(asha), dependent     Facility Arrived From: home     Do you expect to return to your current living situation? Yes     Do you have help at home or someone to help you manage your care at home? Yes     Who are your caregiver(s) and their phone number(s)? family     Prior to hospitilization cognitive status: Alert/Oriented     Current cognitive status: Alert/Oriented     Walking or Climbing Stairs Difficulty none     Dressing/Bathing Difficulty none     Equipment Currently Used at Home none     Readmission within 30 days? No     Patient currently being followed by outpatient case management? No     Do you currently have service(s) that help you manage your care at home? No     Do you take prescription medications? Yes     Do you have prescription coverage? Yes     Do you have any problems affording any of your prescribed medications? No     Is the patient taking medications as prescribed? yes     Who is going to help you get home at discharge? family     How do you get to doctors appointments? car, drives self     Are you on dialysis? No     Do you take coumadin? No     Discharge Plan A Home     Discharge Plan B Home with family     DME Needed Upon Discharge  none     Discharge Plan discussed with: Patient     Discharge Barriers Identified None

## 2022-05-24 NOTE — ASSESSMENT & PLAN NOTE
Noted.   - s/p decompression left humeral head 3/9 with Dr. Rebolledo  - ortho consulted   - Xray right shoulder ordered  - CXR:   1. Perihilar interstitial prominence, nonspecific and unchanged from prior.  No new focal consolidation.  Correlate for sickle cell crisis.   2. Bone infarcts in the bilateral humeral heads and multiple H-type vertebral body compression fractures.

## 2022-05-24 NOTE — HPI
"Nishi Dumont is a 31 y.o. y.o. 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 She states pain started suddenly 2 day ago while she was at home holding her child. Describes pain as a constant generalized stabbing sensation that is "everywhere" but specifically reports pain in right ribcage and mid back. States this feels like past sickle cell crisis. She also reports chills, nasal congestion, back pain, right shoulder pain, and cough intermittently productive of green sputum. She initially reported CP but corrected herself stating it was chest tightness rather than pain. She also denies fever, SOB, palpitations, abdominal pain, n/v/d, and dysuria. Patient has Hx of left should AVN s/p decompression with Dr. Rebolledo 3/9. Last sickle sell pain crisis was 4/18. ED workup was significant for elevated WBC, anemia, hypokalemia, thrombocytosis, hyperbilirubinemia, and CXR concerning for bone infarcts in the bilateral humeral heads and multiple H-type vertebral body compression fractures. The patient was placed in observation under the care of Hospital Medicine.   "

## 2022-05-25 LAB
ALBUMIN SERPL BCP-MCNC: 3.3 G/DL (ref 3.5–5.2)
ALP SERPL-CCNC: 95 U/L (ref 55–135)
ALT SERPL W/O P-5'-P-CCNC: 25 U/L (ref 10–44)
ANION GAP SERPL CALC-SCNC: 9 MMOL/L (ref 8–16)
AST SERPL-CCNC: 20 U/L (ref 10–40)
BASOPHILS # BLD AUTO: 0.08 K/UL (ref 0–0.2)
BASOPHILS NFR BLD: 0.6 % (ref 0–1.9)
BILIRUB SERPL-MCNC: 2.3 MG/DL (ref 0.1–1)
BUN SERPL-MCNC: 4 MG/DL (ref 6–20)
CALCIUM SERPL-MCNC: 8.7 MG/DL (ref 8.7–10.5)
CHLORIDE SERPL-SCNC: 110 MMOL/L (ref 95–110)
CO2 SERPL-SCNC: 25 MMOL/L (ref 23–29)
CREAT SERPL-MCNC: 0.7 MG/DL (ref 0.5–1.4)
DIFFERENTIAL METHOD: ABNORMAL
EOSINOPHIL # BLD AUTO: 0.2 K/UL (ref 0–0.5)
EOSINOPHIL NFR BLD: 1.6 % (ref 0–8)
ERYTHROCYTE [DISTWIDTH] IN BLOOD BY AUTOMATED COUNT: 18.5 % (ref 11.5–14.5)
EST. GFR  (AFRICAN AMERICAN): >60 ML/MIN/1.73 M^2
EST. GFR  (NON AFRICAN AMERICAN): >60 ML/MIN/1.73 M^2
GLUCOSE SERPL-MCNC: 89 MG/DL (ref 70–110)
HCT VFR BLD AUTO: 21.6 % (ref 37–48.5)
HGB BLD-MCNC: 7.5 G/DL (ref 12–16)
IMM GRANULOCYTES # BLD AUTO: 0.07 K/UL (ref 0–0.04)
IMM GRANULOCYTES NFR BLD AUTO: 0.5 % (ref 0–0.5)
LYMPHOCYTES # BLD AUTO: 3 K/UL (ref 1–4.8)
LYMPHOCYTES NFR BLD: 22.2 % (ref 18–48)
MAGNESIUM SERPL-MCNC: 1.7 MG/DL (ref 1.6–2.6)
MCH RBC QN AUTO: 30.7 PG (ref 27–31)
MCHC RBC AUTO-ENTMCNC: 34.7 G/DL (ref 32–36)
MCV RBC AUTO: 89 FL (ref 82–98)
MONOCYTES # BLD AUTO: 1.4 K/UL (ref 0.3–1)
MONOCYTES NFR BLD: 10.4 % (ref 4–15)
NEUTROPHILS # BLD AUTO: 8.7 K/UL (ref 1.8–7.7)
NEUTROPHILS NFR BLD: 64.7 % (ref 38–73)
NRBC BLD-RTO: 0 /100 WBC
PHOSPHATE SERPL-MCNC: 3.1 MG/DL (ref 2.7–4.5)
PLATELET # BLD AUTO: 445 K/UL (ref 150–450)
PMV BLD AUTO: 9 FL (ref 9.2–12.9)
POTASSIUM SERPL-SCNC: 3.7 MMOL/L (ref 3.5–5.1)
PROT SERPL-MCNC: 6.2 G/DL (ref 6–8.4)
RBC # BLD AUTO: 2.44 M/UL (ref 4–5.4)
SODIUM SERPL-SCNC: 144 MMOL/L (ref 136–145)
WBC # BLD AUTO: 13.38 K/UL (ref 3.9–12.7)

## 2022-05-25 PROCEDURE — 83735 ASSAY OF MAGNESIUM: CPT

## 2022-05-25 PROCEDURE — 25000003 PHARM REV CODE 250: Performed by: NURSE PRACTITIONER

## 2022-05-25 PROCEDURE — 12000002 HC ACUTE/MED SURGE SEMI-PRIVATE ROOM

## 2022-05-25 PROCEDURE — 25000003 PHARM REV CODE 250

## 2022-05-25 PROCEDURE — 80053 COMPREHEN METABOLIC PANEL: CPT

## 2022-05-25 PROCEDURE — 36415 COLL VENOUS BLD VENIPUNCTURE: CPT

## 2022-05-25 PROCEDURE — 63600175 PHARM REV CODE 636 W HCPCS

## 2022-05-25 PROCEDURE — 94799 UNLISTED PULMONARY SVC/PX: CPT

## 2022-05-25 PROCEDURE — 85025 COMPLETE CBC W/AUTO DIFF WBC: CPT

## 2022-05-25 PROCEDURE — 84100 ASSAY OF PHOSPHORUS: CPT

## 2022-05-25 PROCEDURE — 94761 N-INVAS EAR/PLS OXIMETRY MLT: CPT

## 2022-05-25 RX ORDER — OXYCODONE AND ACETAMINOPHEN 5; 325 MG/1; MG/1
1 TABLET ORAL EVERY 4 HOURS PRN
Status: DISCONTINUED | OUTPATIENT
Start: 2022-05-25 | End: 2022-05-30

## 2022-05-25 RX ORDER — MORPHINE SULFATE 30 MG/1
30 TABLET, FILM COATED, EXTENDED RELEASE ORAL EVERY 8 HOURS
Status: DISCONTINUED | OUTPATIENT
Start: 2022-05-25 | End: 2022-05-31 | Stop reason: HOSPADM

## 2022-05-25 RX ADMIN — MORPHINE SULFATE 30 MG: 30 TABLET, FILM COATED, EXTENDED RELEASE ORAL at 01:05

## 2022-05-25 RX ADMIN — SODIUM CHLORIDE, SODIUM LACTATE, POTASSIUM CHLORIDE, AND CALCIUM CHLORIDE: .6; .31; .03; .02 INJECTION, SOLUTION INTRAVENOUS at 05:05

## 2022-05-25 RX ADMIN — HYDROXYUREA 1000 MG: 500 CAPSULE ORAL at 08:05

## 2022-05-25 RX ADMIN — MORPHINE SULFATE 2 MG: 2 INJECTION, SOLUTION INTRAMUSCULAR; INTRAVENOUS at 09:05

## 2022-05-25 RX ADMIN — MORPHINE SULFATE 2 MG: 2 INJECTION, SOLUTION INTRAMUSCULAR; INTRAVENOUS at 12:05

## 2022-05-25 RX ADMIN — MORPHINE SULFATE 2 MG: 2 INJECTION, SOLUTION INTRAMUSCULAR; INTRAVENOUS at 05:05

## 2022-05-25 RX ADMIN — DIPHENHYDRAMINE HYDROCHLORIDE 25 MG: 25 CAPSULE ORAL at 12:05

## 2022-05-25 RX ADMIN — DOCUSATE SODIUM AND SENNOSIDES 1 TABLET: 8.6; 5 TABLET, FILM COATED ORAL at 08:05

## 2022-05-25 RX ADMIN — OXYCODONE HYDROCHLORIDE AND ACETAMINOPHEN 1 TABLET: 5; 325 TABLET ORAL at 07:05

## 2022-05-25 RX ADMIN — MORPHINE SULFATE 30 MG: 30 TABLET, FILM COATED, EXTENDED RELEASE ORAL at 09:05

## 2022-05-25 RX ADMIN — FOLIC ACID 4 MG: 1 TABLET ORAL at 08:05

## 2022-05-25 NOTE — SUBJECTIVE & OBJECTIVE
Interval History:  Notes reviewed, no acute events overnight.  Patient reports pain to her back and shoulders are still present.  Advised patient will resume her home medications and continue IV p.r.n. for breakthrough pain.  Patient voiced understanding and appreciative of care.  Will continue to monitor closely.    Review of Systems   Constitutional:  Negative for chills, fatigue and fever.   HENT:  Negative for congestion, sore throat and trouble swallowing.    Respiratory:  Negative for cough and shortness of breath.    Cardiovascular:  Negative for chest pain.   Gastrointestinal:  Negative for abdominal pain, diarrhea, nausea and vomiting.   Genitourinary:  Negative for difficulty urinating, dysuria and urgency.   Musculoskeletal:  Positive for arthralgias and back pain. Negative for gait problem, joint swelling and myalgias.   Skin:  Negative for color change, pallor, rash and wound.   Neurological:  Negative for dizziness, weakness and light-headedness.   Psychiatric/Behavioral:  Negative for agitation, behavioral problems and confusion.    All other systems reviewed and are negative.  Objective:     Vital Signs (Most Recent):  Temp: 99 °F (37.2 °C) (05/25/22 1131)  Pulse: 89 (05/25/22 1131)  Resp: 16 (05/25/22 1323)  BP: (!) 102/58 (05/25/22 1131)  SpO2: 95 % (05/25/22 1131)   Vital Signs (24h Range):  Temp:  [97.9 °F (36.6 °C)-99.2 °F (37.3 °C)] 99 °F (37.2 °C)  Pulse:  [73-89] 89  Resp:  [16-18] 16  SpO2:  [95 %-98 %] 95 %  BP: ()/(52-64) 102/58     Weight: 54.4 kg (120 lb)  Body mass index is 24.24 kg/m².    Intake/Output Summary (Last 24 hours) at 5/25/2022 1430  Last data filed at 5/25/2022 0739  Gross per 24 hour   Intake 3700.02 ml   Output 2100 ml   Net 1600.02 ml      Physical Exam  Vitals and nursing note reviewed.   Constitutional:       General: She is not in acute distress.     Appearance: Normal appearance. She is well-developed. She is not ill-appearing or diaphoretic.   HENT:      Head:  Normocephalic and atraumatic.      Right Ear: External ear normal.      Left Ear: External ear normal.      Nose: Nose normal. No congestion or rhinorrhea.      Mouth/Throat:      Mouth: Mucous membranes are moist.      Pharynx: Oropharynx is clear. No oropharyngeal exudate or posterior oropharyngeal erythema.   Eyes:      General: No scleral icterus.     Conjunctiva/sclera: Conjunctivae normal.      Pupils: Pupils are equal, round, and reactive to light.   Neck:      Vascular: No JVD.   Cardiovascular:      Rate and Rhythm: Normal rate and regular rhythm.      Pulses: Normal pulses.      Heart sounds: Normal heart sounds. No murmur heard.  Pulmonary:      Effort: Pulmonary effort is normal. No respiratory distress.      Breath sounds: Normal breath sounds. No stridor. No wheezing, rhonchi or rales.   Abdominal:      General: Bowel sounds are normal. There is no distension.      Palpations: Abdomen is soft.      Tenderness: There is no abdominal tenderness.   Musculoskeletal:         General: Tenderness present. No swelling. Normal range of motion.      Cervical back: Normal range of motion and neck supple.   Skin:     General: Skin is warm and dry.      Capillary Refill: Capillary refill takes 2 to 3 seconds.      Coloration: Skin is not jaundiced or pale.      Findings: No erythema.   Neurological:      General: No focal deficit present.      Mental Status: She is alert and oriented to person, place, and time.      Cranial Nerves: No cranial nerve deficit.      Sensory: No sensory deficit.   Psychiatric:         Mood and Affect: Mood normal.         Behavior: Behavior normal.         Thought Content: Thought content normal.       Significant Labs: All pertinent labs within the past 24 hours have been reviewed.  CBC:   Recent Labs   Lab 05/23/22  2309 05/24/22  0443 05/25/22  0436   WBC 15.19* 14.16* 13.38*   HGB 7.8* 7.0* 7.5*   HCT 22.7* 20.7* 21.6*   * 442 445     CMP:   Recent Labs   Lab 05/23/22  1246  05/24/22  0443 05/25/22  0436    143 144   K 3.1* 3.1* 3.7   * 113* 110   CO2 20* 22* 25   * 88 89   BUN 5* 4* 4*   CREATININE 0.7 0.7 0.7   CALCIUM 8.5* 8.1* 8.7   PROT 6.9 5.9* 6.2   ALBUMIN 3.7 3.2* 3.3*   BILITOT 2.1* 1.7* 2.3*   ALKPHOS 116 100 95   AST 28 22 20   ALT 36 30 25   ANIONGAP 11 8 9   EGFRNONAA >60 >60 >60       Significant Imaging: I have reviewed all pertinent imaging results/findings within the past 24 hours.

## 2022-05-25 NOTE — PLAN OF CARE
POC discussed with pt. Pt voiced understanding. Pt IV infusing with no difficulties. Pain controlled with pain medication, VSS, Safety measures maintained.  Pt call light in reach. Pt instructed to call with any needs.

## 2022-05-25 NOTE — CARE UPDATE
05/24/22 2110   Patient Assessment/Suction   Level of Consciousness (AVPU) alert   Respiratory Effort Unlabored;Normal   Expansion/Accessory Muscles/Retractions no use of accessory muscles;no retractions;expansion symmetric   Rhythm/Pattern, Respiratory unlabored;pattern regular;depth regular   PRE-TX-O2   O2 Device (Oxygen Therapy) room air   SpO2 98 %   Pulse Oximetry Type Intermittent   $ Pulse Oximetry - Multiple Charge Pulse Oximetry - Multiple   Pulse 78   Resp 18   Incentive Spirometer   $ Incentive Spirometer Charges done with encouragement   Administration (IS) instruction provided, follow-up   Number of Repetitions (IS) 5   Level Incentive Spirometer (mL) 1000   Patient Tolerance (IS) good

## 2022-05-25 NOTE — PROGRESS NOTES
"[facility] Medicine  Progress Note    Patient Name: Nishi Dumont  MRN: [id number]  Patient Class: OP- Observation   Admission Date: 5/23/2022  Length of Stay: 0 days  Attending Physician: Norberto Umana MD  Primary Care Provider: [facility]        Subjective:     Principal Problem:Sickle cell pain crisis        HPI:  Nishi Dumont is a 31 y.o. y.o. 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 She states pain started suddenly 2 day ago while she was at home holding her child. Describes pain as a constant generalized stabbing sensation that is "everywhere" but specifically reports pain in right ribcage and mid back. States this feels like past sickle cell crisis. She also reports chills, nasal congestion, back pain, right shoulder pain, and cough intermittently productive of green sputum. She initially reported CP but corrected herself stating it was chest tightness rather than pain. She also denies fever, SOB, palpitations, abdominal pain, n/v/d, and dysuria. Patient has Hx of left should AVN s/p decompression with Dr. Rebolledo 3/9. Last sickle sell pain crisis was 4/18. ED workup was significant for elevated WBC, anemia, hypokalemia, thrombocytosis, hyperbilirubinemia, and CXR concerning for bone infarcts in the bilateral humeral heads and multiple H-type vertebral body compression fractures. The patient was placed in observation under the care of Hospital Medicine.       Overview/Hospital Course:  No notes on file    Interval History:  Notes reviewed, no acute events overnight.  Patient reports pain to her back and shoulders are still present.  Advised patient will resume her home medications and continue IV p.r.n. for breakthrough pain.  Patient voiced understanding and appreciative of care.  Will continue to " monitor closely.    Review of Systems   Constitutional:  Negative for chills, fatigue and fever.   HENT:  Negative for congestion, sore throat and trouble swallowing.    Respiratory:  Negative for cough and shortness of breath.    Cardiovascular:  Negative for chest pain.   Gastrointestinal:  Negative for abdominal pain, diarrhea, nausea and vomiting.   Genitourinary:  Negative for difficulty urinating, dysuria and urgency.   Musculoskeletal:  Positive for arthralgias and back pain. Negative for gait problem, joint swelling and myalgias.   Skin:  Negative for color change, pallor, rash and wound.   Neurological:  Negative for dizziness, weakness and light-headedness.   Psychiatric/Behavioral:  Negative for agitation, behavioral problems and confusion.    All other systems reviewed and are negative.  Objective:     Vital Signs (Most Recent):  Temp: 99 °F (37.2 °C) (05/25/22 1131)  Pulse: 89 (05/25/22 1131)  Resp: 16 (05/25/22 1323)  BP: (!) 102/58 (05/25/22 1131)  SpO2: 95 % (05/25/22 1131)   Vital Signs (24h Range):  Temp:  [97.9 °F (36.6 °C)-99.2 °F (37.3 °C)] 99 °F (37.2 °C)  Pulse:  [73-89] 89  Resp:  [16-18] 16  SpO2:  [95 %-98 %] 95 %  BP: ()/(52-64) 102/58     Weight: 54.4 kg (120 lb)  Body mass index is 24.24 kg/m².    Intake/Output Summary (Last 24 hours) at 5/25/2022 1430  Last data filed at 5/25/2022 0739  Gross per 24 hour   Intake 3700.02 ml   Output 2100 ml   Net 1600.02 ml      Physical Exam  Vitals and nursing note reviewed.   Constitutional:       General: She is not in acute distress.     Appearance: Normal appearance. She is well-developed. She is not ill-appearing or diaphoretic.   HENT:      Head: Normocephalic and atraumatic.      Right Ear: External ear normal.      Left Ear: External ear normal.      Nose: Nose normal. No congestion or rhinorrhea.      Mouth/Throat:      Mouth: Mucous membranes are moist.      Pharynx: Oropharynx is clear. No oropharyngeal exudate or posterior  oropharyngeal erythema.   Eyes:      General: No scleral icterus.     Conjunctiva/sclera: Conjunctivae normal.      Pupils: Pupils are equal, round, and reactive to light.   Neck:      Vascular: No JVD.   Cardiovascular:      Rate and Rhythm: Normal rate and regular rhythm.      Pulses: Normal pulses.      Heart sounds: Normal heart sounds. No murmur heard.  Pulmonary:      Effort: Pulmonary effort is normal. No respiratory distress.      Breath sounds: Normal breath sounds. No stridor. No wheezing, rhonchi or rales.   Abdominal:      General: Bowel sounds are normal. There is no distension.      Palpations: Abdomen is soft.      Tenderness: There is no abdominal tenderness.   Musculoskeletal:         General: Tenderness present. No swelling. Normal range of motion.      Cervical back: Normal range of motion and neck supple.   Skin:     General: Skin is warm and dry.      Capillary Refill: Capillary refill takes 2 to 3 seconds.      Coloration: Skin is not jaundiced or pale.      Findings: No erythema.   Neurological:      General: No focal deficit present.      Mental Status: She is alert and oriented to person, place, and time.      Cranial Nerves: No cranial nerve deficit.      Sensory: No sensory deficit.   Psychiatric:         Mood and Affect: Mood normal.         Behavior: Behavior normal.         Thought Content: Thought content normal.       Significant Labs: All pertinent labs within the past 24 hours have been reviewed.  CBC:   Recent Labs   Lab 05/23/22 2309 05/24/22  0443 05/25/22  0436   WBC 15.19* 14.16* 13.38*   HGB 7.8* 7.0* 7.5*   HCT 22.7* 20.7* 21.6*   * 442 445     CMP:   Recent Labs   Lab 05/23/22 2309 05/24/22  0443 05/25/22  0436    143 144   K 3.1* 3.1* 3.7   * 113* 110   CO2 20* 22* 25   * 88 89   BUN 5* 4* 4*   CREATININE 0.7 0.7 0.7   CALCIUM 8.5* 8.1* 8.7   PROT 6.9 5.9* 6.2   ALBUMIN 3.7 3.2* 3.3*   BILITOT 2.1* 1.7* 2.3*   ALKPHOS 116 100 95   AST 28 22 20    ALT 36 30 25   ANIONGAP 11 8 9   EGFRNONAA >60 >60 >60       Significant Imaging: I have reviewed all pertinent imaging results/findings within the past 24 hours.      Assessment/Plan:      * Sickle cell pain crisis  Acute:  - IVFH  - supplemental oxygen  - prn pain medication ordered  - prn antiemetics  - Trending CBC  - CXR:   1. Perihilar interstitial prominence, nonspecific and unchanged from prior.  No new focal consolidation.  Correlate for sickle cell crisis.   2. Bone infarcts in the bilateral humeral heads and multiple H-type vertebral body compression fractures.    5/25 - resume home medications today and cont prn IV for BT pain    Thrombocytosis  Chronic:  - monitor CBC      History of avascular necrosis of left humeral head  Noted.   - s/p decompression left humeral head 3/9 with Dr. Rebolledo  - ortho consulted   - Xray right shoulder ordered  - CXR:   1. Perihilar interstitial prominence, nonspecific and unchanged from prior.  No new focal consolidation.  Correlate for sickle cell crisis.   2. Bone infarcts in the bilateral humeral heads and multiple H-type vertebral body compression fractures.      Hypokalemia  Acute:  - initial potassium: 3.1  - replacements ordered  - monitor electrolytes closely      Hyperbilirubinemia  Noted.   - Monitor CBC closely      Leukocytosis, unspecified  Noted.   - monitor closely  - UA ordered        Sickle cell anemia  Patient's anemia is currently controlled. Has not received any PRBCs to date.. Etiology likely d/t sickle cell anemia  Current CBC reviewed-   Lab Results   Component Value Date    HGB 7.8 (L) 05/23/2022    HCT 22.7 (L) 05/23/2022     Monitor serial CBC and transfuse if patient becomes hemodynamically unstable, symptomatic or H/H drops below 7/21.           VTE Risk Mitigation (From admission, onward)         Ordered     IP VTE LOW RISK PATIENT  Once         05/24/22 0202     Place sequential compression device  Until discontinued         05/24/22 0202                 Discharge Planning   SHANE:      Code Status: Full Code   Is the patient medically ready for discharge?:     Reason for patient still in hospital (select all that apply): Treatment  Discharge Plan A: Home                  Moraima Valentine NP  Department of Hospital Medicine   [facility]

## 2022-05-25 NOTE — PLAN OF CARE
Plan of care reviewed with patient. Patient verbalized complete understanding. Pt awake,alert, and oriented. Pts chief complaint is pain, prn meds given. Pt IV CDI. Pt pulses plus 2. Pt on tele [id number] NSR.  All fall precautions maintained, bed in lowest position, locked, call light within reach. Side rails up times 2. Slip resistant socks maintained.

## 2022-05-25 NOTE — ASSESSMENT & PLAN NOTE
Acute:  - IVFH  - supplemental oxygen  - prn pain medication ordered  - prn antiemetics  - Trending CBC  - CXR:   1. Perihilar interstitial prominence, nonspecific and unchanged from prior.  No new focal consolidation.  Correlate for sickle cell crisis.   2. Bone infarcts in the bilateral humeral heads and multiple H-type vertebral body compression fractures.    5/25 - resume home medications today and cont prn IV for BT pain

## 2022-05-26 LAB
ALBUMIN SERPL BCP-MCNC: 3.2 G/DL (ref 3.5–5.2)
ALP SERPL-CCNC: 101 U/L (ref 55–135)
ALT SERPL W/O P-5'-P-CCNC: 21 U/L (ref 10–44)
ANION GAP SERPL CALC-SCNC: 10 MMOL/L (ref 8–16)
AST SERPL-CCNC: 18 U/L (ref 10–40)
BASOPHILS # BLD AUTO: 0.08 K/UL (ref 0–0.2)
BASOPHILS NFR BLD: 0.7 % (ref 0–1.9)
BILIRUB SERPL-MCNC: 1.9 MG/DL (ref 0.1–1)
BUN SERPL-MCNC: 5 MG/DL (ref 6–20)
CALCIUM SERPL-MCNC: 8.3 MG/DL (ref 8.7–10.5)
CHLORIDE SERPL-SCNC: 107 MMOL/L (ref 95–110)
CO2 SERPL-SCNC: 25 MMOL/L (ref 23–29)
CREAT SERPL-MCNC: 0.7 MG/DL (ref 0.5–1.4)
DIFFERENTIAL METHOD: ABNORMAL
EOSINOPHIL # BLD AUTO: 0.3 K/UL (ref 0–0.5)
EOSINOPHIL NFR BLD: 2.4 % (ref 0–8)
ERYTHROCYTE [DISTWIDTH] IN BLOOD BY AUTOMATED COUNT: 17.4 % (ref 11.5–14.5)
EST. GFR  (AFRICAN AMERICAN): >60 ML/MIN/1.73 M^2
EST. GFR  (NON AFRICAN AMERICAN): >60 ML/MIN/1.73 M^2
GLUCOSE SERPL-MCNC: 97 MG/DL (ref 70–110)
HCT VFR BLD AUTO: 22.7 % (ref 37–48.5)
HGB BLD-MCNC: 7.4 G/DL (ref 12–16)
IMM GRANULOCYTES # BLD AUTO: 0.05 K/UL (ref 0–0.04)
IMM GRANULOCYTES NFR BLD AUTO: 0.4 % (ref 0–0.5)
LYMPHOCYTES # BLD AUTO: 3.5 K/UL (ref 1–4.8)
LYMPHOCYTES NFR BLD: 28.8 % (ref 18–48)
MAGNESIUM SERPL-MCNC: 1.6 MG/DL (ref 1.6–2.6)
MCH RBC QN AUTO: 29.6 PG (ref 27–31)
MCHC RBC AUTO-ENTMCNC: 32.6 G/DL (ref 32–36)
MCV RBC AUTO: 91 FL (ref 82–98)
MONOCYTES # BLD AUTO: 1.2 K/UL (ref 0.3–1)
MONOCYTES NFR BLD: 10 % (ref 4–15)
NEUTROPHILS # BLD AUTO: 6.9 K/UL (ref 1.8–7.7)
NEUTROPHILS NFR BLD: 57.7 % (ref 38–73)
NRBC BLD-RTO: 0 /100 WBC
PHOSPHATE SERPL-MCNC: 3.9 MG/DL (ref 2.7–4.5)
PLATELET # BLD AUTO: 441 K/UL (ref 150–450)
PMV BLD AUTO: 8.9 FL (ref 9.2–12.9)
POTASSIUM SERPL-SCNC: 3.4 MMOL/L (ref 3.5–5.1)
PROT SERPL-MCNC: 6 G/DL (ref 6–8.4)
RBC # BLD AUTO: 2.5 M/UL (ref 4–5.4)
SODIUM SERPL-SCNC: 142 MMOL/L (ref 136–145)
WBC # BLD AUTO: 12 K/UL (ref 3.9–12.7)

## 2022-05-26 PROCEDURE — 94799 UNLISTED PULMONARY SVC/PX: CPT

## 2022-05-26 PROCEDURE — 87077 CULTURE AEROBIC IDENTIFY: CPT | Performed by: NURSE PRACTITIONER

## 2022-05-26 PROCEDURE — 85025 COMPLETE CBC W/AUTO DIFF WBC: CPT

## 2022-05-26 PROCEDURE — 87040 BLOOD CULTURE FOR BACTERIA: CPT | Performed by: NURSE PRACTITIONER

## 2022-05-26 PROCEDURE — 87186 SC STD MICRODIL/AGAR DIL: CPT | Mod: 59 | Performed by: NURSE PRACTITIONER

## 2022-05-26 PROCEDURE — 94761 N-INVAS EAR/PLS OXIMETRY MLT: CPT

## 2022-05-26 PROCEDURE — 25000003 PHARM REV CODE 250

## 2022-05-26 PROCEDURE — 63600175 PHARM REV CODE 636 W HCPCS

## 2022-05-26 PROCEDURE — 83735 ASSAY OF MAGNESIUM: CPT

## 2022-05-26 PROCEDURE — 25000003 PHARM REV CODE 250: Performed by: NURSE PRACTITIONER

## 2022-05-26 PROCEDURE — 36415 COLL VENOUS BLD VENIPUNCTURE: CPT

## 2022-05-26 PROCEDURE — 80053 COMPREHEN METABOLIC PANEL: CPT

## 2022-05-26 PROCEDURE — 12000002 HC ACUTE/MED SURGE SEMI-PRIVATE ROOM

## 2022-05-26 PROCEDURE — 84100 ASSAY OF PHOSPHORUS: CPT

## 2022-05-26 PROCEDURE — 63600175 PHARM REV CODE 636 W HCPCS: Performed by: NURSE PRACTITIONER

## 2022-05-26 RX ORDER — DIPHENHYDRAMINE HYDROCHLORIDE 50 MG/ML
12.5 INJECTION INTRAMUSCULAR; INTRAVENOUS EVERY 4 HOURS PRN
Status: DISCONTINUED | OUTPATIENT
Start: 2022-05-26 | End: 2022-05-31 | Stop reason: HOSPADM

## 2022-05-26 RX ADMIN — FOLIC ACID 4 MG: 1 TABLET ORAL at 08:05

## 2022-05-26 RX ADMIN — SODIUM CHLORIDE, SODIUM LACTATE, POTASSIUM CHLORIDE, AND CALCIUM CHLORIDE: .6; .31; .03; .02 INJECTION, SOLUTION INTRAVENOUS at 02:05

## 2022-05-26 RX ADMIN — DIPHENHYDRAMINE HYDROCHLORIDE 12.5 MG: 50 INJECTION, SOLUTION INTRAMUSCULAR; INTRAVENOUS at 04:05

## 2022-05-26 RX ADMIN — MORPHINE SULFATE 2 MG: 2 INJECTION, SOLUTION INTRAMUSCULAR; INTRAVENOUS at 01:05

## 2022-05-26 RX ADMIN — MORPHINE SULFATE 30 MG: 30 TABLET, FILM COATED, EXTENDED RELEASE ORAL at 09:05

## 2022-05-26 RX ADMIN — DOCUSATE SODIUM AND SENNOSIDES 1 TABLET: 8.6; 5 TABLET, FILM COATED ORAL at 08:05

## 2022-05-26 RX ADMIN — SODIUM CHLORIDE, SODIUM LACTATE, POTASSIUM CHLORIDE, AND CALCIUM CHLORIDE: .6; .31; .03; .02 INJECTION, SOLUTION INTRAVENOUS at 09:05

## 2022-05-26 RX ADMIN — OXYCODONE HYDROCHLORIDE AND ACETAMINOPHEN 1 TABLET: 5; 325 TABLET ORAL at 02:05

## 2022-05-26 RX ADMIN — POTASSIUM BICARBONATE 35 MEQ: 391 TABLET, EFFERVESCENT ORAL at 08:05

## 2022-05-26 RX ADMIN — DIPHENHYDRAMINE HYDROCHLORIDE 12.5 MG: 50 INJECTION, SOLUTION INTRAMUSCULAR; INTRAVENOUS at 09:05

## 2022-05-26 RX ADMIN — DIPHENHYDRAMINE HYDROCHLORIDE 12.5 MG: 50 INJECTION, SOLUTION INTRAMUSCULAR; INTRAVENOUS at 01:05

## 2022-05-26 RX ADMIN — MORPHINE SULFATE 2 MG: 2 INJECTION, SOLUTION INTRAMUSCULAR; INTRAVENOUS at 04:05

## 2022-05-26 RX ADMIN — HYDROXYUREA 1000 MG: 500 CAPSULE ORAL at 08:05

## 2022-05-26 RX ADMIN — MORPHINE SULFATE 30 MG: 30 TABLET, FILM COATED, EXTENDED RELEASE ORAL at 05:05

## 2022-05-26 RX ADMIN — SODIUM CHLORIDE, SODIUM LACTATE, POTASSIUM CHLORIDE, AND CALCIUM CHLORIDE: .6; .31; .03; .02 INJECTION, SOLUTION INTRAVENOUS at 01:05

## 2022-05-26 RX ADMIN — POTASSIUM BICARBONATE 35 MEQ: 391 TABLET, EFFERVESCENT ORAL at 06:05

## 2022-05-26 RX ADMIN — MORPHINE SULFATE 30 MG: 30 TABLET, FILM COATED, EXTENDED RELEASE ORAL at 02:05

## 2022-05-26 RX ADMIN — MORPHINE SULFATE 2 MG: 2 INJECTION, SOLUTION INTRAMUSCULAR; INTRAVENOUS at 08:05

## 2022-05-26 RX ADMIN — ACETAMINOPHEN 650 MG: 325 TABLET ORAL at 04:05

## 2022-05-26 RX ADMIN — DIPHENHYDRAMINE HYDROCHLORIDE 25 MG: 25 CAPSULE ORAL at 08:05

## 2022-05-26 RX ADMIN — OXYCODONE HYDROCHLORIDE AND ACETAMINOPHEN 1 TABLET: 5; 325 TABLET ORAL at 09:05

## 2022-05-26 NOTE — CARE UPDATE
05/25/22 2029   Patient Assessment/Suction   Level of Consciousness (AVPU) alert   Respiratory Effort Normal;Unlabored   Expansion/Accessory Muscles/Retractions no use of accessory muscles;no retractions;expansion symmetric   Rhythm/Pattern, Respiratory unlabored;depth regular;pattern regular   PRE-TX-O2   O2 Device (Oxygen Therapy) room air   SpO2 96 %   Pulse Oximetry Type Intermittent   $ Pulse Oximetry - Multiple Charge Pulse Oximetry - Multiple   Pulse 76   Resp 16   Incentive Spirometer   $ Incentive Spirometer Charges done with encouragement   Administration (IS) instruction provided, follow-up   Number of Repetitions (IS) 5   Level Incentive Spirometer (mL) 1000   Patient Tolerance (IS) good

## 2022-05-26 NOTE — SUBJECTIVE & OBJECTIVE
Interval History:  Notes reviewed, no acute events overnight.  Patient reports pain still present but mildly improved from yesterday.  Will continue current treatment plan and monitor closely.    Review of Systems   Constitutional:  Negative for chills, fatigue and fever.   HENT:  Negative for congestion, sore throat and trouble swallowing.    Respiratory:  Negative for cough and shortness of breath.    Cardiovascular:  Negative for chest pain.   Gastrointestinal:  Negative for abdominal pain, diarrhea, nausea and vomiting.   Genitourinary:  Negative for difficulty urinating, dysuria and urgency.   Musculoskeletal:  Positive for arthralgias and back pain. Negative for gait problem, joint swelling and myalgias.   Skin:  Negative for color change, pallor, rash and wound.   Neurological:  Negative for dizziness, weakness and light-headedness.   Psychiatric/Behavioral:  Negative for agitation, behavioral problems and confusion.    All other systems reviewed and are negative.  Objective:     Vital Signs (Most Recent):  Temp: 97.7 °F (36.5 °C) (05/26/22 0822)  Pulse: 88 (05/26/22 0822)  Resp: 18 (05/26/22 0822)  BP: 108/61 (05/26/22 0822)  SpO2: 95 % (05/26/22 0822)   Vital Signs (24h Range):  Temp:  [97.7 °F (36.5 °C)-98.9 °F (37.2 °C)] 97.7 °F (36.5 °C)  Pulse:  [74-88] 88  Resp:  [15-18] 18  SpO2:  [95 %-98 %] 95 %  BP: (101-116)/(55-67) 108/61     Weight: 54.4 kg (120 lb)  Body mass index is 24.24 kg/m².    Intake/Output Summary (Last 24 hours) at 5/26/2022 1133  Last data filed at 5/26/2022 0900  Gross per 24 hour   Intake 3271.18 ml   Output --   Net 3271.18 ml        Physical Exam  Vitals and nursing note reviewed.   Constitutional:       General: She is not in acute distress.     Appearance: Normal appearance. She is well-developed. She is not ill-appearing or diaphoretic.   HENT:      Head: Normocephalic and atraumatic.      Right Ear: External ear normal.      Left Ear: External ear normal.      Nose: Nose  normal. No congestion or rhinorrhea.      Mouth/Throat:      Mouth: Mucous membranes are moist.      Pharynx: Oropharynx is clear. No oropharyngeal exudate or posterior oropharyngeal erythema.   Eyes:      General: No scleral icterus.     Conjunctiva/sclera: Conjunctivae normal.      Pupils: Pupils are equal, round, and reactive to light.   Neck:      Vascular: No JVD.   Cardiovascular:      Rate and Rhythm: Normal rate and regular rhythm.      Pulses: Normal pulses.      Heart sounds: Normal heart sounds. No murmur heard.  Pulmonary:      Effort: Pulmonary effort is normal. No respiratory distress.      Breath sounds: Normal breath sounds. No stridor. No wheezing, rhonchi or rales.   Abdominal:      General: Bowel sounds are normal. There is no distension.      Palpations: Abdomen is soft.      Tenderness: There is no abdominal tenderness.   Musculoskeletal:         General: Tenderness present. No swelling. Normal range of motion.      Cervical back: Normal range of motion and neck supple.      Comments: Generalized tenderness and pain with movement of shoulders   Skin:     General: Skin is warm and dry.      Capillary Refill: Capillary refill takes 2 to 3 seconds.      Coloration: Skin is not jaundiced or pale.      Findings: No erythema.   Neurological:      General: No focal deficit present.      Mental Status: She is alert and oriented to person, place, and time.      Cranial Nerves: No cranial nerve deficit.      Sensory: No sensory deficit.   Psychiatric:         Mood and Affect: Mood normal.         Behavior: Behavior normal.         Thought Content: Thought content normal.       Significant Labs: All pertinent labs within the past 24 hours have been reviewed.  CBC:   Recent Labs   Lab 05/25/22 0436 05/26/22  0435   WBC 13.38* 12.00   HGB 7.5* 7.4*   HCT 21.6* 22.7*    441       CMP:   Recent Labs   Lab 05/25/22 0436 05/26/22  0435    142   K 3.7 3.4*    107   CO2 25 25   GLU 89 97   BUN  4* 5*   CREATININE 0.7 0.7   CALCIUM 8.7 8.3*   PROT 6.2 6.0   ALBUMIN 3.3* 3.2*   BILITOT 2.3* 1.9*   ALKPHOS 95 101   AST 20 18   ALT 25 21   ANIONGAP 9 10   EGFRNONAA >60 >60         Significant Imaging: I have reviewed all pertinent imaging results/findings within the past 24 hours.

## 2022-05-26 NOTE — PROGRESS NOTES
"[facility] Medicine  Progress Note    Patient Name: Nishi Dumont  MRN: [id number]  Patient Class: IP- Inpatient   Admission Date: 5/23/2022  Length of Stay: 1 days  Attending Physician: Norberto Umana MD  Primary Care Provider: [facility]        Subjective:     Principal Problem:Sickle cell pain crisis        HPI:  Nishi Dumont is a 31 y.o. y.o. 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 She states pain started suddenly 2 day ago while she was at home holding her child. Describes pain as a constant generalized stabbing sensation that is "everywhere" but specifically reports pain in right ribcage and mid back. States this feels like past sickle cell crisis. She also reports chills, nasal congestion, back pain, right shoulder pain, and cough intermittently productive of green sputum. She initially reported CP but corrected herself stating it was chest tightness rather than pain. She also denies fever, SOB, palpitations, abdominal pain, n/v/d, and dysuria. Patient has Hx of left should AVN s/p decompression with Dr. Rebolledo 3/9. Last sickle sell pain crisis was 4/18. ED workup was significant for elevated WBC, anemia, hypokalemia, thrombocytosis, hyperbilirubinemia, and CXR concerning for bone infarcts in the bilateral humeral heads and multiple H-type vertebral body compression fractures. The patient was placed in observation under the care of Hospital Medicine.       Overview/Hospital Course:  No notes on file    Interval History:  Notes reviewed, no acute events overnight.  Patient reports pain still present but mildly improved from yesterday.  Will continue current treatment plan and monitor closely.    Review of Systems   Constitutional:  Negative for chills, fatigue and fever.   HENT:  Negative for " congestion, sore throat and trouble swallowing.    Respiratory:  Negative for cough and shortness of breath.    Cardiovascular:  Negative for chest pain.   Gastrointestinal:  Negative for abdominal pain, diarrhea, nausea and vomiting.   Genitourinary:  Negative for difficulty urinating, dysuria and urgency.   Musculoskeletal:  Positive for arthralgias and back pain. Negative for gait problem, joint swelling and myalgias.   Skin:  Negative for color change, pallor, rash and wound.   Neurological:  Negative for dizziness, weakness and light-headedness.   Psychiatric/Behavioral:  Negative for agitation, behavioral problems and confusion.    All other systems reviewed and are negative.  Objective:     Vital Signs (Most Recent):  Temp: 97.7 °F (36.5 °C) (05/26/22 0822)  Pulse: 88 (05/26/22 0822)  Resp: 18 (05/26/22 0822)  BP: 108/61 (05/26/22 0822)  SpO2: 95 % (05/26/22 0822)   Vital Signs (24h Range):  Temp:  [97.7 °F (36.5 °C)-98.9 °F (37.2 °C)] 97.7 °F (36.5 °C)  Pulse:  [74-88] 88  Resp:  [15-18] 18  SpO2:  [95 %-98 %] 95 %  BP: (101-116)/(55-67) 108/61     Weight: 54.4 kg (120 lb)  Body mass index is 24.24 kg/m².    Intake/Output Summary (Last 24 hours) at 5/26/2022 1133  Last data filed at 5/26/2022 0900  Gross per 24 hour   Intake 3271.18 ml   Output --   Net 3271.18 ml        Physical Exam  Vitals and nursing note reviewed.   Constitutional:       General: She is not in acute distress.     Appearance: Normal appearance. She is well-developed. She is not ill-appearing or diaphoretic.   HENT:      Head: Normocephalic and atraumatic.      Right Ear: External ear normal.      Left Ear: External ear normal.      Nose: Nose normal. No congestion or rhinorrhea.      Mouth/Throat:      Mouth: Mucous membranes are moist.      Pharynx: Oropharynx is clear. No oropharyngeal exudate or posterior oropharyngeal erythema.   Eyes:      General: No scleral icterus.     Conjunctiva/sclera: Conjunctivae normal.      Pupils: Pupils  are equal, round, and reactive to light.   Neck:      Vascular: No JVD.   Cardiovascular:      Rate and Rhythm: Normal rate and regular rhythm.      Pulses: Normal pulses.      Heart sounds: Normal heart sounds. No murmur heard.  Pulmonary:      Effort: Pulmonary effort is normal. No respiratory distress.      Breath sounds: Normal breath sounds. No stridor. No wheezing, rhonchi or rales.   Abdominal:      General: Bowel sounds are normal. There is no distension.      Palpations: Abdomen is soft.      Tenderness: There is no abdominal tenderness.   Musculoskeletal:         General: Tenderness present. No swelling. Normal range of motion.      Cervical back: Normal range of motion and neck supple.      Comments: Generalized tenderness and pain with movement of shoulders   Skin:     General: Skin is warm and dry.      Capillary Refill: Capillary refill takes 2 to 3 seconds.      Coloration: Skin is not jaundiced or pale.      Findings: No erythema.   Neurological:      General: No focal deficit present.      Mental Status: She is alert and oriented to person, place, and time.      Cranial Nerves: No cranial nerve deficit.      Sensory: No sensory deficit.   Psychiatric:         Mood and Affect: Mood normal.         Behavior: Behavior normal.         Thought Content: Thought content normal.       Significant Labs: All pertinent labs within the past 24 hours have been reviewed.  CBC:   Recent Labs   Lab 05/25/22  0436 05/26/22  0435   WBC 13.38* 12.00   HGB 7.5* 7.4*   HCT 21.6* 22.7*    441       CMP:   Recent Labs   Lab 05/25/22  0436 05/26/22  0435    142   K 3.7 3.4*    107   CO2 25 25   GLU 89 97   BUN 4* 5*   CREATININE 0.7 0.7   CALCIUM 8.7 8.3*   PROT 6.2 6.0   ALBUMIN 3.3* 3.2*   BILITOT 2.3* 1.9*   ALKPHOS 95 101   AST 20 18   ALT 25 21   ANIONGAP 9 10   EGFRNONAA >60 >60         Significant Imaging: I have reviewed all pertinent imaging results/findings within the past 24  hours.      Assessment/Plan:      * Sickle cell pain crisis  Acute:  - IVFH  - supplemental oxygen  - prn pain medication ordered  - prn antiemetics  - Trending CBC  - CXR:   1. Perihilar interstitial prominence, nonspecific and unchanged from prior.  No new focal consolidation.  Correlate for sickle cell crisis.   2. Bone infarcts in the bilateral humeral heads and multiple H-type vertebral body compression fractures.    5/25 - resume home medications today and cont prn IV for BT pain  5/26 - pain persist but mildy improved. Cont current regimen and monitor closely    Thrombocytosis  Chronic:  - monitor CBC      History of avascular necrosis of left humeral head  Noted.   - s/p decompression left humeral head 3/9 with Dr. Rebolledo  - ortho consulted   - Xray right shoulder ordered  - CXR:   1. Perihilar interstitial prominence, nonspecific and unchanged from prior.  No new focal consolidation.  Correlate for sickle cell crisis.   2. Bone infarcts in the bilateral humeral heads and multiple H-type vertebral body compression fractures.      Hypokalemia  Acute:  - initial potassium: 3.1  - replacements ordered  - monitor electrolytes closely      Hyperbilirubinemia  Noted.   - Monitor CBC closely      Leukocytosis, unspecified  Noted.   - monitor closely  - UA ordered        Sickle cell anemia  Patient's anemia is currently controlled. Has not received any PRBCs to date.. Etiology likely d/t sickle cell anemia  Current CBC reviewed-   Lab Results   Component Value Date    HGB 7.8 (L) 05/23/2022    HCT 22.7 (L) 05/23/2022     Monitor serial CBC and transfuse if patient becomes hemodynamically unstable, symptomatic or H/H drops below 7/21.           VTE Risk Mitigation (From admission, onward)         Ordered     IP VTE LOW RISK PATIENT  Once         05/24/22 0202     Place sequential compression device  Until discontinued         05/24/22 0202                Discharge Planning   SHANE: 5/30/2022     Code Status: Full Code    Is the patient medically ready for discharge?:     Reason for patient still in hospital (select all that apply): Patient trending condition and Treatment  Discharge Plan A: Home                  Moraima Valentine NP  Department of Hospital Medicine   [facility]

## 2022-05-26 NOTE — ASSESSMENT & PLAN NOTE
Acute:  - IVFH  - supplemental oxygen  - prn pain medication ordered  - prn antiemetics  - Trending CBC  - CXR:   1. Perihilar interstitial prominence, nonspecific and unchanged from prior.  No new focal consolidation.  Correlate for sickle cell crisis.   2. Bone infarcts in the bilateral humeral heads and multiple H-type vertebral body compression fractures.    5/25 - resume home medications today and cont prn IV for BT pain  5/26 - pain persist but mildy improved. Cont current regimen and monitor closely

## 2022-05-26 NOTE — PROGRESS NOTES
Monitor room called pt sustaining in the 120's. Also noted pt temp 102.5 reported by pct reassessed temp  101. administered tylenol md approved early administration of pain meds and benadryl. Pt tolerated well. Pt hr 118 after admiistration of meds.

## 2022-05-26 NOTE — PLAN OF CARE
POC discussed with pt, pt verbalized understanding. Oriented x4. PIV cdi, infusing. NSR on tele. Pt complains of pain in neck and back, controlled with prn pain medications. Appeared to sleep without difficulty. Ambulated independently. Call light in reach, bed alarm set, safety maintained. No complaints or requests at this time, will continue to monitor.

## 2022-05-26 NOTE — PLAN OF CARE
Problem: Infection  Goal: Absence of Infection Signs and Symptoms  Outcome: Ongoing, Progressing     Problem: Adult Inpatient Plan of Care  Goal: Optimal Comfort and Wellbeing  Outcome: Ongoing, Progressing   Pt resting in bed no s/s of distress. Pt left port a cath accessed this shift infusing lr at this time. Pt pain managed throughout this shift. Pt temp elevated once this shift covered with tylenol reassessed wnl. Pt verbalized no needs at this time.  Fall precautions in place call light in reach.

## 2022-05-27 PROBLEM — J18.9 PNEUMONIA: Status: ACTIVE | Noted: 2022-05-27

## 2022-05-27 LAB
ALBUMIN SERPL BCP-MCNC: 3.3 G/DL (ref 3.5–5.2)
ALP SERPL-CCNC: 99 U/L (ref 55–135)
ALT SERPL W/O P-5'-P-CCNC: 29 U/L (ref 10–44)
ANION GAP SERPL CALC-SCNC: 10 MMOL/L (ref 8–16)
AST SERPL-CCNC: 29 U/L (ref 10–40)
BASOPHILS # BLD AUTO: 0.09 K/UL (ref 0–0.2)
BASOPHILS NFR BLD: 0.5 % (ref 0–1.9)
BILIRUB SERPL-MCNC: 2.1 MG/DL (ref 0.1–1)
BILIRUB UR QL STRIP: NEGATIVE
BUN SERPL-MCNC: 7 MG/DL (ref 6–20)
CALCIUM SERPL-MCNC: 8.3 MG/DL (ref 8.7–10.5)
CHLORIDE SERPL-SCNC: 105 MMOL/L (ref 95–110)
CLARITY UR: CLEAR
CO2 SERPL-SCNC: 27 MMOL/L (ref 23–29)
COLOR UR: YELLOW
CREAT SERPL-MCNC: 0.7 MG/DL (ref 0.5–1.4)
DIFFERENTIAL METHOD: ABNORMAL
EOSINOPHIL # BLD AUTO: 0.4 K/UL (ref 0–0.5)
EOSINOPHIL NFR BLD: 2.3 % (ref 0–8)
ERYTHROCYTE [DISTWIDTH] IN BLOOD BY AUTOMATED COUNT: 16.5 % (ref 11.5–14.5)
EST. GFR  (AFRICAN AMERICAN): >60 ML/MIN/1.73 M^2
EST. GFR  (NON AFRICAN AMERICAN): >60 ML/MIN/1.73 M^2
GLUCOSE SERPL-MCNC: 90 MG/DL (ref 70–110)
GLUCOSE UR QL STRIP: NEGATIVE
HCT VFR BLD AUTO: 21.9 % (ref 37–48.5)
HGB BLD-MCNC: 7.1 G/DL (ref 12–16)
HGB UR QL STRIP: NEGATIVE
IMM GRANULOCYTES # BLD AUTO: 0.09 K/UL (ref 0–0.04)
IMM GRANULOCYTES NFR BLD AUTO: 0.5 % (ref 0–0.5)
KETONES UR QL STRIP: NEGATIVE
LEUKOCYTE ESTERASE UR QL STRIP: NEGATIVE
LYMPHOCYTES # BLD AUTO: 2.2 K/UL (ref 1–4.8)
LYMPHOCYTES NFR BLD: 12.6 % (ref 18–48)
MCH RBC QN AUTO: 29.7 PG (ref 27–31)
MCHC RBC AUTO-ENTMCNC: 32.4 G/DL (ref 32–36)
MCV RBC AUTO: 92 FL (ref 82–98)
MONOCYTES # BLD AUTO: 0.9 K/UL (ref 0.3–1)
MONOCYTES NFR BLD: 5.4 % (ref 4–15)
NEUTROPHILS # BLD AUTO: 13.5 K/UL (ref 1.8–7.7)
NEUTROPHILS NFR BLD: 78.7 % (ref 38–73)
NITRITE UR QL STRIP: NEGATIVE
NRBC BLD-RTO: 1 /100 WBC
PH UR STRIP: 8 [PH] (ref 5–8)
PLATELET # BLD AUTO: 426 K/UL (ref 150–450)
PMV BLD AUTO: 9 FL (ref 9.2–12.9)
POTASSIUM SERPL-SCNC: 3.8 MMOL/L (ref 3.5–5.1)
PROT SERPL-MCNC: 6.1 G/DL (ref 6–8.4)
PROT UR QL STRIP: NEGATIVE
RBC # BLD AUTO: 2.39 M/UL (ref 4–5.4)
SODIUM SERPL-SCNC: 142 MMOL/L (ref 136–145)
SP GR UR STRIP: 1.01 (ref 1–1.03)
URN SPEC COLLECT METH UR: ABNORMAL
UROBILINOGEN UR STRIP-ACNC: ABNORMAL EU/DL
WBC # BLD AUTO: 17.16 K/UL (ref 3.9–12.7)

## 2022-05-27 PROCEDURE — 25000003 PHARM REV CODE 250

## 2022-05-27 PROCEDURE — 12000002 HC ACUTE/MED SURGE SEMI-PRIVATE ROOM

## 2022-05-27 PROCEDURE — 94799 UNLISTED PULMONARY SVC/PX: CPT

## 2022-05-27 PROCEDURE — 80053 COMPREHEN METABOLIC PANEL: CPT | Performed by: NURSE PRACTITIONER

## 2022-05-27 PROCEDURE — 36415 COLL VENOUS BLD VENIPUNCTURE: CPT | Performed by: NURSE PRACTITIONER

## 2022-05-27 PROCEDURE — 81003 URINALYSIS AUTO W/O SCOPE: CPT | Performed by: NURSE PRACTITIONER

## 2022-05-27 PROCEDURE — 94761 N-INVAS EAR/PLS OXIMETRY MLT: CPT

## 2022-05-27 PROCEDURE — 25000003 PHARM REV CODE 250: Performed by: NURSE PRACTITIONER

## 2022-05-27 PROCEDURE — 85025 COMPLETE CBC W/AUTO DIFF WBC: CPT | Performed by: NURSE PRACTITIONER

## 2022-05-27 PROCEDURE — 63600175 PHARM REV CODE 636 W HCPCS

## 2022-05-27 PROCEDURE — 63600175 PHARM REV CODE 636 W HCPCS: Performed by: NURSE PRACTITIONER

## 2022-05-27 RX ORDER — LEVOFLOXACIN 5 MG/ML
750 INJECTION, SOLUTION INTRAVENOUS
Status: DISCONTINUED | OUTPATIENT
Start: 2022-05-27 | End: 2022-05-31 | Stop reason: HOSPADM

## 2022-05-27 RX ORDER — HYDROMORPHONE HYDROCHLORIDE 1 MG/ML
0.5 INJECTION, SOLUTION INTRAMUSCULAR; INTRAVENOUS; SUBCUTANEOUS EVERY 4 HOURS PRN
Status: DISCONTINUED | OUTPATIENT
Start: 2022-05-27 | End: 2022-05-28

## 2022-05-27 RX ADMIN — FOLIC ACID 4 MG: 1 TABLET ORAL at 09:05

## 2022-05-27 RX ADMIN — MORPHINE SULFATE 30 MG: 30 TABLET, FILM COATED, EXTENDED RELEASE ORAL at 03:05

## 2022-05-27 RX ADMIN — HYDROXYUREA 1000 MG: 500 CAPSULE ORAL at 09:05

## 2022-05-27 RX ADMIN — MORPHINE SULFATE 30 MG: 30 TABLET, FILM COATED, EXTENDED RELEASE ORAL at 11:05

## 2022-05-27 RX ADMIN — DOCUSATE SODIUM AND SENNOSIDES 1 TABLET: 8.6; 5 TABLET, FILM COATED ORAL at 09:05

## 2022-05-27 RX ADMIN — SODIUM CHLORIDE, SODIUM LACTATE, POTASSIUM CHLORIDE, AND CALCIUM CHLORIDE: .6; .31; .03; .02 INJECTION, SOLUTION INTRAVENOUS at 05:05

## 2022-05-27 RX ADMIN — HYDROMORPHONE HYDROCHLORIDE 0.5 MG: 1 INJECTION, SOLUTION INTRAMUSCULAR; INTRAVENOUS; SUBCUTANEOUS at 09:05

## 2022-05-27 RX ADMIN — DOCUSATE SODIUM AND SENNOSIDES 1 TABLET: 8.6; 5 TABLET, FILM COATED ORAL at 08:05

## 2022-05-27 RX ADMIN — LEVOFLOXACIN 750 MG: 750 INJECTION, SOLUTION INTRAVENOUS at 09:05

## 2022-05-27 RX ADMIN — POTASSIUM BICARBONATE 50 MEQ: 977.5 TABLET, EFFERVESCENT ORAL at 05:05

## 2022-05-27 RX ADMIN — HYDROMORPHONE HYDROCHLORIDE 0.5 MG: 1 INJECTION, SOLUTION INTRAMUSCULAR; INTRAVENOUS; SUBCUTANEOUS at 01:05

## 2022-05-27 RX ADMIN — DIPHENHYDRAMINE HYDROCHLORIDE 12.5 MG: 50 INJECTION, SOLUTION INTRAMUSCULAR; INTRAVENOUS at 02:05

## 2022-05-27 RX ADMIN — DIPHENHYDRAMINE HYDROCHLORIDE 12.5 MG: 50 INJECTION, SOLUTION INTRAMUSCULAR; INTRAVENOUS at 10:05

## 2022-05-27 RX ADMIN — SODIUM CHLORIDE, SODIUM LACTATE, POTASSIUM CHLORIDE, AND CALCIUM CHLORIDE: .6; .31; .03; .02 INJECTION, SOLUTION INTRAVENOUS at 04:05

## 2022-05-27 RX ADMIN — DIPHENHYDRAMINE HYDROCHLORIDE 12.5 MG: 50 INJECTION, SOLUTION INTRAMUSCULAR; INTRAVENOUS at 09:05

## 2022-05-27 RX ADMIN — DIPHENHYDRAMINE HYDROCHLORIDE 12.5 MG: 50 INJECTION, SOLUTION INTRAMUSCULAR; INTRAVENOUS at 01:05

## 2022-05-27 RX ADMIN — MORPHINE SULFATE 30 MG: 30 TABLET, FILM COATED, EXTENDED RELEASE ORAL at 05:05

## 2022-05-27 RX ADMIN — MORPHINE SULFATE 2 MG: 2 INJECTION, SOLUTION INTRAMUSCULAR; INTRAVENOUS at 02:05

## 2022-05-27 RX ADMIN — DIPHENHYDRAMINE HYDROCHLORIDE 12.5 MG: 50 INJECTION, SOLUTION INTRAMUSCULAR; INTRAVENOUS at 06:05

## 2022-05-27 RX ADMIN — HYDROMORPHONE HYDROCHLORIDE 0.5 MG: 1 INJECTION, SOLUTION INTRAMUSCULAR; INTRAVENOUS; SUBCUTANEOUS at 10:05

## 2022-05-27 RX ADMIN — OXYCODONE HYDROCHLORIDE AND ACETAMINOPHEN 1 TABLET: 5; 325 TABLET ORAL at 05:05

## 2022-05-27 RX ADMIN — HYDROMORPHONE HYDROCHLORIDE 0.5 MG: 1 INJECTION, SOLUTION INTRAMUSCULAR; INTRAVENOUS; SUBCUTANEOUS at 06:05

## 2022-05-27 NOTE — SUBJECTIVE & OBJECTIVE
"Interval History:  Notes reviewed, patient developed fever yesterday evening.  UA negative, blood cultures pending.  Chest x-ray with early left lower lobe pneumonia.  Pt denies cough or sob. Will start IV Levaquin.  Patient reports she still in significant pain "all over"and states pain is worse to her midback and bilateral shoulders.  Discussed with patient switching from morphine to Dilaudid IV and she is agreeable.  Will continue to monitor closely and continue current treatment plan.  Patient verbalized understanding and appreciative of care.      Review of Systems   Constitutional:  Negative for chills, fatigue and fever.   HENT:  Negative for congestion, sore throat and trouble swallowing.    Respiratory:  Negative for cough and shortness of breath.    Cardiovascular:  Negative for chest pain, palpitations and leg swelling.   Gastrointestinal:  Negative for abdominal pain, diarrhea, nausea and vomiting.   Genitourinary:  Negative for difficulty urinating, dysuria and urgency.   Musculoskeletal:  Positive for arthralgias and back pain. Negative for gait problem, joint swelling and myalgias.   Skin:  Negative for color change, pallor, rash and wound.   Neurological:  Negative for dizziness, weakness and light-headedness.   Psychiatric/Behavioral:  Negative for agitation, behavioral problems and confusion.    All other systems reviewed and are negative.  Objective:     Vital Signs (Most Recent):  Temp: 98.7 °F (37.1 °C) (05/27/22 1147)  Pulse: 93 (05/27/22 1147)  Resp: 18 (05/27/22 1309)  BP: (!) 96/58 (05/27/22 1147)  SpO2: 97 % (05/27/22 1147)   Vital Signs (24h Range):  Temp:  [98.6 °F (37 °C)-102.5 °F (39.2 °C)] 98.7 °F (37.1 °C)  Pulse:  [] 93  Resp:  [15-18] 18  SpO2:  [94 %-97 %] 97 %  BP: ()/(50-61) 96/58     Weight: 52.8 kg (116 lb 4.8 oz)  Body mass index is 23.49 kg/m².    Intake/Output Summary (Last 24 hours) at 5/27/2022 1433  Last data filed at 5/27/2022 0502  Gross per 24 hour   Intake " 3034.24 ml   Output 500 ml   Net 2534.24 ml        Physical Exam  Vitals and nursing note reviewed.   Constitutional:       General: She is not in acute distress.     Appearance: Normal appearance. She is well-developed. She is not ill-appearing or diaphoretic.   HENT:      Head: Normocephalic and atraumatic.      Right Ear: External ear normal.      Left Ear: External ear normal.      Nose: Nose normal. No congestion or rhinorrhea.      Mouth/Throat:      Mouth: Mucous membranes are moist.      Pharynx: Oropharynx is clear. No oropharyngeal exudate or posterior oropharyngeal erythema.   Eyes:      General: No scleral icterus.     Conjunctiva/sclera: Conjunctivae normal.      Pupils: Pupils are equal, round, and reactive to light.   Neck:      Vascular: No JVD.   Cardiovascular:      Rate and Rhythm: Normal rate and regular rhythm.      Pulses: Normal pulses.      Heart sounds: Normal heart sounds. No murmur heard.  Pulmonary:      Effort: Pulmonary effort is normal. No respiratory distress.      Breath sounds: Normal breath sounds. No stridor. No wheezing, rhonchi or rales.   Abdominal:      General: Bowel sounds are normal. There is no distension.      Palpations: Abdomen is soft.      Tenderness: There is no abdominal tenderness.   Musculoskeletal:         General: Tenderness present. No swelling. Normal range of motion.      Cervical back: Normal range of motion and neck supple.      Comments: Generalized tenderness and pain with movement of shoulders   Skin:     General: Skin is warm and dry.      Capillary Refill: Capillary refill takes 2 to 3 seconds.      Coloration: Skin is not jaundiced or pale.      Findings: No erythema.   Neurological:      General: No focal deficit present.      Mental Status: She is alert and oriented to person, place, and time.      Cranial Nerves: No cranial nerve deficit.      Sensory: No sensory deficit.   Psychiatric:         Mood and Affect: Mood normal.         Behavior:  Behavior normal.         Thought Content: Thought content normal.       Significant Labs: All pertinent labs within the past 24 hours have been reviewed.  CBC:   Recent Labs   Lab 05/26/22 0435 05/27/22 0412   WBC 12.00 17.16*   HGB 7.4* 7.1*   HCT 22.7* 21.9*    426       CMP:   Recent Labs   Lab 05/26/22 0435 05/27/22 0412    142   K 3.4* 3.8    105   CO2 25 27   GLU 97 90   BUN 5* 7   CREATININE 0.7 0.7   CALCIUM 8.3* 8.3*   PROT 6.0 6.1   ALBUMIN 3.2* 3.3*   BILITOT 1.9* 2.1*   ALKPHOS 101 99   AST 18 29   ALT 21 29   ANIONGAP 10 10   EGFRNONAA >60 >60         Significant Imaging: I have reviewed all pertinent imaging results/findings within the past 24 hours.

## 2022-05-27 NOTE — ASSESSMENT & PLAN NOTE
Acute:  - IVFH  - supplemental oxygen  - prn pain medication ordered  - prn antiemetics  - Trending CBC  - CXR:   1. Perihilar interstitial prominence, nonspecific and unchanged from prior.  No new focal consolidation.  Correlate for sickle cell crisis.   2. Bone infarcts in the bilateral humeral heads and multiple H-type vertebral body compression fractures.    5/25 - resume home medications today and cont prn IV for BT pain  5/26 - pain persist but mildy improved. Cont current regimen and monitor closely  5/27 - stop IV morphine and change to IV dilaudid for better pain control, cont home oral meds

## 2022-05-27 NOTE — ASSESSMENT & PLAN NOTE
Patient has hypokalemia which is currently controlled. Last electrolytes reviewed- Recent Labs   Lab 05/26/22  0436 05/27/22  0412   K 3.4* 3.8   . Will replace potassium and monitor electrolytes closely.

## 2022-05-27 NOTE — ASSESSMENT & PLAN NOTE
Pt developed fever yesterday - workup obtained, UA neg, CXR with early LLL infiltrate and blood cultures pending  IV levaquin

## 2022-05-27 NOTE — CARE UPDATE
05/26/22 2037   Patient Assessment/Suction   Level of Consciousness (AVPU) alert   Respiratory Effort Unlabored   Expansion/Accessory Muscles/Retractions no use of accessory muscles   All Lung Fields Breath Sounds Anterior:;clear;equal bilaterally   Rhythm/Pattern, Respiratory unlabored;pattern regular   PRE-TX-O2   O2 Device (Oxygen Therapy) room air   SpO2 96 %   Pulse Oximetry Type Intermittent   $ Pulse Oximetry - Multiple Charge Pulse Oximetry - Multiple   Pulse (!) 112   Resp 16   Positioning HOB elevated 30 degrees   Incentive Spirometer   $ Incentive Spirometer Charges done with encouragement   Administration (IS) mouthpiece utilized;instruction provided, follow-up   Number of Repetitions (IS) 6   Level Incentive Spirometer (mL) 1000   Patient Tolerance (IS) good

## 2022-05-27 NOTE — PROGRESS NOTES
"[facility] Medicine  Progress Note    Patient Name: Nishi Dumont  MRN: [id number]  Patient Class: IP- Inpatient   Admission Date: 5/23/2022  Length of Stay: 2 days  Attending Physician: Norberto Umana MD  Primary Care Provider: [facility]        Subjective:     Principal Problem:Sickle cell pain crisis        HPI:  Nishi Dumont is a 31 y.o. y.o. 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 She states pain started suddenly 2 day ago while she was at home holding her child. Describes pain as a constant generalized stabbing sensation that is "everywhere" but specifically reports pain in right ribcage and mid back. States this feels like past sickle cell crisis. She also reports chills, nasal congestion, back pain, right shoulder pain, and cough intermittently productive of green sputum. She initially reported CP but corrected herself stating it was chest tightness rather than pain. She also denies fever, SOB, palpitations, abdominal pain, n/v/d, and dysuria. Patient has Hx of left should AVN s/p decompression with Dr. Rebolledo 3/9. Last sickle sell pain crisis was 4/18. ED workup was significant for elevated WBC, anemia, hypokalemia, thrombocytosis, hyperbilirubinemia, and CXR concerning for bone infarcts in the bilateral humeral heads and multiple H-type vertebral body compression fractures. The patient was placed in observation under the care of Hospital Medicine.       Overview/Hospital Course:  No notes on file    Interval History:  Notes reviewed, patient developed fever yesterday evening.  UA negative, blood cultures pending.  Chest x-ray with early left lower lobe pneumonia.  Pt denies cough or sob. Will start IV Levaquin.  Patient reports she still in significant pain "all over"and states pain is worse to " her midback and bilateral shoulders.  Discussed with patient switching from morphine to Dilaudid IV and she is agreeable.  Will continue to monitor closely and continue current treatment plan.  Patient verbalized understanding and appreciative of care.      Review of Systems   Constitutional:  Negative for chills, fatigue and fever.   HENT:  Negative for congestion, sore throat and trouble swallowing.    Respiratory:  Negative for cough and shortness of breath.    Cardiovascular:  Negative for chest pain, palpitations and leg swelling.   Gastrointestinal:  Negative for abdominal pain, diarrhea, nausea and vomiting.   Genitourinary:  Negative for difficulty urinating, dysuria and urgency.   Musculoskeletal:  Positive for arthralgias and back pain. Negative for gait problem, joint swelling and myalgias.   Skin:  Negative for color change, pallor, rash and wound.   Neurological:  Negative for dizziness, weakness and light-headedness.   Psychiatric/Behavioral:  Negative for agitation, behavioral problems and confusion.    All other systems reviewed and are negative.  Objective:     Vital Signs (Most Recent):  Temp: 98.7 °F (37.1 °C) (05/27/22 1147)  Pulse: 93 (05/27/22 1147)  Resp: 18 (05/27/22 1309)  BP: (!) 96/58 (05/27/22 1147)  SpO2: 97 % (05/27/22 1147)   Vital Signs (24h Range):  Temp:  [98.6 °F (37 °C)-102.5 °F (39.2 °C)] 98.7 °F (37.1 °C)  Pulse:  [] 93  Resp:  [15-18] 18  SpO2:  [94 %-97 %] 97 %  BP: ()/(50-61) 96/58     Weight: 52.8 kg (116 lb 4.8 oz)  Body mass index is 23.49 kg/m².    Intake/Output Summary (Last 24 hours) at 5/27/2022 1433  Last data filed at 5/27/2022 0502  Gross per 24 hour   Intake 3034.24 ml   Output 500 ml   Net 2534.24 ml        Physical Exam  Vitals and nursing note reviewed.   Constitutional:       General: She is not in acute distress.     Appearance: Normal appearance. She is well-developed. She is not ill-appearing or diaphoretic.   HENT:      Head: Normocephalic and  atraumatic.      Right Ear: External ear normal.      Left Ear: External ear normal.      Nose: Nose normal. No congestion or rhinorrhea.      Mouth/Throat:      Mouth: Mucous membranes are moist.      Pharynx: Oropharynx is clear. No oropharyngeal exudate or posterior oropharyngeal erythema.   Eyes:      General: No scleral icterus.     Conjunctiva/sclera: Conjunctivae normal.      Pupils: Pupils are equal, round, and reactive to light.   Neck:      Vascular: No JVD.   Cardiovascular:      Rate and Rhythm: Normal rate and regular rhythm.      Pulses: Normal pulses.      Heart sounds: Normal heart sounds. No murmur heard.  Pulmonary:      Effort: Pulmonary effort is normal. No respiratory distress.      Breath sounds: Normal breath sounds. No stridor. No wheezing, rhonchi or rales.   Abdominal:      General: Bowel sounds are normal. There is no distension.      Palpations: Abdomen is soft.      Tenderness: There is no abdominal tenderness.   Musculoskeletal:         General: Tenderness present. No swelling. Normal range of motion.      Cervical back: Normal range of motion and neck supple.      Comments: Generalized tenderness and pain with movement of shoulders   Skin:     General: Skin is warm and dry.      Capillary Refill: Capillary refill takes 2 to 3 seconds.      Coloration: Skin is not jaundiced or pale.      Findings: No erythema.   Neurological:      General: No focal deficit present.      Mental Status: She is alert and oriented to person, place, and time.      Cranial Nerves: No cranial nerve deficit.      Sensory: No sensory deficit.   Psychiatric:         Mood and Affect: Mood normal.         Behavior: Behavior normal.         Thought Content: Thought content normal.       Significant Labs: All pertinent labs within the past 24 hours have been reviewed.  CBC:   Recent Labs   Lab 05/26/22  0435 05/27/22  0412   WBC 12.00 17.16*   HGB 7.4* 7.1*   HCT 22.7* 21.9*    426       CMP:   Recent Labs    Lab 05/26/22  0435 05/27/22 0412    142   K 3.4* 3.8    105   CO2 25 27   GLU 97 90   BUN 5* 7   CREATININE 0.7 0.7   CALCIUM 8.3* 8.3*   PROT 6.0 6.1   ALBUMIN 3.2* 3.3*   BILITOT 1.9* 2.1*   ALKPHOS 101 99   AST 18 29   ALT 21 29   ANIONGAP 10 10   EGFRNONAA >60 >60         Significant Imaging: I have reviewed all pertinent imaging results/findings within the past 24 hours.      Assessment/Plan:      * Sickle cell pain crisis  Acute:  - IVFH  - supplemental oxygen  - prn pain medication ordered  - prn antiemetics  - Trending CBC  - CXR:   1. Perihilar interstitial prominence, nonspecific and unchanged from prior.  No new focal consolidation.  Correlate for sickle cell crisis.   2. Bone infarcts in the bilateral humeral heads and multiple H-type vertebral body compression fractures.    5/25 - resume home medications today and cont prn IV for BT pain  5/26 - pain persist but mildy improved. Cont current regimen and monitor closely  5/27 - stop IV morphine and change to IV dilaudid for better pain control, cont home oral meds    Pneumonia  Pt developed fever yesterday - workup obtained, UA neg, CXR with early LLL infiltrate and blood cultures pending  IV levaquin      Thrombocytosis  Chronic:  - monitor CBC      History of avascular necrosis of left humeral head  Noted.   - s/p decompression left humeral head 3/9 with Dr. Rebolledo  - ortho consulted   - Xray right shoulder ordered  - CXR:   1. Perihilar interstitial prominence, nonspecific and unchanged from prior.  No new focal consolidation.  Correlate for sickle cell crisis.   2. Bone infarcts in the bilateral humeral heads and multiple H-type vertebral body compression fractures.      Hypokalemia  Patient has hypokalemia which is currently controlled. Last electrolytes reviewed- Recent Labs   Lab 05/26/22 0435 05/27/22 0412   K 3.4* 3.8   . Will replace potassium and monitor electrolytes closely.         Hyperbilirubinemia  Noted.   - Monitor CBC  closely      Sickle cell anemia  Patient's anemia is currently controlled. Has not received any PRBCs to date.. Etiology likely d/t sickle cell anemia  Current CBC reviewed-   Lab Results   Component Value Date    HGB 7.8 (L) 05/23/2022    HCT 22.7 (L) 05/23/2022     Monitor serial CBC and transfuse if patient becomes hemodynamically unstable, symptomatic or H/H drops below 7/21.           VTE Risk Mitigation (From admission, onward)         Ordered     IP VTE LOW RISK PATIENT  Once         05/24/22 0202     Place sequential compression device  Until discontinued         05/24/22 0202                Discharge Planning   SHANE: 5/27/2022     Code Status: Full Code   Is the patient medically ready for discharge?:     Reason for patient still in hospital (select all that apply): Patient new problem, Patient trending condition and Treatment  Discharge Plan A: Home                  Moraima Valentine NP  Department of Hospital Medicine   [facility]

## 2022-05-27 NOTE — CARE UPDATE
05/27/22 0835   Patient Assessment/Suction   Level of Consciousness (AVPU) alert   Respiratory Effort Unlabored   PRE-TX-O2   O2 Device (Oxygen Therapy) room air   SpO2 97 %   Pulse Oximetry Type Intermittent   $ Pulse Oximetry - Multiple Charge Pulse Oximetry - Multiple   Pulse 105   Resp 16   Incentive Spirometer   $ Incentive Spirometer Charges done with encouragement   Incentive Spirometer Predicted Level (mL) 2200   Administration (IS) self-administered

## 2022-05-27 NOTE — PLAN OF CARE
POC discussed with pt, pt verbalized understanding. Oriented x4. Port to left chest wall accessed, intact, fluids infusing. Tachy on tele. Complaints of pain to back, neck, and shoulders controlled with prn pain medications. Pt ambulated to the restroom independently. Complaints of itching, relief with benadryl. Call light in reach, bed alarm set, safety maintained. No complaints or requests at this time, will continue to monitor.

## 2022-05-28 PROBLEM — R78.81 GRAM-POSITIVE COCCI BACTEREMIA: Status: ACTIVE | Noted: 2022-05-28

## 2022-05-28 LAB
ALBUMIN SERPL BCP-MCNC: 3.5 G/DL (ref 3.5–5.2)
ALP SERPL-CCNC: 104 U/L (ref 55–135)
ALT SERPL W/O P-5'-P-CCNC: 30 U/L (ref 10–44)
ANION GAP SERPL CALC-SCNC: 10 MMOL/L (ref 8–16)
AORTIC ROOT ANNULUS: 2.53 CM
AORTIC VALVE CUSP SEPERATION: 2.13 CM
AST SERPL-CCNC: 33 U/L (ref 10–40)
AV INDEX (PROSTH): 0.94
AV MEAN GRADIENT: 3 MMHG
AV PEAK GRADIENT: 5 MMHG
AV VALVE AREA: 2.92 CM2
AV VELOCITY RATIO: 0.9
BASOPHILS # BLD AUTO: 0.08 K/UL (ref 0–0.2)
BASOPHILS NFR BLD: 0.5 % (ref 0–1.9)
BILIRUB SERPL-MCNC: 2.3 MG/DL (ref 0.1–1)
BSA FOR ECHO PROCEDURE: 1.48 M2
BUN SERPL-MCNC: 7 MG/DL (ref 6–20)
CALCIUM SERPL-MCNC: 8.8 MG/DL (ref 8.7–10.5)
CHLORIDE SERPL-SCNC: 102 MMOL/L (ref 95–110)
CO2 SERPL-SCNC: 26 MMOL/L (ref 23–29)
CREAT SERPL-MCNC: 0.8 MG/DL (ref 0.5–1.4)
CV ECHO LV RWT: 0.27 CM
DIFFERENTIAL METHOD: ABNORMAL
DOP CALC AO PEAK VEL: 1.11 M/S
DOP CALC AO VTI: 16.42 CM
DOP CALC LVOT AREA: 3.1 CM2
DOP CALC LVOT DIAMETER: 1.99 CM
DOP CALC LVOT PEAK VEL: 1 M/S
DOP CALC LVOT STROKE VOLUME: 47.97 CM3
DOP CALC MV VTI: 15.7 CM
DOP CALCLVOT PEAK VEL VTI: 15.43 CM
E WAVE DECELERATION TIME: 204.41 MSEC
E/A RATIO: 0.91
E/E' RATIO: 6.18 M/S
ECHO LV POSTERIOR WALL: 0.61 CM (ref 0.6–1.1)
EJECTION FRACTION: 70 %
EOSINOPHIL # BLD AUTO: 0.7 K/UL (ref 0–0.5)
EOSINOPHIL NFR BLD: 4.5 % (ref 0–8)
ERYTHROCYTE [DISTWIDTH] IN BLOOD BY AUTOMATED COUNT: 15.9 % (ref 11.5–14.5)
EST. GFR  (AFRICAN AMERICAN): >60 ML/MIN/1.73 M^2
EST. GFR  (NON AFRICAN AMERICAN): >60 ML/MIN/1.73 M^2
FRACTIONAL SHORTENING: 39 % (ref 28–44)
GLUCOSE SERPL-MCNC: 88 MG/DL (ref 70–110)
HCT VFR BLD AUTO: 22.9 % (ref 37–48.5)
HGB BLD-MCNC: 7.9 G/DL (ref 12–16)
IMM GRANULOCYTES # BLD AUTO: 0.07 K/UL (ref 0–0.04)
IMM GRANULOCYTES NFR BLD AUTO: 0.4 % (ref 0–0.5)
INTERVENTRICULAR SEPTUM: 0.65 CM (ref 0.6–1.1)
IVRT: 88.49 MSEC
LA MAJOR: 3.99 CM
LA MINOR: 3.62 CM
LA WIDTH: 2.94 CM
LEFT ATRIUM SIZE: 3 CM
LEFT ATRIUM VOLUME INDEX MOD: 14.5 ML/M2
LEFT ATRIUM VOLUME INDEX: 19.5 ML/M2
LEFT ATRIUM VOLUME MOD: 21.23 CM3
LEFT ATRIUM VOLUME: 28.46 CM3
LEFT INTERNAL DIMENSION IN SYSTOLE: 2.76 CM (ref 2.1–4)
LEFT VENTRICLE DIASTOLIC VOLUME INDEX: 64.45 ML/M2
LEFT VENTRICLE DIASTOLIC VOLUME: 94.09 ML
LEFT VENTRICLE MASS INDEX: 58 G/M2
LEFT VENTRICLE SYSTOLIC VOLUME INDEX: 19.6 ML/M2
LEFT VENTRICLE SYSTOLIC VOLUME: 28.59 ML
LEFT VENTRICULAR INTERNAL DIMENSION IN DIASTOLE: 4.53 CM (ref 3.5–6)
LEFT VENTRICULAR MASS: 84.75 G
LV LATERAL E/E' RATIO: 5.67 M/S
LV SEPTAL E/E' RATIO: 6.8 M/S
LYMPHOCYTES # BLD AUTO: 1.5 K/UL (ref 1–4.8)
LYMPHOCYTES NFR BLD: 9.6 % (ref 18–48)
MCH RBC QN AUTO: 31.2 PG (ref 27–31)
MCHC RBC AUTO-ENTMCNC: 34.5 G/DL (ref 32–36)
MCV RBC AUTO: 91 FL (ref 82–98)
MONOCYTES # BLD AUTO: 1 K/UL (ref 0.3–1)
MONOCYTES NFR BLD: 6.4 % (ref 4–15)
MV A" WAVE DURATION": 10.28 MSEC
MV MEAN GRADIENT: 1 MMHG
MV PEAK A VEL: 0.75 M/S
MV PEAK E VEL: 0.68 M/S
MV PEAK GRADIENT: 4 MMHG
MV STENOSIS PRESSURE HALF TIME: 59.28 MS
MV VALVE AREA BY CONTINUITY EQUATION: 3.06 CM2
MV VALVE AREA P 1/2 METHOD: 3.71 CM2
NEUTROPHILS # BLD AUTO: 12.4 K/UL (ref 1.8–7.7)
NEUTROPHILS NFR BLD: 78.6 % (ref 38–73)
NRBC BLD-RTO: 0 /100 WBC
PISA TR MAX VEL: 2.85 M/S
PLATELET # BLD AUTO: 423 K/UL (ref 150–450)
PMV BLD AUTO: 9.1 FL (ref 9.2–12.9)
POTASSIUM SERPL-SCNC: 4.5 MMOL/L (ref 3.5–5.1)
PROT SERPL-MCNC: 6.7 G/DL (ref 6–8.4)
PULM VEIN S/D RATIO: 1.15
PV PEAK D VEL: 0.61 M/S
PV PEAK S VEL: 0.7 M/S
PV PEAK VELOCITY: 0.93 CM/S
RA MAJOR: 3.94 CM
RA PRESSURE: 3 MMHG
RA WIDTH: 2.54 CM
RBC # BLD AUTO: 2.53 M/UL (ref 4–5.4)
RIGHT VENTRICULAR END-DIASTOLIC DIMENSION: 3.15 CM
RV TISSUE DOPPLER FREE WALL SYSTOLIC VELOCITY 1 (APICAL 4 CHAMBER VIEW): 15.01 CM/S
SODIUM SERPL-SCNC: 138 MMOL/L (ref 136–145)
TDI LATERAL: 0.12 M/S
TDI SEPTAL: 0.1 M/S
TDI: 0.11 M/S
TR MAX PG: 32 MMHG
TRICUSPID ANNULAR PLANE SYSTOLIC EXCURSION: 2.46 CM
TV REST PULMONARY ARTERY PRESSURE: 35 MMHG
WBC # BLD AUTO: 15.81 K/UL (ref 3.9–12.7)

## 2022-05-28 PROCEDURE — 63600175 PHARM REV CODE 636 W HCPCS

## 2022-05-28 PROCEDURE — 25000003 PHARM REV CODE 250: Performed by: HOSPITALIST

## 2022-05-28 PROCEDURE — 87040 BLOOD CULTURE FOR BACTERIA: CPT | Mod: 59 | Performed by: NURSE PRACTITIONER

## 2022-05-28 PROCEDURE — 25000003 PHARM REV CODE 250: Performed by: NURSE PRACTITIONER

## 2022-05-28 PROCEDURE — 85025 COMPLETE CBC W/AUTO DIFF WBC: CPT | Performed by: NURSE PRACTITIONER

## 2022-05-28 PROCEDURE — 63600175 PHARM REV CODE 636 W HCPCS: Performed by: HOSPITALIST

## 2022-05-28 PROCEDURE — 94761 N-INVAS EAR/PLS OXIMETRY MLT: CPT

## 2022-05-28 PROCEDURE — 36415 COLL VENOUS BLD VENIPUNCTURE: CPT | Performed by: NURSE PRACTITIONER

## 2022-05-28 PROCEDURE — 94799 UNLISTED PULMONARY SVC/PX: CPT

## 2022-05-28 PROCEDURE — 25000003 PHARM REV CODE 250

## 2022-05-28 PROCEDURE — 80053 COMPREHEN METABOLIC PANEL: CPT | Performed by: NURSE PRACTITIONER

## 2022-05-28 PROCEDURE — 12000002 HC ACUTE/MED SURGE SEMI-PRIVATE ROOM

## 2022-05-28 PROCEDURE — 63600175 PHARM REV CODE 636 W HCPCS: Performed by: NURSE PRACTITIONER

## 2022-05-28 RX ORDER — HYDROMORPHONE HYDROCHLORIDE 1 MG/ML
1 INJECTION, SOLUTION INTRAMUSCULAR; INTRAVENOUS; SUBCUTANEOUS EVERY 4 HOURS PRN
Status: DISCONTINUED | OUTPATIENT
Start: 2022-05-28 | End: 2022-05-29

## 2022-05-28 RX ORDER — VANCOMYCIN HCL IN 5 % DEXTROSE 1G/250ML
1000 PLASTIC BAG, INJECTION (ML) INTRAVENOUS
Status: DISCONTINUED | OUTPATIENT
Start: 2022-05-28 | End: 2022-05-30

## 2022-05-28 RX ADMIN — HYDROMORPHONE HYDROCHLORIDE 0.5 MG: 1 INJECTION, SOLUTION INTRAMUSCULAR; INTRAVENOUS; SUBCUTANEOUS at 02:05

## 2022-05-28 RX ADMIN — MORPHINE SULFATE 30 MG: 30 TABLET, FILM COATED, EXTENDED RELEASE ORAL at 09:05

## 2022-05-28 RX ADMIN — SODIUM CHLORIDE, SODIUM LACTATE, POTASSIUM CHLORIDE, AND CALCIUM CHLORIDE: .6; .31; .03; .02 INJECTION, SOLUTION INTRAVENOUS at 02:05

## 2022-05-28 RX ADMIN — HYDROXYUREA 1000 MG: 500 CAPSULE ORAL at 08:05

## 2022-05-28 RX ADMIN — MORPHINE SULFATE 30 MG: 30 TABLET, FILM COATED, EXTENDED RELEASE ORAL at 01:05

## 2022-05-28 RX ADMIN — HYDROMORPHONE HYDROCHLORIDE 1 MG: 1 INJECTION, SOLUTION INTRAMUSCULAR; INTRAVENOUS; SUBCUTANEOUS at 11:05

## 2022-05-28 RX ADMIN — DIPHENHYDRAMINE HYDROCHLORIDE 12.5 MG: 50 INJECTION, SOLUTION INTRAMUSCULAR; INTRAVENOUS at 02:05

## 2022-05-28 RX ADMIN — HYDROMORPHONE HYDROCHLORIDE 0.5 MG: 1 INJECTION, SOLUTION INTRAMUSCULAR; INTRAVENOUS; SUBCUTANEOUS at 06:05

## 2022-05-28 RX ADMIN — DIPHENHYDRAMINE HYDROCHLORIDE 12.5 MG: 50 INJECTION, SOLUTION INTRAMUSCULAR; INTRAVENOUS at 06:05

## 2022-05-28 RX ADMIN — LEVOFLOXACIN 750 MG: 750 INJECTION, SOLUTION INTRAVENOUS at 08:05

## 2022-05-28 RX ADMIN — DIPHENHYDRAMINE HYDROCHLORIDE 12.5 MG: 50 INJECTION, SOLUTION INTRAMUSCULAR; INTRAVENOUS at 11:05

## 2022-05-28 RX ADMIN — FOLIC ACID 4 MG: 1 TABLET ORAL at 08:05

## 2022-05-28 RX ADMIN — DOCUSATE SODIUM AND SENNOSIDES 1 TABLET: 8.6; 5 TABLET, FILM COATED ORAL at 08:05

## 2022-05-28 RX ADMIN — DIPHENHYDRAMINE HYDROCHLORIDE 12.5 MG: 50 INJECTION, SOLUTION INTRAMUSCULAR; INTRAVENOUS at 05:05

## 2022-05-28 RX ADMIN — MORPHINE SULFATE 30 MG: 30 TABLET, FILM COATED, EXTENDED RELEASE ORAL at 06:05

## 2022-05-28 RX ADMIN — HYDROMORPHONE HYDROCHLORIDE 1 MG: 1 INJECTION, SOLUTION INTRAMUSCULAR; INTRAVENOUS; SUBCUTANEOUS at 05:05

## 2022-05-28 RX ADMIN — VANCOMYCIN HYDROCHLORIDE 1000 MG: 1 INJECTION, POWDER, LYOPHILIZED, FOR SOLUTION INTRAVENOUS at 11:05

## 2022-05-28 NOTE — SUBJECTIVE & OBJECTIVE
Interval History:  Notes reviewed, no acute events overnight.  Patient states her pain is still present and Dilaudid is helping the pain but it is still currently 7/10 most of the time.  Advised patient I will increase dilaudid dose to 1mg to help with better pain control.  She verbalized understanding and was very appreciative.  Blood cultures growing Gram-positive cocci in clusters out of anaerobic bottle.  Repeat blood cultures ordered and IV vanc initiated.  Will continue to monitor closely.    Review of Systems   Constitutional:  Positive for fatigue. Negative for chills and fever.   HENT:  Negative for congestion, sore throat and trouble swallowing.    Respiratory:  Negative for cough and shortness of breath.    Cardiovascular:  Negative for chest pain, palpitations and leg swelling.   Gastrointestinal:  Negative for abdominal pain, diarrhea, nausea and vomiting.   Genitourinary:  Negative for difficulty urinating, dysuria and urgency.   Musculoskeletal:  Positive for arthralgias and back pain. Negative for gait problem, joint swelling and myalgias.   Skin:  Negative for color change, pallor, rash and wound.   Neurological:  Negative for dizziness, weakness and light-headedness.   Psychiatric/Behavioral:  Negative for agitation, behavioral problems and confusion.    All other systems reviewed and are negative.  Objective:     Vital Signs (Most Recent):  Temp: 99.5 °F (37.5 °C) (05/28/22 0739)  Pulse: 90 (05/28/22 0810)  Resp: 18 (05/28/22 1117)  BP: (!) 88/52 (05/28/22 0739)  SpO2: 96 % (05/28/22 0810)   Vital Signs (24h Range):  Temp:  [98 °F (36.7 °C)-99.5 °F (37.5 °C)] 99.5 °F (37.5 °C)  Pulse:  [90-99] 90  Resp:  [14-18] 18  SpO2:  [95 %-98 %] 96 %  BP: ()/(51-75) 88/52     Weight: 52.8 kg (116 lb 4.8 oz)  Body mass index is 23.49 kg/m².    Intake/Output Summary (Last 24 hours) at 5/28/2022 1131  Last data filed at 5/28/2022 0635  Gross per 24 hour   Intake 2220 ml   Output --   Net 2220 ml         Physical Exam  Vitals and nursing note reviewed.   Constitutional:       General: She is not in acute distress.     Appearance: Normal appearance. She is well-developed. She is not ill-appearing or diaphoretic.   HENT:      Head: Normocephalic and atraumatic.      Right Ear: External ear normal.      Left Ear: External ear normal.      Nose: Nose normal. No congestion or rhinorrhea.      Mouth/Throat:      Mouth: Mucous membranes are moist.      Pharynx: Oropharynx is clear. No oropharyngeal exudate or posterior oropharyngeal erythema.   Eyes:      General: No scleral icterus.     Conjunctiva/sclera: Conjunctivae normal.      Pupils: Pupils are equal, round, and reactive to light.   Neck:      Vascular: No JVD.   Cardiovascular:      Rate and Rhythm: Normal rate and regular rhythm.      Pulses: Normal pulses.      Heart sounds: Normal heart sounds. No murmur heard.  Pulmonary:      Effort: Pulmonary effort is normal. No respiratory distress.      Breath sounds: Normal breath sounds. No stridor. No wheezing, rhonchi or rales.   Abdominal:      General: Bowel sounds are normal. There is no distension.      Palpations: Abdomen is soft.      Tenderness: There is no abdominal tenderness.   Musculoskeletal:         General: Tenderness present. No swelling. Normal range of motion.      Cervical back: Normal range of motion and neck supple.      Comments: Generalized tenderness and pain with movement of shoulders   Skin:     General: Skin is warm and dry.      Capillary Refill: Capillary refill takes 2 to 3 seconds.      Coloration: Skin is not jaundiced or pale.      Findings: No erythema.   Neurological:      General: No focal deficit present.      Mental Status: She is alert and oriented to person, place, and time.      Cranial Nerves: No cranial nerve deficit.      Sensory: No sensory deficit.   Psychiatric:         Mood and Affect: Mood normal.         Behavior: Behavior normal.         Thought Content: Thought  content normal.       Significant Labs: All pertinent labs within the past 24 hours have been reviewed.  CBC:   Recent Labs   Lab 05/27/22  0412 05/28/22  0621   WBC 17.16* 15.81*   HGB 7.1* 7.9*   HCT 21.9* 22.9*    423       CMP:   Recent Labs   Lab 05/27/22  0412 05/28/22  0621    138   K 3.8 4.5    102   CO2 27 26   GLU 90 88   BUN 7 7   CREATININE 0.7 0.8   CALCIUM 8.3* 8.8   PROT 6.1 6.7   ALBUMIN 3.3* 3.5   BILITOT 2.1* 2.3*   ALKPHOS 99 104   AST 29 33   ALT 29 30   ANIONGAP 10 10   EGFRNONAA >60 >60         Significant Imaging: I have reviewed all pertinent imaging results/findings within the past 24 hours.

## 2022-05-28 NOTE — CARE UPDATE
05/28/22 0810   Patient Assessment/Suction   Level of Consciousness (AVPU) alert   Respiratory Effort Unlabored;Normal   Expansion/Accessory Muscles/Retractions no use of accessory muscles;no retractions   All Lung Fields Breath Sounds clear   Rhythm/Pattern, Respiratory unlabored;pattern regular   PRE-TX-O2   O2 Device (Oxygen Therapy) room air   SpO2 96 %   Pulse Oximetry Type Intermittent   $ Pulse Oximetry - Multiple Charge Pulse Oximetry - Multiple   Pulse 90   Resp 16   Incentive Spirometer   $ Incentive Spirometer Charges done with encouragement   Incentive Spirometer Predicted Level (mL) 2200   Administration (IS) instruction provided, follow-up   Number of Repetitions (IS) 15   Level Incentive Spirometer (mL) 1500   Patient Tolerance (IS) good

## 2022-05-28 NOTE — PROGRESS NOTES
"Pharmacokinetic Initial Assessment: IV Vancomycin    Assessment/Plan:    Initiate intravenous vancomycin with a dose of 1000 mg every 12 hours  Desired empiric serum trough concentration is 15 to 20 mcg/mL  Draw vancomycin trough level 60 min prior to fourth dose on 5/29 at approximately 2200  Pharmacy will continue to follow and monitor vancomycin.      Please contact pharmacy at extension [phone] with any questions regarding this assessment.     Thank you for the consult,   Karynabarb Aaronraquel       Patient brief summary:  Nishi Dumont is a 31 y.o. female initiated on antimicrobial therapy with IV Vancomycin for treatment of suspected bacteremia    Drug Allergies:   Review of patient's allergies indicates:   Allergen Reactions    Contrast media Hives    Iodine and iodide containing products Hives and Swelling    Mushroom Hives    Zithromax [azithromycin] Anaphylaxis    Demerol [meperidine] Other (See Comments)     Regarding reaction to demerol pt states "I talk out of my head and become aggressive"       Actual Body Weight:   52.8    Renal Function:   Estimated Creatinine Clearance: 78.5 mL/min (based on SCr of 0.8 mg/dL).,     Dialysis Method (if applicable):  N/A    CBC (last 72 hours):  Recent Labs   Lab Result Units 05/26/22 0435 05/27/22 0412 05/28/22 0621   WBC K/uL 12.00 17.16* 15.81*   Hemoglobin g/dL 7.4* 7.1* 7.9*   Hematocrit % 22.7* 21.9* 22.9*   Platelets K/uL 441 426 423   Gran % % 57.7 78.7* 78.6*   Lymph % % 28.8 12.6* 9.6*   Mono % % 10.0 5.4 6.4   Eosinophil % % 2.4 2.3 4.5   Basophil % % 0.7 0.5 0.5   Differential Method  Automated Automated Automated       Metabolic Panel (last 72 hours):  Recent Labs   Lab Result Units 05/26/22  0435 05/27/22  0356 05/27/22 0412 05/28/22  0621   Sodium mmol/L 142  --  142 138   Potassium mmol/L 3.4*  --  3.8 4.5   Chloride mmol/L 107  --  105 102   CO2 mmol/L 25  --  27 26   Glucose mg/dL 97  --  90 88   Glucose, UA   --  Negative  --   --    BUN mg/dL 5*  " --  7 7   Creatinine mg/dL 0.7  --  0.7 0.8   Albumin g/dL 3.2*  --  3.3* 3.5   Total Bilirubin mg/dL 1.9*  --  2.1* 2.3*   Alkaline Phosphatase U/L 101  --  99 104   AST U/L 18  --  29 33   ALT U/L 21  --  29 30   Magnesium mg/dL 1.6  --   --   --    Phosphorus mg/dL 3.9  --   --   --        Drug levels (last 3 results):  No results for input(s): VANCOMYCINRA, VANCORANDOM, VANCOMYCINPE, VANCOPEAK, VANCOMYCINTR, VANCOTROUGH in the last 72 hours.    Microbiologic Results:  Microbiology Results (last 7 days)       Procedure Component Value Units Date/Time    Blood culture [[id number]]     Order Status: Sent Specimen: Blood     Blood culture [[id number]]     Order Status: Sent Specimen: Blood     Blood culture [[id number]] Collected: 05/26/22 1757    Order Status: Completed Specimen: Blood Updated: 05/28/22 0613     Blood Culture, Routine No Growth to date      No Growth to date    Blood culture [[id number]] Collected: 05/26/22 1757    Order Status: Completed Specimen: Blood Updated: 05/27/22 2020     Blood Culture, Routine Gram stain joao bottle: Gram positive cocci in clusters resembling Staph      Results called to and read back by:Verena Olivas RN 05/27/2022  20:19

## 2022-05-28 NOTE — PLAN OF CARE
POC discussed with pt. Pt voiced understanding. Pt medications given per order. VSS, safety measures maintained.  Call light in reach. Instructed pt to call with needs.

## 2022-05-28 NOTE — PROGRESS NOTES
"[facility] Medicine  Progress Note    Patient Name: Nishi Dumont  MRN: [id number]  Patient Class: IP- Inpatient   Admission Date: 5/23/2022  Length of Stay: 3 days  Attending Physician: Norberto Umana MD  Primary Care Provider: [facility]        Subjective:     Principal Problem:Sickle cell pain crisis        HPI:  Nishi Dumont is a 31 y.o. y.o. 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 She states pain started suddenly 2 day ago while she was at home holding her child. Describes pain as a constant generalized stabbing sensation that is "everywhere" but specifically reports pain in right ribcage and mid back. States this feels like past sickle cell crisis. She also reports chills, nasal congestion, back pain, right shoulder pain, and cough intermittently productive of green sputum. She initially reported CP but corrected herself stating it was chest tightness rather than pain. She also denies fever, SOB, palpitations, abdominal pain, n/v/d, and dysuria. Patient has Hx of left should AVN s/p decompression with Dr. Rebolledo 3/9. Last sickle sell pain crisis was 4/18. ED workup was significant for elevated WBC, anemia, hypokalemia, thrombocytosis, hyperbilirubinemia, and CXR concerning for bone infarcts in the bilateral humeral heads and multiple H-type vertebral body compression fractures. The patient was placed in observation under the care of Hospital Medicine.       Overview/Hospital Course:  No notes on file    Interval History:  Notes reviewed, no acute events overnight.  Patient states her pain is still present and Dilaudid is helping the pain but it is still currently 7/10 most of the time.  Advised patient I will increase dilaudid dose to 1mg to help with better pain control.  She verbalized understanding " and was very appreciative.  Blood cultures growing Gram-positive cocci in clusters out of anaerobic bottle.  Repeat blood cultures ordered and IV vanc initiated.  Will continue to monitor closely.    Review of Systems   Constitutional:  Positive for fatigue. Negative for chills and fever.   HENT:  Negative for congestion, sore throat and trouble swallowing.    Respiratory:  Negative for cough and shortness of breath.    Cardiovascular:  Negative for chest pain, palpitations and leg swelling.   Gastrointestinal:  Negative for abdominal pain, diarrhea, nausea and vomiting.   Genitourinary:  Negative for difficulty urinating, dysuria and urgency.   Musculoskeletal:  Positive for arthralgias and back pain. Negative for gait problem, joint swelling and myalgias.   Skin:  Negative for color change, pallor, rash and wound.   Neurological:  Negative for dizziness, weakness and light-headedness.   Psychiatric/Behavioral:  Negative for agitation, behavioral problems and confusion.    All other systems reviewed and are negative.  Objective:     Vital Signs (Most Recent):  Temp: 99.5 °F (37.5 °C) (05/28/22 0739)  Pulse: 90 (05/28/22 0810)  Resp: 18 (05/28/22 1117)  BP: (!) 88/52 (05/28/22 0739)  SpO2: 96 % (05/28/22 0810)   Vital Signs (24h Range):  Temp:  [98 °F (36.7 °C)-99.5 °F (37.5 °C)] 99.5 °F (37.5 °C)  Pulse:  [90-99] 90  Resp:  [14-18] 18  SpO2:  [95 %-98 %] 96 %  BP: ()/(51-75) 88/52     Weight: 52.8 kg (116 lb 4.8 oz)  Body mass index is 23.49 kg/m².    Intake/Output Summary (Last 24 hours) at 5/28/2022 1131  Last data filed at 5/28/2022 0635  Gross per 24 hour   Intake 2220 ml   Output --   Net 2220 ml        Physical Exam  Vitals and nursing note reviewed.   Constitutional:       General: She is not in acute distress.     Appearance: Normal appearance. She is well-developed. She is not ill-appearing or diaphoretic.   HENT:      Head: Normocephalic and atraumatic.      Right Ear: External ear normal.      Left  Ear: External ear normal.      Nose: Nose normal. No congestion or rhinorrhea.      Mouth/Throat:      Mouth: Mucous membranes are moist.      Pharynx: Oropharynx is clear. No oropharyngeal exudate or posterior oropharyngeal erythema.   Eyes:      General: No scleral icterus.     Conjunctiva/sclera: Conjunctivae normal.      Pupils: Pupils are equal, round, and reactive to light.   Neck:      Vascular: No JVD.   Cardiovascular:      Rate and Rhythm: Normal rate and regular rhythm.      Pulses: Normal pulses.      Heart sounds: Normal heart sounds. No murmur heard.  Pulmonary:      Effort: Pulmonary effort is normal. No respiratory distress.      Breath sounds: Normal breath sounds. No stridor. No wheezing, rhonchi or rales.   Abdominal:      General: Bowel sounds are normal. There is no distension.      Palpations: Abdomen is soft.      Tenderness: There is no abdominal tenderness.   Musculoskeletal:         General: Tenderness present. No swelling. Normal range of motion.      Cervical back: Normal range of motion and neck supple.      Comments: Generalized tenderness and pain with movement of shoulders   Skin:     General: Skin is warm and dry.      Capillary Refill: Capillary refill takes 2 to 3 seconds.      Coloration: Skin is not jaundiced or pale.      Findings: No erythema.   Neurological:      General: No focal deficit present.      Mental Status: She is alert and oriented to person, place, and time.      Cranial Nerves: No cranial nerve deficit.      Sensory: No sensory deficit.   Psychiatric:         Mood and Affect: Mood normal.         Behavior: Behavior normal.         Thought Content: Thought content normal.       Significant Labs: All pertinent labs within the past 24 hours have been reviewed.  CBC:   Recent Labs   Lab 05/27/22 0412 05/28/22  0621   WBC 17.16* 15.81*   HGB 7.1* 7.9*   HCT 21.9* 22.9*    423       CMP:   Recent Labs   Lab 05/27/22  0412 05/28/22  0621    138   K 3.8 4.5     102   CO2 27 26   GLU 90 88   BUN 7 7   CREATININE 0.7 0.8   CALCIUM 8.3* 8.8   PROT 6.1 6.7   ALBUMIN 3.3* 3.5   BILITOT 2.1* 2.3*   ALKPHOS 99 104   AST 29 33   ALT 29 30   ANIONGAP 10 10   EGFRNONAA >60 >60         Significant Imaging: I have reviewed all pertinent imaging results/findings within the past 24 hours.      Assessment/Plan:      * Sickle cell pain crisis  Acute:  - IVFH  - supplemental oxygen  - prn pain medication ordered  - prn antiemetics  - Trending CBC  - CXR:   1. Perihilar interstitial prominence, nonspecific and unchanged from prior.  No new focal consolidation.  Correlate for sickle cell crisis.   2. Bone infarcts in the bilateral humeral heads and multiple H-type vertebral body compression fractures.    5/25 - resume home medications today and cont prn IV for BT pain  5/26 - pain persist but mildy improved. Cont current regimen and monitor closely  5/27 - stop IV morphine and change to IV dilaudid for better pain control, cont home oral meds  5/28 - pain better controlled with dilaudid, will increase to 1mg today    Gram-positive cocci bacteremia  Repeat blood cultures today  Start IV vanc  Echo pending        Pneumonia  Pt developed fever yesterday - workup obtained, UA neg, CXR with early LLL infiltrate and blood cultures pending  IV levaquin      Thrombocytosis  Chronic:  - monitor CBC      History of avascular necrosis of left humeral head  Noted.   - s/p decompression left humeral head 3/9 with Dr. Rebolledo  - ortho consulted   - Xray right shoulder ordered  - CXR:   1. Perihilar interstitial prominence, nonspecific and unchanged from prior.  No new focal consolidation.  Correlate for sickle cell crisis.   2. Bone infarcts in the bilateral humeral heads and multiple H-type vertebral body compression fractures.      Hypokalemia  Patient has hypokalemia which is currently controlled. Last electrolytes reviewed- Recent Labs   Lab 05/26/22  0435 05/27/22  0412   K 3.4* 3.8   . Will  replace potassium and monitor electrolytes closely.         Hyperbilirubinemia  Noted.   - Monitor CBC closely      Sickle cell anemia  Patient's anemia is currently controlled. Has not received any PRBCs to date.. Etiology likely d/t sickle cell anemia  Current CBC reviewed-   Lab Results   Component Value Date    HGB 7.8 (L) 05/23/2022    HCT 22.7 (L) 05/23/2022     Monitor serial CBC and transfuse if patient becomes hemodynamically unstable, symptomatic or H/H drops below 7/21.           VTE Risk Mitigation (From admission, onward)         Ordered     IP VTE LOW RISK PATIENT  Once         05/24/22 0202     Place sequential compression device  Until discontinued         05/24/22 0202                Discharge Planning   SHANE: 5/29/2022     Code Status: Full Code   Is the patient medically ready for discharge?:     Reason for patient still in hospital (select all that apply): Patient new problem, Patient trending condition, Laboratory test, Treatment and Imaging  Discharge Plan A: Home                  Moraima Valentine NP  Department of Hospital Medicine   [facility]

## 2022-05-28 NOTE — PLAN OF CARE
Dx Sickle Cell Crisis & PNA. Encouraged use of IS. VSS. No fever thus far on this shift. Ambulating to BR independently. IVF's infusing. PAC accessed to LCW. Remains on telemetry NSR to ST 90's- 100's. + bld culture. Remains on Levaquin. Labs to be drawn this AM. Pain medication controlling pain fairly. Tolerating regular diet well. Possible RBC transfusion of H/H below 7/21 per MD notes. Pt remains free from falls. Monitoring strict I/O's. Daily weights per orders.

## 2022-05-28 NOTE — NURSING
Labs drawn via PAC via sterile technique. Tolerated well. Fresh ice water provided, OJ given per pt request.  @ BS.

## 2022-05-28 NOTE — ASSESSMENT & PLAN NOTE
Acute:  - IVFH  - supplemental oxygen  - prn pain medication ordered  - prn antiemetics  - Trending CBC  - CXR:   1. Perihilar interstitial prominence, nonspecific and unchanged from prior.  No new focal consolidation.  Correlate for sickle cell crisis.   2. Bone infarcts in the bilateral humeral heads and multiple H-type vertebral body compression fractures.    5/25 - resume home medications today and cont prn IV for BT pain  5/26 - pain persist but mildy improved. Cont current regimen and monitor closely  5/27 - stop IV morphine and change to IV dilaudid for better pain control, cont home oral meds  5/28 - pain better controlled with dilaudid, will increase to 1mg today

## 2022-05-29 ENCOUNTER — OUTSIDE PLACE OF SERVICE (OUTPATIENT)
Dept: ADMINISTRATIVE | Facility: HOSPITAL | Age: 32
End: 2022-05-29
Payer: MEDICAID

## 2022-05-29 DIAGNOSIS — D57.00 SICKLE CELL CRISIS: ICD-10-CM

## 2022-05-29 DIAGNOSIS — B95.7 STAPHYLOCOCCUS EPIDERMIDIS BACTEREMIA: ICD-10-CM

## 2022-05-29 DIAGNOSIS — R78.81 STAPHYLOCOCCUS EPIDERMIDIS BACTEREMIA: ICD-10-CM

## 2022-05-29 LAB
ALBUMIN SERPL BCP-MCNC: 3.4 G/DL (ref 3.5–5.2)
ALP SERPL-CCNC: 97 U/L (ref 55–135)
ALT SERPL W/O P-5'-P-CCNC: 51 U/L (ref 10–44)
ANION GAP SERPL CALC-SCNC: 10 MMOL/L (ref 8–16)
AST SERPL-CCNC: 64 U/L (ref 10–40)
BASOPHILS # BLD AUTO: 0.08 K/UL (ref 0–0.2)
BASOPHILS NFR BLD: 0.6 % (ref 0–1.9)
BILIRUB SERPL-MCNC: 1.8 MG/DL (ref 0.1–1)
BUN SERPL-MCNC: 7 MG/DL (ref 6–20)
CALCIUM SERPL-MCNC: 8.7 MG/DL (ref 8.7–10.5)
CHLORIDE SERPL-SCNC: 103 MMOL/L (ref 95–110)
CO2 SERPL-SCNC: 25 MMOL/L (ref 23–29)
CREAT SERPL-MCNC: 0.8 MG/DL (ref 0.5–1.4)
CRP SERPL-MCNC: 45.1 MG/L (ref 0–8.2)
DIFFERENTIAL METHOD: ABNORMAL
EOSINOPHIL # BLD AUTO: 0.5 K/UL (ref 0–0.5)
EOSINOPHIL NFR BLD: 3.6 % (ref 0–8)
ERYTHROCYTE [DISTWIDTH] IN BLOOD BY AUTOMATED COUNT: 15.3 % (ref 11.5–14.5)
EST. GFR  (AFRICAN AMERICAN): >60 ML/MIN/1.73 M^2
EST. GFR  (NON AFRICAN AMERICAN): >60 ML/MIN/1.73 M^2
GLUCOSE SERPL-MCNC: 114 MG/DL (ref 70–110)
HCT VFR BLD AUTO: 21.4 % (ref 37–48.5)
HGB BLD-MCNC: 7.3 G/DL (ref 12–16)
IMM GRANULOCYTES # BLD AUTO: 0.07 K/UL (ref 0–0.04)
IMM GRANULOCYTES NFR BLD AUTO: 0.5 % (ref 0–0.5)
LYMPHOCYTES # BLD AUTO: 2.8 K/UL (ref 1–4.8)
LYMPHOCYTES NFR BLD: 21.1 % (ref 18–48)
MCH RBC QN AUTO: 30.5 PG (ref 27–31)
MCHC RBC AUTO-ENTMCNC: 34.1 G/DL (ref 32–36)
MCV RBC AUTO: 90 FL (ref 82–98)
MONOCYTES # BLD AUTO: 1.4 K/UL (ref 0.3–1)
MONOCYTES NFR BLD: 10.5 % (ref 4–15)
NEUTROPHILS # BLD AUTO: 8.6 K/UL (ref 1.8–7.7)
NEUTROPHILS NFR BLD: 63.7 % (ref 38–73)
NRBC BLD-RTO: 0 /100 WBC
PLATELET # BLD AUTO: 425 K/UL (ref 150–450)
PMV BLD AUTO: 9.3 FL (ref 9.2–12.9)
POTASSIUM SERPL-SCNC: 4.1 MMOL/L (ref 3.5–5.1)
PROCALCITONIN SERPL IA-MCNC: 0.78 NG/ML
PROT SERPL-MCNC: 6.6 G/DL (ref 6–8.4)
RBC # BLD AUTO: 2.39 M/UL (ref 4–5.4)
SODIUM SERPL-SCNC: 138 MMOL/L (ref 136–145)
VANCOMYCIN TROUGH SERPL-MCNC: 14.1 UG/ML (ref 10–22)
WBC # BLD AUTO: 13.44 K/UL (ref 3.9–12.7)

## 2022-05-29 PROCEDURE — 94761 N-INVAS EAR/PLS OXIMETRY MLT: CPT

## 2022-05-29 PROCEDURE — 63600175 PHARM REV CODE 636 W HCPCS: Performed by: HOSPITALIST

## 2022-05-29 PROCEDURE — 87081 CULTURE SCREEN ONLY: CPT | Performed by: STUDENT IN AN ORGANIZED HEALTH CARE EDUCATION/TRAINING PROGRAM

## 2022-05-29 PROCEDURE — 99223 PR INITIAL HOSPITAL CARE,LEVL III: ICD-10-PCS | Mod: ,,, | Performed by: STUDENT IN AN ORGANIZED HEALTH CARE EDUCATION/TRAINING PROGRAM

## 2022-05-29 PROCEDURE — 63600175 PHARM REV CODE 636 W HCPCS

## 2022-05-29 PROCEDURE — 85025 COMPLETE CBC W/AUTO DIFF WBC: CPT | Performed by: NURSE PRACTITIONER

## 2022-05-29 PROCEDURE — 25000003 PHARM REV CODE 250

## 2022-05-29 PROCEDURE — 84145 PROCALCITONIN (PCT): CPT | Performed by: STUDENT IN AN ORGANIZED HEALTH CARE EDUCATION/TRAINING PROGRAM

## 2022-05-29 PROCEDURE — 63600175 PHARM REV CODE 636 W HCPCS: Performed by: NURSE PRACTITIONER

## 2022-05-29 PROCEDURE — 99223 1ST HOSP IP/OBS HIGH 75: CPT | Mod: ,,, | Performed by: STUDENT IN AN ORGANIZED HEALTH CARE EDUCATION/TRAINING PROGRAM

## 2022-05-29 PROCEDURE — 12000002 HC ACUTE/MED SURGE SEMI-PRIVATE ROOM

## 2022-05-29 PROCEDURE — 80053 COMPREHEN METABOLIC PANEL: CPT | Performed by: NURSE PRACTITIONER

## 2022-05-29 PROCEDURE — 36415 COLL VENOUS BLD VENIPUNCTURE: CPT | Performed by: NURSE PRACTITIONER

## 2022-05-29 PROCEDURE — 86140 C-REACTIVE PROTEIN: CPT | Performed by: STUDENT IN AN ORGANIZED HEALTH CARE EDUCATION/TRAINING PROGRAM

## 2022-05-29 PROCEDURE — 99900035 HC TECH TIME PER 15 MIN (STAT)

## 2022-05-29 PROCEDURE — 80202 ASSAY OF VANCOMYCIN: CPT | Performed by: HOSPITALIST

## 2022-05-29 PROCEDURE — 25000003 PHARM REV CODE 250: Performed by: NURSE PRACTITIONER

## 2022-05-29 PROCEDURE — 25000003 PHARM REV CODE 250: Performed by: HOSPITALIST

## 2022-05-29 RX ORDER — HYDROMORPHONE HYDROCHLORIDE 1 MG/ML
0.5 INJECTION, SOLUTION INTRAMUSCULAR; INTRAVENOUS; SUBCUTANEOUS EVERY 4 HOURS PRN
Status: DISCONTINUED | OUTPATIENT
Start: 2022-05-29 | End: 2022-05-31 | Stop reason: HOSPADM

## 2022-05-29 RX ADMIN — ACETAMINOPHEN 650 MG: 325 TABLET ORAL at 12:05

## 2022-05-29 RX ADMIN — MORPHINE SULFATE 30 MG: 30 TABLET, FILM COATED, EXTENDED RELEASE ORAL at 03:05

## 2022-05-29 RX ADMIN — HYDROMORPHONE HYDROCHLORIDE 1 MG: 1 INJECTION, SOLUTION INTRAMUSCULAR; INTRAVENOUS; SUBCUTANEOUS at 08:05

## 2022-05-29 RX ADMIN — SODIUM CHLORIDE, SODIUM LACTATE, POTASSIUM CHLORIDE, AND CALCIUM CHLORIDE: .6; .31; .03; .02 INJECTION, SOLUTION INTRAVENOUS at 03:05

## 2022-05-29 RX ADMIN — DIPHENHYDRAMINE HYDROCHLORIDE 12.5 MG: 50 INJECTION, SOLUTION INTRAMUSCULAR; INTRAVENOUS at 09:05

## 2022-05-29 RX ADMIN — HYDROMORPHONE HYDROCHLORIDE 1 MG: 1 INJECTION, SOLUTION INTRAMUSCULAR; INTRAVENOUS; SUBCUTANEOUS at 12:05

## 2022-05-29 RX ADMIN — MORPHINE SULFATE 30 MG: 30 TABLET, FILM COATED, EXTENDED RELEASE ORAL at 05:05

## 2022-05-29 RX ADMIN — MORPHINE SULFATE 30 MG: 30 TABLET, FILM COATED, EXTENDED RELEASE ORAL at 09:05

## 2022-05-29 RX ADMIN — DIPHENHYDRAMINE HYDROCHLORIDE 12.5 MG: 50 INJECTION, SOLUTION INTRAMUSCULAR; INTRAVENOUS at 05:05

## 2022-05-29 RX ADMIN — DIPHENHYDRAMINE HYDROCHLORIDE 12.5 MG: 50 INJECTION, SOLUTION INTRAMUSCULAR; INTRAVENOUS at 08:05

## 2022-05-29 RX ADMIN — OXYCODONE HYDROCHLORIDE AND ACETAMINOPHEN 1 TABLET: 5; 325 TABLET ORAL at 05:05

## 2022-05-29 RX ADMIN — VANCOMYCIN HYDROCHLORIDE 1000 MG: 1 INJECTION, POWDER, LYOPHILIZED, FOR SOLUTION INTRAVENOUS at 12:05

## 2022-05-29 RX ADMIN — HYDROXYUREA 1000 MG: 500 CAPSULE ORAL at 08:05

## 2022-05-29 RX ADMIN — FOLIC ACID 4 MG: 1 TABLET ORAL at 08:05

## 2022-05-29 RX ADMIN — DOCUSATE SODIUM AND SENNOSIDES 1 TABLET: 8.6; 5 TABLET, FILM COATED ORAL at 09:05

## 2022-05-29 RX ADMIN — DIPHENHYDRAMINE HYDROCHLORIDE 12.5 MG: 50 INJECTION, SOLUTION INTRAMUSCULAR; INTRAVENOUS at 12:05

## 2022-05-29 RX ADMIN — LEVOFLOXACIN 750 MG: 750 INJECTION, SOLUTION INTRAVENOUS at 10:05

## 2022-05-29 RX ADMIN — DOCUSATE SODIUM AND SENNOSIDES 1 TABLET: 8.6; 5 TABLET, FILM COATED ORAL at 08:05

## 2022-05-29 RX ADMIN — OXYCODONE HYDROCHLORIDE AND ACETAMINOPHEN 1 TABLET: 5; 325 TABLET ORAL at 10:05

## 2022-05-29 NOTE — CARE UPDATE
05/29/22 0809   Patient Assessment/Suction   Level of Consciousness (AVPU) alert   PRE-TX-O2   O2 Device (Oxygen Therapy) room air   SpO2 98 %   Pulse Oximetry Type Intermittent   $ Pulse Oximetry - Multiple Charge Pulse Oximetry - Multiple   Pulse 91   Resp 16   Incentive Spirometer   $ Incentive Spirometer Charges ready for self-administration

## 2022-05-29 NOTE — ASSESSMENT & PLAN NOTE
Patient has hypokalemia which is currently controlled. Last electrolytes reviewed-   Recent Labs   Lab 05/28/22  0621 05/29/22  0314   K 4.5 4.1   . Will replace potassium and monitor electrolytes closely.

## 2022-05-29 NOTE — PLAN OF CARE
POC discussed with pt. Pt voiced understanding.  Pt pain controlled with pain medication per provider orders. VSS, safety measures maintained.  Call light in reach. Will continue to monitor. Instructed pt to call with any needs.

## 2022-05-29 NOTE — ASSESSMENT & PLAN NOTE
Repeat blood cultures remain negative  Cont IV vanc  ID consult  Echo reviewed and neg for vegetation

## 2022-05-29 NOTE — PROGRESS NOTES
"[facility] Medicine  Progress Note    Patient Name: Nishi Dumont  MRN: [id number]  Patient Class: IP- Inpatient   Admission Date: 5/23/2022  Length of Stay: 4 days  Attending Physician: Edwin Avitia MD  Primary Care Provider: [facility]        Subjective:     Principal Problem:Sickle cell pain crisis        HPI:  Nishi Dumont is a 31 y.o. y.o. 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 She states pain started suddenly 2 day ago while she was at home holding her child. Describes pain as a constant generalized stabbing sensation that is "everywhere" but specifically reports pain in right ribcage and mid back. States this feels like past sickle cell crisis. She also reports chills, nasal congestion, back pain, right shoulder pain, and cough intermittently productive of green sputum. She initially reported CP but corrected herself stating it was chest tightness rather than pain. She also denies fever, SOB, palpitations, abdominal pain, n/v/d, and dysuria. Patient has Hx of left should AVN s/p decompression with Dr. Rebolledo 3/9. Last sickle sell pain crisis was 4/18. ED workup was significant for elevated WBC, anemia, hypokalemia, thrombocytosis, hyperbilirubinemia, and CXR concerning for bone infarcts in the bilateral humeral heads and multiple H-type vertebral body compression fractures. The patient was placed in observation under the care of Hospital Medicine.       Overview/Hospital Course:  No notes on file    Interval History:  Notes reviewed, no acute events overnight.  Patient reports sickle cell pain is slightly improved today.  I had a detailed discussion with patient in regards to her being mildly hypotension and amount of opiates she is currently receiving.  Advised patient will have to hold off on " IV Dilaudid for now since blood pressure remains on the lower side.  Patient verbalized understanding. Patient reports allergy to Toradol with rash. Encouraged patient to utilize p.o. medications.  Blood cultures growing Staph epidermidis, ID consult placed.  Patient reports allergy to Toradol with rash.  Will continue current treatment plan and await further workup and ID recommendations.      Review of Systems   Constitutional:  Positive for fatigue. Negative for chills and fever.   HENT:  Negative for congestion, sore throat and trouble swallowing.    Respiratory:  Negative for cough and shortness of breath.    Cardiovascular:  Negative for chest pain, palpitations and leg swelling.   Gastrointestinal:  Negative for abdominal pain, diarrhea, nausea and vomiting.   Genitourinary:  Negative for difficulty urinating, dysuria and urgency.   Musculoskeletal:  Positive for arthralgias and back pain. Negative for gait problem, joint swelling and myalgias.   Skin:  Negative for color change, pallor, rash and wound.   Neurological:  Negative for dizziness, weakness and light-headedness.   Psychiatric/Behavioral:  Negative for agitation, behavioral problems and confusion.    All other systems reviewed and are negative.  Objective:     Vital Signs (Most Recent):  Temp: 98 °F (36.7 °C) (05/29/22 1553)  Pulse: 99 (05/29/22 1553)  Resp: 16 (05/29/22 1553)  BP: (!) 97/54 (05/29/22 1553)  SpO2: 98 % (05/29/22 1553)   Vital Signs (24h Range):  Temp:  [98 °F (36.7 °C)-101.1 °F (38.4 °C)] 98 °F (36.7 °C)  Pulse:  [] 99  Resp:  [14-18] 16  SpO2:  [95 %-99 %] 98 %  BP: ()/(43-76) 97/54     Weight: 52.6 kg (116 lb)  Body mass index is 23.43 kg/m².    Intake/Output Summary (Last 24 hours) at 5/29/2022 1620  Last data filed at 5/29/2022 0600  Gross per 24 hour   Intake 1980 ml   Output --   Net 1980 ml        Physical Exam  Vitals and nursing note reviewed.   Constitutional:       General: She is not in acute distress.      Appearance: Normal appearance. She is well-developed. She is not ill-appearing or diaphoretic.   HENT:      Head: Normocephalic and atraumatic.      Right Ear: External ear normal.      Left Ear: External ear normal.      Nose: Nose normal. No congestion or rhinorrhea.      Mouth/Throat:      Mouth: Mucous membranes are moist.      Pharynx: Oropharynx is clear. No oropharyngeal exudate or posterior oropharyngeal erythema.   Eyes:      General: No scleral icterus.     Conjunctiva/sclera: Conjunctivae normal.      Pupils: Pupils are equal, round, and reactive to light.   Neck:      Vascular: No JVD.   Cardiovascular:      Rate and Rhythm: Normal rate and regular rhythm.      Pulses: Normal pulses.      Heart sounds: Normal heart sounds. No murmur heard.  Pulmonary:      Effort: Pulmonary effort is normal. No respiratory distress.      Breath sounds: Normal breath sounds. No stridor. No wheezing, rhonchi or rales.   Abdominal:      General: Bowel sounds are normal. There is no distension.      Palpations: Abdomen is soft.      Tenderness: There is no abdominal tenderness.   Musculoskeletal:         General: Tenderness present. No swelling. Normal range of motion.      Cervical back: Normal range of motion and neck supple.      Comments: Mild tenderness to left shoulder AC joint with movement, no point tenderness. FROM. No vertebral tenderness   Skin:     General: Skin is warm and dry.      Capillary Refill: Capillary refill takes 2 to 3 seconds.      Coloration: Skin is not jaundiced or pale.      Findings: No erythema.      Comments: Left CW PAC site covered with drsg CDI, no surrounding erythema, mild tenderness to site with palpation.    Neurological:      General: No focal deficit present.      Mental Status: She is alert and oriented to person, place, and time.      Cranial Nerves: No cranial nerve deficit.      Sensory: No sensory deficit.   Psychiatric:         Mood and Affect: Mood normal.         Behavior:  Behavior normal.         Thought Content: Thought content normal.       Significant Labs: All pertinent labs within the past 24 hours have been reviewed.  CBC:   Recent Labs   Lab 05/28/22  0621 05/29/22 0314   WBC 15.81* 13.44*   HGB 7.9* 7.3*   HCT 22.9* 21.4*    425       CMP:   Recent Labs   Lab 05/28/22  0621 05/29/22 0314    138   K 4.5 4.1    103   CO2 26 25   GLU 88 114*   BUN 7 7   CREATININE 0.8 0.8   CALCIUM 8.8 8.7   PROT 6.7 6.6   ALBUMIN 3.5 3.4*   BILITOT 2.3* 1.8*   ALKPHOS 104 97   AST 33 64*   ALT 30 51*   ANIONGAP 10 10   EGFRNONAA >60 >60         Significant Imaging: I have reviewed all pertinent imaging results/findings within the past 24 hours.      Assessment/Plan:      * Sickle cell pain crisis  Acute:  - IVFH  - supplemental oxygen  - prn pain medication ordered  - prn antiemetics  - Trending CBC  - CXR:   1. Perihilar interstitial prominence, nonspecific and unchanged from prior.  No new focal consolidation.  Correlate for sickle cell crisis.   2. Bone infarcts in the bilateral humeral heads and multiple H-type vertebral body compression fractures.    5/25 - resume home medications today and cont prn IV for BT pain  5/26 - pain persist but mildy improved. Cont current regimen and monitor closely  5/27 - stop IV morphine and change to IV dilaudid for better pain control, cont home oral meds  5/28 - pain better controlled with dilaudid, will increase to 1mg today  5/29 - pain improved today, will decrease IV dilaudid to 0.5 mg due to mild hypotension. Pt encouraged to utilize po meds    Staphylococcus epidermidis bacteremia  Repeat blood cultures remain negative  Cont IV vanc  ID consult  Echo reviewed and neg for vegetation        Pneumonia  cxr reviewed - LLL infiltrate  IV levaquin and vanc      Thrombocytosis  Chronic:  - monitor CBC      History of avascular necrosis of left humeral head  Noted.   - s/p decompression left humeral head 3/9 with Dr. Rebolledo  - ortho  consult appreciated  - Xray right shoulder reviewed and neg for acute findings  - CXR:   1. Perihilar interstitial prominence, nonspecific and unchanged from prior.  No new focal consolidation.  Correlate for sickle cell crisis.   2. Bone infarcts in the bilateral humeral heads and multiple H-type vertebral body compression fractures.      Hypokalemia  Patient has hypokalemia which is currently controlled. Last electrolytes reviewed-   Recent Labs   Lab 05/28/22  0621 05/29/22  0314   K 4.5 4.1   . Will replace potassium and monitor electrolytes closely.         Hyperbilirubinemia  Noted.   - Monitor CBC closely      Sickle cell anemia  Patient's anemia is currently controlled. Has not received any PRBCs to date.. Etiology likely d/t sickle cell anemia  Current CBC reviewed-   Lab Results   Component Value Date    HGB 7.8 (L) 05/23/2022    HCT 22.7 (L) 05/23/2022     Monitor serial CBC and transfuse if patient becomes hemodynamically unstable, symptomatic or H/H drops below 7/21.           VTE Risk Mitigation (From admission, onward)         Ordered     IP VTE LOW RISK PATIENT  Once         05/24/22 0202     Place sequential compression device  Until discontinued         05/24/22 0202                Discharge Planning   SHANE: 5/29/2022     Code Status: Full Code   Is the patient medically ready for discharge?:     Reason for patient still in hospital (select all that apply): Patient trending condition, Laboratory test, Treatment and Consult recommendations  Discharge Plan A: Home                  Moraima Valentine NP  Department of Hospital Medicine   [facility]

## 2022-05-29 NOTE — ASSESSMENT & PLAN NOTE
Acute:  - IVFH  - supplemental oxygen  - prn pain medication ordered  - prn antiemetics  - Trending CBC  - CXR:   1. Perihilar interstitial prominence, nonspecific and unchanged from prior.  No new focal consolidation.  Correlate for sickle cell crisis.   2. Bone infarcts in the bilateral humeral heads and multiple H-type vertebral body compression fractures.    5/25 - resume home medications today and cont prn IV for BT pain  5/26 - pain persist but mildy improved. Cont current regimen and monitor closely  5/27 - stop IV morphine and change to IV dilaudid for better pain control, cont home oral meds  5/28 - pain better controlled with dilaudid, will increase to 1mg today  5/29 - pain improved today, will decrease IV dilaudid to 0.5 mg due to mild hypotension. Pt encouraged to utilize po meds

## 2022-05-29 NOTE — SUBJECTIVE & OBJECTIVE
Interval History:  Notes reviewed, no acute events overnight.  Patient reports sickle cell pain is slightly improved today.  I had a detailed discussion with patient in regards to her being mildly hypotension and amount of opiates she is currently receiving.  Advised patient will have to hold off on IV Dilaudid for now since blood pressure remains on the lower side.  Patient verbalized understanding. Patient reports allergy to Toradol with rash. Encouraged patient to utilize p.o. medications.  Blood cultures growing Staph epidermidis, ID consult placed.  Patient reports allergy to Toradol with rash.  Will continue current treatment plan and await further workup and ID recommendations.      Review of Systems   Constitutional:  Positive for fatigue. Negative for chills and fever.   HENT:  Negative for congestion, sore throat and trouble swallowing.    Respiratory:  Negative for cough and shortness of breath.    Cardiovascular:  Negative for chest pain, palpitations and leg swelling.   Gastrointestinal:  Negative for abdominal pain, diarrhea, nausea and vomiting.   Genitourinary:  Negative for difficulty urinating, dysuria and urgency.   Musculoskeletal:  Positive for arthralgias and back pain. Negative for gait problem, joint swelling and myalgias.   Skin:  Negative for color change, pallor, rash and wound.   Neurological:  Negative for dizziness, weakness and light-headedness.   Psychiatric/Behavioral:  Negative for agitation, behavioral problems and confusion.    All other systems reviewed and are negative.  Objective:     Vital Signs (Most Recent):  Temp: 98 °F (36.7 °C) (05/29/22 1553)  Pulse: 99 (05/29/22 1553)  Resp: 16 (05/29/22 1553)  BP: (!) 97/54 (05/29/22 1553)  SpO2: 98 % (05/29/22 1553)   Vital Signs (24h Range):  Temp:  [98 °F (36.7 °C)-101.1 °F (38.4 °C)] 98 °F (36.7 °C)  Pulse:  [] 99  Resp:  [14-18] 16  SpO2:  [95 %-99 %] 98 %  BP: ()/(43-76) 97/54     Weight: 52.6 kg (116 lb)  Body mass  index is 23.43 kg/m².    Intake/Output Summary (Last 24 hours) at 5/29/2022 1620  Last data filed at 5/29/2022 0600  Gross per 24 hour   Intake 1980 ml   Output --   Net 1980 ml        Physical Exam  Vitals and nursing note reviewed.   Constitutional:       General: She is not in acute distress.     Appearance: Normal appearance. She is well-developed. She is not ill-appearing or diaphoretic.   HENT:      Head: Normocephalic and atraumatic.      Right Ear: External ear normal.      Left Ear: External ear normal.      Nose: Nose normal. No congestion or rhinorrhea.      Mouth/Throat:      Mouth: Mucous membranes are moist.      Pharynx: Oropharynx is clear. No oropharyngeal exudate or posterior oropharyngeal erythema.   Eyes:      General: No scleral icterus.     Conjunctiva/sclera: Conjunctivae normal.      Pupils: Pupils are equal, round, and reactive to light.   Neck:      Vascular: No JVD.   Cardiovascular:      Rate and Rhythm: Normal rate and regular rhythm.      Pulses: Normal pulses.      Heart sounds: Normal heart sounds. No murmur heard.  Pulmonary:      Effort: Pulmonary effort is normal. No respiratory distress.      Breath sounds: Normal breath sounds. No stridor. No wheezing, rhonchi or rales.   Abdominal:      General: Bowel sounds are normal. There is no distension.      Palpations: Abdomen is soft.      Tenderness: There is no abdominal tenderness.   Musculoskeletal:         General: Tenderness present. No swelling. Normal range of motion.      Cervical back: Normal range of motion and neck supple.      Comments: Mild tenderness to left shoulder AC joint with movement, no point tenderness. FROM. No vertebral tenderness   Skin:     General: Skin is warm and dry.      Capillary Refill: Capillary refill takes 2 to 3 seconds.      Coloration: Skin is not jaundiced or pale.      Findings: No erythema.      Comments: Left CW PAC site covered with drsg CDI, no surrounding erythema, mild tenderness to site  with palpation.    Neurological:      General: No focal deficit present.      Mental Status: She is alert and oriented to person, place, and time.      Cranial Nerves: No cranial nerve deficit.      Sensory: No sensory deficit.   Psychiatric:         Mood and Affect: Mood normal.         Behavior: Behavior normal.         Thought Content: Thought content normal.       Significant Labs: All pertinent labs within the past 24 hours have been reviewed.  CBC:   Recent Labs   Lab 05/28/22  0621 05/29/22  0314   WBC 15.81* 13.44*   HGB 7.9* 7.3*   HCT 22.9* 21.4*    425       CMP:   Recent Labs   Lab 05/28/22  0621 05/29/22  0314    138   K 4.5 4.1    103   CO2 26 25   GLU 88 114*   BUN 7 7   CREATININE 0.8 0.8   CALCIUM 8.8 8.7   PROT 6.7 6.6   ALBUMIN 3.5 3.4*   BILITOT 2.3* 1.8*   ALKPHOS 104 97   AST 33 64*   ALT 30 51*   ANIONGAP 10 10   EGFRNONAA >60 >60         Significant Imaging: I have reviewed all pertinent imaging results/findings within the past 24 hours.

## 2022-05-29 NOTE — HOSPITAL COURSE
Patient admitted for sickle cell pain crisis.  Patient was initiated on IV fluids and IV p.r.n. pain medication.  Patient developed fever overnight 5/26. Fever workup obtained - UA negative, CXR revealed small LLL infiltrate and BC obtained.  Pt was initiated on IV Levaquin for pneumonia.  Her blood cultures resulted positive for Gram-positive bacteremia on 05/27 and IV vancomycin was added to regimen.  Patient underwent echocardiogram which revealed no vegetation and unrevealing for any acute abnormalities.  Repeat blood cultures were obtained on 05/27 and remain negative.  Blood cultures were followed and growing Staph epidermidis and ID was consulted for further assistance.  Patient required titration of IV p.r.n. pain medication for better control.  Patient became mildly hypotensive on 05/29 and IV Dilaudid dose was decreased and patient was encouraged to utilize p.r.n. p.o. pain medication. Pt was monitored closely and bp remained stable.

## 2022-05-29 NOTE — NURSING
Called in ID consult to Dr. Faust spoke with Meliza from the answering service. Updated primary nurse Lorene.

## 2022-05-29 NOTE — PLAN OF CARE
POC reviewed with patient. Verbalized understanding. Vancomycin started yesterday d/t + bld cultures. Bld cultures re-drawn yesterday. Temp 101.1 this shift. Echo done and results pending. PAC dressing changed this shift. Remains on telemetry. ST low 100's. CBC and CMP labs ordered for this AM. Progressing toward d/c goals.

## 2022-05-29 NOTE — ASSESSMENT & PLAN NOTE
Noted.   - s/p decompression left humeral head 3/9 with Dr. Rebolledo  - ortho consult appreciated  - Xray right shoulder reviewed and neg for acute findings  - CXR:   1. Perihilar interstitial prominence, nonspecific and unchanged from prior.  No new focal consolidation.  Correlate for sickle cell crisis.   2. Bone infarcts in the bilateral humeral heads and multiple H-type vertebral body compression fractures.

## 2022-05-30 LAB
ALBUMIN SERPL BCP-MCNC: 3.5 G/DL (ref 3.5–5.2)
ALP SERPL-CCNC: 93 U/L (ref 55–135)
ALT SERPL W/O P-5'-P-CCNC: 50 U/L (ref 10–44)
ANION GAP SERPL CALC-SCNC: 9 MMOL/L (ref 8–16)
AST SERPL-CCNC: 39 U/L (ref 10–40)
BACTERIA BLD CULT: ABNORMAL
BASOPHILS # BLD AUTO: 0.09 K/UL (ref 0–0.2)
BASOPHILS NFR BLD: 0.7 % (ref 0–1.9)
BILIRUB SERPL-MCNC: 1.8 MG/DL (ref 0.1–1)
BUN SERPL-MCNC: 12 MG/DL (ref 6–20)
CALCIUM SERPL-MCNC: 9.1 MG/DL (ref 8.7–10.5)
CHLORIDE SERPL-SCNC: 104 MMOL/L (ref 95–110)
CO2 SERPL-SCNC: 25 MMOL/L (ref 23–29)
CREAT SERPL-MCNC: 0.9 MG/DL (ref 0.5–1.4)
DIFFERENTIAL METHOD: ABNORMAL
EOSINOPHIL # BLD AUTO: 0.5 K/UL (ref 0–0.5)
EOSINOPHIL NFR BLD: 3.8 % (ref 0–8)
ERYTHROCYTE [DISTWIDTH] IN BLOOD BY AUTOMATED COUNT: 15.3 % (ref 11.5–14.5)
EST. GFR  (AFRICAN AMERICAN): >60 ML/MIN/1.73 M^2
EST. GFR  (NON AFRICAN AMERICAN): >60 ML/MIN/1.73 M^2
GLUCOSE SERPL-MCNC: 109 MG/DL (ref 70–110)
HCT VFR BLD AUTO: 20.9 % (ref 37–48.5)
HGB BLD-MCNC: 7.2 G/DL (ref 12–16)
IMM GRANULOCYTES # BLD AUTO: 0.07 K/UL (ref 0–0.04)
IMM GRANULOCYTES NFR BLD AUTO: 0.6 % (ref 0–0.5)
LYMPHOCYTES # BLD AUTO: 2.8 K/UL (ref 1–4.8)
LYMPHOCYTES NFR BLD: 23.2 % (ref 18–48)
MCH RBC QN AUTO: 30.6 PG (ref 27–31)
MCHC RBC AUTO-ENTMCNC: 34.4 G/DL (ref 32–36)
MCV RBC AUTO: 89 FL (ref 82–98)
MONOCYTES # BLD AUTO: 1.2 K/UL (ref 0.3–1)
MONOCYTES NFR BLD: 9.9 % (ref 4–15)
NEUTROPHILS # BLD AUTO: 7.5 K/UL (ref 1.8–7.7)
NEUTROPHILS NFR BLD: 61.8 % (ref 38–73)
NRBC BLD-RTO: 0 /100 WBC
PLATELET # BLD AUTO: 409 K/UL (ref 150–450)
PMV BLD AUTO: 9.3 FL (ref 9.2–12.9)
POTASSIUM SERPL-SCNC: 4.8 MMOL/L (ref 3.5–5.1)
PROT SERPL-MCNC: 7 G/DL (ref 6–8.4)
RBC # BLD AUTO: 2.35 M/UL (ref 4–5.4)
SODIUM SERPL-SCNC: 138 MMOL/L (ref 136–145)
WBC # BLD AUTO: 12.09 K/UL (ref 3.9–12.7)

## 2022-05-30 PROCEDURE — 99232 PR SUBSEQUENT HOSPITAL CARE,LEVL II: ICD-10-PCS | Mod: ,,, | Performed by: STUDENT IN AN ORGANIZED HEALTH CARE EDUCATION/TRAINING PROGRAM

## 2022-05-30 PROCEDURE — 63600175 PHARM REV CODE 636 W HCPCS

## 2022-05-30 PROCEDURE — 25000003 PHARM REV CODE 250: Performed by: HOSPITALIST

## 2022-05-30 PROCEDURE — 12000002 HC ACUTE/MED SURGE SEMI-PRIVATE ROOM

## 2022-05-30 PROCEDURE — 80053 COMPREHEN METABOLIC PANEL: CPT | Performed by: NURSE PRACTITIONER

## 2022-05-30 PROCEDURE — 25000003 PHARM REV CODE 250: Performed by: NURSE PRACTITIONER

## 2022-05-30 PROCEDURE — 36415 COLL VENOUS BLD VENIPUNCTURE: CPT | Performed by: NURSE PRACTITIONER

## 2022-05-30 PROCEDURE — 99232 SBSQ HOSP IP/OBS MODERATE 35: CPT | Mod: ,,, | Performed by: STUDENT IN AN ORGANIZED HEALTH CARE EDUCATION/TRAINING PROGRAM

## 2022-05-30 PROCEDURE — 63600175 PHARM REV CODE 636 W HCPCS: Performed by: HOSPITALIST

## 2022-05-30 PROCEDURE — 63600175 PHARM REV CODE 636 W HCPCS: Performed by: NURSE PRACTITIONER

## 2022-05-30 PROCEDURE — 94761 N-INVAS EAR/PLS OXIMETRY MLT: CPT

## 2022-05-30 PROCEDURE — 25000003 PHARM REV CODE 250

## 2022-05-30 PROCEDURE — 85025 COMPLETE CBC W/AUTO DIFF WBC: CPT | Performed by: NURSE PRACTITIONER

## 2022-05-30 PROCEDURE — 94799 UNLISTED PULMONARY SVC/PX: CPT

## 2022-05-30 RX ORDER — OXYCODONE HYDROCHLORIDE 10 MG/1
10 TABLET ORAL EVERY 4 HOURS PRN
Status: DISCONTINUED | OUTPATIENT
Start: 2022-05-30 | End: 2022-05-31 | Stop reason: HOSPADM

## 2022-05-30 RX ADMIN — VANCOMYCIN HYDROCHLORIDE 1250 MG: 1.25 INJECTION, POWDER, LYOPHILIZED, FOR SOLUTION INTRAVENOUS at 12:05

## 2022-05-30 RX ADMIN — OXYCODONE HYDROCHLORIDE 10 MG: 10 TABLET ORAL at 11:05

## 2022-05-30 RX ADMIN — DIPHENHYDRAMINE HYDROCHLORIDE 12.5 MG: 50 INJECTION, SOLUTION INTRAMUSCULAR; INTRAVENOUS at 03:05

## 2022-05-30 RX ADMIN — DIPHENHYDRAMINE HYDROCHLORIDE 12.5 MG: 50 INJECTION, SOLUTION INTRAMUSCULAR; INTRAVENOUS at 02:05

## 2022-05-30 RX ADMIN — OXYCODONE HYDROCHLORIDE 10 MG: 10 TABLET ORAL at 09:05

## 2022-05-30 RX ADMIN — HYDROMORPHONE HYDROCHLORIDE 0.5 MG: 1 INJECTION, SOLUTION INTRAMUSCULAR; INTRAVENOUS; SUBCUTANEOUS at 02:05

## 2022-05-30 RX ADMIN — FOLIC ACID 4 MG: 1 TABLET ORAL at 09:05

## 2022-05-30 RX ADMIN — LEVOFLOXACIN 750 MG: 750 INJECTION, SOLUTION INTRAVENOUS at 09:05

## 2022-05-30 RX ADMIN — OXYCODONE HYDROCHLORIDE AND ACETAMINOPHEN 1 TABLET: 5; 325 TABLET ORAL at 01:05

## 2022-05-30 RX ADMIN — MORPHINE SULFATE 30 MG: 30 TABLET, FILM COATED, EXTENDED RELEASE ORAL at 09:05

## 2022-05-30 RX ADMIN — DIPHENHYDRAMINE HYDROCHLORIDE 12.5 MG: 50 INJECTION, SOLUTION INTRAMUSCULAR; INTRAVENOUS at 09:05

## 2022-05-30 RX ADMIN — MORPHINE SULFATE 30 MG: 30 TABLET, FILM COATED, EXTENDED RELEASE ORAL at 01:05

## 2022-05-30 RX ADMIN — OXYCODONE HYDROCHLORIDE 10 MG: 10 TABLET ORAL at 07:05

## 2022-05-30 RX ADMIN — DIPHENHYDRAMINE HYDROCHLORIDE 12.5 MG: 50 INJECTION, SOLUTION INTRAMUSCULAR; INTRAVENOUS at 11:05

## 2022-05-30 RX ADMIN — HYDROMORPHONE HYDROCHLORIDE 0.5 MG: 1 INJECTION, SOLUTION INTRAMUSCULAR; INTRAVENOUS; SUBCUTANEOUS at 03:05

## 2022-05-30 RX ADMIN — MORPHINE SULFATE 30 MG: 30 TABLET, FILM COATED, EXTENDED RELEASE ORAL at 06:05

## 2022-05-30 RX ADMIN — SODIUM CHLORIDE, SODIUM LACTATE, POTASSIUM CHLORIDE, AND CALCIUM CHLORIDE: .6; .31; .03; .02 INJECTION, SOLUTION INTRAVENOUS at 09:05

## 2022-05-30 RX ADMIN — HYDROXYUREA 1000 MG: 500 CAPSULE ORAL at 09:05

## 2022-05-30 NOTE — CARE UPDATE
05/30/22 0824   Patient Assessment/Suction   Level of Consciousness (AVPU) alert   PRE-TX-O2   O2 Device (Oxygen Therapy) room air   SpO2 100 %   Pulse Oximetry Type Intermittent   $ Pulse Oximetry - Multiple Charge Pulse Oximetry - Multiple   Incentive Spirometer   $ Incentive Spirometer Charges done with encouragement   Administration (IS) self-administered

## 2022-05-30 NOTE — ASSESSMENT & PLAN NOTE
Patient has hypokalemia which is currently controlled. Last electrolytes reviewed-   Recent Labs   Lab 05/29/22  0314 05/30/22  0349   K 4.1 4.8   . Will replace potassium and monitor electrolytes closely.

## 2022-05-30 NOTE — PROGRESS NOTES
"Pharmacokinetic Assessment Follow Up: IV Vancomycin    Vancomycin serum concentration assessment(s):    The trough level was drawn correctly and can be used to guide therapy at this time. The measurement is below the desired definitive target range of 15 to 20 mcg/mL.    Vancomycin Regimen Plan:    Change regimen to Vancomycin 1250 mg IV every 12 hours with next serum trough concentration measured at 1130 prior to 4th dose on 05/31/22    Drug levels (last 3 results):  Recent Labs   Lab Result Units 05/29/22  2330   Vancomycin-Trough ug/mL 14.1       Pharmacy will continue to follow and monitor vancomycin.    Please contact pharmacy at extension [phone] for questions regarding this assessment.    Thank you for the consult,   Alex Murillo       Patient brief summary:  Nishi Dumont is a 31 y.o. female initiated on antimicrobial therapy with IV Vancomycin for treatment of bacteremia    The patient's current regimen is 1000mg q12h    Drug Allergies:   Review of patient's allergies indicates:   Allergen Reactions    Contrast media Hives    Iodine and iodide containing products Hives and Swelling    Mushroom Hives    Zithromax [azithromycin] Anaphylaxis    Demerol [meperidine] Other (See Comments)     Regarding reaction to demerol pt states "I talk out of my head and become aggressive"    Toradol [ketorolac] Rash       Actual Body Weight:   52.6kg    Renal Function:   Estimated Creatinine Clearance: 78.5 mL/min (based on SCr of 0.8 mg/dL).,     CBC (last 72 hours):  Recent Labs   Lab Result Units 05/27/22  0412 05/28/22  0621 05/29/22  0314   WBC K/uL 17.16* 15.81* 13.44*   Hemoglobin g/dL 7.1* 7.9* 7.3*   Hematocrit % 21.9* 22.9* 21.4*   Platelets K/uL 426 423 425   Gran % % 78.7* 78.6* 63.7   Lymph % % 12.6* 9.6* 21.1   Mono % % 5.4 6.4 10.5   Eosinophil % % 2.3 4.5 3.6   Basophil % % 0.5 0.5 0.6   Differential Method  Automated Automated Automated       Metabolic Panel (last 72 hours):  Recent Labs   Lab Result " Units 05/27/22  0356 05/27/22  0412 05/28/22  0621 05/29/22  0314   Sodium mmol/L  --  142 138 138   Potassium mmol/L  --  3.8 4.5 4.1   Chloride mmol/L  --  105 102 103   CO2 mmol/L  --  27 26 25   Glucose mg/dL  --  90 88 114*   Glucose, UA  Negative  --   --   --    BUN mg/dL  --  7 7 7   Creatinine mg/dL  --  0.7 0.8 0.8   Albumin g/dL  --  3.3* 3.5 3.4*   Total Bilirubin mg/dL  --  2.1* 2.3* 1.8*   Alkaline Phosphatase U/L  --  99 104 97   AST U/L  --  29 33 64*   ALT U/L  --  29 30 51*       Vancomycin Administrations:  vancomycin given in the last 96 hours                     vancomycin in dextrose 5 % 1 gram/250 mL IVPB 1,000 mg (mg) 1,000 mg New Bag 05/29/22 1240     1,000 mg New Bag  0012     1,000 mg New Bag 05/28/22 1119                    Microbiologic Results:  Microbiology Results (last 7 days)       Procedure Component Value Units Date/Time    Blood culture [[id number]] Collected: 05/28/22 1039    Order Status: Completed Specimen: Blood from Antecubital, Right Updated: 05/29/22 2322     Blood Culture, Routine No Growth to date      No Growth to date    Blood culture [[id number]] Collected: 05/28/22 1050    Order Status: Completed Specimen: Blood Updated: 05/29/22 2322     Blood Culture, Routine No Growth to date      No Growth to date    Culture, MRSA [[id number]] Collected: 05/29/22 1711    Order Status: Sent Specimen: MRSA source from Nares, Left Updated: 05/29/22 2259    Blood culture [[id number]]  (Abnormal) Collected: 05/26/22 1757    Order Status: Completed Specimen: Blood Updated: 05/29/22 1249     Blood Culture, Routine Gram stain joao bottle: Gram positive cocci in clusters resembling Staph      Results called to and read back by:Verena Olivas RN 05/27/2022  20:19      Gram stain aer bottle: Gram positive cocci in clusters resembling Staph       Positive results previously called 05/28/2022  14:35      STAPHYLOCOCCUS EPIDERMIDIS  Susceptibility pending      Blood culture [[id number]] Collected:  05/26/22 1757    Order Status: Completed Specimen: Blood Updated: 05/28/22 2025     Blood Culture, Routine Gram stain peds bottle: Gram positive cocci in clusters resembling Staph       Positive results previously called 05/28/2022  20:25

## 2022-05-30 NOTE — PROGRESS NOTES
"      [facility] Medicine  Telemedicine Progress Note    Patient Name: Nishi Dumont  MRN: [id number]  Patient Class: IP- Inpatient   Admission Date: 5/23/2022  Length of Stay: 5 days  Attending Physician: Susan Austin MD  Primary Care Provider: [facility]          Subjective:     Principal Problem:Sickle cell pain crisis        HPI:  Nishi Dumont is a 31 y.o. y.o. 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 She states pain started suddenly 2 day ago while she was at home holding her child. Describes pain as a constant generalized stabbing sensation that is "everywhere" but specifically reports pain in right ribcage and mid back. States this feels like past sickle cell crisis. She also reports chills, nasal congestion, back pain, right shoulder pain, and cough intermittently productive of green sputum. She initially reported CP but corrected herself stating it was chest tightness rather than pain. She also denies fever, SOB, palpitations, abdominal pain, n/v/d, and dysuria. Patient has Hx of left should AVN s/p decompression with Dr. Rebolledo 3/9. Last sickle sell pain crisis was 4/18. ED workup was significant for elevated WBC, anemia, hypokalemia, thrombocytosis, hyperbilirubinemia, and CXR concerning for bone infarcts in the bilateral humeral heads and multiple H-type vertebral body compression fractures. The patient was placed in observation under the care of Hospital Medicine.       Overview/Hospital Course:  Patient admitted for sickle cell pain crisis.  Patient was initiated on IV fluids and IV p.r.n. pain medication.  Patient developed fever overnight 5/26. Fever workup obtained - UA negative, CXR revealed small LLL infiltrate and BC obtained.  Pt was initiated on IV Levaquin for pneumonia.  Her blood " cultures resulted positive for Gram-positive bacteremia on 05/27 and IV vancomycin was added to regimen.  Patient underwent echocardiogram which revealed no vegetation and unrevealing for any acute abnormalities.  Repeat blood cultures were obtained on 05/27 and remain negative.  Blood cultures were followed and growing Staph epidermidis and ID was consulted for further assistance.  Patient required titration of IV p.r.n. pain medication for better control.  Patient became mildly hypotensive on 05/29 and IV Dilaudid dose was decreased and patient was encouraged to utilize p.r.n. p.o. pain medication. Pt was monitored closely and bp remained stable.       Interval History: No acute events today. Continuing IV abx per ID. Pain improved but not yet ready to wean off IV pain meds. Increased oral oxycodone.     Review of Systems   Constitutional:  Negative for chills, fatigue and fever.   HENT: Negative.     Eyes: Negative.    Respiratory:  Negative for cough and shortness of breath.    Cardiovascular:  Negative for chest pain, palpitations and leg swelling.   Gastrointestinal:  Negative for abdominal pain and vomiting.   Genitourinary: Negative.    Skin: Negative.    Neurological:  Negative for seizures and speech difficulty.   Psychiatric/Behavioral:  Negative for agitation and confusion. The patient is not nervous/anxious.    Objective:     Vital Signs (Most Recent):  Temp: 97.2 °F (36.2 °C) (05/30/22 0750)  Pulse: 101 (05/30/22 0750)  Resp: 18 (05/30/22 0939)  BP: (!) 105/58 (05/30/22 0750)  SpO2: 100 % (05/30/22 0824)   Vital Signs (24h Range):  Temp:  [97.2 °F (36.2 °C)-98.7 °F (37.1 °C)] 97.2 °F (36.2 °C)  Pulse:  [] 101  Resp:  [16-18] 18  SpO2:  [96 %-100 %] 100 %  BP: ()/(52-59) 105/58     Weight: 52.6 kg (116 lb)  Body mass index is 23.43 kg/m².    Intake/Output Summary (Last 24 hours) at 5/30/2022 1039  Last data filed at 5/30/2022 0600  Gross per 24 hour   Intake 1500 ml   Output --   Net 1500 ml       Physical Exam  Vitals and nursing note reviewed.   Constitutional:       General: She is awake. She is not in acute distress.     Appearance: Normal appearance. She is well-developed and well-groomed. She is not ill-appearing, toxic-appearing or diaphoretic.   HENT:      Head: Normocephalic and atraumatic.   Eyes:      General: No scleral icterus.  Cardiovascular:      Rate and Rhythm: Normal rate.   Pulmonary:      Effort: No tachypnea or respiratory distress.   Musculoskeletal:      Right lower leg: No edema.      Left lower leg: No edema.   Skin:     Coloration: Skin is not jaundiced.   Neurological:      General: No focal deficit present.      Mental Status: She is alert and oriented to person, place, and time. Mental status is at baseline.   Psychiatric:         Attention and Perception: Attention normal.         Mood and Affect: Mood and affect normal.         Speech: Speech normal.         Behavior: Behavior normal. Behavior is cooperative.         Thought Content: Thought content normal.         Cognition and Memory: Cognition and memory normal. Cognition is not impaired. Memory is not impaired.         Judgment: Judgment normal.       Significant Labs: All pertinent labs within the past 24 hours have been reviewed.    Significant Imaging: I have reviewed all pertinent imaging results/findings within the past 24 hours.      Assessment/Plan:      * Sickle cell pain crisis  Acute:  - IVFH  - supplemental oxygen  - prn pain medication ordered  - prn antiemetics  - Trending CBC  - CXR:   1. Perihilar interstitial prominence, nonspecific and unchanged from prior.  No new focal consolidation.  Correlate for sickle cell crisis.   2. Bone infarcts in the bilateral humeral heads and multiple H-type vertebral body compression fractures.    5/25 - resume home medications today and cont prn IV for BT pain  5/26 - pain persist but mildy improved. Cont current regimen and monitor closely  5/27 - stop IV morphine and  "change to IV dilaudid for better pain control, cont home oral meds  5/28 - pain better controlled with dilaudid, will increase to 1mg today  5/29 - pain improved today, will decrease IV dilaudid to 0.5 mg due to mild hypotension. Pt encouraged to utilize po meds  5/30 - cont pain control.     Staphylococcus epidermidis bacteremia  Temp Readings from Last 3 Encounters:   05/30/22 97.2 °F (36.2 °C)   04/19/22 97 °F (36.1 °C)   03/11/22 97.5 °F (36.4 °C) (Oral)     Lab Results   Component Value Date    WBC 12.09 05/30/2022     No results for input(s): LACTATE in the last 72 hours.    Antibiotics given-   Antibiotics (From admission, onward)            Start     Stop Route Frequency Ordered    05/30/22 0030  vancomycin 1.25 g in dextrose 5% 250 mL IVPB (ready to mix)         -- IV Every 12 hours (non-standard times) 05/30/22 0005    05/28/22 1042  vancomycin - pharmacy to dose  (vancomycin IVPB)        "And" Linked Group Details    -- IV pharmacy to manage frequency 05/28/22 0942    05/27/22 0930  levoFLOXacin 750 mg/150 mL IVPB 750 mg         -- IV Every 24 hours (non-standard times) 05/27/22 0815        Cultures were taken-   Microbiology Results (last 7 days)     Procedure Component Value Units Date/Time    Blood culture [[id number]]  (Abnormal) Collected: 05/26/22 1757    Order Status: Completed Specimen: Blood Updated: 05/30/22 0916     Blood Culture, Routine Gram stain peds bottle: Gram positive cocci in clusters resembling Staph       Positive results previously called 05/28/2022  20:25      STAPHYLOCOCCUS SPECIES  Identification and susceptibility pending      Blood culture [[id number]] Collected: 05/28/22 1039    Order Status: Completed Specimen: Blood from Antecubital, Right Updated: 05/29/22 2322     Blood Culture, Routine No Growth to date      No Growth to date    Blood culture [[id number]] Collected: 05/28/22 1050    Order Status: Completed Specimen: Blood Updated: 05/29/22 2322     Blood Culture, Routine No " Growth to date      No Growth to date    Culture, MRSA [[id number]] Collected: 05/29/22 1711    Order Status: Sent Specimen: MRSA source from Nares, Left Updated: 05/29/22 2259    Blood culture [[id number]]  (Abnormal) Collected: 05/26/22 1757    Order Status: Completed Specimen: Blood Updated: 05/29/22 1249     Blood Culture, Routine Gram stain joao bottle: Gram positive cocci in clusters resembling Staph      Results called to and read back by:Verena Olivas RN 05/27/2022  20:19      Gram stain aer bottle: Gram positive cocci in clusters resembling Staph       Positive results previously called 05/28/2022  14:35      STAPHYLOCOCCUS EPIDERMIDIS  Susceptibility pending          Repeat blood cultures remain negative  Echo reviewed and neg for vegetation  Cont IV vanc  ID consult: Continue vancomycin IV, keep level 15-20 for 2 weeks, estimated end date 6/11/2022      Pneumonia  cxr reviewed - LLL infiltrate  IV levaquin and vanc      Thrombocytosis  Chronic:  - monitor CBC      History of avascular necrosis of left humeral head  Noted.   - s/p decompression left humeral head 3/9 with Dr. Rebolledo  - ortho consult appreciated  - Xray right shoulder reviewed and neg for acute findings  - CXR:   1. Perihilar interstitial prominence, nonspecific and unchanged from prior.  No new focal consolidation.  Correlate for sickle cell crisis.   2. Bone infarcts in the bilateral humeral heads and multiple H-type vertebral body compression fractures.      Hypokalemia  Patient has hypokalemia which is currently controlled. Last electrolytes reviewed-   Recent Labs   Lab 05/29/22  0314 05/30/22  0349   K 4.1 4.8   . Will replace potassium and monitor electrolytes closely.         Hyperbilirubinemia  Noted.   - Monitor CBC closely      Sickle cell anemia  Patient's anemia is currently controlled. Has not received any PRBCs to date.. Etiology likely d/t sickle cell anemia  Current CBC reviewed-   Lab Results   Component Value Date    HGB  7.2 (L) 05/30/2022    HCT 20.9 (L) 05/30/2022     Monitor serial CBC and transfuse if patient becomes hemodynamically unstable, symptomatic or H/H drops below 7/21.           VTE Risk Mitigation (From admission, onward)         Ordered     IP VTE LOW RISK PATIENT  Once         05/24/22 0202     Place sequential compression device  Until discontinued         05/24/22 0202                      I have assessed these finding virtually using telemed platform and with assistance of bedside nurse                 The attending portion of this evaluation, treatment, and documentation was performed per Susan Braun MD via Telemedicine AudioVisual using the secure "[employer] software platform with 2 way audio/video. The provider was located off-site and the patient is located in the hospital. The aforementioned video software was utilized to document the relevant history and physical exam    Susan Braun MD  Department of Hospital Medicine   [facility]   Detail Level: Simple Detail Level: Detailed

## 2022-05-30 NOTE — ASSESSMENT & PLAN NOTE
"Temp Readings from Last 3 Encounters:   05/30/22 97.2 °F (36.2 °C)   04/19/22 97 °F (36.1 °C)   03/11/22 97.5 °F (36.4 °C) (Oral)     Lab Results   Component Value Date    WBC 12.09 05/30/2022     No results for input(s): LACTATE in the last 72 hours.    Antibiotics given-   Antibiotics (From admission, onward)            Start     Stop Route Frequency Ordered    05/30/22 0030  vancomycin 1.25 g in dextrose 5% 250 mL IVPB (ready to mix)         -- IV Every 12 hours (non-standard times) 05/30/22 0005    05/28/22 1042  vancomycin - pharmacy to dose  (vancomycin IVPB)        "And" Linked Group Details    -- IV pharmacy to manage frequency 05/28/22 0942    05/27/22 0930  levoFLOXacin 750 mg/150 mL IVPB 750 mg         -- IV Every 24 hours (non-standard times) 05/27/22 0815        Cultures were taken-   Microbiology Results (last 7 days)     Procedure Component Value Units Date/Time    Blood culture [[id number]]  (Abnormal) Collected: 05/26/22 1757    Order Status: Completed Specimen: Blood Updated: 05/30/22 0916     Blood Culture, Routine Gram stain peds bottle: Gram positive cocci in clusters resembling Staph       Positive results previously called 05/28/2022  20:25      STAPHYLOCOCCUS SPECIES  Identification and susceptibility pending      Blood culture [[id number]] Collected: 05/28/22 1039    Order Status: Completed Specimen: Blood from Antecubital, Right Updated: 05/29/22 2322     Blood Culture, Routine No Growth to date      No Growth to date    Blood culture [[id number]] Collected: 05/28/22 1050    Order Status: Completed Specimen: Blood Updated: 05/29/22 2322     Blood Culture, Routine No Growth to date      No Growth to date    Culture, MRSA [[id number]] Collected: 05/29/22 1711    Order Status: Sent Specimen: MRSA source from Nares, Left Updated: 05/29/22 2259    Blood culture [[id number]]  (Abnormal) Collected: 05/26/22 1757    Order Status: Completed Specimen: Blood Updated: 05/29/22 1249     Blood Culture, " Routine Gram stain joao bottle: Gram positive cocci in clusters resembling Staph      Results called to and read back by:Verena Olivas RN 05/27/2022  20:19      Gram stain aer bottle: Gram positive cocci in clusters resembling Staph       Positive results previously called 05/28/2022  14:35      STAPHYLOCOCCUS EPIDERMIDIS  Susceptibility pending          Repeat blood cultures remain negative  Echo reviewed and neg for vegetation  Cont IV vanc  ID consult: Continue vancomycin IV, keep level 15-20 for 2 weeks, estimated end date 6/11/2022

## 2022-05-30 NOTE — PROGRESS NOTES
Consult Note  Infectious Disease    Reason for Consult:  Staphylococcus epidermidis bacteremia in the setting of Port-A-Cath     HPI: Nishi Dumont is a 31 y.o. 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  Last sickle cell crisis 4/18.  She has past medical history of sickle cell disease, on monthly exchange transfusions via left chemo port, prior acute chest syndromes, avascular necrosis of the left hip/femur in 2006, past surgical history of cholecystectomy.  Presenting this time with what she describes as uncomfortable feeling on her left chemo port, symptoms worsened after holding her child at home.  Pain was generalized, stabbing sensation, everywhere including rib ribcage and mid back.  She also complained initially of mild productive cough, and congestion.  She denies fever, chills, nausea, vomit, abdominal pain, dysuria, or change in bowel movements.    In the ER, hemodynamically stable, afebrile  Labs on admission significant for white count of 15.1, PMN 82%, H&H 7.8/22.7, platelet count 472, occasional sickle cell seen on peripheral blood smear  Hypokalemia 3.1, stable kidney function 0.7, normal LFTs  UA with trace occult blood, 2 -3 urobilinogen, negative for infection  Chest x-ray revealed perihilar interstitial prominence, nonspecific and unchanged from prior.  No new focal consolidation.  Correlate for sickle cell crisis. Bone infarcts in the bilateral humeral heads and multiple H-type vertebral body compression fractures.    Patient admitted for acute sickle cell crisis.    Hospital course complicated by fever of 102.5, chest x-ray reveal new small infiltrate at the left lung base.  Empirically started on levofloxacin 5/27, vancomycin added on 05/28.  Blood cultures 3/4 bottles grew Staphylococcus epidermidis, sensitivities pending.  Repeat blood cultures x2  "5/28 no growth to date, pending final.    ID consult for staphylococcal epidermidis bacteremia in the setting of left port a cath.    INTERVAL HISTORY:   5/30: Interim reviewed, patient c/o L ribcage pain upon deep inspiration, just received pain meds. hemodynamically stable, afebrile. Labs reviewed, normal WBC, no left shift. H/H 7.2/20.39. plt: 409. Micro reviewed, repeat blood culture no growth.       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    Avascular necrosis of bone of hip     Avascular necrosis of humeral head     Blood transfusion     Sickle cell disease      Past Surgical History:   Procedure Laterality Date    CHOLECYSTECTOMY      JOINT REPLACEMENT       left hip     Social History     Tobacco Use    Smoking status: Never Smoker    Smokeless tobacco: Never Used   Substance Use Topics    Alcohol use: Yes     Alcohol/week: 0.0 standard drinks     Comment: occassionally        Family History   Problem Relation Age of Onset    Sickle cell trait Mother     Sickle cell trait Father     Sickle cell trait Brother     Sickle cell trait Daughter          Review of Systems:   10 points RANGEL reviewed negative, except for what mentioned in HPi.    Outdoor activities:  Lives at home, has 3 kids all of them with sickle trait  Travel:  None  Implants:  Left port A -cath placed 6 years ago  Antibiotic History: Levofloxacin 5/75, vancomycin 5/28.        EXAM & DIAGNOSTICS REVIEWED:   Vitals:     Temp:  [97.2 °F (36.2 °C)-98.7 °F (37.1 °C)]   Temp: 97.2 °F (36.2 °C) (05/30/22 " 0750)  Pulse: 101 (05/30/22 0750)  Resp: 16 (05/30/22 0750)  BP: (!) 105/58 (05/30/22 0750)  SpO2: 100 % (05/30/22 0824)    Intake/Output Summary (Last 24 hours) at 5/30/2022 0920  Last data filed at 5/30/2022 0600  Gross per 24 hour   Intake 1500 ml   Output --   Net 1500 ml       General:         In NAD. Alert and attentive, cooperative, comfortable on room air  Eyes:               Contact lenses, anicteric, PERRL  ENT:                No ulcers, exudates, thrush, nares patent, dentition is good  Neck:              Supple  Lungs:            Minimal rhonchi L, R clear  Heart:              S1/S2+, regular rhythm, no murmurs  Abd:                +BS, soft, non tender, non distended, no rebound  :                  Voids  Musc:              Joints without effusion, swelling,  erythema, synovitis, ambulatory  Skin:               Warm, no rash  Wound:             Neuro:  Following commands, no acute focal deficit   Psych:             Calm, cooperative  Lymphatic:       Extrem:           No LE edema b/l  VAD:               Port-A cath L chest since Sept/2016 - no redness noted, not tender to palpation today, no indurated area noted                                                          Isolation: None    General Labs reviewed:  Recent Labs   Lab 05/28/22  0621 05/29/22  0314 05/30/22  0349   WBC 15.81* 13.44* 12.09   HGB 7.9* 7.3* 7.2*   HCT 22.9* 21.4* 20.9*    425 409       Recent Labs   Lab 05/28/22  0621 05/29/22  0314 05/30/22  0349    138 138   K 4.5 4.1 4.8    103 104   CO2 26 25 25   BUN 7 7 12   CREATININE 0.8 0.8 0.9   CALCIUM 8.8 8.7 9.1   PROT 6.7 6.6 7.0   BILITOT 2.3* 1.8* 1.8*   ALKPHOS 104 97 93   ALT 30 51* 50*   AST 33 64* 39     Recent Labs   Lab 05/29/22  1556   CRP 45.1*     No results for input(s): SEDRATE in the last 168 hours.    Estimated Creatinine Clearance: 69.8 mL/min (based on SCr of 0.9 mg/dL).     Micro:  Microbiology Results (last 7 days)     Procedure Component Value  Units Date/Time    Blood culture [[id number]]  (Abnormal) Collected: 05/26/22 1757    Order Status: Completed Specimen: Blood Updated: 05/30/22 0916     Blood Culture, Routine Gram stain peds bottle: Gram positive cocci in clusters resembling Staph       Positive results previously called 05/28/2022  20:25      STAPHYLOCOCCUS SPECIES  Identification and susceptibility pending      Blood culture [[id number]] Collected: 05/28/22 1039    Order Status: Completed Specimen: Blood from Antecubital, Right Updated: 05/29/22 2322     Blood Culture, Routine No Growth to date      No Growth to date    Blood culture [[id number]] Collected: 05/28/22 1050    Order Status: Completed Specimen: Blood Updated: 05/29/22 2322     Blood Culture, Routine No Growth to date      No Growth to date    Culture, MRSA [[id number]] Collected: 05/29/22 1711    Order Status: Sent Specimen: MRSA source from Nares, Left Updated: 05/29/22 2259    Blood culture [[id number]]  (Abnormal) Collected: 05/26/22 1757    Order Status: Completed Specimen: Blood Updated: 05/29/22 1249     Blood Culture, Routine Gram stain joao bottle: Gram positive cocci in clusters resembling Staph      Results called to and read back by:Verena Olivas RN 05/27/2022  20:19      Gram stain aer bottle: Gram positive cocci in clusters resembling Staph       Positive results previously called 05/28/2022  14:35      STAPHYLOCOCCUS EPIDERMIDIS  Susceptibility pending            Imaging Reviewed:  CXRs    Cardiology: ECHO 5/28/22  · The left ventricle is normal in size with normal systolic function.  · The estimated ejection fraction is 70%.  · Normal left ventricular diastolic function.  · Normal right ventricular size with normal right ventricular systolic function.  · Normal central venous pressure (3 mmHg).  · The estimated PA systolic pressure is 35 mmHg.  As per report, all valves appear structurally normal.         IMPRESSION & PLAN     1. Sepsis resolved, likely secondary to Staph  epidermidis bacteremia in the setting of port a cath and possible HCAP, currently on room air   WBC improved   Repeat blood cultures x2 5/28 no growth to date, pending final   ECHo no mention regarding vegetations    Procal 0.78, CRP 45.1   MRSA nasal swab in process    2. PMHx sickle cell s/p left hip replacement       Recommendations:  Continue vancomycin IV, keep level 15-20 for 2 weeks, estimated end date 6/11/2022  Levofloxacin 750mg PO daily PO, one mor day   Monitor CBC w/diff, CMP, vanco level, CRP weekly while on antibiotics   Follow up ID clinic/Dr Faust in 1-2 weeks; office to call patient and schedule appt    Will sign-off, call us back with any questions    D/w nursing, CM, Dr Austin    Medical Decision Making during this encounter was  [_] Low Complexity  [_] Moderate Complexity  [xx] High Complexity

## 2022-05-30 NOTE — PLAN OF CARE
Problem: Adult Inpatient Plan of Care  Goal: Plan of Care Review  Outcome: Ongoing, Progressing  Goal: Patient-Specific Goal (Individualized)  Outcome: Ongoing, Progressing  Goal: Absence of Hospital-Acquired Illness or Injury  Outcome: Ongoing, Progressing  Goal: Optimal Comfort and Wellbeing  Outcome: Ongoing, Progressing  Goal: Readiness for Transition of Care  Outcome: Ongoing, Progressing     Problem: Fall Injury Risk  Goal: Absence of Fall and Fall-Related Injury  Outcome: Ongoing, Progressing     Problem: Infection  Goal: Absence of Infection Signs and Symptoms  Outcome: Ongoing, Progressing     Problem: Fluid Imbalance (Pneumonia)  Goal: Fluid Balance  Outcome: Ongoing, Progressing     Problem: Infection (Pneumonia)  Goal: Resolution of Infection Signs and Symptoms  Outcome: Ongoing, Progressing     Pt A/O. Medicated for pain x 2 and for itching x 1. Pt awake most of the night with significant other at bedside. Rounded on pt atleast every 2 hours addressing pain, safety, call light and ice water within reach. IVFs infusing continuously as ordered.

## 2022-05-30 NOTE — CONSULTS
Nishi Dumont has been accepted for transfer to [facility] and will be followed through telemedicine services beginning  at 7 AM on 5/30/22.

## 2022-05-30 NOTE — SUBJECTIVE & OBJECTIVE
Interval History: No acute events today. Continuing IV abx per ID. Pain improved but not yet ready to wean off IV pain meds. Increased oral oxycodone.     Review of Systems   Constitutional:  Negative for chills, fatigue and fever.   HENT: Negative.     Eyes: Negative.    Respiratory:  Negative for cough and shortness of breath.    Cardiovascular:  Negative for chest pain, palpitations and leg swelling.   Gastrointestinal:  Negative for abdominal pain and vomiting.   Genitourinary: Negative.    Skin: Negative.    Neurological:  Negative for seizures and speech difficulty.   Psychiatric/Behavioral:  Negative for agitation and confusion. The patient is not nervous/anxious.    Objective:     Vital Signs (Most Recent):  Temp: 97.2 °F (36.2 °C) (05/30/22 0750)  Pulse: 101 (05/30/22 0750)  Resp: 18 (05/30/22 0939)  BP: (!) 105/58 (05/30/22 0750)  SpO2: 100 % (05/30/22 0824)   Vital Signs (24h Range):  Temp:  [97.2 °F (36.2 °C)-98.7 °F (37.1 °C)] 97.2 °F (36.2 °C)  Pulse:  [] 101  Resp:  [16-18] 18  SpO2:  [96 %-100 %] 100 %  BP: ()/(52-59) 105/58     Weight: 52.6 kg (116 lb)  Body mass index is 23.43 kg/m².    Intake/Output Summary (Last 24 hours) at 5/30/2022 1039  Last data filed at 5/30/2022 0600  Gross per 24 hour   Intake 1500 ml   Output --   Net 1500 ml      Physical Exam  Vitals and nursing note reviewed.   Constitutional:       General: She is awake. She is not in acute distress.     Appearance: Normal appearance. She is well-developed and well-groomed. She is not ill-appearing, toxic-appearing or diaphoretic.   HENT:      Head: Normocephalic and atraumatic.   Eyes:      General: No scleral icterus.  Cardiovascular:      Rate and Rhythm: Normal rate.   Pulmonary:      Effort: No tachypnea or respiratory distress.   Musculoskeletal:      Right lower leg: No edema.      Left lower leg: No edema.   Skin:     Coloration: Skin is not jaundiced.   Neurological:      General: No focal deficit present.       Mental Status: She is alert and oriented to person, place, and time. Mental status is at baseline.   Psychiatric:         Attention and Perception: Attention normal.         Mood and Affect: Mood and affect normal.         Speech: Speech normal.         Behavior: Behavior normal. Behavior is cooperative.         Thought Content: Thought content normal.         Cognition and Memory: Cognition and memory normal. Cognition is not impaired. Memory is not impaired.         Judgment: Judgment normal.       Significant Labs: All pertinent labs within the past 24 hours have been reviewed.    Significant Imaging: I have reviewed all pertinent imaging results/findings within the past 24 hours.

## 2022-05-30 NOTE — ASSESSMENT & PLAN NOTE
Acute:  - IVFH  - supplemental oxygen  - prn pain medication ordered  - prn antiemetics  - Trending CBC  - CXR:   1. Perihilar interstitial prominence, nonspecific and unchanged from prior.  No new focal consolidation.  Correlate for sickle cell crisis.   2. Bone infarcts in the bilateral humeral heads and multiple H-type vertebral body compression fractures.    5/25 - resume home medications today and cont prn IV for BT pain  5/26 - pain persist but mildy improved. Cont current regimen and monitor closely  5/27 - stop IV morphine and change to IV dilaudid for better pain control, cont home oral meds  5/28 - pain better controlled with dilaudid, will increase to 1mg today  5/29 - pain improved today, will decrease IV dilaudid to 0.5 mg due to mild hypotension. Pt encouraged to utilize po meds  5/30 - cont pain control.

## 2022-05-30 NOTE — PLAN OF CARE
CM sent Home IV Infusion referral to [facility]/ Infusion. [facility] nurse came and provided teach to pt. Medication and supplies pending delivery, pending final orders. Dr Faust and Dr Austin aware of need for Orders to send to Infusion company prior to delivery of supplies and meds.     Medication - Name, Dose, frequency and route   Port Flush orders- NS and 1:100 heparin   Lab Draw- CBC, CRP and Vanc trough and who to call results to  [facility] to Access and Deaccess port and to perform weekly and PRN dressing changes.   ORDERS to be faxed to [phone] when received.         05/30/22 1646   Post-Acute Status   Post-Acute Authorization IV Infusion   IV Infusion Status Referral(s) sent  ([facility])   Discharge Delays (!) Medication Delivery   Discharge Plan   Discharge Plan A Home with family  (IV Infusion)

## 2022-05-30 NOTE — ASSESSMENT & PLAN NOTE
Patient's anemia is currently controlled. Has not received any PRBCs to date.. Etiology likely d/t sickle cell anemia  Current CBC reviewed-   Lab Results   Component Value Date    HGB 7.2 (L) 05/30/2022    HCT 20.9 (L) 05/30/2022     Monitor serial CBC and transfuse if patient becomes hemodynamically unstable, symptomatic or H/H drops below 7/21.

## 2022-05-31 VITALS
DIASTOLIC BLOOD PRESSURE: 52 MMHG | OXYGEN SATURATION: 96 % | RESPIRATION RATE: 16 BRPM | TEMPERATURE: 99 F | HEIGHT: 59 IN | SYSTOLIC BLOOD PRESSURE: 97 MMHG | BODY MASS INDEX: 23.39 KG/M2 | HEART RATE: 95 BPM | WEIGHT: 116 LBS

## 2022-05-31 LAB
BACTERIA BLD CULT: ABNORMAL
MRSA SPEC QL CULT: NORMAL
VANCOMYCIN TROUGH SERPL-MCNC: 28 UG/ML (ref 10–22)

## 2022-05-31 PROCEDURE — C1751 CATH, INF, PER/CENT/MIDLINE: HCPCS

## 2022-05-31 PROCEDURE — 94799 UNLISTED PULMONARY SVC/PX: CPT

## 2022-05-31 PROCEDURE — 36410 VNPNXR 3YR/> PHY/QHP DX/THER: CPT

## 2022-05-31 PROCEDURE — 94761 N-INVAS EAR/PLS OXIMETRY MLT: CPT

## 2022-05-31 PROCEDURE — 25000003 PHARM REV CODE 250: Performed by: HOSPITALIST

## 2022-05-31 PROCEDURE — 25000003 PHARM REV CODE 250

## 2022-05-31 PROCEDURE — 63600175 PHARM REV CODE 636 W HCPCS: Performed by: NURSE PRACTITIONER

## 2022-05-31 PROCEDURE — 76937 US GUIDE VASCULAR ACCESS: CPT

## 2022-05-31 PROCEDURE — 36415 COLL VENOUS BLD VENIPUNCTURE: CPT | Performed by: HOSPITALIST

## 2022-05-31 PROCEDURE — 63600175 PHARM REV CODE 636 W HCPCS: Performed by: HOSPITALIST

## 2022-05-31 PROCEDURE — 25000003 PHARM REV CODE 250: Performed by: NURSE PRACTITIONER

## 2022-05-31 PROCEDURE — 99900035 HC TECH TIME PER 15 MIN (STAT)

## 2022-05-31 PROCEDURE — 80202 ASSAY OF VANCOMYCIN: CPT | Performed by: HOSPITALIST

## 2022-05-31 RX ORDER — HEPARIN 100 UNIT/ML
5 SYRINGE INTRAVENOUS ONCE
Status: COMPLETED | OUTPATIENT
Start: 2022-05-31 | End: 2022-05-31

## 2022-05-31 RX ADMIN — FOLIC ACID 4 MG: 1 TABLET ORAL at 08:05

## 2022-05-31 RX ADMIN — OXYCODONE HYDROCHLORIDE 10 MG: 10 TABLET ORAL at 05:05

## 2022-05-31 RX ADMIN — DIPHENHYDRAMINE HYDROCHLORIDE 12.5 MG: 50 INJECTION, SOLUTION INTRAMUSCULAR; INTRAVENOUS at 05:05

## 2022-05-31 RX ADMIN — LEVOFLOXACIN 750 MG: 750 INJECTION, SOLUTION INTRAVENOUS at 09:05

## 2022-05-31 RX ADMIN — MORPHINE SULFATE 30 MG: 30 TABLET, FILM COATED, EXTENDED RELEASE ORAL at 03:05

## 2022-05-31 RX ADMIN — OXYCODONE HYDROCHLORIDE 10 MG: 10 TABLET ORAL at 09:05

## 2022-05-31 RX ADMIN — HEPARIN SODIUM (PORCINE) LOCK FLUSH IV SOLN 100 UNIT/ML 500 UNITS: 100 SOLUTION at 04:05

## 2022-05-31 RX ADMIN — VANCOMYCIN HYDROCHLORIDE 1250 MG: 1.25 INJECTION, POWDER, LYOPHILIZED, FOR SOLUTION INTRAVENOUS at 12:05

## 2022-05-31 RX ADMIN — DIPHENHYDRAMINE HYDROCHLORIDE 12.5 MG: 50 INJECTION, SOLUTION INTRAMUSCULAR; INTRAVENOUS at 09:05

## 2022-05-31 RX ADMIN — HYDROXYUREA 1000 MG: 500 CAPSULE ORAL at 08:05

## 2022-05-31 RX ADMIN — DIPHENHYDRAMINE HYDROCHLORIDE 12.5 MG: 50 INJECTION, SOLUTION INTRAMUSCULAR; INTRAVENOUS at 03:05

## 2022-05-31 RX ADMIN — MORPHINE SULFATE 30 MG: 30 TABLET, FILM COATED, EXTENDED RELEASE ORAL at 05:05

## 2022-05-31 NOTE — ASSESSMENT & PLAN NOTE
Patient has hypokalemia which is currently controlled. Last electrolytes reviewed-   Recent Labs   Lab 05/30/22  0349   K 4.8   . Will replace potassium and monitor electrolytes closely.        medium/soft

## 2022-05-31 NOTE — PROGRESS NOTES
"Pharmacokinetic Assessment Follow Up: IV Vancomycin    Vancomycin serum concentration assessment(s):    The random level was drawn correctly and can be used to guide therapy at this time. The measurement is above the desired definitive target range of 15 to 20 mcg/mL.    Vancomycin Regimen Plan:    Re-dose when the random level is less than 20 mcg/mL, next level to be drawn at 0530 on 6/1    Drug levels (last 3 results):  Recent Labs   Lab Result Units 05/29/22  2330 05/31/22  1124   Vancomycin-Trough ug/mL 14.1 28.0*       Pharmacy will continue to follow and monitor vancomycin.    Please contact pharmacy at extension [phone] for questions regarding this assessment.    Thank you for the consult,   Arlin Murillo       Patient brief summary:  Nishi Dumont is a 31 y.o. female initiated on antimicrobial therapy with IV Vancomycin for treatment of bacteremia    Drug Allergies:   Review of patient's allergies indicates:   Allergen Reactions    Contrast media Hives    Iodine and iodide containing products Hives and Swelling    Mushroom Hives    Zithromax [azithromycin] Anaphylaxis    Demerol [meperidine] Other (See Comments)     Regarding reaction to demerol pt states "I talk out of my head and become aggressive"    Toradol [ketorolac] Rash       Actual Body Weight:   52.6 kg    Renal Function:   Estimated Creatinine Clearance: 69.8 mL/min (based on SCr of 0.9 mg/dL).,     Dialysis Method (if applicable):  N/A    CBC (last 72 hours):  Recent Labs   Lab Result Units 05/29/22 0314 05/30/22  0349   WBC K/uL 13.44* 12.09   Hemoglobin g/dL 7.3* 7.2*   Hematocrit % 21.4* 20.9*   Platelets K/uL 425 409   Gran % % 63.7 61.8   Lymph % % 21.1 23.2   Mono % % 10.5 9.9   Eosinophil % % 3.6 3.8   Basophil % % 0.6 0.7   Differential Method  Automated Automated       Metabolic Panel (last 72 hours):  Recent Labs   Lab Result Units 05/29/22  0314 05/30/22  0349   Sodium mmol/L 138 138   Potassium mmol/L 4.1 4.8   Chloride mmol/L 103 " 104   CO2 mmol/L 25 25   Glucose mg/dL 114* 109   BUN mg/dL 7 12   Creatinine mg/dL 0.8 0.9   Albumin g/dL 3.4* 3.5   Total Bilirubin mg/dL 1.8* 1.8*   Alkaline Phosphatase U/L 97 93   AST U/L 64* 39   ALT U/L 51* 50*       Vancomycin Administrations:  vancomycin given in the last 96 hours                     vancomycin 1.25 g in dextrose 5% 250 mL IVPB (ready to mix) (mg) 1,250 mg New Bag 05/31/22 0056     1,250 mg New Bag 05/30/22 1219     1,250 mg New Bag  0034    vancomycin in dextrose 5 % 1 gram/250 mL IVPB 1,000 mg (mg) 1,000 mg New Bag 05/29/22 1240     1,000 mg New Bag  0012     1,000 mg New Bag 05/28/22 1119                    Microbiologic Results:  Microbiology Results (last 7 days)       Procedure Component Value Units Date/Time    Blood culture [[id number]]  (Abnormal)  (Susceptibility) Collected: 05/26/22 1757    Order Status: Completed Specimen: Blood Updated: 05/31/22 0751     Blood Culture, Routine Gram stain peds bottle: Gram positive cocci in clusters resembling Staph       Positive results previously called 05/28/2022  20:25      STAPHYLOCOCCUS EPIDERMIDIS    Culture, MRSA [[id number]] Collected: 05/29/22 1711    Order Status: Completed Specimen: MRSA source from Nares, Left Updated: 05/31/22 0702     MRSA Surveillance Screen No MRSA isolated    Narrative:      Both  nares    Blood culture [[id number]] Collected: 05/28/22 1050    Order Status: Completed Specimen: Blood Updated: 05/30/22 2322     Blood Culture, Routine No Growth to date      No Growth to date      No Growth to date    Blood culture [[id number]] Collected: 05/28/22 1039    Order Status: Completed Specimen: Blood from Antecubital, Right Updated: 05/30/22 2322     Blood Culture, Routine No Growth to date      No Growth to date      No Growth to date    Blood culture [[id number]]  (Abnormal)  (Susceptibility) Collected: 05/26/22 1757    Order Status: Completed Specimen: Blood Updated: 05/30/22 1114     Blood Culture, Routine Gram stain joao  bottle: Gram positive cocci in clusters resembling Staph      Results called to and read back by:Verena Olivas RN 05/27/2022  20:19      Gram stain aer bottle: Gram positive cocci in clusters resembling Staph       Positive results previously called 05/28/2022  14:35      STAPHYLOCOCCUS EPIDERMIDIS

## 2022-05-31 NOTE — PLAN OF CARE
Pt is PENDING Midline Placement and bedside teach for Home IV Infusion, then can DC.       05/31/22 1200   Final Note   Assessment Type Final Discharge Note   Anticipated Discharge Disposition IV Therapy   What phone number can be called within the next 1-3 days to see how you are doing after discharge? [phone]   Hospital Resources/Appts/Education Provided Appointments scheduled and added to AVS;Post-Acute resouces added to AVS   Post-Acute Status   Post-Acute Authorization IV Infusion   IV Infusion Status Awaiting delivery  (Pending Bedside teach for Home Infusion)   Discharge Delays (!) Procedure Scheduling (IR, OR, Labs, Echo, Cath, Echo, EEG)  (PENDING MIDLINE PLACEMENT, IV Infusion Bedside Teach)

## 2022-05-31 NOTE — CARE UPDATE
05/31/22 0756   Patient Assessment/Suction   Level of Consciousness (AVPU)   (pt asleep)   Respiratory Effort Unlabored   Rhythm/Pattern, Respiratory unlabored   PRE-TX-O2   O2 Device (Oxygen Therapy) room air   SpO2 95 %   Pulse Oximetry Type Intermittent   $ Pulse Oximetry - Multiple Charge Pulse Oximetry - Multiple   Pulse 94   Resp 16   Incentive Spirometer   $ Incentive Spirometer Charges unable to perform  (pt asleep)

## 2022-05-31 NOTE — CONSULTS
18 Gx 10cm PowerGlide Midline placed to pts left basilic vein with the use of ultrasound guidance by Trudi.    Ultrasound guidance: yes  Vessel Caliber: large and patent, compressibility normal  Needle advanced into vessel with real time Ultrasound guidance.  Guidewire confirmed in vessel.  Image recorded and saved.  Sterile sheath used.  Sterile dressing applied  Arm circumference- 27cm  Dressing dated   Education provided to patient re: proper maintenance of line- pt verbalized understanding  Limb alert applied.

## 2022-05-31 NOTE — CARE UPDATE
05/30/22 1918   Patient Assessment/Suction   Level of Consciousness (AVPU) alert   LLL Breath Sounds clear   PRE-TX-O2   O2 Device (Oxygen Therapy) room air   SpO2 98 %   Pulse Oximetry Type Intermittent   Incentive Spirometer   $ Incentive Spirometer Charges done with encouragement   Administration (IS) mouthpiece utilized;proper technique demonstrated   Number of Repetitions (IS) 10   Level Incentive Spirometer (mL) 1500   Patient Tolerance (IS) good

## 2022-05-31 NOTE — CARE UPDATE
05/31/22 1138   Patient Assessment/Suction   Level of Consciousness (AVPU) alert   Respiratory Effort Normal;Unlabored   Expansion/Accessory Muscles/Retractions no use of accessory muscles;no retractions;expansion symmetric   All Lung Fields Breath Sounds clear;equal bilaterally   Rhythm/Pattern, Respiratory unlabored;pattern regular;depth regular   Cough Frequency no cough   PRE-TX-O2   O2 Device (Oxygen Therapy) room air   SpO2 96 %   Pulse Oximetry Type Intermittent   $ Pulse Oximetry - Multiple Charge Pulse Oximetry - Multiple   Pulse 95   Resp 16   Temp 98.5 °F (36.9 °C)   BP (!) 97/52   Incentive Spirometer   $ Incentive Spirometer Charges done with encouragement   Incentive Spirometer Predicted Level (mL) 2200   Administration (IS) proper technique demonstrated   Number of Repetitions (IS) 10   Level Incentive Spirometer (mL) 1500   Patient Tolerance (IS) good

## 2022-05-31 NOTE — ASSESSMENT & PLAN NOTE
"Temp Readings from Last 3 Encounters:   05/31/22 98.4 °F (36.9 °C) (Oral)   04/19/22 97 °F (36.1 °C)   03/11/22 97.5 °F (36.4 °C) (Oral)     Lab Results   Component Value Date    WBC 12.09 05/30/2022     No results for input(s): LACTATE in the last 72 hours.    Antibiotics given-   Antibiotics (From admission, onward)            Start     Stop Route Frequency Ordered    05/30/22 0030  vancomycin 1.25 g in dextrose 5% 250 mL IVPB (ready to mix)         -- IV Every 12 hours (non-standard times) 05/30/22 0005    05/28/22 1042  vancomycin - pharmacy to dose  (vancomycin IVPB)        "And" Linked Group Details    -- IV pharmacy to manage frequency 05/28/22 0942    05/27/22 0930  levoFLOXacin 750 mg/150 mL IVPB 750 mg         -- IV Every 24 hours (non-standard times) 05/27/22 0815        Cultures were taken-   Microbiology Results (last 7 days)     Procedure Component Value Units Date/Time    Blood culture [[id number]]  (Abnormal)  (Susceptibility) Collected: 05/26/22 1757    Order Status: Completed Specimen: Blood Updated: 05/31/22 0751     Blood Culture, Routine Gram stain peds bottle: Gram positive cocci in clusters resembling Staph       Positive results previously called 05/28/2022  20:25      STAPHYLOCOCCUS EPIDERMIDIS    Culture, MRSA [[id number]] Collected: 05/29/22 1711    Order Status: Completed Specimen: MRSA source from Nares, Left Updated: 05/31/22 0702     MRSA Surveillance Screen No MRSA isolated    Narrative:      Both  nares    Blood culture [[id number]] Collected: 05/28/22 1050    Order Status: Completed Specimen: Blood Updated: 05/30/22 2322     Blood Culture, Routine No Growth to date      No Growth to date      No Growth to date    Blood culture [[id number]] Collected: 05/28/22 1039    Order Status: Completed Specimen: Blood from Antecubital, Right Updated: 05/30/22 2322     Blood Culture, Routine No Growth to date      No Growth to date      No Growth to date    Blood culture [[id number]]  (Abnormal)  " (Susceptibility) Collected: 05/26/22 5238    Order Status: Completed Specimen: Blood Updated: 05/30/22 1114     Blood Culture, Routine Gram stain joao bottle: Gram positive cocci in clusters resembling Staph      Results called to and read back by:Verena Olivas RN 05/27/2022  20:19      Gram stain aer bottle: Gram positive cocci in clusters resembling Staph       Positive results previously called 05/28/2022  14:35      STAPHYLOCOCCUS EPIDERMIDIS        Repeat blood cultures remain negative  Echo reviewed and neg for vegetation  Cont IV vanc  ID consult: Continue vancomycin IV, keep level 15-20 for 2 weeks, estimated end date 6/11/2022

## 2022-05-31 NOTE — DISCHARGE SUMMARY
"[facility] Medicine  Discharge Summary      Patient Name: Nishi Dumont  MRN: [id number]  Patient Class: IP- Inpatient  Admission Date: 5/23/2022  Hospital Length of Stay: 6 days  Discharge Date and Time:  05/31/2022 10:14 AM  Attending Physician: Susan Austin MD   Discharging Provider: Susan Braun MD  Primary Care Provider: [facility]      HPI:   Nishi Dumont is a 31 y.o. y.o. 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 She states pain started suddenly 2 day ago while she was at home holding her child. Describes pain as a constant generalized stabbing sensation that is "everywhere" but specifically reports pain in right ribcage and mid back. States this feels like past sickle cell crisis. She also reports chills, nasal congestion, back pain, right shoulder pain, and cough intermittently productive of green sputum. She initially reported CP but corrected herself stating it was chest tightness rather than pain. She also denies fever, SOB, palpitations, abdominal pain, n/v/d, and dysuria. Patient has Hx of left should AVN s/p decompression with Dr. Rebolledo 3/9. Last sickle sell pain crisis was 4/18. ED workup was significant for elevated WBC, anemia, hypokalemia, thrombocytosis, hyperbilirubinemia, and CXR concerning for bone infarcts in the bilateral humeral heads and multiple H-type vertebral body compression fractures. The patient was placed in observation under the care of Hospital Medicine.       * No surgery found *      Hospital Course:   Patient admitted for sickle cell pain crisis.  Patient was initiated on IV fluids and IV p.r.n. pain medication.  Patient developed fever overnight 5/26. Fever workup obtained - UA negative, CXR revealed small LLL infiltrate and BC obtained.  Pt was initiated on IV " "Levaquin for pneumonia.  Her blood cultures resulted positive for Gram-positive bacteremia on 05/27 and IV vancomycin was added to regimen.  Patient underwent echocardiogram which revealed no vegetation and unrevealing for any acute abnormalities.  Repeat blood cultures were obtained on 05/27 and remain negative.  Blood cultures were followed and growing Staph epidermidis and ID was consulted for further assistance.  Patient required titration of IV p.r.n. pain medication for better control.  Patient became mildly hypotensive on 05/29 and IV Dilaudid dose was decreased and patient was encouraged to utilize p.r.n. p.o. pain medication. Pt was monitored closely and bp remained stable.        Goals of Care Treatment Preferences:  Code Status: Full Code      Consults:   Consults (From admission, onward)        Status Ordering Provider     Inpatient consult to Social Work/Case Management  Once        Provider:  (Not yet assigned)    Completed ALISE LYONS     Inpatient virtual consult to Hospital Medicine  Once        Provider:  (Not yet assigned)    Completed JAYJAY LYONS     Inpatient consult to Infectious Diseases  Once        Provider:  Alise Piña MD    Completed JAYJAY LYONS     Pharmacy to dose Vancomycin consult  Once        Provider:  (Not yet assigned)   "And" Linked Group Details    Acknowledged JAYJAY LYONS     Inpatient consult to Orthopedics  Once        Provider:  Anish Rebolledo II, MD    Completed DARRYL WALLS          * Sickle cell pain crisis  Acute:  - IVFH  - supplemental oxygen  - prn pain medication ordered  - prn antiemetics  - Trending CBC  - CXR:   1. Perihilar interstitial prominence, nonspecific and unchanged from prior.  No new focal consolidation.  Correlate for sickle cell crisis.   2. Bone infarcts in the bilateral humeral heads and multiple H-type vertebral body compression fractures.    5/25 - resume home medications today and cont " "prn IV for BT pain  5/26 - pain persist but mildy improved. Cont current regimen and monitor closely  5/27 - stop IV morphine and change to IV dilaudid for better pain control, cont home oral meds  5/28 - pain better controlled with dilaudid, will increase to 1mg today  5/29 - pain improved today, will decrease IV dilaudid to 0.5 mg due to mild hypotension. Pt encouraged to utilize po meds  5/30 - cont pain control.   5/31 : pain resolved. Ready for discharge    Staphylococcus epidermidis bacteremia  Temp Readings from Last 3 Encounters:   05/31/22 98.4 °F (36.9 °C) (Oral)   04/19/22 97 °F (36.1 °C)   03/11/22 97.5 °F (36.4 °C) (Oral)     Lab Results   Component Value Date    WBC 12.09 05/30/2022     No results for input(s): LACTATE in the last 72 hours.    Antibiotics given-   Antibiotics (From admission, onward)            Start     Stop Route Frequency Ordered    05/30/22 0030  vancomycin 1.25 g in dextrose 5% 250 mL IVPB (ready to mix)         -- IV Every 12 hours (non-standard times) 05/30/22 0005    05/28/22 1042  vancomycin - pharmacy to dose  (vancomycin IVPB)        "And" Linked Group Details    -- IV pharmacy to manage frequency 05/28/22 0942    05/27/22 0930  levoFLOXacin 750 mg/150 mL IVPB 750 mg         -- IV Every 24 hours (non-standard times) 05/27/22 0815        Cultures were taken-   Microbiology Results (last 7 days)     Procedure Component Value Units Date/Time    Blood culture [[id number]]  (Abnormal)  (Susceptibility) Collected: 05/26/22 1757    Order Status: Completed Specimen: Blood Updated: 05/31/22 0751     Blood Culture, Routine Gram stain peds bottle: Gram positive cocci in clusters resembling Staph       Positive results previously called 05/28/2022  20:25      STAPHYLOCOCCUS EPIDERMIDIS    Culture, MRSA [[id number]] Collected: 05/29/22 1711    Order Status: Completed Specimen: MRSA source from Nares, Left Updated: 05/31/22 0702     MRSA Surveillance Screen No MRSA isolated    Narrative:   "    Both  nares    Blood culture [[id number]] Collected: 05/28/22 1050    Order Status: Completed Specimen: Blood Updated: 05/30/22 2322     Blood Culture, Routine No Growth to date      No Growth to date      No Growth to date    Blood culture [[id number]] Collected: 05/28/22 1039    Order Status: Completed Specimen: Blood from Antecubital, Right Updated: 05/30/22 2322     Blood Culture, Routine No Growth to date      No Growth to date      No Growth to date    Blood culture [[id number]]  (Abnormal)  (Susceptibility) Collected: 05/26/22 1757    Order Status: Completed Specimen: Blood Updated: 05/30/22 1114     Blood Culture, Routine Gram stain joao bottle: Gram positive cocci in clusters resembling Staph      Results called to and read back by:Verena Olivas RN 05/27/2022  20:19      Gram stain aer bottle: Gram positive cocci in clusters resembling Staph       Positive results previously called 05/28/2022  14:35      STAPHYLOCOCCUS EPIDERMIDIS        Repeat blood cultures remain negative  Echo reviewed and neg for vegetation  Cont IV vanc  ID consult: Continue vancomycin IV, keep level 15-20 for 2 weeks, estimated end date 6/11/2022      Pneumonia  cxr reviewed - LLL infiltrate  IV levaquin and vanc      Thrombocytosis  Chronic:  - monitor CBC      History of avascular necrosis of left humeral head  Noted.   - s/p decompression left humeral head 3/9 with Dr. Rebolledo  - ortho consult appreciated  - Xray right shoulder reviewed and neg for acute findings  - CXR:   1. Perihilar interstitial prominence, nonspecific and unchanged from prior.  No new focal consolidation.  Correlate for sickle cell crisis.   2. Bone infarcts in the bilateral humeral heads and multiple H-type vertebral body compression fractures.      Hypokalemia  Patient has hypokalemia which is currently controlled. Last electrolytes reviewed-   Recent Labs   Lab 05/30/22  0349   K 4.8   . Will replace potassium and monitor electrolytes closely.          Hyperbilirubinemia  Noted.   - Monitor CBC closely      Sickle cell anemia  Patient's anemia is currently controlled. Has not received any PRBCs to date.. Etiology likely d/t sickle cell anemia  Current CBC reviewed-   Lab Results   Component Value Date    HGB 7.2 (L) 05/30/2022    HCT 20.9 (L) 05/30/2022     Monitor serial CBC and transfuse if patient becomes hemodynamically unstable, symptomatic or H/H drops below 7/21.           Final Active Diagnoses:    Diagnosis Date Noted POA    PRINCIPAL PROBLEM:  Sickle cell pain crisis [D57.00] 09/29/2017 Yes    Staphylococcus epidermidis bacteremia [R78.81, B95.7] 05/28/2022 No    Pneumonia [J18.9] 05/27/2022 Clinically Undetermined    History of avascular necrosis of left humeral head [M87.022] 03/09/2022 Yes    Hypokalemia [E87.6] 01/26/2017 Yes    Thrombocytosis [D75.839] 12/24/2015 Yes    Hyperbilirubinemia [E80.6] 04/02/2014 Yes    Sickle cell anemia [D57.1] 09/03/2013 Yes      Problems Resolved During this Admission:    Diagnosis Date Noted Date Resolved POA    Leukocytosis, unspecified [D72.829] 12/30/2013 05/27/2022 Yes       Discharged Condition: good    Disposition: Home-Health Care c    Follow Up:    Patient Instructions:      Diet Adult Regular     Notify your health care provider if you experience any of the following:  temperature >100.4     Activity as tolerated       Significant Diagnostic Studies: Labs:   BMP:   Recent Labs   Lab 05/30/22  0349         K 4.8      CO2 25   BUN 12   CREATININE 0.9   CALCIUM 9.1    and CBC   Recent Labs   Lab 05/30/22  0349   WBC 12.09   HGB 7.2*   HCT 20.9*          Pending Diagnostic Studies:     Procedure Component Value Units Date/Time    VANCOMYCIN, TROUGH [[id number]]     Order Status: Sent Lab Status: No result     Specimen: Blood          Medications:  Reconciled Home Medications:      Medication List      START taking these medications    VANCOMYCIN 1.25 G/250 ML D5W  (READY TO MIX)  Inject 250 mLs (1,250 mg total) into the vein every 12 (twelve) hours. for 11 days        CONTINUE taking these medications    deferasirox 360 mg Tab  Commonly known as: JADENU  Take 1,080 mg by mouth once daily.     ENDARI 5 gram Pwpk  Generic drug: glutamine (sickle cell)  Take 15 g by mouth 2 (two) times daily.     ergocalciferol 50,000 unit Cap  Commonly known as: ERGOCALCIFEROL  Take 50,000 Units by mouth every 7 days.     folic acid 1 MG tablet  Commonly known as: FOLVITE  Take 4 tablets (4 mg total) by mouth once daily.     hydroxyurea 500 mg Cap  Commonly known as: HYDREA  Take 1,000 mg by mouth once daily.     morphine 30 MG 12 hr tablet  Commonly known as: MS CONTIN  Take 30 mg by mouth every 8 (eight) hours.     oxyCODONE-acetaminophen 5-325 mg per tablet  Commonly known as: PERCOCET  Take 1 tablet by mouth every 4 (four) hours as needed for Pain.     traMADoL 50 mg tablet  Commonly known as: ULTRAM  Take 50 mg by mouth 3 (three) times daily.            Indwelling Lines/Drains at time of discharge:   Lines/Drains/Airways     Central Venous Catheter Line  Duration           Port A Cath Single Lumen 05/26/22 1307 left atrial 4 days                Time spent on the discharge of patient: 30 minutes         The attending portion of this evaluation, treatment, and documentation was performed per Susan Braun MD via Telemedicine AudioVisual using the secure Vidyo software platform with 2 way audio/video. The provider was located off-site and the patient is located in the hospital. The aforementioned video software was utilized to document the relevant history and physical exam    Susan Braun MD  Department of Hospital Medicine  [facility]

## 2022-05-31 NOTE — PLAN OF CARE
"    [facility]    HOME HEALTH ORDERS  FACE TO FACE ENCOUNTER    Patient Name: Nishi Pinzon Vital  YOB: 1990    PCP: [facility]   PCP Address: [address] [place] [address]  PCP Phone Number: [phone]  PCP Fax: [phone]       Encounter Date: 05/30/2022    Admit to Home Health    Diagnoses:  Active Hospital Problems    Diagnosis  POA    *Sickle cell pain crisis [D57.00]  Yes     Priority: 1 - High    Staphylococcus epidermidis bacteremia [R78.81, B95.7]  No     Priority: 2     Pneumonia [J18.9]  Clinically Undetermined    History of avascular necrosis of left humeral head [M87.022]  Yes    Hypokalemia [E87.6]  Yes    Thrombocytosis [D75.839]  Yes    Hyperbilirubinemia [E80.6]  Yes    Sickle cell anemia [D57.1]  Yes      Resolved Hospital Problems    Diagnosis Date Resolved POA    Leukocytosis, unspecified [D72.829] 05/27/2022 Yes     Dx updated per 2019 IMO Load         Future Appointments   Date Time Provider Department Center   6/14/2022  1:20 PM Sarah Piña MD [id number]           I have seen and examined this patient face to face today. My clinical findings that support the need for the home health skilled services and home bound status are the following:  Medical restrictions requiring assistance of another human to leave home due to  IV infusion Needs.    Allergies:  Review of patient's allergies indicates:   Allergen Reactions    Contrast media Hives    Iodine and iodide containing products Hives and Swelling    Mushroom Hives    Zithromax [azithromycin] Anaphylaxis    Demerol [meperidine] Other (See Comments)     Regarding reaction to demerol pt states "I talk out of my head and become aggressive"    Toradol [ketorolac] Rash       Diet: regular diet    Activities: activity as tolerated    Nursing:   SN to complete comprehensive assessment including routine vital signs. Instruct on " disease process and s/s of complications to report to MD. Review/verify medication list sent home with the patient at time of discharge  and instruct patient/caregiver as needed. Frequency may be adjusted depending on start of care date.    Notify MD if SBP > 160 or < 90; DBP > 90 or < 50; HR > 120 or < 50; Temp > 101; Other:         CONSULTS:     to evaluate for community resources/long-range planning.  Aide to provide assistance with personal care, ADLs, and vital signs.    MISCELLANEOUS CARE:  Home Infusion Therapy:   SN to perform Infusion Therapy/Central Line Care.  Review Central Line Care & Central Line Flush with patient.    Administer (drug and dose): Vancomycin 1 g IV BID   Last dose given: 5/31/22                         Home dose due: 5/31/22    Scrub the Hub: Prior to accessing the line, always perform a 30 second alcohol scrub  Each lumen of the central line is to be flushed at least daily with 10 mL Normal Saline and 3 mL Heparin flush (10 units/mL)  Skilled Nurse (SN) may draw blood from IV access  Blood Draw Procedure:   - Aspirate at least 5 mL of blood   - Discard   - Obtain specimen   - Change injection cap   - Flush with 20 mL Normal Saline followed by a                 3-5 mL Heparin flush (10 units/mL)  Central :   - Sterile dressing changes are done weekly and as needed.   - Use chlor-hexadine scrub to cleanse site, apply Biopatch to insertion site,       apply securement device dressing   - Injection caps are changed weekly and after EVERY lab draw.   - If sterile gauze is under dressing to control oozing,                 dressing change must be performed every 24 hours until gauze is not needed.    Monitor CBC w/diff, CMP, vanco level, CRP weekly while on antibiotics   Please fax results To the office, [phone]  Follow up ID clinic/Dr Faust in 1-2 weeks    WOUND CARE ORDERS  no      Medications: Review discharge medications with patient and family and  provide education.      Current Discharge Medication List      START taking these medications    Details   vancomycin HCl (VANCOMYCIN 1.25 G/250 ML D5W, READY TO MIX,) Inject 250 mLs (1,250 mg total) into the vein every 12 (twelve) hours. for 11 days         CONTINUE these medications which have NOT CHANGED    Details   deferasirox (JADENU) 360 mg Tab Take 1,080 mg by mouth once daily.      ergocalciferol (ERGOCALCIFEROL) 50,000 unit Cap Take 50,000 Units by mouth every 7 days.      folic acid (FOLVITE) 1 MG tablet Take 4 tablets (4 mg total) by mouth once daily.  Qty: 120 tablet, Refills: 11      glutamine, sickle cell, (ENDARI) 5 gram PwPk Take 15 g by mouth 2 (two) times daily.      hydroxyurea (HYDREA) 500 mg Cap Take 1,000 mg by mouth once daily.      morphine (MS CONTIN) 30 MG 12 hr tablet Take 30 mg by mouth every 8 (eight) hours.      oxyCODONE-acetaminophen (PERCOCET) 5-325 mg per tablet Take 1 tablet by mouth every 4 (four) hours as needed for Pain.      traMADoL (ULTRAM) 50 mg tablet Take 50 mg by mouth 3 (three) times daily.             I certify that this patient is confined to her home and needs intermittent skilled nursing care.

## 2022-05-31 NOTE — ASSESSMENT & PLAN NOTE
Acute:  - IVFH  - supplemental oxygen  - prn pain medication ordered  - prn antiemetics  - Trending CBC  - CXR:   1. Perihilar interstitial prominence, nonspecific and unchanged from prior.  No new focal consolidation.  Correlate for sickle cell crisis.   2. Bone infarcts in the bilateral humeral heads and multiple H-type vertebral body compression fractures.    5/25 - resume home medications today and cont prn IV for BT pain  5/26 - pain persist but mildy improved. Cont current regimen and monitor closely  5/27 - stop IV morphine and change to IV dilaudid for better pain control, cont home oral meds  5/28 - pain better controlled with dilaudid, will increase to 1mg today  5/29 - pain improved today, will decrease IV dilaudid to 0.5 mg due to mild hypotension. Pt encouraged to utilize po meds  5/30 - cont pain control.   5/31 : pain resolved. Ready for discharge

## 2022-05-31 NOTE — NURSING
Discharge instructions reviewed with pt, verbalizes understanding and all questions answered. Port deaccessed. Midline in place, educated on care and home health. Left unit safely.

## 2022-05-31 NOTE — PLAN OF CARE
Problem: Adult Inpatient Plan of Care  Goal: Plan of Care Review  Outcome: Ongoing, Progressing  Goal: Patient-Specific Goal (Individualized)  Outcome: Ongoing, Progressing  Goal: Absence of Hospital-Acquired Illness or Injury  Outcome: Ongoing, Progressing  Goal: Optimal Comfort and Wellbeing  Outcome: Ongoing, Progressing  Goal: Readiness for Transition of Care  Outcome: Ongoing, Progressing     Problem: Fall Injury Risk  Goal: Absence of Fall and Fall-Related Injury  Outcome: Ongoing, Progressing     Problem: Infection  Goal: Absence of Infection Signs and Symptoms  Outcome: Ongoing, Progressing     Problem: Fluid Imbalance (Pneumonia)  Goal: Fluid Balance  Outcome: Ongoing, Progressing     Problem: Infection (Pneumonia)  Goal: Resolution of Infection Signs and Symptoms  Outcome: Ongoing, Progressing     Pt A/O. Medicated for pain with PRN oral pain med as requested, see MAR, and x 1 for itching. LR infusing as ordered. Rounded on pt atleast every 2 hours addressing pain, safety, call light and ice water within reach. Pt is independent with ADL's and toileting.

## 2022-06-01 ENCOUNTER — TELEPHONE (OUTPATIENT)
Dept: MEDSURG UNIT | Facility: HOSPITAL | Age: 32
End: 2022-06-01
Payer: MEDICAID

## 2022-06-01 ENCOUNTER — PATIENT OUTREACH (OUTPATIENT)
Dept: ADMINISTRATIVE | Facility: CLINIC | Age: 32
End: 2022-06-01
Payer: MEDICAID

## 2022-06-01 NOTE — PROGRESS NOTES
C3 nurse spoke with Nishi Dumont   for a TCC post hospital discharge follow up call. The patient has a scheduled HOS appointment with [facility]   on 6/2/22 @ 3pm.

## 2022-06-02 LAB
BACTERIA BLD CULT: NORMAL
BACTERIA BLD CULT: NORMAL

## 2022-06-02 NOTE — PHYSICIAN QUERY
PT Name: Nishi Dumont  MR #: [id number]     DOCUMENTATION CLARIFICATION     CDS: Estela Mayorga RN         Contact information:[email or web address] or (cell) [phone]    This form is a permanent document in the medical record.     Query Date: June 2, 2022    By submitting this query, we are merely seeking further clarification of documentation.  Please utilize your independent clinical judgment when addressing the question(s) below.      The Medical Record contains the following:    Clinical Information Location in Medical Records   STAPHYLOCOCCUS EPIDERMIDIS    Gram-positive cocci bacteremia  Repeat blood cultures today  Start IV vanc  Echo pending Micro 5/26    HM PN 5/28     According to coding guidelines, Present on Admission is defined as present at the time the order for inpatient admission occurs. Conditions that develop during an outpatient encounter, including emergency department, observation, or outpatient surgery, are considered as present on admission.       Please clarify the Present on Admission (POA) status of the diagnosis: Bacteremia    [ x ] Yes (Y)     [  ] No (N)     [  ] Documentation insufficient to determine if condition is POA (U)     [  ] Clinically Undetermined (W)     Reference:  ICD-10-CM Official Guidelines for Coding and Reporting FY 2021. (2020). Retrieved October 21, 2020, from https://www.cdc.gov/nchs/data/icd/10cmguidelines-FY2021.pdf?fbclid=[email or web address]    Form No. [id number]

## 2022-06-02 NOTE — PHYSICIAN QUERY
PT Name: Nishi Dumont  MR #: [id number]     DOCUMENTATION CLARIFICATION     CDS: Estela Mayorga RN          Contact information:[email or web address] or (cell) [phone]    This form is a permanent document in the medical record.     Query Date: June 2, 2022    By submitting this query, we are merely seeking further clarification of documentation.  Please utilize your independent clinical judgment when addressing the question(s) below.      The Medical Record contains the following:    Clinical Information Location in Medical Records   Pneumonia  Pt developed fever yesterday - workup obtained, UA neg, CXR with early LLL infiltrate and blood cultures pending  IV Levaquin    Impression:   1. Perihilar interstitial prominence, nonspecific and unchanged from prior.  No new focal consolidation.  Correlate for sickle cell crisis.  2. Bone infarcts in the bilateral humeral heads and multiple H-type vertebral body compression fractures.    Impression:   Small infiltrate identified at the left lung base.  Venous Port-A-Cath in position.   HM PN 5/27          Chest Xray 5/23            Chest Xray 5/26     According to coding guidelines, Present on Admission is defined as present at the time the order for inpatient admission occurs. Conditions that develop during an outpatient encounter, including emergency department, observation, or outpatient surgery, are considered as present on admission.       Please clarify the Present on Admission (POA) status of the diagnosis: Pneumonia    [  x] Yes (Y)     [  ] No (N)     [  ] Documentation insufficient to determine if condition is POA (U)     [  ] Clinically Undetermined (W)     Reference:  ICD-10-CM Official Guidelines for Coding and Reporting FY 2021. (2020). Retrieved October 21, 2020, from https://www.cdc.gov/nchs/data/icd/10cmguidelines-FY2021.pdf?fbclid=[id number]    Form No. [id number]

## 2022-06-02 NOTE — PHYSICIAN QUERY
PT Name: Nishi Dumont  MR #: [id number]     DOCUMENTATION CLARIFICATION     CDS: Estela Mayorga RN          Contact information:[email or web address] or (cell) [phone]    This form is a permanent document in the medical record.     Query Date: June 2, 2022    By submitting this query, we are merely seeking further clarification of documentation.  Please utilize your independent clinical judgment when addressing the question(s) below.  The Medical Record contains the following:  Indicators Supporting Clinical Findings Location in Medical Record   x HR           RR            BP          Temp 5/23 @ 2219: Temp 98.8, HR 95, RR 20, /72  5/25 @ 1131: Temp 99.0, HR 89, RR 16, /58  5/26 @ 1653: Temp 102.5,   5/28 @ 0739: Temp 99.5, HR 90, RR 18, BP 88/52  5/29 @ 1159: Temp 101.1, HR 96, RR 16   Vital Signs   x Lactic Acid          Procalcitonin  05/29/22 15:56   Procalcitonin 0.78 (H)      Lab Results    x WBC            Bands            CRP     05/23/22 23:09 05/24/22 04:43 05/25/22 04:36 05/26/22 04:35 05/27/22 04:12 05/28/22 06:21 05/29/22 03:14   WBC 15.19 (H) 14.16 (H) 13.38 (H) 12.00 17.16 (H) 15.81 (H) 13.44 (H)      Lab Results    x Culture(s) STAPHYLOCOCCUS EPIDERMIDIS   Micro 5/26    AMS, Confusion, LOC, etc.      Organ Dysfunction/Failure     x Bacteremia or Sepsis / Septic Gram-positive cocci bacteremia  Repeat blood cultures today  Start IV vanc  Echo pending    Sepsis resolved, likely secondary to Staph epidermidis bacteremia in the setting of port a cath and possible HCAP, currently on room air   HM PN 5/28          ID consult 5/29    x Known or Suspected Source of Infection documented Pneumonia  Pt developed fever yesterday - workup obtained, UA neg, CXR with early LLL infiltrate and blood cultures pending  IV Levaquin    PN 5/27    (Failed) Outpatient Treatment      Medication      Treatment      Other          Provider, please specify diagnosis or diagnoses associated  with above clinical findings.    [   x] Sepsis due to Staph Epidermis (specify source):                      [   ] Infected Port                      [   x] Pneumonia                      [   ] Infected Port and Pneumonia                      [   ] Other:_____________________     [   ] Bacteremia without Sepsis (specify source):                      [   ] Infected Port                      [   ] Unknown                      [   ] Other:___________________     [   ] Other Infectious Disease (please specify): __________     [  ] Clinically Undetermined       Present on admission (POA) status:     [   ] Yes (Y)            [  ] Clinically Undetermined (W)    [   ] No (N)              [   ] Documentation insufficient to determine if condition is POA (U)

## 2022-06-08 ENCOUNTER — DOCUMENTATION ONLY (OUTPATIENT)
Dept: INFECTIOUS DISEASES | Facility: CLINIC | Age: 32
End: 2022-06-08

## 2022-06-08 NOTE — PROGRESS NOTES
I spoke with Isaac ( [facility] ) [phone]    To advise it is okay to remove Line at end of care   (6/11/22 ) IV  abx ; per Dr Faust's orders       6/08/22

## 2022-06-14 ENCOUNTER — OFFICE VISIT (OUTPATIENT)
Dept: INFECTIOUS DISEASES | Facility: CLINIC | Age: 32
End: 2022-06-14
Payer: MEDICAID

## 2022-06-14 ENCOUNTER — LAB VISIT (OUTPATIENT)
Dept: LAB | Facility: HOSPITAL | Age: 32
End: 2022-06-14
Attending: STUDENT IN AN ORGANIZED HEALTH CARE EDUCATION/TRAINING PROGRAM
Payer: MEDICAID

## 2022-06-14 VITALS
DIASTOLIC BLOOD PRESSURE: 68 MMHG | WEIGHT: 111.38 LBS | BODY MASS INDEX: 22.45 KG/M2 | HEIGHT: 59 IN | HEART RATE: 90 BPM | TEMPERATURE: 98 F | OXYGEN SATURATION: 98 % | SYSTOLIC BLOOD PRESSURE: 118 MMHG

## 2022-06-14 DIAGNOSIS — B95.7 STAPHYLOCOCCUS EPIDERMIDIS BACTEREMIA: ICD-10-CM

## 2022-06-14 DIAGNOSIS — R78.81 STAPHYLOCOCCUS EPIDERMIDIS BACTEREMIA: Primary | ICD-10-CM

## 2022-06-14 DIAGNOSIS — B95.7 STAPHYLOCOCCUS EPIDERMIDIS BACTEREMIA: Primary | ICD-10-CM

## 2022-06-14 DIAGNOSIS — R78.81 STAPHYLOCOCCUS EPIDERMIDIS BACTEREMIA: ICD-10-CM

## 2022-06-14 LAB
ALBUMIN SERPL BCP-MCNC: 4.9 G/DL (ref 3.5–5.2)
ALP SERPL-CCNC: 68 U/L (ref 55–135)
ALT SERPL W/O P-5'-P-CCNC: 32 U/L (ref 10–44)
ANION GAP SERPL CALC-SCNC: 7 MMOL/L (ref 8–16)
AST SERPL-CCNC: 35 U/L (ref 10–40)
BASOPHILS # BLD AUTO: 0.07 K/UL (ref 0–0.2)
BASOPHILS NFR BLD: 0.7 % (ref 0–1.9)
BILIRUB SERPL-MCNC: 1.7 MG/DL (ref 0.1–1)
BUN SERPL-MCNC: 10 MG/DL (ref 6–20)
CALCIUM SERPL-MCNC: 9 MG/DL (ref 8.7–10.5)
CHLORIDE SERPL-SCNC: 104 MMOL/L (ref 95–110)
CO2 SERPL-SCNC: 24 MMOL/L (ref 23–29)
CREAT SERPL-MCNC: 0.8 MG/DL (ref 0.5–1.4)
CRP SERPL-MCNC: 0.14 MG/DL
DIFFERENTIAL METHOD: ABNORMAL
EOSINOPHIL # BLD AUTO: 0.1 K/UL (ref 0–0.5)
EOSINOPHIL NFR BLD: 0.7 % (ref 0–8)
ERYTHROCYTE [DISTWIDTH] IN BLOOD BY AUTOMATED COUNT: 17.3 % (ref 11.5–14.5)
EST. GFR  (AFRICAN AMERICAN): >60 ML/MIN/1.73 M^2
EST. GFR  (NON AFRICAN AMERICAN): >60 ML/MIN/1.73 M^2
GLUCOSE SERPL-MCNC: 76 MG/DL (ref 70–110)
HCT VFR BLD AUTO: 35 % (ref 37–48.5)
HGB BLD-MCNC: 11.8 G/DL (ref 12–16)
IMM GRANULOCYTES # BLD AUTO: 0.05 K/UL (ref 0–0.04)
IMM GRANULOCYTES NFR BLD AUTO: 0.5 % (ref 0–0.5)
LYMPHOCYTES # BLD AUTO: 1.9 K/UL (ref 1–4.8)
LYMPHOCYTES NFR BLD: 17.9 % (ref 18–48)
MCH RBC QN AUTO: 29.7 PG (ref 27–31)
MCHC RBC AUTO-ENTMCNC: 33.7 G/DL (ref 32–36)
MCV RBC AUTO: 88 FL (ref 82–98)
MONOCYTES # BLD AUTO: 1.3 K/UL (ref 0.3–1)
MONOCYTES NFR BLD: 12.5 % (ref 4–15)
NEUTROPHILS # BLD AUTO: 7.3 K/UL (ref 1.8–7.7)
NEUTROPHILS NFR BLD: 67.7 % (ref 38–73)
NRBC BLD-RTO: 0 /100 WBC
PLATELET # BLD AUTO: 337 K/UL (ref 150–450)
PMV BLD AUTO: 8.7 FL (ref 9.2–12.9)
POTASSIUM SERPL-SCNC: 3.8 MMOL/L (ref 3.5–5.1)
PROT SERPL-MCNC: 7.9 G/DL (ref 6–8.4)
RBC # BLD AUTO: 3.97 M/UL (ref 4–5.4)
SODIUM SERPL-SCNC: 135 MMOL/L (ref 136–145)
WBC # BLD AUTO: 10.73 K/UL (ref 3.9–12.7)

## 2022-06-14 PROCEDURE — 3078F DIAST BP <80 MM HG: CPT | Mod: CPTII,S$GLB,, | Performed by: STUDENT IN AN ORGANIZED HEALTH CARE EDUCATION/TRAINING PROGRAM

## 2022-06-14 PROCEDURE — 87040 BLOOD CULTURE FOR BACTERIA: CPT | Mod: 59 | Performed by: STUDENT IN AN ORGANIZED HEALTH CARE EDUCATION/TRAINING PROGRAM

## 2022-06-14 PROCEDURE — 80053 COMPREHEN METABOLIC PANEL: CPT | Performed by: STUDENT IN AN ORGANIZED HEALTH CARE EDUCATION/TRAINING PROGRAM

## 2022-06-14 PROCEDURE — 3074F SYST BP LT 130 MM HG: CPT | Mod: CPTII,S$GLB,, | Performed by: STUDENT IN AN ORGANIZED HEALTH CARE EDUCATION/TRAINING PROGRAM

## 2022-06-14 PROCEDURE — 1111F DSCHRG MED/CURRENT MED MERGE: CPT | Mod: CPTII,S$GLB,, | Performed by: STUDENT IN AN ORGANIZED HEALTH CARE EDUCATION/TRAINING PROGRAM

## 2022-06-14 PROCEDURE — 1159F MED LIST DOCD IN RCRD: CPT | Mod: CPTII,S$GLB,, | Performed by: STUDENT IN AN ORGANIZED HEALTH CARE EDUCATION/TRAINING PROGRAM

## 2022-06-14 PROCEDURE — 99214 PR OFFICE/OUTPT VISIT, EST, LEVL IV, 30-39 MIN: ICD-10-PCS | Mod: S$GLB,,, | Performed by: STUDENT IN AN ORGANIZED HEALTH CARE EDUCATION/TRAINING PROGRAM

## 2022-06-14 PROCEDURE — 99214 OFFICE O/P EST MOD 30 MIN: CPT | Mod: S$GLB,,, | Performed by: STUDENT IN AN ORGANIZED HEALTH CARE EDUCATION/TRAINING PROGRAM

## 2022-06-14 PROCEDURE — 3078F PR MOST RECENT DIASTOLIC BLOOD PRESSURE < 80 MM HG: ICD-10-PCS | Mod: CPTII,S$GLB,, | Performed by: STUDENT IN AN ORGANIZED HEALTH CARE EDUCATION/TRAINING PROGRAM

## 2022-06-14 PROCEDURE — 85025 COMPLETE CBC W/AUTO DIFF WBC: CPT | Performed by: STUDENT IN AN ORGANIZED HEALTH CARE EDUCATION/TRAINING PROGRAM

## 2022-06-14 PROCEDURE — 1159F PR MEDICATION LIST DOCUMENTED IN MEDICAL RECORD: ICD-10-PCS | Mod: CPTII,S$GLB,, | Performed by: STUDENT IN AN ORGANIZED HEALTH CARE EDUCATION/TRAINING PROGRAM

## 2022-06-14 PROCEDURE — 3008F PR BODY MASS INDEX (BMI) DOCUMENTED: ICD-10-PCS | Mod: CPTII,S$GLB,, | Performed by: STUDENT IN AN ORGANIZED HEALTH CARE EDUCATION/TRAINING PROGRAM

## 2022-06-14 PROCEDURE — 86140 C-REACTIVE PROTEIN: CPT | Performed by: STUDENT IN AN ORGANIZED HEALTH CARE EDUCATION/TRAINING PROGRAM

## 2022-06-14 PROCEDURE — 1111F PR DISCHARGE MEDS RECONCILED W/ CURRENT OUTPATIENT MED LIST: ICD-10-PCS | Mod: CPTII,S$GLB,, | Performed by: STUDENT IN AN ORGANIZED HEALTH CARE EDUCATION/TRAINING PROGRAM

## 2022-06-14 PROCEDURE — 3008F BODY MASS INDEX DOCD: CPT | Mod: CPTII,S$GLB,, | Performed by: STUDENT IN AN ORGANIZED HEALTH CARE EDUCATION/TRAINING PROGRAM

## 2022-06-14 PROCEDURE — 3074F PR MOST RECENT SYSTOLIC BLOOD PRESSURE < 130 MM HG: ICD-10-PCS | Mod: CPTII,S$GLB,, | Performed by: STUDENT IN AN ORGANIZED HEALTH CARE EDUCATION/TRAINING PROGRAM

## 2022-06-14 PROCEDURE — 36415 COLL VENOUS BLD VENIPUNCTURE: CPT | Performed by: STUDENT IN AN ORGANIZED HEALTH CARE EDUCATION/TRAINING PROGRAM

## 2022-06-14 NOTE — PROGRESS NOTES
"Subjective: Hospital follow up        Patient ID: Nishi Dumont is a 31 y.o. female.    Chief Complaint:: hospital follow up visit     HPI Nishi Dumont is a 31 y.o. 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 Last admitted in May for acute sickle cell crisis, found to have CAP. Hospital course complicated by Staph epi bacteremia likely from L port-a cath. Sent home on vancomycin IV via midline, completed therapy 6/12, line removed 6/13, tight dressing left, removed today. She had transfusion yesterday.     She is here for follow-up, feeling great.  No acute complaints.    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    Avascular necrosis of bone of hip     Avascular necrosis of humeral head     Blood transfusion     Sickle cell disease      Past Surgical History:   Procedure Laterality Date    CHOLECYSTECTOMY      JOINT REPLACEMENT       left hip     Social History     Tobacco Use    Smoking status: Never Smoker    Smokeless tobacco: Never Used   Substance Use Topics    Alcohol use: Yes     Alcohol/week: 0.0 standard drinks     Comment: occassionally        Family History   Problem Relation Age of Onset    Sickle cell trait Mother     Sickle cell trait Father     " "Sickle cell trait Brother     Sickle cell trait Daughter          Review of Systems   Constitutional: Negative for chills and fever.   HENT: Negative for sinus pain.    Respiratory: Negative for cough.    Cardiovascular: Negative for chest pain.   Gastrointestinal: Negative for abdominal pain, diarrhea and nausea.   Genitourinary: Negative for dysuria.   Musculoskeletal: Negative for back pain and myalgias.   Neurological: Negative for headaches.           VAD: L Port-A cath, no redness noted, non tender to palpation    Objective:      Blood pressure 118/68, pulse 90, temperature 98.4 °F (36.9 °C), height 4' 11" (1.499 m), weight 50.5 kg (111 lb 6.4 oz), SpO2 98 %. Body mass index is 22.5 kg/m².  Physical Exam  Constitutional:       Appearance: Normal appearance. She is normal weight.   HENT:      Mouth/Throat:      Mouth: Mucous membranes are moist.      Pharynx: Oropharynx is clear.   Eyes:      Extraocular Movements: Extraocular movements intact.      Pupils: Pupils are equal, round, and reactive to light.   Cardiovascular:      Rate and Rhythm: Normal rate and regular rhythm.      Pulses: Normal pulses.      Heart sounds: Normal heart sounds.   Pulmonary:      Effort: Pulmonary effort is normal.      Breath sounds: Normal breath sounds.   Abdominal:      General: Bowel sounds are normal.      Palpations: Abdomen is soft.      Tenderness: There is no abdominal tenderness.   Musculoskeletal:         General: Normal range of motion.      Cervical back: Normal range of motion and neck supple.      Right lower leg: No edema.      Left lower leg: No edema.   Skin:     General: Skin is warm.      Capillary Refill: Capillary refill takes less than 2 seconds.   Neurological:      General: No focal deficit present.      Mental Status: She is alert and oriented to person, place, and time.   Psychiatric:         Thought Content: Thought content normal.      VAD:  L Port-A cath, no redness noted, non tender to " palpation         Recent Diagnostics:     CXRs     Cardiology: ECHO 5/28/22  · The left ventricle is normal in size with normal systolic function.  · The estimated ejection fraction is 70%.  · Normal left ventricular diastolic function.  · Normal right ventricular size with normal right ventricular systolic function.  · Normal central venous pressure (3 mmHg).  · The estimated PA systolic pressure is 35 mmHg.  As per report, all valves appear structurally normal.    MICRO:   5/26 Blood cultures 3/4 bottles Staph epidermidis sensitive to vancomycin  Repeat blood cultures on growth      Assessment and Plan:         1. History of Staph epidermidis bacteremia in the setting of port a cath             Completed Vancomycin IV 6/12   Midline removed 6/13   Labs today reviewed, normal CRP, Hg improved   Surveillance blood cultures x 2, will call patient with results   Follow up as needed     Staphylococcus epidermidis bacteremia  -     C-reactive protein; Future; Expected date: 06/14/2022  -     Blood culture; Future; Expected date: 06/14/2022  -     Blood culture; Future; Expected date: 06/14/2022  -     CBC auto differential; Future; Expected date: 06/14/2022  -     Comprehensive Metabolic Panel; Future; Expected date: 06/14/2022          This note was created using Dragon voice recognition software that occasionally misinterpreted phrases or words.

## 2022-06-19 LAB
BACTERIA BLD CULT: NORMAL
BACTERIA BLD CULT: NORMAL

## 2022-09-22 ENCOUNTER — HOSPITAL ENCOUNTER (EMERGENCY)
Facility: HOSPITAL | Age: 32
Discharge: HOME | End: 2022-09-22
Attending: EMERGENCY MEDICINE
Payer: MEDICAID

## 2022-09-22 VITALS
TEMPERATURE: 98 F | HEART RATE: 83 BPM | RESPIRATION RATE: 18 BRPM | DIASTOLIC BLOOD PRESSURE: 61 MMHG | SYSTOLIC BLOOD PRESSURE: 120 MMHG | HEIGHT: 59 IN | BODY MASS INDEX: 22.44 KG/M2 | OXYGEN SATURATION: 100 % | WEIGHT: 111.31 LBS

## 2022-09-22 DIAGNOSIS — R07.81 RIB PAIN ON RIGHT SIDE: ICD-10-CM

## 2022-09-22 DIAGNOSIS — R52 PAIN: ICD-10-CM

## 2022-09-22 DIAGNOSIS — R07.89 CHEST WALL PAIN: Primary | ICD-10-CM

## 2022-09-22 LAB
ALBUMIN SERPL BCP-MCNC: 4.6 G/DL (ref 3.5–5.2)
ALP SERPL-CCNC: 76 U/L (ref 55–135)
ALT SERPL W/O P-5'-P-CCNC: 38 U/L (ref 10–44)
ANION GAP SERPL CALC-SCNC: 15 MMOL/L (ref 8–16)
AST SERPL-CCNC: 20 U/L (ref 10–40)
BASOPHILS # BLD AUTO: 0.04 K/UL (ref 0–0.2)
BASOPHILS NFR BLD: 0.2 % (ref 0–1.9)
BILIRUB SERPL-MCNC: 1.5 MG/DL (ref 0.1–1)
BUN SERPL-MCNC: 15 MG/DL (ref 6–20)
CALCIUM SERPL-MCNC: 9.6 MG/DL (ref 8.7–10.5)
CHLORIDE SERPL-SCNC: 103 MMOL/L (ref 95–110)
CO2 SERPL-SCNC: 21 MMOL/L (ref 23–29)
CREAT SERPL-MCNC: 0.8 MG/DL (ref 0.5–1.4)
DIFFERENTIAL METHOD: ABNORMAL
EOSINOPHIL # BLD AUTO: 0 K/UL (ref 0–0.5)
EOSINOPHIL NFR BLD: 0 % (ref 0–8)
ERYTHROCYTE [DISTWIDTH] IN BLOOD BY AUTOMATED COUNT: 15.9 % (ref 11.5–14.5)
EST. GFR  (NO RACE VARIABLE): >60 ML/MIN/1.73 M^2
GLUCOSE SERPL-MCNC: 88 MG/DL (ref 70–110)
HCT VFR BLD AUTO: 35.4 % (ref 37–48.5)
HGB BLD-MCNC: 11.3 G/DL (ref 12–16)
IMM GRANULOCYTES # BLD AUTO: 0.18 K/UL (ref 0–0.04)
IMM GRANULOCYTES NFR BLD AUTO: 0.8 % (ref 0–0.5)
LACTATE SERPL-SCNC: 1.9 MMOL/L (ref 0.5–2.2)
LYMPHOCYTES # BLD AUTO: 1.5 K/UL (ref 1–4.8)
LYMPHOCYTES NFR BLD: 6.7 % (ref 18–48)
MCH RBC QN AUTO: 30.7 PG (ref 27–31)
MCHC RBC AUTO-ENTMCNC: 31.9 G/DL (ref 32–36)
MCV RBC AUTO: 96 FL (ref 82–98)
MONOCYTES # BLD AUTO: 1.8 K/UL (ref 0.3–1)
MONOCYTES NFR BLD: 8.3 % (ref 4–15)
NEUTROPHILS # BLD AUTO: 18.5 K/UL (ref 1.8–7.7)
NEUTROPHILS NFR BLD: 84 % (ref 38–73)
NRBC BLD-RTO: 0 /100 WBC
PLATELET # BLD AUTO: 557 K/UL (ref 150–450)
PMV BLD AUTO: 9.2 FL (ref 9.2–12.9)
POTASSIUM SERPL-SCNC: 4.5 MMOL/L (ref 3.5–5.1)
PROCALCITONIN SERPL IA-MCNC: 0.06 NG/ML
PROT SERPL-MCNC: 8.2 G/DL (ref 6–8.4)
RBC # BLD AUTO: 3.68 M/UL (ref 4–5.4)
SODIUM SERPL-SCNC: 139 MMOL/L (ref 136–145)
WBC # BLD AUTO: 22.03 K/UL (ref 3.9–12.7)

## 2022-09-22 PROCEDURE — 83605 ASSAY OF LACTIC ACID: CPT | Performed by: PHYSICIAN ASSISTANT

## 2022-09-22 PROCEDURE — 84145 PROCALCITONIN (PCT): CPT | Performed by: PHYSICIAN ASSISTANT

## 2022-09-22 PROCEDURE — 85025 COMPLETE CBC W/AUTO DIFF WBC: CPT | Performed by: PHYSICIAN ASSISTANT

## 2022-09-22 PROCEDURE — 36415 COLL VENOUS BLD VENIPUNCTURE: CPT | Performed by: PHYSICIAN ASSISTANT

## 2022-09-22 PROCEDURE — 99284 EMERGENCY DEPT VISIT MOD MDM: CPT | Mod: 25

## 2022-09-22 PROCEDURE — 87040 BLOOD CULTURE FOR BACTERIA: CPT | Mod: 59 | Performed by: PHYSICIAN ASSISTANT

## 2022-09-22 PROCEDURE — 80053 COMPREHEN METABOLIC PANEL: CPT | Performed by: PHYSICIAN ASSISTANT

## 2022-09-23 NOTE — ED NOTES
Pt aaox4 from home c/o  right side rib pain and chest wall pain. Pt s/p recent port-o-cath removal surgery. Nadn.

## 2022-09-23 NOTE — ED PROVIDER NOTES
"Encounter Date: 9/22/2022    SCRIBE #1 NOTE: I, Elen Pennington, am scribing for, and in the presence of,  Sunil Eaton MD.     History     Chief Complaint   Patient presents with    Right Side Pain     Pt. States she was discharged today for port-a-cath infection; States she is having right side pain     Time seen by provider: 10:16 PM on 09/22/2022    Nishi Dumont is a 31 y.o. female who presents to the ED with right sided rib pain and left sided chest pain that occurred PTA. Pt states she was discharged today after a port-a-cath infection. Pt had [id number] a few days ago. Pt is on chronic IV antibiotics. The patient denies fever, or any other symptoms at this time. PMHx of sickle cell disease, acute chest syndrome due to hemoglobin S disease, avascular necrosis of bone of hip, avascular necrosis of humeral head, and blood transfusion. PSHx of joint replacement and cholecystectomy.     The history is provided by the patient and medical records.   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    Avascular necrosis of bone of hip     Avascular necrosis of humeral head     Blood transfusion     Sickle cell disease      Past Surgical History:   Procedure Laterality Date    CHOLECYSTECTOMY      JOINT REPLACEMENT       left hip     Family History   Problem Relation Age of Onset    Sickle cell trait Mother     Sickle cell trait Father     Sickle cell trait Brother     Sickle cell trait Daughter      Social History     Tobacco Use    Smoking status: Never    Smokeless tobacco: Never "   Substance Use Topics    Alcohol use: Yes     Alcohol/week: 0.0 standard drinks     Comment: occassionally    Drug use: No     Review of Systems   Constitutional:  Negative for fever.   HENT:  Negative for congestion.    Eyes:  Negative for visual disturbance.   Respiratory:  Negative for cough.    Cardiovascular:  Positive for chest pain.   Gastrointestinal:  Negative for abdominal pain.   Musculoskeletal:         + right sided rib pain   Skin:  Negative for pallor.   Hematological:  Does not bruise/bleed easily.   Psychiatric/Behavioral:  Negative for agitation.      Physical Exam     Initial Vitals [09/22/22 1918]   BP Pulse Resp Temp SpO2   125/80 86 20 99 °F (37.2 °C) 98 %      MAP       --         Physical Exam    Nursing note and vitals reviewed.  Constitutional: She appears well-developed and well-nourished. She is not diaphoretic. No distress.   HENT:   Head: Normocephalic and atraumatic.   Eyes: EOM are normal. Pupils are equal, round, and reactive to light.   Neck: Neck supple.   Normal range of motion.  Cardiovascular:  Normal rate, regular rhythm, normal heart sounds and intact distal pulses.     Exam reveals no gallop and no friction rub.       No murmur heard.  Pulmonary/Chest: Breath sounds normal. No respiratory distress. She has no wheezes. She has no rhonchi. She has no rales. She exhibits tenderness.   Tenderness to left chest near old port-a-cath site. Bruise noted to left breast. Tenderness to right ribs.    Abdominal: Abdomen is soft. Bowel sounds are normal. There is no abdominal tenderness. There is no rebound and no guarding.   Musculoskeletal:         General: Normal range of motion.      Cervical back: Normal range of motion and neck supple.     Neurological: She is alert and oriented to person, place, and time.   Skin: Skin is warm and dry.   Psychiatric: She has a normal mood and affect. Her behavior is normal. Judgment and thought content normal.       ED Course   Procedures  Labs  Reviewed   CBC W/ AUTO DIFFERENTIAL - Abnormal; Notable for the following components:       Result Value    WBC 22.03 (*)     RBC 3.68 (*)     Hemoglobin 11.3 (*)     Hematocrit 35.4 (*)     MCHC 31.9 (*)     RDW 15.9 (*)     Platelets 557 (*)     Immature Granulocytes 0.8 (*)     Gran # (ANC) 18.5 (*)     Immature Grans (Abs) 0.18 (*)     Mono # 1.8 (*)     Gran % 84.0 (*)     Lymph % 6.7 (*)     All other components within normal limits    Narrative:     Collection has been rescheduled by [email or web address] at 09/22/2022 19:50 Reason:   Unable to collect   COMPREHENSIVE METABOLIC PANEL - Abnormal; Notable for the following components:    CO2 21 (*)     Total Bilirubin 1.5 (*)     All other components within normal limits    Narrative:     Collection has been rescheduled by [email or web address] at 09/22/2022 19:50 Reason:   Unable to collect   CULTURE, BLOOD    Narrative:     Collection has been rescheduled by [email or web address] at 09/22/2022 19:50 Reason:   Unable to collect  Collection has been rescheduled by [facility] at 09/22/2022 19:50 Reason:   Unable to collect   CULTURE, BLOOD    Narrative:     Collection has been rescheduled by [facility] at 09/22/2022 19:50 Reason:   Unable to collect  Collection has been rescheduled by [facility] at 09/22/2022 19:50 Reason:   Unable to collect   LACTIC ACID, PLASMA    Narrative:     Collection has been rescheduled by [facility] at 09/22/2022 19:50 Reason:   Unable to collect   PROCALCITONIN    Narrative:     Collection has been rescheduled by [facility] at 09/22/2022 19:50 Reason:   Unable to collect   SARS-COV-2 RNA AMPLIFICATION, QUAL          Imaging Results              X-Ray Ribs 2 View Right (Final result)  Result time 09/22/22 22:51:21      Final result by Demetrius Padgett DO (09/22/22 22:51:21)                   Impression:      No rib fracture.      Electronically signed by: Demetrius Padgett  Date:    09/22/2022  Time:    22:51               Narrative:    EXAMINATION:  XR RIBS 2 VIEW RIGHT    CLINICAL  HISTORY:  Pleurodynia    TECHNIQUE:  Two views of the right ribs were performed.    COMPARISON:  Chest radiograph from earlier the same date and multiple additional priors.    FINDINGS:  There is no evidence of an acute displaced right-sided rib fracture.  There is a chip compression deformities of the visualized vertebral bodies compatible with patient's history of sickle cell disease.  There are right upper quadrant surgical clips.                                       X-Ray Chest AP Portable (Final result)  Result time 09/22/22 20:19:08      Final result by Demetrius Padgett DO (09/22/22 20:19:08)                   Impression:      No acute cardiopulmonary abnormality.    Sequela of sickle cell disease.      Electronically signed by: Demetrius Padgett  Date:    09/22/2022  Time:    20:19               Narrative:    EXAMINATION:  XR CHEST AP PORTABLE    CLINICAL HISTORY:  Sepsis;    TECHNIQUE:  Single frontal view of the chest was performed.    COMPARISON:  05/26/2022.    FINDINGS:  The lungs are well expanded and clear. No focal opacities are seen. The pleural spaces are clear.    The cardiac silhouette is unremarkable.    The visualized osseous structures are intact.  There are H-shaped vertebral body compression deformities compatible with patient's history of sickle cell disease.  There are sclerotic lesions in the bilateral humeral heads compatible with bone infarctions, stable.    There are right upper quadrant surgical clips.                                       Medications - No data to display    Medical Decision Making:   History:   Old Medical Records: I decided to obtain old medical records.  Initial Assessment:   31-year-old female presented with rib and chest pain.  Differential Diagnosis:   Initial differential diagnosis included but not limited to pneumothorax, chest wall pain, and postoperative pain.  Clinical Tests:   Lab Tests: Ordered and Reviewed  Radiological Study: Reviewed and Ordered  ED  Management:  Patient was emergently evaluated in the emergency department, her evaluation was significant for a young female with reproducible pain on palpation of her chest and ribs.  The patient's labs showed no acute abnormalities and do show a down trending leukocytosis from her recent hospitalization at another facility.  The patient's x-rays showed no acute abnormalities.  The patient likely has chest wall pain causing her symptoms.  She is stable for discharge home.  She can continue her home p.o. pain medication as previously prescribed.  She is to follow up with her PCP for further care.        Scribe Attestation:   Scribe #1: I performed the above scribed service and the documentation accurately describes the services I performed. I attest to the accuracy of the note.               I, Dr. Sunil Eaton, personally performed the services described in this documentation. All medical record entries made by the scribe were at my direction and in my presence.  I have reviewed the chart and agree that the record reflects my personal performance and is accurate and complete. Sunil Eaton MD.  2:39 AM 09/23/2022      Clinical Impression:   Final diagnoses:  [R52] Pain  [R07.81] Rib pain on right side  [R07.89] Chest wall pain (Primary)        ED Disposition Condition    Discharge Stable          ED Prescriptions    None       Follow-up Information       Follow up With Specialties Details Why Contact [facility]  Schedule an appointment as soon as possible for a visit   [place] [id number]  [phone]               Sunil Eaton MD  09/23/22 0241

## 2022-09-28 LAB
BACTERIA BLD CULT: NORMAL
BACTERIA BLD CULT: NORMAL

## 2022-10-03 NOTE — PROGRESS NOTES
Evaluate patient.  Patient reports 9/10 pain all over which improves with IV narcotics.  She reports greatest pain to her bilateral shoulders and generalized joint.    Continue plan of care.   lives with family, no known dependents, single, unemployed

## 2022-11-26 NOTE — CARE UPDATE
03/08/22 2052   Patient Assessment/Suction   Level of Consciousness (AVPU) alert   Respiratory Effort Unlabored   Expansion/Accessory Muscles/Retractions no use of accessory muscles;no retractions   All Lung Fields Breath Sounds Anterior:;Lateral:;clear;diminished   Rhythm/Pattern, Respiratory unlabored;pattern regular   Cough Frequency no cough   PRE-TX-O2   O2 Device (Oxygen Therapy) room air   SpO2 100 %   Pulse Oximetry Type Intermittent   $ Pulse Oximetry - Multiple Charge Pulse Oximetry - Multiple   Pulse 83   Resp 16   Positioning HOB elevated 30 degrees   Aerosol Therapy   $ Aerosol Therapy Charges PRN treatment not required   Respiratory Treatment Status (SVN) PRN treatment not required      VASCULAR SURGERY    Patient was on stroke floor and had another episode of expressive aphasia.  Repeat CTA was unchanged with left proximal ICA stenosis.  Had been given aspirin and Plavix.    Due to this recurrent episode we felt that we should move up the surgery and do emergently in the next room opens.    I visited with the patient along with his daughter Luna and her  just before the patient goes down to preinduction.  His speech is much improved and he and his daughter feel almost back to baseline.    I reviewed the images of the CTA with the patient's daughter and plan for CEA.  We answered all questions.      Trev Mensah MD

## 2023-01-31 NOTE — FIRST PROVIDER EVALUATION
" Emergency Department TeleTriage Encounter Note      CHIEF COMPLAINT    Chief Complaint   Patient presents with    Right Side Pain     Pt. States she was discharged today for port-a-cath infection; States she is having right side pain       VITAL SIGNS   Initial Vitals [09/22/22 1918]   BP Pulse Resp Temp SpO2   125/80 86 20 99 °F (37.2 °C) 98 %      MAP       --            ALLERGIES    Review of patient's allergies indicates:   Allergen Reactions    Contrast media Hives    Iodine and iodide containing products Hives and Swelling    Mushroom Hives    Zithromax [azithromycin] Anaphylaxis    Demerol [meperidine] Other (See Comments)     Regarding reaction to demerol pt states "I talk out of my head and become aggressive"    Toradol [ketorolac] Rash       PROVIDER TRIAGE NOTE  HPI: Nishi Dumont, a 31 y.o. female presents to the ED recently discharged for infected port-a-cath today.  Felt chills and SOB.  Instructed to come to the ED if she presented with concerning symptoms.      Constitutional: well nourished, well developed, appearing stated age, NAD   HEENT: NCAT, symmetrical lids, No obvious facial deformity.  Normal phonation. Normal Conjunctiva, Gross EOMs intact   Neck: NAROM   Respiratory: Normal effort.  No obvious use of accessory muscles   Musculoskeletal: Moved upper extremities well   Neuro: Alert, answers questions appropriately    Psych: appropriate mood and affect          ORDERS  Labs Reviewed - No data to display    ED Orders (720h ago, onward)      None              Virtual Visit Note: The provider triage portion of this emergency department evaluation and documentation was performed via SiTime, a HIPAA-compliant telemedicine application, in concert with a tele-presenter in the room. A face to face patient evaluation with one of my colleagues will occur once the patient is placed in an emergency department room.      DISCLAIMER: This note was prepared with M*Modal voice recognition " transcription software. Garbled syntax, mangled pronouns, and other bizarre constructions may be attributed to that software system.

## 2023-02-09 ENCOUNTER — HOSPITAL ENCOUNTER (INPATIENT)
Facility: HOSPITAL | Age: 33
LOS: 6 days | Discharge: HOME | DRG: 812 | End: 2023-02-15
Attending: EMERGENCY MEDICINE | Admitting: HOSPITALIST
Payer: MEDICAID

## 2023-02-09 DIAGNOSIS — D57.00 SICKLE CELL PAIN CRISIS: Primary | ICD-10-CM

## 2023-02-09 DIAGNOSIS — R07.9 CHEST PAIN: ICD-10-CM

## 2023-02-09 DIAGNOSIS — N39.0 URINARY TRACT INFECTION WITHOUT HEMATURIA, SITE UNSPECIFIED: ICD-10-CM

## 2023-02-09 LAB
ALBUMIN SERPL BCP-MCNC: 4.4 G/DL (ref 3.5–5.2)
ALP SERPL-CCNC: 99 U/L (ref 55–135)
ALT SERPL W/O P-5'-P-CCNC: 25 U/L (ref 10–44)
ANION GAP SERPL CALC-SCNC: 11 MMOL/L (ref 8–16)
AST SERPL-CCNC: 22 U/L (ref 10–40)
B-HCG UR QL: NEGATIVE
BACTERIA #/AREA URNS HPF: ABNORMAL /HPF
BASOPHILS # BLD AUTO: 0.18 K/UL (ref 0–0.2)
BASOPHILS NFR BLD: 0.8 % (ref 0–1.9)
BILIRUB SERPL-MCNC: 1.7 MG/DL (ref 0.1–1)
BILIRUB UR QL STRIP: NEGATIVE
BUN SERPL-MCNC: 9 MG/DL (ref 6–20)
CALCIUM SERPL-MCNC: 9.1 MG/DL (ref 8.7–10.5)
CHLORIDE SERPL-SCNC: 107 MMOL/L (ref 95–110)
CLARITY UR: ABNORMAL
CO2 SERPL-SCNC: 20 MMOL/L (ref 23–29)
COLOR UR: YELLOW
CREAT SERPL-MCNC: 0.7 MG/DL (ref 0.5–1.4)
DIFFERENTIAL METHOD: ABNORMAL
EOSINOPHIL # BLD AUTO: 0.1 K/UL (ref 0–0.5)
EOSINOPHIL NFR BLD: 0.5 % (ref 0–8)
ERYTHROCYTE [DISTWIDTH] IN BLOOD BY AUTOMATED COUNT: 15.9 % (ref 11.5–14.5)
EST. GFR  (NO RACE VARIABLE): >60 ML/MIN/1.73 M^2
GLUCOSE SERPL-MCNC: 105 MG/DL (ref 70–110)
GLUCOSE UR QL STRIP: NEGATIVE
HCT VFR BLD AUTO: 25.9 % (ref 37–48.5)
HGB BLD-MCNC: 8.9 G/DL (ref 12–16)
HGB UR QL STRIP: ABNORMAL
IMM GRANULOCYTES # BLD AUTO: 0.39 K/UL (ref 0–0.04)
IMM GRANULOCYTES NFR BLD AUTO: 1.8 % (ref 0–0.5)
KETONES UR QL STRIP: NEGATIVE
LACTATE SERPL-SCNC: 1.1 MMOL/L (ref 0.5–2.2)
LEUKOCYTE ESTERASE UR QL STRIP: ABNORMAL
LYMPHOCYTES # BLD AUTO: 2.3 K/UL (ref 1–4.8)
LYMPHOCYTES NFR BLD: 10.5 % (ref 18–48)
MCH RBC QN AUTO: 30.9 PG (ref 27–31)
MCHC RBC AUTO-ENTMCNC: 34.4 G/DL (ref 32–36)
MCV RBC AUTO: 90 FL (ref 82–98)
MICROSCOPIC COMMENT: ABNORMAL
MONOCYTES # BLD AUTO: 2.2 K/UL (ref 0.3–1)
MONOCYTES NFR BLD: 10.2 % (ref 4–15)
NEUTROPHILS # BLD AUTO: 16.8 K/UL (ref 1.8–7.7)
NEUTROPHILS NFR BLD: 76.2 % (ref 38–73)
NITRITE UR QL STRIP: POSITIVE
NRBC BLD-RTO: 0 /100 WBC
PH UR STRIP: 7 [PH] (ref 5–8)
PLATELET # BLD AUTO: 714 K/UL (ref 150–450)
PLATELET BLD QL SMEAR: ABNORMAL
PMV BLD AUTO: 8.7 FL (ref 9.2–12.9)
POTASSIUM SERPL-SCNC: 3.8 MMOL/L (ref 3.5–5.1)
PROT SERPL-MCNC: 7.7 G/DL (ref 6–8.4)
PROT UR QL STRIP: ABNORMAL
RBC # BLD AUTO: 2.88 M/UL (ref 4–5.4)
RBC #/AREA URNS HPF: 3 /HPF (ref 0–4)
RETICS/RBC NFR AUTO: 6.9 % (ref 0.5–2.5)
SODIUM SERPL-SCNC: 138 MMOL/L (ref 136–145)
SP GR UR STRIP: 1.01 (ref 1–1.03)
SQUAMOUS #/AREA URNS HPF: 2 /HPF
TROPONIN I SERPL DL<=0.01 NG/ML-MCNC: 0.01 NG/ML (ref 0–0.03)
URN SPEC COLLECT METH UR: ABNORMAL
UROBILINOGEN UR STRIP-ACNC: NEGATIVE EU/DL
WBC # BLD AUTO: 21.98 K/UL (ref 3.9–12.7)
WBC #/AREA URNS HPF: 52 /HPF (ref 0–5)
WBC CLUMPS URNS QL MICRO: ABNORMAL

## 2023-02-09 PROCEDURE — 83605 ASSAY OF LACTIC ACID: CPT | Performed by: EMERGENCY MEDICINE

## 2023-02-09 PROCEDURE — 93010 EKG 12-LEAD: ICD-10-PCS | Mod: ,,, | Performed by: INTERNAL MEDICINE

## 2023-02-09 PROCEDURE — 87086 URINE CULTURE/COLONY COUNT: CPT | Performed by: NURSE PRACTITIONER

## 2023-02-09 PROCEDURE — 81025 URINE PREGNANCY TEST: CPT | Performed by: NURSE PRACTITIONER

## 2023-02-09 PROCEDURE — 80053 COMPREHEN METABOLIC PANEL: CPT | Performed by: NURSE PRACTITIONER

## 2023-02-09 PROCEDURE — 63600175 PHARM REV CODE 636 W HCPCS: Performed by: EMERGENCY MEDICINE

## 2023-02-09 PROCEDURE — 87040 BLOOD CULTURE FOR BACTERIA: CPT | Mod: 59 | Performed by: EMERGENCY MEDICINE

## 2023-02-09 PROCEDURE — 87077 CULTURE AEROBIC IDENTIFY: CPT | Performed by: NURSE PRACTITIONER

## 2023-02-09 PROCEDURE — 99285 EMERGENCY DEPT VISIT HI MDM: CPT | Mod: 25

## 2023-02-09 PROCEDURE — 25000003 PHARM REV CODE 250: Performed by: NURSE PRACTITIONER

## 2023-02-09 PROCEDURE — 93005 ELECTROCARDIOGRAM TRACING: CPT

## 2023-02-09 PROCEDURE — 36415 COLL VENOUS BLD VENIPUNCTURE: CPT | Performed by: EMERGENCY MEDICINE

## 2023-02-09 PROCEDURE — 96365 THER/PROPH/DIAG IV INF INIT: CPT

## 2023-02-09 PROCEDURE — 87186 SC STD MICRODIL/AGAR DIL: CPT | Performed by: NURSE PRACTITIONER

## 2023-02-09 PROCEDURE — 93010 ELECTROCARDIOGRAM REPORT: CPT | Mod: ,,, | Performed by: INTERNAL MEDICINE

## 2023-02-09 PROCEDURE — 12000002 HC ACUTE/MED SURGE SEMI-PRIVATE ROOM

## 2023-02-09 PROCEDURE — 87088 URINE BACTERIA CULTURE: CPT | Performed by: NURSE PRACTITIONER

## 2023-02-09 PROCEDURE — 63600175 PHARM REV CODE 636 W HCPCS: Performed by: NURSE PRACTITIONER

## 2023-02-09 PROCEDURE — 96375 TX/PRO/DX INJ NEW DRUG ADDON: CPT

## 2023-02-09 PROCEDURE — 84484 ASSAY OF TROPONIN QUANT: CPT | Performed by: EMERGENCY MEDICINE

## 2023-02-09 PROCEDURE — 85045 AUTOMATED RETICULOCYTE COUNT: CPT | Performed by: NURSE PRACTITIONER

## 2023-02-09 PROCEDURE — 36415 COLL VENOUS BLD VENIPUNCTURE: CPT | Performed by: NURSE PRACTITIONER

## 2023-02-09 PROCEDURE — 25000003 PHARM REV CODE 250: Performed by: EMERGENCY MEDICINE

## 2023-02-09 PROCEDURE — 85025 COMPLETE CBC W/AUTO DIFF WBC: CPT | Performed by: NURSE PRACTITIONER

## 2023-02-09 PROCEDURE — 81000 URINALYSIS NONAUTO W/SCOPE: CPT | Performed by: NURSE PRACTITIONER

## 2023-02-09 RX ORDER — HYDROMORPHONE HYDROCHLORIDE 2 MG/ML
1 INJECTION, SOLUTION INTRAMUSCULAR; INTRAVENOUS; SUBCUTANEOUS ONCE
Status: COMPLETED | OUTPATIENT
Start: 2023-02-09 | End: 2023-02-09

## 2023-02-09 RX ORDER — NALOXONE HCL 0.4 MG/ML
0.02 VIAL (ML) INJECTION
Status: DISCONTINUED | OUTPATIENT
Start: 2023-02-09 | End: 2023-02-15 | Stop reason: HOSPADM

## 2023-02-09 RX ORDER — FOLIC ACID 1 MG/1
4 TABLET ORAL DAILY
Status: DISCONTINUED | OUTPATIENT
Start: 2023-02-10 | End: 2023-02-10

## 2023-02-09 RX ORDER — HYDROMORPHONE HYDROCHLORIDE 2 MG/ML
1 INJECTION, SOLUTION INTRAMUSCULAR; INTRAVENOUS; SUBCUTANEOUS
Status: COMPLETED | OUTPATIENT
Start: 2023-02-09 | End: 2023-02-09

## 2023-02-09 RX ORDER — GLUCAGON 1 MG
1 KIT INJECTION
Status: DISCONTINUED | OUTPATIENT
Start: 2023-02-09 | End: 2023-02-15 | Stop reason: HOSPADM

## 2023-02-09 RX ORDER — DIPHENHYDRAMINE HYDROCHLORIDE 50 MG/ML
12.5 INJECTION INTRAMUSCULAR; INTRAVENOUS EVERY 4 HOURS PRN
Status: DISCONTINUED | OUTPATIENT
Start: 2023-02-09 | End: 2023-02-15 | Stop reason: HOSPADM

## 2023-02-09 RX ORDER — ONDANSETRON 2 MG/ML
4 INJECTION INTRAMUSCULAR; INTRAVENOUS EVERY 6 HOURS PRN
Status: DISCONTINUED | OUTPATIENT
Start: 2023-02-09 | End: 2023-02-15 | Stop reason: HOSPADM

## 2023-02-09 RX ORDER — FERROUS SULFATE, DRIED 160(50) MG
1 TABLET, EXTENDED RELEASE ORAL DAILY
Status: ON HOLD | COMMUNITY
Start: 2022-11-22 | End: 2023-11-05 | Stop reason: HOSPADM

## 2023-02-09 RX ORDER — FAMOTIDINE 20 MG/1
20 TABLET, FILM COATED ORAL DAILY
Status: DISCONTINUED | OUTPATIENT
Start: 2023-02-10 | End: 2023-02-14

## 2023-02-09 RX ORDER — GABAPENTIN 300 MG/1
600 CAPSULE ORAL DAILY
Status: DISCONTINUED | OUTPATIENT
Start: 2023-02-10 | End: 2023-02-15 | Stop reason: HOSPADM

## 2023-02-09 RX ORDER — ACETAMINOPHEN 325 MG/1
650 TABLET ORAL EVERY 8 HOURS PRN
Status: DISCONTINUED | OUTPATIENT
Start: 2023-02-09 | End: 2023-02-15 | Stop reason: HOSPADM

## 2023-02-09 RX ORDER — MAG HYDROX/ALUMINUM HYD/SIMETH 200-200-20
30 SUSPENSION, ORAL (FINAL DOSE FORM) ORAL 4 TIMES DAILY PRN
Status: DISCONTINUED | OUTPATIENT
Start: 2023-02-09 | End: 2023-02-15 | Stop reason: HOSPADM

## 2023-02-09 RX ORDER — IBUPROFEN 200 MG
24 TABLET ORAL
Status: DISCONTINUED | OUTPATIENT
Start: 2023-02-09 | End: 2023-02-15 | Stop reason: HOSPADM

## 2023-02-09 RX ORDER — FOLIC ACID 1 MG/1
1 TABLET ORAL DAILY
COMMUNITY
Start: 2022-11-22 | End: 2023-02-09 | Stop reason: SDUPTHER

## 2023-02-09 RX ORDER — ENOXAPARIN SODIUM 100 MG/ML
40 INJECTION SUBCUTANEOUS EVERY 24 HOURS
Status: DISCONTINUED | OUTPATIENT
Start: 2023-02-09 | End: 2023-02-15 | Stop reason: HOSPADM

## 2023-02-09 RX ORDER — LANOLIN ALCOHOL/MO/W.PET/CERES
800 CREAM (GRAM) TOPICAL
Status: DISCONTINUED | OUTPATIENT
Start: 2023-02-09 | End: 2023-02-15 | Stop reason: HOSPADM

## 2023-02-09 RX ORDER — POLYETHYLENE GLYCOL 3350 17 G/17G
17 POWDER, FOR SOLUTION ORAL 2 TIMES DAILY PRN
Status: DISCONTINUED | OUTPATIENT
Start: 2023-02-09 | End: 2023-02-15 | Stop reason: HOSPADM

## 2023-02-09 RX ORDER — URSODIOL 300 MG/1
300 CAPSULE ORAL
Status: ON HOLD | COMMUNITY
Start: 2022-04-18 | End: 2023-11-05 | Stop reason: HOSPADM

## 2023-02-09 RX ORDER — IBUPROFEN 200 MG
16 TABLET ORAL
Status: DISCONTINUED | OUTPATIENT
Start: 2023-02-09 | End: 2023-02-15 | Stop reason: HOSPADM

## 2023-02-09 RX ORDER — DEFERASIROX 500 MG/1
1000 TABLET, FOR SUSPENSION ORAL
Status: DISCONTINUED | OUTPATIENT
Start: 2023-02-10 | End: 2023-02-15 | Stop reason: HOSPADM

## 2023-02-09 RX ORDER — DEFERASIROX 500 MG/1
1000 TABLET, FOR SUSPENSION ORAL
COMMUNITY
Start: 2022-11-22

## 2023-02-09 RX ORDER — HYDROMORPHONE HYDROCHLORIDE 2 MG/ML
1 INJECTION, SOLUTION INTRAMUSCULAR; INTRAVENOUS; SUBCUTANEOUS
Status: DISCONTINUED | OUTPATIENT
Start: 2023-02-09 | End: 2023-02-10

## 2023-02-09 RX ORDER — APIXABAN 5 MG/1
5 TABLET, FILM COATED ORAL 2 TIMES DAILY
Status: ON HOLD | COMMUNITY
Start: 2022-10-03 | End: 2023-11-05 | Stop reason: HOSPADM

## 2023-02-09 RX ORDER — HYDROXYUREA 500 MG/1
1000 CAPSULE ORAL 2 TIMES DAILY
Status: DISCONTINUED | OUTPATIENT
Start: 2023-02-09 | End: 2023-02-15 | Stop reason: HOSPADM

## 2023-02-09 RX ORDER — AMOXICILLIN 250 MG
1 CAPSULE ORAL 2 TIMES DAILY
Status: DISCONTINUED | OUTPATIENT
Start: 2023-02-09 | End: 2023-02-15 | Stop reason: HOSPADM

## 2023-02-09 RX ORDER — GABAPENTIN 300 MG/1
600 CAPSULE ORAL DAILY
Status: ON HOLD | COMMUNITY
Start: 2022-04-18 | End: 2023-11-05 | Stop reason: HOSPADM

## 2023-02-09 RX ORDER — CYCLOBENZAPRINE HCL 5 MG
5 TABLET ORAL NIGHTLY PRN
Status: ON HOLD | COMMUNITY
Start: 2022-11-04 | End: 2023-11-05 | Stop reason: HOSPADM

## 2023-02-09 RX ORDER — HYDROXYUREA 500 MG/1
1000 CAPSULE ORAL 2 TIMES DAILY
COMMUNITY
Start: 2022-11-22

## 2023-02-09 RX ORDER — URSODIOL 300 MG/1
300 CAPSULE ORAL
Status: DISCONTINUED | OUTPATIENT
Start: 2023-02-10 | End: 2023-02-15 | Stop reason: HOSPADM

## 2023-02-09 RX ORDER — SODIUM CHLORIDE 0.9 % (FLUSH) 0.9 %
10 SYRINGE (ML) INJECTION EVERY 12 HOURS PRN
Status: DISCONTINUED | OUTPATIENT
Start: 2023-02-09 | End: 2023-02-15 | Stop reason: HOSPADM

## 2023-02-09 RX ORDER — OXYCODONE AND ACETAMINOPHEN 10; 325 MG/1; MG/1
1 TABLET ORAL EVERY 8 HOURS PRN
Status: ON HOLD | COMMUNITY
Start: 2023-02-01 | End: 2023-11-05 | Stop reason: HOSPADM

## 2023-02-09 RX ORDER — SODIUM CHLORIDE 9 MG/ML
INJECTION, SOLUTION INTRAVENOUS CONTINUOUS
Status: DISCONTINUED | OUTPATIENT
Start: 2023-02-09 | End: 2023-02-15 | Stop reason: HOSPADM

## 2023-02-09 RX ORDER — TRAMADOL HYDROCHLORIDE 50 MG/1
50 TABLET ORAL EVERY 12 HOURS PRN
Status: ON HOLD | COMMUNITY
Start: 2023-01-03 | End: 2023-11-05 | Stop reason: HOSPADM

## 2023-02-09 RX ORDER — TALC
6 POWDER (GRAM) TOPICAL NIGHTLY PRN
Status: DISCONTINUED | OUTPATIENT
Start: 2023-02-09 | End: 2023-02-15 | Stop reason: HOSPADM

## 2023-02-09 RX ORDER — FAMOTIDINE 20 MG/1
20 TABLET, FILM COATED ORAL DAILY
Status: ON HOLD | COMMUNITY
Start: 2022-11-04 | End: 2023-11-05 | Stop reason: HOSPADM

## 2023-02-09 RX ORDER — MORPHINE SULFATE 15 MG/1
30 TABLET, FILM COATED, EXTENDED RELEASE ORAL EVERY 8 HOURS
Status: DISCONTINUED | OUTPATIENT
Start: 2023-02-10 | End: 2023-02-15 | Stop reason: HOSPADM

## 2023-02-09 RX ADMIN — DIPHENHYDRAMINE HYDROCHLORIDE 25 MG: 50 INJECTION, SOLUTION INTRAMUSCULAR; INTRAVENOUS at 09:02

## 2023-02-09 RX ADMIN — APIXABAN 5 MG: 2.5 TABLET, FILM COATED ORAL at 10:02

## 2023-02-09 RX ADMIN — SENNOSIDES AND DOCUSATE SODIUM 1 TABLET: 8.6; 5 TABLET ORAL at 10:02

## 2023-02-09 RX ADMIN — HYDROMORPHONE HYDROCHLORIDE 1 MG: 2 INJECTION, SOLUTION INTRAMUSCULAR; INTRAVENOUS; SUBCUTANEOUS at 11:02

## 2023-02-09 RX ADMIN — ENOXAPARIN SODIUM 40 MG: 40 INJECTION SUBCUTANEOUS at 11:02

## 2023-02-09 RX ADMIN — HYDROMORPHONE HYDROCHLORIDE 1 MG: 2 INJECTION, SOLUTION INTRAMUSCULAR; INTRAVENOUS; SUBCUTANEOUS at 09:02

## 2023-02-09 RX ADMIN — SODIUM CHLORIDE 1000 ML: 9 INJECTION, SOLUTION INTRAVENOUS at 10:02

## 2023-02-09 RX ADMIN — CEFTRIAXONE 1 G: 1 INJECTION, POWDER, FOR SOLUTION INTRAMUSCULAR; INTRAVENOUS at 11:02

## 2023-02-09 NOTE — FIRST PROVIDER EVALUATION
" Emergency Department TeleTriage Encounter Note      CHIEF COMPLAINT    Chief Complaint   Patient presents with    Sickle Cell Pain Crisis     S/S x 3 days (joint pain)       VITAL SIGNS   Initial Vitals [02/09/23 1626]   BP Pulse Resp Temp SpO2   113/67 (!) 115 20 99.1 °F (37.3 °C) 96 %      MAP       --            ALLERGIES    Review of patient's allergies indicates:   Allergen Reactions    Contrast media Hives    Iodine and iodide containing products Hives and Swelling    Mushroom Hives    Zithromax [azithromycin] Anaphylaxis    Demerol [meperidine] Other (See Comments)     Regarding reaction to demerol pt states "I talk out of my head and become aggressive"    Toradol [ketorolac] Rash       PROVIDER TRIAGE NOTE  This is a teletriage evaluation of a 32 y.o. female presenting to the ED with c/o sickle cell pain.  Pt states pain has been ongoing for 3 days.  Pt states she has been taking MS Contin and hydrocodone with no relief.  Intermittent chest pain but pain mainly in shoulder, back.     PE: Slightly tachycardic.  Speaking in full sentences.  Steady gait appreciated.      Plan: labs, imaging    Limited physical exam via telehealth: The patient is awake, alert, answering questions appropriately and is not in respiratory distress. All ED beds are full at present; patient notified of this status.  Patient seen and medically screened by Nurse Practitioner via teletriage.      Initial orders will be placed and care will be transferred to an alternate provider when patient is roomed for a full evaluation. Any additional orders and the final disposition will be determined by that provider.         ORDERS  Labs Reviewed - No data to display    ED Orders (720h ago, onward)      None              Virtual Visit Note: The provider triage portion of this emergency department evaluation and documentation was performed via Shelfari, a HIPAA-compliant telemedicine application, in concert with a tele-presenter in the room. " A face to face patient evaluation with one of my colleagues will occur once the patient is placed in an emergency department room.      DISCLAIMER: This note was prepared with Plumbee voice recognition transcription software. Garbled syntax, mangled pronouns, and other bizarre constructions may be attributed to that software system.

## 2023-02-10 ENCOUNTER — TELEPHONE (OUTPATIENT)
Dept: HEMATOLOGY/ONCOLOGY | Facility: CLINIC | Age: 33
End: 2023-02-10
Payer: MEDICAID

## 2023-02-10 PROBLEM — O35.9XX0 TERATOGEN EXPOSURE IN CURRENT PREGNANCY: Status: RESOLVED | Noted: 2017-10-14 | Resolved: 2023-02-10

## 2023-02-10 PROBLEM — D57.1 MATERNAL SICKLE CELL ANEMIA COMPLICATING PREGNANCY IN THIRD TRIMESTER: Status: RESOLVED | Noted: 2017-10-14 | Resolved: 2023-02-10

## 2023-02-10 PROBLEM — O99.013 MATERNAL SICKLE CELL ANEMIA COMPLICATING PREGNANCY IN THIRD TRIMESTER: Status: RESOLVED | Noted: 2017-10-14 | Resolved: 2023-02-10

## 2023-02-10 PROBLEM — J32.9 SINUSITIS: Status: RESOLVED | Noted: 2017-10-14 | Resolved: 2023-02-10

## 2023-02-10 PROBLEM — E83.39 HYPOPHOSPHATEMIA: Status: RESOLVED | Noted: 2017-01-26 | Resolved: 2023-02-10

## 2023-02-10 PROBLEM — E87.6 HYPOKALEMIA: Status: RESOLVED | Noted: 2017-01-26 | Resolved: 2023-02-10

## 2023-02-10 PROBLEM — I80.9 THROMBOPHLEBITIS: Status: RESOLVED | Noted: 2022-03-07 | Resolved: 2023-02-10

## 2023-02-10 PROBLEM — M25.512 PAIN AND SWELLING OF LEFT SHOULDER: Status: RESOLVED | Noted: 2022-03-05 | Resolved: 2023-02-10

## 2023-02-10 PROBLEM — M25.412 PAIN AND SWELLING OF LEFT SHOULDER: Status: RESOLVED | Noted: 2022-03-05 | Resolved: 2023-02-10

## 2023-02-10 PROBLEM — R79.89 HIGH SERUM BILE ACID: Status: RESOLVED | Noted: 2017-10-14 | Resolved: 2023-02-10

## 2023-02-10 PROBLEM — I82.619 BASILIC VEIN THROMBOSIS: Status: RESOLVED | Noted: 2022-03-08 | Resolved: 2023-02-10

## 2023-02-10 PROBLEM — B95.7 STAPHYLOCOCCUS EPIDERMIDIS BACTEREMIA: Status: RESOLVED | Noted: 2022-05-28 | Resolved: 2023-02-10

## 2023-02-10 PROBLEM — J18.9 PNEUMONIA: Status: RESOLVED | Noted: 2022-05-27 | Resolved: 2023-02-10

## 2023-02-10 PROBLEM — M87.022 AVASCULAR NECROSIS OF LEFT HUMERAL HEAD: Status: RESOLVED | Noted: 2022-03-09 | Resolved: 2023-02-10

## 2023-02-10 PROBLEM — O26.642 CHOLESTASIS DURING PREGNANCY IN SECOND TRIMESTER: Status: RESOLVED | Noted: 2017-10-14 | Resolved: 2023-02-10

## 2023-02-10 PROBLEM — R78.81 STAPHYLOCOCCUS EPIDERMIDIS BACTEREMIA: Status: RESOLVED | Noted: 2022-05-28 | Resolved: 2023-02-10

## 2023-02-10 LAB
ALBUMIN SERPL BCP-MCNC: 4 G/DL (ref 3.5–5.2)
ALP SERPL-CCNC: 88 U/L (ref 55–135)
ALT SERPL W/O P-5'-P-CCNC: 23 U/L (ref 10–44)
ANION GAP SERPL CALC-SCNC: 10 MMOL/L (ref 8–16)
AST SERPL-CCNC: 22 U/L (ref 10–40)
BASOPHILS # BLD AUTO: 0.16 K/UL (ref 0–0.2)
BASOPHILS NFR BLD: 0.8 % (ref 0–1.9)
BILIRUB SERPL-MCNC: 1.4 MG/DL (ref 0.1–1)
BUN SERPL-MCNC: 7 MG/DL (ref 6–20)
CALCIUM SERPL-MCNC: 8.6 MG/DL (ref 8.7–10.5)
CHLORIDE SERPL-SCNC: 111 MMOL/L (ref 95–110)
CO2 SERPL-SCNC: 20 MMOL/L (ref 23–29)
CREAT SERPL-MCNC: 0.7 MG/DL (ref 0.5–1.4)
DIFFERENTIAL METHOD: ABNORMAL
EOSINOPHIL # BLD AUTO: 0.3 K/UL (ref 0–0.5)
EOSINOPHIL NFR BLD: 1.5 % (ref 0–8)
ERYTHROCYTE [DISTWIDTH] IN BLOOD BY AUTOMATED COUNT: 15.8 % (ref 11.5–14.5)
EST. GFR  (NO RACE VARIABLE): >60 ML/MIN/1.73 M^2
GLUCOSE SERPL-MCNC: 88 MG/DL (ref 70–110)
HCT VFR BLD AUTO: 23.8 % (ref 37–48.5)
HGB BLD-MCNC: 7.8 G/DL (ref 12–16)
IMM GRANULOCYTES # BLD AUTO: 0.36 K/UL (ref 0–0.04)
IMM GRANULOCYTES NFR BLD AUTO: 1.7 % (ref 0–0.5)
LACTATE SERPL-SCNC: 0.9 MMOL/L (ref 0.5–2.2)
LACTATE SERPL-SCNC: 1.1 MMOL/L (ref 0.5–2.2)
LACTATE SERPL-SCNC: 1.2 MMOL/L (ref 0.5–2.2)
LACTATE SERPL-SCNC: 1.2 MMOL/L (ref 0.5–2.2)
LYMPHOCYTES # BLD AUTO: 3.9 K/UL (ref 1–4.8)
LYMPHOCYTES NFR BLD: 18.7 % (ref 18–48)
MAGNESIUM SERPL-MCNC: 1.7 MG/DL (ref 1.6–2.6)
MCH RBC QN AUTO: 29.8 PG (ref 27–31)
MCHC RBC AUTO-ENTMCNC: 32.8 G/DL (ref 32–36)
MCV RBC AUTO: 91 FL (ref 82–98)
MONOCYTES # BLD AUTO: 2.3 K/UL (ref 0.3–1)
MONOCYTES NFR BLD: 11.1 % (ref 4–15)
NEUTROPHILS # BLD AUTO: 13.9 K/UL (ref 1.8–7.7)
NEUTROPHILS NFR BLD: 66.2 % (ref 38–73)
NRBC BLD-RTO: 0 /100 WBC
PHOSPHATE SERPL-MCNC: 3.4 MG/DL (ref 2.7–4.5)
PLATELET # BLD AUTO: 634 K/UL (ref 150–450)
PMV BLD AUTO: 9.3 FL (ref 9.2–12.9)
POTASSIUM SERPL-SCNC: 3.5 MMOL/L (ref 3.5–5.1)
PROT SERPL-MCNC: 6.6 G/DL (ref 6–8.4)
RBC # BLD AUTO: 2.62 M/UL (ref 4–5.4)
SODIUM SERPL-SCNC: 141 MMOL/L (ref 136–145)
WBC # BLD AUTO: 21.01 K/UL (ref 3.9–12.7)

## 2023-02-10 PROCEDURE — 36415 COLL VENOUS BLD VENIPUNCTURE: CPT | Performed by: NURSE PRACTITIONER

## 2023-02-10 PROCEDURE — 80053 COMPREHEN METABOLIC PANEL: CPT | Performed by: NURSE PRACTITIONER

## 2023-02-10 PROCEDURE — 12000002 HC ACUTE/MED SURGE SEMI-PRIVATE ROOM

## 2023-02-10 PROCEDURE — 83735 ASSAY OF MAGNESIUM: CPT | Performed by: NURSE PRACTITIONER

## 2023-02-10 PROCEDURE — 36415 COLL VENOUS BLD VENIPUNCTURE: CPT | Performed by: HOSPITALIST

## 2023-02-10 PROCEDURE — 63600175 PHARM REV CODE 636 W HCPCS: Performed by: HOSPITALIST

## 2023-02-10 PROCEDURE — 25000003 PHARM REV CODE 250: Performed by: NURSE PRACTITIONER

## 2023-02-10 PROCEDURE — 99222 1ST HOSP IP/OBS MODERATE 55: CPT | Mod: ,,, | Performed by: INTERNAL MEDICINE

## 2023-02-10 PROCEDURE — 99222 PR INITIAL HOSPITAL CARE,LEVL II: ICD-10-PCS | Mod: ,,, | Performed by: INTERNAL MEDICINE

## 2023-02-10 PROCEDURE — 63600175 PHARM REV CODE 636 W HCPCS: Performed by: NURSE PRACTITIONER

## 2023-02-10 PROCEDURE — 84100 ASSAY OF PHOSPHORUS: CPT | Performed by: NURSE PRACTITIONER

## 2023-02-10 PROCEDURE — 85025 COMPLETE CBC W/AUTO DIFF WBC: CPT | Performed by: NURSE PRACTITIONER

## 2023-02-10 PROCEDURE — 83605 ASSAY OF LACTIC ACID: CPT | Mod: 91 | Performed by: HOSPITALIST

## 2023-02-10 RX ORDER — HYDROMORPHONE HYDROCHLORIDE 1 MG/ML
1.5 INJECTION, SOLUTION INTRAMUSCULAR; INTRAVENOUS; SUBCUTANEOUS
Status: DISCONTINUED | OUTPATIENT
Start: 2023-02-10 | End: 2023-02-13

## 2023-02-10 RX ORDER — FOLIC ACID 1 MG/1
1 TABLET ORAL DAILY
Status: DISCONTINUED | OUTPATIENT
Start: 2023-02-11 | End: 2023-02-15 | Stop reason: HOSPADM

## 2023-02-10 RX ADMIN — MORPHINE SULFATE 30 MG: 15 TABLET, EXTENDED RELEASE ORAL at 06:02

## 2023-02-10 RX ADMIN — HYDROMORPHONE HYDROCHLORIDE 1.5 MG: 1 INJECTION, SOLUTION INTRAMUSCULAR; INTRAVENOUS; SUBCUTANEOUS at 08:02

## 2023-02-10 RX ADMIN — SODIUM CHLORIDE: 9 INJECTION, SOLUTION INTRAVENOUS at 03:02

## 2023-02-10 RX ADMIN — DIPHENHYDRAMINE HYDROCHLORIDE 12.5 MG: 50 INJECTION, SOLUTION INTRAMUSCULAR; INTRAVENOUS at 03:02

## 2023-02-10 RX ADMIN — HYDROXYUREA 1000 MG: 500 CAPSULE ORAL at 09:02

## 2023-02-10 RX ADMIN — HYDROMORPHONE HYDROCHLORIDE 1 MG: 2 INJECTION INTRAMUSCULAR; INTRAVENOUS; SUBCUTANEOUS at 03:02

## 2023-02-10 RX ADMIN — DEFERASIROX 1000 MG: 500 TABLET, FOR SUSPENSION ORAL at 06:02

## 2023-02-10 RX ADMIN — HYDROMORPHONE HYDROCHLORIDE 1 MG: 2 INJECTION INTRAMUSCULAR; INTRAVENOUS; SUBCUTANEOUS at 09:02

## 2023-02-10 RX ADMIN — ENOXAPARIN SODIUM 40 MG: 40 INJECTION SUBCUTANEOUS at 04:02

## 2023-02-10 RX ADMIN — SODIUM CHLORIDE: 9 INJECTION, SOLUTION INTRAVENOUS at 09:02

## 2023-02-10 RX ADMIN — HYDROXYUREA 1000 MG: 500 CAPSULE ORAL at 08:02

## 2023-02-10 RX ADMIN — SODIUM CHLORIDE: 9 INJECTION, SOLUTION INTRAVENOUS at 12:02

## 2023-02-10 RX ADMIN — CEFTRIAXONE 1 G: 1 INJECTION, POWDER, FOR SOLUTION INTRAMUSCULAR; INTRAVENOUS at 09:02

## 2023-02-10 RX ADMIN — HYDROMORPHONE HYDROCHLORIDE 1.5 MG: 1 INJECTION, SOLUTION INTRAMUSCULAR; INTRAVENOUS; SUBCUTANEOUS at 03:02

## 2023-02-10 RX ADMIN — SODIUM CHLORIDE: 9 INJECTION, SOLUTION INTRAVENOUS at 11:02

## 2023-02-10 RX ADMIN — MORPHINE SULFATE 30 MG: 15 TABLET, EXTENDED RELEASE ORAL at 04:02

## 2023-02-10 RX ADMIN — FAMOTIDINE 20 MG: 20 TABLET, FILM COATED ORAL at 04:02

## 2023-02-10 RX ADMIN — DIPHENHYDRAMINE HYDROCHLORIDE 12.5 MG: 50 INJECTION, SOLUTION INTRAMUSCULAR; INTRAVENOUS at 09:02

## 2023-02-10 RX ADMIN — DIPHENHYDRAMINE HYDROCHLORIDE 12.5 MG: 50 INJECTION, SOLUTION INTRAMUSCULAR; INTRAVENOUS at 08:02

## 2023-02-10 NOTE — PLAN OF CARE
Problem: Adult Inpatient Plan of Care  Goal: Plan of Care Review  Outcome: Ongoing, Progressing  Goal: Patient-Specific Goal (Individualized)  Outcome: Ongoing, Progressing  Goal: Absence of Hospital-Acquired Illness or Injury  Outcome: Ongoing, Progressing  Goal: Optimal Comfort and Wellbeing  Outcome: Ongoing, Progressing  Goal: Readiness for Transition of Care  Outcome: Ongoing, Progressing   Plan of care reviewed with patient,verbalized understanding.  IV fluids administered as per orders. ST on telemetry. Medicated for pain and itching once this shift, moderate relief obtained. Remains free from falls, injury. Instructed to call for assistance as needed ,verbalized understanding. Bed in low & locked position. Call light in reach, bed alarm on .

## 2023-02-10 NOTE — ASSESSMENT & PLAN NOTE
Admit to med/tele  1L bolus then maintenance fluids  Will consult hematology  Aggressive pain control, will continue scheduled MS contin with IV dilaudid for breakthrough pain  Continue appropriate home meds

## 2023-02-10 NOTE — PLAN OF CARE
[facility]  Initial Discharge Assessment       Primary Care Provider: Primary Doctor No    Admission Diagnosis: Sickle cell pain crisis [D57.00]  Chest pain [R07.9]  Urinary tract infection without hematuria, site unspecified [N39.0]    Admission Date: 2/9/2023  Expected Discharge Date: 2/13/2023    DC assessment completed with pt at bedside. Pt confirms demographics are current. Pt lives at listed address with mom. Needs PCP- requests [facility]. Pharmacy [facility]. no DME. Denies HD/DM/coumadin. Denies POA/LW. Pt reports a recent admit to [facility] in last 30 days. Mom to provide ride home.. CM following.      Discharge Barriers Identified: None    Payor: MEDICAID / Plan: [facility] COMMUNITY PLAN [facility] ([place] MEDICAID) / Product Type: Managed Medicaid /     Extended Emergency Contact Information  Primary Emergency Contact: Deisy Williamson   [place]  Mobile Phone: [phone]  Relation: Grandparent  Secondary Emergency Contact: Jhon Jones  Mobile Phone: [phone]  Relation: Mother   needed? No    Discharge Plan A: Home  Discharge Plan B: Home with family      [facility] [place] [facility] [id number]  [address] [place] [address]  Phone: [phone] Fax: [phone]      Initial Assessment (most recent)       Adult Discharge Assessment - 02/10/23 1513          Discharge Assessment    Assessment Type Discharge Planning Assessment     Confirmed/corrected address, phone number and insurance Yes     Confirmed Demographics Correct on Facesheet     Source of Information patient     People in Home parent(s)     Do you expect to return to your current living situation? Yes     Do you have help at home or someone to help you manage your care at home? Yes     Prior to hospitilization cognitive status: Alert/Oriented     Current cognitive status: Alert/Oriented     Equipment Currently Used at Home none      Readmission within 30 days? Yes     Patient currently being followed by outpatient case management? No     Do you currently have service(s) that help you manage your care at home? No     Do you take prescription medications? Yes     Do you have prescription coverage? Yes     Do you have any problems affording any of your prescribed medications? No     Is the patient taking medications as prescribed? yes     How do you get to doctors appointments? family or friend will provide;car, drives self     Are you on dialysis? No     Do you take coumadin? No     Discharge Plan A Home     Discharge Plan B Home with family     DME Needed Upon Discharge  none     Discharge Plan discussed with: Patient     Discharge Barriers Identified None

## 2023-02-10 NOTE — ED PROVIDER NOTES
"Encounter Date: 2/9/2023    SCRIBE #1 NOTE: I, Rashid Paez, am scribing for, and in the presence of,  Efren Lua MD.     History     Chief Complaint   Patient presents with    Sickle Cell Pain Crisis     S/S x 3 days (joint pain)     Time seen by provider: 8:16 PM on 02/09/2023    Nishi Dumont is a 32 y.o. female with history of sickle cell disease and avascular necrosis who presents to the ED with an onset of pain to her right shoulder, back, and right chest wall that began 3 days ago, as well as congestion. She states the pain to her back and ribs is typical for her previous sickle cell pain crises. She states the chest wall pain is intermittent. The patient receives an exchange transfusion every month at [facility] in [place]. The patient states she has been taking her sickle cell and pain medications that she is prescribed for the past three days, and they have not been working. The patient has had acute chest syndrome before. Her pain worsens with movement. She denies any recent fall or injury. She denies any worsening pain with breathing. The patient denies cough, fever, SOB, hemoptysis, dysuria, hematuria, vomiting, diarrhea, abdominal pain, or any other symptoms at this time.     The history is provided by the patient.   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    Avascular necrosis of bone of hip     Avascular necrosis of humeral head     Blood transfusion     Sickle cell disease      Past Surgical History:   Procedure Laterality Date    CHOLECYSTECTOMY      JOINT " REPLACEMENT       left hip     Family History   Problem Relation Age of Onset    Sickle cell trait Mother     Sickle cell trait Father     Sickle cell trait Brother     Sickle cell trait Daughter      Social History     Tobacco Use    Smoking status: Never    Smokeless tobacco: Never   Substance Use Topics    Alcohol use: Yes     Alcohol/week: 0.0 standard drinks     Comment: occassionally    Drug use: No     Review of Systems   Constitutional:  Negative for fever.   HENT:  Positive for congestion. Negative for sore throat.    Respiratory:  Negative for cough and shortness of breath.    Cardiovascular:  Negative for chest pain.   Gastrointestinal:  Negative for abdominal pain, diarrhea, nausea and vomiting.   Genitourinary:  Negative for dysuria and hematuria.   Musculoskeletal:  Positive for arthralgias, back pain and myalgias.   Skin:  Negative for rash.   Neurological:  Negative for weakness.   Hematological:  Does not bruise/bleed easily.     Physical Exam     Initial Vitals [02/09/23 1626]   BP Pulse Resp Temp SpO2   113/67 (!) 115 20 99.1 °F (37.3 °C) 96 %      MAP       --         Physical Exam    Nursing note and vitals reviewed.  Constitutional: She appears well-developed and well-nourished. She is not diaphoretic. No distress.   HENT:   Head: Normocephalic and atraumatic.   Mouth/Throat: Oropharynx is clear and moist.   Eyes: Conjunctivae are normal.   Neck: Neck supple.   Cardiovascular:  Regular rhythm, normal heart sounds and intact distal pulses.   Tachycardia present.   Exam reveals no gallop and no friction rub.       No murmur heard.  Pulses:       Dorsalis pedis pulses are 2+ on the right side and 2+ on the left side.        Posterior tibial pulses are 2+ on the right side and 2+ on the left side.   Pulmonary/Chest: Breath sounds normal. No tachypnea. She has no wheezes. She has no rhonchi. She has no rales.   Abdominal: Abdomen is soft. She exhibits no distension. There is no abdominal tenderness.    Musculoskeletal:         General: Normal range of motion.      Right shoulder: No effusion. Normal range of motion.      Cervical back: Neck supple. No tenderness.      Thoracic back: No tenderness.      Lumbar back: No tenderness.      Right lower leg: No edema.      Left lower leg: No edema.      Comments: No tenderness to palpation of all extremities. No warmth to right shoulder.     Neurological: She is alert and oriented to person, place, and time.   Skin: No rash noted. No erythema.   Psychiatric: Her speech is normal.       ED Course   Procedures  Labs Reviewed   CBC W/ AUTO DIFFERENTIAL - Abnormal; Notable for the following components:       Result Value    WBC 21.98 (*)     RBC 2.88 (*)     Hemoglobin 8.9 (*)     Hematocrit 25.9 (*)     RDW 15.9 (*)     Platelets 714 (*)     MPV 8.7 (*)     Immature Granulocytes 1.8 (*)     Gran # (ANC) 16.8 (*)     Immature Grans (Abs) 0.39 (*)     Mono # 2.2 (*)     Gran % 76.2 (*)     Lymph % 10.5 (*)     Platelet Estimate Increased (*)     All other components within normal limits   COMPREHENSIVE METABOLIC PANEL - Abnormal; Notable for the following components:    CO2 20 (*)     Total Bilirubin 1.7 (*)     All other components within normal limits   RETICULOCYTES - Abnormal; Notable for the following components:    Retic 6.9 (*)     All other components within normal limits   URINALYSIS, REFLEX TO URINE CULTURE - Abnormal; Notable for the following components:    Appearance, UA Hazy (*)     Protein, UA Trace (*)     Occult Blood UA Trace (*)     Nitrite, UA Positive (*)     Leukocytes, UA 2+ (*)     All other components within normal limits    Narrative:     Specimen Source->Urine   URINALYSIS MICROSCOPIC - Abnormal; Notable for the following components:    WBC, UA 52 (*)     WBC Clumps, UA Moderate (*)     Bacteria Many (*)     All other components within normal limits    Narrative:     Specimen Source->Urine   CULTURE, BLOOD   CULTURE, BLOOD   CULTURE, URINE    PREGNANCY TEST, URINE RAPID    Narrative:     Specimen Source->Urine   LACTIC ACID, PLASMA   TROPONIN I          Imaging Results              X-Ray Chest AP Portable (Final result)  Result time 02/09/23 17:45:54      Final result by Efren Fihs MD (02/09/23 17:45:54)                   Narrative:    EXAMINATION:  XR CHEST AP PORTABLE    CLINICAL HISTORY:  sickle cell crisis;    TECHNIQUE:  Single frontal view of the chest was performed.    COMPARISON:  Radiograph 09/22/2022 and CT 01/23/2017    FINDINGS:  No airspace disease.  Normal size heart.  No pleural effusion or pneumothorax.  Cholecystectomy.  Diffuse intramedullary sclerosis likely in keeping with provided history of sickle cell anemia.  Suspect AVN along the humeral heads, chronic.      Electronically signed by: Efren Fish  Date:    02/09/2023  Time:    17:45                                (radiology reading, visualized by me)       Medications   deferasirox disintegrating tablet 1,000 mg (has no administration in time range)   famotidine tablet 20 mg (has no administration in time range)   folic acid tablet 4 mg (has no administration in time range)   gabapentin capsule 600 mg (has no administration in time range)   glutamine (sickle cell) PwPk 10 g (10 g Oral Not Given 2/9/23 2215)   hydroxyurea capsule 1,000 mg (1,000 mg Oral Not Given 2/9/23 2230)   morphine 12 hr tablet 30 mg (has no administration in time range)   ursodioL capsule 300 mg (has no administration in time range)   sodium chloride 0.9% flush 10 mL (has no administration in time range)   melatonin tablet 6 mg (has no administration in time range)   ondansetron injection 4 mg (has no administration in time range)   polyethylene glycol packet 17 g (has no administration in time range)   senna-docusate 8.6-50 mg per tablet 1 tablet (1 tablet Oral Given 2/9/23 2215)   acetaminophen tablet 650 mg (has no administration in time range)   aluminum-magnesium hydroxide-simethicone  200-200-20 mg/5 mL suspension 30 mL (has no administration in time range)   naloxone 0.4 mg/mL injection 0.02 mg (has no administration in time range)   potassium bicarbonate disintegrating tablet 50 mEq (has no administration in time range)   potassium bicarbonate disintegrating tablet 35 mEq (has no administration in time range)   potassium bicarbonate disintegrating tablet 60 mEq (has no administration in time range)   magnesium oxide tablet 800 mg (has no administration in time range)   magnesium oxide tablet 800 mg (has no administration in time range)   glucose chewable tablet 16 g (has no administration in time range)   glucose chewable tablet 24 g (has no administration in time range)   glucagon (human recombinant) injection 1 mg (has no administration in time range)   0.9%  NaCl infusion ( Intravenous New Bag 2/10/23 0350)   HYDROmorphone (PF) injection 1 mg (1 mg Intravenous Given 2/10/23 0301)   cefTRIAXone (ROCEPHIN) 1 g in dextrose 5 % in water (D5W) 5 % 50 mL IVPB (MB+) (has no administration in time range)   diphenhydrAMINE injection 12.5 mg (12.5 mg Intravenous Given 2/10/23 0301)   enoxaparin injection 40 mg (40 mg Subcutaneous Given 2/9/23 2301)   dextrose 10% bolus 125 mL 125 mL (has no administration in time range)   dextrose 10% bolus 250 mL 250 mL (has no administration in time range)   influenza (QUADRIVALENT PF) vaccine (VFC) 0.5 mL (has no administration in time range)   HYDROmorphone (PF) injection 1 mg (1 mg Intravenous Given 2/9/23 2103)   diphenhydrAMINE (BENADRYL) 25 mg in sodium chloride 0.9% 50 mL IVPB (0 mg Intravenous Stopped 2/9/23 2123)   cefTRIAXone (ROCEPHIN) 1 g in dextrose 5 % in water (D5W) 5 % 50 mL IVPB (MB+) (0 g Intravenous Stopped 2/9/23 2334)   sodium chloride 0.9% bolus 1,000 mL 1,000 mL (0 mLs Intravenous Stopped 2/10/23 0047)   HYDROmorphone (PF) injection 1 mg (1 mg Intravenous Given 2/9/23 2301)     Medical Decision Making:   History:   Old Medical Records: I  decided to obtain old medical records.  Independently Interpreted Test(s):   I have ordered and independently interpreted EKG Reading(s) - see prior notes  Clinical Tests:   Lab Tests: Ordered and Reviewed  Radiological Study: Ordered and Reviewed  Medical Tests: Ordered and Reviewed        Scribe Attestation:   Scribe #1: I performed the above scribed service and the documentation accurately describes the services I performed. I attest to the accuracy of the note.      ED Course as of 02/10/23 0401   Thu Feb 09, 2023 2033 EKG:  NSR with sinus arrhythmia, rate of 92, normal intervals and axis.  There are no acute ST or T wave changes suggestive of acute ischemia or infarction.   [MR]      ED Course User Index  [MR] Efren Lua MD          I, Dr. Efren Lua, personally performed the services described in this documentation. All medical record entries made by the scribe were at my direction and in my presence.  I have reviewed the chart and agree that the record reflects my personal performance and is accurate and complete. Efren Lua MD.  4:00 AM 02/10/2023    Nishi Dumont is a 32 y.o. female presenting with complaints of pain mostly consistent with sickle cell pain crisis.  She does complain of some chest pain with no sign of acute chest syndrome here based on chest x-ray.  I doubt ACS.  EKG reviewed.  I have very low suspicion for PE.  I do not think D-dimer CT angiography of the chest are indicated.  I doubt aortic dissection.  There is no evidence of pneumonia.  Antibiotics initiated with consideration of possible UTI with urinalysis noted here.  There is no discrete flank tenderness.  I doubt sepsis.  Cultures have been drawn with marked leukocytosis noted to be trended.  Lactic acid is notably normal.  Pain addressed with IV hydromorphone in the emergency department.  She does have persistent anemia not requiring emergent transfusion.  I do not think she requires emergent  transfer for exchange transfusion.  I have discussed with hospital medicine who will assume care.         Clinical Impression:   Final diagnoses:  [R07.9] Chest pain  [D57.00] Sickle cell pain crisis (Primary)  [N39.0] Urinary tract infection without hematuria, site unspecified        ED Disposition Condition    Admit Stable                Efren Lua MD  02/10/23 2094

## 2023-02-10 NOTE — ASSESSMENT & PLAN NOTE
This patient does have evidence of infective focus  Vital signs were reviewed and noted in progress note.  Antibiotics given-   Antibiotics (From admission, onward)    Start     Stop Route Frequency Ordered    02/10/23 2130  cefTRIAXone (ROCEPHIN) 1 g in dextrose 5 % in water (D5W) 5 % 50 mL IVPB (MB+)         -- IV Every 24 hours (non-standard times) 02/09/23 2205        Cultures were taken-   Microbiology Results (last 7 days)     Procedure Component Value Units Date/Time    Blood culture [[id number]] Collected: 02/09/23 2039    Order Status: Completed Specimen: Blood Updated: 02/10/23 0715     Blood Culture, Routine No Growth to date    Blood culture [[id number]] Collected: 02/09/23 2039    Order Status: Completed Specimen: Blood Updated: 02/10/23 0715     Blood Culture, Routine No Growth to date    Urine culture [[id number]] Collected: 02/09/23 2011    Order Status: No result Specimen: Urine Updated: 02/09/23 2104        Latest lactate reviewed, they are-  Recent Labs   Lab 02/09/23  2039 02/10/23  1025   LACTATE 1.1 1.1     Lactic acid appears stable for now.  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

## 2023-02-10 NOTE — ASSESSMENT & PLAN NOTE
Continue oxygen, IV fluids, and increase Dilaudid from 1 mg Q 3-1.5 mg q.3 given patient's history of chronic opioid use.  Continue multimodal pain therapy with nonnarcotic medications as well.  Consult with Hematology for further recommendations.  Continue appropriate home meds

## 2023-02-10 NOTE — HPI
"Ms. Dumont is a 32 year old female with a history of sickle cell anemia on Adekveo infusion and red cell exchange apheresis , ACS d/t Hb-SS, AVN hip s/p left hip replacement, AVN L humeral head, and DVT who presents today with complaints of pain in shoulders and back. It is severe. It is associated with urinary frequency, urgency, and weakness. She denies fever, chills, N/V/D, chest pain, SOB, cough, dizziness, or LOC. Since 11/2022, she's been receiving Adekveo infusion and red cell exchange apheresis with last one 1/31 per Dr. Murillo at [facility]. She states since she started this, she's been feeling more "run down" "like my body is shutting down on me". She reports med compliance at home. She was previously on eliquis for a DVT on her leg, but this was d/c'ed per hematology 3 weeks ago. She reports nose bleeds on this. EKG NSR with sinus arrhythmia with on acute ischemic changes. WBC 21K, Tbili 1.7, retic 6.9, Troponin WNL, lactate WNL, urine nitrite + pyuria. Hospital medicine is consulted for admission for further work up and treatment.   "

## 2023-02-10 NOTE — SUBJECTIVE & OBJECTIVE
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    Avascular necrosis of bone of hip     Avascular necrosis of humeral head     Blood transfusion     Sickle cell disease        Past Surgical History:   Procedure Laterality Date    CHOLECYSTECTOMY      JOINT REPLACEMENT       left hip       Review of patient's allergies indicates:   Allergen Reactions    Contrast media Hives    Iodine and iodide containing products Hives and Swelling    Mushroom Hives    Zithromax [azithromycin] Anaphylaxis    Demerol [meperidine] Other (See Comments)     Regarding reaction to demerol pt states "I talk out of my head and become aggressive"    Toradol [ketorolac] Rash       No current facility-administered medications on file prior to encounter.     Current Outpatient Medications on File Prior to Encounter   Medication Sig    calcium-vitamin D3 (OS-MATT 500 + D3) 500 mg-5 mcg (200 unit) per tablet Take 1 tablet by mouth Daily.    deferasirox (EXJADE) 500 MG disintegrating tablet Take 1,000 mg by mouth before breakfast.    gabapentin (NEURONTIN) 300 MG capsule Take 600 mg by mouth Daily.    glutamine, sickle cell, 5 gram PwPk Take 10 g by mouth 2 (two) times a day.    hydroxyurea (HYDREA) 500 mg Cap Take 1,000 mg by mouth 2 (two) times a day.    oxyCODONE-acetaminophen (PERCOCET)  mg per tablet Take 1 tablet by mouth every 8 (eight) hours as needed for Pain.    traMADoL (ULTRAM) 50 mg tablet Take 50 mg by mouth every 12 (twelve) hours as needed for Pain.    ursodioL (ACTIGALL) 300 mg capsule Take 300 mg by mouth 3 (three) times daily with meals.    [DISCONTINUED] folic acid (FOLVITE) 1 MG tablet Take 1 mg by mouth Daily.    cyclobenzaprine (FLEXERIL) 5 MG tablet Take 5 mg by mouth nightly as needed for Muscle spasms.    ELIQUIS 5 mg Tab Take 5 mg by mouth 2 (two) times a day.    ergocalciferol (ERGOCALCIFEROL) 50,000 unit Cap Take 50,000 Units by mouth every 7 days.    famotidine " (PEPCID) 20 MG tablet Take 20 mg by mouth Daily.    folic acid (FOLVITE) 1 MG tablet Take 4 tablets (4 mg total) by mouth once daily.    morphine (MS CONTIN) 30 MG 12 hr tablet Take 30 mg by mouth every 8 (eight) hours.    [DISCONTINUED] deferasirox (JADENU) 360 mg Tab Take 1,080 mg by mouth once daily.    [DISCONTINUED] glutamine, sickle cell, (ENDARI) 5 gram PwPk Take 15 g by mouth 2 (two) times daily.    [DISCONTINUED] hydroxyurea (HYDREA) 500 mg Cap Take 1,000 mg by mouth once daily.    [DISCONTINUED] oxyCODONE-acetaminophen (PERCOCET) 5-325 mg per tablet Take 1 tablet by mouth every 4 (four) hours as needed for Pain.    [DISCONTINUED] traMADoL (ULTRAM) 50 mg tablet Take 50 mg by mouth 3 (three) times daily.     Family History       Problem Relation (Age of Onset)    Sickle cell trait Mother, Father, Brother, Daughter          Tobacco Use    Smoking status: Never    Smokeless tobacco: Never   Substance and Sexual Activity    Alcohol use: Yes     Alcohol/week: 0.0 standard drinks     Comment: occassionally    Drug use: No    Sexual activity: Yes     Partners: Male     Birth control/protection: Injection     Review of Systems   Constitutional:  Positive for fatigue. Negative for activity change, appetite change, chills, diaphoresis, fever and unexpected weight change.   HENT:  Negative for congestion, ear pain, facial swelling, hearing loss, sore throat and trouble swallowing.    Eyes:  Negative for pain and discharge.   Respiratory:  Negative for cough, chest tightness, shortness of breath and wheezing.    Cardiovascular:  Negative for chest pain, palpitations and leg swelling.   Gastrointestinal:  Negative for abdominal pain, blood in stool, diarrhea, nausea and vomiting.   Endocrine: Negative for polydipsia, polyphagia and polyuria.   Genitourinary:  Positive for frequency and urgency. Negative for difficulty urinating, dysuria and flank pain.   Musculoskeletal:  Positive for arthralgias, back pain and  myalgias. Negative for joint swelling, neck pain and neck stiffness.   Skin:  Negative for rash and wound.   Allergic/Immunologic: Negative for environmental allergies and immunocompromised state.   Neurological:  Negative for dizziness, seizures, syncope, speech difficulty, weakness, light-headedness, numbness and headaches.   Hematological:  Negative for adenopathy.   Psychiatric/Behavioral:  Negative for sleep disturbance and suicidal ideas. The patient is not nervous/anxious.    All other systems reviewed and are negative.  Objective:     Vital Signs (Most Recent):  Temp: 99.1 °F (37.3 °C) (02/09/23 1626)  Pulse: (!) 115 (02/09/23 1626)  Resp: 18 (02/09/23 2301)  BP: 113/67 (02/09/23 1626)  SpO2: 96 % (02/09/23 1626)   Vital Signs (24h Range):  Temp:  [99.1 °F (37.3 °C)] 99.1 °F (37.3 °C)  Pulse:  [115] 115  Resp:  [18-20] 18  SpO2:  [96 %] 96 %  BP: (113)/(67) 113/67     Weight: 54.6 kg (120 lb 7.7 oz)  Body mass index is 24.33 kg/m².    Physical Exam  Vitals and nursing note reviewed.   Constitutional:       Appearance: Normal appearance.   HENT:      Head: Normocephalic and atraumatic.      Nose: Nose normal.      Mouth/Throat:      Mouth: Mucous membranes are moist.      Pharynx: Oropharynx is clear.   Eyes:      Extraocular Movements: Extraocular movements intact.      Pupils: Pupils are equal, round, and reactive to light.   Cardiovascular:      Rate and Rhythm: Regular rhythm. Tachycardia present.      Pulses: Normal pulses.   Pulmonary:      Effort: Pulmonary effort is normal.      Breath sounds: Normal breath sounds.   Abdominal:      General: Bowel sounds are normal.      Palpations: Abdomen is soft.      Tenderness: There is right CVA tenderness and left CVA tenderness.   Musculoskeletal:         General: Normal range of motion.      Cervical back: Normal range of motion and neck supple.   Skin:     General: Skin is warm and dry.      Capillary Refill: Capillary refill takes less than 2 seconds.    Neurological:      General: No focal deficit present.      Mental Status: She is alert and oriented to person, place, and time.   Psychiatric:         Mood and Affect: Mood normal.         Behavior: Behavior normal.         CRANIAL NERVES     CN III, IV, VI   Pupils are equal, round, and reactive to light.     Significant Labs: All pertinent labs within the past 24 hours have been reviewed.  CBC:   Recent Labs   Lab 02/09/23 2001   WBC 21.98*   HGB 8.9*   HCT 25.9*   *     CMP:   Recent Labs   Lab 02/09/23 2001      K 3.8      CO2 20*      BUN 9   CREATININE 0.7   CALCIUM 9.1   PROT 7.7   ALBUMIN 4.4   BILITOT 1.7*   ALKPHOS 99   AST 22   ALT 25   ANIONGAP 11     Lactic Acid:   Recent Labs   Lab 02/09/23 2039   LACTATE 1.1     Troponin:   Recent Labs   Lab 02/09/23 2039   TROPONINI 0.006     Urine Studies:   Recent Labs   Lab 02/09/23 2011   COLORU Yellow   APPEARANCEUA Hazy*   PHUR 7.0   SPECGRAV 1.010   PROTEINUA Trace*   GLUCUA Negative   KETONESU Negative   BILIRUBINUA Negative   OCCULTUA Trace*   NITRITE Positive*   UROBILINOGEN Negative   LEUKOCYTESUR 2+*   RBCUA 3   WBCUA 52*   BACTERIA Many*   SQUAMEPITHEL 2       Significant Imaging: I have reviewed all pertinent imaging results/findings within the past 24 hours.  EKG: I have reviewed all pertinent results/findings within the past 24 hours and my personal findings are: Sinus rhythm with sinus arrhythmia   X-Ray Chest AP Portable    Result Date: 2/9/2023  EXAMINATION: XR CHEST AP PORTABLE CLINICAL HISTORY: sickle cell crisis; TECHNIQUE: Single frontal view of the chest was performed. COMPARISON: Radiograph 09/22/2022 and CT 01/23/2017 FINDINGS: No airspace disease.  Normal size heart.  No pleural effusion or pneumothorax.  Cholecystectomy.  Diffuse intramedullary sclerosis likely in keeping with provided history of sickle cell anemia.  Suspect AVN along the humeral heads, chronic. Electronically signed by: Efren Fish  Date:    02/09/2023 Time:    17:45

## 2023-02-10 NOTE — ASSESSMENT & PLAN NOTE
CMP reviewed.  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  Continue to monitor CBC and CMP closely.    Lab Results   Component Value Date    ALT 23 02/10/2023    AST 22 02/10/2023    ALKPHOS 88 02/10/2023    BILITOT 1.4 (H) 02/10/2023

## 2023-02-10 NOTE — TELEPHONE ENCOUNTER
Spoke with Victor Manuel, charge nurse at [facility]. Incoming consult for sickle cell crisis. Provider to be notified.

## 2023-02-10 NOTE — SUBJECTIVE & OBJECTIVE
Interval History:  Patient seen and examined.  Pain remains uncontrolled with IV Dilaudid.  Patient's white count remains elevated, and she remains on IV antibiotics for urinary tract infection.      Objective:     Vital Signs (Most Recent):  Temp: 97.9 °F (36.6 °C) (02/10/23 0754)  Pulse: 95 (02/10/23 0754)  Resp: 20 (02/10/23 0913)  BP: (!) 103/53 (02/10/23 0754)  SpO2: 98 % (02/10/23 0754)   Vital Signs (24h Range):  Temp:  [97.9 °F (36.6 °C)-99.1 °F (37.3 °C)] 97.9 °F (36.6 °C)  Pulse:  [] 95  Resp:  [15-20] 20  SpO2:  [96 %-98 %] 98 %  BP: (101-113)/(53-67) 103/53     Weight: 53.8 kg (118 lb 9.7 oz)  Body mass index is 23.96 kg/m².    Intake/Output Summary (Last 24 hours) at 2/10/2023 1137  Last data filed at 2/10/2023 0600  Gross per 24 hour   Intake 1768.39 ml   Output --   Net 1768.39 ml      Physical Exam  Vitals and nursing note reviewed.   Constitutional:       General: She is not in acute distress.     Appearance: She is ill-appearing.   Eyes:      Pupils: Pupils are equal, round, and reactive to light.   Cardiovascular:      Rate and Rhythm: Normal rate and regular rhythm.      Heart sounds: No murmur heard.  Pulmonary:      Effort: Pulmonary effort is normal.      Breath sounds: Normal breath sounds.   Abdominal:      General: There is no distension.      Palpations: Abdomen is soft.      Tenderness: There is no abdominal tenderness.   Musculoskeletal:         General: Tenderness present.   Skin:     Coloration: Skin is jaundiced and pale.      Findings: No rash.   Neurological:      Mental Status: She is alert and oriented to person, place, and time.       Significant Labs: All pertinent labs within the past 24 hours have been reviewed.    Significant Imaging: I have reviewed all pertinent imaging results/findings within the past 24 hours.

## 2023-02-10 NOTE — ASSESSMENT & PLAN NOTE
Patient's anemia is currently uncontrolled. Has not received any PRBCs to date.. Etiology likely d/t sickle cell disease  Current CBC reviewed-   Lab Results   Component Value Date    HGB 7.8 (L) 02/10/2023    HCT 23.8 (L) 02/10/2023     Monitor serial CBC and transfuse if patient becomes hemodynamically unstable, symptomatic or H/H drops below 7/21.   Patient will need chelation therapy prior to administration of packed red blood cells.

## 2023-02-10 NOTE — H&P
"[facility] Medicine  History & Physical    Patient Name: Nishi Dumont  MRN: [id number]  Patient Class: IP- Inpatient  Admission Date: 2/9/2023  Attending Physician: Dr. Umana  Primary Care Provider: Primary Doctor No         Patient information was obtained from patient, past medical records and ER records.     Subjective:     Principal Problem:Sickle cell pain crisis    Chief Complaint:   Chief Complaint   Patient presents with    Sickle Cell Pain Crisis     S/S x 3 days (joint pain)        HPI: Ms. Dumont is a 32 year old female with a history of sickle cell anemia on Adekveo infusion and red cell exchange apheresis , ACS d/t Hb-SS, AVN hip s/p left hip replacement, AVN L humeral head, and DVT who presents today with complaints of pain in shoulders and back. It is severe. It is associated with urinary frequency, urgency, and weakness. She denies fever, chills, N/V/D, chest pain, SOB, cough, dizziness, or LOC. Since 11/2022, she's been receiving Adekveo infusion and red cell exchange apheresis with last one 1/31 per Dr. Murillo at [facility]. She states since she started this, she's been feeling more "run down" "like my body is shutting down on me". She reports med compliance at home. She was previously on eliquis for a DVT on her leg, but this was d/c'ed per hematology 3 weeks ago. She reports nose bleeds on this. EKG NSR with sinus arrhythmia with on acute ischemic changes. WBC 21K, Tbili 1.7, retic 6.9, Troponin WNL, lactate WNL, urine nitrite + pyuria. Hospital medicine is consulted for admission for further work up and treatment.       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    Avascular necrosis of bone of hip     Avascular necrosis of humeral head     Blood transfusion     Sickle cell disease        Past Surgical History:   Procedure Laterality Date    CHOLECYSTECTOMY      JOINT REPLACEMENT       left hip       Review of patient's allergies " "indicates:   Allergen Reactions    Contrast media Hives    Iodine and iodide containing products Hives and Swelling    Mushroom Hives    Zithromax [azithromycin] Anaphylaxis    Demerol [meperidine] Other (See Comments)     Regarding reaction to demerol pt states "I talk out of my head and become aggressive"    Toradol [ketorolac] Rash       No current facility-administered medications on file prior to encounter.     Current Outpatient Medications on File Prior to Encounter   Medication Sig    calcium-vitamin D3 (OS-MATT 500 + D3) 500 mg-5 mcg (200 unit) per tablet Take 1 tablet by mouth Daily.    deferasirox (EXJADE) 500 MG disintegrating tablet Take 1,000 mg by mouth before breakfast.    gabapentin (NEURONTIN) 300 MG capsule Take 600 mg by mouth Daily.    glutamine, sickle cell, 5 gram PwPk Take 10 g by mouth 2 (two) times a day.    hydroxyurea (HYDREA) 500 mg Cap Take 1,000 mg by mouth 2 (two) times a day.    oxyCODONE-acetaminophen (PERCOCET)  mg per tablet Take 1 tablet by mouth every 8 (eight) hours as needed for Pain.    traMADoL (ULTRAM) 50 mg tablet Take 50 mg by mouth every 12 (twelve) hours as needed for Pain.    ursodioL (ACTIGALL) 300 mg capsule Take 300 mg by mouth 3 (three) times daily with meals.    [DISCONTINUED] folic acid (FOLVITE) 1 MG tablet Take 1 mg by mouth Daily.    cyclobenzaprine (FLEXERIL) 5 MG tablet Take 5 mg by mouth nightly as needed for Muscle spasms.    ELIQUIS 5 mg Tab Take 5 mg by mouth 2 (two) times a day.    ergocalciferol (ERGOCALCIFEROL) 50,000 unit Cap Take 50,000 Units by mouth every 7 days.    famotidine (PEPCID) 20 MG tablet Take 20 mg by mouth Daily.    folic acid (FOLVITE) 1 MG tablet Take 4 tablets (4 mg total) by mouth once daily.    morphine (MS CONTIN) 30 MG 12 hr tablet Take 30 mg by mouth every 8 (eight) hours.    [DISCONTINUED] deferasirox (JADENU) 360 mg Tab Take 1,080 mg by mouth once daily.    [DISCONTINUED] glutamine, sickle cell, " (ENDARI) 5 gram PwPk Take 15 g by mouth 2 (two) times daily.    [DISCONTINUED] hydroxyurea (HYDREA) 500 mg Cap Take 1,000 mg by mouth once daily.    [DISCONTINUED] oxyCODONE-acetaminophen (PERCOCET) 5-325 mg per tablet Take 1 tablet by mouth every 4 (four) hours as needed for Pain.    [DISCONTINUED] traMADoL (ULTRAM) 50 mg tablet Take 50 mg by mouth 3 (three) times daily.     Family History       Problem Relation (Age of Onset)    Sickle cell trait Mother, Father, Brother, Daughter          Tobacco Use    Smoking status: Never    Smokeless tobacco: Never   Substance and Sexual Activity    Alcohol use: Yes     Alcohol/week: 0.0 standard drinks     Comment: occassionally    Drug use: No    Sexual activity: Yes     Partners: Male     Birth control/protection: Injection     Review of Systems   Constitutional:  Positive for fatigue. Negative for activity change, appetite change, chills, diaphoresis, fever and unexpected weight change.   HENT:  Negative for congestion, ear pain, facial swelling, hearing loss, sore throat and trouble swallowing.    Eyes:  Negative for pain and discharge.   Respiratory:  Negative for cough, chest tightness, shortness of breath and wheezing.    Cardiovascular:  Negative for chest pain, palpitations and leg swelling.   Gastrointestinal:  Negative for abdominal pain, blood in stool, diarrhea, nausea and vomiting.   Endocrine: Negative for polydipsia, polyphagia and polyuria.   Genitourinary:  Positive for frequency and urgency. Negative for difficulty urinating, dysuria and flank pain.   Musculoskeletal:  Positive for arthralgias, back pain and myalgias. Negative for joint swelling, neck pain and neck stiffness.   Skin:  Negative for rash and wound.   Allergic/Immunologic: Negative for environmental allergies and immunocompromised state.   Neurological:  Negative for dizziness, seizures, syncope, speech difficulty, weakness, light-headedness, numbness and headaches.   Hematological:   Negative for adenopathy.   Psychiatric/Behavioral:  Negative for sleep disturbance and suicidal ideas. The patient is not nervous/anxious.    All other systems reviewed and are negative.  Objective:     Vital Signs (Most Recent):  Temp: 99.1 °F (37.3 °C) (02/09/23 1626)  Pulse: (!) 115 (02/09/23 1626)  Resp: 18 (02/09/23 2301)  BP: 113/67 (02/09/23 1626)  SpO2: 96 % (02/09/23 1626)   Vital Signs (24h Range):  Temp:  [99.1 °F (37.3 °C)] 99.1 °F (37.3 °C)  Pulse:  [115] 115  Resp:  [18-20] 18  SpO2:  [96 %] 96 %  BP: (113)/(67) 113/67     Weight: 54.6 kg (120 lb 7.7 oz)  Body mass index is 24.33 kg/m².    Physical Exam  Vitals and nursing note reviewed.   Constitutional:       Appearance: Normal appearance.   HENT:      Head: Normocephalic and atraumatic.      Nose: Nose normal.      Mouth/Throat:      Mouth: Mucous membranes are moist.      Pharynx: Oropharynx is clear.   Eyes:      Extraocular Movements: Extraocular movements intact.      Pupils: Pupils are equal, round, and reactive to light.   Cardiovascular:      Rate and Rhythm: Regular rhythm. Tachycardia present.      Pulses: Normal pulses.   Pulmonary:      Effort: Pulmonary effort is normal.      Breath sounds: Normal breath sounds.   Abdominal:      General: Bowel sounds are normal.      Palpations: Abdomen is soft.      Tenderness: There is right CVA tenderness and left CVA tenderness.   Musculoskeletal:         General: Normal range of motion.      Cervical back: Normal range of motion and neck supple.   Skin:     General: Skin is warm and dry.      Capillary Refill: Capillary refill takes less than 2 seconds.   Neurological:      General: No focal deficit present.      Mental Status: She is alert and oriented to person, place, and time.   Psychiatric:         Mood and Affect: Mood normal.         Behavior: Behavior normal.         CRANIAL NERVES     CN III, IV, VI   Pupils are equal, round, and reactive to light.     Significant Labs: All pertinent labs  within the past 24 hours have been reviewed.  CBC:   Recent Labs   Lab 02/09/23 2001   WBC 21.98*   HGB 8.9*   HCT 25.9*   *     CMP:   Recent Labs   Lab 02/09/23 2001      K 3.8      CO2 20*      BUN 9   CREATININE 0.7   CALCIUM 9.1   PROT 7.7   ALBUMIN 4.4   BILITOT 1.7*   ALKPHOS 99   AST 22   ALT 25   ANIONGAP 11     Lactic Acid:   Recent Labs   Lab 02/09/23 2039   LACTATE 1.1     Troponin:   Recent Labs   Lab 02/09/23 2039   TROPONINI 0.006     Urine Studies:   Recent Labs   Lab 02/09/23 2011   COLORU Yellow   APPEARANCEUA Hazy*   PHUR 7.0   SPECGRAV 1.010   PROTEINUA Trace*   GLUCUA Negative   KETONESU Negative   BILIRUBINUA Negative   OCCULTUA Trace*   NITRITE Positive*   UROBILINOGEN Negative   LEUKOCYTESUR 2+*   RBCUA 3   WBCUA 52*   BACTERIA Many*   SQUAMEPITHEL 2       Significant Imaging: I have reviewed all pertinent imaging results/findings within the past 24 hours.  EKG: I have reviewed all pertinent results/findings within the past 24 hours and my personal findings are: Sinus rhythm with sinus arrhythmia   X-Ray Chest AP Portable    Result Date: 2/9/2023  EXAMINATION: XR CHEST AP PORTABLE CLINICAL HISTORY: sickle cell crisis; TECHNIQUE: Single frontal view of the chest was performed. COMPARISON: Radiograph 09/22/2022 and CT 01/23/2017 FINDINGS: No airspace disease.  Normal size heart.  No pleural effusion or pneumothorax.  Cholecystectomy.  Diffuse intramedullary sclerosis likely in keeping with provided history of sickle cell anemia.  Suspect AVN along the humeral heads, chronic. Electronically signed by: Efren Fish Date:    02/09/2023 Time:    17:45         Assessment/Plan:     * Sickle cell pain crisis  Admit to med/tele  1L bolus then maintenance fluids  Will consult hematology  Aggressive pain control, will continue scheduled MS contin with IV dilaudid for breakthrough pain  Continue appropriate home meds      Thrombocytosis  Trend cbc      Iron  overload  Continue deferasirox         Hyperbilirubinemia  Monitor CMP      UTI (urinary tract infection)  Urine/blood cultures  rocephin        VTE Risk Mitigation (From admission, onward)         Ordered     enoxaparin injection 40 mg  Daily         02/09/23 2240     IP VTE LOW RISK PATIENT  Once         02/09/23 2205     Place sequential compression device  Until discontinued         02/09/23 2205                   Kaitlynn Vasques, NP  Department of Hospital Medicine   [facility]

## 2023-02-10 NOTE — HOSPITAL COURSE
Patient was admitted to the hospital for sickle cell crisis.  Her pain was uncontrolled the 1st day, so her hydromorphone was increased from 1-1.5 mg.  She was given IV fluids and oxygen supplementation.  She continued to have issues with pain control and oral narcotics were added to supplement her regimen.  Despite aggressive fluid resuscitation and narcotic pain regimen, Pt continued with uncontrolled pain.  Case was discussed with Hematology and Pt was transfused 1 units of packed red blood cells on 02/12.  She was maintained on IV Abx for her UTI. Her pain medication was titrated. She was noted to have right shoulder and arm pain.  US was negative for DVT.  Shoulder xrays were obtained and no acute process. She was continued on fluid resuscitation and was weaned from IV pain medications, doing well on her current home regimen.  She expressed readiness for discharge.  Patient was encouraged to follow up with her PCP and hematologist outpatient.  Discharge instructions well as return precautions were discussed with patient good understanding.    Physical exam:  Awake alert oriented x4, no acute distress  Cardiac:  RRR  Pulmonary:  Clear to auscultation  Abdomen:  Soft, nontender

## 2023-02-10 NOTE — ASSESSMENT & PLAN NOTE
Continue to monitor CBC.  Last platelet count reviewed.    Lab Results   Component Value Date     (H) 02/10/2023

## 2023-02-10 NOTE — ASSESSMENT & PLAN NOTE
Noted, patient will require higher doses of pain medications secondary to history of opiate tolerance.

## 2023-02-10 NOTE — PROGRESS NOTES
"[facility] Medicine  Progress Note    Patient Name: Nishi Dumont  MRN: [id number]  Patient Class: IP- Inpatient   Admission Date: 2/9/2023  Length of Stay: 1 days  Attending Physician: Norberto Umana MD  Primary Care Provider: Primary Doctor No        Subjective:     Principal Problem:Sickle cell pain crisis        HPI:  Ms. Dumont is a 32 year old female with a history of sickle cell anemia on Adekveo infusion and red cell exchange apheresis , ACS d/t Hb-SS, AVN hip s/p left hip replacement, AVN L humeral head, and DVT who presents today with complaints of pain in shoulders and back. It is severe. It is associated with urinary frequency, urgency, and weakness. She denies fever, chills, N/V/D, chest pain, SOB, cough, dizziness, or LOC. Since 11/2022, she's been receiving Adekveo infusion and red cell exchange apheresis with last one 1/31 per Dr. Murillo at [facility]. She states since she started this, she's been feeling more "run down" "like my body is shutting down on me". She reports med compliance at home. She was previously on eliquis for a DVT on her leg, but this was d/c'ed per hematology 3 weeks ago. She reports nose bleeds on this. EKG NSR with sinus arrhythmia with on acute ischemic changes. WBC 21K, Tbili 1.7, retic 6.9, Troponin WNL, lactate WNL, urine nitrite + pyuria. Hospital medicine is consulted for admission for further work up and treatment.       Overview/Hospital Course:  No notes on file    Interval History:  Patient seen and examined.  Pain remains uncontrolled with IV Dilaudid.  Patient's white count remains elevated, and she remains on IV antibiotics for urinary tract infection.      Objective:     Vital Signs (Most Recent):  Temp: 97.9 °F (36.6 °C) (02/10/23 0754)  Pulse: 95 (02/10/23 0754)  Resp: 20 (02/10/23 0913)  BP: (!) 103/53 (02/10/23 0754)  SpO2: 98 % (02/10/23 0754)   Vital Signs (24h Range):  Temp:  [97.9 °F (36.6 °C)-99.1 °F (37.3 °C)] 97.9 °F (36.6 " °C)  Pulse:  [] 95  Resp:  [15-20] 20  SpO2:  [96 %-98 %] 98 %  BP: (101-113)/(53-67) 103/53     Weight: 53.8 kg (118 lb 9.7 oz)  Body mass index is 23.96 kg/m².    Intake/Output Summary (Last 24 hours) at 2/10/2023 1137  Last data filed at 2/10/2023 0600  Gross per 24 hour   Intake 1768.39 ml   Output --   Net 1768.39 ml      Physical Exam  Vitals and nursing note reviewed.   Constitutional:       General: She is not in acute distress.     Appearance: She is ill-appearing.   Eyes:      Pupils: Pupils are equal, round, and reactive to light.   Cardiovascular:      Rate and Rhythm: Normal rate and regular rhythm.      Heart sounds: No murmur heard.  Pulmonary:      Effort: Pulmonary effort is normal.      Breath sounds: Normal breath sounds.   Abdominal:      General: There is no distension.      Palpations: Abdomen is soft.      Tenderness: There is no abdominal tenderness.   Musculoskeletal:         General: Tenderness present.   Skin:     Coloration: Skin is jaundiced and pale.      Findings: No rash.   Neurological:      Mental Status: She is alert and oriented to person, place, and time.       Significant Labs: All pertinent labs within the past 24 hours have been reviewed.    Significant Imaging: I have reviewed all pertinent imaging results/findings within the past 24 hours.      Assessment/Plan:      * Sickle cell pain crisis  Continue oxygen, IV fluids, and increase Dilaudid from 1 mg Q 3-1.5 mg q.3 given patient's history of chronic opioid use.  Continue multimodal pain therapy with nonnarcotic medications as well.  Consult with Hematology for further recommendations.  Continue appropriate home meds      Sickle cell anemia  Patient's anemia is currently uncontrolled. Has not received any PRBCs to date.. Etiology likely d/t sickle cell disease  Current CBC reviewed-   Lab Results   Component Value Date    HGB 7.8 (L) 02/10/2023    HCT 23.8 (L) 02/10/2023     Monitor serial CBC and transfuse if patient  becomes hemodynamically unstable, symptomatic or H/H drops below 7/21.   Patient will need chelation therapy prior to administration of packed red blood cells.        Thrombocytosis  Continue to monitor CBC.  Last platelet count reviewed.    Lab Results   Component Value Date     (H) 02/10/2023           Iron overload  Continue deferasirox         Chronic prescription opiate use  Noted, patient will require higher doses of pain medications secondary to history of opiate tolerance.      Hyperbilirubinemia  CMP reviewed.  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  Continue to monitor CBC and CMP closely.    Lab Results   Component Value Date    ALT 23 02/10/2023    AST 22 02/10/2023    ALKPHOS 88 02/10/2023    BILITOT 1.4 (H) 02/10/2023           UTI (urinary tract infection)  This patient does have evidence of infective focus  Vital signs were reviewed and noted in progress note.  Antibiotics given-   Antibiotics (From admission, onward)    Start     Stop Route Frequency Ordered    02/10/23 2130  cefTRIAXone (ROCEPHIN) 1 g in dextrose 5 % in water (D5W) 5 % 50 mL IVPB (MB+)         -- IV Every 24 hours (non-standard times) 02/09/23 2205        Cultures were taken-   Microbiology Results (last 7 days)     Procedure Component Value Units Date/Time    Blood culture [[id number]] Collected: 02/09/23 2039    Order Status: Completed Specimen: Blood Updated: 02/10/23 0715     Blood Culture, Routine No Growth to date    Blood culture [[id number]] Collected: 02/09/23 2039    Order Status: Completed Specimen: Blood Updated: 02/10/23 0715     Blood Culture, Routine No Growth to date    Urine culture [[id number]] Collected: 02/09/23 2011    Order Status: No result Specimen: Urine Updated: 02/09/23 2104        Latest lactate reviewed, they are-  Recent Labs   Lab 02/09/23  2039 02/10/23  1025   LACTATE 1.1 1.1     Lactic acid appears stable for now.  Will monitor patient closely given this she is  functionally immune compromise secondary to sickle cell.          VTE Risk Mitigation (From admission, onward)         Ordered     enoxaparin injection 40 mg  Daily         02/09/23 2240     IP VTE LOW RISK PATIENT  Once         02/09/23 2205     Place sequential compression device  Until discontinued         02/09/23 2205                Discharge Planning   SHANE:      Code Status: Full Code   Is the patient medically ready for discharge?:     Reason for patient still in hospital (select all that apply): Treatment                     Norberto Uamna MD  Department of Hospital Medicine   [facility]

## 2023-02-11 LAB
ALBUMIN SERPL BCP-MCNC: 3.5 G/DL (ref 3.5–5.2)
ALP SERPL-CCNC: 79 U/L (ref 55–135)
ALT SERPL W/O P-5'-P-CCNC: 19 U/L (ref 10–44)
ANION GAP SERPL CALC-SCNC: 10 MMOL/L (ref 8–16)
AST SERPL-CCNC: 20 U/L (ref 10–40)
BASOPHILS # BLD AUTO: 0.15 K/UL (ref 0–0.2)
BASOPHILS NFR BLD: 1 % (ref 0–1.9)
BILIRUB SERPL-MCNC: 1.1 MG/DL (ref 0.1–1)
BUN SERPL-MCNC: 4 MG/DL (ref 6–20)
CALCIUM SERPL-MCNC: 8.4 MG/DL (ref 8.7–10.5)
CHLORIDE SERPL-SCNC: 116 MMOL/L (ref 95–110)
CO2 SERPL-SCNC: 16 MMOL/L (ref 23–29)
CREAT SERPL-MCNC: 0.7 MG/DL (ref 0.5–1.4)
DIFFERENTIAL METHOD: ABNORMAL
EOSINOPHIL # BLD AUTO: 0.5 K/UL (ref 0–0.5)
EOSINOPHIL NFR BLD: 3.4 % (ref 0–8)
ERYTHROCYTE [DISTWIDTH] IN BLOOD BY AUTOMATED COUNT: 16.3 % (ref 11.5–14.5)
EST. GFR  (NO RACE VARIABLE): >60 ML/MIN/1.73 M^2
GLUCOSE SERPL-MCNC: 82 MG/DL (ref 70–110)
HCT VFR BLD AUTO: 24.8 % (ref 37–48.5)
HGB BLD-MCNC: 7.8 G/DL (ref 12–16)
IMM GRANULOCYTES # BLD AUTO: 0.18 K/UL (ref 0–0.04)
IMM GRANULOCYTES NFR BLD AUTO: 1.1 % (ref 0–0.5)
LYMPHOCYTES # BLD AUTO: 3.5 K/UL (ref 1–4.8)
LYMPHOCYTES NFR BLD: 22.4 % (ref 18–48)
MAGNESIUM SERPL-MCNC: 1.6 MG/DL (ref 1.6–2.6)
MCH RBC QN AUTO: 29.9 PG (ref 27–31)
MCHC RBC AUTO-ENTMCNC: 31.5 G/DL (ref 32–36)
MCV RBC AUTO: 95 FL (ref 82–98)
MONOCYTES # BLD AUTO: 1.7 K/UL (ref 0.3–1)
MONOCYTES NFR BLD: 11.1 % (ref 4–15)
NEUTROPHILS # BLD AUTO: 9.6 K/UL (ref 1.8–7.7)
NEUTROPHILS NFR BLD: 61 % (ref 38–73)
NRBC BLD-RTO: 0 /100 WBC
PHOSPHATE SERPL-MCNC: 4 MG/DL (ref 2.7–4.5)
PLATELET # BLD AUTO: 369 K/UL (ref 150–450)
PMV BLD AUTO: 10.3 FL (ref 9.2–12.9)
POTASSIUM SERPL-SCNC: 4.1 MMOL/L (ref 3.5–5.1)
PROT SERPL-MCNC: 5.9 G/DL (ref 6–8.4)
RBC # BLD AUTO: 2.61 M/UL (ref 4–5.4)
SODIUM SERPL-SCNC: 142 MMOL/L (ref 136–145)
WBC # BLD AUTO: 15.73 K/UL (ref 3.9–12.7)

## 2023-02-11 PROCEDURE — 25000003 PHARM REV CODE 250: Performed by: NURSE PRACTITIONER

## 2023-02-11 PROCEDURE — 63600175 PHARM REV CODE 636 W HCPCS: Performed by: NURSE PRACTITIONER

## 2023-02-11 PROCEDURE — 83735 ASSAY OF MAGNESIUM: CPT | Performed by: NURSE PRACTITIONER

## 2023-02-11 PROCEDURE — 99233 PR SUBSEQUENT HOSPITAL CARE,LEVL III: ICD-10-PCS | Mod: ,,, | Performed by: INTERNAL MEDICINE

## 2023-02-11 PROCEDURE — 12000002 HC ACUTE/MED SURGE SEMI-PRIVATE ROOM

## 2023-02-11 PROCEDURE — 85025 COMPLETE CBC W/AUTO DIFF WBC: CPT | Performed by: NURSE PRACTITIONER

## 2023-02-11 PROCEDURE — 63600175 PHARM REV CODE 636 W HCPCS: Performed by: HOSPITALIST

## 2023-02-11 PROCEDURE — 84100 ASSAY OF PHOSPHORUS: CPT | Performed by: NURSE PRACTITIONER

## 2023-02-11 PROCEDURE — 99233 SBSQ HOSP IP/OBS HIGH 50: CPT | Mod: ,,, | Performed by: INTERNAL MEDICINE

## 2023-02-11 PROCEDURE — 25000003 PHARM REV CODE 250: Performed by: HOSPITALIST

## 2023-02-11 PROCEDURE — 36415 COLL VENOUS BLD VENIPUNCTURE: CPT | Performed by: NURSE PRACTITIONER

## 2023-02-11 PROCEDURE — 80053 COMPREHEN METABOLIC PANEL: CPT | Performed by: NURSE PRACTITIONER

## 2023-02-11 RX ORDER — OXYCODONE AND ACETAMINOPHEN 10; 325 MG/1; MG/1
1 TABLET ORAL EVERY 6 HOURS PRN
Status: DISCONTINUED | OUTPATIENT
Start: 2023-02-11 | End: 2023-02-13

## 2023-02-11 RX ADMIN — MORPHINE SULFATE 30 MG: 15 TABLET, EXTENDED RELEASE ORAL at 10:02

## 2023-02-11 RX ADMIN — HYDROXYUREA 1000 MG: 500 CAPSULE ORAL at 08:02

## 2023-02-11 RX ADMIN — MORPHINE SULFATE 30 MG: 15 TABLET, EXTENDED RELEASE ORAL at 12:02

## 2023-02-11 RX ADMIN — DEFERASIROX 1000 MG: 500 TABLET, FOR SUSPENSION ORAL at 06:02

## 2023-02-11 RX ADMIN — SODIUM CHLORIDE: 9 INJECTION, SOLUTION INTRAVENOUS at 02:02

## 2023-02-11 RX ADMIN — FOLIC ACID 1 MG: 1 TABLET ORAL at 08:02

## 2023-02-11 RX ADMIN — FAMOTIDINE 20 MG: 20 TABLET, FILM COATED ORAL at 04:02

## 2023-02-11 RX ADMIN — HYDROMORPHONE HYDROCHLORIDE 1.5 MG: 1 INJECTION, SOLUTION INTRAMUSCULAR; INTRAVENOUS; SUBCUTANEOUS at 08:02

## 2023-02-11 RX ADMIN — DIPHENHYDRAMINE HYDROCHLORIDE 12.5 MG: 50 INJECTION, SOLUTION INTRAMUSCULAR; INTRAVENOUS at 12:02

## 2023-02-11 RX ADMIN — CEFTRIAXONE 1 G: 1 INJECTION, POWDER, FOR SOLUTION INTRAMUSCULAR; INTRAVENOUS at 09:02

## 2023-02-11 RX ADMIN — DIPHENHYDRAMINE HYDROCHLORIDE 12.5 MG: 50 INJECTION, SOLUTION INTRAMUSCULAR; INTRAVENOUS at 07:02

## 2023-02-11 RX ADMIN — DIPHENHYDRAMINE HYDROCHLORIDE 12.5 MG: 50 INJECTION, SOLUTION INTRAMUSCULAR; INTRAVENOUS at 01:02

## 2023-02-11 RX ADMIN — DIPHENHYDRAMINE HYDROCHLORIDE 12.5 MG: 50 INJECTION, SOLUTION INTRAMUSCULAR; INTRAVENOUS at 04:02

## 2023-02-11 RX ADMIN — OXYCODONE AND ACETAMINOPHEN 1 TABLET: 10; 325 TABLET ORAL at 06:02

## 2023-02-11 RX ADMIN — HYDROMORPHONE HYDROCHLORIDE 1.5 MG: 1 INJECTION, SOLUTION INTRAMUSCULAR; INTRAVENOUS; SUBCUTANEOUS at 01:02

## 2023-02-11 RX ADMIN — OXYCODONE AND ACETAMINOPHEN 1 TABLET: 10; 325 TABLET ORAL at 11:02

## 2023-02-11 RX ADMIN — MORPHINE SULFATE 30 MG: 15 TABLET, EXTENDED RELEASE ORAL at 07:02

## 2023-02-11 RX ADMIN — MORPHINE SULFATE 30 MG: 15 TABLET, EXTENDED RELEASE ORAL at 02:02

## 2023-02-11 RX ADMIN — HYDROMORPHONE HYDROCHLORIDE 1.5 MG: 1 INJECTION, SOLUTION INTRAMUSCULAR; INTRAVENOUS; SUBCUTANEOUS at 12:02

## 2023-02-11 RX ADMIN — HYDROMORPHONE HYDROCHLORIDE 1.5 MG: 1 INJECTION, SOLUTION INTRAMUSCULAR; INTRAVENOUS; SUBCUTANEOUS at 07:02

## 2023-02-11 RX ADMIN — SODIUM CHLORIDE: 9 INJECTION, SOLUTION INTRAVENOUS at 06:02

## 2023-02-11 RX ADMIN — HYDROMORPHONE HYDROCHLORIDE 1.5 MG: 1 INJECTION, SOLUTION INTRAMUSCULAR; INTRAVENOUS; SUBCUTANEOUS at 04:02

## 2023-02-11 RX ADMIN — ENOXAPARIN SODIUM 40 MG: 40 INJECTION SUBCUTANEOUS at 04:02

## 2023-02-11 RX ADMIN — DIPHENHYDRAMINE HYDROCHLORIDE 12.5 MG: 50 INJECTION, SOLUTION INTRAMUSCULAR; INTRAVENOUS at 08:02

## 2023-02-11 RX ADMIN — SODIUM CHLORIDE: 9 INJECTION, SOLUTION INTRAVENOUS at 08:02

## 2023-02-11 NOTE — PROGRESS NOTES
"[facility] Medicine  Progress Note    Patient Name: Nishi Dumont  MRN: [id number]  Patient Class: IP- Inpatient   Admission Date: 2/9/2023  Length of Stay: 2 days  Attending Physician: Norberto Umana MD  Primary Care Provider: Primary Doctor No        Subjective:     Principal Problem:Sickle cell pain crisis        HPI:  Ms. Dumont is a 32 year old female with a history of sickle cell anemia on Adekveo infusion and red cell exchange apheresis , ACS d/t Hb-SS, AVN hip s/p left hip replacement, AVN L humeral head, and DVT who presents today with complaints of pain in shoulders and back. It is severe. It is associated with urinary frequency, urgency, and weakness. She denies fever, chills, N/V/D, chest pain, SOB, cough, dizziness, or LOC. Since 11/2022, she's been receiving Adekveo infusion and red cell exchange apheresis with last one 1/31 per Dr. Murillo at [facility]. She states since she started this, she's been feeling more "run down" "like my body is shutting down on me". She reports med compliance at home. She was previously on eliquis for a DVT on her leg, but this was d/c'ed per hematology 3 weeks ago. She reports nose bleeds on this. EKG NSR with sinus arrhythmia with on acute ischemic changes. WBC 21K, Tbili 1.7, retic 6.9, Troponin WNL, lactate WNL, urine nitrite + pyuria. Hospital medicine is consulted for admission for further work up and treatment.       Overview/Hospital Course:  Patient was admitted to the hospital for sickle cell crisis.  Her pain was uncontrolled the 1st day, so her hydromorphone was increased from 1-1.5 mg.  She was given IV fluids and oxygen supplementation.      Interval History:  Pt seen and examined.  Denies chest pain or SOB.  Her pain is poorly controlled this morning.  She received Dilaudid 1.5 hours ago.  She currently rates pain 9/10 in severity-describes the pain as typical of her crises to her shoulder and back.  She is only using the Dilaudid " approximately every 6 hours.  Discussed with her we would try her oral breakthrough pain medication to see if we can have more sustained relief.  Pt agreeable.  WBC improving, afebrile, awaiting urine culture results.    Review of Systems   Constitutional:  Positive for fatigue. Negative for chills and fever.   Respiratory:  Negative for cough and shortness of breath.    Gastrointestinal:  Negative for nausea and vomiting.   Musculoskeletal:  Positive for arthralgias and back pain.   Objective:     Vital Signs (Most Recent):  Temp: 98 °F (36.7 °C) (02/11/23 0741)  Pulse: 88 (02/11/23 0741)  Resp: 16 (02/11/23 0755)  BP: (!) 99/47 (02/11/23 0741)  SpO2: 99 % (02/11/23 0741)   Vital Signs (24h Range):  Temp:  [97.3 °F (36.3 °C)-98.1 °F (36.7 °C)] 98 °F (36.7 °C)  Pulse:  [] 88  Resp:  [15-18] 16  SpO2:  [97 %-99 %] 99 %  BP: ()/(47-67) 99/47     Weight: 53.8 kg (118 lb 9.7 oz)  Body mass index is 23.96 kg/m².  No intake or output data in the 24 hours ending 02/11/23 1111   Physical Exam  Vitals and nursing note reviewed.   Constitutional:       Appearance: Normal appearance. She is ill-appearing.   HENT:      Head: Normocephalic and atraumatic.   Cardiovascular:      Rate and Rhythm: Normal rate and regular rhythm.   Pulmonary:      Effort: Pulmonary effort is normal.      Breath sounds: Normal breath sounds.   Abdominal:      General: Abdomen is flat. Bowel sounds are normal.      Palpations: Abdomen is soft.   Musculoskeletal:      Cervical back: Normal range of motion and neck supple.   Skin:     General: Skin is warm and dry.   Neurological:      General: No focal deficit present.      Mental Status: She is alert and oriented to person, place, and time. Mental status is at baseline.       Significant Labs: All pertinent labs within the past 24 hours have been reviewed.  CBC:   Recent Labs   Lab 02/09/23  2001 02/10/23  0435 02/11/23  0420   WBC 21.98* 21.01* 15.73*   HGB 8.9* 7.8* 7.8*   HCT 25.9*  23.8* 24.8*   * 634* 369     CMP:   Recent Labs   Lab 02/09/23  2001 02/10/23  0435 02/11/23  0420    141 142   K 3.8 3.5 4.1    111* 116*   CO2 20* 20* 16*    88 82   BUN 9 7 4*   CREATININE 0.7 0.7 0.7   CALCIUM 9.1 8.6* 8.4*   PROT 7.7 6.6 5.9*   ALBUMIN 4.4 4.0 3.5   BILITOT 1.7* 1.4* 1.1*   ALKPHOS 99 88 79   AST 22 22 20   ALT 25 23 19   ANIONGAP 11 10 10       Urine Studies:   Recent Labs   Lab 02/09/23 2011   COLORU Yellow   APPEARANCEUA Hazy*   PHUR 7.0   SPECGRAV 1.010   PROTEINUA Trace*   GLUCUA Negative   KETONESU Negative   BILIRUBINUA Negative   OCCULTUA Trace*   NITRITE Positive*   UROBILINOGEN Negative   LEUKOCYTESUR 2+*   RBCUA 3   WBCUA 52*   BACTERIA Many*   SQUAMEPITHEL 2           Assessment/Plan:      * Sickle cell pain crisis  -pain persistent   -continue aggressive IV fluid resuscitation   -continue IV pain medication, add an oral pain medication p.r.n. to see if we get better control   -continue her scheduled morphine orally  -p.r.n.  Narcan to chart   -avoid transfusion for now, if symptoms are improved with aggressive fluid resuscitation a an adjustment pain medication, may need to consider    Thrombocytosis  Continue to monitor CBC.  Last platelet count reviewed.    Lab Results   Component Value Date     02/11/2023           Iron overload  Continue deferasirox         Chronic prescription opiate use  Noted, patient will require higher doses of pain medications secondary to history of opiate tolerance.      Hyperbilirubinemia  CMP reviewed.  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 This is improving.  Continue to monitor CBC and CMP closely.    Lab Results   Component Value Date    ALT 19 02/11/2023    AST 20 02/11/2023    ALKPHOS 79 02/11/2023    BILITOT 1.1 (H) 02/11/2023           UTI (urinary tract infection)  This patient does have evidence of infective focus  Vital signs were reviewed and noted in progress  note.  Antibiotics given-   Antibiotics (From admission, onward)    Start     Stop Route Frequency Ordered    02/10/23 2130  cefTRIAXone (ROCEPHIN) 1 g in dextrose 5 % in water (D5W) 5 % 50 mL IVPB (MB+)         -- IV Every 24 hours (non-standard times) 02/09/23 2205        Cultures were taken-   Microbiology Results (last 7 days)     Procedure Component Value Units Date/Time    Urine culture [[id number]]  (Abnormal) Collected: 02/09/23 2011    Order Status: Completed Specimen: Urine Updated: 02/11/23 0918     Urine Culture, Routine PRESUMPTIVE E COLI  >100,000 cfu/ml  Identification and susceptibility pending      Narrative:      Specimen Source->Urine    Blood culture [[id number]] Collected: 02/09/23 2039    Order Status: Completed Specimen: Blood Updated: 02/11/23 0613     Blood Culture, Routine No Growth to date      No Growth to date    Blood culture [[id number]] Collected: 02/09/23 2039    Order Status: Completed Specimen: Blood Updated: 02/11/23 0613     Blood Culture, Routine No Growth to date      No Growth to date        Latest lactate reviewed, they are-  Recent Labs   Lab 02/10/23  1358 02/10/23  1841 02/10/23  2226   LACTATE 1.2 1.2 0.9     Lactic acid appears stable for now.  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        Sickle cell anemia  Patient's anemia is currently uncontrolled. Has not received any PRBCs to date.. Etiology likely d/t sickle cell disease  Current CBC reviewed-   Lab Results   Component Value Date    HGB 7.8 (L) 02/11/2023    HCT 24.8 (L) 02/11/2023     Monitor serial CBC and transfuse if patient becomes hemodynamically unstable, symptomatic or H/H drops below 7/21.   Patient will need chelation therapy prior to administration of packed red blood cells.  I do not think she warrants a transfusion at this point        VTE Risk Mitigation (From admission, onward)         Ordered     enoxaparin injection 40 mg  Daily          02/09/23 2240     IP VTE LOW RISK PATIENT  Once         02/09/23 2205     Place sequential compression device  Until discontinued         02/09/23 2205                Discharge Planning   SHANE: 2/13/2023     Code Status: Full Code   Is the patient medically ready for discharge?:     Reason for patient still in hospital (select all that apply): Patient trending condition and Treatment  Discharge Plan A: Home                  Carri Carbone NP  Department of Hospital Medicine   [facility]

## 2023-02-11 NOTE — SUBJECTIVE & OBJECTIVE
Interval History:  Pt seen and examined.  Denies chest pain or SOB.  Her pain is poorly controlled this morning.  She received Dilaudid 1.5 hours ago.  She currently rates pain 9/10 in severity-describes the pain as typical of her crises to her shoulder and back.  She is only using the Dilaudid approximately every 6 hours.  Discussed with her we would try her oral breakthrough pain medication to see if we can have more sustained relief.  Pt agreeable.  WBC improving, afebrile, awaiting urine culture results.    Review of Systems   Constitutional:  Positive for fatigue. Negative for chills and fever.   Respiratory:  Negative for cough and shortness of breath.    Gastrointestinal:  Negative for nausea and vomiting.   Musculoskeletal:  Positive for arthralgias and back pain.   Objective:     Vital Signs (Most Recent):  Temp: 98 °F (36.7 °C) (02/11/23 0741)  Pulse: 88 (02/11/23 0741)  Resp: 16 (02/11/23 0755)  BP: (!) 99/47 (02/11/23 0741)  SpO2: 99 % (02/11/23 0741)   Vital Signs (24h Range):  Temp:  [97.3 °F (36.3 °C)-98.1 °F (36.7 °C)] 98 °F (36.7 °C)  Pulse:  [] 88  Resp:  [15-18] 16  SpO2:  [97 %-99 %] 99 %  BP: ()/(47-67) 99/47     Weight: 53.8 kg (118 lb 9.7 oz)  Body mass index is 23.96 kg/m².  No intake or output data in the 24 hours ending 02/11/23 1111   Physical Exam  Vitals and nursing note reviewed.   Constitutional:       Appearance: Normal appearance. She is ill-appearing.   HENT:      Head: Normocephalic and atraumatic.   Cardiovascular:      Rate and Rhythm: Normal rate and regular rhythm.   Pulmonary:      Effort: Pulmonary effort is normal.      Breath sounds: Normal breath sounds.   Abdominal:      General: Abdomen is flat. Bowel sounds are normal.      Palpations: Abdomen is soft.   Musculoskeletal:      Cervical back: Normal range of motion and neck supple.   Skin:     General: Skin is warm and dry.   Neurological:      General: No focal deficit present.      Mental Status: She is  alert and oriented to person, place, and time. Mental status is at baseline.       Significant Labs: All pertinent labs within the past 24 hours have been reviewed.  CBC:   Recent Labs   Lab 02/09/23  2001 02/10/23  0435 02/11/23  0420   WBC 21.98* 21.01* 15.73*   HGB 8.9* 7.8* 7.8*   HCT 25.9* 23.8* 24.8*   * 634* 369     CMP:   Recent Labs   Lab 02/09/23  2001 02/10/23  0435 02/11/23  0420    141 142   K 3.8 3.5 4.1    111* 116*   CO2 20* 20* 16*    88 82   BUN 9 7 4*   CREATININE 0.7 0.7 0.7   CALCIUM 9.1 8.6* 8.4*   PROT 7.7 6.6 5.9*   ALBUMIN 4.4 4.0 3.5   BILITOT 1.7* 1.4* 1.1*   ALKPHOS 99 88 79   AST 22 22 20   ALT 25 23 19   ANIONGAP 11 10 10       Urine Studies:   Recent Labs   Lab 02/09/23 2011   COLORU Yellow   APPEARANCEUA Hazy*   PHUR 7.0   SPECGRAV 1.010   PROTEINUA Trace*   GLUCUA Negative   KETONESU Negative   BILIRUBINUA Negative   OCCULTUA Trace*   NITRITE Positive*   UROBILINOGEN Negative   LEUKOCYTESUR 2+*   RBCUA 3   WBCUA 52*   BACTERIA Many*   SQUAMEPITHEL 2

## 2023-02-11 NOTE — ASSESSMENT & PLAN NOTE
Continue to monitor CBC.  Last platelet count reviewed.    Lab Results   Component Value Date     02/11/2023

## 2023-02-11 NOTE — ASSESSMENT & PLAN NOTE
-pain persistent   -continue aggressive IV fluid resuscitation   -continue IV pain medication, add an oral pain medication p.r.n. to see if we get better control   -continue her scheduled morphine orally  -p.r.n.  Narcan to chart   -avoid transfusion for now, if symptoms are improved with aggressive fluid resuscitation a an adjustment pain medication, may need to consider

## 2023-02-11 NOTE — ASSESSMENT & PLAN NOTE
This patient does have evidence of infective focus  Vital signs were reviewed and noted in progress note.  Antibiotics given-   Antibiotics (From admission, onward)    Start     Stop Route Frequency Ordered    02/10/23 2130  cefTRIAXone (ROCEPHIN) 1 g in dextrose 5 % in water (D5W) 5 % 50 mL IVPB (MB+)         -- IV Every 24 hours (non-standard times) 02/09/23 2205        Cultures were taken-   Microbiology Results (last 7 days)     Procedure Component Value Units Date/Time    Urine culture [[id number]]  (Abnormal) Collected: 02/09/23 2011    Order Status: Completed Specimen: Urine Updated: 02/11/23 0918     Urine Culture, Routine PRESUMPTIVE E COLI  >100,000 cfu/ml  Identification and susceptibility pending      Narrative:      Specimen Source->Urine    Blood culture [[id number]] Collected: 02/09/23 2039    Order Status: Completed Specimen: Blood Updated: 02/11/23 0613     Blood Culture, Routine No Growth to date      No Growth to date    Blood culture [[id number]] Collected: 02/09/23 2039    Order Status: Completed Specimen: Blood Updated: 02/11/23 0613     Blood Culture, Routine No Growth to date      No Growth to date        Latest lactate reviewed, they are-  Recent Labs   Lab 02/10/23  1358 02/10/23  1841 02/10/23  2226   LACTATE 1.2 1.2 0.9     Lactic acid appears stable for now.  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

## 2023-02-11 NOTE — PLAN OF CARE
Problem: Adult Inpatient Plan of Care  Goal: Plan of Care Review  Outcome: Ongoing, Progressing  Goal: Patient-Specific Goal (Individualized)  Outcome: Ongoing, Progressing  Goal: Absence of Hospital-Acquired Illness or Injury  Outcome: Ongoing, Progressing  Goal: Optimal Comfort and Wellbeing  Outcome: Ongoing, Progressing  Goal: Readiness for Transition of Care  Outcome: Ongoing, Progressing   Plan of care reviewed with patient,verbalized understanding.  IV fluids/ IV antibiotics administered as per orders. SR on telemetry. Medicated for pain & itching twice this shift, moderate relief obtained. Remains free from falls, injury. Instructed to call for assistance as needed ,verbalized understanding. Bed in low & locked position. Call light in reach, bed alarm on .    Initial (On Arrival)

## 2023-02-11 NOTE — ASSESSMENT & PLAN NOTE
Patient's anemia is currently uncontrolled. Has not received any PRBCs to date.. Etiology likely d/t sickle cell disease  Current CBC reviewed-   Lab Results   Component Value Date    HGB 7.8 (L) 02/11/2023    HCT 24.8 (L) 02/11/2023     Monitor serial CBC and transfuse if patient becomes hemodynamically unstable, symptomatic or H/H drops below 7/21.   Patient will need chelation therapy prior to administration of packed red blood cells.  I do not think she warrants a transfusion at this point

## 2023-02-11 NOTE — CONSULTS
"[facility]  Hematology/Oncology  Consult Note    Patient Name: Nishi Dumont  MRN: [id number]  Admission Date: 2/9/2023  Hospital Length of Stay: 1 days  Code Status: Full Code   Attending Provider: Norberto Umana MD  Consulting Provider: Mildred Williamson MD  Primary Care Physician: Primary Doctor No  Principal Problem:Sickle cell pain crisis    Consults  Subjective:     HPI: 32 year old female with a history of sickle cell anemia on Adekveo infusion and red cell exchange apheresis , ACS d/t Hb-SS, AVN hip s/p left hip replacement, AVN L humeral head, and DVT who presents with complaints of pain in shoulders and back. It is severe. It is associated with urinary frequency, urgency, and weakness. She denies fever, chills, N/V/D, chest pain, SOB, cough, dizziness, or LOC. Since 11/2022, she's been receiving Adekveo infusion and red cell exchange apheresis with last one 1/31 per Dr. Murillo at [facility]. She states since she started this, she's been feeling more "run down" "like my body is shutting down on me". She reports med compliance at home. She was previously on eliquis for a DVT on her leg, but this was d/c'ed per hematology 3 weeks ago. She reports nose bleeds on this. EKG NSR with sinus arrhythmia with on acute ischemic changes. WBC 21K, Tbili 1.7, retic 6.9, Troponin WNL, lactate WNL, urine nitrite + pyuria.       Medications:  Continuous Infusions:   sodium chloride 0.9% 125 mL/hr at 02/10/23 2112     Scheduled Meds:   cefTRIAXone (ROCEPHIN) IVPB  1 g Intravenous Q24H    deferasirox  1,000 mg Oral Before breakfast    enoxaparin  40 mg Subcutaneous Daily    famotidine  20 mg Oral Daily    [START ON 2/11/2023] folic acid  1 mg Oral Daily    gabapentin  600 mg Oral Daily    glutamine (sickle cell)  10 g Oral BID    hydroxyurea  1,000 mg Oral BID    morphine  30 mg Oral Q8H    senna-docusate 8.6-50 mg  1 tablet Oral BID    ursodioL  300 mg Oral TID WM     PRN Meds:acetaminophen, aluminum-magnesium " "hydroxide-simethicone, dextrose 10%, dextrose 10%, diphenhydrAMINE, glucagon (human recombinant), glucose, glucose, HYDROmorphone, influenza, magnesium oxide, magnesium oxide, melatonin, naloxone, ondansetron, polyethylene glycol, potassium bicarbonate, potassium bicarbonate, potassium bicarbonate, sodium chloride 0.9%     Review of patient's allergies indicates:   Allergen Reactions    Contrast media Hives    Iodine and iodide containing products Hives and Swelling    Mushroom Hives    Zithromax [azithromycin] Anaphylaxis    Demerol [meperidine] Other (See Comments)     Regarding reaction to demerol pt states "I talk out of my head and become aggressive"    Toradol [ketorolac] Rash        Past Medical History:   Diagnosis Date    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    Avascular necrosis of bone of hip     Avascular necrosis of humeral head     Blood transfusion     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    History of avascular necrosis of left humeral head 3/9/2022    Sickle cell disease      Past Surgical History:   Procedure Laterality Date    CHOLECYSTECTOMY      JOINT REPLACEMENT       left hip     Family History       Problem Relation (Age of Onset)    Sickle cell trait Mother, Father, Brother, Daughter          Tobacco Use    Smoking status: Never    Smokeless tobacco: Never   Substance and Sexual Activity    Alcohol use: Yes     Alcohol/week: 0.0 standard drinks     Comment: occassionally    Drug use: No    Sexual activity: Yes     Partners: Male     Birth control/protection: Injection       Review of Systems   As above in HPI  Objective:     Vital Signs (Most Recent):  Temp: 98 °F (36.7 °C) (02/10/23 1946)  Pulse: 93 (02/10/23 2041)  Resp: 15 (02/10/23 2040)  BP: (!) 101/58 (02/10/23 2041)  SpO2: 97 % (02/10/23 1946)   Vital Signs (24h Range):  Temp:  [97.3 °F (36.3 °C)-98.4 °F (36.9 °C)] 98 °F (36.7 °C)  Pulse:  [] 93  Resp:  [15-20] 15  SpO2:  [96 %-98 " %] 97 %  BP: ()/(52-67) 101/58     Weight: 53.8 kg (118 lb 9.7 oz)  Body mass index is 23.96 kg/m².  Body surface area is 1.5 meters squared.      Intake/Output Summary (Last 24 hours) at 2/10/2023 2332  Last data filed at 2/10/2023 0600  Gross per 24 hour   Intake 1721.45 ml   Output --   Net 1721.45 ml       Physical Exam  VITAL SIGNS:  as above   GENERAL: appears well-built, well-nourished.  No anxiety, no agitation, and in no distress.  Patient is awake, alert, oriented and cooperative.  HEENT:  Showed no congestion. Trachea is central no obvious icterus or pallor noted no hoarseness. no obvious JVD   NECK:  Supple.  No JVD. No obvious cervical submental or supraclavicular adenopathy.  RS:the visualized portion of  Chest expands well. chest appears symmetric, no audible wheezes.  No dyspnea recognized  ABDOMEN:  abdomen appears undistended.  EXTREMITIES:  Without edema.  NEUROLOGICAL:  The patient is appropriate, higher functions are normal.  No  obvious neurological deficits.  normal judgement normal thought content  No confusion, no speech impediment. Cranial nerves are intact and show no deficit. No gross motor deficits noted   SKIN MUSCULOSKELETAL: no joint or skeletal deformity, no clubbing of nails.  No visible rash ecchymosis or petechiae   Significant Labs:   Lab Results   Component Value Date    WBC 21.01 (H) 02/10/2023    HGB 7.8 (L) 02/10/2023    HCT 23.8 (L) 02/10/2023    MCV 91 02/10/2023     (H) 02/10/2023      CMP  Sodium   Date Value Ref Range Status   02/10/2023 141 136 - 145 mmol/L Final     Potassium   Date Value Ref Range Status   02/10/2023 3.5 3.5 - 5.1 mmol/L Final     Chloride   Date Value Ref Range Status   02/10/2023 111 (H) 95 - 110 mmol/L Final     CO2   Date Value Ref Range Status   02/10/2023 20 (L) 23 - 29 mmol/L Final     Glucose   Date Value Ref Range Status   02/10/2023 88 70 - 110 mg/dL Final     BUN   Date Value Ref Range Status   02/10/2023 7 6 - 20 mg/dL Final      Creatinine   Date Value Ref Range Status   02/10/2023 0.7 0.5 - 1.4 mg/dL Final     Calcium   Date Value Ref Range Status   02/10/2023 8.6 (L) 8.7 - 10.5 mg/dL Final     Total Protein   Date Value Ref Range Status   02/10/2023 6.6 6.0 - 8.4 g/dL Final     Albumin   Date Value Ref Range Status   02/10/2023 4.0 3.5 - 5.2 g/dL Final     Total Bilirubin   Date Value Ref Range Status   02/10/2023 1.4 (H) 0.1 - 1.0 mg/dL Final     Comment:     For infants and newborns, interpretation of results should be based  on gestational age, weight and in agreement with clinical  observations.    Premature Infant recommended reference ranges:  Up to 24 hours.............<8.0 mg/dL  Up to 48 hours............<12.0 mg/dL  3-5 days..................<15.0 mg/dL  6-29 days.................<15.0 mg/dL       Alkaline Phosphatase   Date Value Ref Range Status   02/10/2023 88 55 - 135 U/L Final     AST   Date Value Ref Range Status   02/10/2023 22 10 - 40 U/L Final     ALT   Date Value Ref Range Status   02/10/2023 23 10 - 44 U/L Final     Anion Gap   Date Value Ref Range Status   02/10/2023 10 8 - 16 mmol/L Final     eGFR   Date Value Ref Range Status   02/10/2023 >60 >60 mL/min/1.73 m^2 Final          Assessment/Plan:     Active Diagnoses:    Diagnosis Date Noted POA    PRINCIPAL PROBLEM:  Sickle cell pain crisis [D57.00] 09/29/2017 Yes    Chronic prescription opiate use [Z79.891] 02/06/2022 Not Applicable    Iron overload [E83.19] 08/24/2016 Yes    Thrombocytosis [D75.839] 12/24/2015 Yes    Hyperbilirubinemia [E80.6] 04/02/2014 Yes    UTI (urinary tract infection) [N39.0] 12/05/2013 Yes    Sickle cell anemia [D57.1] 09/03/2013 Yes      Problems Resolved During this Admission:   Sickle cell pain crisis     Aggressive hydration  Adequate pain mx currently on morphine/ IV dilauudid  Continue  home meds   Pt to discuss with her treating MD s/e of her meds  UTI: currenlty managed with antibiotics   Thrombocytosis: expended, please  trend    Thanks for consult    Mildred Williamson MD  Hematology/Oncology  [facility]

## 2023-02-11 NOTE — ASSESSMENT & PLAN NOTE
CMP reviewed.  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 This is improving.  Continue to monitor CBC and CMP closely.    Lab Results   Component Value Date    ALT 19 02/11/2023    AST 20 02/11/2023    ALKPHOS 79 02/11/2023    BILITOT 1.1 (H) 02/11/2023

## 2023-02-12 LAB
ABO + RH BLD: NORMAL
ALBUMIN SERPL BCP-MCNC: 3.4 G/DL (ref 3.5–5.2)
ALP SERPL-CCNC: 72 U/L (ref 55–135)
ALT SERPL W/O P-5'-P-CCNC: 16 U/L (ref 10–44)
ANION GAP SERPL CALC-SCNC: 8 MMOL/L (ref 8–16)
AST SERPL-CCNC: 18 U/L (ref 10–40)
BASOPHILS # BLD AUTO: 0.1 K/UL (ref 0–0.2)
BASOPHILS NFR BLD: 0.7 % (ref 0–1.9)
BILIRUB SERPL-MCNC: 1 MG/DL (ref 0.1–1)
BLD GP AB SCN CELLS X3 SERPL QL: NORMAL
BLD PROD TYP BPU: NORMAL
BLOOD UNIT EXPIRATION DATE: NORMAL
BLOOD UNIT TYPE CODE: 9500
BLOOD UNIT TYPE: NORMAL
BUN SERPL-MCNC: 6 MG/DL (ref 6–20)
CALCIUM SERPL-MCNC: 8.3 MG/DL (ref 8.7–10.5)
CHLORIDE SERPL-SCNC: 112 MMOL/L (ref 95–110)
CO2 SERPL-SCNC: 23 MMOL/L (ref 23–29)
CODING SYSTEM: NORMAL
CREAT SERPL-MCNC: 0.7 MG/DL (ref 0.5–1.4)
CROSSMATCH INTERPRETATION: NORMAL
DIFFERENTIAL METHOD: ABNORMAL
DISPENSE STATUS: NORMAL
EOSINOPHIL # BLD AUTO: 0.7 K/UL (ref 0–0.5)
EOSINOPHIL NFR BLD: 4.8 % (ref 0–8)
ERYTHROCYTE [DISTWIDTH] IN BLOOD BY AUTOMATED COUNT: 15.7 % (ref 11.5–14.5)
EST. GFR  (NO RACE VARIABLE): >60 ML/MIN/1.73 M^2
GLUCOSE SERPL-MCNC: 87 MG/DL (ref 70–110)
HCT VFR BLD AUTO: 21.9 % (ref 37–48.5)
HGB BLD-MCNC: 7.3 G/DL (ref 12–16)
IMM GRANULOCYTES # BLD AUTO: 0.14 K/UL (ref 0–0.04)
IMM GRANULOCYTES NFR BLD AUTO: 1 % (ref 0–0.5)
LYMPHOCYTES # BLD AUTO: 3.6 K/UL (ref 1–4.8)
LYMPHOCYTES NFR BLD: 25.1 % (ref 18–48)
MAGNESIUM SERPL-MCNC: 1.5 MG/DL (ref 1.6–2.6)
MCH RBC QN AUTO: 30.2 PG (ref 27–31)
MCHC RBC AUTO-ENTMCNC: 33.3 G/DL (ref 32–36)
MCV RBC AUTO: 91 FL (ref 82–98)
MONOCYTES # BLD AUTO: 1.5 K/UL (ref 0.3–1)
MONOCYTES NFR BLD: 10.6 % (ref 4–15)
NEUTROPHILS # BLD AUTO: 8.2 K/UL (ref 1.8–7.7)
NEUTROPHILS NFR BLD: 57.8 % (ref 38–73)
NRBC BLD-RTO: 1 /100 WBC
NUM UNITS TRANS PACKED RBC: NORMAL
PHOSPHATE SERPL-MCNC: 4.6 MG/DL (ref 2.7–4.5)
PLATELET # BLD AUTO: 558 K/UL (ref 150–450)
PMV BLD AUTO: 9.1 FL (ref 9.2–12.9)
POTASSIUM SERPL-SCNC: 3.9 MMOL/L (ref 3.5–5.1)
PROT SERPL-MCNC: 5.9 G/DL (ref 6–8.4)
RBC # BLD AUTO: 2.42 M/UL (ref 4–5.4)
SODIUM SERPL-SCNC: 143 MMOL/L (ref 136–145)
WBC # BLD AUTO: 14.2 K/UL (ref 3.9–12.7)

## 2023-02-12 PROCEDURE — 84100 ASSAY OF PHOSPHORUS: CPT | Performed by: NURSE PRACTITIONER

## 2023-02-12 PROCEDURE — 83735 ASSAY OF MAGNESIUM: CPT | Performed by: NURSE PRACTITIONER

## 2023-02-12 PROCEDURE — 25000003 PHARM REV CODE 250: Performed by: NURSE PRACTITIONER

## 2023-02-12 PROCEDURE — 36415 COLL VENOUS BLD VENIPUNCTURE: CPT | Performed by: NURSE PRACTITIONER

## 2023-02-12 PROCEDURE — 86902 BLOOD TYPE ANTIGEN DONOR EA: CPT | Performed by: NURSE PRACTITIONER

## 2023-02-12 PROCEDURE — P9016 RBC LEUKOCYTES REDUCED: HCPCS | Performed by: NURSE PRACTITIONER

## 2023-02-12 PROCEDURE — 86900 BLOOD TYPING SEROLOGIC ABO: CPT | Performed by: NURSE PRACTITIONER

## 2023-02-12 PROCEDURE — 86920 COMPATIBILITY TEST SPIN: CPT | Performed by: NURSE PRACTITIONER

## 2023-02-12 PROCEDURE — 85025 COMPLETE CBC W/AUTO DIFF WBC: CPT | Performed by: NURSE PRACTITIONER

## 2023-02-12 PROCEDURE — 80053 COMPREHEN METABOLIC PANEL: CPT | Performed by: NURSE PRACTITIONER

## 2023-02-12 PROCEDURE — 63600175 PHARM REV CODE 636 W HCPCS: Performed by: NURSE PRACTITIONER

## 2023-02-12 PROCEDURE — 25000003 PHARM REV CODE 250: Performed by: HOSPITALIST

## 2023-02-12 PROCEDURE — 99233 PR SUBSEQUENT HOSPITAL CARE,LEVL III: ICD-10-PCS | Mod: ,,, | Performed by: INTERNAL MEDICINE

## 2023-02-12 PROCEDURE — 63600175 PHARM REV CODE 636 W HCPCS: Performed by: HOSPITALIST

## 2023-02-12 PROCEDURE — 12000002 HC ACUTE/MED SURGE SEMI-PRIVATE ROOM

## 2023-02-12 PROCEDURE — 99233 SBSQ HOSP IP/OBS HIGH 50: CPT | Mod: ,,, | Performed by: INTERNAL MEDICINE

## 2023-02-12 PROCEDURE — 36430 TRANSFUSION BLD/BLD COMPNT: CPT

## 2023-02-12 RX ORDER — ACETAMINOPHEN 325 MG/1
650 TABLET ORAL ONCE
Status: COMPLETED | OUTPATIENT
Start: 2023-02-12 | End: 2023-02-12

## 2023-02-12 RX ORDER — DIPHENHYDRAMINE HCL 25 MG
25 CAPSULE ORAL ONCE
Status: COMPLETED | OUTPATIENT
Start: 2023-02-12 | End: 2023-02-12

## 2023-02-12 RX ORDER — HYDROCODONE BITARTRATE AND ACETAMINOPHEN 500; 5 MG/1; MG/1
TABLET ORAL
Status: DISCONTINUED | OUTPATIENT
Start: 2023-02-12 | End: 2023-02-15 | Stop reason: HOSPADM

## 2023-02-12 RX ADMIN — FOLIC ACID 1 MG: 1 TABLET ORAL at 08:02

## 2023-02-12 RX ADMIN — OXYCODONE AND ACETAMINOPHEN 1 TABLET: 10; 325 TABLET ORAL at 08:02

## 2023-02-12 RX ADMIN — HYDROMORPHONE HYDROCHLORIDE 1.5 MG: 1 INJECTION, SOLUTION INTRAMUSCULAR; INTRAVENOUS; SUBCUTANEOUS at 04:02

## 2023-02-12 RX ADMIN — SODIUM CHLORIDE: 9 INJECTION, SOLUTION INTRAVENOUS at 07:02

## 2023-02-12 RX ADMIN — DIPHENHYDRAMINE HYDROCHLORIDE 12.5 MG: 50 INJECTION, SOLUTION INTRAMUSCULAR; INTRAVENOUS at 01:02

## 2023-02-12 RX ADMIN — ACETAMINOPHEN 650 MG: 325 TABLET ORAL at 04:02

## 2023-02-12 RX ADMIN — OXYCODONE AND ACETAMINOPHEN 1 TABLET: 10; 325 TABLET ORAL at 04:02

## 2023-02-12 RX ADMIN — DIPHENHYDRAMINE HYDROCHLORIDE 12.5 MG: 50 INJECTION, SOLUTION INTRAMUSCULAR; INTRAVENOUS at 05:02

## 2023-02-12 RX ADMIN — HYDROMORPHONE HYDROCHLORIDE 1.5 MG: 1 INJECTION, SOLUTION INTRAMUSCULAR; INTRAVENOUS; SUBCUTANEOUS at 12:02

## 2023-02-12 RX ADMIN — HYDROMORPHONE HYDROCHLORIDE 1.5 MG: 1 INJECTION, SOLUTION INTRAMUSCULAR; INTRAVENOUS; SUBCUTANEOUS at 07:02

## 2023-02-12 RX ADMIN — MAGNESIUM OXIDE TAB 400 MG (241.3 MG ELEMENTAL MG) 800 MG: 400 (241.3 MG) TAB at 11:02

## 2023-02-12 RX ADMIN — HYDROMORPHONE HYDROCHLORIDE 1.5 MG: 1 INJECTION, SOLUTION INTRAMUSCULAR; INTRAVENOUS; SUBCUTANEOUS at 10:02

## 2023-02-12 RX ADMIN — DIPHENHYDRAMINE HYDROCHLORIDE 12.5 MG: 50 INJECTION, SOLUTION INTRAMUSCULAR; INTRAVENOUS at 02:02

## 2023-02-12 RX ADMIN — FAMOTIDINE 20 MG: 20 TABLET, FILM COATED ORAL at 04:02

## 2023-02-12 RX ADMIN — MAGNESIUM OXIDE TAB 400 MG (241.3 MG ELEMENTAL MG) 800 MG: 400 (241.3 MG) TAB at 03:02

## 2023-02-12 RX ADMIN — OXYCODONE AND ACETAMINOPHEN 1 TABLET: 10; 325 TABLET ORAL at 11:02

## 2023-02-12 RX ADMIN — HYDROMORPHONE HYDROCHLORIDE 1.5 MG: 1 INJECTION, SOLUTION INTRAMUSCULAR; INTRAVENOUS; SUBCUTANEOUS at 02:02

## 2023-02-12 RX ADMIN — DIPHENHYDRAMINE HYDROCHLORIDE 12.5 MG: 50 INJECTION, SOLUTION INTRAMUSCULAR; INTRAVENOUS at 10:02

## 2023-02-12 RX ADMIN — DEFERASIROX 1000 MG: 500 TABLET, FOR SUSPENSION ORAL at 05:02

## 2023-02-12 RX ADMIN — SODIUM CHLORIDE: 9 INJECTION, SOLUTION INTRAVENOUS at 04:02

## 2023-02-12 RX ADMIN — MORPHINE SULFATE 30 MG: 15 TABLET, EXTENDED RELEASE ORAL at 02:02

## 2023-02-12 RX ADMIN — DIPHENHYDRAMINE HYDROCHLORIDE 12.5 MG: 50 INJECTION, SOLUTION INTRAMUSCULAR; INTRAVENOUS at 11:02

## 2023-02-12 RX ADMIN — DIPHENHYDRAMINE HYDROCHLORIDE 25 MG: 25 CAPSULE ORAL at 04:02

## 2023-02-12 RX ADMIN — CEFTRIAXONE 1 G: 1 INJECTION, POWDER, FOR SOLUTION INTRAMUSCULAR; INTRAVENOUS at 09:02

## 2023-02-12 RX ADMIN — HYDROXYUREA 1000 MG: 500 CAPSULE ORAL at 09:02

## 2023-02-12 RX ADMIN — OXYCODONE AND ACETAMINOPHEN 1 TABLET: 10; 325 TABLET ORAL at 01:02

## 2023-02-12 RX ADMIN — MORPHINE SULFATE 30 MG: 15 TABLET, EXTENDED RELEASE ORAL at 09:02

## 2023-02-12 RX ADMIN — MORPHINE SULFATE 30 MG: 15 TABLET, EXTENDED RELEASE ORAL at 05:02

## 2023-02-12 NOTE — PROGRESS NOTES
"[facility] Medicine  Progress Note    Patient Name: Nishi Dumont  MRN: [id number]  Patient Class: IP- Inpatient   Admission Date: 2/9/2023  Length of Stay: 3 days  Attending Physician: Dawson Forman MD  Primary Care Provider: [facility]        Subjective:     Principal Problem:Sickle cell pain crisis        HPI:  Ms. Dumont is a 32 year old female with a history of sickle cell anemia on Adekveo infusion and red cell exchange apheresis , ACS d/t Hb-SS, AVN hip s/p left hip replacement, AVN L humeral head, and DVT who presents today with complaints of pain in shoulders and back. It is severe. It is associated with urinary frequency, urgency, and weakness. She denies fever, chills, N/V/D, chest pain, SOB, cough, dizziness, or LOC. Since 11/2022, she's been receiving Adekveo infusion and red cell exchange apheresis with last one 1/31 per Dr. Murillo at [facility]. She states since she started this, she's been feeling more "run down" "like my body is shutting down on me". She reports med compliance at home. She was previously on eliquis for a DVT on her leg, but this was d/c'ed per hematology 3 weeks ago. She reports nose bleeds on this. EKG NSR with sinus arrhythmia with on acute ischemic changes. WBC 21K, Tbili 1.7, retic 6.9, Troponin WNL, lactate WNL, urine nitrite + pyuria. Hospital medicine is consulted for admission for further work up and treatment.       Overview/Hospital Course:  Patient was admitted to the hospital for sickle cell crisis.  Her pain was uncontrolled the 1st day, so her hydromorphone was increased from 1-1.5 mg.  She was given IV fluids and oxygen supplementation.      Interval History:  Pt seen and examined.  States she had a miserable night.  Despite pain medication, could not get any rest.  Pain continues in her typical shoulder, back areas.  Long discussion with Pt regarding persistent symptoms despite aggressive fluid " resuscitation and adequate pain regimen.  Will transfuse 1 unit packed red blood cells today.    Review of Systems   Constitutional:  Positive for fatigue. Negative for chills and fever.   Respiratory:  Negative for cough and shortness of breath.    Gastrointestinal:  Negative for nausea and vomiting.   Musculoskeletal:  Positive for arthralgias and back pain.   Objective:     Vital Signs (Most Recent):  Temp: 98.1 °F (36.7 °C) (02/12/23 0749)  Pulse: 91 (02/12/23 0749)  Resp: 18 (02/12/23 1047)  BP: 105/67 (02/12/23 0749)  SpO2: 99 % (02/12/23 0749) Vital Signs (24h Range):  Temp:  [97.7 °F (36.5 °C)-98.2 °F (36.8 °C)] 98.1 °F (36.7 °C)  Pulse:  [87-93] 91  Resp:  [16-18] 18  SpO2:  [96 %-100 %] 99 %  BP: ()/(51-67) 105/67     Weight: 53.8 kg (118 lb 9.7 oz)  Body mass index is 23.96 kg/m².    Intake/Output Summary (Last 24 hours) at 2/12/2023 1103  Last data filed at 2/12/2023 0254  Gross per 24 hour   Intake 360 ml   Output --   Net 360 ml      Physical Exam  Vitals and nursing note reviewed.   Constitutional:       Appearance: Normal appearance.   HENT:      Head: Normocephalic and atraumatic.   Cardiovascular:      Rate and Rhythm: Normal rate and regular rhythm.   Pulmonary:      Effort: Pulmonary effort is normal.      Breath sounds: Normal breath sounds.   Abdominal:      General: Abdomen is flat. Bowel sounds are normal.      Palpations: Abdomen is soft.   Musculoskeletal:         General: Normal range of motion.   Skin:     Capillary Refill: Capillary refill takes less than 2 seconds.   Neurological:      General: No focal deficit present.      Mental Status: She is alert and oriented to person, place, and time. Mental status is at baseline.       Significant Labs: All pertinent labs within the past 24 hours have been reviewed.  BMP:   Recent Labs   Lab 02/12/23  0528   GLU 87      K 3.9   *   CO2 23   BUN 6   CREATININE 0.7   CALCIUM 8.3*   MG 1.5*     CBC:   Recent Labs   Lab  02/11/23  0420 02/12/23  0528   WBC 15.73* 14.20*   HGB 7.8* 7.3*   HCT 24.8* 21.9*    558*       Significant Imaging: I have reviewed all pertinent imaging results/findings within the past 24 hours.      Assessment/Plan:      * Sickle cell pain crisis  -pain persistent despite adequate regimen and aggressive fluid resuscitation: discussed with hematology; will transfuse 1 unit 2/12.  -continue aggressive IV fluid resuscitation   -continue IV pain medication, add an oral pain medication p.r.n. to see if we get better control   -continue her scheduled morphine orally  -p.r.n.  Narcan to chart       Thrombocytosis  Continue to monitor CBC.  Last platelet count reviewed.    Lab Results   Component Value Date     02/11/2023           Iron overload  Continue deferasirox         Chronic prescription opiate use  Noted, patient will require higher doses of pain medications secondary to history of opiate tolerance.      Hyperbilirubinemia  CMP reviewed.  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 This is improving.  Continue to monitor CBC and CMP closely.    Lab Results   Component Value Date    ALT 19 02/11/2023    AST 20 02/11/2023    ALKPHOS 79 02/11/2023    BILITOT 1.1 (H) 02/11/2023           UTI (urinary tract infection)  This patient does have evidence of infective focus  Vital signs were reviewed and noted in progress note.  Antibiotics given-   Antibiotics (From admission, onward)    Start     Stop Route Frequency Ordered    02/10/23 2130  cefTRIAXone (ROCEPHIN) 1 g in dextrose 5 % in water (D5W) 5 % 50 mL IVPB (MB+)         -- IV Every 24 hours (non-standard times) 02/09/23 2205        Cultures were taken-   Microbiology Results (last 7 days)     Procedure Component Value Units Date/Time    Blood culture [[id number]] Collected: 02/09/23 2039    Order Status: Completed Specimen: Blood Updated: 02/12/23 0613     Blood Culture, Routine No Growth to date      No Growth to date       No Growth to date    Blood culture [[id number]] Collected: 02/09/23 2039    Order Status: Completed Specimen: Blood Updated: 02/12/23 0613     Blood Culture, Routine No Growth to date      No Growth to date      No Growth to date    Urine culture [[id number]]  (Abnormal) Collected: 02/09/23 2011    Order Status: Completed Specimen: Urine Updated: 02/11/23 0918     Urine Culture, Routine PRESUMPTIVE E COLI  >100,000 cfu/ml  Identification and susceptibility pending      Narrative:      Specimen Source->Urine        Latest lactate reviewed, they are-  Recent Labs   Lab 02/10/23  1358 02/10/23  1841 02/10/23  2226   LACTATE 1.2 1.2 0.9     Lactic acid appears stable for now.  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    Growing E. Coli: continue the rocephin and await sensitivities.    Sickle cell anemia  Patient's anemia is currently uncontrolled. Has not received any PRBCs to date.. Etiology likely d/t sickle cell disease  Current CBC reviewed-   Lab Results   Component Value Date    HGB 7.3 (L) 02/12/2023    HCT 21.9 (L) 02/12/2023     Monitor serial CBC and transfuse if patient becomes hemodynamically unstable, symptomatic or H/H drops below 7/21.   She will receive 1 units rbc on 2/12 due to uncontrolled crisis symptoms.        VTE Risk Mitigation (From admission, onward)         Ordered     enoxaparin injection 40 mg  Daily         02/09/23 2240     IP VTE LOW RISK PATIENT  Once         02/09/23 2205     Place sequential compression device  Until discontinued         02/09/23 2205                Discharge Planning   SHANE: 2/13/2023     Code Status: Full Code   Is the patient medically ready for discharge?:     Reason for patient still in hospital (select all that apply): Patient trending condition, Treatment and Consult recommendations  Discharge Plan A: Home                  Carri Carbone NP  Department of Hospital Medicine   [facility]

## 2023-02-12 NOTE — PROGRESS NOTES
"[facility]  Hematology/Oncology  Progress Note    Patient Name: Nishi Pinzon Vital  Admission Date: 2/9/2023  Hospital Length of Stay: 2 days  Code Status: Full Code     Subjective:     Interval History: still with weird feeling and backpain    32 year old female with a history of sickle cell anemia on Adekveo infusion and red cell exchange apheresis , ACS d/t Hb-SS, AVN hip s/p left hip replacement, AVN L humeral head, and DVT who presents today with complaints of pain in shoulders and back. It is severe. It is associated with urinary frequency, urgency, and weakness. She denies fever, chills, N/V/D, chest pain, SOB, cough, dizziness, or LOC. Since 11/2022, she's been receiving Adekveo infusion and red cell exchange apheresis with last one 1/31 per Dr. Murillo at [facility]. She states since she started this, she's been feeling more "run down" "like my body is shutting down on me". She reports med compliance at home. She was previously on eliquis for a DVT on her leg, but this was d/c'ed per hematology 3 weeks ago. She reports nose bleeds on this. EKG NSR with sinus arrhythmia with on acute ischemic changes. WBC 21K, Tbili 1.7, retic 6.9, Troponin WNL, lactate WNL, urine nitrite + pyuria. Hospital medicine is consulted for admission for further work up and treatment.         Overview/Hospital Course:  Patient was admitted to the hospital for sickle cell crisis.  Her pain was uncontrolled the 1st day, so her hydromorphone was increased from 1-1.5 mg.  She was given IV fluids and oxygen supplementation.       Medications:  Continuous Infusions:   sodium chloride 0.9% 125 mL/hr at 02/11/23 2059     Scheduled Meds:   cefTRIAXone (ROCEPHIN) IVPB  1 g Intravenous Q24H    deferasirox  1,000 mg Oral Before breakfast    enoxaparin  40 mg Subcutaneous Daily    famotidine  20 mg Oral Daily    folic acid  1 mg Oral Daily    gabapentin  600 mg Oral Daily    glutamine (sickle cell)  10 g Oral BID    hydroxyurea  " 1,000 mg Oral BID    morphine  30 mg Oral Q8H    senna-docusate 8.6-50 mg  1 tablet Oral BID    ursodioL  300 mg Oral TID WM     PRN Meds:acetaminophen, aluminum-magnesium hydroxide-simethicone, dextrose 10%, dextrose 10%, diphenhydrAMINE, glucagon (human recombinant), glucose, glucose, HYDROmorphone, influenza, magnesium oxide, magnesium oxide, melatonin, naloxone, ondansetron, oxyCODONE-acetaminophen, polyethylene glycol, potassium bicarbonate, potassium bicarbonate, potassium bicarbonate, sodium chloride 0.9%     Review of Systems  Constitutional:  Positive for fatigue. Negative for chills and fever.   Respiratory:  Negative for cough and shortness of breath.    Gastrointestinal:  Negative for nausea and vomiting.   Musculoskeletal:  Positive for arthralgias and back pain.   Objective:     Vital Signs (Most Recent):  Temp: 98.2 °F (36.8 °C) (02/11/23 1955)  Pulse: 89 (02/11/23 1955)  Resp: 17 (02/11/23 2054)  BP: (!) 104/54 (02/11/23 1955)  SpO2: 100 % (02/11/23 1955)   Vital Signs (24h Range):  Temp:  [97.8 °F (36.6 °C)-98.2 °F (36.8 °C)] 98.2 °F (36.8 °C)  Pulse:  [84-93] 89  Resp:  [16-18] 17  SpO2:  [96 %-100 %] 100 %  BP: ()/(47-63) 104/54     Weight: 53.8 kg (118 lb 9.7 oz)  Body mass index is 23.96 kg/m².  Body surface area is 1.5 meters squared.    No intake or output data in the 24 hours ending 02/11/23 2146    Physical Exam  VITAL SIGNS:  as above   GENERAL: appears well-built, well-nourished.  No anxiety, no agitation, and in no distress.  Patient is awake, alert, oriented and cooperative.  HEENT:  Showed no congestion. Trachea is central no obvious icterus or pallor noted no hoarseness. no obvious JVD   NECK:  Supple.  No JVD. No obvious cervical submental or supraclavicular adenopathy.  RS:the visualized portion of  Chest expands well. chest appears symmetric, no audible wheezes.  No dyspnea recognized  ABDOMEN:  abdomen appears undistended.  EXTREMITIES:  Without edema.  NEUROLOGICAL:  The  patient is appropriate, higher functions are normal.  No  obvious neurological deficits.  normal judgement normal thought content  No confusion, no speech impediment. Cranial nerves are intact and show no deficit. No gross motor deficits noted   SKIN MUSCULOSKELETAL: no joint or skeletal deformity, no clubbing of nails.  No visible rash ecchymosis or petechiae   Significant Labs:   Lab Results   Component Value Date    WBC 15.73 (H) 02/11/2023    HGB 7.8 (L) 02/11/2023    HCT 24.8 (L) 02/11/2023    MCV 95 02/11/2023     02/11/2023      CMP  Sodium   Date Value Ref Range Status   02/11/2023 142 136 - 145 mmol/L Final     Potassium   Date Value Ref Range Status   02/11/2023 4.1 3.5 - 5.1 mmol/L Final     Chloride   Date Value Ref Range Status   02/11/2023 116 (H) 95 - 110 mmol/L Final     CO2   Date Value Ref Range Status   02/11/2023 16 (L) 23 - 29 mmol/L Final     Glucose   Date Value Ref Range Status   02/11/2023 82 70 - 110 mg/dL Final     BUN   Date Value Ref Range Status   02/11/2023 4 (L) 6 - 20 mg/dL Final     Creatinine   Date Value Ref Range Status   02/11/2023 0.7 0.5 - 1.4 mg/dL Final     Calcium   Date Value Ref Range Status   02/11/2023 8.4 (L) 8.7 - 10.5 mg/dL Final     Total Protein   Date Value Ref Range Status   02/11/2023 5.9 (L) 6.0 - 8.4 g/dL Final     Albumin   Date Value Ref Range Status   02/11/2023 3.5 3.5 - 5.2 g/dL Final     Total Bilirubin   Date Value Ref Range Status   02/11/2023 1.1 (H) 0.1 - 1.0 mg/dL Final     Comment:     For infants and newborns, interpretation of results should be based  on gestational age, weight and in agreement with clinical  observations.    Premature Infant recommended reference ranges:  Up to 24 hours.............<8.0 mg/dL  Up to 48 hours............<12.0 mg/dL  3-5 days..................<15.0 mg/dL  6-29 days.................<15.0 mg/dL       Alkaline Phosphatase   Date Value Ref Range Status   02/11/2023 79 55 - 135 U/L Final     AST   Date Value Ref  Range Status   02/11/2023 20 10 - 40 U/L Final     ALT   Date Value Ref Range Status   02/11/2023 19 10 - 44 U/L Final     Anion Gap   Date Value Ref Range Status   02/11/2023 10 8 - 16 mmol/L Final     eGFR   Date Value Ref Range Status   02/11/2023 >60 >60 mL/min/1.73 m^2 Final          Assessment/Plan:     Active Diagnoses:    Diagnosis Date Noted POA    PRINCIPAL PROBLEM:  Sickle cell pain crisis [D57.00] 09/29/2017 Yes    Chronic prescription opiate use [Z79.891] 02/06/2022 Not Applicable    Iron overload [E83.19] 08/24/2016 Yes    Thrombocytosis [D75.839] 12/24/2015 Yes    Hyperbilirubinemia [E80.6] 04/02/2014 Yes    UTI (urinary tract infection) [N39.0] 12/05/2013 Yes    Sickle cell anemia [D57.1] 09/03/2013 Yes      Problems Resolved During this Admission:     Sickle cell pain crisis      Aggressive hydration  Adequate pain mx currently on morphine/ IV dilauudid  Continue  home meds   Pt to discuss with her treating MD s/e of her meds  UTI: currenlty managed with antibiotics   Thrombocytosis: expected, please trend   Pt will folow at [facility] at discharge      Mildred Williamson MD  Hematology/Oncology  [facility]

## 2023-02-12 NOTE — ASSESSMENT & PLAN NOTE
This patient does have evidence of infective focus  Vital signs were reviewed and noted in progress note.  Antibiotics given-   Antibiotics (From admission, onward)    Start     Stop Route Frequency Ordered    02/10/23 2130  cefTRIAXone (ROCEPHIN) 1 g in dextrose 5 % in water (D5W) 5 % 50 mL IVPB (MB+)         -- IV Every 24 hours (non-standard times) 02/09/23 2205        Cultures were taken-   Microbiology Results (last 7 days)     Procedure Component Value Units Date/Time    Blood culture [[id number]] Collected: 02/09/23 2039    Order Status: Completed Specimen: Blood Updated: 02/12/23 0613     Blood Culture, Routine No Growth to date      No Growth to date      No Growth to date    Blood culture [[id number]] Collected: 02/09/23 2039    Order Status: Completed Specimen: Blood Updated: 02/12/23 0613     Blood Culture, Routine No Growth to date      No Growth to date      No Growth to date    Urine culture [[id number]]  (Abnormal) Collected: 02/09/23 2011    Order Status: Completed Specimen: Urine Updated: 02/11/23 0918     Urine Culture, Routine PRESUMPTIVE E COLI  >100,000 cfu/ml  Identification and susceptibility pending      Narrative:      Specimen Source->Urine        Latest lactate reviewed, they are-  Recent Labs   Lab 02/10/23  1358 02/10/23  1841 02/10/23  2226   LACTATE 1.2 1.2 0.9     Lactic acid appears stable for now.  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    Growing E. Coli: continue the rocephin and await sensitivities.

## 2023-02-12 NOTE — ASSESSMENT & PLAN NOTE
Patient's anemia is currently uncontrolled. Has not received any PRBCs to date.. Etiology likely d/t sickle cell disease  Current CBC reviewed-   Lab Results   Component Value Date    HGB 7.3 (L) 02/12/2023    HCT 21.9 (L) 02/12/2023     Monitor serial CBC and transfuse if patient becomes hemodynamically unstable, symptomatic or H/H drops below 7/21.   She will receive 1 units rbc on 2/12 due to uncontrolled crisis symptoms.

## 2023-02-12 NOTE — SUBJECTIVE & OBJECTIVE
Interval History:  Pt seen and examined.  States she had a miserable night.  Despite pain medication, could not get any rest.  Pain continues in her typical shoulder, back areas.  Long discussion with Pt regarding persistent symptoms despite aggressive fluid resuscitation and adequate pain regimen.  Will transfuse 1 unit packed red blood cells today.    Review of Systems   Constitutional:  Positive for fatigue. Negative for chills and fever.   Respiratory:  Negative for cough and shortness of breath.    Gastrointestinal:  Negative for nausea and vomiting.   Musculoskeletal:  Positive for arthralgias and back pain.   Objective:     Vital Signs (Most Recent):  Temp: 98.1 °F (36.7 °C) (02/12/23 0749)  Pulse: 91 (02/12/23 0749)  Resp: 18 (02/12/23 1047)  BP: 105/67 (02/12/23 0749)  SpO2: 99 % (02/12/23 0749) Vital Signs (24h Range):  Temp:  [97.7 °F (36.5 °C)-98.2 °F (36.8 °C)] 98.1 °F (36.7 °C)  Pulse:  [87-93] 91  Resp:  [16-18] 18  SpO2:  [96 %-100 %] 99 %  BP: ()/(51-67) 105/67     Weight: 53.8 kg (118 lb 9.7 oz)  Body mass index is 23.96 kg/m².    Intake/Output Summary (Last 24 hours) at 2/12/2023 1103  Last data filed at 2/12/2023 0254  Gross per 24 hour   Intake 360 ml   Output --   Net 360 ml      Physical Exam  Vitals and nursing note reviewed.   Constitutional:       Appearance: Normal appearance.   HENT:      Head: Normocephalic and atraumatic.   Cardiovascular:      Rate and Rhythm: Normal rate and regular rhythm.   Pulmonary:      Effort: Pulmonary effort is normal.      Breath sounds: Normal breath sounds.   Abdominal:      General: Abdomen is flat. Bowel sounds are normal.      Palpations: Abdomen is soft.   Musculoskeletal:         General: Normal range of motion.   Skin:     Capillary Refill: Capillary refill takes less than 2 seconds.   Neurological:      General: No focal deficit present.      Mental Status: She is alert and oriented to person, place, and time. Mental status is at baseline.        Significant Labs: All pertinent labs within the past 24 hours have been reviewed.  BMP:   Recent Labs   Lab 02/12/23  0528   GLU 87      K 3.9   *   CO2 23   BUN 6   CREATININE 0.7   CALCIUM 8.3*   MG 1.5*     CBC:   Recent Labs   Lab 02/11/23  0420 02/12/23  0528   WBC 15.73* 14.20*   HGB 7.8* 7.3*   HCT 24.8* 21.9*    558*       Significant Imaging: I have reviewed all pertinent imaging results/findings within the past 24 hours.

## 2023-02-12 NOTE — PLAN OF CARE
Problem: Adult Inpatient Plan of Care  Goal: Plan of Care Review  Outcome: Ongoing, Progressing  Goal: Readiness for Transition of Care  2/12/2023 0253 by Tatyana Amezcua RN  Outcome: Ongoing, Progressing  2/12/2023 0253 by Tatyana Amezcua RN  Outcome: Ongoing, Progressing     Problem: Fall Injury Risk  Goal: Absence of Fall and Fall-Related Injury  2/12/2023 0253 by Tatyana Amezcua RN  Outcome: Ongoing, Progressing  2/12/2023 0253 by Tatyana Amezcua RN  Outcome: Ongoing, Progressing     Problem: Pain Chronic (Persistent)  Goal: Acceptable Pain Control and Functional Ability  2/12/2023 0253 by Tatyana Amezcua RN  Outcome: Ongoing, Progressing  2/12/2023 0253 by Tatyana Amezcua RN  Outcome: Ongoing, Progressing     Problem: UTI (Urinary Tract Infection)  Goal: Improved Infection Symptoms  Outcome: Ongoing, Progressing

## 2023-02-12 NOTE — ASSESSMENT & PLAN NOTE
-pain persistent despite adequate regimen and aggressive fluid resuscitation: discussed with hematology; will transfuse 1 unit 2/12.  -continue aggressive IV fluid resuscitation   -continue IV pain medication, add an oral pain medication p.r.n. to see if we get better control   -continue her scheduled morphine orally  -p.r.n.  Narcan to chart

## 2023-02-13 LAB
ANION GAP SERPL CALC-SCNC: 11 MMOL/L (ref 8–16)
BACTERIA UR CULT: ABNORMAL
BASOPHILS # BLD AUTO: 0.14 K/UL (ref 0–0.2)
BASOPHILS NFR BLD: 1.1 % (ref 0–1.9)
BUN SERPL-MCNC: 5 MG/DL (ref 6–20)
CALCIUM SERPL-MCNC: 8.7 MG/DL (ref 8.7–10.5)
CHLORIDE SERPL-SCNC: 110 MMOL/L (ref 95–110)
CO2 SERPL-SCNC: 20 MMOL/L (ref 23–29)
CREAT SERPL-MCNC: 0.7 MG/DL (ref 0.5–1.4)
DIFFERENTIAL METHOD: ABNORMAL
EOSINOPHIL # BLD AUTO: 0.8 K/UL (ref 0–0.5)
EOSINOPHIL NFR BLD: 5.9 % (ref 0–8)
ERYTHROCYTE [DISTWIDTH] IN BLOOD BY AUTOMATED COUNT: 16.4 % (ref 11.5–14.5)
EST. GFR  (NO RACE VARIABLE): >60 ML/MIN/1.73 M^2
GLUCOSE SERPL-MCNC: 74 MG/DL (ref 70–110)
HCT VFR BLD AUTO: 26.2 % (ref 37–48.5)
HGB BLD-MCNC: 8.5 G/DL (ref 12–16)
IMM GRANULOCYTES # BLD AUTO: 0.09 K/UL (ref 0–0.04)
IMM GRANULOCYTES NFR BLD AUTO: 0.7 % (ref 0–0.5)
LYMPHOCYTES # BLD AUTO: 2.5 K/UL (ref 1–4.8)
LYMPHOCYTES NFR BLD: 19.1 % (ref 18–48)
MAGNESIUM SERPL-MCNC: 1.6 MG/DL (ref 1.6–2.6)
MCH RBC QN AUTO: 29.2 PG (ref 27–31)
MCHC RBC AUTO-ENTMCNC: 32.4 G/DL (ref 32–36)
MCV RBC AUTO: 90 FL (ref 82–98)
MONOCYTES # BLD AUTO: 1.4 K/UL (ref 0.3–1)
MONOCYTES NFR BLD: 10.7 % (ref 4–15)
NEUTROPHILS # BLD AUTO: 8.3 K/UL (ref 1.8–7.7)
NEUTROPHILS NFR BLD: 62.5 % (ref 38–73)
NRBC BLD-RTO: 1 /100 WBC
PLATELET # BLD AUTO: 261 K/UL (ref 150–450)
PLATELET BLD QL SMEAR: ABNORMAL
PMV BLD AUTO: 10.6 FL (ref 9.2–12.9)
POTASSIUM SERPL-SCNC: 3.9 MMOL/L (ref 3.5–5.1)
RBC # BLD AUTO: 2.91 M/UL (ref 4–5.4)
SODIUM SERPL-SCNC: 141 MMOL/L (ref 136–145)
WBC # BLD AUTO: 13.29 K/UL (ref 3.9–12.7)

## 2023-02-13 PROCEDURE — 63600175 PHARM REV CODE 636 W HCPCS: Performed by: NURSE PRACTITIONER

## 2023-02-13 PROCEDURE — 80048 BASIC METABOLIC PNL TOTAL CA: CPT | Performed by: NURSE PRACTITIONER

## 2023-02-13 PROCEDURE — 36415 COLL VENOUS BLD VENIPUNCTURE: CPT | Performed by: INTERNAL MEDICINE

## 2023-02-13 PROCEDURE — 25000003 PHARM REV CODE 250: Performed by: HOSPITALIST

## 2023-02-13 PROCEDURE — 63600175 PHARM REV CODE 636 W HCPCS: Performed by: HOSPITALIST

## 2023-02-13 PROCEDURE — 99223 1ST HOSP IP/OBS HIGH 75: CPT | Mod: ,,, | Performed by: INTERNAL MEDICINE

## 2023-02-13 PROCEDURE — 12000002 HC ACUTE/MED SURGE SEMI-PRIVATE ROOM

## 2023-02-13 PROCEDURE — 99223 PR INITIAL HOSPITAL CARE,LEVL III: ICD-10-PCS | Mod: ,,, | Performed by: INTERNAL MEDICINE

## 2023-02-13 PROCEDURE — 85025 COMPLETE CBC W/AUTO DIFF WBC: CPT | Performed by: NURSE PRACTITIONER

## 2023-02-13 PROCEDURE — 36415 COLL VENOUS BLD VENIPUNCTURE: CPT | Performed by: NURSE PRACTITIONER

## 2023-02-13 PROCEDURE — 25000003 PHARM REV CODE 250: Performed by: NURSE PRACTITIONER

## 2023-02-13 PROCEDURE — 83735 ASSAY OF MAGNESIUM: CPT | Performed by: INTERNAL MEDICINE

## 2023-02-13 RX ORDER — HYDROMORPHONE HYDROCHLORIDE 2 MG/ML
2 INJECTION, SOLUTION INTRAMUSCULAR; INTRAVENOUS; SUBCUTANEOUS
Status: DISCONTINUED | OUTPATIENT
Start: 2023-02-13 | End: 2023-02-14

## 2023-02-13 RX ORDER — OXYCODONE AND ACETAMINOPHEN 10; 325 MG/1; MG/1
1 TABLET ORAL EVERY 4 HOURS PRN
Status: DISCONTINUED | OUTPATIENT
Start: 2023-02-13 | End: 2023-02-15 | Stop reason: HOSPADM

## 2023-02-13 RX ADMIN — DIPHENHYDRAMINE HYDROCHLORIDE 12.5 MG: 50 INJECTION, SOLUTION INTRAMUSCULAR; INTRAVENOUS at 07:02

## 2023-02-13 RX ADMIN — HYDROMORPHONE HYDROCHLORIDE 2 MG: 2 INJECTION, SOLUTION INTRAMUSCULAR; INTRAVENOUS; SUBCUTANEOUS at 08:02

## 2023-02-13 RX ADMIN — HYDROMORPHONE HYDROCHLORIDE 2 MG: 2 INJECTION, SOLUTION INTRAMUSCULAR; INTRAVENOUS; SUBCUTANEOUS at 12:02

## 2023-02-13 RX ADMIN — URSODIOL 300 MG: 300 CAPSULE ORAL at 08:02

## 2023-02-13 RX ADMIN — MORPHINE SULFATE 30 MG: 15 TABLET, EXTENDED RELEASE ORAL at 06:02

## 2023-02-13 RX ADMIN — MORPHINE SULFATE 30 MG: 15 TABLET, EXTENDED RELEASE ORAL at 10:02

## 2023-02-13 RX ADMIN — SENNOSIDES AND DOCUSATE SODIUM 1 TABLET: 8.6; 5 TABLET ORAL at 08:02

## 2023-02-13 RX ADMIN — SODIUM CHLORIDE: 9 INJECTION, SOLUTION INTRAVENOUS at 03:02

## 2023-02-13 RX ADMIN — DIPHENHYDRAMINE HYDROCHLORIDE 12.5 MG: 50 INJECTION, SOLUTION INTRAMUSCULAR; INTRAVENOUS at 08:02

## 2023-02-13 RX ADMIN — DEFERASIROX 1000 MG: 500 TABLET, FOR SUSPENSION ORAL at 06:02

## 2023-02-13 RX ADMIN — HYDROMORPHONE HYDROCHLORIDE 1.5 MG: 1 INJECTION, SOLUTION INTRAMUSCULAR; INTRAVENOUS; SUBCUTANEOUS at 12:02

## 2023-02-13 RX ADMIN — GABAPENTIN 600 MG: 300 CAPSULE ORAL at 04:02

## 2023-02-13 RX ADMIN — DIPHENHYDRAMINE HYDROCHLORIDE 12.5 MG: 50 INJECTION, SOLUTION INTRAMUSCULAR; INTRAVENOUS at 01:02

## 2023-02-13 RX ADMIN — HYDROXYUREA 1000 MG: 500 CAPSULE ORAL at 08:02

## 2023-02-13 RX ADMIN — HYDROMORPHONE HYDROCHLORIDE 2 MG: 2 INJECTION, SOLUTION INTRAMUSCULAR; INTRAVENOUS; SUBCUTANEOUS at 04:02

## 2023-02-13 RX ADMIN — CEFTRIAXONE 1 G: 1 INJECTION, POWDER, FOR SOLUTION INTRAMUSCULAR; INTRAVENOUS at 08:02

## 2023-02-13 RX ADMIN — FOLIC ACID 1 MG: 1 TABLET ORAL at 08:02

## 2023-02-13 RX ADMIN — ENOXAPARIN SODIUM 40 MG: 40 INJECTION SUBCUTANEOUS at 04:02

## 2023-02-13 RX ADMIN — DIPHENHYDRAMINE HYDROCHLORIDE 12.5 MG: 50 INJECTION, SOLUTION INTRAMUSCULAR; INTRAVENOUS at 03:02

## 2023-02-13 RX ADMIN — SODIUM CHLORIDE: 9 INJECTION, SOLUTION INTRAVENOUS at 07:02

## 2023-02-13 RX ADMIN — MORPHINE SULFATE 30 MG: 15 TABLET, EXTENDED RELEASE ORAL at 01:02

## 2023-02-13 RX ADMIN — HYDROMORPHONE HYDROCHLORIDE 1.5 MG: 1 INJECTION, SOLUTION INTRAMUSCULAR; INTRAVENOUS; SUBCUTANEOUS at 03:02

## 2023-02-13 RX ADMIN — FAMOTIDINE 20 MG: 20 TABLET, FILM COATED ORAL at 04:02

## 2023-02-13 RX ADMIN — HYDROMORPHONE HYDROCHLORIDE 2 MG: 2 INJECTION, SOLUTION INTRAMUSCULAR; INTRAVENOUS; SUBCUTANEOUS at 07:02

## 2023-02-13 NOTE — PLAN OF CARE
Per NICK's pt is not cleared for discharge today - still working on pain control . CM following.    02/13/23 1211   Discharge Assessment   Assessment Type Discharge Planning Reassessment

## 2023-02-13 NOTE — PROGRESS NOTES
"[facility]  Hematology/Oncology  Progress Note    Patient Name: Nishi Dumont  Admission Date: 2/9/2023  Hospital Length of Stay: 3 days  Code Status: Full Code     Subjective:     Interval History: still in a lot of oain and discofort  Ms. Dumont is a 32 year old female with a history of sickle cell anemia on Adekveo infusion and red cell exchange apheresis , ACS d/t Hb-SS, AVN hip s/p left hip replacement, AVN L humeral head, and DVT who presents today with complaints of pain in shoulders and back. It is severe. It is associated with urinary frequency, urgency, and weakness. She denies fever, chills, N/V/D, chest pain, SOB, cough, dizziness, or LOC. Since 11/2022, she's been receiving Adekveo infusion and red cell exchange apheresis with last one 1/31 per Dr. Murillo at [facility]. She states since she started this, she's been feeling more "run down" "like my body is shutting down on me". She reports med compliance at home. She was previously on eliquis for a DVT on her leg, but this was d/c'ed per hematology 3 weeks ago. She reports nose bleeds on this. EKG NSR with sinus arrhythmia with on acute ischemic changes. WBC 21K, Tbili 1.7, retic 6.9, Troponin WNL, lactate WNL, urine nitrite + pyuria. Hospital medicine is consulted for admission for further work up and treatment.         Overview/Hospital Course:  Patient was admitted to the hospital for sickle cell crisis.  Her pain was uncontrolled the 1st day, so her hydromorphone was increased from 1-1.5 mg.  She was given IV fluids and oxygen supplementation.       Medications:  Continuous Infusions:   sodium chloride 0.9% 125 mL/hr at 02/12/23 1938     Scheduled Meds:   cefTRIAXone (ROCEPHIN) IVPB  1 g Intravenous Q24H    deferasirox  1,000 mg Oral Before breakfast    enoxaparin  40 mg Subcutaneous Daily    famotidine  20 mg Oral Daily    folic acid  1 mg Oral Daily    gabapentin  600 mg Oral Daily    glutamine (sickle cell)  10 g Oral BID    " hydroxyurea  1,000 mg Oral BID    morphine  30 mg Oral Q8H    senna-docusate 8.6-50 mg  1 tablet Oral BID    ursodioL  300 mg Oral TID WM     PRN Meds:sodium chloride, acetaminophen, aluminum-magnesium hydroxide-simethicone, dextrose 10%, dextrose 10%, diphenhydrAMINE, glucagon (human recombinant), glucose, glucose, HYDROmorphone, influenza, magnesium oxide, magnesium oxide, melatonin, naloxone, ondansetron, oxyCODONE-acetaminophen, polyethylene glycol, potassium bicarbonate, potassium bicarbonate, potassium bicarbonate, sodium chloride 0.9%     Review of Systems    Constitutional:  Positive for fatigue. Negative for chills and fever.   Respiratory:  Negative for cough and shortness of breath.    Gastrointestinal:  Negative for nausea and vomiting.   Musculoskeletal:  Positive for arthralgias and back pain.   Objective:     Vital Signs (Most Recent):  Temp: 98.6 °F (37 °C) (02/12/23 1922)  Pulse: 89 (02/12/23 1922)  Resp: 18 (02/12/23 1922)  BP: 115/71 (02/12/23 1922)  SpO2: 98 % (02/12/23 1922) Vital Signs (24h Range):  Temp:  [97.7 °F (36.5 °C)-98.6 °F (37 °C)] 98.6 °F (37 °C)  Pulse:  [84-99] 89  Resp:  [16-18] 18  SpO2:  [96 %-100 %] 98 %  BP: ()/(51-89) 115/71     Weight: 53.8 kg (118 lb 9.7 oz)  Body mass index is 23.96 kg/m².  Body surface area is 1.5 meters squared.      Intake/Output Summary (Last 24 hours) at 2/12/2023 2038  Last data filed at 2/12/2023 0254  Gross per 24 hour   Intake 360 ml   Output --   Net 360 ml       Physical Exam  VITAL SIGNS:  as above   GENERAL: appears well-built, well-nourished.  No anxiety, no agitation, and in no distress.  Patient is awake, alert, oriented and cooperative.  HEENT:  Showed no congestion. Trachea is central no obvious icterus or pallor noted no hoarseness. no obvious JVD   NECK:  Supple.  No JVD. No obvious cervical submental or supraclavicular adenopathy.  RS:the visualized portion of  Chest expands well. chest appears symmetric, no audible wheezes.  No  dyspnea recognized  ABDOMEN:  abdomen appears undistended.  EXTREMITIES:  Without edema.  NEUROLOGICAL:  The patient is appropriate, higher functions are normal.  No  obvious neurological deficits.  normal judgement normal thought content  No confusion, no speech impediment. Cranial nerves are intact and show no deficit. No gross motor deficits noted   SKIN MUSCULOSKELETAL: no joint or skeletal deformity, no clubbing of nails.  No visible rash ecchymosis or petechiae   Lab Results   Component Value Date    WBC 14.20 (H) 02/12/2023    HGB 7.3 (L) 02/12/2023    HCT 21.9 (L) 02/12/2023    MCV 91 02/12/2023     (H) 02/12/2023      CMP  Sodium   Date Value Ref Range Status   02/12/2023 143 136 - 145 mmol/L Final     Potassium   Date Value Ref Range Status   02/12/2023 3.9 3.5 - 5.1 mmol/L Final     Chloride   Date Value Ref Range Status   02/12/2023 112 (H) 95 - 110 mmol/L Final     CO2   Date Value Ref Range Status   02/12/2023 23 23 - 29 mmol/L Final     Glucose   Date Value Ref Range Status   02/12/2023 87 70 - 110 mg/dL Final     BUN   Date Value Ref Range Status   02/12/2023 6 6 - 20 mg/dL Final     Creatinine   Date Value Ref Range Status   02/12/2023 0.7 0.5 - 1.4 mg/dL Final     Calcium   Date Value Ref Range Status   02/12/2023 8.3 (L) 8.7 - 10.5 mg/dL Final     Total Protein   Date Value Ref Range Status   02/12/2023 5.9 (L) 6.0 - 8.4 g/dL Final     Albumin   Date Value Ref Range Status   02/12/2023 3.4 (L) 3.5 - 5.2 g/dL Final     Total Bilirubin   Date Value Ref Range Status   02/12/2023 1.0 0.1 - 1.0 mg/dL Final     Comment:     For infants and newborns, interpretation of results should be based  on gestational age, weight and in agreement with clinical  observations.    Premature Infant recommended reference ranges:  Up to 24 hours.............<8.0 mg/dL  Up to 48 hours............<12.0 mg/dL  3-5 days..................<15.0 mg/dL  6-29 days.................<15.0 mg/dL       Alkaline Phosphatase    Date Value Ref Range Status   02/12/2023 72 55 - 135 U/L Final     AST   Date Value Ref Range Status   02/12/2023 18 10 - 40 U/L Final     ALT   Date Value Ref Range Status   02/12/2023 16 10 - 44 U/L Final     Anion Gap   Date Value Ref Range Status   02/12/2023 8 8 - 16 mmol/L Final     eGFR   Date Value Ref Range Status   02/12/2023 >60 >60 mL/min/1.73 m^2 Final          Assessment/Plan:     Active Diagnoses:    Diagnosis Date Noted POA    PRINCIPAL PROBLEM:  Sickle cell pain crisis [D57.00] 09/29/2017 Yes    Chronic prescription opiate use [Z79.891] 02/06/2022 Not Applicable    Iron overload [E83.19] 08/24/2016 Yes    Thrombocytosis [D75.839] 12/24/2015 Yes    Hyperbilirubinemia [E80.6] 04/02/2014 Yes    UTI (urinary tract infection) [N39.0] 12/05/2013 Yes    Sickle cell anemia [D57.1] 09/03/2013 Yes      Problems Resolved During this Admission:         Aggressive hydration  needsAdequate pain mx currently on morphine/ IV dilauudid  Continue  home meds   Pt to discuss with her treating MD s/e of her meds  UTI: currenlty managed with antibiotics   Thrombocytosis: expected, please trend   Pt will folow at [facility] at discharge   Transfuse 1 unit prbc today due to continued pain      Mildred Williamson MD  Hematology/Oncology  [facility]

## 2023-02-13 NOTE — ASSESSMENT & PLAN NOTE
Patient's anemia is currently uncontrolled. Has recieved 1 units of PRBCs on 02/12/2023. Etiology likely d/t sickle cell disease  Current CBC reviewed-   Lab Results   Component Value Date    HGB 8.5 (L) 02/13/2023    HCT 26.2 (L) 02/13/2023     Monitor serial CBC and transfuse if patient becomes hemodynamically unstable, symptomatic or H/H drops below 7/21.   She will receive 1 units rbc on 2/12 due to uncontrolled crisis symptoms.

## 2023-02-13 NOTE — PLAN OF CARE
Problem: Adult Inpatient Plan of Care  Goal: Plan of Care Review  Outcome: Ongoing, Progressing  Goal: Readiness for Transition of Care  Outcome: Ongoing, Progressing     Problem: Fall Injury Risk  Goal: Absence of Fall and Fall-Related Injury  Outcome: Ongoing, Progressing     Problem: Pain Chronic (Persistent)  Goal: Acceptable Pain Control and Functional Ability  Outcome: Ongoing, Progressing     Problem: UTI (Urinary Tract Infection)  Goal: Improved Infection Symptoms  Outcome: Ongoing, Progressing     Problem: Infection  Goal: Absence of Infection Signs and Symptoms  Outcome: Ongoing, Progressing

## 2023-02-13 NOTE — PLAN OF CARE
Problem: Adult Inpatient Plan of Care  Goal: Plan of Care Review  Outcome: Ongoing, Progressing  Goal: Patient-Specific Goal (Individualized)  Outcome: Ongoing, Progressing  Goal: Absence of Hospital-Acquired Illness or Injury  Outcome: Ongoing, Progressing  Goal: Optimal Comfort and Wellbeing  Outcome: Ongoing, Progressing  Goal: Readiness for Transition of Care  Outcome: Ongoing, Progressing     Problem: Fall Injury Risk  Goal: Absence of Fall and Fall-Related Injury  Outcome: Ongoing, Progressing     Problem: Pain Chronic (Persistent)  Goal: Acceptable Pain Control and Functional Ability  Outcome: Ongoing, Progressing     Problem: UTI (Urinary Tract Infection)  Goal: Improved Infection Symptoms  Outcome: Ongoing, Progressing     Problem: Infection  Goal: Absence of Infection Signs and Symptoms  Outcome: Ongoing, Progressing

## 2023-02-13 NOTE — SUBJECTIVE & OBJECTIVE
Interval History:  Pt seen and examined.  States she had a miserable night.  Despite pain medication, could not get any rest.  Pain continues in her typical shoulder, back areas.  Long discussion with Pt regarding persistent symptoms despite aggressive fluid resuscitation and adequate pain regimen.  Will transfuse 1 unit packed red blood cells today.    Review of Systems   Constitutional:  Positive for fatigue. Negative for chills and fever.   Respiratory:  Negative for cough and shortness of breath.    Cardiovascular:  Negative for chest pain and palpitations.   Gastrointestinal:  Negative for abdominal pain, nausea and vomiting.   Musculoskeletal:  Positive for arthralgias and back pain.   Skin:  Positive for color change (right shoulder bruising).   Objective:     Vital Signs (Most Recent):  Temp: 98.3 °F (36.8 °C) (02/13/23 0734)  Pulse: 88 (02/13/23 0734)  Resp: 18 (02/13/23 0823)  BP: 107/70 (02/13/23 0734)  SpO2: 95 % (02/13/23 0734) Vital Signs (24h Range):  Temp:  [97.9 °F (36.6 °C)-98.7 °F (37.1 °C)] 98.3 °F (36.8 °C)  Pulse:  [] 88  Resp:  [16-18] 18  SpO2:  [94 %-100 %] 95 %  BP: (104-118)/(56-89) 107/70     Weight: 53.8 kg (118 lb 9.7 oz)  Body mass index is 23.96 kg/m².    Intake/Output Summary (Last 24 hours) at 2/13/2023 1025  Last data filed at 2/13/2023 0127  Gross per 24 hour   Intake 960 ml   Output --   Net 960 ml        Physical Exam  Vitals and nursing note reviewed.   Constitutional:       General: She is not in acute distress.     Appearance: Normal appearance. She is not toxic-appearing or diaphoretic.   Cardiovascular:      Rate and Rhythm: Regular rhythm. Tachycardia present.      Pulses: Normal pulses.      Heart sounds: Normal heart sounds. No murmur heard.    No friction rub. No gallop.   Pulmonary:      Effort: Pulmonary effort is normal. No respiratory distress.      Breath sounds: Normal breath sounds.   Abdominal:      General: Abdomen is flat. Bowel sounds are normal.       Palpations: Abdomen is soft.   Musculoskeletal:         General: Tenderness (Right shoulder tenderness, reduced ROM 2/2 to pain) present.   Skin:     General: Skin is warm and dry.      Findings: Bruising (Right shoulder) present.   Neurological:      General: No focal deficit present.      Mental Status: She is alert and oriented to person, place, and time. Mental status is at baseline.       Significant Labs: All pertinent labs within the past 24 hours have been reviewed.  BMP:   Recent Labs   Lab 02/13/23  0452   GLU 74      K 3.9      CO2 20*   BUN 5*   CREATININE 0.7   CALCIUM 8.7   MG 1.6       CBC:   Recent Labs   Lab 02/12/23  0528 02/13/23  0452   WBC 14.20* 13.29*   HGB 7.3* 8.5*   HCT 21.9* 26.2*   * 261         Significant Imaging: I have reviewed all pertinent imaging results/findings within the past 24 hours.

## 2023-02-13 NOTE — ASSESSMENT & PLAN NOTE
-pain persistent despite adequate regimen and aggressive fluid resuscitation: s/p transfusion 1 unit 2/12.  -continue aggressive IV fluid resuscitation   -continue IV pain medication, add an oral pain medication p.r.n. to see if we get better control   -continue her scheduled morphine orally  -p.r.n.  Narcan to chart   - worsening pain to right shoulder with bruising noted - no trauma - will obtain xray and ultrasound to r/o DVT

## 2023-02-13 NOTE — PROGRESS NOTES
"[facility] Medicine  Progress Note    Patient Name: Nishi Dumont  MRN: [id number]  Patient Class: IP- Inpatient   Admission Date: 2/9/2023  Length of Stay: 4 days  Attending Physician: Dawson Forman MD  Primary Care Provider: [facility]        Subjective:     Principal Problem:Sickle cell pain crisis        HPI:  Ms. Dumont is a 32 year old female with a history of sickle cell anemia on Adekveo infusion and red cell exchange apheresis , ACS d/t Hb-SS, AVN hip s/p left hip replacement, AVN L humeral head, and DVT who presents today with complaints of pain in shoulders and back. It is severe. It is associated with urinary frequency, urgency, and weakness. She denies fever, chills, N/V/D, chest pain, SOB, cough, dizziness, or LOC. Since 11/2022, she's been receiving Adekveo infusion and red cell exchange apheresis with last one 1/31 per Dr. Murillo at [facility]. She states since she started this, she's been feeling more "run down" "like my body is shutting down on me". She reports med compliance at home. She was previously on eliquis for a DVT on her leg, but this was d/c'ed per hematology 3 weeks ago. She reports nose bleeds on this. EKG NSR with sinus arrhythmia with on acute ischemic changes. WBC 21K, Tbili 1.7, retic 6.9, Troponin WNL, lactate WNL, urine nitrite + pyuria. Hospital medicine is consulted for admission for further work up and treatment.       Overview/Hospital Course:  Patient was admitted to the hospital for sickle cell crisis.  Her pain was uncontrolled the 1st day, so her hydromorphone was increased from 1-1.5 mg.  She was given IV fluids and oxygen supplementation.  She continued to have issues with pain control and oral narcotics were added to supplement her regimen.  Despite aggressive fluid resuscitation and narcotic pain regimen, Pt continued with uncontrolled pain.  Case was discussed with Hematology and Pt was transfused 1 " units of packed red blood cells on 02/12.  She was maintained on IV Abx for her UTI.      Interval History:  Pt seen and examined.  Still having considerable amount of pain despite receiving blood transfusion.  Reports her pain today is 8/10 scale to her shoulders and her back. Overnight she has developed bruising on her right shoulder and is having difficulty with range of motion and use due to pain.  She denies trauma to her shoulder, numbness, tingling, weakness, and neck pain.  Discussed pain control.     Review of Systems   Constitutional:  Positive for fatigue. Negative for chills and fever.   Respiratory:  Negative for cough and shortness of breath.    Cardiovascular:  Negative for chest pain and palpitations.   Gastrointestinal:  Negative for abdominal pain, nausea and vomiting.   Musculoskeletal:  Positive for arthralgias and back pain.   Skin:  Positive for color change (right shoulder bruising).   Objective:     Vital Signs (Most Recent):  Temp: 98.3 °F (36.8 °C) (02/13/23 0734)  Pulse: 88 (02/13/23 0734)  Resp: 18 (02/13/23 0823)  BP: 107/70 (02/13/23 0734)  SpO2: 95 % (02/13/23 0734) Vital Signs (24h Range):  Temp:  [97.9 °F (36.6 °C)-98.7 °F (37.1 °C)] 98.3 °F (36.8 °C)  Pulse:  [] 88  Resp:  [16-18] 18  SpO2:  [94 %-100 %] 95 %  BP: (104-118)/(56-89) 107/70     Weight: 53.8 kg (118 lb 9.7 oz)  Body mass index is 23.96 kg/m².    Intake/Output Summary (Last 24 hours) at 2/13/2023 1025  Last data filed at 2/13/2023 0127  Gross per 24 hour   Intake 960 ml   Output --   Net 960 ml        Physical Exam  Vitals and nursing note reviewed.   Constitutional:       General: She is not in acute distress.     Appearance: Normal appearance. She is not toxic-appearing or diaphoretic.   Cardiovascular:      Rate and Rhythm: Regular rhythm. Tachycardia present.      Pulses: Normal pulses.      Heart sounds: Normal heart sounds. No murmur heard.    No friction rub. No gallop.   Pulmonary:      Effort: Pulmonary  effort is normal. No respiratory distress.      Breath sounds: Normal breath sounds.   Abdominal:      General: Abdomen is flat. Bowel sounds are normal.      Palpations: Abdomen is soft.   Musculoskeletal:         General: Tenderness (Right shoulder tenderness, reduced ROM 2/2 to pain) present.   Skin:     General: Skin is warm and dry.      Findings: Bruising (Right shoulder) present.   Neurological:      General: No focal deficit present.      Mental Status: She is alert and oriented to person, place, and time. Mental status is at baseline.       Significant Labs: All pertinent labs within the past 24 hours have been reviewed.  BMP:   Recent Labs   Lab 02/13/23  0452   GLU 74      K 3.9      CO2 20*   BUN 5*   CREATININE 0.7   CALCIUM 8.7   MG 1.6       CBC:   Recent Labs   Lab 02/12/23  0528 02/13/23  0452   WBC 14.20* 13.29*   HGB 7.3* 8.5*   HCT 21.9* 26.2*   * 261         Significant Imaging: I have reviewed all pertinent imaging results/findings within the past 24 hours.      Assessment/Plan:      * Sickle cell pain crisis  -pain persistent despite adequate regimen and aggressive fluid resuscitation: s/p transfusion 1 unit 2/12.  -continue aggressive IV fluid resuscitation   -continue IV pain medication, add an oral pain medication p.r.n. to see if we get better control   -continue her scheduled morphine orally  -p.r.n.  Narcan to chart   - worsening pain to right shoulder with bruising noted - no trauma - will obtain xray and ultrasound to r/o DVT    Thrombocytosis  Continue to monitor CBC.  Last platelet count reviewed.    Lab Results   Component Value Date     02/11/2023           Iron overload  Continue deferasirox         Chronic prescription opiate use  Noted, patient will require higher doses of pain medications secondary to history of opiate tolerance.      Hyperbilirubinemia  CMP reviewed.  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 This is  improving.  Continue to monitor CBC and CMP closely.    Lab Results   Component Value Date    ALT 19 02/11/2023    AST 20 02/11/2023    ALKPHOS 79 02/11/2023    BILITOT 1.1 (H) 02/11/2023           UTI (urinary tract infection)  This patient does have evidence of infective focus  Vital signs were reviewed and noted in progress note.  Antibiotics given-   Antibiotics (From admission, onward)    Start     Stop Route Frequency Ordered    02/10/23 2130  cefTRIAXone (ROCEPHIN) 1 g in dextrose 5 % in water (D5W) 5 % 50 mL IVPB (MB+)         -- IV Every 24 hours (non-standard times) 02/09/23 2205        Cultures were taken-   Microbiology Results (last 7 days)     Procedure Component Value Units Date/Time    Urine culture [[id number]]  (Abnormal)  (Susceptibility) Collected: 02/09/23 2011    Order Status: Completed Specimen: Urine Updated: 02/13/23 0819     Urine Culture, Routine ESCHERICHIA COLI  >100,000 cfu/ml      Narrative:      Specimen Source->Urine    Blood culture [[id number]] Collected: 02/09/23 2039    Order Status: Completed Specimen: Blood Updated: 02/13/23 0612     Blood Culture, Routine No Growth to date      No Growth to date      No Growth to date      No Growth to date    Blood culture [[id number]] Collected: 02/09/23 2039    Order Status: Completed Specimen: Blood Updated: 02/13/23 0612     Blood Culture, Routine No Growth to date      No Growth to date      No Growth to date      No Growth to date        Latest lactate reviewed, they are-  Recent Labs   Lab 02/10/23  1358 02/10/23  1841 02/10/23  2226   LACTATE 1.2 1.2 0.9     Lactic acid appears stable for now.  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    Growing E. Coli: continue the rocephin transition to oral macrobid upon d/c.     Sickle cell anemia  Patient's anemia is currently uncontrolled. Has recieved 1 units of PRBCs on 02/12/2023. Etiology likely d/t sickle cell disease  Current CBC  reviewed-   Lab Results   Component Value Date    HGB 8.5 (L) 02/13/2023    HCT 26.2 (L) 02/13/2023     Monitor serial CBC and transfuse if patient becomes hemodynamically unstable, symptomatic or H/H drops below 7/21.   She will receive 1 units rbc on 2/12 due to uncontrolled crisis symptoms.        VTE Risk Mitigation (From admission, onward)         Ordered     enoxaparin injection 40 mg  Daily         02/09/23 2240     IP VTE LOW RISK PATIENT  Once         02/09/23 2205     Place sequential compression device  Until discontinued         02/09/23 2205                Discharge Planning   SHANE: 2/13/2023     Code Status: Full Code   Is the patient medically ready for discharge?:     Reason for patient still in hospital (select all that apply): Patient new problem, Patient trending condition, Treatment, Imaging and Consult recommendations  Discharge Plan A: Home                  Geetha Bermudez NP  Department of Hospital Medicine   [facility]

## 2023-02-13 NOTE — ASSESSMENT & PLAN NOTE
This patient does have evidence of infective focus  Vital signs were reviewed and noted in progress note.  Antibiotics given-   Antibiotics (From admission, onward)    Start     Stop Route Frequency Ordered    02/10/23 2130  cefTRIAXone (ROCEPHIN) 1 g in dextrose 5 % in water (D5W) 5 % 50 mL IVPB (MB+)         -- IV Every 24 hours (non-standard times) 02/09/23 2205        Cultures were taken-   Microbiology Results (last 7 days)     Procedure Component Value Units Date/Time    Urine culture [[id number]]  (Abnormal)  (Susceptibility) Collected: 02/09/23 2011    Order Status: Completed Specimen: Urine Updated: 02/13/23 0819     Urine Culture, Routine ESCHERICHIA COLI  >100,000 cfu/ml      Narrative:      Specimen Source->Urine    Blood culture [[id number]] Collected: 02/09/23 2039    Order Status: Completed Specimen: Blood Updated: 02/13/23 0612     Blood Culture, Routine No Growth to date      No Growth to date      No Growth to date      No Growth to date    Blood culture [[id number]] Collected: 02/09/23 2039    Order Status: Completed Specimen: Blood Updated: 02/13/23 0612     Blood Culture, Routine No Growth to date      No Growth to date      No Growth to date      No Growth to date        Latest lactate reviewed, they are-  Recent Labs   Lab 02/10/23  1358 02/10/23  1841 02/10/23  2226   LACTATE 1.2 1.2 0.9     Lactic acid appears stable for now.  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    Growing E. Coli: continue the rocephin transition to oral macrobid upon d/c.

## 2023-02-13 NOTE — PROGRESS NOTES
"[facility]  Hematology/Oncology  Progress Note    Patient Name: Nishi Dumont  Admission Date: 2/9/2023  Hospital Length of Stay: 4 days  Code Status: Full Code     Subjective:     Interval History: still in a lot of oain and discofort  Ms. Dumont is a 32 year old female with a history of sickle cell anemia on Adekveo infusion and red cell exchange apheresis , ACS d/t Hb-SS, AVN hip s/p left hip replacement, AVN L humeral head, and DVT who presents today with complaints of pain in shoulders and back. It is severe. It is associated with urinary frequency, urgency, and weakness. She denies fever, chills, N/V/D, chest pain, SOB, cough, dizziness, or LOC. Since 11/2022, she's been receiving Adekveo infusion and red cell exchange apheresis with last one 1/31 per Dr. Murillo at [facility]. She states since she started this, she's been feeling more "run down" "like my body is shutting down on me". She reports med compliance at home. She was previously on eliquis for a DVT on her leg, but this was d/c'ed per hematology 3 weeks ago. She reports nose bleeds on this. EKG NSR with sinus arrhythmia with on acute ischemic changes. WBC 21K, Tbili 1.7, retic 6.9, Troponin WNL, lactate WNL, urine nitrite + pyuria. Hospital medicine is consulted for admission for further work up and treatment.         Overview/Hospital Course:  Patient was admitted to the hospital for sickle cell crisis.  Her pain was uncontrolled the 1st day, so her hydromorphone was increased from 1-1.5 mg.  She was given IV fluids and oxygen supplementation.       Medications:  Continuous Infusions:   sodium chloride 0.9% 125 mL/hr at 02/13/23 0346     Scheduled Meds:   cefTRIAXone (ROCEPHIN) IVPB  1 g Intravenous Q24H    deferasirox  1,000 mg Oral Before breakfast    enoxaparin  40 mg Subcutaneous Daily    famotidine  20 mg Oral Daily    folic acid  1 mg Oral Daily    gabapentin  600 mg Oral Daily    hydroxyurea  1,000 mg Oral BID    morphine  30 " mg Oral Q8H    senna-docusate 8.6-50 mg  1 tablet Oral BID    ursodioL  300 mg Oral TID WM     PRN Meds:sodium chloride, acetaminophen, aluminum-magnesium hydroxide-simethicone, dextrose 10%, dextrose 10%, diphenhydrAMINE, glucagon (human recombinant), glucose, glucose, HYDROmorphone, influenza, magnesium oxide, magnesium oxide, melatonin, naloxone, ondansetron, oxyCODONE-acetaminophen, polyethylene glycol, potassium bicarbonate, potassium bicarbonate, potassium bicarbonate, sodium chloride 0.9%       Objective:     Vital Signs (Most Recent):  Temp: 98.3 °F (36.8 °C) (02/13/23 0734)  Pulse: 88 (02/13/23 0734)  Resp: 18 (02/13/23 0823)  BP: 107/70 (02/13/23 0734)  SpO2: 95 % (02/13/23 0734) Vital Signs (24h Range):  Temp:  [97.9 °F (36.6 °C)-98.7 °F (37.1 °C)] 98.3 °F (36.8 °C)  Pulse:  [] 88  Resp:  [16-18] 18  SpO2:  [94 %-100 %] 95 %  BP: (104-118)/(56-89) 107/70     Weight: 53.8 kg (118 lb 9.7 oz)  Body mass index is 23.96 kg/m².  Body surface area is 1.5 meters squared.      Intake/Output Summary (Last 24 hours) at 2/13/2023 0848  Last data filed at 2/13/2023 0127  Gross per 24 hour   Intake 960 ml   Output --   Net 960 ml         Physical Exam  VITAL SIGNS:  as above   GENERAL: appears well-built, well-nourished.  No anxiety, no agitation, and in no distress.  Patient is awake, alert, oriented and cooperative.  HEENT:  Showed no congestion. Trachea is central no obvious icterus or pallor noted no hoarseness. no obvious JVD   NECK:  Supple.  No JVD. No obvious cervical submental or supraclavicular adenopathy.  RS:the visualized portion of  Chest expands well. chest appears symmetric, no audible wheezes.  No dyspnea recognized  ABDOMEN:  abdomen appears undistended.  EXTREMITIES:  Without edema.  NEUROLOGICAL:  The patient is appropriate, higher functions are normal.  No  obvious neurological deficits.  normal judgement normal thought content  No confusion, no speech impediment. Cranial nerves are intact  and show no deficit. No gross motor deficits noted   SKIN MUSCULOSKELETAL: no joint or skeletal deformity, no clubbing of nails.  No visible rash ecchymosis or petechiae   Lab Results   Component Value Date    WBC 13.29 (H) 02/13/2023    HGB 8.5 (L) 02/13/2023    HCT 26.2 (L) 02/13/2023    MCV 90 02/13/2023     02/13/2023      CMP  Sodium   Date Value Ref Range Status   02/12/2023 143 136 - 145 mmol/L Final     Potassium   Date Value Ref Range Status   02/12/2023 3.9 3.5 - 5.1 mmol/L Final     Chloride   Date Value Ref Range Status   02/12/2023 112 (H) 95 - 110 mmol/L Final     CO2   Date Value Ref Range Status   02/12/2023 23 23 - 29 mmol/L Final     Glucose   Date Value Ref Range Status   02/12/2023 87 70 - 110 mg/dL Final     BUN   Date Value Ref Range Status   02/12/2023 6 6 - 20 mg/dL Final     Creatinine   Date Value Ref Range Status   02/12/2023 0.7 0.5 - 1.4 mg/dL Final     Calcium   Date Value Ref Range Status   02/12/2023 8.3 (L) 8.7 - 10.5 mg/dL Final     Total Protein   Date Value Ref Range Status   02/12/2023 5.9 (L) 6.0 - 8.4 g/dL Final     Albumin   Date Value Ref Range Status   02/12/2023 3.4 (L) 3.5 - 5.2 g/dL Final     Total Bilirubin   Date Value Ref Range Status   02/12/2023 1.0 0.1 - 1.0 mg/dL Final     Comment:     For infants and newborns, interpretation of results should be based  on gestational age, weight and in agreement with clinical  observations.    Premature Infant recommended reference ranges:  Up to 24 hours.............<8.0 mg/dL  Up to 48 hours............<12.0 mg/dL  3-5 days..................<15.0 mg/dL  6-29 days.................<15.0 mg/dL       Alkaline Phosphatase   Date Value Ref Range Status   02/12/2023 72 55 - 135 U/L Final     AST   Date Value Ref Range Status   02/12/2023 18 10 - 40 U/L Final     ALT   Date Value Ref Range Status   02/12/2023 16 10 - 44 U/L Final     Anion Gap   Date Value Ref Range Status   02/12/2023 8 8 - 16 mmol/L Final     eGFR   Date Value  Ref Range Status   02/12/2023 >60 >60 mL/min/1.73 m^2 Final          Assessment/Plan:     Sickle cell pain - once pain has been controlled may plan for d/c     Transfused 1 unit PRBC 2/12/23 - good respond     Continue oral morphine     UTI: currenlty managed with antibiotics    Thrombocytosis resolved    Pt will folow at [facility] at discharge      Abraham Ambriz MD  Hematology/Oncology  [facility]

## 2023-02-14 LAB
ANION GAP SERPL CALC-SCNC: 8 MMOL/L (ref 8–16)
BASOPHILS # BLD AUTO: 0.14 K/UL (ref 0–0.2)
BASOPHILS NFR BLD: 1 % (ref 0–1.9)
BUN SERPL-MCNC: 7 MG/DL (ref 6–20)
CALCIUM SERPL-MCNC: 8.7 MG/DL (ref 8.7–10.5)
CHLORIDE SERPL-SCNC: 107 MMOL/L (ref 95–110)
CO2 SERPL-SCNC: 25 MMOL/L (ref 23–29)
CREAT SERPL-MCNC: 0.8 MG/DL (ref 0.5–1.4)
DIFFERENTIAL METHOD: ABNORMAL
EOSINOPHIL # BLD AUTO: 1.2 K/UL (ref 0–0.5)
EOSINOPHIL NFR BLD: 8.2 % (ref 0–8)
ERYTHROCYTE [DISTWIDTH] IN BLOOD BY AUTOMATED COUNT: 16.3 % (ref 11.5–14.5)
EST. GFR  (NO RACE VARIABLE): >60 ML/MIN/1.73 M^2
GLUCOSE SERPL-MCNC: 86 MG/DL (ref 70–110)
HCT VFR BLD AUTO: 25.4 % (ref 37–48.5)
HGB BLD-MCNC: 8.5 G/DL (ref 12–16)
IMM GRANULOCYTES # BLD AUTO: 0.07 K/UL (ref 0–0.04)
IMM GRANULOCYTES NFR BLD AUTO: 0.5 % (ref 0–0.5)
LYMPHOCYTES # BLD AUTO: 2.2 K/UL (ref 1–4.8)
LYMPHOCYTES NFR BLD: 15.1 % (ref 18–48)
MCH RBC QN AUTO: 30 PG (ref 27–31)
MCHC RBC AUTO-ENTMCNC: 33.5 G/DL (ref 32–36)
MCV RBC AUTO: 90 FL (ref 82–98)
MONOCYTES # BLD AUTO: 1.2 K/UL (ref 0.3–1)
MONOCYTES NFR BLD: 8.2 % (ref 4–15)
NEUTROPHILS # BLD AUTO: 9.7 K/UL (ref 1.8–7.7)
NEUTROPHILS NFR BLD: 67 % (ref 38–73)
NRBC BLD-RTO: 1 /100 WBC
PLATELET # BLD AUTO: 403 K/UL (ref 150–450)
PMV BLD AUTO: 10.3 FL (ref 9.2–12.9)
POTASSIUM SERPL-SCNC: 4 MMOL/L (ref 3.5–5.1)
RBC # BLD AUTO: 2.83 M/UL (ref 4–5.4)
SODIUM SERPL-SCNC: 140 MMOL/L (ref 136–145)
WBC # BLD AUTO: 14.47 K/UL (ref 3.9–12.7)

## 2023-02-14 PROCEDURE — 25000003 PHARM REV CODE 250: Performed by: NURSE PRACTITIONER

## 2023-02-14 PROCEDURE — 25000003 PHARM REV CODE 250: Performed by: INTERNAL MEDICINE

## 2023-02-14 PROCEDURE — 63600175 PHARM REV CODE 636 W HCPCS: Performed by: NURSE PRACTITIONER

## 2023-02-14 PROCEDURE — 36415 COLL VENOUS BLD VENIPUNCTURE: CPT | Performed by: NURSE PRACTITIONER

## 2023-02-14 PROCEDURE — 12000002 HC ACUTE/MED SURGE SEMI-PRIVATE ROOM

## 2023-02-14 PROCEDURE — 85025 COMPLETE CBC W/AUTO DIFF WBC: CPT | Performed by: NURSE PRACTITIONER

## 2023-02-14 PROCEDURE — 80048 BASIC METABOLIC PNL TOTAL CA: CPT | Performed by: NURSE PRACTITIONER

## 2023-02-14 PROCEDURE — 25000003 PHARM REV CODE 250: Performed by: HOSPITALIST

## 2023-02-14 RX ORDER — FAMOTIDINE 20 MG/1
20 TABLET, FILM COATED ORAL 2 TIMES DAILY
Status: DISCONTINUED | OUTPATIENT
Start: 2023-02-14 | End: 2023-02-15 | Stop reason: HOSPADM

## 2023-02-14 RX ORDER — HYDROMORPHONE HYDROCHLORIDE 2 MG/ML
2 INJECTION, SOLUTION INTRAMUSCULAR; INTRAVENOUS; SUBCUTANEOUS
Status: DISCONTINUED | OUTPATIENT
Start: 2023-02-14 | End: 2023-02-15

## 2023-02-14 RX ADMIN — HYDROXYUREA 1000 MG: 500 CAPSULE ORAL at 08:02

## 2023-02-14 RX ADMIN — DEFERASIROX 1000 MG: 500 TABLET, FOR SUSPENSION ORAL at 06:02

## 2023-02-14 RX ADMIN — SODIUM CHLORIDE: 9 INJECTION, SOLUTION INTRAVENOUS at 11:02

## 2023-02-14 RX ADMIN — HYDROMORPHONE HYDROCHLORIDE 2 MG: 2 INJECTION, SOLUTION INTRAMUSCULAR; INTRAVENOUS; SUBCUTANEOUS at 12:02

## 2023-02-14 RX ADMIN — HYDROMORPHONE HYDROCHLORIDE 2 MG: 2 INJECTION, SOLUTION INTRAMUSCULAR; INTRAVENOUS; SUBCUTANEOUS at 04:02

## 2023-02-14 RX ADMIN — FAMOTIDINE 20 MG: 20 TABLET, FILM COATED ORAL at 08:02

## 2023-02-14 RX ADMIN — MORPHINE SULFATE 30 MG: 15 TABLET, EXTENDED RELEASE ORAL at 10:02

## 2023-02-14 RX ADMIN — OXYCODONE AND ACETAMINOPHEN 1 TABLET: 10; 325 TABLET ORAL at 08:02

## 2023-02-14 RX ADMIN — DIPHENHYDRAMINE HYDROCHLORIDE 12.5 MG: 50 INJECTION, SOLUTION INTRAMUSCULAR; INTRAVENOUS at 08:02

## 2023-02-14 RX ADMIN — OXYCODONE AND ACETAMINOPHEN 1 TABLET: 10; 325 TABLET ORAL at 12:02

## 2023-02-14 RX ADMIN — DIPHENHYDRAMINE HYDROCHLORIDE 12.5 MG: 50 INJECTION, SOLUTION INTRAMUSCULAR; INTRAVENOUS at 12:02

## 2023-02-14 RX ADMIN — OXYCODONE AND ACETAMINOPHEN 1 TABLET: 10; 325 TABLET ORAL at 04:02

## 2023-02-14 RX ADMIN — DIPHENHYDRAMINE HYDROCHLORIDE 12.5 MG: 50 INJECTION, SOLUTION INTRAMUSCULAR; INTRAVENOUS at 04:02

## 2023-02-14 RX ADMIN — CEFTRIAXONE 1 G: 1 INJECTION, POWDER, FOR SOLUTION INTRAMUSCULAR; INTRAVENOUS at 09:02

## 2023-02-14 RX ADMIN — SENNOSIDES AND DOCUSATE SODIUM 1 TABLET: 8.6; 5 TABLET ORAL at 08:02

## 2023-02-14 RX ADMIN — MORPHINE SULFATE 30 MG: 15 TABLET, EXTENDED RELEASE ORAL at 06:02

## 2023-02-14 RX ADMIN — MORPHINE SULFATE 30 MG: 15 TABLET, EXTENDED RELEASE ORAL at 02:02

## 2023-02-14 RX ADMIN — FOLIC ACID 1 MG: 1 TABLET ORAL at 08:02

## 2023-02-14 RX ADMIN — ENOXAPARIN SODIUM 40 MG: 40 INJECTION SUBCUTANEOUS at 04:02

## 2023-02-14 NOTE — ASSESSMENT & PLAN NOTE
Patient's anemia is currently uncontrolled. Has recieved 1 units of PRBCs on 02/12/2023. Etiology likely d/t sickle cell disease  Current CBC reviewed-   Lab Results   Component Value Date    HGB 8.5 (L) 02/14/2023    HCT 25.4 (L) 02/14/2023     Monitor serial CBC and transfuse if patient becomes hemodynamically unstable, symptomatic or H/H drops below 7/21.   She will receive 1 units rbc on 2/12 due to uncontrolled crisis symptoms.

## 2023-02-14 NOTE — SUBJECTIVE & OBJECTIVE
Interval History:  Pt seen and examined.  Feels a little better today.  Still reporting severe right shoulder pain, back pain with ready of 8/10 scale.  She has just received oral pain medications.      Review of Systems   Constitutional:  Positive for fatigue. Negative for chills and fever.   Respiratory:  Negative for cough and shortness of breath.    Cardiovascular:  Negative for chest pain and palpitations.   Gastrointestinal:  Negative for abdominal pain, nausea and vomiting.   Musculoskeletal:  Positive for arthralgias and back pain.   Skin:  Positive for color change (right shoulder bruising).   Objective:     Vital Signs (Most Recent):  Temp: 98.1 °F (36.7 °C) (02/14/23 0751)  Pulse: 90 (02/14/23 0751)  Resp: 18 (02/14/23 0819)  BP: 112/70 (02/14/23 0751)  SpO2: 97 % (02/14/23 0751) Vital Signs (24h Range):  Temp:  [98.1 °F (36.7 °C)-98.7 °F (37.1 °C)] 98.1 °F (36.7 °C)  Pulse:  [] 90  Resp:  [15-18] 18  SpO2:  [95 %-97 %] 97 %  BP: (106-112)/(62-85) 112/70     Weight: 53.8 kg (118 lb 9.7 oz)  Body mass index is 23.96 kg/m².    Intake/Output Summary (Last 24 hours) at 2/14/2023 1100  Last data filed at 2/14/2023 0639  Gross per 24 hour   Intake 600 ml   Output --   Net 600 ml        Physical Exam  Vitals and nursing note reviewed.   Constitutional:       General: She is not in acute distress.     Appearance: Normal appearance. She is not toxic-appearing or diaphoretic.   Cardiovascular:      Rate and Rhythm: Regular rhythm. Tachycardia present.      Pulses: Normal pulses.      Heart sounds: Normal heart sounds. No murmur heard.    No friction rub. No gallop.   Pulmonary:      Effort: Pulmonary effort is normal. No respiratory distress.      Breath sounds: Normal breath sounds.   Abdominal:      General: Abdomen is flat. Bowel sounds are normal.      Palpations: Abdomen is soft.   Musculoskeletal:         General: Tenderness (Right shoulder tenderness, reduced ROM 2/2 to pain) present.   Skin:      General: Skin is warm and dry.      Findings: Bruising (Right shoulder) present.   Neurological:      General: No focal deficit present.      Mental Status: She is alert and oriented to person, place, and time. Mental status is at baseline.       Significant Labs: All pertinent labs within the past 24 hours have been reviewed.  BMP:   Recent Labs   Lab 02/13/23 0452 02/14/23  0438   GLU 74 86    140   K 3.9 4.0    107   CO2 20* 25   BUN 5* 7   CREATININE 0.7 0.8   CALCIUM 8.7 8.7   MG 1.6  --        CBC:   Recent Labs   Lab 02/13/23 0452 02/14/23  0438   WBC 13.29* 14.47*   HGB 8.5* 8.5*   HCT 26.2* 25.4*    403         Significant Imaging: I have reviewed all pertinent imaging results/findings within the past 24 hours.

## 2023-02-14 NOTE — PROGRESS NOTES
"[facility] Medicine  Progress Note    Patient Name: Nishi Dumont  MRN: [id number]  Patient Class: IP- Inpatient   Admission Date: 2/9/2023  Length of Stay: 5 days  Attending Physician: Dawson Forman MD  Primary Care Provider: [facility]        Subjective:     Principal Problem:Sickle cell pain crisis        HPI:  Ms. Dumont is a 32 year old female with a history of sickle cell anemia on Adekveo infusion and red cell exchange apheresis , ACS d/t Hb-SS, AVN hip s/p left hip replacement, AVN L humeral head, and DVT who presents today with complaints of pain in shoulders and back. It is severe. It is associated with urinary frequency, urgency, and weakness. She denies fever, chills, N/V/D, chest pain, SOB, cough, dizziness, or LOC. Since 11/2022, she's been receiving Adekveo infusion and red cell exchange apheresis with last one 1/31 per Dr. Murillo at [facility]. She states since she started this, she's been feeling more "run down" "like my body is shutting down on me". She reports med compliance at home. She was previously on eliquis for a DVT on her leg, but this was d/c'ed per hematology 3 weeks ago. She reports nose bleeds on this. EKG NSR with sinus arrhythmia with on acute ischemic changes. WBC 21K, Tbili 1.7, retic 6.9, Troponin WNL, lactate WNL, urine nitrite + pyuria. Hospital medicine is consulted for admission for further work up and treatment.       Overview/Hospital Course:  Patient was admitted to the hospital for sickle cell crisis.  Her pain was uncontrolled the 1st day, so her hydromorphone was increased from 1-1.5 mg.  She was given IV fluids and oxygen supplementation.  She continued to have issues with pain control and oral narcotics were added to supplement her regimen.  Despite aggressive fluid resuscitation and narcotic pain regimen, Pt continued with uncontrolled pain.  Case was discussed with Hematology and Pt was transfused 1 " units of packed red blood cells on 02/12.  She was maintained on IV Abx for her UTI.      Interval History:  Pt seen and examined.  Feels a little better today.  Still reporting severe right shoulder pain, back pain with ready of 8/10 scale.  She has just received oral pain medications.      Review of Systems   Constitutional:  Positive for fatigue. Negative for chills and fever.   Respiratory:  Negative for cough and shortness of breath.    Cardiovascular:  Negative for chest pain and palpitations.   Gastrointestinal:  Negative for abdominal pain, nausea and vomiting.   Musculoskeletal:  Positive for arthralgias and back pain.   Skin:  Positive for color change (right shoulder bruising).   Objective:     Vital Signs (Most Recent):  Temp: 98.1 °F (36.7 °C) (02/14/23 0751)  Pulse: 90 (02/14/23 0751)  Resp: 18 (02/14/23 0819)  BP: 112/70 (02/14/23 0751)  SpO2: 97 % (02/14/23 0751) Vital Signs (24h Range):  Temp:  [98.1 °F (36.7 °C)-98.7 °F (37.1 °C)] 98.1 °F (36.7 °C)  Pulse:  [] 90  Resp:  [15-18] 18  SpO2:  [95 %-97 %] 97 %  BP: (106-112)/(62-85) 112/70     Weight: 53.8 kg (118 lb 9.7 oz)  Body mass index is 23.96 kg/m².    Intake/Output Summary (Last 24 hours) at 2/14/2023 1100  Last data filed at 2/14/2023 0639  Gross per 24 hour   Intake 600 ml   Output --   Net 600 ml        Physical Exam  Vitals and nursing note reviewed.   Constitutional:       General: She is not in acute distress.     Appearance: Normal appearance. She is not toxic-appearing or diaphoretic.   Cardiovascular:      Rate and Rhythm: Regular rhythm. Tachycardia present.      Pulses: Normal pulses.      Heart sounds: Normal heart sounds. No murmur heard.    No friction rub. No gallop.   Pulmonary:      Effort: Pulmonary effort is normal. No respiratory distress.      Breath sounds: Normal breath sounds.   Abdominal:      General: Abdomen is flat. Bowel sounds are normal.      Palpations: Abdomen is soft.   Musculoskeletal:         General:  Tenderness (Right shoulder tenderness, reduced ROM 2/2 to pain) present.   Skin:     General: Skin is warm and dry.      Findings: Bruising (Right shoulder) present.   Neurological:      General: No focal deficit present.      Mental Status: She is alert and oriented to person, place, and time. Mental status is at baseline.       Significant Labs: All pertinent labs within the past 24 hours have been reviewed.  BMP:   Recent Labs   Lab 02/13/23 0452 02/14/23  0438   GLU 74 86    140   K 3.9 4.0    107   CO2 20* 25   BUN 5* 7   CREATININE 0.7 0.8   CALCIUM 8.7 8.7   MG 1.6  --        CBC:   Recent Labs   Lab 02/13/23 0452 02/14/23  0438   WBC 13.29* 14.47*   HGB 8.5* 8.5*   HCT 26.2* 25.4*    403         Significant Imaging: I have reviewed all pertinent imaging results/findings within the past 24 hours.      Assessment/Plan:      * Sickle cell pain crisis  -pain persistent despite adequate regimen and aggressive fluid resuscitation: s/p transfusion 1 unit 2/12.  -continue aggressive IV fluid resuscitation   -continue IV pain medication, add an oral pain medication p.r.n. to see if we get better control   -continue her scheduled morphine orally  -p.r.n.  Narcan to chart   - worsening pain to right shoulder with bruising noted - no trauma - Xray without acute changes, US RUE no DVT.      Thrombocytosis  Continue to monitor CBC.  Last platelet count reviewed.    Lab Results   Component Value Date     02/11/2023           Iron overload  Continue deferasirox         Chronic prescription opiate use  Noted, patient will require higher doses of pain medications secondary to history of opiate tolerance.      Hyperbilirubinemia  CMP reviewed.  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 This is improving.  Continue to monitor CBC and CMP closely.    Lab Results   Component Value Date    ALT 19 02/11/2023    AST 20 02/11/2023    ALKPHOS 79 02/11/2023    BILITOT 1.1 (H)  02/11/2023           UTI (urinary tract infection)  This patient does have evidence of infective focus  Vital signs were reviewed and noted in progress note.  Antibiotics given-   Antibiotics (From admission, onward)    Start     Stop Route Frequency Ordered    02/10/23 2130  cefTRIAXone (ROCEPHIN) 1 g in dextrose 5 % in water (D5W) 5 % 50 mL IVPB (MB+)         -- IV Every 24 hours (non-standard times) 02/09/23 2205        Cultures were taken-   Microbiology Results (last 7 days)     Procedure Component Value Units Date/Time    Urine culture [[id number]]  (Abnormal)  (Susceptibility) Collected: 02/09/23 2011    Order Status: Completed Specimen: Urine Updated: 02/13/23 0819     Urine Culture, Routine ESCHERICHIA COLI  >100,000 cfu/ml      Narrative:      Specimen Source->Urine    Blood culture [[id number]] Collected: 02/09/23 2039    Order Status: Completed Specimen: Blood Updated: 02/13/23 0612     Blood Culture, Routine No Growth to date      No Growth to date      No Growth to date      No Growth to date    Blood culture [[id number]] Collected: 02/09/23 2039    Order Status: Completed Specimen: Blood Updated: 02/13/23 0612     Blood Culture, Routine No Growth to date      No Growth to date      No Growth to date      No Growth to date        Latest lactate reviewed, they are-  Recent Labs   Lab 02/10/23  1358 02/10/23  1841 02/10/23  2226   LACTATE 1.2 1.2 0.9     Lactic acid appears stable for now.  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    Growing E. Coli: continue the rocephin transition to oral macrobid upon d/c.     Sickle cell anemia  Patient's anemia is currently uncontrolled. Has recieved 1 units of PRBCs on 02/12/2023. Etiology likely d/t sickle cell disease  Current CBC reviewed-   Lab Results   Component Value Date    HGB 8.5 (L) 02/14/2023    HCT 25.4 (L) 02/14/2023     Monitor serial CBC and transfuse if patient becomes hemodynamically  unstable, symptomatic or H/H drops below 7/21.   She will receive 1 units rbc on 2/12 due to uncontrolled crisis symptoms.        VTE Risk Mitigation (From admission, onward)         Ordered     enoxaparin injection 40 mg  Daily         02/09/23 2240     IP VTE LOW RISK PATIENT  Once         02/09/23 2205     Place sequential compression device  Until discontinued         02/09/23 2205                Discharge Planning   SHANE: 2/14/2023     Code Status: Full Code   Is the patient medically ready for discharge?:     Reason for patient still in hospital (select all that apply): Patient trending condition, Treatment and Consult recommendations  Discharge Plan A: Home                  Geetha Bermudez NP  Department of Hospital Medicine   [facility]

## 2023-02-14 NOTE — PROGRESS NOTES
Pharmacist Renal Dose Adjustment Note    Nishi Dumont is a 32 y.o. female being treated with the medication Pepcid    Patient Data:    Vital Signs (Most Recent):  Temp: 98.6 °F (37 °C) (02/14/23 1147)  Pulse: 89 (02/14/23 1147)  Resp: 16 (02/14/23 1147)  BP: 107/64 (02/14/23 1147)  SpO2: 97 % (02/14/23 1147) Vital Signs (72h Range):  Temp:  [97.7 °F (36.5 °C)-98.7 °F (37.1 °C)]   Pulse:  []   Resp:  [15-18]   BP: ()/(51-89)   SpO2:  [94 %-100 %]      Recent Labs   Lab 02/12/23  0528 02/13/23  0452 02/14/23  0438   CREATININE 0.7 0.7 0.8     Serum creatinine: 0.8 mg/dL 02/14/23 0438  Estimated creatinine clearance: 77.8 mL/min    Medication:Pepcid dose: 20 mg frequency daily will be changed to medication:Pepcid dose:20 mg frequency:twice daily    Pharmacist's Name: Arlin Murillo  Pharmacist's Extension: [phone]

## 2023-02-14 NOTE — ASSESSMENT & PLAN NOTE
-pain persistent despite adequate regimen and aggressive fluid resuscitation: s/p transfusion 1 unit 2/12.  -continue aggressive IV fluid resuscitation   -continue IV pain medication, add an oral pain medication p.r.n. to see if we get better control   -continue her scheduled morphine orally  -p.r.n.  Narcan to chart   - worsening pain to right shoulder with bruising noted - no trauma - Xray without acute changes, US RUE no DVT.

## 2023-02-15 VITALS
HEIGHT: 59 IN | BODY MASS INDEX: 23.92 KG/M2 | RESPIRATION RATE: 18 BRPM | TEMPERATURE: 98 F | DIASTOLIC BLOOD PRESSURE: 55 MMHG | WEIGHT: 118.63 LBS | SYSTOLIC BLOOD PRESSURE: 105 MMHG | OXYGEN SATURATION: 97 % | HEART RATE: 83 BPM

## 2023-02-15 LAB
ANION GAP SERPL CALC-SCNC: 10 MMOL/L (ref 8–16)
BACTERIA BLD CULT: NORMAL
BACTERIA BLD CULT: NORMAL
BASOPHILS # BLD AUTO: 0.16 K/UL (ref 0–0.2)
BASOPHILS NFR BLD: 1.2 % (ref 0–1.9)
BUN SERPL-MCNC: 10 MG/DL (ref 6–20)
CALCIUM SERPL-MCNC: 8.2 MG/DL (ref 8.7–10.5)
CHLORIDE SERPL-SCNC: 107 MMOL/L (ref 95–110)
CO2 SERPL-SCNC: 25 MMOL/L (ref 23–29)
CREAT SERPL-MCNC: 0.7 MG/DL (ref 0.5–1.4)
DIFFERENTIAL METHOD: ABNORMAL
EOSINOPHIL # BLD AUTO: 1.3 K/UL (ref 0–0.5)
EOSINOPHIL NFR BLD: 9.7 % (ref 0–8)
ERYTHROCYTE [DISTWIDTH] IN BLOOD BY AUTOMATED COUNT: 16.3 % (ref 11.5–14.5)
EST. GFR  (NO RACE VARIABLE): >60 ML/MIN/1.73 M^2
GLUCOSE SERPL-MCNC: 90 MG/DL (ref 70–110)
HCT VFR BLD AUTO: 24 % (ref 37–48.5)
HGB BLD-MCNC: 8.1 G/DL (ref 12–16)
IMM GRANULOCYTES # BLD AUTO: 0.07 K/UL (ref 0–0.04)
IMM GRANULOCYTES NFR BLD AUTO: 0.5 % (ref 0–0.5)
LYMPHOCYTES # BLD AUTO: 2.4 K/UL (ref 1–4.8)
LYMPHOCYTES NFR BLD: 18.2 % (ref 18–48)
MCH RBC QN AUTO: 30.1 PG (ref 27–31)
MCHC RBC AUTO-ENTMCNC: 33.8 G/DL (ref 32–36)
MCV RBC AUTO: 89 FL (ref 82–98)
MONOCYTES # BLD AUTO: 0.9 K/UL (ref 0.3–1)
MONOCYTES NFR BLD: 6.8 % (ref 4–15)
NEUTROPHILS # BLD AUTO: 8.2 K/UL (ref 1.8–7.7)
NEUTROPHILS NFR BLD: 63.6 % (ref 38–73)
NRBC BLD-RTO: 1 /100 WBC
PLATELET # BLD AUTO: 446 K/UL (ref 150–450)
PMV BLD AUTO: 9.1 FL (ref 9.2–12.9)
POTASSIUM SERPL-SCNC: 4.1 MMOL/L (ref 3.5–5.1)
RBC # BLD AUTO: 2.69 M/UL (ref 4–5.4)
SODIUM SERPL-SCNC: 142 MMOL/L (ref 136–145)
WBC # BLD AUTO: 12.92 K/UL (ref 3.9–12.7)

## 2023-02-15 PROCEDURE — 25000003 PHARM REV CODE 250: Performed by: NURSE PRACTITIONER

## 2023-02-15 PROCEDURE — 25000003 PHARM REV CODE 250: Performed by: INTERNAL MEDICINE

## 2023-02-15 PROCEDURE — 85025 COMPLETE CBC W/AUTO DIFF WBC: CPT | Performed by: NURSE PRACTITIONER

## 2023-02-15 PROCEDURE — 80048 BASIC METABOLIC PNL TOTAL CA: CPT | Performed by: NURSE PRACTITIONER

## 2023-02-15 PROCEDURE — 36415 COLL VENOUS BLD VENIPUNCTURE: CPT | Performed by: NURSE PRACTITIONER

## 2023-02-15 PROCEDURE — 63600175 PHARM REV CODE 636 W HCPCS: Performed by: NURSE PRACTITIONER

## 2023-02-15 PROCEDURE — 25000003 PHARM REV CODE 250: Performed by: HOSPITALIST

## 2023-02-15 RX ORDER — HEPARIN 100 UNIT/ML
5 SYRINGE INTRAVENOUS ONCE
Status: DISCONTINUED | OUTPATIENT
Start: 2023-02-15 | End: 2023-02-15 | Stop reason: HOSPADM

## 2023-02-15 RX ORDER — HYDROMORPHONE HYDROCHLORIDE 1 MG/ML
1 INJECTION, SOLUTION INTRAMUSCULAR; INTRAVENOUS; SUBCUTANEOUS EVERY 6 HOURS PRN
Status: DISCONTINUED | OUTPATIENT
Start: 2023-02-15 | End: 2023-02-15 | Stop reason: HOSPADM

## 2023-02-15 RX ADMIN — SENNOSIDES AND DOCUSATE SODIUM 1 TABLET: 8.6; 5 TABLET ORAL at 08:02

## 2023-02-15 RX ADMIN — FOLIC ACID 1 MG: 1 TABLET ORAL at 08:02

## 2023-02-15 RX ADMIN — OXYCODONE AND ACETAMINOPHEN 1 TABLET: 10; 325 TABLET ORAL at 01:02

## 2023-02-15 RX ADMIN — MORPHINE SULFATE 30 MG: 15 TABLET, EXTENDED RELEASE ORAL at 06:02

## 2023-02-15 RX ADMIN — OXYCODONE AND ACETAMINOPHEN 1 TABLET: 10; 325 TABLET ORAL at 08:02

## 2023-02-15 RX ADMIN — DIPHENHYDRAMINE HYDROCHLORIDE 12.5 MG: 50 INJECTION, SOLUTION INTRAMUSCULAR; INTRAVENOUS at 08:02

## 2023-02-15 RX ADMIN — DIPHENHYDRAMINE HYDROCHLORIDE 12.5 MG: 50 INJECTION, SOLUTION INTRAMUSCULAR; INTRAVENOUS at 01:02

## 2023-02-15 RX ADMIN — DEFERASIROX 1000 MG: 500 TABLET, FOR SUSPENSION ORAL at 06:02

## 2023-02-15 RX ADMIN — SODIUM CHLORIDE: 9 INJECTION, SOLUTION INTRAVENOUS at 06:02

## 2023-02-15 RX ADMIN — HYDROXYUREA 1000 MG: 500 CAPSULE ORAL at 08:02

## 2023-02-15 RX ADMIN — FAMOTIDINE 20 MG: 20 TABLET, FILM COATED ORAL at 08:02

## 2023-02-15 NOTE — DISCHARGE SUMMARY
"[facility] Medicine  Discharge Summary      Patient Name: Nishi Dumont  MRN: [id number]  LIZETH: [id number]  Patient Class: IP- Inpatient  Admission Date: 2/9/2023  Hospital Length of Stay: 6 days  Discharge Date and Time: 2/15/2023 10:21 AM  Attending Physician: Kacey att. providers found   Discharging Provider: Geetha Bermudez NP  Primary Care Provider: [facility]    Primary Care Team: Networked reference to record PCT     HPI:   Ms. Dumont is a 32 year old female with a history of sickle cell anemia on Adekveo infusion and red cell exchange apheresis , ACS d/t Hb-SS, AVN hip s/p left hip replacement, AVN L humeral head, and DVT who presents today with complaints of pain in shoulders and back. It is severe. It is associated with urinary frequency, urgency, and weakness. She denies fever, chills, N/V/D, chest pain, SOB, cough, dizziness, or LOC. Since 11/2022, she's been receiving Adekveo infusion and red cell exchange apheresis with last one 1/31 per Dr. Murillo at [facility]. She states since she started this, she's been feeling more "run down" "like my body is shutting down on me". She reports med compliance at home. She was previously on eliquis for a DVT on her leg, but this was d/c'ed per hematology 3 weeks ago. She reports nose bleeds on this. EKG NSR with sinus arrhythmia with on acute ischemic changes. WBC 21K, Tbili 1.7, retic 6.9, Troponin WNL, lactate WNL, urine nitrite + pyuria. Hospital medicine is consulted for admission for further work up and treatment.       * No surgery found *      Hospital Course:   Patient was admitted to the hospital for sickle cell crisis.  Her pain was uncontrolled the 1st day, so her hydromorphone was increased from 1-1.5 mg.  She was given IV fluids and oxygen supplementation.  She continued to have issues with pain control and oral narcotics were added to supplement her regimen.  Despite aggressive fluid " resuscitation and narcotic pain regimen, Pt continued with uncontrolled pain.  Case was discussed with Hematology and Pt was transfused 1 units of packed red blood cells on 02/12.  She was maintained on IV Abx for her UTI. Her pain medication was titrated. She was noted to have right shoulder and arm pain.  US was negative for DVT.  Shoulder xrays were obtained and no acute process. She was continued on fluid resuscitation and was weaned from IV pain medications, doing well on her current home regimen.  She expressed readiness for discharge.  Patient was encouraged to follow up with her PCP and hematologist outpatient.  Discharge instructions well as return precautions were discussed with patient good understanding.    Physical exam:  Awake alert oriented x4, no acute distress  Cardiac:  RRR  Pulmonary:  Clear to auscultation  Abdomen:  Soft, nontender          Goals of Care Treatment Preferences:  Code Status: Full Code      Consults:   Consults (From admission, onward)        Status Ordering Provider     Inpatient consult to Hematology/Oncology  Once        Provider:  MD Suzanne Sun LAURA J.          No new Assessment & Plan notes have been filed under this hospital service since the last note was generated.  Service: Hospital Medicine    Final Active Diagnoses:    Diagnosis Date Noted POA    PRINCIPAL PROBLEM:  Sickle cell pain crisis [D57.00] 09/29/2017 Yes    Chronic prescription opiate use [Z79.891] 02/06/2022 Not Applicable    Iron overload [E83.19] 08/24/2016 Yes    Thrombocytosis [D75.839] 12/24/2015 Yes    Hyperbilirubinemia [E80.6] 04/02/2014 Yes    UTI (urinary tract infection) [N39.0] 12/05/2013 Yes    Sickle cell anemia [D57.1] 09/03/2013 Yes      Problems Resolved During this Admission:       Discharged Condition: good    Disposition: Home or Self Care    Follow Up:   Follow-up Information     [facility] Follow up.    Why:  Email sent requesting hospital follow up appointment. Someone will contact you with a date and time  Contact information:  [place] [id number]  [phone]                       Patient Instructions:      Diet Adult Regular     Notify your health care provider if you experience any of the following:  temperature >100.4     Notify your health care provider if you experience any of the following:  persistent nausea and vomiting or diarrhea     Notify your health care provider if you experience any of the following:  severe uncontrolled pain     Notify your health care provider if you experience any of the following:  difficulty breathing or increased cough     Activity as tolerated       Significant Diagnostic Studies: Labs:   CMP   Recent Labs   Lab 02/14/23 0438 02/15/23  0400    142   K 4.0 4.1    107   CO2 25 25   GLU 86 90   BUN 7 10   CREATININE 0.8 0.7   CALCIUM 8.7 8.2*   ANIONGAP 8 10   , CBC   Recent Labs   Lab 02/14/23  0438 02/15/23  0400   WBC 14.47* 12.92*   HGB 8.5* 8.1*   HCT 25.4* 24.0*    446    and All labs within the past 24 hours have been reviewed  Radiology:     US Upper Extremity Veins Right [[id number]] Resulted: 02/13/23 1558   Order Status: Completed Updated: 02/13/23 1600   Narrative:     EXAMINATION:   US UPPER EXTREMITY VEINS RIGHT     CLINICAL HISTORY:   R/O DVT, Pain;     TECHNIQUE:   Duplex and color flow Doppler evaluation and dynamic compression was performed of the right upper extremity veins.     COMPARISON:   None     FINDINGS:   Central veins: The internal jugular, subclavian, and axillary veins are patent and free of thrombus.     Arm veins: The brachial, basilic, and cephalic veins are patent and compressible.     Contralateral subclavian/internal jugular veins: The left subclavian and internal jugular veins are patent and free of thrombus.     Other findings: None.    Impression:       No thrombus in central veins of the right upper extremity.        Electronically signed by: Efren Fish   Date: 02/13/2023   Time: 15:58   X-ray Shoulder 2 or More Views Right [[id number]] Resulted: 02/13/23 1409   Order Status: Completed Updated: 02/13/23 1412   Narrative:     EXAMINATION:   XR SHOULDER COMPLETE 2 OR MORE VIEWS RIGHT     CLINICAL HISTORY:   Shoulder pain;     TECHNIQUE:   Two or three views of the right shoulder were performed.     COMPARISON:   05/24/2022     FINDINGS:   There is no fracture or dislocation.  There is some patchy sclerotic change of the right humeral head in keeping with known underlying osteonecrosis.  No evidence for interval progression the soft tissues are unremarkable.    Impression:       Osteonecrosis of the right humeral head without radiographic evidence for progression.  No acute findings.       Electronically signed by: Guanaco Hendrickson MD   Date: 02/13/2023   Time: 14:09   X-Ray Chest AP Portable [[id number]] Resulted: 02/09/23 1745   Order Status: Completed Updated: 02/09/23 1748   Narrative:     EXAMINATION:   XR CHEST AP PORTABLE     CLINICAL HISTORY:   sickle cell crisis;     TECHNIQUE:   Single frontal view of the chest was performed.     COMPARISON:   Radiograph 09/22/2022 and CT 01/23/2017     FINDINGS:   No airspace disease.  Normal size heart.  No pleural effusion or pneumothorax.  Cholecystectomy.  Diffuse intramedullary sclerosis likely in keeping with provided history of sickle cell anemia.  Suspect AVN along the humeral heads, chronic.       Electronically signed by: Efren Fish   Date: 02/09/2023   Time: 17:45         Pending Diagnostic Studies:     None         Medications:  Reconciled Home Medications:      Medication List      CONTINUE taking these medications    calcium-vitamin D3 500 mg-5 mcg (200 unit) per tablet  Commonly known as: OS-MATT 500 + D3  Take 1 tablet by mouth Daily.     cyclobenzaprine 5 MG tablet  Commonly known as: FLEXERIL  Take 5 mg by mouth nightly as needed for Muscle spasms.      deferasirox 500 MG disintegrating tablet  Commonly known as: EXJADE  Take 1,000 mg by mouth before breakfast.     ELIQUIS 5 mg Tab  Generic drug: apixaban  Take 5 mg by mouth 2 (two) times a day.     ergocalciferol 50,000 unit Cap  Commonly known as: ERGOCALCIFEROL  Take 50,000 Units by mouth every 7 days.     famotidine 20 MG tablet  Commonly known as: PEPCID  Take 20 mg by mouth Daily.     folic acid 1 MG tablet  Commonly known as: FOLVITE  Take 4 tablets (4 mg total) by mouth once daily.     gabapentin 300 MG capsule  Commonly known as: NEURONTIN  Take 600 mg by mouth Daily.     glutamine (sickle cell) 5 gram Pwpk  Take 10 g by mouth 2 (two) times a day.     hydroxyurea 500 mg Cap  Commonly known as: HYDREA  Take 1,000 mg by mouth 2 (two) times a day.     morphine 30 MG 12 hr tablet  Commonly known as: MS CONTIN  Take 30 mg by mouth every 8 (eight) hours.     oxyCODONE-acetaminophen  mg per tablet  Commonly known as: PERCOCET  Take 1 tablet by mouth every 8 (eight) hours as needed for Pain.     traMADoL 50 mg tablet  Commonly known as: ULTRAM  Take 50 mg by mouth every 12 (twelve) hours as needed for Pain.     ursodioL 300 mg capsule  Commonly known as: ACTIGALL  Take 300 mg by mouth 3 (three) times daily with meals.            Indwelling Lines/Drains at time of discharge:   Lines/Drains/Airways     Central Venous Catheter Line  Duration           Port A Cath Single Lumen 02/12/23 1127 right femoral 2 days                Time spent on the discharge of patient: 38 minutes         Geetha Bermudez NP  Department of Hospital Medicine  [facility]

## 2023-02-15 NOTE — PLAN OF CARE
Pt appears to be resting, eyes are closed, breaths are deep and even. VSS, NAD noted at this time. Discussed PoC with pt, stated they understood and would help as able. c/o pain handled w/ PRN meds as ordered, will continue to monitor.

## 2023-02-15 NOTE — PLAN OF CARE
The pt is cleared for discharge home from case management.    02/15/23 0857   Final Note   Assessment Type Final Discharge Note   Anticipated Discharge Disposition Home

## 2023-02-16 ENCOUNTER — PATIENT OUTREACH (OUTPATIENT)
Dept: ADMINISTRATIVE | Facility: CLINIC | Age: 33
End: 2023-02-16
Payer: MEDICAID

## 2023-02-16 NOTE — PROGRESS NOTES
C3 nurse 2nd attempt to contact Nishi Dumont for a TCC post hospital discharge follow up call. No answer. The patient does not have a scheduled HOSFU appointment, and the pt does not have an [facility] PCP.

## 2023-02-16 NOTE — PROGRESS NOTES
C3 nurse attempted to contact Nishi Dumont for a TCC post hospital discharge follow up call. No answer. The patient does not have a scheduled HOSFU appointment, and the pt does not have an [facility] PCP.

## 2023-02-17 NOTE — PROGRESS NOTES
C3 nurse 3rd attempt to contact Nishi Dumont for a TCC post hospital discharge follow up call. No answer. The patient does not have a scheduled HOSFU appointment, and the pt does not have an [facility] PCP.

## 2023-04-19 NOTE — NURSING
MD Butch paged for patient's ongoing and uncontrolled pain. MD increased PCA dose and PO phenergan for patient's nausea. Patient has been tearful, restless, and in pain for majority of the night. Safety maintained. Will cont to monitor.  India Russ   NST Performed  Reactive per barry

## 2023-11-03 ENCOUNTER — HOSPITAL ENCOUNTER (INPATIENT)
Facility: HOSPITAL | Age: 33
LOS: 2 days | Discharge: HOME | DRG: 812 | End: 2023-11-05
Attending: STUDENT IN AN ORGANIZED HEALTH CARE EDUCATION/TRAINING PROGRAM | Admitting: STUDENT IN AN ORGANIZED HEALTH CARE EDUCATION/TRAINING PROGRAM
Payer: MEDICAID

## 2023-11-03 DIAGNOSIS — E83.19 IRON OVERLOAD: ICD-10-CM

## 2023-11-03 DIAGNOSIS — R07.9 CHEST PAIN: ICD-10-CM

## 2023-11-03 DIAGNOSIS — D57.00 SICKLE CELL PAIN CRISIS: Primary | ICD-10-CM

## 2023-11-03 DIAGNOSIS — D57.00 SICKLE CELL DISEASE WITH CRISIS: ICD-10-CM

## 2023-11-03 PROCEDURE — 12000002 HC ACUTE/MED SURGE SEMI-PRIVATE ROOM

## 2023-11-03 PROCEDURE — 99285 EMERGENCY DEPT VISIT HI MDM: CPT | Mod: 25

## 2023-11-03 RX ORDER — HYDROMORPHONE HYDROCHLORIDE 1 MG/ML
1 INJECTION, SOLUTION INTRAMUSCULAR; INTRAVENOUS; SUBCUTANEOUS
Status: COMPLETED | OUTPATIENT
Start: 2023-11-04 | End: 2023-11-04

## 2023-11-03 RX ORDER — DIPHENHYDRAMINE HYDROCHLORIDE 50 MG/ML
25 INJECTION INTRAMUSCULAR; INTRAVENOUS
Status: COMPLETED | OUTPATIENT
Start: 2023-11-04 | End: 2023-11-04

## 2023-11-03 NOTE — Clinical Note
Diagnosis: Sickle cell pain crisis [086755]   Future Attending Provider: OTTO FLORES [[id number]]   Admitting Provider:: PAULINE BETHEA [[id number]]

## 2023-11-04 LAB
ABO + RH BLD: NORMAL
ALBUMIN SERPL BCP-MCNC: 3.8 G/DL (ref 3.5–5.2)
ALBUMIN SERPL BCP-MCNC: 4.3 G/DL (ref 3.5–5.2)
ALP SERPL-CCNC: 68 U/L (ref 55–135)
ALP SERPL-CCNC: 83 U/L (ref 55–135)
ALT SERPL W/O P-5'-P-CCNC: 18 U/L (ref 10–44)
ALT SERPL W/O P-5'-P-CCNC: 18 U/L (ref 10–44)
ANION GAP SERPL CALC-SCNC: 7 MMOL/L (ref 8–16)
ANION GAP SERPL CALC-SCNC: 9 MMOL/L (ref 8–16)
AST SERPL-CCNC: 18 U/L (ref 10–40)
AST SERPL-CCNC: 20 U/L (ref 10–40)
BACTERIA #/AREA URNS HPF: ABNORMAL /HPF
BASOPHILS # BLD AUTO: 0.05 K/UL (ref 0–0.2)
BASOPHILS # BLD AUTO: 0.08 K/UL (ref 0–0.2)
BASOPHILS NFR BLD: 0.3 % (ref 0–1.9)
BASOPHILS NFR BLD: 0.4 % (ref 0–1.9)
BILIRUB SERPL-MCNC: 2 MG/DL (ref 0.1–1)
BILIRUB SERPL-MCNC: 2.1 MG/DL (ref 0.1–1)
BILIRUB UR QL STRIP: NEGATIVE
BLD GP AB SCN CELLS X3 SERPL QL: NORMAL
BUN SERPL-MCNC: 5 MG/DL (ref 6–20)
BUN SERPL-MCNC: 6 MG/DL (ref 6–20)
CALCIUM SERPL-MCNC: 8.2 MG/DL (ref 8.7–10.5)
CALCIUM SERPL-MCNC: 9.1 MG/DL (ref 8.7–10.5)
CHLORIDE SERPL-SCNC: 112 MMOL/L (ref 95–110)
CHLORIDE SERPL-SCNC: 114 MMOL/L (ref 95–110)
CLARITY UR: ABNORMAL
CO2 SERPL-SCNC: 21 MMOL/L (ref 23–29)
CO2 SERPL-SCNC: 22 MMOL/L (ref 23–29)
COLOR UR: YELLOW
CREAT SERPL-MCNC: 0.7 MG/DL (ref 0.5–1.4)
CREAT SERPL-MCNC: 0.7 MG/DL (ref 0.5–1.4)
DIFFERENTIAL METHOD: ABNORMAL
DIFFERENTIAL METHOD: ABNORMAL
EOSINOPHIL # BLD AUTO: 0 K/UL (ref 0–0.5)
EOSINOPHIL # BLD AUTO: 0.1 K/UL (ref 0–0.5)
EOSINOPHIL NFR BLD: 0.2 % (ref 0–8)
EOSINOPHIL NFR BLD: 0.7 % (ref 0–8)
ERYTHROCYTE [DISTWIDTH] IN BLOOD BY AUTOMATED COUNT: 16.9 % (ref 11.5–14.5)
ERYTHROCYTE [DISTWIDTH] IN BLOOD BY AUTOMATED COUNT: 17.1 % (ref 11.5–14.5)
EST. GFR  (NO RACE VARIABLE): >60 ML/MIN/1.73 M^2
EST. GFR  (NO RACE VARIABLE): >60 ML/MIN/1.73 M^2
GLUCOSE SERPL-MCNC: 89 MG/DL (ref 70–110)
GLUCOSE SERPL-MCNC: 98 MG/DL (ref 70–110)
GLUCOSE UR QL STRIP: NEGATIVE
HCT VFR BLD AUTO: 23.5 % (ref 37–48.5)
HCT VFR BLD AUTO: 24.3 % (ref 37–48.5)
HGB BLD-MCNC: 7.6 G/DL (ref 12–16)
HGB BLD-MCNC: 8.2 G/DL (ref 12–16)
HGB UR QL STRIP: NEGATIVE
IMM GRANULOCYTES # BLD AUTO: 0.12 K/UL (ref 0–0.04)
IMM GRANULOCYTES # BLD AUTO: 0.13 K/UL (ref 0–0.04)
IMM GRANULOCYTES NFR BLD AUTO: 0.7 % (ref 0–0.5)
IMM GRANULOCYTES NFR BLD AUTO: 0.7 % (ref 0–0.5)
KETONES UR QL STRIP: NEGATIVE
LACTATE SERPL-SCNC: 1.1 MMOL/L (ref 0.5–2.2)
LEUKOCYTE ESTERASE UR QL STRIP: ABNORMAL
LYMPHOCYTES # BLD AUTO: 2.3 K/UL (ref 1–4.8)
LYMPHOCYTES # BLD AUTO: 3.3 K/UL (ref 1–4.8)
LYMPHOCYTES NFR BLD: 12.7 % (ref 18–48)
LYMPHOCYTES NFR BLD: 20.6 % (ref 18–48)
MCH RBC QN AUTO: 28.1 PG (ref 27–31)
MCH RBC QN AUTO: 29.4 PG (ref 27–31)
MCHC RBC AUTO-ENTMCNC: 32.3 G/DL (ref 32–36)
MCHC RBC AUTO-ENTMCNC: 33.7 G/DL (ref 32–36)
MCV RBC AUTO: 87 FL (ref 82–98)
MCV RBC AUTO: 87 FL (ref 82–98)
MICROSCOPIC COMMENT: ABNORMAL
MONOCYTES # BLD AUTO: 1.5 K/UL (ref 0.3–1)
MONOCYTES # BLD AUTO: 1.6 K/UL (ref 0.3–1)
MONOCYTES NFR BLD: 10 % (ref 4–15)
MONOCYTES NFR BLD: 8.4 % (ref 4–15)
NEUTROPHILS # BLD AUTO: 10.9 K/UL (ref 1.8–7.7)
NEUTROPHILS # BLD AUTO: 14.2 K/UL (ref 1.8–7.7)
NEUTROPHILS NFR BLD: 67.7 % (ref 38–73)
NEUTROPHILS NFR BLD: 77.6 % (ref 38–73)
NITRITE UR QL STRIP: NEGATIVE
NRBC BLD-RTO: 0 /100 WBC
NRBC BLD-RTO: 0 /100 WBC
PH UR STRIP: 7 [PH] (ref 5–8)
PLATELET # BLD AUTO: 407 K/UL (ref 150–450)
PLATELET # BLD AUTO: 411 K/UL (ref 150–450)
PMV BLD AUTO: 8.7 FL (ref 9.2–12.9)
PMV BLD AUTO: 8.9 FL (ref 9.2–12.9)
POTASSIUM SERPL-SCNC: 3.7 MMOL/L (ref 3.5–5.1)
POTASSIUM SERPL-SCNC: 4 MMOL/L (ref 3.5–5.1)
PROT SERPL-MCNC: 6.2 G/DL (ref 6–8.4)
PROT SERPL-MCNC: 7.3 G/DL (ref 6–8.4)
PROT UR QL STRIP: NEGATIVE
RBC # BLD AUTO: 2.7 M/UL (ref 4–5.4)
RBC # BLD AUTO: 2.79 M/UL (ref 4–5.4)
RBC #/AREA URNS HPF: 7 /HPF (ref 0–4)
RETICS/RBC NFR AUTO: 7.1 % (ref 0.5–2.5)
SODIUM SERPL-SCNC: 142 MMOL/L (ref 136–145)
SODIUM SERPL-SCNC: 143 MMOL/L (ref 136–145)
SP GR UR STRIP: 1.01 (ref 1–1.03)
SPECIMEN OUTDATE: NORMAL
SQUAMOUS #/AREA URNS HPF: 21 /HPF
TROPONIN I SERPL DL<=0.01 NG/ML-MCNC: <0.006 NG/ML (ref 0–0.03)
URN SPEC COLLECT METH UR: ABNORMAL
UROBILINOGEN UR STRIP-ACNC: NEGATIVE EU/DL
WBC # BLD AUTO: 16.15 K/UL (ref 3.9–12.7)
WBC # BLD AUTO: 18.26 K/UL (ref 3.9–12.7)
WBC #/AREA URNS HPF: 8 /HPF (ref 0–5)

## 2023-11-04 PROCEDURE — 25000003 PHARM REV CODE 250

## 2023-11-04 PROCEDURE — 81000 URINALYSIS NONAUTO W/SCOPE: CPT | Performed by: STUDENT IN AN ORGANIZED HEALTH CARE EDUCATION/TRAINING PROGRAM

## 2023-11-04 PROCEDURE — 83605 ASSAY OF LACTIC ACID: CPT

## 2023-11-04 PROCEDURE — 36415 COLL VENOUS BLD VENIPUNCTURE: CPT

## 2023-11-04 PROCEDURE — 86901 BLOOD TYPING SEROLOGIC RH(D): CPT

## 2023-11-04 PROCEDURE — 96374 THER/PROPH/DIAG INJ IV PUSH: CPT

## 2023-11-04 PROCEDURE — 11000001 HC ACUTE MED/SURG PRIVATE ROOM

## 2023-11-04 PROCEDURE — 85025 COMPLETE CBC W/AUTO DIFF WBC: CPT | Mod: 91

## 2023-11-04 PROCEDURE — 25000003 PHARM REV CODE 250: Performed by: STUDENT IN AN ORGANIZED HEALTH CARE EDUCATION/TRAINING PROGRAM

## 2023-11-04 PROCEDURE — 80053 COMPREHEN METABOLIC PANEL: CPT | Mod: 91

## 2023-11-04 PROCEDURE — 85045 AUTOMATED RETICULOCYTE COUNT: CPT | Performed by: STUDENT IN AN ORGANIZED HEALTH CARE EDUCATION/TRAINING PROGRAM

## 2023-11-04 PROCEDURE — 96376 TX/PRO/DX INJ SAME DRUG ADON: CPT

## 2023-11-04 PROCEDURE — 63600175 PHARM REV CODE 636 W HCPCS: Performed by: STUDENT IN AN ORGANIZED HEALTH CARE EDUCATION/TRAINING PROGRAM

## 2023-11-04 PROCEDURE — 85025 COMPLETE CBC W/AUTO DIFF WBC: CPT | Performed by: STUDENT IN AN ORGANIZED HEALTH CARE EDUCATION/TRAINING PROGRAM

## 2023-11-04 PROCEDURE — 63600175 PHARM REV CODE 636 W HCPCS

## 2023-11-04 PROCEDURE — 80053 COMPREHEN METABOLIC PANEL: CPT | Performed by: STUDENT IN AN ORGANIZED HEALTH CARE EDUCATION/TRAINING PROGRAM

## 2023-11-04 PROCEDURE — 96375 TX/PRO/DX INJ NEW DRUG ADDON: CPT

## 2023-11-04 PROCEDURE — 84484 ASSAY OF TROPONIN QUANT: CPT

## 2023-11-04 RX ORDER — HYDROMORPHONE HYDROCHLORIDE 1 MG/ML
1 INJECTION, SOLUTION INTRAMUSCULAR; INTRAVENOUS; SUBCUTANEOUS EVERY 4 HOURS PRN
Status: DISCONTINUED | OUTPATIENT
Start: 2023-11-04 | End: 2023-11-05 | Stop reason: HOSPADM

## 2023-11-04 RX ORDER — DIPHENHYDRAMINE HYDROCHLORIDE 50 MG/ML
25 INJECTION INTRAMUSCULAR; INTRAVENOUS
Status: COMPLETED | OUTPATIENT
Start: 2023-11-04 | End: 2023-11-04

## 2023-11-04 RX ORDER — FOLIC ACID 1 MG/1
4 TABLET ORAL DAILY
Status: DISCONTINUED | OUTPATIENT
Start: 2023-11-04 | End: 2023-11-04

## 2023-11-04 RX ORDER — NALOXONE HCL 0.4 MG/ML
0.02 VIAL (ML) INJECTION
Status: DISCONTINUED | OUTPATIENT
Start: 2023-11-04 | End: 2023-11-05 | Stop reason: HOSPADM

## 2023-11-04 RX ORDER — IBUPROFEN 200 MG
24 TABLET ORAL
Status: DISCONTINUED | OUTPATIENT
Start: 2023-11-04 | End: 2023-11-05 | Stop reason: HOSPADM

## 2023-11-04 RX ORDER — SODIUM CHLORIDE 0.9 % (FLUSH) 0.9 %
10 SYRINGE (ML) INJECTION EVERY 12 HOURS PRN
Status: DISCONTINUED | OUTPATIENT
Start: 2023-11-04 | End: 2023-11-05 | Stop reason: HOSPADM

## 2023-11-04 RX ORDER — AMOXICILLIN 250 MG
1 CAPSULE ORAL 2 TIMES DAILY PRN
Status: DISCONTINUED | OUTPATIENT
Start: 2023-11-04 | End: 2023-11-05 | Stop reason: HOSPADM

## 2023-11-04 RX ORDER — OXYCODONE HYDROCHLORIDE 5 MG/1
5 TABLET ORAL EVERY 6 HOURS PRN
Status: DISCONTINUED | OUTPATIENT
Start: 2023-11-04 | End: 2023-11-05 | Stop reason: HOSPADM

## 2023-11-04 RX ORDER — SODIUM CHLORIDE 9 MG/ML
1000 INJECTION, SOLUTION INTRAVENOUS
Status: COMPLETED | OUTPATIENT
Start: 2023-11-04 | End: 2023-11-04

## 2023-11-04 RX ORDER — DIPHENHYDRAMINE HCL 25 MG
25 CAPSULE ORAL EVERY 4 HOURS PRN
Status: DISCONTINUED | OUTPATIENT
Start: 2023-11-04 | End: 2023-11-05 | Stop reason: HOSPADM

## 2023-11-04 RX ORDER — GLUCAGON 1 MG
1 KIT INJECTION
Status: DISCONTINUED | OUTPATIENT
Start: 2023-11-04 | End: 2023-11-05 | Stop reason: HOSPADM

## 2023-11-04 RX ORDER — ACETAMINOPHEN 325 MG/1
650 TABLET ORAL EVERY 4 HOURS PRN
Status: DISCONTINUED | OUTPATIENT
Start: 2023-11-04 | End: 2023-11-05 | Stop reason: HOSPADM

## 2023-11-04 RX ORDER — HYDROMORPHONE HYDROCHLORIDE 1 MG/ML
1 INJECTION, SOLUTION INTRAMUSCULAR; INTRAVENOUS; SUBCUTANEOUS
Status: COMPLETED | OUTPATIENT
Start: 2023-11-04 | End: 2023-11-04

## 2023-11-04 RX ORDER — HYDROMORPHONE HYDROCHLORIDE 1 MG/ML
1 INJECTION, SOLUTION INTRAMUSCULAR; INTRAVENOUS; SUBCUTANEOUS EVERY 6 HOURS PRN
Status: DISCONTINUED | OUTPATIENT
Start: 2023-11-04 | End: 2023-11-04

## 2023-11-04 RX ORDER — ONDANSETRON 8 MG/1
8 TABLET, ORALLY DISINTEGRATING ORAL EVERY 8 HOURS PRN
Status: DISCONTINUED | OUTPATIENT
Start: 2023-11-04 | End: 2023-11-05 | Stop reason: HOSPADM

## 2023-11-04 RX ORDER — DEFERASIROX 500 MG/1
1000 TABLET, FOR SUSPENSION ORAL
Status: DISCONTINUED | OUTPATIENT
Start: 2023-11-04 | End: 2023-11-05 | Stop reason: HOSPADM

## 2023-11-04 RX ORDER — HYDROXYUREA 500 MG/1
1000 CAPSULE ORAL 2 TIMES DAILY
Status: DISCONTINUED | OUTPATIENT
Start: 2023-11-04 | End: 2023-11-05 | Stop reason: HOSPADM

## 2023-11-04 RX ORDER — IBUPROFEN 200 MG
16 TABLET ORAL
Status: DISCONTINUED | OUTPATIENT
Start: 2023-11-04 | End: 2023-11-05 | Stop reason: HOSPADM

## 2023-11-04 RX ORDER — PROCHLORPERAZINE EDISYLATE 5 MG/ML
5 INJECTION INTRAMUSCULAR; INTRAVENOUS EVERY 6 HOURS PRN
Status: DISCONTINUED | OUTPATIENT
Start: 2023-11-04 | End: 2023-11-05 | Stop reason: HOSPADM

## 2023-11-04 RX ORDER — SODIUM CHLORIDE 9 MG/ML
INJECTION, SOLUTION INTRAVENOUS CONTINUOUS
Status: DISCONTINUED | OUTPATIENT
Start: 2023-11-04 | End: 2023-11-05 | Stop reason: HOSPADM

## 2023-11-04 RX ORDER — FOLIC ACID 1 MG/1
1 TABLET ORAL DAILY
Status: DISCONTINUED | OUTPATIENT
Start: 2023-11-05 | End: 2023-11-05 | Stop reason: HOSPADM

## 2023-11-04 RX ORDER — MORPHINE SULFATE 15 MG/1
30 TABLET, FILM COATED, EXTENDED RELEASE ORAL EVERY 8 HOURS
Status: DISCONTINUED | OUTPATIENT
Start: 2023-11-04 | End: 2023-11-05 | Stop reason: HOSPADM

## 2023-11-04 RX ADMIN — DIPHENHYDRAMINE HYDROCHLORIDE 25 MG: 25 CAPSULE ORAL at 09:11

## 2023-11-04 RX ADMIN — HYDROMORPHONE HYDROCHLORIDE 1 MG: 1 INJECTION, SOLUTION INTRAMUSCULAR; INTRAVENOUS; SUBCUTANEOUS at 03:11

## 2023-11-04 RX ADMIN — HYDROXYUREA 1000 MG: 500 CAPSULE ORAL at 09:11

## 2023-11-04 RX ADMIN — FOLIC ACID 1 MG: 1 TABLET ORAL at 09:11

## 2023-11-04 RX ADMIN — MORPHINE SULFATE 30 MG: 15 TABLET, EXTENDED RELEASE ORAL at 09:11

## 2023-11-04 RX ADMIN — HYDROMORPHONE HYDROCHLORIDE 1 MG: 1 INJECTION, SOLUTION INTRAMUSCULAR; INTRAVENOUS; SUBCUTANEOUS at 08:11

## 2023-11-04 RX ADMIN — MORPHINE SULFATE 30 MG: 15 TABLET, EXTENDED RELEASE ORAL at 04:11

## 2023-11-04 RX ADMIN — GLUTAMINE 10 G: 5 POWDER, FOR SOLUTION ORAL at 09:11

## 2023-11-04 RX ADMIN — HYDROMORPHONE HYDROCHLORIDE 1 MG: 1 INJECTION, SOLUTION INTRAMUSCULAR; INTRAVENOUS; SUBCUTANEOUS at 09:11

## 2023-11-04 RX ADMIN — DIPHENHYDRAMINE HYDROCHLORIDE 25 MG: 50 INJECTION, SOLUTION INTRAMUSCULAR; INTRAVENOUS at 12:11

## 2023-11-04 RX ADMIN — SODIUM CHLORIDE 1000 ML: 9 INJECTION, SOLUTION INTRAVENOUS at 03:11

## 2023-11-04 RX ADMIN — DIPHENHYDRAMINE HYDROCHLORIDE 25 MG: 50 INJECTION, SOLUTION INTRAMUSCULAR; INTRAVENOUS at 02:11

## 2023-11-04 RX ADMIN — HYDROMORPHONE HYDROCHLORIDE 1 MG: 1 INJECTION, SOLUTION INTRAMUSCULAR; INTRAVENOUS; SUBCUTANEOUS at 02:11

## 2023-11-04 RX ADMIN — HYDROMORPHONE HYDROCHLORIDE 1 MG: 1 INJECTION, SOLUTION INTRAMUSCULAR; INTRAVENOUS; SUBCUTANEOUS at 12:11

## 2023-11-04 RX ADMIN — MORPHINE SULFATE 30 MG: 15 TABLET, EXTENDED RELEASE ORAL at 01:11

## 2023-11-04 RX ADMIN — SODIUM CHLORIDE: 9 INJECTION, SOLUTION INTRAVENOUS at 04:11

## 2023-11-04 RX ADMIN — SODIUM CHLORIDE 1000 ML: 9 INJECTION, SOLUTION INTRAVENOUS at 02:11

## 2023-11-04 NOTE — HPI
Nishi Dumont is a 33 y.o. with a pmh of sickle cell anemia on monthly infusion therapy, ACS d/t Hb-SS, AVN of left hip s/p replacement, AVN of left humeral head, and history of DVT on Eliquis presents to the hospital with right sided chest pain which radiates to the back. Patient was recently diagnosed with pneumonia on 11/1/2023 and admitted to the hospital however patient left AMA. Today, she began to have right sided chest pain that propagates to the back. She states that she takes her home pain medications but that pain is still persistent and is a 8/10 currently. Patient denies fever or chills, cough, headaches, shortness of breath, n/v/d, melena, hematemesis, hematochezia, hematuria, or any other associated symptoms. She was previously on eliquis for a DVT on her leg, but this was d/c'ed per hematology. Patient is allergic to contrast, iodine, mushrooms, Zithromax, and demerol.     In the ED: Patient is afebrile with leukocytosis (WBC 18.26), vitals stable with 100 O2 sat on presentation, H/H 8.2/24.3 at baseline, reticulocyte 7.1, total bilirubin at 2.1, CXR shows no acute processes, EKG reviewed and shows NSR with no ST changes. Patient given 1 L NS, diphenhydramine 25 mg injection x 2, Dilaudid 1 mg injection x2.    Case discussed with Dr. Kenji Matute (Emergency Medicine).    Nishi Dumont will be placed under observation for further medical management of her sickle cell crisis.

## 2023-11-04 NOTE — PLAN OF CARE
Problem: Adult Inpatient Plan of Care  Goal: Plan of Care Review  Outcome: Ongoing, Progressing     Problem: Infection  Goal: Absence of Infection Signs and Symptoms  Outcome: Ongoing, Progressing     Problem: Inadequate Tissue Perfusion (Sickle Cell Disease)  Goal: Effective Tissue Perfusion  Outcome: Ongoing, Progressing     Problem: Pain (Sickle Cell Disease)  Goal: Acceptable Pain Control  Outcome: Ongoing, Progressing

## 2023-11-04 NOTE — ASSESSMENT & PLAN NOTE
-Sickle cell crisis with no signs of acute chest, no fever, or hypoxia  -H/H stable 8.2/24.3 at baseline    Plan:  -IVF NS at 125 cc/hr  -Continue home hydroxyurea and folic acid  -Continue home MS Contin  -Prn pain control with oxycodone and dilaudid for breakthrough pain  -Oxygen prn  -Monitor serial CBC and transfuse if patient becomes hemodynamically unstable, symptomatic or H/H drops below 7/21

## 2023-11-04 NOTE — PLAN OF CARE
Case Management Assessment     PCP: [facility]  Pharmacy: [facility].  Patient Arrived From: Home  Existing Help at Home: Grandparents  Barriers to Discharge: None    Discharge Plan:    A: Home with family; follow-up   B: TBD    SW Role explained to patient; two patient identifiers recognized; SW contact information placed on Communication board. Discussed patient managing health care at home and discussed discharge plans A and B; determined who would be helping patient at home with recovery: Grandparents will help patient with recovery at home.

## 2023-11-04 NOTE — PLAN OF CARE
11/04/23 1201   Discharge Assessment   Assessment Type Discharge Planning Assessment   Confirmed/corrected address, phone number and insurance Yes   Confirmed Demographics Correct on Facesheet   Source of Information patient   Does patient/caregiver understand observation status Yes   Communicated SHANE with patient/caregiver Date not available/Unable to determine   Reason For Admission Sickle cell pain crisis   People in Home grandparent(s)   Do you expect to return to your current living situation? Yes   Do you have help at home or someone to help you manage your care at home? Yes   Who are your caregiver(s) and their phone number(s)? Deisy Williamson [phone]   Prior to hospitilization cognitive status: Alert/Oriented   Current cognitive status: Alert/Oriented   Home Accessibility wheelchair accessible   Home Layout Able to live on 1st floor   Equipment Currently Used at Home none   Readmission within 30 days? No   Patient currently being followed by outpatient case management? No   Do you currently have service(s) that help you manage your care at home? No   Do you take prescription medications? Yes   Do you have prescription coverage? Yes   Coverage Medicaid   Do you have any problems affording any of your prescribed medications? No   Is the patient taking medications as prescribed? yes   Who is going to help you get home at discharge? Grandparent   How do you get to doctors appointments? car, drives self   Are you on dialysis? No   Do you take coumadin? No   DME Needed Upon Discharge  other (see comments)  (TBD)   Discharge Plan discussed with: Patient   Transition of Care Barriers None   Discharge Plan A Home  (Home with family, follow up)   Discharge Plan B Other  (TBD)

## 2023-11-04 NOTE — NURSING
Informed patient to bring in glutamine and deferasirox from home to continue during hospital stay. Pt verbalized understanding to give to RN to have pharmacy verify and place a label.

## 2023-11-04 NOTE — SUBJECTIVE & OBJECTIVE
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    History of avascular necrosis of left humeral head 3/9/2022    Sickle cell disease        Past Surgical History:   Procedure Laterality Date    CHOLECYSTECTOMY      JOINT REPLACEMENT       left hip       Review of patient's allergies indicates:   Allergen Reactions    Contrast media Hives    Iodine and iodide containing products Hives and Swelling    Mushroom Hives    Zithromax [azithromycin] Anaphylaxis    Demerol [meperidine] Other (See Comments)     Regarding reaction to demerol pt states "I talk out of my head and become aggressive"    Toradol [ketorolac] Rash       No current facility-administered medications on file prior to encounter.     Current Outpatient Medications on File Prior to Encounter   Medication Sig    deferasirox (EXJADE) 500 MG disintegrating tablet Take 1,000 mg by mouth before breakfast.    glutamine, sickle cell, 5 gram PwPk Take 10 g by mouth 2 (two) times a day.    hydroxyurea (HYDREA) 500 mg Cap Take 1,000 mg by mouth 2 (two) times a day.    morphine (MS CONTIN) 30 MG 12 hr tablet Take 30 mg by mouth every 8 (eight) hours.    oxyCODONE-acetaminophen (PERCOCET)  mg per tablet Take 1 tablet by mouth every 8 (eight) hours as needed for Pain.    calcium-vitamin D3 (OS-MATT 500 + D3) 500 mg-5 mcg (200 unit) per tablet Take 1 tablet by mouth Daily.    cyclobenzaprine (FLEXERIL) 5 MG tablet Take 5 mg by mouth nightly as needed for Muscle spasms.    ELIQUIS 5 mg Tab Take 5 mg by mouth 2 (two) times a day.    ergocalciferol (ERGOCALCIFEROL) 50,000 unit Cap Take 50,000 Units by mouth every 7 days.    famotidine (PEPCID) 20 MG tablet Take 20 mg by mouth Daily.    folic acid (FOLVITE) 1 MG tablet Take 4 tablets (4 mg total) by " mouth once daily.    gabapentin (NEURONTIN) 300 MG capsule Take 600 mg by mouth Daily.    traMADoL (ULTRAM) 50 mg tablet Take 50 mg by mouth every 12 (twelve) hours as needed for Pain.    ursodioL (ACTIGALL) 300 mg capsule Take 300 mg by mouth 3 (three) times daily with meals.     Family History       Problem Relation (Age of Onset)    Sickle cell trait Mother, Father, Brother, Daughter          Tobacco Use    Smoking status: Never    Smokeless tobacco: Never   Substance and Sexual Activity    Alcohol use: Yes     Alcohol/week: 0.0 standard drinks of alcohol     Comment: occassionally    Drug use: No    Sexual activity: Yes     Partners: Male     Birth control/protection: Injection     Review of Systems   Constitutional:  Negative for chills, diaphoresis and fever.   HENT:  Negative for congestion, sinus pressure and sore throat.    Eyes:  Negative for visual disturbance.   Respiratory:  Positive for chest tightness. Negative for shortness of breath and wheezing.    Cardiovascular:  Positive for chest pain. Negative for palpitations and leg swelling.   Gastrointestinal:  Negative for abdominal pain, constipation, diarrhea, nausea and vomiting.   Endocrine: Negative for polyuria.   Genitourinary:  Negative for dysuria, hematuria and urgency.   Musculoskeletal:  Negative for neck pain.   Skin:  Negative for rash.   Neurological:  Positive for light-headedness. Negative for dizziness, weakness and headaches.   Psychiatric/Behavioral:  Negative for confusion.      Objective:     Vital Signs (Most Recent):  Temp: 98.1 °F (36.7 °C) (11/04/23 0407)  Pulse: 83 (11/04/23 0407)  Resp: 18 (11/04/23 0457)  BP: (!) 93/54 (11/04/23 0407)  SpO2: 96 % (11/04/23 0407) Vital Signs (24h Range):  Temp:  [98.1 °F (36.7 °C)-99.1 °F (37.3 °C)] 98.1 °F (36.7 °C)  Pulse:  [83-99] 83  Resp:  [17-18] 18  SpO2:  [96 %-100 %] 96 %  BP: ()/(53-73) 93/54     Weight: 52.2 kg (115 lb)  Body mass index is 23.23 kg/m².     Physical  Exam  Constitutional:       General: She is not in acute distress.     Appearance: Normal appearance. She is normal weight. She is not ill-appearing or diaphoretic.   HENT:      Head: Normocephalic and atraumatic.   Eyes:      Extraocular Movements: Extraocular movements intact.   Cardiovascular:      Rate and Rhythm: Normal rate and regular rhythm.      Pulses: Normal pulses.      Heart sounds: Normal heart sounds. No murmur heard.     Comments: EKG from ED reviewed: NSR with no ST changes.   Pulmonary:      Effort: Pulmonary effort is normal.      Breath sounds: Normal breath sounds. No wheezing.   Abdominal:      General: Abdomen is flat.      Palpations: Abdomen is soft.      Tenderness: There is no abdominal tenderness.   Musculoskeletal:         General: Normal range of motion.   Skin:     General: Skin is warm.   Neurological:      General: No focal deficit present.      Mental Status: She is alert and oriented to person, place, and time. Mental status is at baseline.   Psychiatric:         Mood and Affect: Mood normal.         Behavior: Behavior normal.         Thought Content: Thought content normal.             Significant Labs: All pertinent labs within the past 24 hours have been reviewed.  CBC:   Recent Labs   Lab 11/04/23 0116   WBC 18.26*   HGB 8.2*   HCT 24.3*        CMP:   Recent Labs   Lab 11/04/23 0116      K 4.0   *   CO2 21*   GLU 98   BUN 6   CREATININE 0.7   CALCIUM 9.1   PROT 7.3   ALBUMIN 4.3   BILITOT 2.1*   ALKPHOS 83   AST 18   ALT 18   ANIONGAP 9       Significant Imaging: I have reviewed all pertinent imaging results/findings within the past 24 hours.  Imaging Results              X-Ray Chest AP Portable (Final result)  Result time 11/04/23 00:00:35      Final result by Tevin Car MD (11/04/23 00:00:35)                   Impression:      No acute cardiopulmonary finding identified on this single view.    Additional findings discussed in the body of the  "report.      Electronically signed by: Tevin Car MD  Date:    11/04/2023  Time:    00:00               Narrative:    EXAMINATION:  XR CHEST AP PORTABLE    CLINICAL HISTORY:  Provided history is "Sickle cell pain;  ".    TECHNIQUE:  One view of the chest.    COMPARISON:  02/09/2023.    FINDINGS:  Right-sided port catheter is present with the tip overlying the upper right paratracheal region, potentially at the brachiocephalic/SVC junction.  Cylindrical radiodensity overlies the right chest wall, potentially external to the patient.  Cardiomediastinal silhouette is not enlarged.  No confluent area of consolidation.  No sizable pleural effusion.  No pneumothorax.  Sclerotic density in the left proximal humerus, potentially related to osteonecrosis in this patient with sickle cell anemia.  Similar findings were present on the prior radiograph.                                    "

## 2023-11-04 NOTE — ED PROVIDER NOTES
"Encounter Date: 11/3/2023       History     Chief Complaint   Patient presents with    Sickle Cell Pain Crisis     Right side CP and back and rib pain . Was seen on Wednesday with dx of pneumonia. Denies SOB     33-year-old female history of sickle cell disease complicated by avascular necrosis to multiple bones and acute chest presents to the emergency department with right-sided chest pain and back pain.  Patient reports she was seen in the emergency department 3 days ago where she was diagnosed with pneumonia.  She reports that they recommended she be admitted to the hospital but patient declined and left against medical advice.  She reports today she is began having worsening right-sided chest pain that was not controlled with her home pain medicine so presented to the emergency department for evaluation.  She reports pain is so severe that she feels short of breath.  Denies any cough or hemoptysis.  Denies any fevers or chills.    The history is provided by the patient.     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    History of avascular necrosis of left humeral head 3/9/2022    Sickle cell disease      Past Surgical History:   Procedure Laterality Date    CHOLECYSTECTOMY      JOINT REPLACEMENT       left hip     Family History   Problem Relation Age of Onset    Sickle cell trait Mother     Sickle cell trait Father     Sickle " cell trait Brother     Sickle cell trait Daughter      Social History     Tobacco Use    Smoking status: Never    Smokeless tobacco: Never   Substance Use Topics    Alcohol use: Yes     Alcohol/week: 0.0 standard drinks of alcohol     Comment: occassionally    Drug use: No     Review of Systems   Constitutional:  Negative for fever.   HENT:  Negative for sore throat.    Respiratory:  Positive for shortness of breath.    Cardiovascular:  Positive for chest pain.   Gastrointestinal:  Negative for nausea.   Genitourinary:  Negative for dysuria.   Musculoskeletal:  Negative for back pain.   Skin:  Negative for rash.   Neurological:  Negative for weakness.   Hematological:  Does not bruise/bleed easily.       Physical Exam     Initial Vitals [11/03/23 2328]   BP Pulse Resp Temp SpO2   128/73 99 18 99.1 °F (37.3 °C) 100 %      MAP       --         Physical Exam    Nursing note and vitals reviewed.  Constitutional: She is not diaphoretic. No distress.   HENT:   Head: Normocephalic and atraumatic.   Eyes: Conjunctivae and EOM are normal. Pupils are equal, round, and reactive to light.   Neck:   Normal range of motion.  Cardiovascular:  Regular rhythm.           Pulmonary/Chest: Breath sounds normal. No respiratory distress.   Abdominal: Abdomen is soft. Bowel sounds are normal. She exhibits no distension. There is no abdominal tenderness. There is no rebound and no guarding.   Musculoskeletal:         General: No tenderness. Normal range of motion.      Cervical back: Normal range of motion.     Lymphadenopathy:     She has no cervical adenopathy.   Neurological: She is alert.   Skin: Skin is warm. Capillary refill takes less than 2 seconds.   Psychiatric: She has a normal mood and affect. Her behavior is normal.         ED Course   Procedures  Labs Reviewed   CBC W/ AUTO DIFFERENTIAL - Abnormal; Notable for the following components:       Result Value    WBC 18.26 (*)     RBC 2.79 (*)     Hemoglobin 8.2 (*)     Hematocrit  "24.3 (*)     RDW 17.1 (*)     MPV 8.7 (*)     Immature Granulocytes 0.7 (*)     Gran # (ANC) 14.2 (*)     Immature Grans (Abs) 0.13 (*)     Mono # 1.5 (*)     Gran % 77.6 (*)     Lymph % 12.7 (*)     All other components within normal limits   COMPREHENSIVE METABOLIC PANEL - Abnormal; Notable for the following components:    Chloride 112 (*)     CO2 21 (*)     Total Bilirubin 2.1 (*)     All other components within normal limits   RETICULOCYTES - Abnormal; Notable for the following components:    Retic 7.1 (*)     All other components within normal limits   URINALYSIS, REFLEX TO URINE CULTURE          Imaging Results              X-Ray Chest AP Portable (Final result)  Result time 11/04/23 00:00:35      Final result by Tevin Car MD (11/04/23 00:00:35)                   Impression:      No acute cardiopulmonary finding identified on this single view.    Additional findings discussed in the body of the report.      Electronically signed by: Tevin Car MD  Date:    11/04/2023  Time:    00:00               Narrative:    EXAMINATION:  XR CHEST AP PORTABLE    CLINICAL HISTORY:  Provided history is "Sickle cell pain;  ".    TECHNIQUE:  One view of the chest.    COMPARISON:  02/09/2023.    FINDINGS:  Right-sided port catheter is present with the tip overlying the upper right paratracheal region, potentially at the brachiocephalic/SVC junction.  Cylindrical radiodensity overlies the right chest wall, potentially external to the patient.  Cardiomediastinal silhouette is not enlarged.  No confluent area of consolidation.  No sizable pleural effusion.  No pneumothorax.  Sclerotic density in the left proximal humerus, potentially related to osteonecrosis in this patient with sickle cell anemia.  Similar findings were present on the prior radiograph.                                       Medications   sodium chloride 0.9% flush 10 mL (has no administration in time range)   naloxone 0.4 mg/mL injection 0.02 mg (has " no administration in time range)   glucose chewable tablet 16 g (has no administration in time range)   glucose chewable tablet 24 g (has no administration in time range)   glucagon (human recombinant) injection 1 mg (has no administration in time range)   acetaminophen tablet 650 mg (has no administration in time range)   ondansetron disintegrating tablet 8 mg (has no administration in time range)   prochlorperazine injection Soln 5 mg (has no administration in time range)   senna-docusate 8.6-50 mg per tablet 1 tablet (has no administration in time range)   oxyCODONE immediate release tablet 5 mg (has no administration in time range)   dextrose 10% bolus 125 mL 125 mL (has no administration in time range)   dextrose 10% bolus 250 mL 250 mL (has no administration in time range)   HYDROmorphone injection 1 mg (has no administration in time range)   0.9%  NaCl infusion ( Intravenous New Bag 11/4/23 0423)   deferasirox disintegrating tablet 1,000 mg (0 mg Oral Hold 11/4/23 0600)   folic acid tablet 4 mg (has no administration in time range)   glutamine (sickle cell) PwPk 10 g (has no administration in time range)   hydroxyurea capsule 1,000 mg (has no administration in time range)   morphine 12 hr tablet 30 mg (30 mg Oral Given 11/4/23 0457)   HYDROmorphone injection 1 mg (1 mg Intravenous Given 11/4/23 0047)   diphenhydrAMINE injection 25 mg (25 mg Intravenous Given 11/4/23 0047)   HYDROmorphone injection 1 mg (1 mg Intravenous Given 11/4/23 0214)   sodium chloride 0.9% bolus 1,000 mL 1,000 mL (1,000 mLs Intravenous New Bag 11/4/23 0215)   diphenhydrAMINE injection 25 mg (25 mg Intravenous Given 11/4/23 0246)   0.9%  NaCl infusion (1,000 mLs Intravenous New Bag 11/4/23 0335)     Medical Decision Making:   History:   Old Medical Records: I decided to obtain old medical records.  Initial Assessment:   33-year-old female history of sickle cell disease presents with chest pain.  This patient with known sickle cell  disease presents with their classic pain syndrome for a vaso-occlusive crisis. Considered acute chest, stroke, splenic sequestration, and other emergent complications of sickle cell disease. Considered alternate etiologies of this patient's pain to include fracture, MSK pain, infection/abscess, and other ischemic etiologies but doubt these are likely.    Will plan for pain control using patient's pain management plan, basic labs/reticulocyte count, likely discharge if pain is well controlled.    Differential Diagnosis:   Differential Diagnosis includes, but is not limited to:  Aplastic crisis, acute chest syndrome, avascular necrosis, symptomatic anemia, dehydration, medication side effect, non-compliance, chronic musculoskeletal pain.      Clinical Tests:   Lab Tests: Ordered and Reviewed  Radiological Study: Ordered and Reviewed               ED Course as of 11/04/23 0525   Sat Nov 04, 2023   0020 X-Ray Chest AP Portable  Sclerotic density in the left proximal humerus, potentially related to osteonecrosis in this patient with sickle cell anemia [AS]   0159 WBC(!): 18.26  Wbc 17.8 3 days ago [AS]   0207 Retic(!): 7.1  Given elevated retic count suspect patient is in sickle cell pain crises. [AS]   0324 After review of the patient's physical exam, ED testing, and history/symptoms, the patient requires additional care in the hospital for the treatment of sickle cell pain crises . Discussed patients case with Yeimi Tinoco who will accept the patient and any pending labs/imaging/interventions.   I discussed the objective findings with the patient including laboratory studies, diagnostic imaging, and any consultant involvement. The patient/ family was educated on their clinical presentation and all questions were answered. They acknowledged and verbally agreed to the treatment plan. The patient will be admitted to the hospital for further management.    [AS]      ED Course User Index  [AS] Kenji Matute MD           Medical Decision Making  Amount and/or Complexity of Data Reviewed  Labs: ordered. Decision-making details documented in ED Course.  Radiology: ordered. Decision-making details documented in ED Course.    Risk  Prescription drug management.         Critical Care Procedure Note  Authorized and Performed by: Kenji Matute MD  Total critical care time: 35 minutes  Due to a high probability of clinically significant, life threatening deterioration, the patient required my highest level of preparedness to intervene emergently and I personally spent this critical care time directly and personally managing the patient. This critical care time included obtaining a history; examining the patient; pulse oximetry; ordering and review of studies; arranging urgent treatment with development of a management plan; evaluation of patient's response to treatment; frequent reassessment; and, discussions with other providers.  This critical care time was performed to assess and manage the high probability of imminent, life-threatening deterioration that could result in multi-organ failure. It was exclusive of separately billable procedures and treating other patients and teaching time.  Please see MDM section and the rest of the note for further information on patient assessment and treatment.    Clinical Impression:   Final diagnoses:  [D57.00] Sickle cell pain crisis (Primary)          ED Disposition Condition    Observation Stable               DISCLAIMER: This note was prepared with [employer] voice recognition transcription software. Garbled syntax, mangled pronouns, and other bizarre constructions may be attributed to that software system.     Kenji Matute MD  11/04/23 0525

## 2023-11-04 NOTE — CARE UPDATE
H&P, labs, vitals and imaging were reviewed. Patient seen and examined during provider rounds.    Nishi Dumont was placed under observation for management of sickle cell crisis.     Throughout her observation stay, Ms. Dumont continued to endorse experiencing her sickle cell pain. She states that she is normally on a good regimen at home of MS contin and Oxycodone for breakthrough pain. Leukocytosis noted on admission labs and noted to improve without any interventions. H/H down trendin.2/24.3 --> 7.6/23.5. Will continue to monitor blood counts and transfuse for HgB < 7. T.Bili elevation noted without complaints of abdominal pain. RUQ abdominal US ordered which noted mild dilatation of common duct. If patient begins to complain of abdominal pain, GI will be consulted for further evaluation. Pain control will be continued.     I will continue to monitor and treat Nishi Dumont throughout her observation stay.      Samuel Jesus PA-C  Department of Hospital Medicine  [facility]  2023

## 2023-11-04 NOTE — H&P
[facility] Medicine  History & Physical    Patient Name: Nishi Dumont  MRN: [id number]  Patient Class: OP- Observation  Admission Date: 11/3/2023  Attending Physician: Phil Recinos MD   [facility]         Patient information was obtained from patient, past medical records and ER records.     Subjective:     Principal Problem:Sickle cell pain crisis    Chief Complaint:   Chief Complaint   Patient presents with    Sickle Cell Pain Crisis     Right side CP and back and rib pain . Was seen on Wednesday with dx of pneumonia. Denies SOB        HPI: Nishi Dumont is a 33 y.o. with a pmh of sickle cell anemia on monthly infusion therapy, ACS d/t Hb-SS, AVN of left hip s/p replacement, AVN of left humeral head, and history of DVT on Eliquis presents to the hospital with right sided chest pain which radiates to the back. Patient was recently diagnosed with pneumonia on 11/1/2023 and admitted to the hospital however patient left AMA. Today, she began to have right sided chest pain that propagates to the back. She states that she takes her home pain medications but that pain is still persistent and is a 8/10 currently. Patient denies fever or chills, cough, headaches, shortness of breath, n/v/d, melena, hematemesis, hematochezia, hematuria, or any other associated symptoms. She was previously on eliquis for a DVT on her leg, but this was d/c'ed per hematology. Patient is allergic to contrast, iodine, mushrooms, Zithromax, and demerol.     In the ED: Patient is afebrile with leukocytosis (WBC 18.26), vitals stable with 100 O2 sat on presentation, H/H 8.2/24.3 at baseline, reticulocyte 7.1, total bilirubin at 2.1, CXR shows no acute processes, EKG reviewed and shows NSR with no ST changes. Patient given 1 L NS, diphenhydramine 25 mg injection x 2, Dilaudid 1 mg injection x2.    Case discussed with Dr. Kenji Matute (Emergency  "Medicine).    Nishi Dumont will be placed under observation for further medical management of her sickle cell crisis.       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    History of avascular necrosis of left humeral head 3/9/2022    Sickle cell disease        Past Surgical History:   Procedure Laterality Date    CHOLECYSTECTOMY      JOINT REPLACEMENT       left hip       Review of patient's allergies indicates:   Allergen Reactions    Contrast media Hives    Iodine and iodide containing products Hives and Swelling    Mushroom Hives    Zithromax [azithromycin] Anaphylaxis    Demerol [meperidine] Other (See Comments)     Regarding reaction to demerol pt states "I talk out of my head and become aggressive"    Toradol [ketorolac] Rash       No current facility-administered medications on file prior to encounter.     Current Outpatient Medications on File Prior to Encounter   Medication Sig    deferasirox (EXJADE) 500 MG disintegrating tablet Take 1,000 mg by mouth before breakfast.    glutamine, sickle cell, 5 gram PwPk Take 10 g by mouth 2 (two) times a day.    hydroxyurea (HYDREA) 500 mg Cap Take 1,000 mg by mouth 2 (two) times a day.    morphine (MS CONTIN) 30 MG 12 hr tablet Take 30 mg by mouth every 8 (eight) hours.    oxyCODONE-acetaminophen (PERCOCET)  mg per tablet Take 1 tablet by mouth every 8 (eight) hours as needed for Pain.    calcium-vitamin D3 (OS-MATT 500 + D3) 500 mg-5 mcg (200 unit) per tablet Take 1 tablet by mouth Daily.    cyclobenzaprine (FLEXERIL) 5 MG tablet Take 5 mg by mouth nightly as needed for Muscle spasms.    ELIQUIS 5 mg Tab Take 5 mg by mouth 2 (two) times a day.    ergocalciferol (ERGOCALCIFEROL) 50,000 unit Cap Take 50,000 Units by " mouth every 7 days.    famotidine (PEPCID) 20 MG tablet Take 20 mg by mouth Daily.    folic acid (FOLVITE) 1 MG tablet Take 4 tablets (4 mg total) by mouth once daily.    gabapentin (NEURONTIN) 300 MG capsule Take 600 mg by mouth Daily.    traMADoL (ULTRAM) 50 mg tablet Take 50 mg by mouth every 12 (twelve) hours as needed for Pain.    ursodioL (ACTIGALL) 300 mg capsule Take 300 mg by mouth 3 (three) times daily with meals.     Family History       Problem Relation (Age of Onset)    Sickle cell trait Mother, Father, Brother, Daughter          Tobacco Use    Smoking status: Never    Smokeless tobacco: Never   Substance and Sexual Activity    Alcohol use: Yes     Alcohol/week: 0.0 standard drinks of alcohol     Comment: occassionally    Drug use: No    Sexual activity: Yes     Partners: Male     Birth control/protection: Injection     Review of Systems   Constitutional:  Negative for chills, diaphoresis and fever.   HENT:  Negative for congestion, sinus pressure and sore throat.    Eyes:  Negative for visual disturbance.   Respiratory:  Positive for chest tightness. Negative for shortness of breath and wheezing.    Cardiovascular:  Positive for chest pain. Negative for palpitations and leg swelling.   Gastrointestinal:  Negative for abdominal pain, constipation, diarrhea, nausea and vomiting.   Endocrine: Negative for polyuria.   Genitourinary:  Negative for dysuria, hematuria and urgency.   Musculoskeletal:  Negative for neck pain.   Skin:  Negative for rash.   Neurological:  Positive for light-headedness. Negative for dizziness, weakness and headaches.   Psychiatric/Behavioral:  Negative for confusion.      Objective:     Vital Signs (Most Recent):  Temp: 98.1 °F (36.7 °C) (11/04/23 0407)  Pulse: 83 (11/04/23 0407)  Resp: 18 (11/04/23 0457)  BP: (!) 93/54 (11/04/23 0407)  SpO2: 96 % (11/04/23 0407) Vital Signs (24h Range):  Temp:  [98.1 °F (36.7 °C)-99.1 °F (37.3 °C)] 98.1 °F (36.7 °C)  Pulse:  [83-99]  83  Resp:  [17-18] 18  SpO2:  [96 %-100 %] 96 %  BP: ()/(53-73) 93/54     Weight: 52.2 kg (115 lb)  Body mass index is 23.23 kg/m².     Physical Exam  Constitutional:       General: She is not in acute distress.     Appearance: Normal appearance. She is normal weight. She is not ill-appearing or diaphoretic.   HENT:      Head: Normocephalic and atraumatic.   Eyes:      Extraocular Movements: Extraocular movements intact.   Cardiovascular:      Rate and Rhythm: Normal rate and regular rhythm.      Pulses: Normal pulses.      Heart sounds: Normal heart sounds. No murmur heard.     Comments: EKG from ED reviewed: NSR with no ST changes.   Pulmonary:      Effort: Pulmonary effort is normal.      Breath sounds: Normal breath sounds. No wheezing.   Abdominal:      General: Abdomen is flat.      Palpations: Abdomen is soft.      Tenderness: There is no abdominal tenderness.   Musculoskeletal:         General: Normal range of motion.   Skin:     General: Skin is warm.   Neurological:      General: No focal deficit present.      Mental Status: She is alert and oriented to person, place, and time. Mental status is at baseline.   Psychiatric:         Mood and Affect: Mood normal.         Behavior: Behavior normal.         Thought Content: Thought content normal.             Significant Labs: All pertinent labs within the past 24 hours have been reviewed.  CBC:   Recent Labs   Lab 11/04/23 0116   WBC 18.26*   HGB 8.2*   HCT 24.3*        CMP:   Recent Labs   Lab 11/04/23 0116      K 4.0   *   CO2 21*   GLU 98   BUN 6   CREATININE 0.7   CALCIUM 9.1   PROT 7.3   ALBUMIN 4.3   BILITOT 2.1*   ALKPHOS 83   AST 18   ALT 18   ANIONGAP 9       Significant Imaging: I have reviewed all pertinent imaging results/findings within the past 24 hours.  Imaging Results              X-Ray Chest AP Portable (Final result)  Result time 11/04/23 00:00:35      Final result by Tevin Car MD (11/04/23 00:00:35)      "              Impression:      No acute cardiopulmonary finding identified on this single view.    Additional findings discussed in the body of the report.      Electronically signed by: Tevin Car MD  Date:    11/04/2023  Time:    00:00               Narrative:    EXAMINATION:  XR CHEST AP PORTABLE    CLINICAL HISTORY:  Provided history is "Sickle cell pain;  ".    TECHNIQUE:  One view of the chest.    COMPARISON:  02/09/2023.    FINDINGS:  Right-sided port catheter is present with the tip overlying the upper right paratracheal region, potentially at the brachiocephalic/SVC junction.  Cylindrical radiodensity overlies the right chest wall, potentially external to the patient.  Cardiomediastinal silhouette is not enlarged.  No confluent area of consolidation.  No sizable pleural effusion.  No pneumothorax.  Sclerotic density in the left proximal humerus, potentially related to osteonecrosis in this patient with sickle cell anemia.  Similar findings were present on the prior radiograph.                                    Assessment/Plan:     * Sickle cell pain crisis  -Sickle cell crisis with no signs of acute chest, no fever, or hypoxia  -H/H stable 8.2/24.3 at baseline    Plan:  -IVF NS at 125 cc/hr  -Continue home hydroxyurea and folic acid  -Continue home MS Contin  -Prn pain control with oxycodone and dilaudid for breakthrough pain  -Oxygen prn  -Monitor serial CBC and transfuse if patient becomes hemodynamically unstable, symptomatic or H/H drops below 7/21    Iron overload  Continue deferasirox   Not in hospital so patient states she will have it brought from home    Hyperbilirubinemia  Likely secondary to sickle cell disease  Monitor with CBC and CMP    VTE Risk Mitigation (From admission, onward)         Ordered     IP VTE LOW RISK PATIENT  Once         11/04/23 0338     Place sequential compression device  Until discontinued         11/04/23 0338                 VTE: SCD  Code: Full  Diet: " Regular  Dispo: pending pain control     As clarification, on 11/4/2023, patient should be placed in hospital observation services under my care in collaboration with Phil Recinos MD Rashed Mahmud, PA-C  Department of Hospital Medicine  [facility] Surg

## 2023-11-04 NOTE — NURSING
Pt lying in bed, reporting 8/10 pain to right flank and ribs. Informed pt that she has PRN medication available, pt declined at the moment. Spouse at the bedside. PA at bedside, call light in reach, instructed to call for assistance.   [facility] [place] 4 [place]      11/4/2023       Skin assessed on: Admit      [x] No Pressure Injuries Present    [x]Prevention Measures Documented    [] Yes LDA  for Pressure Injury Previously documented     [] Yes New Pressure Injury Discovered   [] LDA for New Pressure Injury Added      Attending RN:  Iliana Murillo RN     Second: GILLIAN Lin

## 2023-11-05 VITALS
BODY MASS INDEX: 23.18 KG/M2 | HEIGHT: 59 IN | TEMPERATURE: 99 F | SYSTOLIC BLOOD PRESSURE: 104 MMHG | OXYGEN SATURATION: 97 % | WEIGHT: 115 LBS | HEART RATE: 85 BPM | DIASTOLIC BLOOD PRESSURE: 59 MMHG | RESPIRATION RATE: 18 BRPM

## 2023-11-05 PROCEDURE — 63600175 PHARM REV CODE 636 W HCPCS: Performed by: STUDENT IN AN ORGANIZED HEALTH CARE EDUCATION/TRAINING PROGRAM

## 2023-11-05 PROCEDURE — 25000003 PHARM REV CODE 250

## 2023-11-05 PROCEDURE — 63600175 PHARM REV CODE 636 W HCPCS

## 2023-11-05 RX ORDER — HEPARIN 100 UNIT/ML
10 SYRINGE INTRAVENOUS ONCE
Status: COMPLETED | OUTPATIENT
Start: 2023-11-05 | End: 2023-11-05

## 2023-11-05 RX ADMIN — HEPARIN 1000 UNITS: 100 SYRINGE at 12:11

## 2023-11-05 RX ADMIN — HYDROMORPHONE HYDROCHLORIDE 1 MG: 1 INJECTION, SOLUTION INTRAMUSCULAR; INTRAVENOUS; SUBCUTANEOUS at 10:11

## 2023-11-05 RX ADMIN — HYDROXYUREA 1000 MG: 500 CAPSULE ORAL at 10:11

## 2023-11-05 RX ADMIN — HYDROMORPHONE HYDROCHLORIDE 1 MG: 1 INJECTION, SOLUTION INTRAMUSCULAR; INTRAVENOUS; SUBCUTANEOUS at 06:11

## 2023-11-05 RX ADMIN — HYDROMORPHONE HYDROCHLORIDE 1 MG: 1 INJECTION, SOLUTION INTRAMUSCULAR; INTRAVENOUS; SUBCUTANEOUS at 02:11

## 2023-11-05 RX ADMIN — FOLIC ACID 1 MG: 1 TABLET ORAL at 10:11

## 2023-11-05 RX ADMIN — MORPHINE SULFATE 30 MG: 15 TABLET, EXTENDED RELEASE ORAL at 05:11

## 2023-11-05 NOTE — PLAN OF CARE
Problem: Adult Inpatient Plan of Care  Goal: Plan of Care Review  Outcome: Ongoing, Progressing     Problem: Adult Inpatient Plan of Care  Goal: Patient-Specific Goal (Individualized)  Outcome: Ongoing, Progressing     Problem: Adult Inpatient Plan of Care  Goal: Absence of Hospital-Acquired Illness or Injury  Outcome: Ongoing, Progressing     Problem: Adult Inpatient Plan of Care  Goal: Optimal Comfort and Wellbeing  Outcome: Ongoing, Progressing     Problem: Acute Neurologic Deterioration (Sickle Cell Disease)  Goal: Absence of Acute Neurologic Symptoms  Outcome: Ongoing, Progressing     Problem: Pain (Sickle Cell Disease)  Goal: Acceptable Pain Control  Outcome: Ongoing, Progressing

## 2023-11-05 NOTE — NURSING
Pt resting comfortably, port to right chest infusing fluids as ordered.  Pt able to ambulate to the restroom independently, no distress noted.    [facility] [place]  Nurses Note -- 4 Eyes      11/4/2023       Skin assessed on: Q Shift      [x] No Pressure Injuries Present    []Prevention Measures Documented    [] Yes LDA  for Pressure Injury Previously documented     [] Yes New Pressure Injury Discovered   [] LDA for New Pressure Injury Added      Attending RN:  Neyda Andres RN     Second RN:  NUBIA Lin

## 2023-11-05 NOTE — DISCHARGE SUMMARY
[facility] Surg  Discharge Note  Short Stay    * No surgery found *      OUTCOME: Patient tolerated treatment/procedure well without complication and is now ready for discharge.    DISPOSITION: Home or Self Care    FINAL DIAGNOSIS:  Sickle cell pain crisis    FOLLOWUP: In clinic    DISCHARGE INSTRUCTIONS:  No discharge procedures on file.     TIME SPENT ON DISCHARGE: 25 minutes

## 2023-11-05 NOTE — PLAN OF CARE
TN sent a secure chat to med surg nurse Porsha that this patient is clear for discharge from case management's viewpoint.     11/05/23 0952   Final Note   Assessment Type Final Discharge Note   Anticipated Discharge Disposition Home   What phone number can be called within the next 1-3 days to see how you are doing after discharge?   (see chart)   Hospital Resources/Appts/Education Provided Provided patient/caregiver with written discharge plan information   Post-Acute Status   Coverage medicaid [employer] commujnity plan [facility]   Discharge Delays None known at this time        Follow-up Information       [facility] week([facility]).    Why: As needed, If symptoms worsen  Patient to call for follow up appointment  Contact information:  [place] [id number] [phone]

## 2023-11-05 NOTE — PLAN OF CARE
Problem: Adult Inpatient Plan of Care  Goal: Plan of Care Review  Outcome: Ongoing, Progressing  Goal: Patient-Specific Goal (Individualized)  Outcome: Ongoing, Progressing  Goal: Absence of Hospital-Acquired Illness or Injury  Outcome: Ongoing, Progressing  Goal: Optimal Comfort and Wellbeing  Outcome: Ongoing, Progressing  Goal: Readiness for Transition of Care  Outcome: Ongoing, Progressing     Problem: Infection  Goal: Absence of Infection Signs and Symptoms  Outcome: Ongoing, Progressing     Problem: Acute Neurologic Deterioration (Sickle Cell Disease)  Goal: Absence of Acute Neurologic Symptoms  Outcome: Ongoing, Progressing     Problem: Adjustment to Illness (Sickle Cell Disease)  Goal: Optimal Coping with Sickle Cell Disease  Outcome: Ongoing, Progressing     Problem: Inadequate Tissue Perfusion (Sickle Cell Disease)  Goal: Effective Tissue Perfusion  Outcome: Ongoing, Progressing     Problem: Infection (Sickle Cell Disease)  Goal: Absence of Infection Signs and Symptoms  Outcome: Ongoing, Progressing     Problem: Pain (Sickle Cell Disease)  Goal: Acceptable Pain Control  Outcome: Ongoing, Progressing     Problem: Respiratory Compromise (Sickle Cell Disease)  Goal: Optimal Oxygenation  Outcome: Ongoing, Progressing

## 2023-11-05 NOTE — DISCHARGE SUMMARY
[facility] Medicine  Discharge Summary      Patient Name: Nishi Dumont  MRN: [id number]  LIZETH: [id number]  Patient Class: IP- Inpatient  Admission Date: 11/3/2023  Hospital Length of Stay: 1 days  Discharge Date and Time:  11/05/2023 9:21 AM  Attending Physician: Phil Recinos MD   Discharging Provider: Giorgio Siu NP  Primary Care Provider: [facility]    Primary Care Team: Networked reference to record PCT     HPI:   Nishi Dumont is a 33 y.o. with a pmh of sickle cell anemia on monthly infusion therapy, ACS d/t Hb-SS, AVN of left hip s/p replacement, AVN of left humeral head, and history of DVT on Eliquis presents to the hospital with right sided chest pain which radiates to the back. Patient was recently diagnosed with pneumonia on 11/1/2023 and admitted to the hospital however patient left AMA. Today, she began to have right sided chest pain that propagates to the back. She states that she takes her home pain medications but that pain is still persistent and is a 8/10 currently. Patient denies fever or chills, cough, headaches, shortness of breath, n/v/d, melena, hematemesis, hematochezia, hematuria, or any other associated symptoms. She was previously on eliquis for a DVT on her leg, but this was d/c'ed per hematology. Patient is allergic to contrast, iodine, mushrooms, Zithromax, and demerol.       * No surgery found *      Hospital Course:   33 year old female present to ED with complaint of right sided chest pain which radiated to back. Patient is afebrile with leukocytosis (WBC 18.26), vitals stable with 100 O2 sat on presentation, H/H 8.2/24.3 at baseline, reticulocyte 7.1, total bilirubin at 2.1, CXR shows no acute processes, EKG reviewed and shows NSR with no ST changes. Patient given 1 L NS, diphenhydramine 25 mg injection x 2, Dilaudid 1 mg. injection x2. Patient was treated with  daily labs CBC and BMP, IVF, pain  medications, tele monitoring, and resume home medication regimen. Patient currenty denies any pain and stating she wants to go home. Patient to follow up with PCP and Hematologist in 1 to 2 weeks.         Goals of Care Treatment Preferences:  Code Status: Full Code      Consults:     No new Assessment & Plan notes have been filed under this hospital service since the last note was generated.  Service: Hospital Medicine    Final Active Diagnoses:    Diagnosis Date Noted POA    PRINCIPAL PROBLEM:  Sickle cell pain crisis [D57.00] 09/29/2017 Yes    Iron overload [E83.19] 08/24/2016 Yes    Hyperbilirubinemia [E80.6] 04/02/2014 Yes      Problems Resolved During this Admission:       Discharged Condition: stable    Disposition: Home     Follow Up: with PCP and Hematologist  in 1 to 2 week    Patient Instructions:   No discharge procedures on file.    Significant Diagnostic Studies: N/A    Pending Diagnostic Studies:     None         Medications:  Reconciled Home Medications:      Medication List      CONTINUE taking these medications    deferasirox 500 MG disintegrating tablet  Commonly known as: EXJADE  Take 1,000 mg by mouth before breakfast.     folic acid 1 MG tablet  Commonly known as: FOLVITE  Take 4 tablets (4 mg total) by mouth once daily.     glutamine (sickle cell) 5 gram Pwpk  Take 10 g by mouth 2 (two) times a day.     hydroxyurea 500 mg Cap  Commonly known as: HYDREA  Take 1,000 mg by mouth 2 (two) times a day.     morphine 30 MG 12 hr tablet  Commonly known as: MS CONTIN  Take 30 mg by mouth every 8 (eight) hours.        STOP taking these medications    calcium-vitamin D3 500 mg-5 mcg (200 unit) per tablet  Commonly known as: OS-MATT 500 + D3     cyclobenzaprine 5 MG tablet  Commonly known as: FLEXERIL     ELIQUIS 5 mg Tab  Generic drug: apixaban     ergocalciferol 50,000 unit Cap  Commonly known as: ERGOCALCIFEROL     famotidine 20 MG tablet  Commonly known as: PEPCID     gabapentin 300 MG  capsule  Commonly known as: NEURONTIN     oxyCODONE-acetaminophen  mg per tablet  Commonly known as: PERCOCET     traMADoL 50 mg tablet  Commonly known as: ULTRAM     ursodioL 300 mg capsule  Commonly known as: ACTIGALL            Indwelling Lines/Drains at time of discharge:   Lines/Drains/Airways     Central Venous Catheter Line  Duration           Port A Cath Double Lumen -- days                Time spent on the discharge of patient: 25 minutes         Giorgio Siu NP  Department of Hospital Medicine  [facility] [place] [facility]

## 2023-11-05 NOTE — NURSING
Pt has been discharged, Pt received discharge instructions, pt verbalized understanding of discharge instructions. Pt AAOx4, respirations even and unlabored, no acute distress, no complaints of pain. Safety precautions in place,  case management cleared patient for discharge.

## 2023-11-05 NOTE — PLAN OF CARE
Problem: Adult Inpatient Plan of Care  Goal: Plan of Care Review  Outcome: Adequate for Care Transition  Goal: Patient-Specific Goal (Individualized)  Outcome: Adequate for Care Transition  Goal: Absence of Hospital-Acquired Illness or Injury  Outcome: Adequate for Care Transition  Goal: Optimal Comfort and Wellbeing  Outcome: Adequate for Care Transition  Goal: Readiness for Transition of Care  Outcome: Adequate for Care Transition     Problem: Adjustment to Illness (Sickle Cell Disease)  Goal: Optimal Coping with Sickle Cell Disease  Outcome: Adequate for Care Transition     Problem: Pain (Sickle Cell Disease)  Goal: Acceptable Pain Control  Outcome: Adequate for Care Transition

## 2023-11-05 NOTE — HOSPITAL COURSE
33 year old female present to ED with complaint of right sided chest pain which radiated to back. Patient is afebrile with leukocytosis (WBC 18.26), vitals stable with 100 O2 sat on presentation, H/H 8.2/24.3 at baseline, reticulocyte 7.1, total bilirubin at 2.1, CXR shows no acute processes, EKG reviewed and shows NSR with no ST changes. Patient given 1 L NS, diphenhydramine 25 mg injection x 2, Dilaudid 1 mg. injection x2. Patient was treated with  daily labs CBC and BMP, IVF, pain medications, tele monitoring, and resume home medication regimen. Patient currenty denies any pain and stating she wants to go home. Patient to follow up with PCP and Hematologist in 1 to 2 weeks.

## 2023-11-06 NOTE — DISCHARGE SUMMARY
[facility] Surg  Discharge Note  Short Stay    * No surgery found *      OUTCOME: Patient tolerated treatment/procedure well without complication and is now ready for discharge.    DISPOSITION: Home or Self Care    FINAL DIAGNOSIS:  Sickle cell pain crisis    FOLLOWUP: In clinic    DISCHARGE INSTRUCTIONS:  No discharge procedures on file.     TIME SPENT ON DISCHARGE: 25 minutes

## 2023-11-09 ENCOUNTER — PATIENT OUTREACH (OUTPATIENT)
Dept: ADMINISTRATIVE | Facility: CLINIC | Age: 33
End: 2023-11-09
Payer: MEDICAID

## 2023-11-09 NOTE — PROGRESS NOTES
C3 nurse attempted to contact Nishi Dumont for a TCC post hospital discharge follow up call. No answer or voicemail available for the pts phones. The patient does [facility] have a scheduled [facility] the next 5-7 days post her discharge date of 11/5/23. Unable to route to [facility].

## 2024-01-07 PROCEDURE — 93005 ELECTROCARDIOGRAM TRACING: CPT

## 2024-01-07 PROCEDURE — 93010 ELECTROCARDIOGRAM REPORT: CPT | Mod: ,,, | Performed by: INTERNAL MEDICINE

## 2024-01-07 PROCEDURE — 99285 EMERGENCY DEPT VISIT HI MDM: CPT | Mod: 25

## 2024-01-08 ENCOUNTER — HOSPITAL ENCOUNTER (EMERGENCY)
Facility: HOSPITAL | Age: 34
Discharge: HOME | End: 2024-01-08
Attending: STUDENT IN AN ORGANIZED HEALTH CARE EDUCATION/TRAINING PROGRAM
Payer: MEDICAID

## 2024-01-08 VITALS
HEART RATE: 88 BPM | HEIGHT: 59 IN | BODY MASS INDEX: 26.41 KG/M2 | WEIGHT: 131 LBS | DIASTOLIC BLOOD PRESSURE: 80 MMHG | TEMPERATURE: 98 F | RESPIRATION RATE: 16 BRPM | SYSTOLIC BLOOD PRESSURE: 103 MMHG | OXYGEN SATURATION: 99 %

## 2024-01-08 DIAGNOSIS — R07.9 CHEST PAIN: Primary | ICD-10-CM

## 2024-01-08 DIAGNOSIS — D57.00 SICKLE CELL PAIN CRISIS: ICD-10-CM

## 2024-01-08 LAB
ALBUMIN SERPL BCP-MCNC: 3.7 G/DL (ref 3.5–5.2)
ALP SERPL-CCNC: 74 U/L (ref 55–135)
ALT SERPL W/O P-5'-P-CCNC: 22 U/L (ref 10–44)
ANION GAP SERPL CALC-SCNC: 14 MMOL/L (ref 8–16)
AST SERPL-CCNC: 31 U/L (ref 10–40)
BASOPHILS # BLD AUTO: 0.18 K/UL (ref 0–0.2)
BASOPHILS NFR BLD: 0.9 % (ref 0–1.9)
BILIRUB SERPL-MCNC: 1.7 MG/DL (ref 0.1–1)
BUN SERPL-MCNC: 5 MG/DL (ref 6–20)
CALCIUM SERPL-MCNC: 8.5 MG/DL (ref 8.7–10.5)
CHLORIDE SERPL-SCNC: 112 MMOL/L (ref 95–110)
CO2 SERPL-SCNC: 16 MMOL/L (ref 23–29)
CREAT SERPL-MCNC: 0.8 MG/DL (ref 0.5–1.4)
DIFFERENTIAL METHOD BLD: ABNORMAL
EOSINOPHIL # BLD AUTO: 0.3 K/UL (ref 0–0.5)
EOSINOPHIL NFR BLD: 1.5 % (ref 0–8)
ERYTHROCYTE [DISTWIDTH] IN BLOOD BY AUTOMATED COUNT: 17.8 % (ref 11.5–14.5)
EST. GFR  (NO RACE VARIABLE): >60 ML/MIN/1.73 M^2
GLUCOSE SERPL-MCNC: 81 MG/DL (ref 70–110)
HCT VFR BLD AUTO: 23.3 % (ref 37–48.5)
HGB BLD-MCNC: 7.7 G/DL (ref 12–16)
IMM GRANULOCYTES # BLD AUTO: 0.29 K/UL (ref 0–0.04)
IMM GRANULOCYTES NFR BLD AUTO: 1.5 % (ref 0–0.5)
LYMPHOCYTES # BLD AUTO: 3.9 K/UL (ref 1–4.8)
LYMPHOCYTES NFR BLD: 20.3 % (ref 18–48)
MAGNESIUM SERPL-MCNC: 1.9 MG/DL (ref 1.6–2.6)
MCH RBC QN AUTO: 30 PG (ref 27–31)
MCHC RBC AUTO-ENTMCNC: 33 G/DL (ref 32–36)
MCV RBC AUTO: 91 FL (ref 82–98)
MONOCYTES # BLD AUTO: 1.7 K/UL (ref 0.3–1)
MONOCYTES NFR BLD: 8.7 % (ref 4–15)
NEUTROPHILS # BLD AUTO: 12.7 K/UL (ref 1.8–7.7)
NEUTROPHILS NFR BLD: 67.1 % (ref 38–73)
NRBC BLD-RTO: 1 /100 WBC
PLATELET # BLD AUTO: 504 K/UL (ref 150–450)
PMV BLD AUTO: 8.4 FL (ref 9.2–12.9)
POTASSIUM SERPL-SCNC: 4.7 MMOL/L (ref 3.5–5.1)
PROT SERPL-MCNC: 7 G/DL (ref 6–8.4)
RBC # BLD AUTO: 2.57 M/UL (ref 4–5.4)
RETICS/RBC NFR AUTO: 13.4 % (ref 0.5–2.5)
SODIUM SERPL-SCNC: 142 MMOL/L (ref 136–145)
TROPONIN I SERPL DL<=0.01 NG/ML-MCNC: <0.006 NG/ML (ref 0–0.03)
WBC # BLD AUTO: 19 K/UL (ref 3.9–12.7)

## 2024-01-08 PROCEDURE — 96361 HYDRATE IV INFUSION ADD-ON: CPT

## 2024-01-08 PROCEDURE — 83735 ASSAY OF MAGNESIUM: CPT | Performed by: PHYSICIAN ASSISTANT

## 2024-01-08 PROCEDURE — 25000003 PHARM REV CODE 250: Performed by: STUDENT IN AN ORGANIZED HEALTH CARE EDUCATION/TRAINING PROGRAM

## 2024-01-08 PROCEDURE — 84484 ASSAY OF TROPONIN QUANT: CPT | Performed by: STUDENT IN AN ORGANIZED HEALTH CARE EDUCATION/TRAINING PROGRAM

## 2024-01-08 PROCEDURE — 96374 THER/PROPH/DIAG INJ IV PUSH: CPT

## 2024-01-08 PROCEDURE — 96375 TX/PRO/DX INJ NEW DRUG ADDON: CPT

## 2024-01-08 PROCEDURE — 85025 COMPLETE CBC W/AUTO DIFF WBC: CPT | Performed by: PHYSICIAN ASSISTANT

## 2024-01-08 PROCEDURE — 85045 AUTOMATED RETICULOCYTE COUNT: CPT | Performed by: PHYSICIAN ASSISTANT

## 2024-01-08 PROCEDURE — 80053 COMPREHEN METABOLIC PANEL: CPT | Performed by: PHYSICIAN ASSISTANT

## 2024-01-08 PROCEDURE — 63600175 PHARM REV CODE 636 W HCPCS: Performed by: STUDENT IN AN ORGANIZED HEALTH CARE EDUCATION/TRAINING PROGRAM

## 2024-01-08 PROCEDURE — 96376 TX/PRO/DX INJ SAME DRUG ADON: CPT

## 2024-01-08 RX ORDER — ONDANSETRON HYDROCHLORIDE 2 MG/ML
4 INJECTION, SOLUTION INTRAVENOUS
Status: COMPLETED | OUTPATIENT
Start: 2024-01-08 | End: 2024-01-08

## 2024-01-08 RX ORDER — DIPHENHYDRAMINE HYDROCHLORIDE 50 MG/ML
25 INJECTION INTRAMUSCULAR; INTRAVENOUS
Status: COMPLETED | OUTPATIENT
Start: 2024-01-08 | End: 2024-01-08

## 2024-01-08 RX ORDER — DIPHENHYDRAMINE HYDROCHLORIDE 50 MG/ML
50 INJECTION INTRAMUSCULAR; INTRAVENOUS
Status: COMPLETED | OUTPATIENT
Start: 2024-01-08 | End: 2024-01-08

## 2024-01-08 RX ORDER — HYDROMORPHONE HYDROCHLORIDE 1 MG/ML
1 INJECTION, SOLUTION INTRAMUSCULAR; INTRAVENOUS; SUBCUTANEOUS
Status: COMPLETED | OUTPATIENT
Start: 2024-01-08 | End: 2024-01-08

## 2024-01-08 RX ORDER — MORPHINE SULFATE 4 MG/ML
4 INJECTION, SOLUTION INTRAMUSCULAR; INTRAVENOUS
Status: COMPLETED | OUTPATIENT
Start: 2024-01-08 | End: 2024-01-08

## 2024-01-08 RX ORDER — OXYCODONE AND ACETAMINOPHEN 10; 325 MG/1; MG/1
1 TABLET ORAL
Status: COMPLETED | OUTPATIENT
Start: 2024-01-08 | End: 2024-01-08

## 2024-01-08 RX ADMIN — OXYCODONE AND ACETAMINOPHEN 1 TABLET: 10; 325 TABLET ORAL at 05:01

## 2024-01-08 RX ADMIN — HYDROMORPHONE HYDROCHLORIDE 1 MG: 1 INJECTION, SOLUTION INTRAMUSCULAR; INTRAVENOUS; SUBCUTANEOUS at 04:01

## 2024-01-08 RX ADMIN — DIPHENHYDRAMINE HYDROCHLORIDE 50 MG: 50 INJECTION, SOLUTION INTRAMUSCULAR; INTRAVENOUS at 01:01

## 2024-01-08 RX ADMIN — DIPHENHYDRAMINE HYDROCHLORIDE 25 MG: 50 INJECTION, SOLUTION INTRAMUSCULAR; INTRAVENOUS at 05:01

## 2024-01-08 RX ADMIN — SODIUM CHLORIDE 1000 ML: 9 INJECTION, SOLUTION INTRAVENOUS at 01:01

## 2024-01-08 RX ADMIN — ONDANSETRON 4 MG: 2 INJECTION INTRAMUSCULAR; INTRAVENOUS at 01:01

## 2024-01-08 RX ADMIN — MORPHINE SULFATE 4 MG: 4 INJECTION, SOLUTION INTRAMUSCULAR; INTRAVENOUS at 01:01

## 2024-01-08 NOTE — ED NOTES
Pt began breaking out in hives immediately after receiving IV morphine. Hives are localized to her left forearm but Pt endorses itching throughout her body. MD made aware, medication to be ordered.

## 2024-01-08 NOTE — ED PROVIDER NOTES
"Encounter Date: 1/7/2024    SCRIBE #1 NOTE: I, Abdoul Hathaway, am scribing for, and in the presence of,  Isaac Prince MD.       History     Chief Complaint   Patient presents with    Chest Pain    Back Pain     Arrives to ER with complaints of right sided CP since yesterday reports taking tylenol and aspirin yesterday with no relief of s/s. Additional s/s include back pain and right hip pain, states "I think it's my sickle cell."    Sickle Cell Pain Crisis     Nishi Dumont is a 33 y.o. female with a PMHx of AVN, sickle cell anemia, who presents to the ED for evaluation of right sided chest pain beginning yesterday. Patient reports complaints of right sided chest pain, noting associated upper back pain. She states her pain is worsened with movement. She denies recent fall or trauma. She states she receives blood transfusions every month, and last received one last month. She also notes her most recent sickle cell pain crisis was a month ago. Endorses compliance with hydroxyurea, and folic acid. No medications taken PTA. No alleviating or exacerbating factors noted. Denies syncope, leg swelling, nausea, vomiting, or other associated symptoms.    The history is provided by the patient. No  was used.     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    Avascular necrosis of bone of hip     Avascular necrosis of humeral head     Blood transfusion     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    History of " avascular necrosis of left humeral head 3/9/2022    Sickle cell disease      Past Surgical History:   Procedure Laterality Date    CHOLECYSTECTOMY      JOINT REPLACEMENT       left hip     Family History   Problem Relation Age of Onset    Sickle cell trait Mother     Sickle cell trait Father     Sickle cell trait Brother     Sickle cell trait Daughter      Social History     Tobacco Use    Smoking status: Never    Smokeless tobacco: Never   Substance Use Topics    Alcohol use: Yes     Alcohol/week: 0.0 standard drinks of alcohol     Comment: occassionally    Drug use: No     Review of Systems   Constitutional:  Negative for chills and fever.   HENT:  Negative for sore throat.    Respiratory:  Negative for cough and shortness of breath.    Cardiovascular:  Positive for chest pain. Negative for leg swelling.   Gastrointestinal:  Negative for nausea.   Genitourinary:  Negative for dysuria.   Musculoskeletal:  Positive for back pain.   Skin:  Negative for rash.   Neurological:  Negative for syncope and weakness.   Psychiatric/Behavioral:  Negative for confusion.        Physical Exam     Initial Vitals [01/07/24 2341]   BP Pulse Resp Temp SpO2   136/83 98 20 98.1 °F (36.7 °C) 99 %      MAP       --         Physical Exam    Nursing note and vitals reviewed.  Constitutional: She appears well-developed and well-nourished. She is not diaphoretic. No distress.   HENT:   Head: Normocephalic and atraumatic.   Right Ear: External ear normal.   Left Ear: External ear normal.   Nose: Nose normal.   Eyes: Conjunctivae are normal. No scleral icterus.   Neck: Neck supple. No tracheal deviation present.   Normal range of motion.  Cardiovascular:  Normal rate, regular rhythm and normal heart sounds.           Pulses:       Dorsalis pedis pulses are 2+ on the right side and 2+ on the left side.   Pulmonary/Chest: Breath sounds normal. No respiratory distress. She exhibits tenderness (right chest wall). She exhibits no crepitus.    Abdominal: Abdomen is soft. Bowel sounds are normal. There is no abdominal tenderness.   Musculoskeletal:      Cervical back: Normal range of motion and neck supple.      Right lower leg: No edema.      Left lower leg: No edema.      Comments: No midline spinal tenderness to the C/T/L noted. Tenderness to the right thoracic paraspinal musculature.     Neurological: She is alert and oriented to person, place, and time.   Skin: Skin is warm and dry.   Psychiatric: She has a normal mood and affect. Thought content normal.         ED Course   Procedures  Labs Reviewed   CBC W/ AUTO DIFFERENTIAL - Abnormal; Notable for the following components:       Result Value    WBC 19.00 (*)     RBC 2.57 (*)     Hemoglobin 7.7 (*)     Hematocrit 23.3 (*)     RDW 17.8 (*)     Platelets 504 (*)     MPV 8.4 (*)     Immature Granulocytes 1.5 (*)     Gran # (ANC) 12.7 (*)     Immature Grans (Abs) 0.29 (*)     Mono # 1.7 (*)     nRBC 1 (*)     All other components within normal limits   COMPREHENSIVE METABOLIC PANEL - Abnormal; Notable for the following components:    Chloride 112 (*)     CO2 16 (*)     BUN 5 (*)     Calcium 8.5 (*)     Total Bilirubin 1.7 (*)     All other components within normal limits   RETICULOCYTES - Abnormal; Notable for the following components:    Retic 13.4 (*)     All other components within normal limits   MAGNESIUM   TROPONIN I          Imaging Results              X-Ray Chest 1 View (Final result)  Result time 01/08/24 00:28:53      Final result by Janae Fortune MD (01/08/24 00:28:53)                   Impression:      Please see above.      Electronically signed by: Janae Fortune MD  Date:    01/08/2024  Time:    00:28               Narrative:    EXAMINATION:  XR CHEST 1 VIEW    CLINICAL HISTORY:  Chest pain, unspecified    TECHNIQUE:  Single frontal view of the chest was performed.    COMPARISON:  11/03/2023    FINDINGS:  There is a stably positioned right-sided chest port in place with tip projecting  over the region of the brachiocephalic/SVC junction.  The cardiomediastinal silhouette is unchanged in size and configuration.  The lungs are symmetrically expanded with coarse interstitial lung markings similar to prior exam.  No evidence of new confluent airspace consolidation, substantial volume of pleural fluid or pneumothorax.  Osseous structures demonstrate sclerosis of the humeral head suggestive of osteonecrosis/AVN in this patient with reported sickle cell anemia.  Surgical clips project over the right upper quadrant.                                       Medications   oxyCODONE-acetaminophen  mg per tablet 1 tablet (has no administration in time range)   sodium chloride 0.9% bolus 1,000 mL 1,000 mL (0 mLs Intravenous Stopped 1/8/24 0411)   morphine injection 4 mg (4 mg Intravenous Given 1/8/24 0150)   ondansetron injection 4 mg (4 mg Intravenous Given 1/8/24 0148)   diphenhydrAMINE injection 50 mg (50 mg Intravenous Given 1/8/24 0157)   HYDROmorphone injection 1 mg (1 mg Intravenous Given 1/8/24 0420)   diphenhydrAMINE injection 25 mg (25 mg Intravenous Given 1/8/24 0503)     Medical Decision Making  Amount and/or Complexity of Data Reviewed  Radiology:  Decision-making details documented in ED Course.    Risk  Prescription drug management.            Scribe Attestation:   Scribe #1: I performed the above scribed service and the documentation accurately describes the services I performed. I attest to the accuracy of the note.        ED Course as of 01/08/24 0535   Mon Jan 08, 2024   0034 X-Ray Chest 1 View  FINDINGS:  There is a stably positioned right-sided chest port in place with tip projecting over the region of the brachiocephalic/SVC junction.  The cardiomediastinal silhouette is unchanged in size and configuration.  The lungs are symmetrically expanded with coarse interstitial lung markings similar to prior exam.  No evidence of new confluent airspace consolidation, substantial volume of  pleural fluid or pneumothorax.  Osseous structures demonstrate sclerosis of the humeral head suggestive of osteonecrosis/AVN in this patient with reported sickle cell anemia.  Surgical clips project over the right upper quadrant.     Impression:     Please see above.         [CC]   0219 X-Ray Chest 1 View  FINDINGS:  There is a stably positioned right-sided chest port in place with tip projecting over the region of the brachiocephalic/SVC junction.  The cardiomediastinal silhouette is unchanged in size and configuration.  The lungs are symmetrically expanded with coarse interstitial lung markings similar to prior exam.  No evidence of new confluent airspace consolidation, substantial volume of pleural fluid or pneumothorax.  Osseous structures demonstrate sclerosis of the humeral head suggestive of osteonecrosis/AVN in this patient with reported sickle cell anemia.  Surgical clips project over the right upper quadrant.     Impression:     Please see above.   [CC]   0531 Patient feels well on re-evaluation.  She notes only mild pain.  We will treat with oral dose of Percocet and discharged to follow up with Hematology. [CC]   0531 33-year-old presenting to the emergency department for evaluation of chest wall pain.  Concerning for sickle cell pain crisis.  Doubt ACS.  EKG is nonischemic.  Troponin is normal.  Pain was reproducible on exam.  Patient with elevated white count.  Patient has a history of leukocytosis.  Especially in the setting of sickle cell pain crisis.  I believe she was likely volume depleted as well.  Fluid resuscitated.  Platelet count is elevated.  Anemia noted but not to the level requiring transfusion.  Electrolytes within normal limits for mild hypercalcemia.  Hyperbilirubinemia in the setting of sickle cell pain crisis plan for discharge.  Strict return precautions given. I discussed with the patient/family the diagnosis, treatment plan, indications for return to the emergency department, and  for expected follow-up. The patient/family verbalized an understanding. The patient/family  asked if there are any questions or concerns. We discuss the case, until all issues are addressed to the patient/family's satisfaction. Patient/family understands and is agreeable to the plan. Patient is stable and ready for discharge.   [CC]      ED Course User Index  [CC] Isaac Prince MD                         IIsaac MD, personally performed the services described in this documentation. All medical record entries made by the scribe were at my direction and in my presence. I have reviewed the chart and agree that the record reflects my personal performance and is accurate and complete.    Clinical Impression:  Final diagnoses:  [R07.9] Chest pain (Primary)  [D57.00] Sickle cell pain crisis          ED Disposition Condition    Discharge Stable          ED Prescriptions    None       Follow-up Information       Follow up With Specialties Details Why Contact Info    [facility]  Schedule an appointment as soon as possible for a visit in 2 days  [address] [address]  [phone]      [facility] - Emergency Dept Emergency Medicine Go to  If symptoms worsen [address] [facility] [place] [id number] [phone]             Isaac Prince MD  01/08/24 3759

## 2024-01-08 NOTE — DISCHARGE INSTRUCTIONS
Please follow up with your hematologist as soon as you can for further evaluation and treatments.  Continue taking her pain medication as prescribed.  Return to the ER with any new or worsening symptoms.

## 2024-01-08 NOTE — ED TRIAGE NOTES
"Patient presents to the ED c/o right sided chest pain that radiates to her upper back x2 days. Pt reports attempting to alleviate s/s w/ Tylenol and ASA w/o relief. Pt states "I think it's my sickle cell." Reports 9/10 pain  "

## 2025-05-22 NOTE — PROCEDURES
EKG Completed   Speech Therapy      Visit Type: Treatment  -  Swallow and motor speech  Reason for consult: Stroke Alert Level 2-admitted with slurred speech, failed nursing swallow screen due to slurred speech.    All pertinent diagnostic imaging, lab results, and current/past medical history reviewed and considered within the context of clinical decision making.       Relevant History/Co-morbidities: Pt here via EMS fom home for slurred speech, per Pt, Pt stated this started last night around 7 then woke up and it was more exaggerated, upon EMS arrival, Pt was nearly non-verbal but able to follow commands.    CT of Brain:  IMPRESSION:     1. No acute intracranial hemorrhage, acute transcortical territorial  infarction, midline shift, or significant mass effect.  2. 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    CXR: Negative    MRI of Brain:   1.   Suspect small acute/subacute infarct within the posterior limb of the  left internal capsule versus artifact.  2.   Scattered microhemorrhages in the bilateral cerebral hemispheres, most  prominent in the right temporoparietal lobes. Distribution of findings  raises suspicion for cerebral amyloid angiopathy versus hypertensive  microangiopathy.    SUBJECTIVE  - Patient verbally agrees to allow the following to be present during the session: spouse    \"They said I'm going home today!\"    Functional Cognition:    - Expression is verbal.     - Following commands intact.      Affect/Behavior: alert, pleasant, calm, cooperative and appropriate    Pain at onset of session:   Patient reports pain is not an issue/concern., Patient does not demonstrate pain behaviors.      OBJECTIVE     Oral Mechanism   Oral Motor Assessment: impaired    Cranial Nerve Exam - Speech & Swallow  Facial Nerve (VII)  - Motor: right sided labial asymmetry and right facial droop  - Sensory: secretions -  intact  Glossopharyngeal (IX)(motor) & Vagus (X) (motor)  - Soft Palate: symmetrical elevation  - Laryngeal Mechanism: functional voice  Hypoglossal (XII)  - Motor: symmetrical protrusion and weakness to resistance    Diet prior to visit (see the Recommendations section below for up-to-date swallow recommendations): Liquid- Thin and IDDSI 7 Easy-to-Chew  - Dentition: intact  - Feeding: feeds self    Swallow  No temperature spike noted.  Patient positioning: upright in bed  Pretrial vocal quality: normal  Volitional swallow: present. No pain associated with swallow.    Numbers listed with consistency indicate IDDSI diet level.    Self fed; straw; cup; single swallow; sequential swallow   - Oral phase: intact   - Pharyngeal phase: clinically unremarkable    Self fed; pureed (4)   - Oral: intact   - Pharyngeal phase: clinically unremarkable    Self fed; regular   - Oral: intact   - Pharyngeal phase: clinically unremarkable    Esophageal symptoms: not present      Motor Speech    - Speech intelligibility: intelligible for daily needs, unknown context needs repetition cues.   - Articulation: reduced articulatory effort, slurred, slow formulation, imprecise consonants and apraxic errors   - Prosody: slow rate, mono loudness, mono pitch and poor intonation   - Verbal Apraxia: not present   - Oral Apraxia: not present   - Dysarthria: moderate   - Phonation: Normal   - Resonance: within normal limits  Treatment of Speech for Apraxia    - verbal   - Multisyllabic words:         Imitation: 90% accuracy        Oral Readin% accuracy         with minimal cues        Type of cueing utilized: repetition and visual   - Automatics:         Independent Production: 75% accuracy        with minimal cues        Type of cueing utilized: repetition and visual   - Phrases:        Oral Readin% accuracy        with minimal cues        Type of cueing utilized: repetition and visual    Sentences:         Imitation: 80% accuracy         with minimal cues        Type of cueing utilized: repetition and visual  Speech Intelligibility Rating    - Sentence Level: 75% accuracy        minimal cues.   - Conversation Level: 60% accuracy        minimal cues.  Summary and Details   - Communication Partner Training: completed   - Interfering Components: dysarthria and apraxia  Pt educated on and utilized slow rate, loud voice, over-articulation and increased breath support (SLOB) during repetition and oral reading of multisyllabic words, phrases and sentences with improvement noted in speech production and intelligibility across all contexts.          Test and Outcome Measures  Outcome Measures:   Functional Oral Intake Scale (FOIS): FOIS: Level 7 - Total oral diet with no restrictions            Education:   - Present and ready to learn: patient, with patient present and patient's family  Education provided during session:  - dysphagia, aspiration precautions, dysarthria, role of SLP, communication, oral care and apraxia  - Stroke Education: self-advocacy/peer support, caregiver support, adapting to stroke and signs and symptoms of stroke (stroke survivor's group)  - Results of above outlined education: Verbalizes understanding and Demonstrates understanding    ASSESSMENT  Progress: Progressing toward goals  Interfering components: acuity and current medical conditions    Discharge needs based on today's assessment:  - Current level of function: significantly below baseline level of function  - Therapy needs at discharge: outpatient therapy 1-3 times per week  - ADL / IADLs (activities / instrumental activities of daily living) requiring support at discharge: compensatory strategies and communication  - Impairments that require further therapy intervention: motor speech    Pt presents with acute motor speech impairment in the setting of suspected CVA,  most consistent with mild-moderate dysarthria, min-mild verbal apraxia characterized by slurred speech,  imprecise consonants, reduced articulatory effort, slow formulation, difficulty with production of multisyllabic words, phrases and sentences, reduced prosody and intonation. Oral Motor Evaluation characterized by mild right facial asymmetry and reduced lingual strength and precision.  Swallow function grossly intact across all consistencies. No signs/symptoms of aspiration. Pt denies dysphagia, globus or odynophagia. Tolerating solids/liquids/meds without difficulty.   Discussed with pt, pt's family, RN, CM, SW, Neurologist and attending MD.    Recommend to advance diet to Regular Solids/Thin  Liquids. Able to self-feed and take meds whole with liquid wash.  SLP will continue to follow targeting motor speech. Will benefit from OP ST post DC.     PLAN (while hospitalized)  Motor speech tx    SLP Frequency: 3-5 x per week           RECOMMENDATIONS     -Diet:          *Liquid- Thin and Regular    -Medication Administration:         *whole and with liquids    -Feeding Guidelines:          *small bites , slow rate of intake , alternate liquids and solids , feeds self , stay upright after meals , small single sips  , periodic liquid wash  and sit up straight in bed/chair     -Speech Reviewed Swallow:         *with patient/family, with clinical caregivers and feeding guidelines posted in room    -Communication Cognition:          *Patient continues to demonstrate acute communication and cognition deficits, will continue to follow.  Recommend the provider order outpatient speech therapy at discharge.      GOALS  Review Date: 5/22/2025  Short Term Goals:   Swallowing: Using compensatory swallow strategies, pt will manage IDDSI 7-(Easy to Chew), Thin Liquids diet with optimum safety and efficiency of swallow function without aspiration related sequelae.   Status: Met    Swallowing: Using compensatory swallow strategies, pt will manage Regular Solids,Thin Liquids diet with optimum safety and efficiency of swallow function  without aspiration related sequelae.   Status: Initial    Speech: Pt will use speech intelligibility strategies at the phrase and sentence level given min cues with 95% accuracy to increase overall intelligibility and ability to clearly express thoughts and ideas.   Status: Progressing             Long Term Goals: (to be met by time of discharge from hospital)  Swallowing: Using compensatory swallow strategies, pt will manage least restrictive diet with optimum safety and efficiency of swallow function without aspiration related sequelae.   Status: Progressing    Speech: Pt will use speech intelligibility strategies across all contexts of speech given min to no cues cues with 95% accuracy to increase overall intelligibility and ability to clearly express thoughts and ideas. Status: Progressing        Patient at End of Session:   Location: in bed  Safety measures: call light within reach and equipment intact  Documented in the chart in the following areas: Assessment.         Therapy procedure time and total treatment time can be found documented on the Time Entry flowsheet

## 2025-05-24 ENCOUNTER — HOSPITAL ENCOUNTER (EMERGENCY)
Facility: HOSPITAL | Age: 35
Discharge: HOME | End: 2025-05-24
Attending: EMERGENCY MEDICINE
Payer: COMMERCIAL

## 2025-05-24 VITALS
TEMPERATURE: 99 F | DIASTOLIC BLOOD PRESSURE: 64 MMHG | RESPIRATION RATE: 19 BRPM | SYSTOLIC BLOOD PRESSURE: 102 MMHG | OXYGEN SATURATION: 100 % | HEART RATE: 105 BPM
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
## 2025-05-24 DIAGNOSIS — S09.90XA CLOSED HEAD INJURY, INITIAL ENCOUNTER: ICD-10-CM

## 2025-05-24 DIAGNOSIS — R00.0 TACHYCARDIA: ICD-10-CM

## 2025-05-24 LAB
ABSOLUTE EOSINOPHIL (OHS): 0.03 K/UL
ABSOLUTE MONOCYTE (OHS): 1.45 K/UL (ref 0.3–1)
ABSOLUTE NEUTROPHIL COUNT (OHS): 11.14 K/UL (ref 1.8–7.7)
ALBUMIN SERPL BCP-MCNC: 3.6 G/DL (ref 3.5–5.2)
ALP SERPL-CCNC: 98 UNIT/L (ref 40–150)
ALT SERPL W/O P-5'-P-CCNC: 33 UNIT/L (ref 10–44)
ANION GAP (OHS): 8 MMOL/L (ref 8–16)
AST SERPL-CCNC: 32 UNIT/L (ref 11–45)
B-HCG UR QL: NEGATIVE
BACTERIA #/AREA URNS AUTO: ABNORMAL /HPF
BASOPHILS # BLD AUTO: 0.09 K/UL
BASOPHILS NFR BLD AUTO: 0.6 %
BILIRUB SERPL-MCNC: 1.6 MG/DL (ref 0.1–1)
BILIRUB UR QL STRIP.AUTO: NEGATIVE
BUN SERPL-MCNC: 9 MG/DL (ref 6–20)
CALCIUM SERPL-MCNC: 8.2 MG/DL (ref 8.7–10.5)
CHLORIDE SERPL-SCNC: 111 MMOL/L (ref 95–110)
CLARITY UR: ABNORMAL
CO2 SERPL-SCNC: 22 MMOL/L (ref 23–29)
COLOR UR AUTO: YELLOW
CREAT SERPL-MCNC: 0.7 MG/DL (ref 0.5–1.4)
CTP QC/QA: YES
ERYTHROCYTE [DISTWIDTH] IN BLOOD BY AUTOMATED COUNT: 15 % (ref 11.5–14.5)
GFR SERPLBLD CREATININE-BSD FMLA CKD-EPI: >60 ML/MIN/1.73/M2
GLUCOSE SERPL-MCNC: 79 MG/DL (ref 70–110)
GLUCOSE UR QL STRIP: NEGATIVE
HCT VFR BLD AUTO: 24.6 % (ref 37–48.5)
HCV AB SERPL QL IA: NORMAL
HGB BLD-MCNC: 8.1 GM/DL (ref 12–16)
HGB UR QL STRIP: ABNORMAL
HIV 1+2 AB+HIV1 P24 AG SERPL QL IA: NORMAL
HOLD SPECIMEN: NORMAL
HOLD SPECIMEN: NORMAL
IMM GRANULOCYTES # BLD AUTO: 0.09 K/UL (ref 0–0.04)
IMM GRANULOCYTES NFR BLD AUTO: 0.6 % (ref 0–0.5)
KETONES UR QL STRIP: NEGATIVE
LEUKOCYTE ESTERASE UR QL STRIP: ABNORMAL
LYMPHOCYTES # BLD AUTO: 1.3 K/UL (ref 1–4.8)
MCH RBC QN AUTO: 29.1 PG (ref 27–31)
MCHC RBC AUTO-ENTMCNC: 32.9 G/DL (ref 32–36)
MCV RBC AUTO: 89 FL (ref 82–98)
MICROSCOPIC COMMENT: ABNORMAL
NITRITE UR QL STRIP: NEGATIVE
NUCLEATED RBC (/100WBC) (OHS): 0 /100 WBC
PH UR STRIP: 7 [PH]
PLATELET # BLD AUTO: 517 K/UL (ref 150–450)
PMV BLD AUTO: 9.1 FL (ref 9.2–12.9)
POTASSIUM SERPL-SCNC: 4.2 MMOL/L (ref 3.5–5.1)
PROT SERPL-MCNC: 6.3 GM/DL (ref 6–8.4)
PROT UR QL STRIP: NEGATIVE
RBC # BLD AUTO: 2.78 M/UL (ref 4–5.4)
RBC #/AREA URNS AUTO: 27 /HPF (ref 0–4)
RELATIVE EOSINOPHIL (OHS): 0.2 %
RELATIVE LYMPHOCYTE (OHS): 9.2 % (ref 18–48)
RELATIVE MONOCYTE (OHS): 10.3 % (ref 4–15)
RELATIVE NEUTROPHIL (OHS): 79.1 % (ref 38–73)
SODIUM SERPL-SCNC: 141 MMOL/L (ref 136–145)
SP GR UR STRIP: 1.01
SQUAMOUS #/AREA URNS AUTO: 1 /HPF
TROPONIN I SERPL HS-MCNC: 5 NG/L
UROBILINOGEN UR STRIP-ACNC: NEGATIVE EU/DL
WBC # BLD AUTO: 14.1 K/UL (ref 3.9–12.7)
WBC #/AREA URNS AUTO: 12 /HPF (ref 0–5)

## 2025-05-24 PROCEDURE — 63600175 PHARM REV CODE 636 W HCPCS: Performed by: EMERGENCY MEDICINE

## 2025-05-24 PROCEDURE — 84484 ASSAY OF TROPONIN QUANT: CPT

## 2025-05-24 PROCEDURE — 96374 THER/PROPH/DIAG INJ IV PUSH: CPT

## 2025-05-24 PROCEDURE — 80053 COMPREHEN METABOLIC PANEL: CPT

## 2025-05-24 PROCEDURE — 85025 COMPLETE CBC W/AUTO DIFF WBC: CPT

## 2025-05-24 PROCEDURE — 63600175 PHARM REV CODE 636 W HCPCS

## 2025-05-24 PROCEDURE — 87389 HIV-1 AG W/HIV-1&-2 AB AG IA: CPT | Performed by: PHYSICIAN ASSISTANT

## 2025-05-24 PROCEDURE — 87086 URINE CULTURE/COLONY COUNT: CPT

## 2025-05-24 PROCEDURE — 99285 EMERGENCY DEPT VISIT HI MDM: CPT | Mod: 25

## 2025-05-24 PROCEDURE — 96375 TX/PRO/DX INJ NEW DRUG ADDON: CPT

## 2025-05-24 PROCEDURE — 86803 HEPATITIS C AB TEST: CPT | Performed by: PHYSICIAN ASSISTANT

## 2025-05-24 PROCEDURE — 81025 URINE PREGNANCY TEST: CPT

## 2025-05-24 PROCEDURE — 81003 URINALYSIS AUTO W/O SCOPE: CPT

## 2025-05-24 PROCEDURE — 93005 ELECTROCARDIOGRAM TRACING: CPT

## 2025-05-24 PROCEDURE — 93010 ELECTROCARDIOGRAM REPORT: CPT | Mod: ,,, | Performed by: INTERNAL MEDICINE

## 2025-05-24 RX ORDER — DIPHENHYDRAMINE HYDROCHLORIDE 50 MG/ML
12.5 INJECTION, SOLUTION INTRAMUSCULAR; INTRAVENOUS
Status: COMPLETED | OUTPATIENT
Start: 2025-05-24 | End: 2025-05-24

## 2025-05-24 RX ORDER — FENTANYL CITRATE 50 UG/ML
50 INJECTION, SOLUTION INTRAMUSCULAR; INTRAVENOUS
Refills: 0 | Status: COMPLETED | OUTPATIENT
Start: 2025-05-24 | End: 2025-05-24

## 2025-05-24 RX ORDER — HEPARIN 100 UNIT/ML
500 SYRINGE INTRAVENOUS ONCE
Status: COMPLETED | OUTPATIENT
Start: 2025-05-24 | End: 2025-05-24

## 2025-05-24 RX ADMIN — FENTANYL CITRATE 50 MCG: 50 INJECTION, SOLUTION INTRAMUSCULAR; INTRAVENOUS at 05:05

## 2025-05-24 RX ADMIN — HEPARIN 500 UNITS: 100 SYRINGE at 10:05

## 2025-05-24 RX ADMIN — DIPHENHYDRAMINE HYDROCHLORIDE 12.5 MG: 50 INJECTION INTRAMUSCULAR; INTRAVENOUS at 06:05
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
## 2025-05-25 LAB
OHS QRS DURATION: 68 MS
OHS QRS DURATION: 70 MS
OHS QTC CALCULATION: 467 MS
OHS QTC CALCULATION: 470 MS
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
## 2025-05-27 ENCOUNTER — RESULTS FOLLOW-UP (OUTPATIENT)
Dept: EMERGENCY MEDICINE | Facility: HOSPITAL | Age: 35
End: 2025-05-27

## 2025-05-27 LAB — BACTERIA UR CULT: ABNORMAL

## 2025-05-27 RX ORDER — SULFAMETHOXAZOLE AND TRIMETHOPRIM 800; 160 MG/1; MG/1
1 TABLET ORAL 2 TIMES DAILY
Qty: 14 TABLET | Refills: 0 | Status: SHIPPED | OUTPATIENT
Start: 2025-05-27 | End: 2025-06-03

## (undated) DEVICE — SPONGE BULKEE II ABSRB 6X6.75

## (undated) DEVICE — APPLICATOR CHLORAPREP ORN 26ML

## (undated) DEVICE — TOWEL OR DISP STRL BLUE 4/PK

## (undated) DEVICE — DRAPE STERI-DRAPE 1000 17X11IN

## (undated) DEVICE — DRESSING XEROFORM FOIL PK 1X8

## (undated) DEVICE — TAPE MEDIPORE 4IN X 2YDS

## (undated) DEVICE — GLOVE SURG ULTRA TOUCH 8.5

## (undated) DEVICE — PACK SET UP 190 OMC-NS

## (undated) DEVICE — Device

## (undated) DEVICE — SEE MEDLINE ITEM 157118

## (undated) DEVICE — SOL 9P NACL IRR PIC IL

## (undated) DEVICE — SUT ETHILON 3-0 FS-1 30

## (undated) DEVICE — SEE MEDLINE ITEM 157131

## (undated) DEVICE — UNDERGLOVES BIOGEL PI SIZE 8.5

## (undated) DEVICE — COVER SURG LIGHT HANDLE

## (undated) DEVICE — GLOVE SURG ULTRA TOUCH 8

## (undated) DEVICE — DRAPE SHOULDER 160X102IN 10/CA

## (undated) DEVICE — SEE MEDLINE ITEM 157117

## (undated) DEVICE — DRAPE STERI U-SHAPED 47X51IN

## (undated) DEVICE — DRAPE PLASTIC U 60X72

## (undated) DEVICE — PACK BASIC

## (undated) DEVICE — TUBING SUC UNIV W/CONN 12FT

## (undated) DEVICE — ALCOHOL 70% ISOP RUBBING 4OZ

## (undated) DEVICE — STRAP OR TABLE 5IN X 72IN

## (undated) DEVICE — SLEEVE SCD EXPRESS KNEE MEDIUM

## (undated) DEVICE — PAD ABD 8X10 STERILE